# Patient Record
Sex: FEMALE | Race: WHITE | NOT HISPANIC OR LATINO | Employment: OTHER | URBAN - METROPOLITAN AREA
[De-identification: names, ages, dates, MRNs, and addresses within clinical notes are randomized per-mention and may not be internally consistent; named-entity substitution may affect disease eponyms.]

---

## 2017-02-15 ENCOUNTER — OFFICE VISIT (OUTPATIENT)
Dept: INTERNAL MEDICINE | Age: 72
End: 2017-02-15

## 2017-02-15 VITALS
SYSTOLIC BLOOD PRESSURE: 134 MMHG | TEMPERATURE: 97.6 F | WEIGHT: 136.2 LBS | BODY MASS INDEX: 25.06 KG/M2 | HEART RATE: 107 BPM | DIASTOLIC BLOOD PRESSURE: 84 MMHG | HEIGHT: 62 IN

## 2017-02-15 DIAGNOSIS — Z23 ENCOUNTER FOR IMMUNIZATION: ICD-10-CM

## 2017-02-15 DIAGNOSIS — I10 ESSENTIAL HYPERTENSION: ICD-10-CM

## 2017-02-15 DIAGNOSIS — M54.50 ACUTE BILATERAL LOW BACK PAIN WITHOUT SCIATICA: ICD-10-CM

## 2017-02-15 DIAGNOSIS — R09.89 PALPABLE ABDOMINAL AORTA: ICD-10-CM

## 2017-02-15 DIAGNOSIS — E28.39: ICD-10-CM

## 2017-02-15 DIAGNOSIS — Z00.00 MEDICARE ANNUAL WELLNESS VISIT, SUBSEQUENT: Primary | ICD-10-CM

## 2017-02-15 DIAGNOSIS — K21.9 GASTROESOPHAGEAL REFLUX DISEASE WITHOUT ESOPHAGITIS: ICD-10-CM

## 2017-02-15 DIAGNOSIS — E78.5 HYPERLIPIDEMIA, UNSPECIFIED HYPERLIPIDEMIA TYPE: ICD-10-CM

## 2017-02-15 LAB
ALBUMIN SERPL-MCNC: 5.1 G/DL (ref 3.5–5.2)
ALBUMIN/GLOB SERPL: 1.9 G/DL
ALP SERPL-CCNC: 98 U/L (ref 39–117)
ALT SERPL-CCNC: 22 U/L (ref 1–33)
AST SERPL-CCNC: 27 U/L (ref 1–32)
BASOPHILS # BLD AUTO: 0.04 10*3/MM3 (ref 0–0.2)
BASOPHILS NFR BLD AUTO: 0.4 % (ref 0–1.5)
BILIRUB SERPL-MCNC: 0.4 MG/DL (ref 0.1–1.2)
BUN SERPL-MCNC: 15 MG/DL (ref 8–23)
BUN/CREAT SERPL: 17.9 (ref 7–25)
CALCIUM SERPL-MCNC: 9.9 MG/DL (ref 8.6–10.5)
CHLORIDE SERPL-SCNC: 102 MMOL/L (ref 98–107)
CHOLEST SERPL-MCNC: 184 MG/DL (ref 0–200)
CO2 SERPL-SCNC: 24.5 MMOL/L (ref 22–29)
CREAT SERPL-MCNC: 0.84 MG/DL (ref 0.57–1)
EOSINOPHIL # BLD AUTO: 0.06 10*3/MM3 (ref 0–0.7)
EOSINOPHIL NFR BLD AUTO: 0.6 % (ref 0.3–6.2)
ERYTHROCYTE [DISTWIDTH] IN BLOOD BY AUTOMATED COUNT: 13 % (ref 11.7–13)
GLOBULIN SER CALC-MCNC: 2.7 GM/DL
GLUCOSE SERPL-MCNC: 109 MG/DL (ref 65–99)
HCT VFR BLD AUTO: 46 % (ref 35.6–45.5)
HDLC SERPL-MCNC: 78 MG/DL (ref 40–60)
HGB BLD-MCNC: 14.5 G/DL (ref 11.9–15.5)
IMM GRANULOCYTES # BLD: 0.03 10*3/MM3 (ref 0–0.03)
IMM GRANULOCYTES NFR BLD: 0.3 % (ref 0–0.5)
LDLC SERPL CALC-MCNC: 81 MG/DL (ref 0–100)
LYMPHOCYTES # BLD AUTO: 1.41 10*3/MM3 (ref 0.9–4.8)
LYMPHOCYTES NFR BLD AUTO: 13.7 % (ref 19.6–45.3)
MCH RBC QN AUTO: 31.7 PG (ref 26.9–32)
MCHC RBC AUTO-ENTMCNC: 31.5 G/DL (ref 32.4–36.3)
MCV RBC AUTO: 100.4 FL (ref 80.5–98.2)
MONOCYTES # BLD AUTO: 0.54 10*3/MM3 (ref 0.2–1.2)
MONOCYTES NFR BLD AUTO: 5.3 % (ref 5–12)
NEUTROPHILS # BLD AUTO: 8.18 10*3/MM3 (ref 1.9–8.1)
NEUTROPHILS NFR BLD AUTO: 79.7 % (ref 42.7–76)
PLATELET # BLD AUTO: 305 10*3/MM3 (ref 140–500)
POTASSIUM SERPL-SCNC: 4.1 MMOL/L (ref 3.5–5.2)
PROT SERPL-MCNC: 7.8 G/DL (ref 6–8.5)
RBC # BLD AUTO: 4.58 10*6/MM3 (ref 3.9–5.2)
SODIUM SERPL-SCNC: 144 MMOL/L (ref 136–145)
TRIGL SERPL-MCNC: 124 MG/DL (ref 0–150)
VLDLC SERPL CALC-MCNC: 24.8 MG/DL (ref 5–40)
WBC # BLD AUTO: 10.26 10*3/MM3 (ref 4.5–10.7)

## 2017-02-15 PROCEDURE — 99214 OFFICE O/P EST MOD 30 MIN: CPT | Performed by: INTERNAL MEDICINE

## 2017-02-15 PROCEDURE — G0009 ADMIN PNEUMOCOCCAL VACCINE: HCPCS | Performed by: INTERNAL MEDICINE

## 2017-02-15 PROCEDURE — G0439 PPPS, SUBSEQ VISIT: HCPCS | Performed by: INTERNAL MEDICINE

## 2017-02-15 PROCEDURE — 90670 PCV13 VACCINE IM: CPT | Performed by: INTERNAL MEDICINE

## 2017-02-15 RX ORDER — CYCLOBENZAPRINE HCL 5 MG
5 TABLET ORAL 3 TIMES DAILY
Qty: 21 TABLET | Refills: 0 | Status: SHIPPED | OUTPATIENT
Start: 2017-02-15 | End: 2017-06-05 | Stop reason: SDUPTHER

## 2017-02-15 NOTE — PROGRESS NOTES
Subjective   Neela Ramirez is a 71 y.o. female.     History of Present Illness     HTN: Patient is compliant with medication, dietary salt restriction, not regularly monitor blood pressure the outpatient setting.  Is not exercise on a regular basis.    Hyperlipidemia: She is compliant with meds, diet, and is fasting for lab work.    Esophageal reflux on medication, symptoms are controlled to her satisfaction.  Without side effects.    Acute bilateral low back pain.  She noticed this after a fall he denied any radiculopathy, no fever, chills, sweats, nausea, vomiting, diarrhea, anorexia, GI or  incontinence.    Ovarian failure, patient needs to have bone density test updated today    Immunization update, Prevnar be provided.    The following portions of the patient's history were reviewed and updated as appropriate: allergies, current medications, past family history, past medical history, past social history, past surgical history and problem list.    Review of Systems   Constitutional: Negative.    HENT: Negative.    Eyes: Negative.    Respiratory: Negative.    Cardiovascular: Negative.    Gastrointestinal: Negative.    Genitourinary: Negative.    Musculoskeletal: Negative.    Skin: Negative.    Neurological: Negative.    Hematological: Negative.    Psychiatric/Behavioral: Negative.        Objective   Physical Exam   Constitutional: She is oriented to person, place, and time. She appears well-developed and well-nourished. No distress.   HENT:   Head: Normocephalic and atraumatic.   Right Ear: Tympanic membrane, external ear and ear canal normal.   Left Ear: Tympanic membrane, external ear and ear canal normal.   Mouth/Throat: Uvula is midline, oropharynx is clear and moist and mucous membranes are normal.   Eyes: Conjunctivae and EOM are normal. Pupils are equal, round, and reactive to light.   Neck: Normal range of motion. Neck supple. No JVD present. Carotid bruit is not present. No thyromegaly present.    Cardiovascular: Normal rate, regular rhythm, normal heart sounds and intact distal pulses.  Exam reveals no gallop and no friction rub.    No murmur heard.  Pulmonary/Chest: Effort normal and breath sounds normal. She has no wheezes. She has no rales.   Abdominal: Soft. Bowel sounds are normal. There is no hepatosplenomegaly. There is no tenderness.   Genitourinary:   Genitourinary Comments: Defer to GYN   Musculoskeletal: Normal range of motion. She exhibits no edema or deformity.   Leg raising test negative bilaterally, area of tenderness is the insertion of the musculature on the superior pelvic brim bilaterally.   Lymphadenopathy:     She has no cervical adenopathy.   Neurological: She is alert and oriented to person, place, and time. She has normal strength and normal reflexes. No cranial nerve deficit or sensory deficit. Coordination normal.   Skin: Skin is warm and dry. No rash noted.   Psychiatric: She has a normal mood and affect. Her speech is normal and behavior is normal. Judgment and thought content normal. Cognition and memory are normal.   Nursing note and vitals reviewed.      Assessment/Plan   Neela was seen today for annual exam.    Diagnoses and all orders for this visit:    Medicare annual wellness visit, subsequent  -     CBC & Differential    Essential hypertension  -     Comprehensive Metabolic Panel    Hyperlipidemia, unspecified hyperlipidemia type  -     Lipid Panel    Gastroesophageal reflux disease without esophagitis    Acute bilateral low back pain without sciatica    Premature ovarian failure type 7  -     DEXA Bone Density Axial    Encounter for immunization  -     Pneumococcal Conjugate Vaccine 13-[employer] All    Palpable abdominal aorta  -     US AAA Screen Limited      #1 Medicare subsequent annual wellness visit, clinically stable, see comments above recommend repeat exam one year, check CBC today, patient is aware that this is a noncovered benefit and will sign an ABN for this  laboratory test.    Hypertension stable on current medication.  Plan: Same meds, blood pressure check 4 months check CMP as above, follow-up results by mail.    #3 hyperlipidemia on medication, status to be determined.  Plan: Continue meds, check lipid today results will be followed up by mail.    #4 esophageal reflux, continue meds.    #5 Back pain SPECT secondary to myofascial strain, is no alarm symptoms noted, I do not suspect degenerative disc disease of the lumbar spine.  Recommend local heat, exercises, she will be provided, Advil as needed.  We'll also prescribe Flexeril 5 mg 3 times a day for 7 days.    #6 ovarian failure, check DEXA.    #7 immunization update, Prevnar today.    #8 palpable abdominal aorta, check ultrasound.

## 2017-02-23 ENCOUNTER — HOSPITAL ENCOUNTER (OUTPATIENT)
Dept: BONE DENSITY | Facility: HOSPITAL | Age: 72
Discharge: HOME | End: 2017-02-23
Admitting: INTERNAL MEDICINE

## 2017-02-23 ENCOUNTER — HOSPITAL ENCOUNTER (OUTPATIENT)
Dept: ULTRASOUND IMAGING | Facility: HOSPITAL | Age: 72
Discharge: HOME | End: 2017-02-23

## 2017-02-23 PROCEDURE — 77080 DXA BONE DENSITY AXIAL: CPT

## 2017-02-23 PROCEDURE — 76775 US EXAM ABDO BACK WALL LIM: CPT

## 2017-03-20 DIAGNOSIS — I10 ESSENTIAL HYPERTENSION: ICD-10-CM

## 2017-03-20 RX ORDER — METOPROLOL SUCCINATE 25 MG/1
TABLET, EXTENDED RELEASE ORAL
Qty: 90 TABLET | Refills: 0 | Status: SHIPPED | OUTPATIENT
Start: 2017-03-20 | End: 2017-06-20 | Stop reason: SDUPTHER

## 2017-03-20 RX ORDER — AMLODIPINE BESYLATE 5 MG/1
TABLET ORAL
Qty: 90 TABLET | Refills: 0 | Status: SHIPPED | OUTPATIENT
Start: 2017-03-20 | End: 2017-06-20 | Stop reason: SDUPTHER

## 2017-03-21 ENCOUNTER — TELEPHONE (OUTPATIENT)
Dept: INTERNAL MEDICINE | Age: 72
End: 2017-03-21

## 2017-03-21 RX ORDER — ALENDRONATE SODIUM 35 MG/1
70 TABLET ORAL
Qty: 12 TABLET | Refills: 0 | Status: SHIPPED | OUTPATIENT
Start: 2017-03-21 | End: 2017-05-08 | Stop reason: SDUPTHER

## 2017-04-14 DIAGNOSIS — F41.9 ANXIETY: Primary | ICD-10-CM

## 2017-04-14 RX ORDER — ESCITALOPRAM OXALATE 20 MG/1
TABLET ORAL
Qty: 90 TABLET | Refills: 0 | Status: SHIPPED | OUTPATIENT
Start: 2017-04-14 | End: 2017-07-13 | Stop reason: SDUPTHER

## 2017-05-09 RX ORDER — ALENDRONATE SODIUM 35 MG/1
TABLET ORAL
Qty: 12 TABLET | Refills: 0 | Status: SHIPPED | OUTPATIENT
Start: 2017-05-09 | End: 2017-06-17 | Stop reason: SDUPTHER

## 2017-06-05 DIAGNOSIS — M54.50 ACUTE BILATERAL LOW BACK PAIN WITHOUT SCIATICA: ICD-10-CM

## 2017-06-06 RX ORDER — CYCLOBENZAPRINE HCL 5 MG
TABLET ORAL
Qty: 21 TABLET | Refills: 0 | Status: SHIPPED | OUTPATIENT
Start: 2017-06-06 | End: 2017-07-17 | Stop reason: SDUPTHER

## 2017-06-09 DIAGNOSIS — M54.50 ACUTE BILATERAL LOW BACK PAIN WITHOUT SCIATICA: ICD-10-CM

## 2017-06-09 RX ORDER — CYCLOBENZAPRINE HCL 5 MG
TABLET ORAL
Qty: 21 TABLET | Refills: 0 | OUTPATIENT
Start: 2017-06-09

## 2017-06-19 RX ORDER — ALENDRONATE SODIUM 35 MG/1
TABLET ORAL
Qty: 12 TABLET | Refills: 0 | Status: SHIPPED | OUTPATIENT
Start: 2017-06-19 | End: 2017-09-19 | Stop reason: SDUPTHER

## 2017-06-20 DIAGNOSIS — I10 ESSENTIAL HYPERTENSION: ICD-10-CM

## 2017-06-20 RX ORDER — AMLODIPINE BESYLATE 5 MG/1
TABLET ORAL
Qty: 90 TABLET | Refills: 0 | Status: SHIPPED | OUTPATIENT
Start: 2017-06-20 | End: 2017-09-16 | Stop reason: SDUPTHER

## 2017-06-20 RX ORDER — METOPROLOL SUCCINATE 25 MG/1
TABLET, EXTENDED RELEASE ORAL
Qty: 90 TABLET | Refills: 0 | Status: SHIPPED | OUTPATIENT
Start: 2017-06-20 | End: 2017-09-16 | Stop reason: SDUPTHER

## 2017-06-21 DIAGNOSIS — I10 ESSENTIAL HYPERTENSION: ICD-10-CM

## 2017-06-22 RX ORDER — AMLODIPINE BESYLATE 5 MG/1
TABLET ORAL
Qty: 90 TABLET | Refills: 0 | OUTPATIENT
Start: 2017-06-22

## 2017-06-22 RX ORDER — METOPROLOL SUCCINATE 25 MG/1
TABLET, EXTENDED RELEASE ORAL
Qty: 90 TABLET | Refills: 0 | OUTPATIENT
Start: 2017-06-22

## 2017-07-13 DIAGNOSIS — F41.9 ANXIETY: ICD-10-CM

## 2017-07-14 RX ORDER — ESCITALOPRAM OXALATE 20 MG/1
TABLET ORAL
Qty: 90 TABLET | Refills: 0 | Status: SHIPPED | OUTPATIENT
Start: 2017-07-14 | End: 2018-05-22

## 2017-07-17 ENCOUNTER — OFFICE VISIT (OUTPATIENT)
Dept: INTERNAL MEDICINE | Age: 72
End: 2017-07-17

## 2017-07-17 ENCOUNTER — HOSPITAL ENCOUNTER (OUTPATIENT)
Dept: GENERAL RADIOLOGY | Facility: HOSPITAL | Age: 72
Discharge: HOME | End: 2017-07-17
Admitting: INTERNAL MEDICINE

## 2017-07-17 ENCOUNTER — HOSPITAL ENCOUNTER (OUTPATIENT)
Dept: GENERAL RADIOLOGY | Facility: HOSPITAL | Age: 72
Discharge: HOME | End: 2017-07-17

## 2017-07-17 VITALS
BODY MASS INDEX: 24.07 KG/M2 | HEIGHT: 62 IN | SYSTOLIC BLOOD PRESSURE: 136 MMHG | OXYGEN SATURATION: 98 % | DIASTOLIC BLOOD PRESSURE: 72 MMHG | TEMPERATURE: 98.6 F | HEART RATE: 105 BPM | WEIGHT: 130.8 LBS

## 2017-07-17 DIAGNOSIS — M25.552 PAIN OF BOTH HIP JOINTS: ICD-10-CM

## 2017-07-17 DIAGNOSIS — I10 ESSENTIAL HYPERTENSION: Primary | ICD-10-CM

## 2017-07-17 DIAGNOSIS — M25.551 PAIN OF BOTH HIP JOINTS: ICD-10-CM

## 2017-07-17 DIAGNOSIS — M54.50 ACUTE BILATERAL LOW BACK PAIN WITHOUT SCIATICA: ICD-10-CM

## 2017-07-17 PROCEDURE — 99214 OFFICE O/P EST MOD 30 MIN: CPT | Performed by: INTERNAL MEDICINE

## 2017-07-17 PROCEDURE — 73521 X-RAY EXAM HIPS BI 2 VIEWS: CPT

## 2017-07-17 RX ORDER — CYCLOBENZAPRINE HCL 5 MG
5 TABLET ORAL NIGHTLY PRN
Qty: 21 TABLET | Refills: 0 | COMMUNITY
Start: 2017-07-17 | End: 2018-05-22

## 2017-07-17 RX ORDER — MELOXICAM 7.5 MG/1
7.5 TABLET ORAL DAILY
Qty: 30 TABLET | Refills: 1 | Status: SHIPPED | OUTPATIENT
Start: 2017-07-17 | End: 2018-01-04

## 2017-07-17 NOTE — PROGRESS NOTES
"Neela JOSE James / 72 y.o. / female  07/17/2017  VITALS    /72  Pulse 105  Temp 98.6 °F (37 °C)  Ht 61.5\" (156.2 cm)  Wt 130 lb 12.8 oz (59.3 kg)  SpO2 98%  BMI 24.31 kg/m2  BP Readings from Last 3 Encounters:   07/17/17 136/72   02/15/17 134/84   08/11/16 138/70     Wt Readings from Last 3 Encounters:   07/17/17 130 lb 12.8 oz (59.3 kg)   02/15/17 136 lb 3.2 oz (61.8 kg)   08/11/16 135 lb 6.4 oz (61.4 kg)      Body mass index is 24.31 kg/(m^2).    CC:  Main reason(s) for today's visit: Hypertension      HPI:     Patient presents today for four-month follow-up for hypertension.  When last seen by me, her blood pressure was well-controlled.    Hypertension: She is compliant with medication, dietary salt restriction, exercise has been limited because of the hot weather, she does not regularly monitor blood pressure the outpatient setting.  There are no medication side effects noted.    Hip pain, noted over the past 4 weeks.  Pain begins in the posterior region of the hip, with radiation around to the groin bilaterally.  Pain seems to improve with lying supine in the evening.  Pain is worsened with weightbearing, and getting in and out of her new car.  She does not have running towards.  Pain is up to 8/10 in severity, aching in quality, and may last up to all day long.    Patient Care Team:  Pj Ramirez MD as PCP - General  Pj Ramirez MD as PCP - Claims Attributed    ____________________________________________________________________    ASSESSMENT & PLAN:    Problem List Items Addressed This Visit        Unprioritized    Essential hypertension - Primary    Relevant Medications    metoprolol succinate XL (TOPROL-XL) 25 MG 24 hr tablet    amLODIPine (NORVASC) 5 MG tablet    Bilateral low back pain without sciatica    Relevant Medications    cyclobenzaprine (FLEXERIL) 5 MG tablet        No orders of the defined types were placed in this encounter.      Summary/Discussion:     ·   Number-one hypertension " stable on current medical regimen.  Plan: Same meds, blood pressure check 4 months.    #2 hip pain bilaterally suspect DJD.  We'll check x-ray today,last renal function normal with a creatinine clearance of 67 cc/m.  Recommend trial of Mobic, 7.5 mg daily, patient will follow-up with me by phone in 2 weeks with an update on her symptoms.we will treat as above, patient is aware that this combination of Lexapro and Mobic and increased risk of GI bleeding.  She will use the medication with food and contact me if she has any abdominal discomfort.  She was advised not to use any over-the-counter NSAIDs or aspirin products.      No Follow-up on file.    No future appointments.    ______________________________________________________    REVIEW OF SYSTEMS    Review of Systems   Respiratory: Negative for chest tightness and shortness of breath.    Cardiovascular: Negative for chest pain and palpitations.   Genitourinary: Negative for dysuria.   Neurological: Negative for dizziness, syncope, speech difficulty, weakness, light-headedness and headaches.         PHYSICAL EXAMINATION    Physical Exam   Constitutional: She is oriented to person, place, and time. She appears well-developed and well-nourished. No distress.   Cardiovascular: Normal rate, regular rhythm, normal heart sounds and intact distal pulses.  Exam reveals no gallop and no friction rub.    No murmur heard.  Pulmonary/Chest: Effort normal and breath sounds normal. She has no wheezes. She has no rales.   Musculoskeletal: Normal range of motion. She exhibits no edema.   Taryn test bilaterally positive right greater than left, straight leg raising test negative bilaterally.  There is no discomfort with internal or external rotation of hip joint.   Neurological: She is alert and oriented to person, place, and time.   Skin: Skin is warm and dry. No rash noted.   Psychiatric: She has a normal mood and affect. Her behavior is normal. Judgment and thought content  normal.   Nursing note and vitals reviewed.      REVIEWED DATA:    Labs:   Lab Results   Component Value Date     02/15/2017    K 4.1 02/15/2017    AST 27 02/15/2017    ALT 22 02/15/2017    BUN 15 02/15/2017    CREATININE 0.84 02/15/2017    CREATININE 1.01 (H) 08/11/2016    CREATININE 0.83 09/30/2015    EGFRIFNONA 67 02/15/2017    EGFRIFAFRI 81 02/15/2017          Lab Results   Component Value Date     (H) 02/15/2017     (H) 08/11/2016     (H) 09/30/2015       Lab Results   Component Value Date    LDL 81 02/15/2017     (H) 08/11/2016    LDL 76 09/30/2015    HDL 78 (H) 02/15/2017    TRIG 124 02/15/2017       No results found for: TSH, FREET4       Lab Results   Component Value Date    WBC 10.26 02/15/2017    HGB 14.5 02/15/2017     02/15/2017        Imaging:        Medical Tests:        Summary of old records / correspondence / consultant report:        Request outside records:        No Known Allergies    Current Outpatient Prescriptions   Medication Sig Dispense Refill   • alendronate (FOSAMAX) 35 MG tablet TAKE TWO TABLETS BY MOUTH ONCE A WEEK 12 tablet 0   • amLODIPine (NORVASC) 5 MG tablet TAKE ONE TABLET BY MOUTH ONCE DAILY 90 tablet 0   • cyclobenzaprine (FLEXERIL) 5 MG tablet Take 1 tablet by mouth At Night As Needed for Muscle Spasms. 21 tablet 0   • desoximetasone (TOPICORT) 0.25 % ointment 1 application 2 (two) times a day as needed.     • escitalopram (LEXAPRO) 20 MG tablet TAKE ONE TABLET BY MOUTH ONCE DAILY 90 tablet 0   • metoprolol succinate XL (TOPROL-XL) 25 MG 24 hr tablet TAKE ONE TABLET BY MOUTH ONCE DAILY 90 tablet 0   • omeprazole (PriLOSEC) 20 MG capsule TAKE ONE CAPSULE BY MOUTH ONCE DAILY IN THE MORNING BEFORE  BREAKFAST 30 capsule 0   • rosuvastatin (CRESTOR) 5 MG tablet Take 1 tablet by mouth daily.       No current facility-administered medications for this visit.        PFSH:     The following portions of the patient's history were reviewed and  updated as appropriate: Allergies / Current Medications / Past Medical History / Surgical History / Social History / Family History    Patient Active Problem List   Diagnosis   • Anxiety   • DD (diverticular disease)   • Gastroesophageal reflux disease   • Essential hypertension   • Hyperlipidemia   • Osteopenia   • Bilateral low back pain without sciatica   • Medicare annual wellness visit, subsequent       Past Medical History:   Diagnosis Date   • Anxiety    • Backache    • Constipation    • Dermatitis    • Diverticulosis     Colonoscopy November 2014   • Dyspepsia    • Dysuria    • Erythema of face     Transitory Erythema Of The Face   • Impacted cerumen    • Neck pain    • Tachycardia        Past Surgical History:   Procedure Laterality Date   • EXOSTECTOMY      Simple Bunion Exostectomy ([facility] Procedure)       Social History     Social History   • Marital status:      Spouse name: N/A   • Number of children: 1   • Years of education: N/A     Social History Main Topics   • Smoking status: Never Smoker   • Smokeless tobacco: Never Used   • Alcohol use Yes      Comment: social drinker   • Drug use: None   • Sexual activity: Not Asked     Other Topics Concern   • None     Social History Narrative       Family History   Problem Relation Age of Onset   • No Known Problems Father          **John Disclaimer:   Much of this encounter note is an electronic transcription/translation of spoken language to printed text. The electronic translation of spoken language may permit erroneous, or at times, nonsensical words or phrases to be inadvertently transcribed. Although I have reviewed the note for such errors, some may still exist.

## 2017-07-18 DIAGNOSIS — F41.9 ANXIETY: ICD-10-CM

## 2017-07-18 RX ORDER — ESCITALOPRAM OXALATE 20 MG/1
TABLET ORAL
Qty: 90 TABLET | Refills: 0 | Status: SHIPPED | OUTPATIENT
Start: 2017-07-18 | End: 2018-01-04 | Stop reason: SDUPTHER

## 2017-08-18 DIAGNOSIS — M54.50 ACUTE BILATERAL LOW BACK PAIN WITHOUT SCIATICA: ICD-10-CM

## 2017-08-18 RX ORDER — CYCLOBENZAPRINE HCL 5 MG
TABLET ORAL
Qty: 21 TABLET | Refills: 0 | OUTPATIENT
Start: 2017-08-18

## 2017-09-16 DIAGNOSIS — I10 ESSENTIAL HYPERTENSION: ICD-10-CM

## 2017-09-18 RX ORDER — AMLODIPINE BESYLATE 5 MG/1
TABLET ORAL
Qty: 90 TABLET | Refills: 0 | Status: SHIPPED | OUTPATIENT
Start: 2017-09-18 | End: 2017-12-16 | Stop reason: SDUPTHER

## 2017-09-18 RX ORDER — METOPROLOL SUCCINATE 25 MG/1
TABLET, EXTENDED RELEASE ORAL
Qty: 90 TABLET | Refills: 0 | Status: SHIPPED | OUTPATIENT
Start: 2017-09-18 | End: 2017-12-16 | Stop reason: SDUPTHER

## 2017-09-20 RX ORDER — ALENDRONATE SODIUM 35 MG/1
TABLET ORAL
Qty: 24 TABLET | Refills: 0 | Status: SHIPPED | OUTPATIENT
Start: 2017-09-20 | End: 2017-12-16 | Stop reason: SDUPTHER

## 2017-11-13 RX ORDER — ROSUVASTATIN CALCIUM 5 MG/1
TABLET, COATED ORAL
Qty: 90 TABLET | Refills: 3 | Status: SHIPPED | OUTPATIENT
Start: 2017-11-13 | End: 2018-11-29 | Stop reason: SDUPTHER

## 2017-12-16 DIAGNOSIS — I10 ESSENTIAL HYPERTENSION: ICD-10-CM

## 2017-12-18 RX ORDER — AMLODIPINE BESYLATE 5 MG/1
TABLET ORAL
Qty: 90 TABLET | Refills: 0 | Status: SHIPPED | OUTPATIENT
Start: 2017-12-18 | End: 2018-03-14 | Stop reason: SDUPTHER

## 2017-12-18 RX ORDER — METOPROLOL SUCCINATE 25 MG/1
TABLET, EXTENDED RELEASE ORAL
Qty: 90 TABLET | Refills: 0 | Status: SHIPPED | OUTPATIENT
Start: 2017-12-18 | End: 2018-03-14 | Stop reason: SDUPTHER

## 2017-12-18 RX ORDER — ALENDRONATE SODIUM 35 MG/1
TABLET ORAL
Qty: 24 TABLET | Refills: 0 | Status: SHIPPED | OUTPATIENT
Start: 2017-12-18 | End: 2017-12-22 | Stop reason: SDUPTHER

## 2017-12-22 DIAGNOSIS — M85.80 OSTEOPENIA, UNSPECIFIED LOCATION: Primary | ICD-10-CM

## 2017-12-22 RX ORDER — ALENDRONATE SODIUM 70 MG/1
70 TABLET ORAL
Qty: 13 TABLET | Refills: 0 | Status: SHIPPED | OUTPATIENT
Start: 2017-12-22 | End: 2018-03-19 | Stop reason: SDUPTHER

## 2017-12-22 NOTE — TELEPHONE ENCOUNTER
Pt should not be taking Alendronate 35mg two tablets 1 time a week. Per Dr. Ramirez, pt should only be taking 70mg 1 time a week.     New Rx has been sent to pharmacy for Alendronate 70mg tablet 1 Q7 days.    KD

## 2018-01-04 ENCOUNTER — OFFICE VISIT (OUTPATIENT)
Dept: INTERNAL MEDICINE | Age: 73
End: 2018-01-04

## 2018-01-04 VITALS — HEART RATE: 96 BPM | TEMPERATURE: 98.6 F | DIASTOLIC BLOOD PRESSURE: 80 MMHG | SYSTOLIC BLOOD PRESSURE: 130 MMHG

## 2018-01-04 DIAGNOSIS — M25.552 PAIN OF BOTH HIP JOINTS: ICD-10-CM

## 2018-01-04 DIAGNOSIS — M25.551 PAIN OF BOTH HIP JOINTS: ICD-10-CM

## 2018-01-04 DIAGNOSIS — E78.5 HYPERLIPIDEMIA, UNSPECIFIED HYPERLIPIDEMIA TYPE: ICD-10-CM

## 2018-01-04 DIAGNOSIS — Z00.00 ROUTINE HEALTH MAINTENANCE: ICD-10-CM

## 2018-01-04 DIAGNOSIS — I10 ESSENTIAL HYPERTENSION: Primary | ICD-10-CM

## 2018-01-04 PROCEDURE — 99213 OFFICE O/P EST LOW 20 MIN: CPT | Performed by: INTERNAL MEDICINE

## 2018-01-04 RX ORDER — OMEPRAZOLE 20 MG/1
20 CAPSULE, DELAYED RELEASE ORAL AS NEEDED
Qty: 30 CAPSULE | Refills: 0
Start: 2018-01-04 | End: 2018-02-05 | Stop reason: SDUPTHER

## 2018-01-04 RX ORDER — MELOXICAM 7.5 MG/1
7.5 TABLET ORAL AS NEEDED
Qty: 30 TABLET | Refills: 1
Start: 2018-01-04 | End: 2018-05-22

## 2018-01-04 NOTE — PROGRESS NOTES
Neela JOSE James / 72 y.o. / female  01/04/2018  VITALS    Visit Vitals   • /80   • Pulse 96   • Temp 98.6 °F (37 °C)       BP Readings from Last 3 Encounters:   01/04/18 130/80   07/17/17 136/72   02/15/17 134/84     Wt Readings from Last 3 Encounters:   07/17/17 59.3 kg (130 lb 12.8 oz)   02/15/17 61.8 kg (136 lb 3.2 oz)   08/11/16 61.4 kg (135 lb 6.4 oz)      There is no height or weight on file to calculate BMI.    CC:  Main reason(s) for today's visit: Hypertension      HPI:     Patient presents today for four-month follow-up of hypertension.  When last seen by me, blood pressure was well-controlled.  Today she is compliant with medication, dietary salt restriction, regular physical activity, is not regularly monitor blood pressure the outpatient setting.    Patient's hip pain as noted at previous visit has improved.  She is not using the meloxicam at this time.  She occasionally will have pain in the lumbar region after carrying heavy objects.  This is treated with local heat at home.    Laboratory review, February 2017 CMP, lipid, CBC, all acceptable, see below for specifics of lab results.    Patient Care Team:  Pj Ramirez MD as PCP - General  Pj Ramirez MD as PCP - Claims Attributed  ____________________________________________________________________    ASSESSMENT & PLAN:    Problem List Items Addressed This Visit        Unprioritized    Essential hypertension - Primary    Relevant Medications    amLODIPine (NORVASC) 5 MG tablet    metoprolol succinate XL (TOPROL-XL) 25 MG 24 hr tablet    Hyperlipidemia    Relevant Medications    rosuvastatin (CRESTOR) 5 MG tablet      Other Visit Diagnoses     Pain of both hip joints        Relevant Medications    meloxicam (MOBIC) 7.5 MG tablet    Routine health maintenance            No orders of the defined types were placed in this encounter.      Summary/Discussion:  · Number-one hypertension stable on current medication.  Plan: Same meds, blood  pressure check 4 months.  ·   · #2 hyperlipidemia stable on medication.  Plan: Continue same, repeat lab again in summer of this year.  ·   · Back pain, continue with local heat as needed.    #4 routine health maintenance, schedule annual wellness visit 4 months.    Return in about 4 months (around 5/4/2018) for Annual wellness visit, Blood pressure check.    No future appointments.    ______________________________________________________    REVIEW OF SYSTEMS    Review of Systems   Constitutional: Negative for appetite change, chills, diaphoresis and fever.   Respiratory: Negative for chest tightness and shortness of breath.    Cardiovascular: Negative for chest pain and palpitations.   Gastrointestinal: Negative for nausea and vomiting.        No GI incontinence noted   Genitourinary:        No  incontinence noted   Neurological: Negative for dizziness, syncope, speech difficulty, weakness, light-headedness and headaches.         PHYSICAL EXAMINATION    Physical Exam   Constitutional: She is oriented to person, place, and time. She appears well-developed and well-nourished. No distress.   Cardiovascular: Normal rate, regular rhythm, normal heart sounds and intact distal pulses.  Exam reveals no gallop and no friction rub.    No murmur heard.  Pulmonary/Chest: Effort normal and breath sounds normal. She has no wheezes. She has no rales.   Musculoskeletal: She exhibits no edema.   Neurological: She is alert and oriented to person, place, and time.   Skin: Skin is warm and dry. No rash noted.   Psychiatric: She has a normal mood and affect. Her behavior is normal. Judgment and thought content normal.   Nursing note and vitals reviewed.    REVIEWED DATA:    Labs:     Lab Results   Component Value Date     02/15/2017    K 4.1 02/15/2017    AST 27 02/15/2017    ALT 22 02/15/2017    BUN 15 02/15/2017    CREATININE 0.84 02/15/2017    CREATININE 1.01 (H) 08/11/2016    CREATININE 0.83 09/30/2015    EGFRIFNONA 67  02/15/2017    EGFRIFAFRI 81 02/15/2017       Lab Results   Component Value Date     (H) 02/15/2017     (H) 08/11/2016     (H) 09/30/2015       Lab Results   Component Value Date    LDL 81 02/15/2017     (H) 08/11/2016    LDL 76 09/30/2015    HDL 78 (H) 02/15/2017    TRIG 124 02/15/2017       No results found for: TSH, FREET4    Lab Results   Component Value Date    WBC 10.26 02/15/2017    HGB 14.5 02/15/2017     02/15/2017       Imaging:         Medical Tests:         Summary of old records / correspondence / consultant report:         Request outside records:         ALLERGIES  No Known Allergies     MEDICATIONS  Current Outpatient Prescriptions   Medication Sig Dispense Refill   • alendronate (FOSAMAX) 70 MG tablet Take 1 tablet by mouth Every 7 (Seven) Days. 13 tablet 0   • amLODIPine (NORVASC) 5 MG tablet TAKE ONE TABLET BY MOUTH ONCE DAILY 90 tablet 0   • cyclobenzaprine (FLEXERIL) 5 MG tablet Take 1 tablet by mouth At Night As Needed for Muscle Spasms. 21 tablet 0   • desoximetasone (TOPICORT) 0.25 % ointment 1 application 2 (two) times a day as needed.     • escitalopram (LEXAPRO) 20 MG tablet TAKE ONE TABLET BY MOUTH ONCE DAILY 90 tablet 0   • meloxicam (MOBIC) 7.5 MG tablet Take 1 tablet by mouth As Needed. 30 tablet 1   • metoprolol succinate XL (TOPROL-XL) 25 MG 24 hr tablet TAKE ONE TABLET BY MOUTH ONCE DAILY 90 tablet 0   • omeprazole (priLOSEC) 20 MG capsule Take 1 capsule by mouth As Needed (Dyspepsia). 30 capsule 0   • rosuvastatin (CRESTOR) 5 MG tablet TAKE ONE TABLET BY MOUTH ONCE DAILY 90 tablet 3     No current facility-administered medications for this visit.        PFSH:     The following portions of the patient's history were reviewed and updated as appropriate: Allergies / Current Medications / Past Medical History / Surgical History / Social History / Family History    PROBLEM LIST   Patient Active Problem List   Diagnosis   • Anxiety   • DD (diverticular  disease)   • Gastroesophageal reflux disease   • Essential hypertension   • Hyperlipidemia   • Osteopenia   • Bilateral low back pain without sciatica   • Medicare annual wellness visit, subsequent       PAST MEDICAL HISTORY  Past Medical History:   Diagnosis Date   • Anxiety    • Backache    • Constipation    • Dermatitis    • Diverticulosis     Colonoscopy November 2014   • Dyspepsia    • Dysuria    • Erythema of face     Transitory Erythema Of The Face   • Impacted cerumen    • Neck pain    • Tachycardia        SURGICAL HISTORY  Past Surgical History:   Procedure Laterality Date   • EXOSTECTOMY      Simple Bunion Exostectomy ([facility] Procedure)       SOCIAL HISTORY  Social History     Social History   • Marital status:      Spouse name: N/A   • Number of children: 1   • Years of education: N/A     Social History Main Topics   • Smoking status: Never Smoker   • Smokeless tobacco: Never Used   • Alcohol use Yes      Comment: social drinker   • Drug use: None   • Sexual activity: Not Asked     Other Topics Concern   • None     Social History Narrative       FAMILY HISTORY  Family History   Problem Relation Age of Onset   • No Known Problems Father          **John Disclaimer:   Much of this encounter note is an electronic transcription/translation of spoken language to printed text. The electronic translation of spoken language may permit erroneous, or at times, nonsensical words or phrases to be inadvertently transcribed. Although I have reviewed the note for such errors, some may still exist.

## 2018-01-09 DIAGNOSIS — F41.9 ANXIETY: ICD-10-CM

## 2018-01-09 RX ORDER — ESCITALOPRAM OXALATE 20 MG/1
TABLET ORAL
Qty: 90 TABLET | Refills: 1 | Status: SHIPPED | OUTPATIENT
Start: 2018-01-09 | End: 2018-07-03 | Stop reason: SDUPTHER

## 2018-02-05 RX ORDER — OMEPRAZOLE 20 MG/1
CAPSULE, DELAYED RELEASE ORAL
Qty: 30 CAPSULE | Refills: 0 | Status: SHIPPED | OUTPATIENT
Start: 2018-02-05 | End: 2019-08-15 | Stop reason: SDUPTHER

## 2018-03-08 DIAGNOSIS — M54.50 ACUTE BILATERAL LOW BACK PAIN WITHOUT SCIATICA: ICD-10-CM

## 2018-03-08 RX ORDER — CYCLOBENZAPRINE HCL 5 MG
TABLET ORAL
Qty: 21 TABLET | Refills: 0 | Status: SHIPPED | OUTPATIENT
Start: 2018-03-08 | End: 2018-05-22

## 2018-03-14 DIAGNOSIS — I10 ESSENTIAL HYPERTENSION: ICD-10-CM

## 2018-03-15 RX ORDER — METOPROLOL SUCCINATE 25 MG/1
TABLET, EXTENDED RELEASE ORAL
Qty: 90 TABLET | Refills: 0 | Status: SHIPPED | OUTPATIENT
Start: 2018-03-15 | End: 2018-06-16 | Stop reason: SDUPTHER

## 2018-03-15 RX ORDER — AMLODIPINE BESYLATE 5 MG/1
TABLET ORAL
Qty: 90 TABLET | Refills: 0 | Status: SHIPPED | OUTPATIENT
Start: 2018-03-15 | End: 2018-06-16 | Stop reason: SDUPTHER

## 2018-03-19 DIAGNOSIS — M85.80 OSTEOPENIA, UNSPECIFIED LOCATION: ICD-10-CM

## 2018-03-20 RX ORDER — ALENDRONATE SODIUM 70 MG/1
TABLET ORAL
Qty: 13 TABLET | Refills: 0 | Status: SHIPPED | OUTPATIENT
Start: 2018-03-20 | End: 2018-07-16 | Stop reason: SDUPTHER

## 2018-05-22 ENCOUNTER — OFFICE VISIT (OUTPATIENT)
Dept: INTERNAL MEDICINE | Age: 73
End: 2018-05-22

## 2018-05-22 VITALS
WEIGHT: 131.8 LBS | SYSTOLIC BLOOD PRESSURE: 140 MMHG | OXYGEN SATURATION: 98 % | HEART RATE: 116 BPM | HEIGHT: 61 IN | BODY MASS INDEX: 24.88 KG/M2 | DIASTOLIC BLOOD PRESSURE: 70 MMHG | TEMPERATURE: 98.9 F

## 2018-05-22 DIAGNOSIS — R19.8 ABDOMINAL FULLNESS: ICD-10-CM

## 2018-05-22 DIAGNOSIS — I10 ESSENTIAL HYPERTENSION: ICD-10-CM

## 2018-05-22 DIAGNOSIS — E78.5 HYPERLIPIDEMIA, UNSPECIFIED HYPERLIPIDEMIA TYPE: ICD-10-CM

## 2018-05-22 DIAGNOSIS — Z00.00 MEDICARE ANNUAL WELLNESS VISIT, SUBSEQUENT: Primary | ICD-10-CM

## 2018-05-22 DIAGNOSIS — K21.9 GASTROESOPHAGEAL REFLUX DISEASE WITHOUT ESOPHAGITIS: ICD-10-CM

## 2018-05-22 PROCEDURE — G0439 PPPS, SUBSEQ VISIT: HCPCS | Performed by: INTERNAL MEDICINE

## 2018-05-22 PROCEDURE — 99214 OFFICE O/P EST MOD 30 MIN: CPT | Performed by: INTERNAL MEDICINE

## 2018-05-22 RX ORDER — CYCLOBENZAPRINE HCL 5 MG
5 TABLET ORAL 3 TIMES DAILY PRN
Qty: 30 TABLET | Refills: 3 | Status: SHIPPED | OUTPATIENT
Start: 2018-05-22 | End: 2020-04-21 | Stop reason: SDUPTHER

## 2018-05-22 RX ORDER — CYCLOBENZAPRINE HCL 5 MG
5 TABLET ORAL 3 TIMES DAILY PRN
COMMUNITY
End: 2018-05-22 | Stop reason: SDUPTHER

## 2018-05-22 RX ORDER — ASPIRIN 81 MG/1
81 TABLET ORAL DAILY
COMMUNITY
End: 2020-02-04

## 2018-05-22 NOTE — PATIENT INSTRUCTIONS
Medicare Wellness  Personal Prevention Plan of Service     Date of Office Visit:  2018  Encounter Provider:  Pj Ramirez MD  Place of Service:  [facility]  Patient Name: Neela Ramirez YOB: 1945    As part of the Medicare Wellness portion of your visit today, we are providing you with this personalized preventive plan of services (PPPS). This plan is based upon recommendations of the [place] Preventive Services Task Force (USPSTF) and the Advisory Committee on Immunization Practices (ACIP).    This lists the preventive care services that should be considered, and provides dates of when you are due. Items listed as completed are up-to-date and do not require any further intervention.    Health Maintenance   Topic Date Due   • ZOSTER VACCINE (2 of 3) 10/27/2012   • INFLUENZA VACCINE  2018   • DXA SCAN  2019   • MEDICARE ANNUAL WELLNESS  2019   • LIPID PANEL  2019   • MAMMOGRAM  2020   • COLONOSCOPY  2024   • TDAP/TD VACCINES (2 - Td) 2025   • HEPATITIS C SCREENING  Addressed   • PNEUMOCOCCAL VACCINES (65+ LOW/MEDIUM RISK)  Completed       Orders Placed This Encounter   Procedures   • CT abdomen w contrast     Order Specific Question:   Will Oral Contrast be needed for this procedure?     Answer:   No   • Comprehensive Metabolic Panel   • Lipid Panel   • CBC & Differential     Order Specific Question:   Manual Differential     Answer:   No       No Follow-up on file.

## 2018-05-22 NOTE — PROGRESS NOTES
Subjective   Neela Ramirez is a 73 y.o. female.     History of Present Illness     Patient presents for subsequent annual Medicare wellness evaluation.    Health maintenance: Last ophthalmology visit was within the past month, dentist within the past 2 months.  Exercise patient walks.    Hypertension: She is compliant with medication, dietary salt restriction, exercise, and she does not monitor blood pressure regularly in the outpatient setting.  There are no medication side effects noted.    Hyperlipidemia: She is compliant with Crestor regularly, and is fasting for lab work today.  She does monitor fat intake as well.    Reflux, patient is compliant with omeprazole, and this does control symptoms of reflux to her satisfaction.  There are no medication side effects noted.        The following portions of the patient's history were reviewed and updated as appropriate: allergies, current medications, past family history, past medical history, past social history, past surgical history and problem list.    Review of Systems   Constitutional: Negative.    HENT: Negative.    Eyes: Negative.    Respiratory: Negative.    Cardiovascular: Negative.    Gastrointestinal: Negative.    Endocrine: Negative.    Genitourinary: Negative.    Musculoskeletal: Negative.    Skin: Negative.    Allergic/Immunologic: Negative for immunocompromised state.   Neurological: Negative.    Hematological: Negative.    Psychiatric/Behavioral: Negative.        Objective   Physical Exam   Constitutional: She is oriented to person, place, and time. She appears well-developed and well-nourished. No distress.   HENT:   Head: Normocephalic and atraumatic.   Right Ear: Tympanic membrane, external ear and ear canal normal.   Left Ear: Tympanic membrane, external ear and ear canal normal.   Mouth/Throat: Uvula is midline, oropharynx is clear and moist and mucous membranes are normal.   Eyes: Conjunctivae and EOM are normal. Pupils are equal, round, and  reactive to light.   Neck: Normal range of motion. Neck supple. No JVD present. Carotid bruit is not present. No thyromegaly present.   Cardiovascular: Normal rate, regular rhythm, normal heart sounds and intact distal pulses.  Exam reveals no gallop and no friction rub.    No murmur heard.  Pulmonary/Chest: Effort normal and breath sounds normal. She has no wheezes. She has no rales.   Abdominal: Soft. Bowel sounds are normal. There is no hepatosplenomegaly. There is no tenderness.       Area of fullness as noted.   Genitourinary:   Genitourinary Comments: Defer to gynecology   Musculoskeletal: Normal range of motion. She exhibits no edema or deformity.   Lymphadenopathy:     She has no cervical adenopathy.   Neurological: She is alert and oriented to person, place, and time. She has normal strength and normal reflexes. No cranial nerve deficit or sensory deficit. Coordination normal.   Skin: Skin is warm and dry. No rash noted.   Psychiatric: She has a normal mood and affect. Her speech is normal and behavior is normal. Judgment and thought content normal. Cognition and memory are normal.   Nursing note and vitals reviewed.      Assessment/Plan   Neela was seen today for annual exam.    Diagnoses and all orders for this visit:    Medicare annual wellness visit, subsequent  -     CBC & Differential    Essential hypertension  -     Comprehensive Metabolic Panel    Hyperlipidemia, unspecified hyperlipidemia type  -     Lipid Panel    Gastroesophageal reflux disease without esophagitis    Abdominal fullness  -     CT abdomen w contrast    Other orders  -     cyclobenzaprine (FLEXERIL) 5 MG tablet; Take 1 tablet by mouth 3 (Three) Times a Day As Needed for Muscle Spasms.      #1 subsequent annual Medicare wellness evaluation, clinically stable.  Recommend repeat exam one year.  Patient be asked to sign a ABN for CBC since is a noncovered benefit..    Hypertension stable stable on current medication.  Plan: Same meds,  blood pressure check 4 months, check CMP today, follow-up results by mail or online as appropriate.    #3 hyperlipidemia on medication status to be determined.  Plan: Continue meds, check lipids today, follow-up results as above.    #4 reflux stable on meds, continue same.    #5 abdominal fullness by physical exam, etiology to be determined.  Some of this may represent abdominal aorta, but the fullness extends beyond the rest of the aorta.  Patient had ultrasound of the aorta in 2017 which showed mild atheromatous plaque within the aorta but no evidence of aneurysm.

## 2018-05-22 NOTE — PROGRESS NOTES
QUICK REFERENCE INFORMATION:  The ABCs of the Annual Wellness Visit    Subsequent Medicare Wellness Visit    HEALTH RISK ASSESSMENT    1945    Recent Hospitalizations:  No hospitalization(s) within the last year..        Current Medical Providers:  Patient Care Team:  Pj Ramirez MD as PCP - General  Pj Ramirez MD as PCP - Claims Attributed        Smoking Status:  History   Smoking Status   • Never Smoker   Smokeless Tobacco   • Never Used       Alcohol Consumption:  History   Alcohol Use   • Yes     Comment: social drinker       Depression Screen:   PHQ-2/PHQ-9 Depression Screening 5/22/2018   Little interest or pleasure in doing things 0   Feeling down, depressed, or hopeless 0   Trouble falling or staying asleep, or sleeping too much -   Feeling tired or having little energy -   Poor appetite or overeating -   Feeling bad about yourself - or that you are a failure or have let yourself or your family down -   Trouble concentrating on things, such as reading the newspaper or watching television -   Moving or speaking so slowly that other people could have noticed. Or the opposite - being so fidgety or restless that you have been moving around a lot more than usual -   Thoughts that you would be better off dead, or of hurting yourself in some way -   Total Score 0   If you checked off any problems, how difficult have these problems made it for you to do your work, take care of things at home, or get along with other people? -       Health Habits and Functional and Cognitive Screening:  Functional & Cognitive Status 5/22/2018   Do you have difficulty preparing food and eating? No   Do you have difficulty bathing yourself, getting dressed or grooming yourself? No   Do you have difficulty using the toilet? No   Do you have difficulty moving around from place to place? No   Do you have trouble with steps or getting out of a bed or a chair? No   In the past year have you fallen or experienced a near fall? No    Do you need help using the phone?  No   Are you deaf or do you have serious difficulty hearing?  No   Do you need help with transportation? No   Do you need help shopping? No   Do you need help preparing meals?  No   Do you need help with housework?  No   Do you need help with laundry? No   Do you need help taking your medications? No   Do you need help managing money? No   Do you ever drive or ride in a car without wearing a seat belt? No   Do you have difficulty concentrating, remembering or making decisions? -           Does the patient have evidence of cognitive impairment? No    Aspirin use counseling: Start ASA 81 mg daily       Recent Lab Results:  CMP:  Lab Results   Component Value Date     (H) 02/15/2017    BUN 15 02/15/2017    CREATININE 0.84 02/15/2017    EGFRIFNONA 67 02/15/2017    EGFRIFAFRI 81 02/15/2017    BCR 17.9 02/15/2017     02/15/2017    K 4.1 02/15/2017    CO2 24.5 02/15/2017    CALCIUM 9.9 02/15/2017    PROTENTOTREF 7.8 02/15/2017    ALBUMIN 5.10 02/15/2017    LABGLOBREF 2.7 02/15/2017    LABIL2 1.9 02/15/2017    BILITOT 0.4 02/15/2017    ALKPHOS 98 02/15/2017    AST 27 02/15/2017    ALT 22 02/15/2017     Lipid Panel:  Lab Results   Component Value Date    TRIG 124 02/15/2017    HDL 78 (H) 02/15/2017    VLDL 24.8 02/15/2017     HbA1c:       Visual Acuity:  No exam data present    Age-appropriate Screening Schedule:  Refer to the list below for future screening recommendations based on patient's age, sex and/or medical conditions. Orders for these recommended tests are listed in the plan section. The patient has been provided with a written plan.    Health Maintenance   Topic Date Due   • ZOSTER VACCINE (2 of 3) 10/27/2012   • INFLUENZA VACCINE  08/01/2018   • DXA SCAN  02/23/2019   • LIPID PANEL  05/22/2019   • MAMMOGRAM  01/22/2020   • COLONOSCOPY  11/17/2024   • TDAP/TD VACCINES (2 - Td) 09/30/2025   • PNEUMOCOCCAL VACCINES (65+ LOW/MEDIUM RISK)  Completed        Subjective    History of Present Illness    Neela Ramirez is a 73 y.o. female who presents for an Subsequent Wellness Visit.    The following portions of the patient's history were reviewed and updated as appropriate: allergies, current medications, past family history, past medical history, past social history, past surgical history and problem list.    Outpatient Medications Prior to Visit   Medication Sig Dispense Refill   • alendronate (FOSAMAX) 70 MG tablet TAKE 1 TABLET BY MOUTH EVERY 7 DAYS 13 tablet 0   • amLODIPine (NORVASC) 5 MG tablet TAKE ONE TABLET BY MOUTH ONCE DAILY 90 tablet 0   • desoximetasone (TOPICORT) 0.25 % ointment 1 application 2 (two) times a day as needed.     • escitalopram (LEXAPRO) 20 MG tablet TAKE ONE TABLET BY MOUTH ONCE DAILY 90 tablet 1   • metoprolol succinate XL (TOPROL-XL) 25 MG 24 hr tablet TAKE ONE TABLET BY MOUTH ONCE DAILY 90 tablet 0   • omeprazole (priLOSEC) 20 MG capsule TAKE ONE CAPSULE BY MOUTH IN THE MORNING BEFORE BREAKFAST 30 capsule 0   • rosuvastatin (CRESTOR) 5 MG tablet TAKE ONE TABLET BY MOUTH ONCE DAILY 90 tablet 3   • cyclobenzaprine (FLEXERIL) 5 MG tablet Take 1 tablet by mouth At Night As Needed for Muscle Spasms. 21 tablet 0   • cyclobenzaprine (FLEXERIL) 5 MG tablet TAKE ONE TABLET BY MOUTH THREE TIMES DAILY 21 tablet 0   • escitalopram (LEXAPRO) 20 MG tablet TAKE ONE TABLET BY MOUTH ONCE DAILY 90 tablet 0   • meloxicam (MOBIC) 7.5 MG tablet Take 1 tablet by mouth As Needed. 30 tablet 1     No facility-administered medications prior to visit.        Patient Active Problem List   Diagnosis   • Anxiety   • DD (diverticular disease)   • Gastroesophageal reflux disease   • Essential hypertension   • Hyperlipidemia   • Osteopenia   • Bilateral low back pain without sciatica   • Medicare annual wellness visit, subsequent       Advance Care Planning:  has an advance directive - a copy has been provided and is in file    Identification of Risk Factors:  Risk factors include:  "None noted.    Review of Systems    Compared to one year ago, the patient feels her physical health is the same.  Compared to one year ago, the patient feels her mental health is the same.    Objective     Physical Exam    Vitals:    05/22/18 1434   BP: 140/90   Pulse: 116   Temp: 98.9 °F (37.2 °C)   SpO2: 98%   Weight: 59.8 kg (131 lb 12.8 oz)   Height: 156.2 cm (61.5\")   PainSc:   4   PainLoc: Neck       Patient's Body mass index is 24.5 kg/m². BMI is within normal parameters. No follow-up required.      Assessment/Plan   Patient Self-Management and Personalized Health Advice  The patient has been provided with information about: None noted and preventive services including:   · Diabetes screening, see lab orders, We did discuss the utility of her having the new shingles vaccine, and I also advised her to obtain hepatitis A immunization..    Visit Diagnoses:    ICD-10-CM ICD-9-CM   1. Medicare annual wellness visit, subsequent Z00.00 V70.0   2. Essential hypertension I10 401.9   3. Hyperlipidemia, unspecified hyperlipidemia type E78.5 272.4   4. Gastroesophageal reflux disease without esophagitis K21.9 530.81       No orders of the defined types were placed in this encounter.      Outpatient Encounter Prescriptions as of 5/22/2018   Medication Sig Dispense Refill   • cyclobenzaprine (FLEXERIL) 5 MG tablet Take 1 tablet by mouth 3 (Three) Times a Day As Needed for Muscle Spasms. 30 tablet 3   • [DISCONTINUED] cyclobenzaprine (FLEXERIL) 5 MG tablet Take 5 mg by mouth 3 (Three) Times a Day As Needed.     • alendronate (FOSAMAX) 70 MG tablet TAKE 1 TABLET BY MOUTH EVERY 7 DAYS 13 tablet 0   • amLODIPine (NORVASC) 5 MG tablet TAKE ONE TABLET BY MOUTH ONCE DAILY 90 tablet 0   • desoximetasone (TOPICORT) 0.25 % ointment 1 application 2 (two) times a day as needed.     • escitalopram (LEXAPRO) 20 MG tablet TAKE ONE TABLET BY MOUTH ONCE DAILY 90 tablet 1   • metoprolol succinate XL (TOPROL-XL) 25 MG 24 hr tablet TAKE ONE " TABLET BY MOUTH ONCE DAILY 90 tablet 0   • omeprazole (priLOSEC) 20 MG capsule TAKE ONE CAPSULE BY MOUTH IN THE MORNING BEFORE BREAKFAST 30 capsule 0   • rosuvastatin (CRESTOR) 5 MG tablet TAKE ONE TABLET BY MOUTH ONCE DAILY 90 tablet 3   • [DISCONTINUED] cyclobenzaprine (FLEXERIL) 5 MG tablet Take 1 tablet by mouth At Night As Needed for Muscle Spasms. 21 tablet 0   • [DISCONTINUED] cyclobenzaprine (FLEXERIL) 5 MG tablet TAKE ONE TABLET BY MOUTH THREE TIMES DAILY 21 tablet 0   • [DISCONTINUED] escitalopram (LEXAPRO) 20 MG tablet TAKE ONE TABLET BY MOUTH ONCE DAILY 90 tablet 0   • [DISCONTINUED] meloxicam (MOBIC) 7.5 MG tablet Take 1 tablet by mouth As Needed. 30 tablet 1     No facility-administered encounter medications on file as of 5/22/2018.        Reviewed use of high risk medication in the elderly: yes  Reviewed for potential of harmful drug interactions in the elderly: yes    Follow Up:  1 YEAR    An After Visit Summary and PPPS with all of these plans were given to the patient.

## 2018-05-25 LAB
ALBUMIN SERPL-MCNC: 4.5 G/DL (ref 3.5–5.2)
ALBUMIN/GLOB SERPL: 1.8 G/DL
ALP SERPL-CCNC: 87 U/L (ref 39–117)
ALT SERPL-CCNC: 16 U/L (ref 1–33)
AST SERPL-CCNC: 19 U/L (ref 1–32)
BASOPHILS # BLD AUTO: 0.04 10*3/MM3 (ref 0–0.2)
BASOPHILS NFR BLD AUTO: 0.5 % (ref 0–1.5)
BILIRUB SERPL-MCNC: 0.5 MG/DL (ref 0.1–1.2)
BUN SERPL-MCNC: 23 MG/DL (ref 8–23)
BUN/CREAT SERPL: 23.2 (ref 7–25)
CALCIUM SERPL-MCNC: 9.8 MG/DL (ref 8.6–10.5)
CHLORIDE SERPL-SCNC: 105 MMOL/L (ref 98–107)
CHOLEST SERPL-MCNC: 177 MG/DL (ref 0–200)
CO2 SERPL-SCNC: 25.6 MMOL/L (ref 22–29)
CREAT SERPL-MCNC: 0.99 MG/DL (ref 0.57–1)
EOSINOPHIL # BLD AUTO: 0.16 10*3/MM3 (ref 0–0.7)
EOSINOPHIL NFR BLD AUTO: 2.2 % (ref 0.3–6.2)
ERYTHROCYTE [DISTWIDTH] IN BLOOD BY AUTOMATED COUNT: 13 % (ref 11.7–13)
GFR SERPLBLD CREATININE-BSD FMLA CKD-EPI: 55 ML/MIN/1.73
GFR SERPLBLD CREATININE-BSD FMLA CKD-EPI: 67 ML/MIN/1.73
GLOBULIN SER CALC-MCNC: 2.5 GM/DL
GLUCOSE SERPL-MCNC: 102 MG/DL (ref 65–99)
HCT VFR BLD AUTO: 44.2 % (ref 35.6–45.5)
HDLC SERPL-MCNC: 69 MG/DL (ref 40–60)
HGB BLD-MCNC: 14.3 G/DL (ref 11.9–15.5)
IMM GRANULOCYTES # BLD: 0 10*3/MM3 (ref 0–0.03)
IMM GRANULOCYTES NFR BLD: 0 % (ref 0–0.5)
LDLC SERPL CALC-MCNC: 86 MG/DL (ref 0–100)
LYMPHOCYTES # BLD AUTO: 2.34 10*3/MM3 (ref 0.9–4.8)
LYMPHOCYTES NFR BLD AUTO: 32.1 % (ref 19.6–45.3)
MCH RBC QN AUTO: 32.7 PG (ref 26.9–32)
MCHC RBC AUTO-ENTMCNC: 32.4 G/DL (ref 32.4–36.3)
MCV RBC AUTO: 101.1 FL (ref 80.5–98.2)
MONOCYTES # BLD AUTO: 0.49 10*3/MM3 (ref 0.2–1.2)
MONOCYTES NFR BLD AUTO: 6.7 % (ref 5–12)
NEUTROPHILS # BLD AUTO: 4.25 10*3/MM3 (ref 1.9–8.1)
NEUTROPHILS NFR BLD AUTO: 58.5 % (ref 42.7–76)
PLATELET # BLD AUTO: 280 10*3/MM3 (ref 140–500)
POTASSIUM SERPL-SCNC: 5.2 MMOL/L (ref 3.5–5.2)
PROT SERPL-MCNC: 7 G/DL (ref 6–8.5)
RBC # BLD AUTO: 4.37 10*6/MM3 (ref 3.9–5.2)
SODIUM SERPL-SCNC: 142 MMOL/L (ref 136–145)
TRIGL SERPL-MCNC: 108 MG/DL (ref 0–150)
VLDLC SERPL CALC-MCNC: 21.6 MG/DL (ref 5–40)
WBC # BLD AUTO: 7.28 10*3/MM3 (ref 4.5–10.7)

## 2018-06-04 ENCOUNTER — HOSPITAL ENCOUNTER (OUTPATIENT)
Dept: CT IMAGING | Facility: HOSPITAL | Age: 73
Discharge: HOME | End: 2018-06-04
Admitting: INTERNAL MEDICINE

## 2018-06-04 LAB — CREAT BLDA-MCNC: 0.8 MG/DL (ref 0.6–1.3)

## 2018-06-04 PROCEDURE — 74160 CT ABDOMEN W/CONTRAST: CPT

## 2018-06-04 PROCEDURE — 25010000002 IOPAMIDOL 61 % SOLUTION: Performed by: INTERNAL MEDICINE

## 2018-06-04 PROCEDURE — 82565 ASSAY OF CREATININE: CPT

## 2018-06-04 RX ADMIN — IOPAMIDOL 85 ML: 612 INJECTION, SOLUTION INTRAVENOUS at 10:39

## 2018-06-16 DIAGNOSIS — I10 ESSENTIAL HYPERTENSION: ICD-10-CM

## 2018-06-18 RX ORDER — AMLODIPINE BESYLATE 5 MG/1
TABLET ORAL
Qty: 90 TABLET | Refills: 0 | Status: SHIPPED | OUTPATIENT
Start: 2018-06-18 | End: 2018-09-12 | Stop reason: SDUPTHER

## 2018-06-18 RX ORDER — METOPROLOL SUCCINATE 25 MG/1
TABLET, EXTENDED RELEASE ORAL
Qty: 90 TABLET | Refills: 0 | Status: SHIPPED | OUTPATIENT
Start: 2018-06-18 | End: 2018-09-12 | Stop reason: SDUPTHER

## 2018-07-03 DIAGNOSIS — F41.9 ANXIETY: ICD-10-CM

## 2018-07-03 RX ORDER — ESCITALOPRAM OXALATE 20 MG/1
TABLET ORAL
Qty: 90 TABLET | Refills: 1 | Status: SHIPPED | OUTPATIENT
Start: 2018-07-03 | End: 2018-12-31 | Stop reason: SDUPTHER

## 2018-07-16 DIAGNOSIS — M85.80 OSTEOPENIA, UNSPECIFIED LOCATION: ICD-10-CM

## 2018-07-16 RX ORDER — ALENDRONATE SODIUM 70 MG/1
TABLET ORAL
Qty: 13 TABLET | Refills: 0 | Status: SHIPPED | OUTPATIENT
Start: 2018-07-16 | End: 2018-11-25 | Stop reason: SDUPTHER

## 2018-09-12 DIAGNOSIS — I10 ESSENTIAL HYPERTENSION: ICD-10-CM

## 2018-09-13 RX ORDER — METOPROLOL SUCCINATE 25 MG/1
TABLET, EXTENDED RELEASE ORAL
Qty: 90 TABLET | Refills: 0 | Status: SHIPPED | OUTPATIENT
Start: 2018-09-13 | End: 2018-12-14 | Stop reason: SDUPTHER

## 2018-09-13 RX ORDER — AMLODIPINE BESYLATE 5 MG/1
TABLET ORAL
Qty: 90 TABLET | Refills: 0 | Status: SHIPPED | OUTPATIENT
Start: 2018-09-13 | End: 2018-12-14 | Stop reason: SDUPTHER

## 2018-10-25 ENCOUNTER — OFFICE VISIT (OUTPATIENT)
Dept: INTERNAL MEDICINE | Age: 73
End: 2018-10-25

## 2018-10-25 VITALS
SYSTOLIC BLOOD PRESSURE: 134 MMHG | BODY MASS INDEX: 25.41 KG/M2 | OXYGEN SATURATION: 98 % | TEMPERATURE: 98 F | WEIGHT: 134.6 LBS | DIASTOLIC BLOOD PRESSURE: 88 MMHG | HEIGHT: 61 IN | HEART RATE: 114 BPM

## 2018-10-25 DIAGNOSIS — Z23 ENCOUNTER FOR IMMUNIZATION: ICD-10-CM

## 2018-10-25 DIAGNOSIS — E78.5 HYPERLIPIDEMIA, UNSPECIFIED HYPERLIPIDEMIA TYPE: ICD-10-CM

## 2018-10-25 DIAGNOSIS — Z23 NEED FOR INFLUENZA VACCINATION: Primary | ICD-10-CM

## 2018-10-25 PROCEDURE — 90662 IIV NO PRSV INCREASED AG IM: CPT | Performed by: INTERNAL MEDICINE

## 2018-10-25 PROCEDURE — 99213 OFFICE O/P EST LOW 20 MIN: CPT | Performed by: INTERNAL MEDICINE

## 2018-10-25 PROCEDURE — G0008 ADMIN INFLUENZA VIRUS VAC: HCPCS | Performed by: INTERNAL MEDICINE

## 2018-10-25 NOTE — PROGRESS NOTES
"[facility]  JESSICA GILBERT MD      Neela Ramirez / 73 y.o. / female  10/25/2018      ASSESSMENT & PLAN:    Problem List Items Addressed This Visit        Unprioritized    Hyperlipidemia    Relevant Medications    rosuvastatin (CRESTOR) 5 MG tablet      Other Visit Diagnoses     Need for influenza vaccination    -  Primary    Relevant Orders    Flu Vaccine High Dose PF 65YR+ ([id number]) (Completed)    Encounter for immunization            Orders Placed This Encounter   Procedures   • Flu Vaccine High Dose PF 65YR+ ([id number])       Summary/Discussion:    Number-one hypertension stable on current medication.  Plan: Continue same, blood pressure check 4-6 months with new physician since I'll be leaving the area.    #2 hyperlipidemia stable on meds, continue same.    #3 immunization update, flu shot today, shingles vaccine in January after the holidays.    Return in about 4 months (around 2/25/2019) for Blood pressure check.    ____________________________________________________________________    VITALS    Visit Vitals  /88   Pulse 114   Temp 98 °F (36.7 °C)   Ht 156.2 cm (61.5\")   Wt 61.1 kg (134 lb 9.6 oz)   SpO2 98%   BMI 25.02 kg/m²       BP Readings from Last 3 Encounters:   10/25/18 134/88   05/22/18 140/70   01/04/18 130/80     Wt Readings from Last 3 Encounters:   10/25/18 61.1 kg (134 lb 9.6 oz)   05/22/18 59.8 kg (131 lb 12.8 oz)   07/17/17 59.3 kg (130 lb 12.8 oz)      Body mass index is 25.02 kg/m².    CC:  Main reason(s) for today's visit: Hypertension      HPI:     Patient presents this morning for follow-up of hypertension.  When last seen by us, blood pressure was well-controlled.  Today she is compliant with meds and dietary salt restriction, she does walk for exercise, does not monitor blood pressure the outpatient setting.    Immunization update, patient will receive flu shot today with also discussed immunizing with the new shingles vaccine after the holidays.    Laboratory review " May 2018 CMP, lipid, CBC all normal.    Patient Care Team:  Pj Ramirez MD as PCP - General  Pj Ramirez MD as PCP - Claims Attributed  ____________________________________________________________________    REVIEW OF SYSTEMS    Review of Systems   Respiratory: Negative for chest tightness and shortness of breath.    Cardiovascular: Negative for chest pain and palpitations.   Neurological: Negative for dizziness, syncope, speech difficulty, weakness, light-headedness and headaches.         PHYSICAL EXAMINATION    Physical Exam   Constitutional: She is oriented to person, place, and time. She appears well-developed and well-nourished. No distress.   Cardiovascular: Normal rate, regular rhythm, normal heart sounds and intact distal pulses.  Exam reveals no gallop and no friction rub.    No murmur heard.  Pulmonary/Chest: Effort normal and breath sounds normal. She has no wheezes. She has no rales.   Musculoskeletal: She exhibits no edema.   Neurological: She is alert and oriented to person, place, and time.   Skin: Skin is warm and dry. No rash noted.   Psychiatric: She has a normal mood and affect. Her behavior is normal. Judgment and thought content normal.   Nursing note and vitals reviewed.        REVIEWED DATA:    Labs:     Lab Results   Component Value Date     05/25/2018    K 5.2 05/25/2018    AST 19 05/25/2018    ALT 16 05/25/2018    BUN 23 05/25/2018    CREATININE 0.80 06/04/2018    CREATININE 0.99 05/25/2018    CREATININE 0.84 02/15/2017    EGFRIFNONA 55 (L) 05/25/2018    EGFRIFAFRI 67 05/25/2018       No results found for: HGBA1C, GLUCOSE, MICROALBUR    Lab Results   Component Value Date    LDL 86 05/25/2018    LDL 81 02/15/2017     (H) 08/11/2016    HDL 69 (H) 05/25/2018    TRIG 108 05/25/2018       No results found for: TSH, FREET4    Lab Results   Component Value Date    WBC 10.26 02/15/2017    HGB 14.3 05/25/2018    HGB 14.5 02/15/2017     05/25/2018       No results found  for: PSA      Imaging:         Medical Tests:         Summary of old records / correspondence / consultant report:         Request outside records:         ALLERGIES  No Known Allergies     MEDICATIONS  Current Outpatient Prescriptions   Medication Sig Dispense Refill   • alendronate (FOSAMAX) 70 MG tablet TAKE 1 TABLET BY MOUTH ONCE A WEEK 13 tablet 0   • amLODIPine (NORVASC) 5 MG tablet TAKE 1 TABLET BY MOUTH ONCE DAILY 90 tablet 0   • aspirin 81 MG EC tablet Take 81 mg by mouth Daily.     • cyclobenzaprine (FLEXERIL) 5 MG tablet Take 1 tablet by mouth 3 (Three) Times a Day As Needed for Muscle Spasms. 30 tablet 3   • desoximetasone (TOPICORT) 0.25 % ointment 1 application 2 (two) times a day as needed.     • escitalopram (LEXAPRO) 20 MG tablet TAKE ONE TABLET BY MOUTH ONCE DAILY 90 tablet 1   • metoprolol succinate XL (TOPROL-XL) 25 MG 24 hr tablet TAKE 1 TABLET BY MOUTH ONCE DAILY 90 tablet 0   • omeprazole (priLOSEC) 20 MG capsule TAKE ONE CAPSULE BY MOUTH IN THE MORNING BEFORE BREAKFAST 30 capsule 0   • rosuvastatin (CRESTOR) 5 MG tablet TAKE ONE TABLET BY MOUTH ONCE DAILY 90 tablet 3     No current facility-administered medications for this visit.        PFSH:     The following portions of the patient's history were reviewed and updated as appropriate: Allergies / Current Medications / Past Medical History / Surgical History / Social History / Family History    PROBLEM LIST   Patient Active Problem List   Diagnosis   • Anxiety   • DD (diverticular disease)   • Gastroesophageal reflux disease   • Essential hypertension   • Hyperlipidemia   • Osteopenia   • Bilateral low back pain without sciatica   • Medicare annual wellness visit, subsequent       PAST MEDICAL HISTORY  Past Medical History:   Diagnosis Date   • Anxiety    • Backache    • Constipation    • Dermatitis    • Diverticulosis     Colonoscopy November 2014   • Dyspepsia    • Dysuria    • Erythema of face     Transitory Erythema Of The Face   •  Impacted cerumen    • Neck pain    • Tachycardia        SURGICAL HISTORY  Past Surgical History:   Procedure Laterality Date   • EXOSTECTOMY      Simple Bunion Exostectomy ([facility] Procedure)       SOCIAL HISTORY  Social History     Social History   • Marital status:    • Number of children: 1     Occupational History   • Retired      Social History Main Topics   • Smoking status: Never Smoker   • Smokeless tobacco: Never Used   • Alcohol use Yes      Comment: social drinker   • Drug use: Unknown     Other Topics Concern   • Not on file       FAMILY HISTORY  Family History   Problem Relation Age of Onset   • No Known Problems Father        Health Maintenance Due   Topic   • ZOSTER VACCINE (2 of 3)       Overdue health maintenance interventions  reviewed with patient.    Dictated utilizing Dragon voice recognition software

## 2018-11-25 DIAGNOSIS — M85.80 OSTEOPENIA, UNSPECIFIED LOCATION: ICD-10-CM

## 2018-11-26 RX ORDER — ALENDRONATE SODIUM 70 MG/1
TABLET ORAL
Qty: 13 TABLET | Refills: 0 | Status: SHIPPED | OUTPATIENT
Start: 2018-11-26 | End: 2019-04-22 | Stop reason: SDUPTHER

## 2018-11-29 RX ORDER — ROSUVASTATIN CALCIUM 5 MG/1
TABLET, COATED ORAL
Qty: 90 TABLET | Refills: 1 | Status: SHIPPED | OUTPATIENT
Start: 2018-11-29 | End: 2019-06-10 | Stop reason: SDUPTHER

## 2018-12-14 DIAGNOSIS — I10 ESSENTIAL HYPERTENSION: ICD-10-CM

## 2018-12-14 RX ORDER — METOPROLOL SUCCINATE 25 MG/1
TABLET, EXTENDED RELEASE ORAL
Qty: 90 TABLET | Refills: 0 | Status: SHIPPED | OUTPATIENT
Start: 2018-12-14 | End: 2019-03-13 | Stop reason: SDUPTHER

## 2018-12-14 RX ORDER — AMLODIPINE BESYLATE 5 MG/1
TABLET ORAL
Qty: 90 TABLET | Refills: 0 | Status: SHIPPED | OUTPATIENT
Start: 2018-12-14 | End: 2019-03-14 | Stop reason: SDUPTHER

## 2018-12-31 DIAGNOSIS — F41.9 ANXIETY: ICD-10-CM

## 2019-01-02 RX ORDER — ESCITALOPRAM OXALATE 20 MG/1
TABLET ORAL
Qty: 90 TABLET | Refills: 1 | Status: SHIPPED | OUTPATIENT
Start: 2019-01-02 | End: 2019-07-09 | Stop reason: SDUPTHER

## 2019-03-13 DIAGNOSIS — I10 ESSENTIAL HYPERTENSION: ICD-10-CM

## 2019-03-13 RX ORDER — METOPROLOL SUCCINATE 25 MG/1
25 TABLET, EXTENDED RELEASE ORAL DAILY
Qty: 90 TABLET | Refills: 1 | Status: SHIPPED | OUTPATIENT
Start: 2019-03-13 | End: 2019-09-09 | Stop reason: SDUPTHER

## 2019-03-14 DIAGNOSIS — I10 ESSENTIAL HYPERTENSION: ICD-10-CM

## 2019-03-14 RX ORDER — AMLODIPINE BESYLATE 5 MG/1
5 TABLET ORAL DAILY
Qty: 90 TABLET | Refills: 1 | Status: SHIPPED | OUTPATIENT
Start: 2019-03-14 | End: 2019-09-09 | Stop reason: SDUPTHER

## 2019-04-22 DIAGNOSIS — M85.80 OSTEOPENIA, UNSPECIFIED LOCATION: ICD-10-CM

## 2019-04-22 RX ORDER — ALENDRONATE SODIUM 70 MG/1
70 TABLET ORAL WEEKLY
Qty: 13 TABLET | Refills: 0 | Status: SHIPPED | OUTPATIENT
Start: 2019-04-22 | End: 2019-10-21 | Stop reason: SDUPTHER

## 2019-06-10 DIAGNOSIS — E78.5 HYPERLIPIDEMIA, UNSPECIFIED HYPERLIPIDEMIA TYPE: Primary | ICD-10-CM

## 2019-06-10 RX ORDER — ROSUVASTATIN CALCIUM 5 MG/1
5 TABLET, COATED ORAL DAILY
Qty: 90 TABLET | Refills: 1 | Status: SHIPPED | OUTPATIENT
Start: 2019-06-10 | End: 2020-01-27

## 2019-07-09 ENCOUNTER — HOSPITAL ENCOUNTER (OUTPATIENT)
Dept: GENERAL RADIOLOGY | Facility: HOSPITAL | Age: 74
Discharge: HOME | End: 2019-07-09
Admitting: NURSE PRACTITIONER

## 2019-07-09 ENCOUNTER — OFFICE VISIT (OUTPATIENT)
Dept: INTERNAL MEDICINE | Age: 74
End: 2019-07-09

## 2019-07-09 VITALS
OXYGEN SATURATION: 96 % | HEART RATE: 113 BPM | TEMPERATURE: 96.8 F | BODY MASS INDEX: 25.3 KG/M2 | WEIGHT: 134 LBS | HEIGHT: 61 IN | DIASTOLIC BLOOD PRESSURE: 80 MMHG | SYSTOLIC BLOOD PRESSURE: 130 MMHG

## 2019-07-09 DIAGNOSIS — I10 ESSENTIAL HYPERTENSION: ICD-10-CM

## 2019-07-09 DIAGNOSIS — Z78.0 POSTMENOPAUSAL: ICD-10-CM

## 2019-07-09 DIAGNOSIS — M85.80 OSTEOPENIA, UNSPECIFIED LOCATION: ICD-10-CM

## 2019-07-09 DIAGNOSIS — F41.9 ANXIETY: ICD-10-CM

## 2019-07-09 DIAGNOSIS — M54.9 UPPER BACK PAIN ON RIGHT SIDE: Primary | ICD-10-CM

## 2019-07-09 PROCEDURE — 72040 X-RAY EXAM NECK SPINE 2-3 VW: CPT

## 2019-07-09 PROCEDURE — 99214 OFFICE O/P EST MOD 30 MIN: CPT | Performed by: NURSE PRACTITIONER

## 2019-07-09 RX ORDER — ESCITALOPRAM OXALATE 20 MG/1
20 TABLET ORAL DAILY
Qty: 90 TABLET | Refills: 1 | Status: SHIPPED | OUTPATIENT
Start: 2019-07-09 | End: 2020-01-02

## 2019-07-09 NOTE — PROGRESS NOTES
Neela Ramirez / 74 y.o. / female  Encounter Date: 07/09/2019    ASSESSMENT & PLAN:    Problem List Items Addressed This Visit        Cardiovascular and Mediastinum    Essential hypertension    Relevant Medications    metoprolol succinate XL (TOPROL-XL) 25 MG 24 hr tablet    amLODIPine (NORVASC) 5 MG tablet       Musculoskeletal and Integument    Osteopenia    Overview     Description: Per bone density testing 7/ 2013         Relevant Medications    alendronate (FOSAMAX) 70 MG tablet    Other Relevant Orders    DEXA Bone Density Axial       Other    Anxiety    Relevant Medications    escitalopram (LEXAPRO) 20 MG tablet      Other Visit Diagnoses     Upper back pain on right side    -  Primary    Relevant Orders    XR Spine Cervical 2 or 3 View    Ambulatory Referral to Physical Therapy Evaluate and treat    Postmenopausal        Relevant Orders    DEXA Bone Density Axial        Orders Placed This Encounter   Procedures   • XR Spine Cervical 2 or 3 View   • DEXA Bone Density Axial   • Ambulatory Referral to Physical Therapy Evaluate and treat     New Medications Ordered This Visit   Medications   • escitalopram (LEXAPRO) 20 MG tablet     Sig: Take 1 tablet by mouth Daily.     Dispense:  90 tablet     Refill:  1       Summary/Discussion:  1.  X-ray of cervical spine today, to assess interval changes from 2014, with new onset right upper back and shoulder pain x4 weeks.  She is also due for repeat DEXA scan, most recent was in 2017.  She is taking Fosamax once weekly.  Recommended physical therapy evaluation and treatment of right upper shoulder pain and constant ache.  Patient is agreeable to this plan.  Okay to use heat and OTC NSAIDs as needed at home for pain relief.  2.  Continue current medications as prescribed.  Refills provided.  3.  Due for annual wellness visit, with labs 1 week prior.  We will schedule in approximately 4 weeks.  Labs: lipid panel, CBC, CMP  4. Health Maintenance due: zoster vax, DEXA, Lipid  "panel, AWV, MOST form.   Spent 45 minutes in face-to-face encounter time and >50% of time spent in counseling regarding above medical problems/issues.       Return in about 4 weeks (around 8/6/2019) for AWV, subsequent with labs 1 week prior.  ________________________________________________________________    VITALS:    Visit Vitals  /80   Pulse 113   Temp 96.8 °F (36 °C) (Temporal)   Ht 156.2 cm (61.5\")   Wt 60.8 kg (134 lb)   SpO2 96%   BMI 24.91 kg/m²       BP Readings from Last 3 Encounters:   07/09/19 130/80   10/25/18 134/88   05/22/18 140/70     Wt Readings from Last 3 Encounters:   07/09/19 60.8 kg (134 lb)   10/25/18 61.1 kg (134 lb 9.6 oz)   05/22/18 59.8 kg (131 lb 12.8 oz)      Body mass index is 24.91 kg/m².    CC: Main reason(s) for today's visit: Hypertension; Neck Pain; Shoulder Pain; and Establish Care      HPI    Patient is a 74 y.o. female who is here to establish care with new PCP and for preventive medicine service visit.  She is a new patient to me.    Concerns today include:     Heartburn/reflux: Patient has previously been taking omeprazole, as needed for reflux symptoms.  Not taking daily.  She reports epigastric burning after eating, without nausea.  Reports satiety, frequent and repetitive belching.  Denies nausea or vomiting.  She does have occasional nighttime symptoms, when she is eaten too close to bedtime.  She admits triggers include spaghetti sauces, and carbonated beverages.  No prior history of EGD.    R upper back pain: Right upper back, cervical/shoulder pain onset 4 weeks ago, worsened in the last 1 to 2 weeks.  She reports sharp pains along her right cervical spine that radiate to her right shoulder.  No numbness or tingling, and no radiation to the right arm.  Right upper back is mildly tender to palpation, and she reports the pain as a dull ache with infrequent sharp shooting pains.  Range of motion is not impaired, no known injury.  Cervical x-ray from 2014 shows " degenerative changes to cervical spine.  She denies headaches, neck impairment, immobility, shortness of air.  She takes very infrequent Advil as needed at home.    Chronic essential hypertension:  Since prior visit: compliant with medication(s), does not check blood pressure at home and denies significant problems with medication(s).  Most recent in-office blood pressure readings:   BP Readings from Last 3 Encounters:   07/09/19 130/80   10/25/18 134/88   05/22/18 140/70     Chronic hyperlipidemia:  Current therapy include low fat/cholesterol diet, rosuvastatin, denies problems with medication.    Most recent labs:   Lab Results   Component Value Date    LDL 86 05/25/2018    LDL 81 02/15/2017     (H) 08/11/2016    HDL 69 (H) 05/25/2018    HDL 78 (H) 02/15/2017    TRIG 108 05/25/2018       Patient Care Team:  Teri Rodriguez APRN as PCP - General (Nurse Practitioner)  Hannah Cabello MD as PCP - Claims Attributed  ____________________________________________________________________    REVIEW OF SYSTEMS    Review of Systems   Constitutional: Negative.    Respiratory: Negative.    Cardiovascular: Negative.    Gastrointestinal: Negative.    Musculoskeletal: Positive for arthralgias and back pain (R upper back pain).   Skin: Negative.    Neurological: Negative.        PHYSICAL EXAMINATION    Physical Exam   Constitutional: She is oriented to person, place, and time. She appears well-developed. No distress.   Neck: Normal range of motion. Neck supple.   Cardiovascular: Normal rate, regular rhythm and normal heart sounds.   Pulmonary/Chest: Effort normal and breath sounds normal.   Musculoskeletal: Normal range of motion. She exhibits no edema or tenderness.   Lymphadenopathy:     She has no cervical adenopathy.   Neurological: She is alert and oriented to person, place, and time. No sensory deficit. Coordination normal.   Skin: Skin is warm and dry.   Psychiatric: She has a normal mood and affect.   Vitals  reviewed.  Back Exam     Tenderness   The patient is experiencing tenderness in the cervical.    Range of Motion   Extension: normal   Flexion: normal   Lateral bend right: normal   Lateral bend left: normal   Rotation right: normal   Rotation left: normal     Muscle Strength   The patient has normal back strength.      Right Shoulder Exam   Right shoulder exam is normal.      Left Shoulder Exam   Left shoulder exam is normal.          REVIEWED DATA:    Labs:   Lab Results   Component Value Date     05/25/2018    K 5.2 05/25/2018    AST 19 05/25/2018    ALT 16 05/25/2018    BUN 23 05/25/2018    CREATININE 0.80 06/04/2018    CREATININE 0.99 05/25/2018    CREATININE 0.84 02/15/2017    EGFRIFNONA 55 (L) 05/25/2018    EGFRIFAFRI 67 05/25/2018       No results found for: GLUCOSE, HGBA1C, MICROALBUR    Lab Results   Component Value Date    LDL 86 05/25/2018    LDL 81 02/15/2017     (H) 08/11/2016    HDL 69 (H) 05/25/2018    TRIG 108 05/25/2018       No results found for: TSH, FREET4       Lab Results   Component Value Date    WBC 7.28 05/25/2018    HGB 14.3 05/25/2018    HGB 14.5 02/15/2017     05/25/2018         Imaging:      Medical Tests:      Summary of old records / correspondence / consultant report:      Request outside records:   ______________________________________________________________________    ALLERGIES  No Known Allergies     MEDICATIONS  Current Outpatient Medications on File Prior to Visit   Medication Sig   • alendronate (FOSAMAX) 70 MG tablet Take 1 tablet by mouth 1 (One) Time Per Week.   • amLODIPine (NORVASC) 5 MG tablet Take 1 tablet by mouth Daily.   • aspirin 81 MG EC tablet Take 81 mg by mouth Daily.   • cyclobenzaprine (FLEXERIL) 5 MG tablet Take 1 tablet by mouth 3 (Three) Times a Day As Needed for Muscle Spasms.   • desoximetasone (TOPICORT) 0.25 % ointment 1 application 2 (two) times a day as needed.   • metoprolol succinate XL (TOPROL-XL) 25 MG 24 hr tablet Take 1  tablet by mouth Daily.   • omeprazole (priLOSEC) 20 MG capsule TAKE ONE CAPSULE BY MOUTH IN THE MORNING BEFORE BREAKFAST   • rosuvastatin (CRESTOR) 5 MG tablet Take 1 tablet by mouth Daily.   • [DISCONTINUED] escitalopram (LEXAPRO) 20 MG tablet TAKE 1 TABLET BY MOUTH ONCE DAILY     No current facility-administered medications on file prior to visit.        PFSH:     The following portions of the patient's history were reviewed and updated as appropriate: Allergies / Current Medications / Past Medical History / Surgical History / Social History / Family History    PROBLEM LIST   Patient Active Problem List   Diagnosis   • Anxiety   • DD (diverticular disease)   • Gastroesophageal reflux disease   • Essential hypertension   • Hyperlipidemia   • Osteopenia   • Bilateral low back pain without sciatica   • Medicare annual wellness visit, subsequent       PAST MEDICAL HISTORY  Past Medical History:   Diagnosis Date   • Anxiety    • Backache    • Constipation    • Dermatitis    • Diverticulosis     Colonoscopy November 2014   • Dyspepsia    • Dysuria    • Erythema of face     Transitory Erythema Of The Face   • Impacted cerumen    • Neck pain    • Tachycardia        SURGICAL HISTORY  Past Surgical History:   Procedure Laterality Date   • EXOSTECTOMY      Simple Bunion Exostectomy (Silver Procedure)       SOCIAL HISTORY  Social History     Socioeconomic History   • Marital status:      Spouse name: Not on file   • Number of children: 1   • Years of education: Not on file   • Highest education level: Not on file   Occupational History   • Occupation: Retired   Tobacco Use   • Smoking status: Never Smoker   • Smokeless tobacco: Never Used   Substance and Sexual Activity   • Alcohol use: Yes     Comment: social drinker       FAMILY HISTORY  Family History   Problem Relation Age of Onset   • No Known Problems Father

## 2019-07-10 DIAGNOSIS — I65.23 CALCIFICATION OF BOTH CAROTID ARTERIES: Primary | ICD-10-CM

## 2019-07-10 DIAGNOSIS — E78.5 HYPERLIPIDEMIA, UNSPECIFIED HYPERLIPIDEMIA TYPE: ICD-10-CM

## 2019-07-10 DIAGNOSIS — I70.90 ARTERIAL CALCIFICATION: ICD-10-CM

## 2019-07-16 ENCOUNTER — HOSPITAL ENCOUNTER (OUTPATIENT)
Dept: CARDIOLOGY | Facility: HOSPITAL | Age: 74
Discharge: HOME | End: 2019-07-16
Admitting: NURSE PRACTITIONER

## 2019-07-16 LAB
BH CV XLRA MEAS LEFT CCA RATIO VEL: 60.9 CM/SEC
BH CV XLRA MEAS LEFT DIST CCA EDV: 14.1 CM/SEC
BH CV XLRA MEAS LEFT DIST CCA PSV: 60.9 CM/SEC
BH CV XLRA MEAS LEFT DIST ICA EDV: -38.9 CM/SEC
BH CV XLRA MEAS LEFT DIST ICA PSV: -110 CM/SEC
BH CV XLRA MEAS LEFT ICA RATIO VEL: -110 CM/SEC
BH CV XLRA MEAS LEFT ICA/CCA RATIO: -1.8
BH CV XLRA MEAS LEFT MID ICA EDV: -31.5 CM/SEC
BH CV XLRA MEAS LEFT MID ICA PSV: -83 CM/SEC
BH CV XLRA MEAS LEFT PROX CCA EDV: 21.2 CM/SEC
BH CV XLRA MEAS LEFT PROX CCA PSV: 104 CM/SEC
BH CV XLRA MEAS LEFT PROX ECA EDV: -10 CM/SEC
BH CV XLRA MEAS LEFT PROX ECA PSV: -83.8 CM/SEC
BH CV XLRA MEAS LEFT PROX ICA EDV: 21.2 CM/SEC
BH CV XLRA MEAS LEFT PROX ICA PSV: 65.6 CM/SEC
BH CV XLRA MEAS LEFT PROX SCLA EDV: 8.3 CM/SEC
BH CV XLRA MEAS LEFT PROX SCLA PSV: 151 CM/SEC
BH CV XLRA MEAS LEFT VERTEBRAL A EDV: 5.4 CM/SEC
BH CV XLRA MEAS LEFT VERTEBRAL A PSV: 24 CM/SEC
BH CV XLRA MEAS RIGHT CCA RATIO VEL: 79.7 CM/SEC
BH CV XLRA MEAS RIGHT DIST CCA EDV: 27 CM/SEC
BH CV XLRA MEAS RIGHT DIST CCA PSV: 79.7 CM/SEC
BH CV XLRA MEAS RIGHT DIST ICA EDV: -48.9 CM/SEC
BH CV XLRA MEAS RIGHT DIST ICA PSV: -113 CM/SEC
BH CV XLRA MEAS RIGHT ICA RATIO VEL: -113 CM/SEC
BH CV XLRA MEAS RIGHT ICA/CCA RATIO: -1.4
BH CV XLRA MEAS RIGHT MID ICA EDV: 36.3 CM/SEC
BH CV XLRA MEAS RIGHT MID ICA PSV: 96.4 CM/SEC
BH CV XLRA MEAS RIGHT PROX CCA EDV: -20.5 CM/SEC
BH CV XLRA MEAS RIGHT PROX CCA PSV: -82.7 CM/SEC
BH CV XLRA MEAS RIGHT PROX ECA EDV: -12.3 CM/SEC
BH CV XLRA MEAS RIGHT PROX ECA PSV: -92 CM/SEC
BH CV XLRA MEAS RIGHT PROX ICA EDV: -28.7 CM/SEC
BH CV XLRA MEAS RIGHT PROX ICA PSV: -82.1 CM/SEC
BH CV XLRA MEAS RIGHT PROX SCLA EDV: 10.2 CM/SEC
BH CV XLRA MEAS RIGHT PROX SCLA PSV: 130 CM/SEC
BH CV XLRA MEAS RIGHT VERTEBRAL A EDV: 11.7 CM/SEC
BH CV XLRA MEAS RIGHT VERTEBRAL A PSV: 35.2 CM/SEC
LEFT ARM BP: NORMAL MMHG
RIGHT ARM BP: NORMAL MMHG

## 2019-07-16 PROCEDURE — 93880 EXTRACRANIAL BILAT STUDY: CPT

## 2019-07-17 ENCOUNTER — HOSPITAL ENCOUNTER (OUTPATIENT)
Dept: BONE DENSITY | Facility: HOSPITAL | Age: 74
Discharge: HOME | End: 2019-07-17
Admitting: NURSE PRACTITIONER

## 2019-07-17 PROCEDURE — 77080 DXA BONE DENSITY AXIAL: CPT

## 2019-08-02 DIAGNOSIS — I65.23 CALCIFICATION OF BOTH CAROTID ARTERIES: ICD-10-CM

## 2019-08-02 DIAGNOSIS — E78.5 HYPERLIPIDEMIA, UNSPECIFIED HYPERLIPIDEMIA TYPE: ICD-10-CM

## 2019-08-02 DIAGNOSIS — I10 ESSENTIAL HYPERTENSION: ICD-10-CM

## 2019-08-02 DIAGNOSIS — M85.80 OSTEOPENIA, UNSPECIFIED LOCATION: ICD-10-CM

## 2019-08-02 DIAGNOSIS — Z00.00 PREVENTATIVE HEALTH CARE: Primary | ICD-10-CM

## 2019-08-05 DIAGNOSIS — Z00.00 ROUTINE HEALTH MAINTENANCE: Primary | ICD-10-CM

## 2019-08-06 DIAGNOSIS — E03.8 SUBCLINICAL HYPOTHYROIDISM: Primary | ICD-10-CM

## 2019-08-06 LAB
ALBUMIN SERPL-MCNC: 4.4 G/DL (ref 3.5–5.2)
ALBUMIN/GLOB SERPL: 1.6 G/DL
ALP SERPL-CCNC: 101 U/L (ref 39–117)
ALT SERPL-CCNC: 19 U/L (ref 1–33)
AST SERPL-CCNC: 21 U/L (ref 1–32)
BASOPHILS # BLD AUTO: 0.06 10*3/MM3 (ref 0–0.2)
BASOPHILS NFR BLD AUTO: 0.9 % (ref 0–1.5)
BILIRUB SERPL-MCNC: 0.4 MG/DL (ref 0.2–1.2)
BUN SERPL-MCNC: 16 MG/DL (ref 8–23)
BUN/CREAT SERPL: 18 (ref 7–25)
CALCIUM SERPL-MCNC: 9.4 MG/DL (ref 8.6–10.5)
CHLORIDE SERPL-SCNC: 102 MMOL/L (ref 98–107)
CHOLEST SERPL-MCNC: 173 MG/DL (ref 0–200)
CO2 SERPL-SCNC: 25.8 MMOL/L (ref 22–29)
CREAT SERPL-MCNC: 0.89 MG/DL (ref 0.57–1)
EOSINOPHIL # BLD AUTO: 0.19 10*3/MM3 (ref 0–0.4)
EOSINOPHIL NFR BLD AUTO: 2.8 % (ref 0.3–6.2)
ERYTHROCYTE [DISTWIDTH] IN BLOOD BY AUTOMATED COUNT: 13 % (ref 12.3–15.4)
GLOBULIN SER CALC-MCNC: 2.8 GM/DL
GLUCOSE SERPL-MCNC: 111 MG/DL (ref 65–99)
HBA1C MFR BLD: 5.6 % (ref 4.8–5.6)
HCT VFR BLD AUTO: 46.5 % (ref 34–46.6)
HDLC SERPL-MCNC: 65 MG/DL (ref 40–60)
HGB BLD-MCNC: 14.1 G/DL (ref 12–15.9)
IMM GRANULOCYTES # BLD AUTO: 0.02 10*3/MM3 (ref 0–0.05)
IMM GRANULOCYTES NFR BLD AUTO: 0.3 % (ref 0–0.5)
LDLC SERPL CALC-MCNC: 87 MG/DL (ref 0–100)
LYMPHOCYTES # BLD AUTO: 2.01 10*3/MM3 (ref 0.7–3.1)
LYMPHOCYTES NFR BLD AUTO: 29.4 % (ref 19.6–45.3)
MCH RBC QN AUTO: 31.4 PG (ref 26.6–33)
MCHC RBC AUTO-ENTMCNC: 30.3 G/DL (ref 31.5–35.7)
MCV RBC AUTO: 103.6 FL (ref 79–97)
MONOCYTES # BLD AUTO: 0.62 10*3/MM3 (ref 0.1–0.9)
MONOCYTES NFR BLD AUTO: 9.1 % (ref 5–12)
NEUTROPHILS # BLD AUTO: 3.93 10*3/MM3 (ref 1.7–7)
NEUTROPHILS NFR BLD AUTO: 57.5 % (ref 42.7–76)
NRBC BLD AUTO-RTO: 0 /100 WBC (ref 0–0.2)
PLATELET # BLD AUTO: 307 10*3/MM3 (ref 140–450)
POTASSIUM SERPL-SCNC: 4.2 MMOL/L (ref 3.5–5.2)
PROT SERPL-MCNC: 7.2 G/DL (ref 6–8.5)
RBC # BLD AUTO: 4.49 10*6/MM3 (ref 3.77–5.28)
SODIUM SERPL-SCNC: 139 MMOL/L (ref 136–145)
T4 FREE SERPL-MCNC: 1 NG/DL (ref 0.93–1.7)
TRIGL SERPL-MCNC: 105 MG/DL (ref 0–150)
TSH SERPL DL<=0.005 MIU/L-ACNC: 4.98 MIU/ML (ref 0.27–4.2)
VLDLC SERPL CALC-MCNC: 21 MG/DL
WBC # BLD AUTO: 6.83 10*3/MM3 (ref 3.4–10.8)

## 2019-08-06 RX ORDER — LEVOTHYROXINE SODIUM 0.03 MG/1
TABLET ORAL
Qty: 30 TABLET | Refills: 3 | Status: SHIPPED | OUTPATIENT
Start: 2019-08-06 | End: 2019-12-08 | Stop reason: SDUPTHER

## 2019-08-11 NOTE — PROGRESS NOTES
"08/12/2019      MEDICARE ANNUAL WELLNESS VISIT     Neela Ramirez is a 74 y.o. female who presents for SUBSEQUENT AWV    Patient's general assessment of her health since a year ago:     - Compared to one year ago, she feels her physical health is about the same without significant change.    - Compared to one year ago, she feels her mental health is about the same without significant change.      HPI for other active medical problems:   Weakness of L foot- patient states she had L foot bunionectomy completed many years ago without complication, however the podiatrist also completed a surgical procedure that she cannot recall on the outer aspect of her L foot and ever since then she reports that she has difficulty with lateral stability on that side. She wears supportive based shoes and walks frequently, but reports that she has imbalance issues only when walking slowly or with weight on the L side. She is interested in further evaluation at this point and does not desire to re-visit previous podiatrist.     Subclinical hypothyroidism: Recent labs showed elevated TSH. She began levothyroxine 25 mcg daily 4 days ago. She denies side effects since starting the medication.    Recently started on levothyroxine, decreased fatigue, no change in weight, denies palpitation or chest pain, denies increased anxiety or depression, denies change in bowels, denies cold intolerance  Lab Results   Component Value Date    TSH 4.980 (H) 08/05/2019    FREET4 1.00 08/05/2019       * The required components of Health Risk Assessment (HRA) that were completed by the patient and/or my staff are contained within this note and in the scanned documents titled \"Health Risk Assessment\" within the media section of the patient's chart in [facility].       HISTORY    Recent Hospitalizations:    Recent hospitalization?: NO    If YES, location, date, and diagnoses:     · Location:   · Date:   · Principle Discharge Dx:   · Secondary Dx:       Patient Care " Team:    Patient Care Team:  Teri Rodriguez APRN as PCP - General (Nurse Practitioner)  Hannah Cabello MD as PCP - Claims Attributed      Allergies:  Patient has no known allergies.    Medications:  Current Outpatient Medications   Medication Sig Dispense Refill   • alendronate (FOSAMAX) 70 MG tablet Take 1 tablet by mouth 1 (One) Time Per Week. 13 tablet 0   • amLODIPine (NORVASC) 5 MG tablet Take 1 tablet by mouth Daily. 90 tablet 1   • aspirin 81 MG EC tablet Take 81 mg by mouth Daily.     • cyclobenzaprine (FLEXERIL) 5 MG tablet Take 1 tablet by mouth 3 (Three) Times a Day As Needed for Muscle Spasms. 30 tablet 3   • desoximetasone (TOPICORT) 0.25 % ointment 1 application 2 (two) times a day as needed.     • escitalopram (LEXAPRO) 20 MG tablet Take 1 tablet by mouth Daily. 90 tablet 1   • levothyroxine (SYNTHROID, LEVOTHROID) 25 MCG tablet Take 30 minutes before breakfast or any other medications in the morning. 30 tablet 3   • metoprolol succinate XL (TOPROL-XL) 25 MG 24 hr tablet Take 1 tablet by mouth Daily. 90 tablet 1   • omeprazole (priLOSEC) 20 MG capsule TAKE ONE CAPSULE BY MOUTH IN THE MORNING BEFORE BREAKFAST 30 capsule 0   • rosuvastatin (CRESTOR) 5 MG tablet Take 1 tablet by mouth Daily. 90 tablet 1     No current facility-administered medications for this visit.         PFSH:     The following portions of the patient's history were reviewed and updated as appropriate: Allergies / Current Medications / Past Medical History / Surgical History / Social History / Family History    Problem List:  Patient Active Problem List   Diagnosis   • Anxiety   • DD (diverticular disease)   • Gastroesophageal reflux disease   • Essential hypertension   • Hyperlipidemia   • Osteopenia   • Bilateral low back pain without sciatica   • Medicare annual wellness visit, subsequent       Past Medical History:  Past Medical History:   Diagnosis Date   • Anxiety    • Backache    • Constipation    • Dermatitis    •  "Diverticulosis     Colonoscopy November 2014   • Dyspepsia    • Dysuria    • Erythema of face     Transitory Erythema Of The Face   • Impacted cerumen    • Neck pain    • Tachycardia        Past Surgical History:  Past Surgical History:   Procedure Laterality Date   • EXOSTECTOMY      Simple Bunion Exostectomy (Silver Procedure)       Social History:  Social History     Socioeconomic History   • Marital status:      Spouse name: Not on file   • Number of children: 1   • Years of education: Not on file   • Highest education level: Not on file   Occupational History   • Occupation: Retired   Tobacco Use   • Smoking status: Never Smoker   • Smokeless tobacco: Never Used   Substance and Sexual Activity   • Alcohol use: Yes     Comment: social drinker       Family History:  Family History   Problem Relation Age of Onset   • No Known Problems Father          PATIENT ASSESSMENT    Vitals:  /74   Pulse 111   Temp 97 °F (36.1 °C) (Temporal)   Ht 156.2 cm (61.5\")   Wt 61.2 kg (135 lb)   SpO2 98%   BMI 25.10 kg/m²   BP Readings from Last 3 Encounters:   08/12/19 128/74   07/09/19 130/80   10/25/18 134/88     Wt Readings from Last 3 Encounters:   08/12/19 61.2 kg (135 lb)   07/09/19 60.8 kg (134 lb)   10/25/18 61.1 kg (134 lb 9.6 oz)      Body mass index is 25.1 kg/m².    There were no vitals filed for this visit.      Review of Systems:    Review of Systems   Constitutional: Negative for appetite change, fatigue (improved since starting thyroid replacement) and unexpected weight change.   HENT: Negative.    Eyes: Negative.    Respiratory: Negative for cough, shortness of breath and wheezing.    Cardiovascular: Negative for chest pain and palpitations.   Gastrointestinal: Negative for constipation, diarrhea and nausea.   Endocrine: Negative.    Genitourinary: Negative.    Musculoskeletal: Negative for arthralgias and myalgias.        L foot instability with balance   Skin: Negative.    Neurological: Negative. "    Psychiatric/Behavioral: Negative.      Physical Exam:    Physical Exam   Constitutional: She is oriented to person, place, and time. She appears well-developed.   HENT:   Head: Normocephalic.   Left Ear: External ear normal.   Mouth/Throat: Oropharynx is clear and moist.   R ear cerumen impaction   Eyes: Conjunctivae and EOM are normal. Pupils are equal, round, and reactive to light.   Neck: Normal range of motion. Neck supple.   Cardiovascular: Normal rate, regular rhythm, normal heart sounds and intact distal pulses.   Pulmonary/Chest: Effort normal and breath sounds normal.   Abdominal: Soft. Bowel sounds are normal. She exhibits no distension and no mass. There is no tenderness.   Musculoskeletal: Normal range of motion. She exhibits no edema.   Lymphadenopathy:     She has no cervical adenopathy.   Neurological: She is alert and oriented to person, place, and time. No sensory deficit. Coordination normal.   Skin: Skin is warm and dry.   Psychiatric: She has a normal mood and affect.   Vitals reviewed.    Reviewed Data:    Labs:   Lab Results   Component Value Date     08/05/2019    K 4.2 08/05/2019    CALCIUM 9.4 08/05/2019    AST 21 08/05/2019    ALT 19 08/05/2019    BUN 16 08/05/2019    CREATININE 0.89 08/05/2019    CREATININE 0.80 06/04/2018    CREATININE 0.99 05/25/2018    EGFRIFNONA 62 08/05/2019    EGFRIFAFRI 75 08/05/2019       Lab Results   Component Value Date     (H) 08/05/2019     (H) 05/25/2018     (H) 02/15/2017    HGBA1C 5.60 08/05/2019       Lab Results   Component Value Date    LDL 87 08/05/2019    LDL 86 05/25/2018    LDL 81 02/15/2017    HDL 65 (H) 08/05/2019    TRIG 105 08/05/2019       Lab Results   Component Value Date    TSH 4.980 (H) 08/05/2019    FREET4 1.00 08/05/2019          Lab Results   Component Value Date    WBC 6.83 08/05/2019    HGB 14.1 08/05/2019    HGB 14.3 05/25/2018    HGB 14.5 02/15/2017     08/05/2019                 No results found  for: PSA    Imaging:     Medical Tests:     Screening for Glaucoma:  Previous screening for glaucoma?: Yes. Most recent eye specialist evaluation 3 months ago. Completed annually. Does have some evidence of small cataracts.       Hearing Loss Screen:  Finger Rub Hearing Test (right ear): passed  Finger Rub Hearing Test (left ear): passed      Urinary Incontinence Screen:  Episodes of urinary incontinence? : No      Depression Screen:  PHQ-2/PHQ-9 Depression Screening 8/12/2019   Little interest or pleasure in doing things 0   Feeling down, depressed, or hopeless 0   Trouble falling or staying asleep, or sleeping too much -   Feeling tired or having little energy -   Poor appetite or overeating -   Feeling bad about yourself - or that you are a failure or have let yourself or your family down -   Trouble concentrating on things, such as reading the newspaper or watching television -   Moving or speaking so slowly that other people could have noticed. Or the opposite - being so fidgety or restless that you have been moving around a lot more than usual -   Thoughts that you would be better off dead, or of hurting yourself in some way -   Total Score 0   If you checked off any problems, how difficult have these problems made it for you to do your work, take care of things at home, or get along with other people? -        PHQ-2: 0 (Not depressed)    PHQ-9: 0 (Negative screening for depression)       FUNCTIONAL, FALL RISK, & COGNITIVE SCREENING (Components below):    DATA:    Functional & Cognitive Status 8/12/2019   Do you have difficulty preparing food and eating? No   Do you have difficulty bathing yourself, getting dressed or grooming yourself? No   Do you have difficulty using the toilet? No   Do you have difficulty moving around from place to place? No   Do you have trouble with steps or getting out of a bed or a chair? No   Current Diet Well Balanced Diet   Dental Exam Up to date   Eye Exam Up to date   Exercise  (times per week) 0 times per week   Current Exercise Activities Include None   Do you need help using the phone?  No   Are you deaf or do you have serious difficulty hearing?  No   Do you need help with transportation? No   Do you need help shopping? No   Do you need help preparing meals?  No   Do you need help with housework?  No   Do you need help with laundry? No   Do you need help taking your medications? No   Do you need help managing money? No   Do you ever drive or ride in a car without wearing a seat belt? No   Do you have difficulty concentrating, remembering or making decisions? No         A) Assessment of Functional Ability:  (Assessment of ability to perform ADL's (showering/bathing, using toilet, dressing, feeding self, moving self around) and IADL's (use telephone, shop, prepare food, housekeep, do laundry, transport independently, take medications independently, and handle finances)    Degree of functional impairment: NONE (based on assessment noted above)      B) Assessment of Fall Risk:       AMB Fall Risk Assessment 1/4/2018   Fallen in past 6 months 0--> No   Mental Status 0--> no mental status change   Mobility 0--> No mobility issues   Medications 0--> No meds   Total Fall Risk Score 2       Need for further evaluation of gait, strength, and balance? : No    Timed Up and Go (TUG):   (>= 12 seconds indicates high risk for falling)    Observable abnormalities included: Normal gait pattern       C. Assessment of Cognitive Function:    Mini-Cog Test:     1) Registration (3 objects): Yes   2) Number of objects recalled: 3   3) Clock Draw: Passed? : Yes       Further evaluation required? : Yes      COUNSELING    A. Identification of Health Risk Factors:    Risk factors include: cardiovascular risk factors, increased fall risk, inadequate nutrition and poor hearing      B. Age-Appropriate Screening Schedule:  (Refer to the list below for future screening recommendations based on patient's age, sex  and/or medical conditions. Orders for these recommended tests are listed in the plan section. The patient has been provided with a written plan)    Health Maintenance Topics  Health Maintenance   Topic Date Due   • ZOSTER VACCINE (2 of 3) 10/27/2012   • INFLUENZA VACCINE  08/01/2019   • LIPID PANEL  08/05/2020   • MAMMOGRAM  03/07/2021   • DXA SCAN  07/17/2021   • COLONOSCOPY  11/17/2024   • TDAP/TD VACCINES (2 - Td) 09/30/2025   • PNEUMOCOCCAL VACCINES (65+ LOW/MEDIUM RISK)  Completed       Health Maintenance Topics Due or Over-Due  Health Maintenance Due   Topic Date Due   • ZOSTER VACCINE (2 of 3) 10/27/2012   • MOST FORM  05/22/2019   • INFLUENZA VACCINE  08/01/2019         C. Advanced Care Planning:    has an advanced directive which is on file and has a medical power of       D. Patient Self-Management and Personalized Health Advice:    She has been provided with personalized counseling/information (including brochures/handouts) about:     -- optimizing diet/nutrition plans, improving exercise / conditioning, weight management, reducing risk for cardiovascular disease (heart, stroke, vascular) and fall prevention      She has been recommended for the following preventative services which has been performed today, will be ordered today or ordered/performed on upcoming follow-up visit:     -- alcohol use counseling completed, nutrition counseling provided, exercise counseling provided, recommended eye exam for glaucoma screening, counseling for cardiovascular disease risk reduction, fall risk assessment / plan of care completed, urinary incontinence assessment done, vaccination for Shingrix administered  (or recommended at pharmacy)      E. Miscellaneous Items:    -Aspirin use counseling: Does not need ASA but is currently taking (advised patient that ASA is not indicated and patient chooses to stop it)    -Discussed BMI with her. The BMI is in the acceptable range    -Reviewed use of high risk  medication in the elderly: YES    -Reviewed for potential of harmful drug interactions in the elderly: YES      IV. WRAP-UP    Assessment & Plan:  1. Medicare annual wellness visit, subsequent    2. Subclinical hypothyroidism  - TSH+Free T4; Future    3. Weakness of left foot  - Ambulatory Referral to Orthopedic Surgery    4. History of foot surgery  - Ambulatory Referral to Orthopedic Surgery    5. Nutritional counseling    1) MEDICARE ANNUAL WELLNESS VISIT    2) OTHER MEDICAL CONDITIONS ADDRESSED TODAY:              Problem List Items Addressed This Visit        Other    Medicare annual wellness visit, subsequent - Primary      Other Visit Diagnoses     Subclinical hypothyroidism        Relevant Orders    TSH+Free T4    Weakness of left foot        Relevant Orders    Ambulatory Referral to Orthopedic Surgery (Completed)    History of foot surgery        Relevant Orders    Ambulatory Referral to Orthopedic Surgery (Completed)    Nutritional counseling                        Orders Placed This Encounter   Procedures   • TSH+Free T4   • Ambulatory Referral to Orthopedic Surgery       Discussion / Summary:  1.  Referral was provided for orthopedic surgery regarding left foot weakness and instability causing imbalance issues.  I have advised her that she may benefit most from physical therapy in order to strengthen left foot, however she would like reevaluation by orthopedist considering history of foot surgery and unknown procedure on the lateral left foot.  2.  Recheck hypothyroidism with thyroid labs in approximately 8 weeks for regular maintenance monitoring and dose adjustment as needed.  Advised patient on side effects and thyroid symptoms to be aware of.  3.  Greater than 15 minutes spent counseling patient on nutrition and exercise for weight loss, she was provided with handout for profile weight loss clinic and health coaching.  I have also handed her information regarding Dr. Harvey and Dr. Pacheco dietary  guidelines on her AVS.  I encouraged her to review all of these guidance tools and find the one that is most appealing to her and will work best with her lifestyle.  Contact me with any questions she may have.  4.  Return in 1 week after applying Debrox eardrops to right ear for earwax removal.  Attempted removal today with irrigation, however earwax was to hard in order to remove it she will need to use Debrox eardrops for approximately 1 week prior to irrigation removal.  She verbalized understanding and agreement.  5.  Follow-up for regular scheduled visits and repeat Medicare wellness visit in approximately 12 months with labs as indicated.      Medications as of TODAY:              Current Outpatient Medications   Medication Sig Dispense Refill   • alendronate (FOSAMAX) 70 MG tablet Take 1 tablet by mouth 1 (One) Time Per Week. 13 tablet 0   • amLODIPine (NORVASC) 5 MG tablet Take 1 tablet by mouth Daily. 90 tablet 1   • aspirin 81 MG EC tablet Take 81 mg by mouth Daily.     • cyclobenzaprine (FLEXERIL) 5 MG tablet Take 1 tablet by mouth 3 (Three) Times a Day As Needed for Muscle Spasms. 30 tablet 3   • desoximetasone (TOPICORT) 0.25 % ointment 1 application 2 (two) times a day as needed.     • escitalopram (LEXAPRO) 20 MG tablet Take 1 tablet by mouth Daily. 90 tablet 1   • levothyroxine (SYNTHROID, LEVOTHROID) 25 MCG tablet Take 30 minutes before breakfast or any other medications in the morning. 30 tablet 3   • metoprolol succinate XL (TOPROL-XL) 25 MG 24 hr tablet Take 1 tablet by mouth Daily. 90 tablet 1   • omeprazole (priLOSEC) 20 MG capsule TAKE ONE CAPSULE BY MOUTH IN THE MORNING BEFORE BREAKFAST 30 capsule 0   • rosuvastatin (CRESTOR) 5 MG tablet Take 1 tablet by mouth Daily. 90 tablet 1     No current facility-administered medications for this visit.          FOLLOW-UP:            Return in about 4 months (around 12/12/2019) for Recheck HTN, Hyperlipidemia.              Future Appointments   Date  Time Provider Department Center   8/19/2019 11:00 AM Teri Rodriguez APRN [facility] None   10/3/2019 10:00 AM [facility] None   12/12/2019 11:00 AM Teri Rodriguez APRN [facility] None         ______________________________________________________________________      A printed After Visit Summary (AVS) including the Personalized Prevention  Plan Services (PPPS) was given to the patient at check-out today.     Educational Handouts    Strong & Stable / Balance / Physical Activity / Pain / Depression /  Forgetfulness / Healthy Eating / Alcohol / Smoking /  Medicine / Sexuality / Scams

## 2019-08-12 ENCOUNTER — OFFICE VISIT (OUTPATIENT)
Dept: INTERNAL MEDICINE | Age: 74
End: 2019-08-12

## 2019-08-12 VITALS
HEIGHT: 61 IN | OXYGEN SATURATION: 98 % | SYSTOLIC BLOOD PRESSURE: 128 MMHG | HEART RATE: 111 BPM | WEIGHT: 135 LBS | DIASTOLIC BLOOD PRESSURE: 74 MMHG | BODY MASS INDEX: 25.49 KG/M2 | TEMPERATURE: 97 F

## 2019-08-12 DIAGNOSIS — Z00.00 MEDICARE ANNUAL WELLNESS VISIT, SUBSEQUENT: Primary | ICD-10-CM

## 2019-08-12 DIAGNOSIS — Z98.890 HISTORY OF FOOT SURGERY: ICD-10-CM

## 2019-08-12 DIAGNOSIS — Z71.3 NUTRITIONAL COUNSELING: ICD-10-CM

## 2019-08-12 DIAGNOSIS — R29.898 WEAKNESS OF LEFT FOOT: ICD-10-CM

## 2019-08-12 DIAGNOSIS — E03.8 SUBCLINICAL HYPOTHYROIDISM: ICD-10-CM

## 2019-08-12 PROCEDURE — G0439 PPPS, SUBSEQ VISIT: HCPCS | Performed by: NURSE PRACTITIONER

## 2019-08-12 NOTE — PATIENT INSTRUCTIONS
Medicare Wellness  Personal Prevention Plan of Service     Date of Office Visit:  2019  Encounter Provider:  RENO Rodriguez  Place of Service:  [facility]  Patient Name: Neela Ramirez  :  1945    As part of the Medicare Wellness portion of your visit today, we are providing you with this personalized preventive plan of services (PPPS). This plan is based upon recommendations of the [place] Preventive Services Task Force (USPSTF) and the Advisory Committee on Immunization Practices (ACIP).    This lists the preventive care services that should be considered, and provides dates of when you are due. Items listed as completed are up-to-date and do not require any further intervention.      Age-Appropriate Screening Schedule:  (Refer to the list below for future screening recommendations based on patient's age, sex and/or medical conditions. Orders for these recommended tests are listed in the plan section. The patient has been provided with a written plan)    Health Maintenance Topics  Health Maintenance   Topic Date Due   • ZOSTER VACCINE (2 of 3) 10/27/2012   • MOST FORM  2019   • INFLUENZA VACCINE  2019   • LIPID PANEL  2020   • MEDICARE ANNUAL WELLNESS  2020   • MAMMOGRAM  2021   • DXA SCAN  2021   • COLONOSCOPY  2024   • TDAP/TD VACCINES (2 - Td) 2025   • PNEUMOCOCCAL VACCINES (65+ LOW/MEDIUM RISK)  Completed   • HEPATITIS C SCREENING  Addressed       Health Maintenance Topics Due or Over-Due  Health Maintenance Due   Topic Date Due   • ZOSTER VACCINE (2 of 3) 10/27/2012   • MOST FORM  2019   • INFLUENZA VACCINE  2019         ADDITIONAL RESOURCES:    For excellent information on senior health and wellness refer to the [place] of [employer] web-site at:    WWW.NIHSENIORHEALTH.GOV    Exercise 150 minutes/week of moderate-vigorous intensity  Daily Water goal: 64-80 ounces   Reduce/eliminate  soda/juice/sweet tea  Anti-inflammatory diet -search Dr. Weil online (Dr. Weil's Anti-Inflammatory Food Pyramid) or Dr. Harrell 'Food as Medicine' at [employer]   Food intake high in vegetables, lean protein, omega 3 fats (salmon, walnuts, almonds), and fruit.  Food intake low in processed food, refined sugar, and saturated fats from meat/dairy.  Flu shot every fall  Vitamin D3 daily  Calcium 4935-8374 mg daily-try to get from food intake such as dark leafy vegetables like spinach, kale, turnips, and jason greens.

## 2019-08-15 DIAGNOSIS — K21.9 GASTROESOPHAGEAL REFLUX DISEASE WITHOUT ESOPHAGITIS: Primary | ICD-10-CM

## 2019-08-15 RX ORDER — OMEPRAZOLE 20 MG/1
20 CAPSULE, DELAYED RELEASE ORAL DAILY
Qty: 30 CAPSULE | Refills: 0 | Status: SHIPPED | OUTPATIENT
Start: 2019-08-15 | End: 2020-01-23

## 2019-08-19 ENCOUNTER — OFFICE VISIT (OUTPATIENT)
Dept: INTERNAL MEDICINE | Age: 74
End: 2019-08-19

## 2019-08-19 VITALS
TEMPERATURE: 98.1 F | OXYGEN SATURATION: 96 % | BODY MASS INDEX: 25.11 KG/M2 | DIASTOLIC BLOOD PRESSURE: 80 MMHG | HEART RATE: 103 BPM | SYSTOLIC BLOOD PRESSURE: 130 MMHG | WEIGHT: 133 LBS | HEIGHT: 61 IN

## 2019-08-19 DIAGNOSIS — H61.21 IMPACTED CERUMEN OF RIGHT EAR: Primary | ICD-10-CM

## 2019-08-19 DIAGNOSIS — H60.501 ACUTE OTITIS EXTERNA OF RIGHT EAR, UNSPECIFIED TYPE: ICD-10-CM

## 2019-08-19 PROCEDURE — 69210 REMOVE IMPACTED EAR WAX UNI: CPT | Performed by: NURSE PRACTITIONER

## 2019-08-19 RX ORDER — AMOXICILLIN 875 MG/1
875 TABLET, COATED ORAL 2 TIMES DAILY
Qty: 14 TABLET | Refills: 0 | Status: SHIPPED | OUTPATIENT
Start: 2019-08-19 | End: 2019-08-26

## 2019-08-19 NOTE — PROGRESS NOTES
Ear Cerumen Removal  Date/Time: 8/19/2019 7:44 AM  Performed by: Teri Rodriguez APRN  Authorized by: Teri Rodriguez APRN   Consent: Verbal consent obtained.  Risks and benefits: risks, benefits and alternatives were discussed  Consent given by: patient  Patient understanding: patient states understanding of the procedure being performed  Ceruminolytics applied: Ceruminolytics applied prior to the procedure.  Location details: right ear  Patient tolerance: Patient tolerated the procedure well with no immediate complications  Comments: R ear: large portion of cerumen removed from external portion of ear canal, without pain but with small lacerations and bleeding left residual. Patient denies any pain. Hard, dark cerumen retained deep in ear canal blocking TM. Patient states she's had hearing impairment for a while now without knowing it, and it has not worsened with procedure. She has a history of scrapping her own ears with fingers and tools at home. She will be referred to ENT for further cerumen removal and TM visualization. She will be started on antibiotics for lacerations of R ear canal following procedure today. She is understanding and agreeable to plan.   Procedure type: instrumentation and irrigation   Sedation:  Patient sedated: no

## 2019-09-09 DIAGNOSIS — I10 ESSENTIAL HYPERTENSION: ICD-10-CM

## 2019-09-10 RX ORDER — METOPROLOL SUCCINATE 25 MG/1
TABLET, EXTENDED RELEASE ORAL
Qty: 90 TABLET | Refills: 1 | Status: SHIPPED | OUTPATIENT
Start: 2019-09-10 | End: 2020-03-10 | Stop reason: SDUPTHER

## 2019-09-10 RX ORDER — AMLODIPINE BESYLATE 5 MG/1
TABLET ORAL
Qty: 90 TABLET | Refills: 1 | Status: SHIPPED | OUTPATIENT
Start: 2019-09-10 | End: 2020-03-10 | Stop reason: SDUPTHER

## 2019-10-03 ENCOUNTER — RESULTS ENCOUNTER (OUTPATIENT)
Dept: INTERNAL MEDICINE | Age: 74
End: 2019-10-03

## 2019-10-03 DIAGNOSIS — E03.8 SUBCLINICAL HYPOTHYROIDISM: ICD-10-CM

## 2019-10-04 LAB
T4 FREE SERPL-MCNC: 1.21 NG/DL (ref 0.93–1.7)
TSH SERPL DL<=0.005 MIU/L-ACNC: 1.41 UIU/ML (ref 0.27–4.2)

## 2019-10-21 DIAGNOSIS — M85.80 OSTEOPENIA, UNSPECIFIED LOCATION: ICD-10-CM

## 2019-10-21 RX ORDER — ALENDRONATE SODIUM 70 MG/1
70 TABLET ORAL WEEKLY
Qty: 13 TABLET | Refills: 3 | Status: SHIPPED | OUTPATIENT
Start: 2019-10-21 | End: 2021-02-01 | Stop reason: SDUPTHER

## 2019-12-08 DIAGNOSIS — E03.8 SUBCLINICAL HYPOTHYROIDISM: ICD-10-CM

## 2019-12-09 RX ORDER — LEVOTHYROXINE SODIUM 0.03 MG/1
TABLET ORAL
Qty: 30 TABLET | Refills: 11 | Status: SHIPPED | OUTPATIENT
Start: 2019-12-09 | End: 2020-11-09 | Stop reason: SDUPTHER

## 2020-01-02 DIAGNOSIS — F41.9 ANXIETY: ICD-10-CM

## 2020-01-02 RX ORDER — ESCITALOPRAM OXALATE 20 MG/1
TABLET ORAL
Qty: 90 TABLET | Refills: 0 | Status: SHIPPED | OUTPATIENT
Start: 2020-01-02 | End: 2020-04-02 | Stop reason: SDUPTHER

## 2020-01-23 DIAGNOSIS — K21.9 GASTROESOPHAGEAL REFLUX DISEASE WITHOUT ESOPHAGITIS: ICD-10-CM

## 2020-01-23 RX ORDER — OMEPRAZOLE 20 MG/1
CAPSULE, DELAYED RELEASE ORAL
Qty: 30 CAPSULE | Refills: 11 | Status: SHIPPED | OUTPATIENT
Start: 2020-01-23 | End: 2021-12-13

## 2020-01-27 DIAGNOSIS — E78.5 HYPERLIPIDEMIA, UNSPECIFIED HYPERLIPIDEMIA TYPE: ICD-10-CM

## 2020-01-27 RX ORDER — ROSUVASTATIN CALCIUM 5 MG/1
TABLET, COATED ORAL
Qty: 90 TABLET | Refills: 0 | Status: SHIPPED | OUTPATIENT
Start: 2020-01-27 | End: 2020-05-28

## 2020-02-04 ENCOUNTER — OFFICE VISIT (OUTPATIENT)
Dept: INTERNAL MEDICINE | Age: 75
End: 2020-02-04

## 2020-02-04 VITALS
WEIGHT: 138 LBS | TEMPERATURE: 97.8 F | OXYGEN SATURATION: 98 % | DIASTOLIC BLOOD PRESSURE: 78 MMHG | SYSTOLIC BLOOD PRESSURE: 140 MMHG | HEIGHT: 61 IN | BODY MASS INDEX: 26.06 KG/M2 | HEART RATE: 112 BPM

## 2020-02-04 DIAGNOSIS — R31.9 HEMATURIA, UNSPECIFIED TYPE: Primary | ICD-10-CM

## 2020-02-04 LAB
BILIRUB BLD-MCNC: NEGATIVE MG/DL
CLARITY, POC: CLEAR
COLOR UR: ABNORMAL
GLUCOSE UR STRIP-MCNC: NEGATIVE MG/DL
KETONES UR QL: NEGATIVE
LEUKOCYTE EST, POC: ABNORMAL
NITRITE UR-MCNC: NEGATIVE MG/ML
PH UR: 6 [PH] (ref 5–8)
PROT UR STRIP-MCNC: NEGATIVE MG/DL
RBC # UR STRIP: ABNORMAL /UL
SP GR UR: 1 (ref 1–1.03)
UROBILINOGEN UR QL: NORMAL

## 2020-02-04 PROCEDURE — 99213 OFFICE O/P EST LOW 20 MIN: CPT | Performed by: NURSE PRACTITIONER

## 2020-02-04 PROCEDURE — 81003 URINALYSIS AUTO W/O SCOPE: CPT | Performed by: NURSE PRACTITIONER

## 2020-02-04 RX ORDER — CEPHALEXIN 500 MG/1
500 CAPSULE ORAL 2 TIMES DAILY
Qty: 14 CAPSULE | Refills: 0 | Status: SHIPPED | OUTPATIENT
Start: 2020-02-04 | End: 2020-02-11

## 2020-02-04 NOTE — PATIENT INSTRUCTIONS
Hematuria, Adult  Hematuria is blood in the urine. Blood may be visible in the urine, or it may be identified with a test. This condition can be caused by infections of the bladder, urethra, kidney, or prostate. Other possible causes include:  · Kidney stones.  · Cancer of the urinary tract.  · Too much calcium in the urine.  · Conditions that are passed from parent to child (inherited conditions).  · Exercise that requires a lot of energy.  Infections can usually be treated with medicine, and a kidney stone usually will pass through your urine. If neither of these is the cause of your hematuria, more tests may be needed to identify the cause of your symptoms.  It is very important to tell your health care provider about any blood in your urine, even if it is painless or the blood stops without treatment. Blood in the urine, when it happens and then stops and then happens again, can be a symptom of a very serious condition, including cancer. There is no pain in the initial stages of many urinary cancers.  Follow these instructions at home:  Medicines  · Take over-the-counter and prescription medicines only as told by your health care provider.  · If you were prescribed an antibiotic medicine, take it as told by your health care provider. Do not stop taking the antibiotic even if you start to feel better.  Eating and drinking  · Drink enough fluid to keep your urine clear or pale yellow. It is recommended that you drink 3-4 quarts (2.8-3.8 L) a day. If you have been diagnosed with an infection, it is recommended that you drink cranberry juice in addition to large amounts of water.  · Avoid caffeine, tea, and carbonated beverages. These tend to irritate the bladder.  · Avoid alcohol because it may irritate the prostate (men).  General instructions  · If you have been diagnosed with a kidney stone, follow your health care provider's instructions about straining your urine to catch the stone.  · Empty your bladder  often. Avoid holding urine for long periods of time.  · If you are female:  ? After a bowel movement, wipe from front to back and use each piece of toilet paper only once.  ? Empty your bladder before and after sex.  · Pay attention to any changes in your symptoms. Tell your health care provider about any changes or any new symptoms.  · It is your responsibility to get your test results. Ask your health care provider, or the department performing the test, when your results will be ready.  · Keep all follow-up visits as told by your health care provider. This is important.  Contact a health care provider if:  · You develop back pain.  · You have a fever.  · You have nausea or vomiting.  · Your symptoms do not improve after 3 days.  · Your symptoms get worse.  Get help right away if:  · You develop severe vomiting and are unable take medicine without vomiting.  · You develop severe pain in your back or abdomen even though you are taking medicine.  · You pass a large amount of blood in your urine.  · You pass blood clots in your urine.  · You feel very weak or like you might faint.  · You faint.  Summary  · Hematuria is blood in the urine. It has many possible causes.  · It is very important that you tell your health care provider about any blood in your urine, even if it is painless or the blood stops without treatment.  · Take over-the-counter and prescription medicines only as told by your health care provider.  · Drink enough fluid to keep your urine clear or pale yellow.  This information is not intended to replace advice given to you by your health care provider. Make sure you discuss any questions you have with your health care provider.  Document Released: 12/18/2006 Document Revised: 01/20/2018 Document Reviewed: 01/20/2018  [employer] Interactive Patient Education © 2019 [employer]

## 2020-02-04 NOTE — PROGRESS NOTES
[facility] [place]  Jacqueline Ramirez / 74 y.o. / female  02/04/2020      ASSESSMENT & PLAN:    Problem List Items Addressed This Visit     None      Visit Diagnoses     Hematuria, unspecified type    -  Primary    Relevant Medications    cephalexin (KEFLEX) 500 MG capsule    Other Relevant Orders    POCT urinalysis dipstick, automated (Completed)    Urinalysis With Culture If Indicated - Urine, Clean Catch        Orders Placed This Encounter   Procedures   • Urinalysis With Culture If Indicated - Urine, Clean Catch   • POCT urinalysis dipstick, automated     New Medications Ordered This Visit   Medications   • cephalexin (KEFLEX) 500 MG capsule     Sig: Take 1 capsule by mouth 2 (Two) Times a Day for 7 days.     Dispense:  14 capsule     Refill:  0       Summary/Discussion:    1. Hematuria, unspecified type  Evidence of possible UTI with hematuria and WBC, will treat as such. Will start patient on PO antibiotics for treatment of lower UTI. Instructed patient to increase PO Fluid intake. Send urinalysis with reflex to culture for verification.   Patient to follow-up in office in 2 weeks for chronic medical follow-up.  At that time, we will recheck urinalysis to ensure resolution of hematuria.  If hematuria persists, may need to consider referral to urology for further evaluation and work-up.     - POCT urinalysis dipstick, automated  - Urinalysis With Culture If Indicated - Urine, Clean Catch  - cephalexin (KEFLEX) 500 MG capsule; Take 1 capsule by mouth 2 (Two) Times a Day for 7 days.  Dispense: 14 capsule; Refill: 0        Return in about 2 weeks (around 2/18/2020) for Next scheduled follow up chronic medical and recheck UA .    ____________________________________________________________________    MEDICATIONS  Current Outpatient Medications   Medication Sig Dispense Refill   • alendronate (FOSAMAX) 70 MG tablet Take 1 tablet by mouth 1 (One) Time Per Week. 13 tablet 3   • amLODIPine  "(NORVASC) 5 MG tablet TAKE 1 TABLET BY MOUTH ONCE DAILY 90 tablet 1   • cyclobenzaprine (FLEXERIL) 5 MG tablet Take 1 tablet by mouth 3 (Three) Times a Day As Needed for Muscle Spasms. 30 tablet 3   • desoximetasone (TOPICORT) 0.25 % ointment 1 application 2 (two) times a day as needed.     • escitalopram (LEXAPRO) 20 MG tablet TAKE 1 TABLET BY MOUTH ONCE DAILY 90 tablet 0   • levothyroxine (SYNTHROID, LEVOTHROID) 25 MCG tablet TAKE 1 TABLET BY MOUTH ONCE DAILY IN THE MORNING 30  MINUTES  BEFORE  BREAKFAST  OR  ANY  OTHER  MEDICATIONS 30 tablet 11   • metoprolol succinate XL (TOPROL-XL) 25 MG 24 hr tablet TAKE 1 TABLET BY MOUTH ONCE DAILY 90 tablet 1   • omeprazole (priLOSEC) 20 MG capsule TAKE 1 CAPSULE BY MOUTH ONCE DAILY 30 capsule 11   • rosuvastatin (CRESTOR) 5 MG tablet TAKE 1 TABLET BY MOUTH ONCE DAILY 90 tablet 0   • cephalexin (KEFLEX) 500 MG capsule Take 1 capsule by mouth 2 (Two) Times a Day for 7 days. 14 capsule 0     No current facility-administered medications for this visit.           VITALS:    Visit Vitals  /78   Pulse 112   Temp 97.8 °F (36.6 °C)   Ht 156.2 cm (61.5\")   Wt 62.6 kg (138 lb)   SpO2 98%   BMI 25.66 kg/m²       BP Readings from Last 3 Encounters:   02/04/20 140/78   08/19/19 130/80   08/12/19 128/74     Wt Readings from Last 3 Encounters:   02/04/20 62.6 kg (138 lb)   08/19/19 60.3 kg (133 lb)   08/12/19 61.2 kg (135 lb)      Body mass index is 25.66 kg/m².    CC:  Main reason(s) for today's visit: Blood in Urine      HPI:     Hematuria: Patient complains of gross hematuria. Onset of hematuria was 1 week ago and was sudden in onset, has had two discrete episodes and none since. There is not a history of nephrolithiasis. There is not a history of urologic trauma. Having some frequency, reports some \"matter noted at urethra\" when wiping, although she is unable to further describe this finding or whether is blood clotting.   Patient denies history of previous infection, " urolithiasis,  trauma,  cancer,  surgery and sexually transmitted diseases. Prior workup has been none. She is not a smoker.     Patient Care Team:  Jacqueline Amor APRN as PCP - General (Internal Medicine)  Hannah Cabello MD as PCP - Claims Attributed    ____________________________________________________________________    REVIEW OF SYSTEMS    Review of Systems   Constitutional: Negative for activity change, appetite change, chills, fever and unexpected weight change.   HENT: Negative for tinnitus.    Eyes: Negative for visual disturbance.   Respiratory: Negative for cough, chest tightness and shortness of breath.    Cardiovascular: Negative for chest pain, palpitations and leg swelling.   Gastrointestinal: Negative for abdominal pain, nausea and vomiting.   Genitourinary: Positive for frequency and hematuria. Negative for decreased urine volume, difficulty urinating, dysuria, flank pain, pelvic pain, urgency, vaginal bleeding, vaginal discharge and vaginal pain.   Musculoskeletal: Negative for back pain.   Neurological: Negative for dizziness, light-headedness and headaches.         PHYSICAL EXAMINATION    Physical Exam   Constitutional: Vital signs are normal. She appears well-developed and well-nourished. She is cooperative. She does not appear ill. No distress.   Cardiovascular: Normal rate, regular rhythm and normal heart sounds.   No murmur heard.  Pulmonary/Chest: Effort normal and breath sounds normal.   Abdominal: There is no CVA tenderness.   Neurological: She is alert.   Skin: Skin is warm, dry and intact.   Psychiatric: She has a normal mood and affect. Her speech is normal and behavior is normal. Judgment and thought content normal. Cognition and memory are normal.   Nursing note and vitals reviewed.      REVIEWED DATA:    Labs:     Lab Results   Component Value Date     08/05/2019    K 4.2 08/05/2019    AST 21 08/05/2019    ALT 19 08/05/2019    BUN 16 08/05/2019    CREATININE  0.89 08/05/2019    CREATININE 0.80 06/04/2018    CREATININE 0.99 05/25/2018    EGFRIFNONA 62 08/05/2019    EGFRIFAFRI 75 08/05/2019       Lab Results   Component Value Date    HGBA1C 5.60 08/05/2019       Lab Results   Component Value Date    LDL 87 08/05/2019    LDL 86 05/25/2018    LDL 81 02/15/2017    HDL 65 (H) 08/05/2019    HDL 69 (H) 05/25/2018    HDL 78 (H) 02/15/2017    TRIG 105 08/05/2019    TRIG 108 05/25/2018    TRIG 124 02/15/2017       Lab Results   Component Value Date    TSH 1.410 10/04/2019    FREET4 1.21 10/04/2019       Lab Results   Component Value Date    WBC 6.83 08/05/2019    HGB 14.1 08/05/2019    HGB 14.3 05/25/2018    HGB 14.5 02/15/2017     08/05/2019       Lab Results   Component Value Date    PROTEIN Trace 09/30/2015    GLUCOSEU Negative 09/30/2015    BLOODU 1+ (A) 09/30/2015    NITRITEU Negative 09/30/2015    LEUKOCYTESUR Small (1+) (A) 02/04/2020       Imaging:         Medical Tests:         Summary of old records / correspondence / consultant report:         Request outside records:         ALLERGIES  No Known Allergies     PFSH:     The following portions of the patient's history were reviewed and updated as appropriate: Allergies / Current Medications / Past Medical History / Surgical History / Social History / Family History    PROBLEM LIST   Patient Active Problem List   Diagnosis   • Anxiety   • DD (diverticular disease)   • Gastroesophageal reflux disease   • Essential hypertension   • Hyperlipidemia   • Osteopenia   • Bilateral low back pain without sciatica   • Medicare annual wellness visit, subsequent       PAST MEDICAL HISTORY  Past Medical History:   Diagnosis Date   • Anxiety    • Backache    • Constipation    • Dermatitis    • Diverticulosis     Colonoscopy November 2014   • Dyspepsia    • Dysuria    • Erythema of face     Transitory Erythema Of The Face   • Impacted cerumen    • Neck pain    • Tachycardia        SURGICAL HISTORY  Past Surgical History:   Procedure  Laterality Date   • EXOSTECTOMY      Simple Bunion Exostectomy (Silver Procedure)       SOCIAL HISTORY  Social History     Socioeconomic History   • Marital status:      Spouse name: Not on file   • Number of children: 1   • Years of education: Not on file   • Highest education level: Not on file   Occupational History   • Occupation: Retired   Tobacco Use   • Smoking status: Never Smoker   • Smokeless tobacco: Never Used   Substance and Sexual Activity   • Alcohol use: Yes     Comment: social drinker       FAMILY HISTORY  Family History   Problem Relation Age of Onset   • No Known Problems Father          **Dragon Disclaimer:   Much of this encounter note is an electronic transcription/translation of spoken language to printed text. The electronic translation of spoken language may permit erroneous, or at times, nonsensical words or phrases to be inadvertently transcribed. Although I have reviewed the note for such errors, some may still exist.

## 2020-02-06 LAB
APPEARANCE UR: CLEAR
BACTERIA #/AREA URNS HPF: ABNORMAL /HPF
BACTERIA UR CULT: NORMAL
BACTERIA UR CULT: NORMAL
BILIRUB UR QL STRIP: NEGATIVE
COLOR UR: YELLOW
EPI CELLS #/AREA URNS HPF: ABNORMAL /HPF (ref 0–10)
GLUCOSE UR QL: NEGATIVE
HGB UR QL STRIP: ABNORMAL
KETONES UR QL STRIP: NEGATIVE
LEUKOCYTE ESTERASE UR QL STRIP: ABNORMAL
MICRO URNS: ABNORMAL
NITRITE UR QL STRIP: NEGATIVE
PH UR STRIP: 5.5 [PH] (ref 5–7.5)
PROT UR QL STRIP: NEGATIVE
RBC #/AREA URNS HPF: ABNORMAL /HPF (ref 0–2)
SP GR UR: 1.01 (ref 1–1.03)
URINALYSIS REFLEX: ABNORMAL
UROBILINOGEN UR STRIP-MCNC: 0.2 MG/DL (ref 0.2–1)
WBC #/AREA URNS HPF: ABNORMAL /HPF (ref 0–5)

## 2020-02-20 ENCOUNTER — OFFICE VISIT (OUTPATIENT)
Dept: INTERNAL MEDICINE | Age: 75
End: 2020-02-20

## 2020-02-20 VITALS
WEIGHT: 138 LBS | HEIGHT: 61 IN | BODY MASS INDEX: 26.06 KG/M2 | HEART RATE: 80 BPM | TEMPERATURE: 97.5 F | DIASTOLIC BLOOD PRESSURE: 84 MMHG | OXYGEN SATURATION: 99 % | SYSTOLIC BLOOD PRESSURE: 140 MMHG

## 2020-02-20 DIAGNOSIS — R31.0 GROSS HEMATURIA: ICD-10-CM

## 2020-02-20 DIAGNOSIS — I10 ESSENTIAL HYPERTENSION: Primary | ICD-10-CM

## 2020-02-20 DIAGNOSIS — F41.9 ANXIETY: ICD-10-CM

## 2020-02-20 DIAGNOSIS — E03.8 SUBCLINICAL HYPOTHYROIDISM: ICD-10-CM

## 2020-02-20 DIAGNOSIS — E78.5 HYPERLIPIDEMIA, UNSPECIFIED HYPERLIPIDEMIA TYPE: ICD-10-CM

## 2020-02-20 PROCEDURE — 99214 OFFICE O/P EST MOD 30 MIN: CPT | Performed by: NURSE PRACTITIONER

## 2020-02-20 NOTE — PROGRESS NOTES
[facility] [place] [facility]  Jacqueline Ramirez / 74 y.o. / female  02/20/2020      ASSESSMENT & PLAN:    Problem List Items Addressed This Visit        Cardiovascular and Mediastinum    Essential hypertension - Primary    Relevant Medications    metoprolol succinate XL (TOPROL-XL) 25 MG 24 hr tablet    amLODIPine (NORVASC) 5 MG tablet    Other Relevant Orders    Basic metabolic panel    Hyperlipidemia    Relevant Medications    rosuvastatin (CRESTOR) 5 MG tablet       Other    Anxiety    Relevant Medications    escitalopram (LEXAPRO) 20 MG tablet      Other Visit Diagnoses     Gross hematuria        Relevant Orders    Urinalysis With Culture If Indicated - Urine, Clean Catch    Subclinical hypothyroidism        Relevant Orders    TSH+Free T4        Orders Placed This Encounter   Procedures   • Urinalysis With Culture If Indicated - Urine, Clean Catch   • TSH+Free T4   • Basic metabolic panel     No orders of the defined types were placed in this encounter.      Summary/Discussion:    1. Essential hypertension  BP slightly elevated today, suspect whitecoat hypertension.  Will check thyroid and BMP today.  Recommended patient start monitoring blood pressure on occasion outside of the office setting.  Continue current medications for now, follow-up 4 months, sooner as needed.    Continue lifestyle modifications for high blood pressure, high cholesterol, and increased weight. Decrease/eliminate soda, caffeine, alcohol and overall caloric intake. Reduce carbohydrates and sweets in diet.  Continue to improve dietary habits with lean proteins, fresh vegetables, fruits, and nuts. Improve aerobic exercise: walking/biking/swimming daily as tolerated, recommend 30 minutes/day at least 5 days/week.     - Basic metabolic panel    2. Hyperlipidemia, unspecified hyperlipidemia type  Stable on current treatment, last FLP done 8/2019.     3. Gross hematuria  Patient reports symptoms have resolved.  Recheck urinalysis  "today.  If hematuria persists, may need to consider further work-up and/or referral to urology.    - Urinalysis With Culture If Indicated - Urine, Clean Catch    4. Anxiety  Stable on current escitalopram, continue same.     5. Subclinical hypothyroidism    - TSH+Free T4        Return in about 4 months (around 6/20/2020) for Next scheduled follow up.  ____________________________________________________________________    MEDICATIONS  Current Outpatient Medications   Medication Sig Dispense Refill   • alendronate (FOSAMAX) 70 MG tablet Take 1 tablet by mouth 1 (One) Time Per Week. 13 tablet 3   • amLODIPine (NORVASC) 5 MG tablet TAKE 1 TABLET BY MOUTH ONCE DAILY 90 tablet 1   • cyclobenzaprine (FLEXERIL) 5 MG tablet Take 1 tablet by mouth 3 (Three) Times a Day As Needed for Muscle Spasms. 30 tablet 3   • desoximetasone (TOPICORT) 0.25 % ointment 1 application 2 (two) times a day as needed.     • escitalopram (LEXAPRO) 20 MG tablet TAKE 1 TABLET BY MOUTH ONCE DAILY 90 tablet 0   • levothyroxine (SYNTHROID, LEVOTHROID) 25 MCG tablet TAKE 1 TABLET BY MOUTH ONCE DAILY IN THE MORNING 30  MINUTES  BEFORE  BREAKFAST  OR  ANY  OTHER  MEDICATIONS 30 tablet 11   • metoprolol succinate XL (TOPROL-XL) 25 MG 24 hr tablet TAKE 1 TABLET BY MOUTH ONCE DAILY 90 tablet 1   • omeprazole (priLOSEC) 20 MG capsule TAKE 1 CAPSULE BY MOUTH ONCE DAILY 30 capsule 11   • rosuvastatin (CRESTOR) 5 MG tablet TAKE 1 TABLET BY MOUTH ONCE DAILY 90 tablet 0     No current facility-administered medications for this visit.        VITALS    Visit Vitals  /84   Pulse 80   Temp 97.5 °F (36.4 °C)   Ht 156.2 cm (61.5\")   Wt 62.6 kg (138 lb)   SpO2 99%   BMI 25.66 kg/m²       BP Readings from Last 3 Encounters:   02/20/20 140/84   02/04/20 140/78   08/19/19 130/80     Wt Readings from Last 3 Encounters:   02/20/20 62.6 kg (138 lb)   02/04/20 62.6 kg (138 lb)   08/19/19 60.3 kg (133 lb)      Body mass index is 25.66 kg/m².    CC:  Main reason(s) for " today's visit: Follow-up for hypertension and hyperlipidemia, follow up hematuria     HPI:   Chronic essential hypertension:  Since prior visit: compliant with medication(s), does not check blood pressure at home and denies significant problems with medication(s). Currently taking amlodipine (Norvase) and metoprolol (Lopressor, Toprol). She is not exercising, but is adherent to low sodium diet.  She denies symptoms of chest pain/pressure, dyspnea, swelling, vision impairment. CVD risk factors include: advanced age (>55 for males, >65 for females), dyslipidemia, HTN, and sedentary lifestyle. Use of agents associated with HTN: no alcohol use, no tobacco use and no caffeine use . She reports white coat hypertension and anxiety. BP at last office visits have been mostly around 130/80.   Most recent in-office blood pressure readings:     BP Readings from Last 3 Encounters:   02/20/20 140/84   02/04/20 140/78   08/19/19 130/80       Chronic hyperlipidemia:  Current therapy include rosuvastatin.      Most recent labs:   Lab Results   Component Value Date    LDL 87 08/05/2019    LDL 86 05/25/2018    LDL 81 02/15/2017    HDL 65 (H) 08/05/2019    HDL 69 (H) 05/25/2018    HDL 78 (H) 02/15/2017    TRIG 105 08/05/2019        Hematuria: Seen by me in office recently for c/o gross hematuria. Treated for UTI with Keflex, urine culture non-remarkable. Patient reports no further episodes of hematuria, frequency, or other urinary symptoms. Denies any pelvic pain, vaginal bleeding. Has scant vaginal discharge, no itching, no odor.       Patient Care Team:  Jacqueline Amor APRN as PCP - General (Internal Medicine)  Hannah Cabello MD as PCP - Claims Attributed  ____________________________________________________________________      REVIEW OF SYSTEMS    Review of Systems   Constitutional: Negative for activity change, appetite change and unexpected weight change.   HENT: Negative for tinnitus.    Eyes: Negative for visual  disturbance.   Respiratory: Negative for cough, chest tightness and shortness of breath.    Cardiovascular: Negative for chest pain, palpitations and leg swelling.   Genitourinary: Positive for vaginal discharge. Negative for dysuria, flank pain, frequency, hematuria, menstrual problem, pelvic pain, vaginal bleeding and vaginal pain.   Neurological: Negative for dizziness, light-headedness and headaches.         PHYSICAL EXAMINATION    Physical Exam   Constitutional: She is oriented to person, place, and time. She appears well-developed and well-nourished. She is cooperative. She does not appear ill. No distress.   Cardiovascular: Normal rate, regular rhythm, S1 normal, S2 normal and normal heart sounds.   No murmur heard.  Pulmonary/Chest: Effort normal and breath sounds normal. She has no decreased breath sounds. She has no wheezes. She has no rhonchi. She has no rales.   Neurological: She is alert and oriented to person, place, and time.   Skin: Skin is warm, dry and intact.   Psychiatric: She has a normal mood and affect. Her speech is normal and behavior is normal. Judgment and thought content normal. Cognition and memory are normal.   Nursing note and vitals reviewed.      REVIEWED DATA:    Labs:   Lab Results   Component Value Date     08/05/2019    K 4.2 08/05/2019    AST 21 08/05/2019    ALT 19 08/05/2019    BUN 16 08/05/2019    CREATININE 0.89 08/05/2019    CREATININE 0.80 06/04/2018    CREATININE 0.99 05/25/2018    EGFRIFNONA 62 08/05/2019    EGFRIFAFRI 75 08/05/2019       Lab Results   Component Value Date    HGBA1C 5.60 08/05/2019       Lab Results   Component Value Date    LDL 87 08/05/2019    LDL 86 05/25/2018    LDL 81 02/15/2017    HDL 65 (H) 08/05/2019    HDL 69 (H) 05/25/2018    HDL 78 (H) 02/15/2017    TRIG 105 08/05/2019    TRIG 108 05/25/2018    TRIG 124 02/15/2017       Lab Results   Component Value Date    TSH 1.410 10/04/2019    FREET4 1.21 10/04/2019          Lab Results   Component  Value Date    WBC 6.83 08/05/2019    HGB 14.1 08/05/2019    HGB 14.3 05/25/2018    HGB 14.5 02/15/2017     08/05/2019       Lab Results   Component Value Date    PROTEIN Negative 02/04/2020    GLUCOSEU Negative 02/04/2020    BLOODU Trace (A) 02/04/2020    NITRITEU Negative 02/04/2020    LEUKOCYTESUR Small (1+) (A) 02/04/2020       Imaging:        Medical Tests:        Summary of old records / correspondence / consultant report:        Request outside records:        ALLERGIES  No Known Allergies     PFSH:     The following portions of the patient's history were reviewed and updated as appropriate: Allergies / Current Medications / Past Medical History / Surgical History / Social History / Family History    PROBLEM LIST   Patient Active Problem List   Diagnosis   • Anxiety   • DD (diverticular disease)   • Gastroesophageal reflux disease   • Essential hypertension   • Hyperlipidemia   • Osteopenia   • Bilateral low back pain without sciatica   • Medicare annual wellness visit, subsequent       PAST MEDICAL HISTORY  Past Medical History:   Diagnosis Date   • Anxiety    • Backache    • Constipation    • Dermatitis    • Diverticulosis     Colonoscopy November 2014   • Dyspepsia    • Dysuria    • Erythema of face     Transitory Erythema Of The Face   • Impacted cerumen    • Neck pain    • Tachycardia        SURGICAL HISTORY  Past Surgical History:   Procedure Laterality Date   • EXOSTECTOMY      Simple Bunion Exostectomy (Silver Procedure)       SOCIAL HISTORY  Social History     Socioeconomic History   • Marital status:      Spouse name: Not on file   • Number of children: 1   • Years of education: Not on file   • Highest education level: Not on file   Occupational History   • Occupation: Retired   Tobacco Use   • Smoking status: Never Smoker   • Smokeless tobacco: Never Used   Substance and Sexual Activity   • Alcohol use: Yes     Comment: social drinker       FAMILY HISTORY  Family History   Problem  Relation Age of Onset   • No Known Problems Father

## 2020-02-24 DIAGNOSIS — R31.9 HEMATURIA, UNSPECIFIED TYPE: Primary | ICD-10-CM

## 2020-02-24 LAB
APPEARANCE UR: CLEAR
BACTERIA #/AREA URNS HPF: NORMAL /HPF
BACTERIA UR CULT: NORMAL
BACTERIA UR CULT: NORMAL
BILIRUB UR QL STRIP: NEGATIVE
BUN SERPL-MCNC: 18 MG/DL (ref 8–23)
BUN/CREAT SERPL: 22.2 (ref 7–25)
CALCIUM SERPL-MCNC: 9.4 MG/DL (ref 8.6–10.5)
CHLORIDE SERPL-SCNC: 99 MMOL/L (ref 98–107)
CO2 SERPL-SCNC: 21.9 MMOL/L (ref 22–29)
COLOR UR: YELLOW
CREAT SERPL-MCNC: 0.81 MG/DL (ref 0.57–1)
EPI CELLS #/AREA URNS HPF: NORMAL /HPF (ref 0–10)
GLUCOSE SERPL-MCNC: 107 MG/DL (ref 65–99)
GLUCOSE UR QL: NEGATIVE
HGB UR QL STRIP: ABNORMAL
KETONES UR QL STRIP: NEGATIVE
LEUKOCYTE ESTERASE UR QL STRIP: ABNORMAL
MICRO URNS: ABNORMAL
MUCOUS THREADS URNS QL MICRO: PRESENT /HPF
NITRITE UR QL STRIP: NEGATIVE
PH UR STRIP: 6 [PH] (ref 5–7.5)
POTASSIUM SERPL-SCNC: 3.9 MMOL/L (ref 3.5–5.2)
PROT UR QL STRIP: NEGATIVE
RBC #/AREA URNS HPF: NORMAL /HPF (ref 0–2)
SODIUM SERPL-SCNC: 136 MMOL/L (ref 136–145)
SP GR UR: 1.01 (ref 1–1.03)
T4 FREE SERPL-MCNC: 1.11 NG/DL (ref 0.93–1.7)
TSH SERPL DL<=0.005 MIU/L-ACNC: 3.5 UIU/ML (ref 0.27–4.2)
URINALYSIS REFLEX: ABNORMAL
UROBILINOGEN UR STRIP-MCNC: 0.2 MG/DL (ref 0.2–1)
WBC #/AREA URNS HPF: NORMAL /HPF (ref 0–5)

## 2020-03-09 ENCOUNTER — RESULTS ENCOUNTER (OUTPATIENT)
Dept: INTERNAL MEDICINE | Age: 75
End: 2020-03-09

## 2020-03-09 DIAGNOSIS — R31.9 HEMATURIA, UNSPECIFIED TYPE: ICD-10-CM

## 2020-03-10 DIAGNOSIS — I10 ESSENTIAL HYPERTENSION: ICD-10-CM

## 2020-03-10 RX ORDER — AMLODIPINE BESYLATE 5 MG/1
5 TABLET ORAL DAILY
Qty: 90 TABLET | Refills: 3 | Status: SHIPPED | OUTPATIENT
Start: 2020-03-10 | End: 2021-03-05

## 2020-03-10 RX ORDER — METOPROLOL SUCCINATE 25 MG/1
25 TABLET, EXTENDED RELEASE ORAL DAILY
Qty: 90 TABLET | Refills: 3 | Status: SHIPPED | OUTPATIENT
Start: 2020-03-10 | End: 2021-03-05

## 2020-04-02 DIAGNOSIS — F41.9 ANXIETY: ICD-10-CM

## 2020-04-02 RX ORDER — ESCITALOPRAM OXALATE 20 MG/1
20 TABLET ORAL DAILY
Qty: 90 TABLET | Refills: 1 | Status: SHIPPED | OUTPATIENT
Start: 2020-04-02 | End: 2020-10-01

## 2020-04-21 RX ORDER — CYCLOBENZAPRINE HCL 5 MG
5 TABLET ORAL 3 TIMES DAILY PRN
Qty: 30 TABLET | Refills: 3 | Status: SHIPPED | OUTPATIENT
Start: 2020-04-21 | End: 2021-06-24

## 2020-05-27 DIAGNOSIS — E78.5 HYPERLIPIDEMIA, UNSPECIFIED HYPERLIPIDEMIA TYPE: ICD-10-CM

## 2020-05-28 RX ORDER — ROSUVASTATIN CALCIUM 5 MG/1
TABLET, COATED ORAL
Qty: 90 TABLET | Refills: 0 | Status: SHIPPED | OUTPATIENT
Start: 2020-05-28 | End: 2020-09-14

## 2020-07-02 ENCOUNTER — OFFICE VISIT (OUTPATIENT)
Dept: INTERNAL MEDICINE | Age: 75
End: 2020-07-02

## 2020-07-02 VITALS
SYSTOLIC BLOOD PRESSURE: 130 MMHG | HEART RATE: 101 BPM | DIASTOLIC BLOOD PRESSURE: 88 MMHG | WEIGHT: 135 LBS | TEMPERATURE: 98 F | HEIGHT: 61 IN | OXYGEN SATURATION: 96 % | BODY MASS INDEX: 25.49 KG/M2

## 2020-07-02 DIAGNOSIS — E78.5 HYPERLIPIDEMIA, UNSPECIFIED HYPERLIPIDEMIA TYPE: ICD-10-CM

## 2020-07-02 DIAGNOSIS — I10 ESSENTIAL HYPERTENSION: Primary | ICD-10-CM

## 2020-07-02 DIAGNOSIS — R31.9 HEMATURIA, UNSPECIFIED TYPE: ICD-10-CM

## 2020-07-02 LAB
ALBUMIN SERPL-MCNC: 4.6 G/DL (ref 3.5–5.2)
ALBUMIN/GLOB SERPL: 1.6 G/DL
ALP SERPL-CCNC: 101 U/L (ref 39–117)
ALT SERPL-CCNC: 18 U/L (ref 1–33)
AST SERPL-CCNC: 18 U/L (ref 1–32)
BILIRUB BLD-MCNC: NEGATIVE MG/DL
BILIRUB SERPL-MCNC: 0.4 MG/DL (ref 0.2–1.2)
BUN SERPL-MCNC: 17 MG/DL (ref 8–23)
BUN/CREAT SERPL: 19.5 (ref 7–25)
CALCIUM SERPL-MCNC: 9.5 MG/DL (ref 8.6–10.5)
CHLORIDE SERPL-SCNC: 103 MMOL/L (ref 98–107)
CHOLEST SERPL-MCNC: 158 MG/DL (ref 0–200)
CHOLEST/HDLC SERPL: 2.43 {RATIO}
CLARITY, POC: CLEAR
CO2 SERPL-SCNC: 24.4 MMOL/L (ref 22–29)
COLOR UR: YELLOW
CREAT SERPL-MCNC: 0.87 MG/DL (ref 0.57–1)
GLOBULIN SER CALC-MCNC: 2.8 GM/DL
GLUCOSE SERPL-MCNC: 114 MG/DL (ref 65–99)
GLUCOSE UR STRIP-MCNC: NEGATIVE MG/DL
HDLC SERPL-MCNC: 65 MG/DL (ref 40–60)
KETONES UR QL: NEGATIVE
LDLC SERPL CALC-MCNC: 81 MG/DL (ref 0–100)
LEUKOCYTE EST, POC: ABNORMAL
NITRITE UR-MCNC: NEGATIVE MG/ML
PH UR: 5.5 [PH] (ref 5–8)
POTASSIUM SERPL-SCNC: 4.3 MMOL/L (ref 3.5–5.2)
PROT SERPL-MCNC: 7.4 G/DL (ref 6–8.5)
PROT UR STRIP-MCNC: NEGATIVE MG/DL
RBC # UR STRIP: ABNORMAL /UL
SODIUM SERPL-SCNC: 138 MMOL/L (ref 136–145)
SP GR UR: 1.01 (ref 1–1.03)
TRIGL SERPL-MCNC: 62 MG/DL (ref 0–150)
UROBILINOGEN UR QL: NORMAL
VLDLC SERPL CALC-MCNC: 12.4 MG/DL

## 2020-07-02 PROCEDURE — 81003 URINALYSIS AUTO W/O SCOPE: CPT | Performed by: NURSE PRACTITIONER

## 2020-07-02 PROCEDURE — 99214 OFFICE O/P EST MOD 30 MIN: CPT | Performed by: NURSE PRACTITIONER

## 2020-07-02 NOTE — PROGRESS NOTES
[facility] [place] [facility]  Jacqueline Ramirez / 75 y.o. / female  07/02/2020      ASSESSMENT & PLAN:    Problem List Items Addressed This Visit        Cardiovascular and Mediastinum    Essential hypertension - Primary    Relevant Medications    amLODIPine (NORVASC) 5 MG tablet    metoprolol succinate XL (TOPROL-XL) 25 MG 24 hr tablet    Other Relevant Orders    Comprehensive Metabolic Panel    Hyperlipidemia    Relevant Medications    rosuvastatin (CRESTOR) 5 MG tablet    Other Relevant Orders    Lipid Panel With / Chol / HDL Ratio      Other Visit Diagnoses     Hematuria, unspecified type        Relevant Orders    Ambulatory Referral to Urology    POCT urinalysis dipstick, automated (Completed)        Orders Placed This Encounter   Procedures   • Comprehensive Metabolic Panel   • Lipid Panel With / Chol / HDL Ratio   • Ambulatory Referral to Urology   • POCT urinalysis dipstick, automated     No orders of the defined types were placed in this encounter.      Summary/Discussion:    1. Essential hypertension  Blood pressures at home have been stable.  Patient has some degree of whitecoat hypertension.  Blood pressure stable on current therapy, continue same.  Check labs today and adjust dosage of medication as necessary.  Follow-up 4 months.    Continue lifestyle modifications for high blood pressure, high cholesterol, and increased weight. Decrease/eliminate soda, caffeine, alcohol and overall caloric intake. Reduce carbohydrates and sweets in diet.  Continue to improve dietary habits with lean proteins, fresh vegetables, fruits, and nuts. Improve aerobic exercise: walking/biking/swimming daily as tolerated, recommend 30 minutes/day at least 5 days/week.     - Comprehensive Metabolic Panel    2. Hyperlipidemia, unspecified hyperlipidemia type  Check fasting lipid panel and adjust dosage of medication as necessary.    - Lipid Panel With / Chol / HDL Ratio    3. Hematuria, unspecified type  Persistent  "hematuria noted on urinalysis today. Recommended referral to urology for further evaluation.     - Ambulatory Referral to Urology      Return in about 4 months (around 11/2/2020) for Next scheduled follow up, 6 months AWV .  ____________________________________________________________________    MEDICATIONS  Current Outpatient Medications   Medication Sig Dispense Refill   • alendronate (FOSAMAX) 70 MG tablet Take 1 tablet by mouth 1 (One) Time Per Week. 13 tablet 3   • amLODIPine (NORVASC) 5 MG tablet Take 1 tablet by mouth Daily. 90 tablet 3   • cyclobenzaprine (FLEXERIL) 5 MG tablet Take 1 tablet by mouth 3 (Three) Times a Day As Needed for Muscle Spasms. 30 tablet 3   • desoximetasone (TOPICORT) 0.25 % ointment 1 application 2 (two) times a day as needed.     • escitalopram (LEXAPRO) 20 MG tablet Take 1 tablet by mouth Daily. 90 tablet 1   • levothyroxine (SYNTHROID, LEVOTHROID) 25 MCG tablet TAKE 1 TABLET BY MOUTH ONCE DAILY IN THE MORNING 30  MINUTES  BEFORE  BREAKFAST  OR  ANY  OTHER  MEDICATIONS 30 tablet 11   • metoprolol succinate XL (TOPROL-XL) 25 MG 24 hr tablet Take 1 tablet by mouth Daily. 90 tablet 3   • omeprazole (priLOSEC) 20 MG capsule TAKE 1 CAPSULE BY MOUTH ONCE DAILY 30 capsule 11   • rosuvastatin (CRESTOR) 5 MG tablet Take 1 tablet by mouth once daily 90 tablet 0     No current facility-administered medications for this visit.        VITALS    Visit Vitals  /88   Pulse 101   Temp 98 °F (36.7 °C) (Temporal)   Ht 156.2 cm (61.5\")   Wt 61.2 kg (135 lb)   SpO2 96%   BMI 25.10 kg/m²       BP Readings from Last 3 Encounters:   07/02/20 130/88   02/20/20 140/84   02/04/20 140/78     Wt Readings from Last 3 Encounters:   07/02/20 61.2 kg (135 lb)   02/20/20 62.6 kg (138 lb)   02/04/20 62.6 kg (138 lb)      Body mass index is 25.1 kg/m².    CC:  Main reason(s) for today's visit: Follow-up for hypertension and hyperlipidemia    HPI:   Chronic essential hypertension:  Since prior visit: compliant " with medication(s), checks blood pressure occasionally and denies significant problems with medication(s). Currently taking amlodipine (Norvase) and metoprolol (Lopressor, Toprol). She is not exercising, but is adherent to low sodium diet.  She denies symptoms of chest pain/pressure, dyspnea, swelling, vision impairment. CVD risk factors include: advanced age (>55 for males, >65 for females), dyslipidemia, HTN, obesity (BMI>=30 kg/m2), and sedentary lifestyle. Use of agents associated with HTN: no tobacco use . Most recent in-office blood pressure readings:   BP Readings from Last 3 Encounters:   07/02/20 130/88   02/20/20 140/84   02/04/20 140/78       Chronic hyperlipidemia:  Current therapy include rosuvastatin.    Most recent labs:   Lab Results   Component Value Date    LDL 87 08/05/2019    LDL 86 05/25/2018    LDL 81 02/15/2017    HDL 65 (H) 08/05/2019    HDL 69 (H) 05/25/2018    HDL 78 (H) 02/15/2017    TRIG 105 08/05/2019        Hypothyroidism  Neela Ramirez is a 75 y.o. female who presents for follow up of hypothyroidism. Current symptoms: none . Patient denies change in energy level, diarrhea, heat / cold intolerance, nervousness, palpitations and weight changes. Symptoms have been basically asymptomatic.    Hematuria: Patient was seen by me in February of this year for gross hematuria.  She was treated with antibiotics and urine was retested.  At that time she still had trace blood in the urine.  I had recommended she return in 2 weeks for follow-up urinalysis, which had not been done until today.  She has not noticed any significant gross hematuria, denies pain.     Patient Care Team:  Jacqueline Amor APRN as PCP - General (Internal Medicine)  Jacqueline Amor APRN as PCP - Claims Attributed  ____________________________________________________________________      REVIEW OF SYSTEMS    Review of Systems   Constitutional: Negative for activity change, appetite change and unexpected weight change.    HENT: Negative for tinnitus.    Eyes: Negative for visual disturbance.   Respiratory: Negative for cough, chest tightness and shortness of breath.    Cardiovascular: Negative for chest pain, palpitations and leg swelling.   Genitourinary: Negative for dysuria, flank pain and hematuria.   Neurological: Negative for dizziness, light-headedness and headaches.         PHYSICAL EXAMINATION    Physical Exam   Constitutional: She is oriented to person, place, and time. Vital signs are normal. She appears well-developed and well-nourished. She is cooperative. She does not appear ill. No distress.   Cardiovascular: Normal rate, regular rhythm, S1 normal, S2 normal and normal heart sounds.   No murmur heard.  Pulmonary/Chest: Effort normal and breath sounds normal. She has no decreased breath sounds. She has no wheezes. She has no rhonchi. She has no rales.   Neurological: She is alert and oriented to person, place, and time.   Skin: Skin is warm, dry and intact.   Psychiatric: She has a normal mood and affect. Her speech is normal and behavior is normal. Judgment and thought content normal. Cognition and memory are normal.   Nursing note and vitals reviewed.      REVIEWED DATA:    Labs:   Lab Results   Component Value Date     02/20/2020    K 3.9 02/20/2020    AST 21 08/05/2019    ALT 19 08/05/2019    BUN 18 02/20/2020    CREATININE 0.81 02/20/2020    CREATININE 0.89 08/05/2019    CREATININE 0.80 06/04/2018    EGFRIFNONA 69 02/20/2020    EGFRIFAFRI 84 02/20/2020       Lab Results   Component Value Date    HGBA1C 5.60 08/05/2019       Lab Results   Component Value Date    LDL 87 08/05/2019    LDL 86 05/25/2018    LDL 81 02/15/2017    HDL 65 (H) 08/05/2019    HDL 69 (H) 05/25/2018    HDL 78 (H) 02/15/2017    TRIG 105 08/05/2019    TRIG 108 05/25/2018    TRIG 124 02/15/2017       Lab Results   Component Value Date    TSH 3.500 02/20/2020    FREET4 1.11 02/20/2020          Lab Results   Component Value Date    WBC 6.83  08/05/2019    HGB 14.1 08/05/2019    HGB 14.3 05/25/2018    HGB 14.5 02/15/2017     08/05/2019       Lab Results   Component Value Date    PROTEIN Negative 02/20/2020    GLUCOSEU Negative 02/20/2020    BLOODU Trace (A) 02/20/2020    NITRITEU Negative 02/20/2020    LEUKOCYTESUR Small (1+) (A) 07/02/2020       Imaging:        Medical Tests:        Summary of old records / correspondence / consultant report:        Request outside records:        ALLERGIES  No Known Allergies     PFSH:     The following portions of the patient's history were reviewed and updated as appropriate: Allergies / Current Medications / Past Medical History / Surgical History / Social History / Family History    PROBLEM LIST   Patient Active Problem List   Diagnosis   • Anxiety   • DD (diverticular disease)   • Gastroesophageal reflux disease   • Essential hypertension   • Hyperlipidemia   • Osteopenia   • Bilateral low back pain without sciatica   • Medicare annual wellness visit, subsequent       PAST MEDICAL HISTORY  Past Medical History:   Diagnosis Date   • Anxiety    • Backache    • Constipation    • Dermatitis    • Diverticulosis     Colonoscopy November 2014   • Dyspepsia    • Dysuria    • Erythema of face     Transitory Erythema Of The Face   • Impacted cerumen    • Neck pain    • Tachycardia        SURGICAL HISTORY  Past Surgical History:   Procedure Laterality Date   • EXOSTECTOMY      Simple Bunion Exostectomy (Silver Procedure)       SOCIAL HISTORY  Social History     Socioeconomic History   • Marital status:      Spouse name: Not on file   • Number of children: 1   • Years of education: Not on file   • Highest education level: Not on file   Occupational History   • Occupation: Retired   Tobacco Use   • Smoking status: Never Smoker   • Smokeless tobacco: Never Used   Substance and Sexual Activity   • Alcohol use: Yes     Comment: social drinker       FAMILY HISTORY  Family History   Problem Relation Age of Onset   •  No Known Problems Father

## 2020-09-11 DIAGNOSIS — E78.5 HYPERLIPIDEMIA, UNSPECIFIED HYPERLIPIDEMIA TYPE: ICD-10-CM

## 2020-09-14 RX ORDER — ROSUVASTATIN CALCIUM 5 MG/1
TABLET, COATED ORAL
Qty: 90 TABLET | Refills: 0 | Status: SHIPPED | OUTPATIENT
Start: 2020-09-14 | End: 2021-01-05

## 2020-09-30 DIAGNOSIS — F41.9 ANXIETY: ICD-10-CM

## 2020-10-01 RX ORDER — ESCITALOPRAM OXALATE 20 MG/1
TABLET ORAL
Qty: 90 TABLET | Refills: 0 | Status: SHIPPED | OUTPATIENT
Start: 2020-10-01 | End: 2020-12-31

## 2020-11-09 DIAGNOSIS — E03.8 SUBCLINICAL HYPOTHYROIDISM: ICD-10-CM

## 2020-11-09 RX ORDER — LEVOTHYROXINE SODIUM 0.03 MG/1
TABLET ORAL
Qty: 30 TABLET | Refills: 11 | Status: SHIPPED | OUTPATIENT
Start: 2020-11-09 | End: 2021-11-03

## 2020-12-30 DIAGNOSIS — F41.9 ANXIETY: ICD-10-CM

## 2020-12-31 RX ORDER — ESCITALOPRAM OXALATE 20 MG/1
TABLET ORAL
Qty: 90 TABLET | Refills: 0 | Status: SHIPPED | OUTPATIENT
Start: 2020-12-31 | End: 2021-04-01

## 2021-01-04 DIAGNOSIS — E78.5 HYPERLIPIDEMIA, UNSPECIFIED HYPERLIPIDEMIA TYPE: ICD-10-CM

## 2021-01-05 RX ORDER — ROSUVASTATIN CALCIUM 5 MG/1
TABLET, COATED ORAL
Qty: 90 TABLET | Refills: 0 | Status: SHIPPED | OUTPATIENT
Start: 2021-01-05 | End: 2021-04-19

## 2021-02-01 DIAGNOSIS — M85.80 OSTEOPENIA, UNSPECIFIED LOCATION: ICD-10-CM

## 2021-02-01 RX ORDER — ALENDRONATE SODIUM 70 MG/1
70 TABLET ORAL WEEKLY
Qty: 13 TABLET | Refills: 3 | Status: SHIPPED | OUTPATIENT
Start: 2021-02-01 | End: 2022-02-18 | Stop reason: SDUPTHER

## 2021-03-05 DIAGNOSIS — I10 ESSENTIAL HYPERTENSION: ICD-10-CM

## 2021-03-05 RX ORDER — AMLODIPINE BESYLATE 5 MG/1
TABLET ORAL
Qty: 30 TABLET | Refills: 0 | Status: SHIPPED | OUTPATIENT
Start: 2021-03-05 | End: 2021-04-01

## 2021-03-05 RX ORDER — METOPROLOL SUCCINATE 25 MG/1
TABLET, EXTENDED RELEASE ORAL
Qty: 30 TABLET | Refills: 0 | Status: SHIPPED | OUTPATIENT
Start: 2021-03-05 | End: 2021-04-01

## 2021-03-31 DIAGNOSIS — I10 ESSENTIAL HYPERTENSION: ICD-10-CM

## 2021-03-31 DIAGNOSIS — F41.9 ANXIETY: ICD-10-CM

## 2021-04-01 RX ORDER — METOPROLOL SUCCINATE 25 MG/1
TABLET, EXTENDED RELEASE ORAL
Qty: 90 TABLET | Refills: 0 | Status: SHIPPED | OUTPATIENT
Start: 2021-04-01 | End: 2021-07-02

## 2021-04-01 RX ORDER — AMLODIPINE BESYLATE 5 MG/1
TABLET ORAL
Qty: 90 TABLET | Refills: 0 | Status: SHIPPED | OUTPATIENT
Start: 2021-04-01 | End: 2021-07-02

## 2021-04-01 RX ORDER — ESCITALOPRAM OXALATE 20 MG/1
TABLET ORAL
Qty: 90 TABLET | Refills: 0 | Status: SHIPPED | OUTPATIENT
Start: 2021-04-01 | End: 2021-07-02

## 2021-04-01 NOTE — TELEPHONE ENCOUNTER
PATIENT CALLING ABOUT THESE MEDICATIONS. PATIENT DOES HAVE AN APPOINTMENT 4/12. PATIENT JUST WANTS TO BE SURE SHE HAS THEM BY THE WEEKEND BECAUSE SHE IS GOING TO RUN OUT. PLEASE ADVISE IF THIS CAN'T BE FILLED .

## 2021-04-12 ENCOUNTER — OFFICE VISIT (OUTPATIENT)
Dept: INTERNAL MEDICINE | Age: 76
End: 2021-04-12

## 2021-04-12 VITALS
WEIGHT: 131 LBS | BODY MASS INDEX: 24.73 KG/M2 | SYSTOLIC BLOOD PRESSURE: 134 MMHG | HEIGHT: 61 IN | TEMPERATURE: 97.3 F | OXYGEN SATURATION: 96 % | HEART RATE: 127 BPM | DIASTOLIC BLOOD PRESSURE: 80 MMHG

## 2021-04-12 DIAGNOSIS — E03.8 SUBCLINICAL HYPOTHYROIDISM: ICD-10-CM

## 2021-04-12 DIAGNOSIS — Z78.0 POSTMENOPAUSAL: ICD-10-CM

## 2021-04-12 DIAGNOSIS — I10 ESSENTIAL HYPERTENSION: Primary | ICD-10-CM

## 2021-04-12 DIAGNOSIS — M85.80 OSTEOPENIA, UNSPECIFIED LOCATION: ICD-10-CM

## 2021-04-12 DIAGNOSIS — E78.5 HYPERLIPIDEMIA, UNSPECIFIED HYPERLIPIDEMIA TYPE: ICD-10-CM

## 2021-04-12 DIAGNOSIS — Z12.31 ENCOUNTER FOR SCREENING MAMMOGRAM FOR MALIGNANT NEOPLASM OF BREAST: ICD-10-CM

## 2021-04-12 DIAGNOSIS — K21.9 GASTROESOPHAGEAL REFLUX DISEASE WITHOUT ESOPHAGITIS: ICD-10-CM

## 2021-04-12 DIAGNOSIS — R73.01 IFG (IMPAIRED FASTING GLUCOSE): ICD-10-CM

## 2021-04-12 PROBLEM — Z00.00 MEDICARE ANNUAL WELLNESS VISIT, SUBSEQUENT: Status: RESOLVED | Noted: 2017-02-15 | Resolved: 2021-04-12

## 2021-04-12 PROCEDURE — 99214 OFFICE O/P EST MOD 30 MIN: CPT | Performed by: NURSE PRACTITIONER

## 2021-04-12 NOTE — PROGRESS NOTES
"Chief Complaint  Hypertension, Hyperlipidemia, and Heartburn    Subjective          Neela Ramirez presents to [facility]  Hypertension  This is a chronic problem. The problem is controlled. Pertinent negatives include no anxiety, blurred vision, chest pain, headaches, peripheral edema, PND, shortness of breath or sweats. Current antihypertension treatment includes calcium channel blockers and beta blockers. There are no compliance problems.    Hyperlipidemia  This is a chronic problem. Pertinent negatives include no chest pain or shortness of breath. Current antihyperlipidemic treatment includes statins. Risk factors for coronary artery disease include dyslipidemia, post-menopausal and hypertension.   Heartburn  She complains of abdominal pain (epigastric discomfort intermittently), dysphagia (occasionally) and early satiety. She reports no chest pain, no coughing or no globus sensation (occasionally). This is a chronic problem. The symptoms are aggravated by certain foods. There are no known risk factors. She has tried a PPI for the symptoms. Past procedures do not include an EGD.       Objective   Vital Signs:   /80   Pulse (!) 127   Temp 97.3 °F (36.3 °C) (Temporal)   Ht 156.2 cm (61.5\")   Wt 59.4 kg (131 lb)   SpO2 96%   BMI 24.35 kg/m²     Physical Exam  Vitals and nursing note reviewed.   Constitutional:       General: She is not in acute distress.     Appearance: She is well-developed. She is not ill-appearing.   Cardiovascular:      Rate and Rhythm: Normal rate and regular rhythm.      Heart sounds: Normal heart sounds, S1 normal and S2 normal. No murmur heard.     Pulmonary:      Effort: Pulmonary effort is normal.      Breath sounds: Normal breath sounds. No decreased breath sounds, wheezing, rhonchi or rales.   Skin:     General: Skin is warm and dry.   Neurological:      Mental Status: She is alert and oriented to person, place, and time.   Psychiatric:         " Attention and Perception: Attention normal.         Mood and Affect: Mood is anxious.         Speech: Speech normal.         Behavior: Behavior normal. Behavior is cooperative.         Thought Content: Thought content normal.         Judgment: Judgment normal.        Result Review :   The following data was reviewed by: RENO Louis on 04/12/2021:  Common labs    Common Labsle 7/2/20 7/2/20    1202 1202   Glucose 114 (A)    BUN 17    Creatinine 0.87    eGFR Non  Am 63    eGFR African Am 77    Sodium 138    Potassium 4.3    Chloride 103    Calcium 9.5    Total Protein 7.4    Albumin 4.60    Total Bilirubin 0.4    Alkaline Phosphatase 101    AST (SGOT) 18    ALT (SGPT) 18    Total Cholesterol  158   Triglycerides  62   HDL Cholesterol  65 (A)   LDL Cholesterol   81   (A) Abnormal value       Comments are available for some flowsheets but are not being displayed.                  Assessment and Plan    Diagnoses and all orders for this visit:    1. Essential hypertension (Primary)  Blood pressure stable on current therapy, continue same.  Check labs today and adjust dosage of medication as necessary.  Follow-up 6 months.    -     CBC & Differential  -     Comprehensive Metabolic Panel    2. Hyperlipidemia, unspecified hyperlipidemia type  Check fasting lipid panel today.  Adjustment of medication as necessary.  Continue to follow and improve on low-fat, low carbohydrate diet.     -     Lipid Panel With / Chol / HDL Ratio    3. Subclinical hypothyroidism  -     TSH+Free T4    4. Osteopenia, unspecified location  DEXA scan due 7/2021.     -     Vitamin D 25 Hydroxy  -     DEXA Bone Density Axial; Future    5. IFG (impaired fasting glucose)  -     Comprehensive Metabolic Panel  -     Hemoglobin A1c    6. Postmenopausal  -     Mammo Screening Bilateral With CAD; Future  -     DEXA Bone Density Axial; Future    7. Encounter for screening mammogram for malignant neoplasm of breast   -     Mammo Screening  Bilateral With CAD; Future    8. Gastroesophageal reflux disease without esophagitis  Advised trial OTC Omeprazole 20mg daily for one month.   If no improvement in symptoms, follow up; may need to consider discontinuation of alendronate and/or referral for EGD.       Follow Up   Return in about 6 months (around 10/12/2021) for Next scheduled follow up.  Patient was given instructions and counseling regarding her condition or for health maintenance advice. Please see specific information pulled into the AVS if appropriate.

## 2021-04-12 NOTE — PATIENT INSTRUCTIONS
TRIAL OF OMEPRAZOLE DAILY FOR 4 WEEKS.   IF SYMPTOMS DO NOT IMPROVE OR RESOLVE, PLEASE FOLLOW UP.       Heartburn  Heartburn is a type of pain or discomfort that can happen in the throat or chest. It is often described as a burning pain. It may also cause a bad, acid-like taste in the mouth. Heartburn may feel worse when you lie down or bend over, and it is often worse at night. Heartburn may be caused by stomach contents that move back up into the esophagus (reflux).  Follow these instructions at home:  Eating and drinking    · Avoid certain foods and drinks as told by your health care provider. This may include:  ? Coffee and tea (with or without caffeine).  ? Drinks that contain alcohol.  ? Energy drinks and sports drinks.  ? Carbonated drinks or sodas.  ? Chocolate and cocoa.  ? Peppermint and mint flavorings.  ? Garlic and onions.  ? Horseradish.  ? Spicy and acidic foods, including peppers, chili powder, massey powder, vinegar, hot sauces, and barbecue sauce.  ? Citrus fruit juices and citrus fruits, such as oranges, jimmy, and limes.  ? Tomato-based foods, such as red sauce, chili, salsa, and pizza with red sauce.  ? Fried and fatty foods, such as donuts, french fries, potato chips, and high-fat dressings.  ? High-fat meats, such as hot dogs and fatty cuts of red and white meats, such as rib eye steak, sausage, ham, and reyes.  ? High-fat dairy items, such as whole milk, butter, and cream cheese.  · Eat small, frequent meals instead of large meals.  · Avoid drinking large amounts of liquid with your meals.  · Avoid eating meals during the 2-3 hours before bedtime.  · Avoid lying down right after you eat.  · Do not exercise right after you eat.  Lifestyle         · If you are overweight, reduce your weight to an amount that is healthy for you. Ask your health care provider for guidance about a safe weight loss goal.  · Do not use any products that contain nicotine or tobacco, such as cigarettes, e-cigarettes,  and chewing tobacco. These can make your symptoms worse. If you need help quitting, ask your health care provider.  · Wear loose-fitting clothing. Do not wear anything tight around your waist that causes pressure on your abdomen.  · Raise (elevate) the head of your bed about 6 inches (15 cm) when you sleep.  · Try to reduce your stress, such as with yoga or meditation. If you need help reducing stress, ask your health care provider.  General instructions  · Pay attention to any changes in your symptoms.  · Take over-the-counter and prescription medicines only as told by your health care provider.  ? Do not take aspirin, ibuprofen, or other NSAIDs unless your health care provider told you to do so.  ? Stop medicines only as told by your health care provider. If you stop taking some medicines too quickly, your symptoms may get worse.  · Keep all follow-up visits as told by your health care provider. This is important.  Contact a health care provider if:  · You have new symptoms.  · You have unexplained weight loss.  · You have difficulty swallowing, or it hurts to swallow.  · You have wheezing or a persistent cough.  · Your symptoms do not improve with treatment.  · You have frequent heartburn for more than 2 weeks.  Get help right away if:  · You have pain in your arms, neck, jaw, teeth, or back.  · You feel sweaty, dizzy, or light-headed.  · You have chest pain or shortness of breath.  · You vomit and your vomit looks like blood or coffee grounds.  · Your stool is bloody or black.  These symptoms may represent a serious problem that is an emergency. Do not wait to see if the symptoms will go away. Get medical help right away. Call your local emergency services (911 in the [place] Do not drive yourself to the hospital.  Summary  · Heartburn is a type of pain or discomfort that can happen in the throat or chest. It is often described as a burning pain. It may also cause a bad, acid-like taste in the mouth.  · Avoid  certain foods and drinks as told by your health care provider.  · Take over-the-counter and prescription medicines only as told by your health care provider. Do not take aspirin, ibuprofen, or other NSAIDs unless your health care provider told you to do so.  · Contact a health care provider if your symptoms do not improve or they get worse.  This information is not intended to replace advice given to you by your health care provider. Make sure you discuss any questions you have with your health care provider.  Document Revised: 05/20/2019 Document Reviewed: 05/20/2019  Elsevier Patient Education © 2021 [employer]

## 2021-04-13 ENCOUNTER — TELEPHONE (OUTPATIENT)
Dept: INTERNAL MEDICINE | Age: 76
End: 2021-04-13

## 2021-04-13 LAB
25(OH)D3+25(OH)D2 SERPL-MCNC: 25.9 NG/ML (ref 30–100)
ALBUMIN SERPL-MCNC: 4.5 G/DL (ref 3.5–5.2)
ALBUMIN/GLOB SERPL: 1.7 G/DL
ALP SERPL-CCNC: 96 U/L (ref 39–117)
ALT SERPL-CCNC: 20 U/L (ref 1–33)
AST SERPL-CCNC: 24 U/L (ref 1–32)
BASOPHILS # BLD AUTO: 0.06 10*3/MM3 (ref 0–0.2)
BASOPHILS NFR BLD AUTO: 0.8 % (ref 0–1.5)
BILIRUB SERPL-MCNC: 0.4 MG/DL (ref 0–1.2)
BUN SERPL-MCNC: 17 MG/DL (ref 8–23)
BUN/CREAT SERPL: 23.6 (ref 7–25)
CALCIUM SERPL-MCNC: 9.8 MG/DL (ref 8.6–10.5)
CHLORIDE SERPL-SCNC: 104 MMOL/L (ref 98–107)
CHOLEST SERPL-MCNC: 179 MG/DL (ref 0–200)
CHOLEST/HDLC SERPL: 2.36 {RATIO}
CO2 SERPL-SCNC: 24.5 MMOL/L (ref 22–29)
CREAT SERPL-MCNC: 0.72 MG/DL (ref 0.57–1)
EOSINOPHIL # BLD AUTO: 0.05 10*3/MM3 (ref 0–0.4)
EOSINOPHIL NFR BLD AUTO: 0.7 % (ref 0.3–6.2)
ERYTHROCYTE [DISTWIDTH] IN BLOOD BY AUTOMATED COUNT: 11.8 % (ref 12.3–15.4)
GLOBULIN SER CALC-MCNC: 2.6 GM/DL
GLUCOSE SERPL-MCNC: 103 MG/DL (ref 65–99)
HBA1C MFR BLD: 5.6 % (ref 4.8–5.6)
HCT VFR BLD AUTO: 42.8 % (ref 34–46.6)
HDLC SERPL-MCNC: 76 MG/DL (ref 40–60)
HGB BLD-MCNC: 14.5 G/DL (ref 12–15.9)
IMM GRANULOCYTES # BLD AUTO: 0.02 10*3/MM3 (ref 0–0.05)
IMM GRANULOCYTES NFR BLD AUTO: 0.3 % (ref 0–0.5)
LDLC SERPL CALC-MCNC: 83 MG/DL (ref 0–100)
LYMPHOCYTES # BLD AUTO: 1.43 10*3/MM3 (ref 0.7–3.1)
LYMPHOCYTES NFR BLD AUTO: 18.7 % (ref 19.6–45.3)
MCH RBC QN AUTO: 32.4 PG (ref 26.6–33)
MCHC RBC AUTO-ENTMCNC: 33.9 G/DL (ref 31.5–35.7)
MCV RBC AUTO: 95.5 FL (ref 79–97)
MONOCYTES # BLD AUTO: 0.48 10*3/MM3 (ref 0.1–0.9)
MONOCYTES NFR BLD AUTO: 6.3 % (ref 5–12)
NEUTROPHILS # BLD AUTO: 5.61 10*3/MM3 (ref 1.7–7)
NEUTROPHILS NFR BLD AUTO: 73.2 % (ref 42.7–76)
NRBC BLD AUTO-RTO: 0 /100 WBC (ref 0–0.2)
PLATELET # BLD AUTO: 296 10*3/MM3 (ref 140–450)
POTASSIUM SERPL-SCNC: 4 MMOL/L (ref 3.5–5.2)
PROT SERPL-MCNC: 7.1 G/DL (ref 6–8.5)
RBC # BLD AUTO: 4.48 10*6/MM3 (ref 3.77–5.28)
SODIUM SERPL-SCNC: 137 MMOL/L (ref 136–145)
T4 FREE SERPL-MCNC: 1.19 NG/DL (ref 0.93–1.7)
TRIGL SERPL-MCNC: 112 MG/DL (ref 0–150)
TSH SERPL DL<=0.005 MIU/L-ACNC: 1.63 UIU/ML (ref 0.27–4.2)
VLDLC SERPL CALC-MCNC: 20 MG/DL (ref 5–40)
WBC # BLD AUTO: 7.65 10*3/MM3 (ref 3.4–10.8)

## 2021-04-13 NOTE — TELEPHONE ENCOUNTER
"----- Message from RENO Louis sent at 4/13/2021  7:02 AM EDT -----  Please call patient with results and send result letter to home address.    Neela:     Here are the results of your labs from your visit.    Your CBC looks normal, no sign of anemia, platelet disorder, or obvious infection.     Your CMP labs are acceptable. This is a measure of kidney function, electrolytes, and liver function. Fasting blood sugar is slightly elevated at 103. HgbA1c (which is a general assessment of your blood sugar over the past 3 months) is stable at 5.6%, which is normal.   For your reference, a fasting glucose of greater than 126 (or a Hemoglobin A1c greater than 6.5%) is indicative of diabetes.     Your cholesterol/lipid levels look good at this point. LDL cholesterol (\"bad cholesterol\") and HDL cholesterol (\"good cholesterol\") are both at normal levels.     Your tests for thyroid function are normal. Continue current dose of replacement.     Your vitamin D level is slightly low, which can cause decreased calcium and phosphorus absorption, increased bone loss, and increased risk for fractures.    Recommend start over-the-counter vitamin D3 2000 units daily.     All other labs are within acceptable range. Please continue current medications as prescribed and follow up as scheduled.     Jacqueline BOJORQUEZ               "

## 2021-04-15 ENCOUNTER — TRANSCRIBE ORDERS (OUTPATIENT)
Dept: ADMINISTRATIVE | Facility: HOSPITAL | Age: 76
End: 2021-04-15

## 2021-04-15 DIAGNOSIS — Z12.31 ENCOUNTER FOR SCREENING MAMMOGRAM FOR BREAST CANCER: Primary | ICD-10-CM

## 2021-04-19 DIAGNOSIS — E78.5 HYPERLIPIDEMIA, UNSPECIFIED HYPERLIPIDEMIA TYPE: ICD-10-CM

## 2021-04-19 RX ORDER — ROSUVASTATIN CALCIUM 5 MG/1
TABLET, COATED ORAL
Qty: 90 TABLET | Refills: 2 | Status: SHIPPED | OUTPATIENT
Start: 2021-04-19 | End: 2022-04-26 | Stop reason: SDUPTHER

## 2021-06-24 RX ORDER — CYCLOBENZAPRINE HCL 5 MG
TABLET ORAL
Qty: 30 TABLET | Refills: 0 | Status: SHIPPED | OUTPATIENT
Start: 2021-06-24 | End: 2022-01-18

## 2021-07-02 DIAGNOSIS — F41.9 ANXIETY: ICD-10-CM

## 2021-07-02 DIAGNOSIS — I10 ESSENTIAL HYPERTENSION: ICD-10-CM

## 2021-07-02 RX ORDER — AMLODIPINE BESYLATE 5 MG/1
TABLET ORAL
Qty: 90 TABLET | Refills: 2 | Status: SHIPPED | OUTPATIENT
Start: 2021-07-02 | End: 2022-04-04 | Stop reason: SDUPTHER

## 2021-07-02 RX ORDER — ESCITALOPRAM OXALATE 20 MG/1
TABLET ORAL
Qty: 90 TABLET | Refills: 1 | Status: SHIPPED | OUTPATIENT
Start: 2021-07-02 | End: 2021-12-29

## 2021-07-02 RX ORDER — METOPROLOL SUCCINATE 25 MG/1
TABLET, EXTENDED RELEASE ORAL
Qty: 90 TABLET | Refills: 2 | Status: SHIPPED | OUTPATIENT
Start: 2021-07-02 | End: 2022-04-04 | Stop reason: SDUPTHER

## 2021-08-10 ENCOUNTER — TELEPHONE (OUTPATIENT)
Dept: INTERNAL MEDICINE | Age: 76
End: 2021-08-10

## 2021-08-10 ENCOUNTER — HOSPITAL ENCOUNTER (OUTPATIENT)
Dept: MAMMOGRAPHY | Facility: HOSPITAL | Age: 76
Discharge: HOME | End: 2021-08-10

## 2021-08-10 ENCOUNTER — HOSPITAL ENCOUNTER (OUTPATIENT)
Dept: BONE DENSITY | Facility: HOSPITAL | Age: 76
Discharge: HOME | End: 2021-08-10

## 2021-08-10 DIAGNOSIS — Z12.31 ENCOUNTER FOR SCREENING MAMMOGRAM FOR MALIGNANT NEOPLASM OF BREAST: ICD-10-CM

## 2021-08-10 DIAGNOSIS — M85.80 OSTEOPENIA, UNSPECIFIED LOCATION: ICD-10-CM

## 2021-08-10 DIAGNOSIS — Z78.0 POSTMENOPAUSAL: ICD-10-CM

## 2021-08-10 DIAGNOSIS — N63.0 BREAST LUMP IN FEMALE: Primary | ICD-10-CM

## 2021-08-10 DIAGNOSIS — N64.59 OTHER SIGNS AND SYMPTOMS IN BREAST: ICD-10-CM

## 2021-08-10 DIAGNOSIS — N63.42 UNSPECIFIED LUMP IN LEFT BREAST, SUBAREOLAR: ICD-10-CM

## 2021-08-10 PROCEDURE — 77080 DXA BONE DENSITY AXIAL: CPT

## 2021-08-17 ENCOUNTER — HOSPITAL ENCOUNTER (OUTPATIENT)
Dept: MAMMOGRAPHY | Facility: HOSPITAL | Age: 76
Discharge: HOME | End: 2021-08-17

## 2021-08-17 ENCOUNTER — HOSPITAL ENCOUNTER (OUTPATIENT)
Dept: ULTRASOUND IMAGING | Facility: HOSPITAL | Age: 76
Discharge: HOME | End: 2021-08-17

## 2021-08-17 DIAGNOSIS — N63.0 BREAST LUMP IN FEMALE: ICD-10-CM

## 2021-08-17 DIAGNOSIS — N63.42 UNSPECIFIED LUMP IN LEFT BREAST, SUBAREOLAR: ICD-10-CM

## 2021-08-17 DIAGNOSIS — Z12.31 ENCOUNTER FOR SCREENING MAMMOGRAM FOR MALIGNANT NEOPLASM OF BREAST: ICD-10-CM

## 2021-08-17 DIAGNOSIS — N64.59 OTHER SIGNS AND SYMPTOMS IN BREAST: ICD-10-CM

## 2021-08-17 PROCEDURE — 77066 DX MAMMO INCL CAD BI: CPT

## 2021-08-17 PROCEDURE — 76642 ULTRASOUND BREAST LIMITED: CPT

## 2021-08-19 ENCOUNTER — TELEPHONE (OUTPATIENT)
Dept: INTERNAL MEDICINE | Age: 76
End: 2021-08-19

## 2021-08-19 DIAGNOSIS — R92.8 ABNORMAL MAMMOGRAM OF LEFT BREAST: ICD-10-CM

## 2021-08-19 DIAGNOSIS — N63.20 MASS OF LEFT BREAST: Primary | ICD-10-CM

## 2021-08-19 NOTE — TELEPHONE ENCOUNTER
----- Message from Ceci Fleming MA sent at 8/19/2021  1:23 PM EDT -----  Pt informed of lab results letter sent. Pt will like to get the Biopsy

## 2021-09-08 ENCOUNTER — HOSPITAL ENCOUNTER (OUTPATIENT)
Dept: ULTRASOUND IMAGING | Facility: HOSPITAL | Age: 76
Discharge: HOME | End: 2021-09-08

## 2021-09-08 ENCOUNTER — HOSPITAL ENCOUNTER (OUTPATIENT)
Dept: MAMMOGRAPHY | Facility: HOSPITAL | Age: 76
Discharge: HOME | End: 2021-09-08

## 2021-09-08 VITALS
HEART RATE: 109 BPM | OXYGEN SATURATION: 97 % | RESPIRATION RATE: 16 BRPM | WEIGHT: 127 LBS | TEMPERATURE: 97.8 F | BODY MASS INDEX: 23.98 KG/M2 | HEIGHT: 61 IN | DIASTOLIC BLOOD PRESSURE: 87 MMHG | SYSTOLIC BLOOD PRESSURE: 143 MMHG

## 2021-09-08 DIAGNOSIS — R92.8 ABNORMAL MAMMOGRAM OF LEFT BREAST: ICD-10-CM

## 2021-09-08 DIAGNOSIS — N63.20 MASS OF LEFT BREAST: ICD-10-CM

## 2021-09-08 DIAGNOSIS — Z98.890 STATUS POST BIOPSY: ICD-10-CM

## 2021-09-08 PROCEDURE — 88341 IMHCHEM/IMCYTCHM EA ADD ANTB: CPT | Performed by: NURSE PRACTITIONER

## 2021-09-08 PROCEDURE — 88342 IMHCHEM/IMCYTCHM 1ST ANTB: CPT | Performed by: NURSE PRACTITIONER

## 2021-09-08 PROCEDURE — 88360 TUMOR IMMUNOHISTOCHEM/MANUAL: CPT | Performed by: NURSE PRACTITIONER

## 2021-09-08 PROCEDURE — 88305 TISSUE EXAM BY PATHOLOGIST: CPT | Performed by: NURSE PRACTITIONER

## 2021-09-08 PROCEDURE — 77065 DX MAMMO INCL CAD UNI: CPT

## 2021-09-08 PROCEDURE — A4648 IMPLANTABLE TISSUE MARKER: HCPCS

## 2021-09-08 RX ORDER — LIDOCAINE HYDROCHLORIDE AND EPINEPHRINE 10; 10 MG/ML; UG/ML
10 INJECTION, SOLUTION INFILTRATION; PERINEURAL ONCE
Status: DISCONTINUED | OUTPATIENT
Start: 2021-09-08 | End: 2021-09-09 | Stop reason: HOSPADM

## 2021-09-08 RX ORDER — DIAZEPAM 5 MG/1
5 TABLET ORAL ONCE AS NEEDED
Status: DISCONTINUED | OUTPATIENT
Start: 2021-09-08 | End: 2021-09-09 | Stop reason: HOSPADM

## 2021-09-08 RX ORDER — LIDOCAINE HYDROCHLORIDE 10 MG/ML
10 INJECTION, SOLUTION INFILTRATION; PERINEURAL ONCE
Status: DISCONTINUED | OUTPATIENT
Start: 2021-09-08 | End: 2021-09-09 | Stop reason: HOSPADM

## 2021-09-08 NOTE — NURSING NOTE
Biopsy site to left mid outer breast clear with Exofin dry and intact. No new firmness or swelling noted at or around biopsy site (patient presented for biopsy for palpable area in left breast). Denies pain. Ice pack with protective covering applied to biopsy site. Discharge instructions discussed with understanding voiced by patient. Copies provided to patient. No distress noted. To home via private vehicle accompanied by her .

## 2021-09-08 NOTE — H&P
Name: Neela Ramirez ADMIT: 2021   : 1945  PCP: Jacqueline Amor APRN    MRN: [id number] LOS: 0 days   AGE/SEX: 76 y.o. female  ROOM: Room/bed info not found       Chief complaint left breast mass    Present Illness or Internal History:  Patient is a 76 y.o. female presents with a left breast mass.     Past Surgical History:  Past Surgical History:   Procedure Laterality Date   • BREAST CYST ASPIRATION     • BREAST CYST EXCISION     • EXOSTECTOMY      Simple Bunion Exostectomy (Silver Procedure)       Past Medical History:  Past Medical History:   Diagnosis Date   • Anxiety    • Backache    • Constipation    • Dermatitis    • Diverticulosis     Colonoscopy 2014   • Dyspepsia    • Dysuria    • Erythema of face     Transitory Erythema Of The Face   • Impacted cerumen    • Neck pain    • Tachycardia        Home Medications:  (Not in a hospital admission)      Allergies:  Patient has no known allergies.    Family History:  Family History   Problem Relation Age of Onset   • No Known Problems Father        Social History:  Social History     Tobacco Use   • Smoking status: Never Smoker   • Smokeless tobacco: Never Used   Substance Use Topics   • Alcohol use: Yes     Comment: social drinker   • Drug use: Not on file        Objective     Physical Exam:    No exam performed today,    Vital Signs  Temp:  [97.8 °F (36.6 °C)] 97.8 °F (36.6 °C)  Heart Rate:  [104] 104  Resp:  [18] 18  BP: (155)/(96) 155/96    Anticipated Surgical Procedure:  Ultrasound guided vacuum assisted left breast biopsy with clip placement    The risks, benefits and alternatives of this procedure have been discussed with the patient or responsible party: Yes        Trevor Tirado Jr., MD  21  14:30 EDT

## 2021-09-09 LAB
CYTO UR: NORMAL
LAB AP CASE REPORT: NORMAL
LAB AP CLINICAL INFORMATION: NORMAL
LAB AP DIAGNOSIS COMMENT: NORMAL
LAB AP SPECIAL STAINS: NORMAL
LAB AP SYNOPTIC CHECKLIST: NORMAL
PATH REPORT.FINAL DX SPEC: NORMAL
PATH REPORT.GROSS SPEC: NORMAL

## 2021-09-13 ENCOUNTER — TELEPHONE (OUTPATIENT)
Dept: INTERNAL MEDICINE | Age: 76
End: 2021-09-13

## 2021-09-13 DIAGNOSIS — C50.912 MALIGNANT NEOPLASM OF LEFT FEMALE BREAST, UNSPECIFIED ESTROGEN RECEPTOR STATUS, UNSPECIFIED SITE OF BREAST (HCC): Primary | ICD-10-CM

## 2021-09-13 NOTE — TELEPHONE ENCOUNTER
Notified patient of positive breast biopsy via telephone.   Referral placed to Breast surgery for further evaluation and treatment discussion.     Ceci, please send copy of results to patient's home address.

## 2021-09-20 NOTE — PROGRESS NOTES
General Surgery History and Physical Exam     Summary:    76 y.o. 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      A multidisciplinary plan has been formulated for the patient:    (1) Breast Surgical Oncology:  -MRI to evaluate anatomy as well as for surgical planning.   -Nurse navigator consult.     (2) Medical Oncology:  -Discussed with Dr. Avendano. Will plan for neoadjuvant chemotherapy with plans for lumpectomy/SLN after. Will order ECHO and port placement.     (3) Radiation Oncology:  -Will refer postoperatively for evaluation for radiation therapy after lumpectomy..    Referring Provider: RENO Louis    Chief Complaint: breast mass    History of Present Illness: Ms. Neela Ramirez is a 76 y.o. year old lady, seen at the request of Jacqueline Amor APRN for a new diagnosis of left breast cancer.      This was initially detected as a palpable mass. She has had annual mammograms each year or every other year. She denies any prior history of abnormal mammograms or breast biopsies. Her work-up is detailed in the oncologic history below.     She denies any pain, skin changes, or nipple discharge.  She has noticed this lump for a few months but new her regular mammogram is coming up soon.  She denies any family history of breast or ovarian cancer.     Workup of Current Diagnosis:    8/17/2021 left breast diagnostic Mammogram with Ultrasound:   In the posterior one-third upper inner quadrant of the left breast at the site of palpable concern  near the 11-o'clock position there is a mass with adjacent pleomorphic microcalcifications. The mass and calcifications measure on the order 1.5 cm in greatest dimension. 11-o'clock position on the order of 5 cm from the nipple. At this location, there is an irregular  hypoechoic mass that measures 2.4 x 2.0 x 1.6 cm. Internal vascularity is noted. Echogenic foci in the posterior aspect of the mass that represents the visualized calcifications are noted.  IMPRESSION:  1. There is an irregular 2.4 cm mass in the left breast at the site of palpable concern which corresponds to the 11-o'clock position on the order of 5 cm from the nipple. Correlation with an ultrasound guided left breast biopsy is recommended.  2. There are no findings suspicious for malignancy in the right breast.  BI-RADS Category 5: Highly suggestive for malignancy. Appropriate action should be taken.    Left breast ultrasound-guided biopsy:   The lesion at the 11:00 position on the order of 5 cm from the nipple was visualized. Multiple tissue specimens were obtained. A barbell shaped metallic clip was placed to katey the site. Postbiopsy unilateral left digital mammography consisting of CC and 90 degree lateral images were obtained and demonstrate placement of a barbell shaped metallic clip at the 11:00 position in the posterior one third of the left breast at the site of a pre-existing mass seen on the examination of 8/17/2021. The metallic clip is on the order of 7 mm anterior to residual microcalcifications.   IMPRESSION:   Technically successful ultrasound guided Mammotome vacuum assisted left breast biopsy with placement of a barbell shaped metallic clip. The pathology result has returned as malignant. Surgical consultation is recommended.  BI-RADS Category 6: Known malignancy    9/8/2021 pathology:   Final Diagnosis   1. Left Breast at 11 O'clock 5 cm from Nipple, Core Biopsy:               A. Invasive breast carcinoma with ductal and lobular features (see comment):                             1. Overall Daphne grade III (tubular score = 3, nuclear score = 3, mitotic score = 2).                            2. Invasive carcinoma measures at least 7 mm in greatest dimension.                            3.  Focally suspicious for lymphovascular space invasion.               B. Rare focus of high grade solid and comedo DCIS.     Gynecologic History:   . P:0 AB:0  Age at first childbirth: Not applicable  Lactation/How long: Not applicable  Age at menarche: 13  Age at menopause: 50  Total years of oral contraceptive use: None  Total years of hormone replacement therapy: 7 years    Past Medical History:   • Hypertension  • Hyperlipidemia  • Hypothyroidism  • GERD    Past Surgical History:    • Right breast lumpectomy for benign disease  • Left cyst aspiration  • Foot surgery    Family History:    Family History   Problem Relation Age of Onset   • No Known Problems Father      Social History:  • Denies tobacco use  • Occasional alcohol use    Allergies:   No Known Allergies    Medications:     Current Outpatient Medications:   •  alendronate (FOSAMAX) 70 MG tablet, Take 1 tablet by mouth 1 (One) Time Per Week., Disp: 13 tablet, Rfl: 3  •  amLODIPine (NORVASC) 5 MG tablet, Take 1 tablet by mouth once daily, Disp: 90 tablet, Rfl: 2  •  cyclobenzaprine (FLEXERIL) 5 MG tablet, Take 1 tablet by mouth three times daily as needed for muscle spasm, Disp: 30 tablet, Rfl: 0  •  desoximetasone (TOPICORT) 0.25 % ointment, 1 application 2 (two) times a day as needed., Disp: , Rfl:   •  escitalopram (LEXAPRO) 20 MG tablet, Take 1 tablet by mouth once daily, Disp: 90 tablet, Rfl: 1  •  levothyroxine (SYNTHROID, LEVOTHROID) 25 MCG tablet, TAKE 1 TABLET BY MOUTH ONCE DAILY IN THE MORNING 30  MINUTES  BEFORE  BREAKFAST  OR  ANY  OTHER  MEDICATIONS, Disp: 30 tablet, Rfl: 11  •  metoprolol succinate XL (TOPROL-XL) 25 MG 24 hr tablet, Take 1 tablet by mouth once daily, Disp: 90 tablet, Rfl: 2  •  omeprazole (priLOSEC) 20 MG capsule, TAKE 1 CAPSULE BY MOUTH ONCE DAILY, Disp: 30 capsule, Rfl: 11  •  rosuvastatin (CRESTOR) 5 MG tablet, Take 1 tablet by mouth once daily, Disp: 90 tablet, Rfl: 2    Laboratory Values:    Labs from 2021  reviewed    Review of Systems:   Influenza-like illness: no fever, no  cough, no  sore throat, no  body aches, no loss of sense of taste or smell, no known exposure to person with Covid-19.  Constitutional: Negative for fevers or chills  HENT: Negative for hearing loss or runny nose  Eyes: Negative for vision changes or scleral icterus  Respiratory: Negative for cough or shortness of breath  Cardiovascular: Negative for chest pain or heart palpitations  Gastrointestinal: Negative for abdominal pain, nausea, vomiting, constipation, melena, or hematochezia  Genitourinary: Negative for hematuria or dysuria  Musculoskeletal: Negative for joint swelling or gait instability  Neurologic: Negative for tremors or seizures  Psychiatric: Negative for suicidal ideations or depression  All other systems reviewed and negative    Physical Exam:   • ECO - Asymptomatic  • Constitutional: Well-developed well-nourished, no acute distress  • Eyes: Conjunctiva normal, sclera nonicteric  • ENMT: Hearing grossly normal, oral mucosa moist  • Neck: Supple, no palpable mass, trachea midline  • Respiratory: Clear to auscultation, normal inspiratory effort  • Cardiovascular: Regular rate, no peripheral edema, no jugular venous distention  • Breast: symmetric  o Right: No visible abnormalities on inspection while seated, with arms raised or hands on hips. No masses, skin changes, or nipple abnormalities.  o Left: No visible abnormalities on inspection while seated, with arms raised or hands on hips.  Palpable mass in the left breast in the upper outer area.  No skin or nipple changes.  o Biopsy site appreciated in left breast, otherwise no skin changes.   o No clinical chest wall involvement.  • Gastrointestinal: Soft, nontender  • Lymphatics (palpable nodes): No cervical, supraclavicular or axillary lymphadenopathy  • Skin:  Warm, dry, no rash on visualized skin surfaces  • Musculoskeletal: Symmetric strength, normal gait  • Psychiatric:  Alert and oriented ×3, normal affect     Discussion:  I had an extensive discussion with the patient and her family about the nature of her breast cancer diagnosis. We reviewed the components of breast tissue including ducts and lobules. We reviewed her pathology report in detail. We reviewed breast cancer histology, including stage, grade, ER/MS receptors, HER2 receptors and how this applies to her diagnosis. We reviewed the basics of systemic and local/regional management of breast cancer.     We reviewed potential surgical treatments to include partial mastectomy, mastectomy, sentinel lymph node biopsy and axillary node dissection and discussed the rationale associated with each approach. Regarding radiation therapy, we discussed that radiation is indicated in all cases of breast conservation and in only limited circumstances following mastectomy. We discussed that the primary goal of adjuvant radiation is to decrease the likelihood of local recurrence.     In her case, she is a good candidate for lumpectomy and she would like to proceed with breast conservation. We discussed that lumpectomy would require preoperative wire-localization. We also discussed the risk of positive margins and that she must have negative margins for lumpectomy to be an appropriate oncologic procedure. I will make every effort to obtain negative margins at her initial operation, but there is a 10-15% chance that she will require a second operation for re-excision, or possibly a total mastectomy. We will not know the margin status until after her final pathology has returned.     We discussed axillary staging. I described the procedure for sentinel lymph node biopsy in detail, including the preoperative injection of technetium sulfur colloid and intraoperative injection of lymphazurin blue dye. I explained that this is a mapping test and not a cancer test, that all of the lymph nodes containing these dyes will be removed for complete  testing by pathology, and that the results could impact the decision for adjuvant treatment or additional surgery.    I described additional risks and potential complications associated with surgery, including, but not limited to, bleeding, infection, complications related to blue dye, lymphedema, deformity/poor cosmetic result, chronic pain, neurovascular injury, numbness, seroma, hematoma, deep venous thrombosis, skin flap necrosis, disease recurrence and the possibility of requiring additional surgery. We also discussed other treatment options including the option of not undergoing any surgical treatment and the risks associated with this including disease progression. She expressed an understanding of these factors and wished to proceed.    We discussed that in her case, systemic treatment would involve endocrine therapy and possibly chemotherapy. She will be referred to medical oncology to discuss this further.     SOSA CARBALLO M.D.  General and Endoscopic Surgery  [facility]    [address], [address]  [place], [place], [address]  P: [phone]  F: [phone]

## 2021-09-21 ENCOUNTER — OFFICE VISIT (OUTPATIENT)
Dept: SURGERY | Facility: CLINIC | Age: 76
End: 2021-09-21

## 2021-09-21 VITALS — HEIGHT: 61 IN | BODY MASS INDEX: 24.28 KG/M2 | WEIGHT: 128.6 LBS

## 2021-09-21 DIAGNOSIS — I06.8 OTHER RHEUMATIC AORTIC VALVE DISEASES: ICD-10-CM

## 2021-09-21 DIAGNOSIS — Z17.0 MALIGNANT NEOPLASM OF BREAST IN FEMALE, ESTROGEN RECEPTOR POSITIVE, UNSPECIFIED LATERALITY, UNSPECIFIED SITE OF BREAST (HCC): Primary | ICD-10-CM

## 2021-09-21 DIAGNOSIS — C50.612 MALIGNANT NEOPLASM OF AXILLARY TAIL OF LEFT FEMALE BREAST, UNSPECIFIED ESTROGEN RECEPTOR STATUS (HCC): ICD-10-CM

## 2021-09-21 DIAGNOSIS — C50.919 MALIGNANT NEOPLASM OF BREAST IN FEMALE, ESTROGEN RECEPTOR POSITIVE, UNSPECIFIED LATERALITY, UNSPECIFIED SITE OF BREAST (HCC): Primary | ICD-10-CM

## 2021-09-21 PROCEDURE — 99204 OFFICE O/P NEW MOD 45 MIN: CPT | Performed by: STUDENT IN AN ORGANIZED HEALTH CARE EDUCATION/TRAINING PROGRAM

## 2021-09-22 ENCOUNTER — TELEPHONE (OUTPATIENT)
Dept: SURGERY | Facility: CLINIC | Age: 76
End: 2021-09-22

## 2021-09-23 ENCOUNTER — PREP FOR SURGERY (OUTPATIENT)
Dept: OTHER | Facility: HOSPITAL | Age: 76
End: 2021-09-23

## 2021-09-23 DIAGNOSIS — C50.919 MALIGNANT NEOPLASM OF FEMALE BREAST, UNSPECIFIED ESTROGEN RECEPTOR STATUS, UNSPECIFIED LATERALITY, UNSPECIFIED SITE OF BREAST (HCC): Primary | ICD-10-CM

## 2021-09-23 RX ORDER — CEFAZOLIN SODIUM 2 G/100ML
2 INJECTION, SOLUTION INTRAVENOUS ONCE
Status: CANCELLED | OUTPATIENT
Start: 2021-10-21 | End: 2021-09-23

## 2021-09-24 ENCOUNTER — TELEPHONE (OUTPATIENT)
Dept: SURGERY | Facility: CLINIC | Age: 76
End: 2021-09-24

## 2021-09-24 DIAGNOSIS — C50.919 MALIGNANT NEOPLASM OF FEMALE BREAST, UNSPECIFIED ESTROGEN RECEPTOR STATUS, UNSPECIFIED LATERALITY, UNSPECIFIED SITE OF BREAST (HCC): Primary | ICD-10-CM

## 2021-09-24 NOTE — TELEPHONE ENCOUNTER
----- Message from Mary Rosa sent at 9/24/2021 11:03 AM EDT -----  Regarding: RE: Scheduling  Scheduled 10/8/21 for ECHO and 10/11/21 to see dr Avendano. Thanks   ----- Message -----  From: Becky Liu MD  Sent: 9/23/2021   4:04 PM EDT  To: Mary Rosa, #  Subject: Scheduling                                       Jeanine: Just placed an order for ECHO and referral to Dr. Avendano.     She needs a port but I'd like for it to be scheduled after she sees Romana.     Thanks, Becky

## 2021-09-27 PROBLEM — C50.919 MALIGNANT NEOPLASM OF FEMALE BREAST (HCC): Status: ACTIVE | Noted: 2021-09-27

## 2021-09-30 ENCOUNTER — NURSE NAVIGATOR (OUTPATIENT)
Dept: ONCOLOGY | Facility: CLINIC | Age: 76
End: 2021-09-30

## 2021-09-30 NOTE — PROGRESS NOTES
I received a referral from Dr. Liu.    I spoke with the patient and I was able to introduce myself and my role in her care as Breast Nurse Navigator.  I notified the patient that I am here to help her to with any questions or needs.   The patient voiced understanding.    I notified the patient of the resources available with [facility].  The patient denied any questions or needs at this time.  I let her know that I have a folder with information on the resources that are available and she stated that she would like to receive one.      The patient was given my direct contact information and I requested that she call with any questions or needs.  She voiced understanding.

## 2021-10-05 ENCOUNTER — CONSULT (OUTPATIENT)
Dept: ONCOLOGY | Facility: CLINIC | Age: 76
End: 2021-10-05

## 2021-10-05 ENCOUNTER — LAB (OUTPATIENT)
Dept: LAB | Facility: HOSPITAL | Age: 76
End: 2021-10-05

## 2021-10-05 VITALS
OXYGEN SATURATION: 96 % | RESPIRATION RATE: 16 BRPM | HEART RATE: 95 BPM | TEMPERATURE: 98.4 F | DIASTOLIC BLOOD PRESSURE: 79 MMHG | BODY MASS INDEX: 29.76 KG/M2 | SYSTOLIC BLOOD PRESSURE: 166 MMHG | HEIGHT: 55 IN | WEIGHT: 128.6 LBS

## 2021-10-05 DIAGNOSIS — C50.212 MALIGNANT NEOPLASM OF UPPER-INNER QUADRANT OF LEFT BREAST IN FEMALE, ESTROGEN RECEPTOR POSITIVE (HCC): Primary | ICD-10-CM

## 2021-10-05 DIAGNOSIS — Z17.0 MALIGNANT NEOPLASM OF UPPER-INNER QUADRANT OF LEFT BREAST IN FEMALE, ESTROGEN RECEPTOR POSITIVE (HCC): ICD-10-CM

## 2021-10-05 DIAGNOSIS — Z17.0 MALIGNANT NEOPLASM OF UPPER-INNER QUADRANT OF LEFT BREAST IN FEMALE, ESTROGEN RECEPTOR POSITIVE (HCC): Primary | ICD-10-CM

## 2021-10-05 DIAGNOSIS — C50.212 MALIGNANT NEOPLASM OF UPPER-INNER QUADRANT OF LEFT BREAST IN FEMALE, ESTROGEN RECEPTOR POSITIVE (HCC): ICD-10-CM

## 2021-10-05 LAB
ALBUMIN SERPL-MCNC: 4.3 G/DL (ref 3.5–5.2)
ALBUMIN/GLOB SERPL: 1.6 G/DL (ref 1.1–2.4)
ALP SERPL-CCNC: 97 U/L (ref 38–116)
ALT SERPL W P-5'-P-CCNC: 13 U/L (ref 0–33)
ANION GAP SERPL CALCULATED.3IONS-SCNC: 10 MMOL/L (ref 5–15)
AST SERPL-CCNC: 19 U/L (ref 0–32)
BASOPHILS # BLD AUTO: 0.07 10*3/MM3 (ref 0–0.2)
BASOPHILS NFR BLD AUTO: 0.8 % (ref 0–1.5)
BILIRUB SERPL-MCNC: 0.4 MG/DL (ref 0.2–1.2)
BUN SERPL-MCNC: 17 MG/DL (ref 6–20)
BUN/CREAT SERPL: 20.5 (ref 7.3–30)
CALCIUM SPEC-SCNC: 9.2 MG/DL (ref 8.5–10.2)
CHLORIDE SERPL-SCNC: 104 MMOL/L (ref 98–107)
CO2 SERPL-SCNC: 24 MMOL/L (ref 22–29)
CREAT SERPL-MCNC: 0.83 MG/DL (ref 0.6–1.1)
DEPRECATED RDW RBC AUTO: 45.3 FL (ref 37–54)
EOSINOPHIL # BLD AUTO: 0.17 10*3/MM3 (ref 0–0.4)
EOSINOPHIL NFR BLD AUTO: 1.9 % (ref 0.3–6.2)
ERYTHROCYTE [DISTWIDTH] IN BLOOD BY AUTOMATED COUNT: 12.4 % (ref 12.3–15.4)
GFR SERPL CREATININE-BSD FRML MDRD: 67 ML/MIN/1.73
GLOBULIN UR ELPH-MCNC: 2.7 GM/DL (ref 1.8–3.5)
GLUCOSE SERPL-MCNC: 107 MG/DL (ref 74–124)
HCT VFR BLD AUTO: 44 % (ref 34–46.6)
HGB BLD-MCNC: 14.1 G/DL (ref 12–15.9)
IMM GRANULOCYTES # BLD AUTO: 0.05 10*3/MM3 (ref 0–0.05)
IMM GRANULOCYTES NFR BLD AUTO: 0.5 % (ref 0–0.5)
LYMPHOCYTES # BLD AUTO: 1.97 10*3/MM3 (ref 0.7–3.1)
LYMPHOCYTES NFR BLD AUTO: 21.5 % (ref 19.6–45.3)
MCH RBC QN AUTO: 31.7 PG (ref 26.6–33)
MCHC RBC AUTO-ENTMCNC: 32 G/DL (ref 31.5–35.7)
MCV RBC AUTO: 98.9 FL (ref 79–97)
MONOCYTES # BLD AUTO: 0.78 10*3/MM3 (ref 0.1–0.9)
MONOCYTES NFR BLD AUTO: 8.5 % (ref 5–12)
NEUTROPHILS NFR BLD AUTO: 6.13 10*3/MM3 (ref 1.7–7)
NEUTROPHILS NFR BLD AUTO: 66.8 % (ref 42.7–76)
NRBC BLD AUTO-RTO: 0 /100 WBC (ref 0–0.2)
PLATELET # BLD AUTO: 296 10*3/MM3 (ref 140–450)
PMV BLD AUTO: 9.5 FL (ref 6–12)
POTASSIUM SERPL-SCNC: 4 MMOL/L (ref 3.5–4.7)
PROT SERPL-MCNC: 7 G/DL (ref 6.3–8)
RBC # BLD AUTO: 4.45 10*6/MM3 (ref 3.77–5.28)
SODIUM SERPL-SCNC: 138 MMOL/L (ref 134–145)
WBC # BLD AUTO: 9.17 10*3/MM3 (ref 3.4–10.8)

## 2021-10-05 PROCEDURE — 99205 OFFICE O/P NEW HI 60 MIN: CPT | Performed by: INTERNAL MEDICINE

## 2021-10-05 PROCEDURE — 85025 COMPLETE CBC W/AUTO DIFF WBC: CPT

## 2021-10-05 PROCEDURE — 80053 COMPREHEN METABOLIC PANEL: CPT

## 2021-10-05 PROCEDURE — G2212 PROLONG OUTPT/OFFICE VIS: HCPCS | Performed by: INTERNAL MEDICINE

## 2021-10-05 PROCEDURE — 36415 COLL VENOUS BLD VENIPUNCTURE: CPT

## 2021-10-05 NOTE — PROGRESS NOTES
Subjective   Neela Ramirez is a 76 y.o. female.  Referred by Dr. 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  A bilateral diagnostic mammogram was performed for further evaluation of the same.    8/17/2021-bilateral diagnostic mammogram-scattered fibroglandular densities throughout both breasts.  In the posterior one third upper inner quadrant of the left breast at the site of palpable concern, 11 o'clock position there is a mass with adjacent pleomorphic calcifications.  The mass and the calcifications measure on order of 1.5 cm in greatest dimension.  No evidence of axillary lymphadenopathy appreciated.  Stable microcalcifications seen in other areas of the left breast on right breast.  Ultrasound-at 11 o'clock position, 5 cm from the nipple in the left breast there is an irregular hypoechoic mass which measures 2.4 x 2 x 1.6 cm.    Impression  1.irregular 2.4 cm mass in the left breast at the site of palpable concern at 11:00, 5 cm from the nipple.  Ultrasound-guided biopsy of the mass recommended  No finding suspicious for malignancy in the right breast    9/8/2021-left breast ultrasound-guided biopsy  Pathology consistent with invasive ductal carcinoma with lobular features.  Grade 3.  The carcinoma measures 7 mm in greatest dimension.  Focally suspicious for lymphovascular space invasion.  ER low +1-10%, intensity week  MO negative  HER-2 3+ on immunohistochemistry  Ki-67 70%    MRI of the breast-ordered but not performed yet, scheduled 10/8/2021    Echocardiogram ordered but not performed yet, scheduled 10/8/2021    She denies any new bone pains, dyspnea, cough, abdominal pain nausea vomiting or recent changes in weight.    She is accompanied by her  to the visit today.  She had 2  previous breast aspirations in her 20s.  No other breast biopsies  She does not know much about her family hence limited family history.        The following portions of the patient's history were reviewed and updated as appropriate: allergies, current medications, past family history, past medical history, past social history, past surgical history and problem list.    Past Medical History:   Diagnosis Date   • Anxiety    • Arthritis    • Backache    • Bleeding    • Constipation    • Dermatitis    • Diverticulosis     Colonoscopy 2014   • Dyspepsia    • Dysuria    • Erythema of face     Transitory Erythema Of The Face   • Hypertension    • Impacted cerumen    • Malignant neoplasm of female breast (CMS/HCC) 2021   • Neck pain    • Tachycardia         Past Surgical History:   Procedure Laterality Date   • BREAST BIOPSY  2021    Dr. Tirado    • BREAST CYST ASPIRATION     • BREAST CYST EXCISION     • EXOSTECTOMY      Simple Bunion Exostectomy ([facility] Procedure)        Family History   Problem Relation Age of Onset   • Arthritis Father         Social History     Socioeconomic History   • Marital status:      Spouse name: Not on file   • Number of children: 1   • Years of education: Not on file   • Highest education level: Not on file   Tobacco Use   • Smoking status: Never Smoker   • Smokeless tobacco: Never Used   Vaping Use   • Vaping Use: Never used   Substance and Sexual Activity   • Alcohol use: Yes     Comment: social drinker   • Drug use: Never   • Sexual activity: Defer        OB History             Para        Term   0            AB        Living           SAB        TAB        Ectopic        Molar        Multiple        Live Births                 Age at menarche-13  Age at menopause-50  Nulliparous  Breast-feeding-N/A   0 para 0  0  Oral contraceptive pill use-none  Hormone replacement therapy-7 years    No Known Allergies         Review of Systems    Constitutional: Negative.    HENT: Negative.    Eyes: Negative.    Respiratory: Negative.    Cardiovascular: Negative.    Gastrointestinal: Negative.    Endocrine: Negative.    Genitourinary: Negative.    Musculoskeletal: Positive for back pain.   Skin: Positive for rash.   Neurological: Negative.    Hematological: Negative.    Psychiatric/Behavioral: Negative.          Objective   There were no vitals taken for this visit.   Physical Exam  Vitals reviewed.   Constitutional:       Appearance: Normal appearance.   HENT:      Head: Normocephalic and atraumatic.      Right Ear: External ear normal.      Left Ear: External ear normal.      Nose: Nose normal.      Mouth/Throat:      Mouth: Mucous membranes are moist.      Pharynx: Oropharynx is clear.   Eyes:      Conjunctiva/sclera: Conjunctivae normal.      Pupils: Pupils are equal, round, and reactive to light.   Cardiovascular:      Rate and Rhythm: Normal rate and regular rhythm.      Pulses: Normal pulses.      Heart sounds: Normal heart sounds.   Pulmonary:      Effort: Pulmonary effort is normal.      Breath sounds: Normal breath sounds.   Abdominal:      General: Abdomen is flat. Bowel sounds are normal.      Palpations: Abdomen is soft.   Musculoskeletal:         General: Normal range of motion.      Cervical back: Normal range of motion.   Skin:     General: Skin is warm.   Neurological:      General: No focal deficit present.      Mental Status: She is alert and oriented to person, place, and time. Mental status is at baseline.   Psychiatric:         Mood and Affect: Mood normal.         Behavior: Behavior normal.         Thought Content: Thought content normal.         Judgment: Judgment normal.       Breast Exam: Right breast appears normal on inspection.  No palpable abnormalities of the right breast.  Left breast on inspection there is a mass in the upper inner quadrant.  On palpation this mass measures 3 x 3 cm.  Some bruising related to recent biopsy.   No palpable left axillary lymphadenopathy.  No right axillary lymphadenopathy.  No cervical or supraclavicular lymphadenopathy.    Lab on 10/05/2021   Component Date Value Ref Range Status   • WBC 10/05/2021 9.17  3.40 - 10.80 10*3/mm3 Final   • RBC 10/05/2021 4.45  3.77 - 5.28 10*6/mm3 Final   • Hemoglobin 10/05/2021 14.1  12.0 - 15.9 g/dL Final   • Hematocrit 10/05/2021 44.0  34.0 - 46.6 % Final   • MCV 10/05/2021 98.9* 79.0 - 97.0 fL Final   • MCH 10/05/2021 31.7  26.6 - 33.0 pg Final   • MCHC 10/05/2021 32.0  31.5 - 35.7 g/dL Final   • RDW 10/05/2021 12.4  12.3 - 15.4 % Final   • RDW-SD 10/05/2021 45.3  37.0 - 54.0 fl Final   • MPV 10/05/2021 9.5  6.0 - 12.0 fL Final   • Platelets 10/05/2021 296  140 - 450 10*3/mm3 Final   • Neutrophil % 10/05/2021 66.8  42.7 - 76.0 % Final   • Lymphocyte % 10/05/2021 21.5  19.6 - 45.3 % Final   • Monocyte % 10/05/2021 8.5  5.0 - 12.0 % Final   • Eosinophil % 10/05/2021 1.9  0.3 - 6.2 % Final   • Basophil % 10/05/2021 0.8  0.0 - 1.5 % Final   • Immature Grans % 10/05/2021 0.5  0.0 - 0.5 % Final   • Neutrophils, Absolute 10/05/2021 6.13  1.70 - 7.00 10*3/mm3 Final   • Lymphocytes, Absolute 10/05/2021 1.97  0.70 - 3.10 10*3/mm3 Final   • Monocytes, Absolute 10/05/2021 0.78  0.10 - 0.90 10*3/mm3 Final   • Eosinophils, Absolute 10/05/2021 0.17  0.00 - 0.40 10*3/mm3 Final   • Basophils, Absolute 10/05/2021 0.07  0.00 - 0.20 10*3/mm3 Final   • Immature Grans, Absolute 10/05/2021 0.05  0.00 - 0.05 10*3/mm3 Final   • nRBC 10/05/2021 0.0  0.0 - 0.2 /100 WBC Final   Hospital Outpatient Visit on 09/08/2021   Component Date Value Ref Range Status   • Case Report 09/08/2021    Final                    Value:Surgical Pathology Report                         Case: [id number]                                  Authorizing Provider:  Jacqueline Amor APRN     Collected:           09/08/2021 02:56 PM          Ordering Location:     [facility]  Received:            09/08/2021 03:28  PM                                 Mammography                                                                  Pathologist:           Patricio Priest MD                                                         Specimen:    Breast, Left, us guided left breast biopsy 11:00 5 cm FN mass - removed from patient                and in formalin  @ 1456 - not for calcifications                                          • Clinical Information 09/08/2021    Final                    Value:This result contains rich text formatting which cannot be displayed here.   • Final Diagnosis 09/08/2021    Final                    Value:This result contains rich text formatting which cannot be displayed here.   • Synoptic Checklist 09/08/2021    Final                    Value:Breast Biomarker Reporting Template  (BREAST: BIOMARKER REPORTING TEMPLATE - All Specimens)                                                        Protocol posted: 2/26/2020                                                           Test(s) Performed:                                     Estrogen Receptor (ER) Status:    Low Positive (1-10% of cells with nuclear positivity)                                    Percentage of Cells with Nuclear Positivity:    6                                    Average Intensity of Staining:    Weak                                    Status of Internal Controls:    Internal control cells present and stain as expected                                  Test Type:    Food and Drug Administration (FDA) cleared (test / vendor): [place]                                  Primary Antibody:    SP1                                  Scoring System:    Trina                                    Proportion Score:    2                                    Intensity Score:    1                                    Total Trina Score:    3                                Test(s) Performed:                                     Progesterone Receptor (PgR) Status:     Negative (less than 1%)                                    :    Internal control cells absent                                  Test Type:    Food and Drug Administration (FDA) cleared (test / vendor): [employer]                                  Primary Antibody:    1E2                                  Scoring System:    No separate scoring system used                                Test(s) Performed:                                     HER2 by Immunohistochemistry:    Positive (Score 3+)                                    Percentage of Cells with Uniform Intense Complete Membrane Stainin %                                 Test Type:    Food and Drug Administration (FDA) cleared (test / vendor): [employer]                                  Primary Antibody:    4B5                                Test(s) Performed:    Ki-67                                  Percentage of Cells with Nuclear Positivity:    70 %                                 Primary Antibody:    30-9                                Cold Ischemia and Fixation Times:    Meet requirements specified in latest version of the ASCO / CAP Guidelines                                Cold Ischemia Time (minutes):    1 min                               Fixation Time (hours):    6 hours                               Testing Performed on Block Number(s):    1A                                                         METHODS                               Fixative:    Formalin                                Image Analysis:    Not performed      • Comment 2021    Final                    Value:This result contains rich text formatting which cannot be displayed here.   • Gross Description 2021    Final                    Value:This result contains rich text formatting which cannot be displayed here.   • Special Stains 2021    Final                    Value:This result contains rich text formatting which cannot be displayed here.   • Microscopic Description  09/08/2021    Final                    Value:This result contains rich text formatting which cannot be displayed here.        Mammo Diagnostic Left With CAD    Result Date: 9/10/2021   IMPRESSION: Technically successful ultrasound guided Mammotome vacuum assisted left breast biopsy with placement of a barbell shaped metallic clip. The pathology result has returned as malignant. Surgical consultation is recommended.  BI-RADS Category 6: Known malignancy.  This report was finalized on 9/10/2021 3:26 PM by Dr. Trevor Tirado M.D.      US Guided Breast Biopsy With & Without Device initial    Result Date: 9/10/2021   IMPRESSION: Technically successful ultrasound guided Mammotome vacuum assisted left breast biopsy with placement of a barbell shaped metallic clip. The pathology result has returned as malignant. Surgical consultation is recommended.  BI-RADS Category 6: Known malignancy.  This report was finalized on 9/10/2021 3:26 PM by Dr. Trevor Tirado M.D.       10/5/2021 CBC and CMP reviewed and within normal limits      Assessment/Plan       *Invasive ductal carcinoma of the left breast  · Clinical T2 N0 M0  · On ultrasound the tumor measures 2.4 cm.  Lymph nodes on the left side are normal.  · MRI of the breast pending  · Pathology consistent with invasive carcinoma with ductal and lobular features, grade 3, ER +1 to 10% weak, IA negative, HER-2 3+ immunohistochemistry, Ki-67 70%.  · She was evaluated by Dr. Liu and referred for consideration of neoadjuvant chemotherapy.  Discussed at length the details of imaging and pathology report.Discussed the origin of breast cancer from the ducts and the lobules and the histological type of breast cancer based on site of origin. Discussed the tumor size, lymph node status and stage of the cancer. Explained the presence of DCIS. Discussed the receptor status including ER, IA and her-2 loreto and their significance in determining the biology and treatment. Also discussed the  importance of grade and ki-67.   The steps of curative intent breast cancer treatment have been explained, including surgery,  chemotherapy, possible radiation and possible endocrine therapy.   Since the tumor is HER-2 positive and over 2 cm I think it would be reasonable to proceed with neoadjuvant chemotherapy.  Explained to her about the benefits of neoadjuvant chemotherapy including do no assessment of response and making decisions about adjuvant HER-2 directed therapy.  Also discussed the benefits of neoadjuvant chemotherapy in terms of breast conservation.  Explained to her about the different regimens that could be used for treatment of HER-2 positive breast cancers particularly TCHP, AC-T HP, Taxol and Herceptin in the adjuvant setting.   Since the tumor is over 2 cm but lymph node negative I think she would be a great candidate for EA 1181 clinical trial which comprises of administering Taxol Herceptin and Perjeta new adjuvantly followed by surgery and additional adjuvant treatment depending upon the response.  Patient and her  were interested in participating in the clinical trial.   Discussed with our research coordinator Nela and an informed consent has been provided to the patient today.  MRI and echocardiogram pending.  We will await the results of both    *Hypertension-currently on amlodipine and metoprolol.  Continue the same blood pressure 166/79    *Anxiety  · Poorly controlled  · Somewhat worse since diagnosis of the breast cancer  · She is currently on Lexapro  · Referral will be made to supportive oncology clinic    *Hypothyroidism  · Start Synthroid    *Follow-up-1 week to discuss the MRI results as well as the echocardiogram and possibly starting chemotherapy.    101 minutes spent on the encounter including face-to-face time, reviewing the medical records,

## 2021-10-08 ENCOUNTER — HOSPITAL ENCOUNTER (OUTPATIENT)
Dept: CARDIOLOGY | Facility: HOSPITAL | Age: 76
Discharge: HOME | End: 2021-10-08

## 2021-10-08 ENCOUNTER — HOSPITAL ENCOUNTER (OUTPATIENT)
Dept: MRI IMAGING | Facility: HOSPITAL | Age: 76
Discharge: HOME | End: 2021-10-08

## 2021-10-08 VITALS
SYSTOLIC BLOOD PRESSURE: 120 MMHG | BODY MASS INDEX: 23.41 KG/M2 | WEIGHT: 124 LBS | HEIGHT: 61 IN | HEART RATE: 91 BPM | DIASTOLIC BLOOD PRESSURE: 70 MMHG

## 2021-10-08 DIAGNOSIS — C50.612 MALIGNANT NEOPLASM OF AXILLARY TAIL OF LEFT FEMALE BREAST, UNSPECIFIED ESTROGEN RECEPTOR STATUS (HCC): ICD-10-CM

## 2021-10-08 DIAGNOSIS — C50.919 MALIGNANT NEOPLASM OF BREAST IN FEMALE, ESTROGEN RECEPTOR POSITIVE, UNSPECIFIED LATERALITY, UNSPECIFIED SITE OF BREAST (HCC): ICD-10-CM

## 2021-10-08 DIAGNOSIS — I06.8 OTHER RHEUMATIC AORTIC VALVE DISEASES: ICD-10-CM

## 2021-10-08 DIAGNOSIS — Z17.0 MALIGNANT NEOPLASM OF BREAST IN FEMALE, ESTROGEN RECEPTOR POSITIVE, UNSPECIFIED LATERALITY, UNSPECIFIED SITE OF BREAST (HCC): ICD-10-CM

## 2021-10-08 LAB
AORTIC ARCH: 2.3 CM
AORTIC DIMENSIONLESS INDEX: 0.5 (DI)
ASCENDING AORTA: 2.5 CM
BH CV ECHO MEAS - ACS: 1.7 CM
BH CV ECHO MEAS - AI DEC SLOPE: 215.2 CM/SEC^2
BH CV ECHO MEAS - AI MAX PG: 55.1 MMHG
BH CV ECHO MEAS - AI MAX VEL: 371 CM/SEC
BH CV ECHO MEAS - AI P1/2T: 505 MSEC
BH CV ECHO MEAS - AO ARCH DIAM (PROXIMAL TRANS.): 2.3 CM
BH CV ECHO MEAS - AO MAX PG (FULL): 6 MMHG
BH CV ECHO MEAS - AO MAX PG: 8.6 MMHG
BH CV ECHO MEAS - AO MEAN PG (FULL): 3.3 MMHG
BH CV ECHO MEAS - AO MEAN PG: 4.7 MMHG
BH CV ECHO MEAS - AO ROOT AREA (BSA CORRECTED): 1.7
BH CV ECHO MEAS - AO ROOT AREA: 6 CM^2
BH CV ECHO MEAS - AO ROOT DIAM: 2.8 CM
BH CV ECHO MEAS - AO V2 MAX: 146.6 CM/SEC
BH CV ECHO MEAS - AO V2 MEAN: 102.3 CM/SEC
BH CV ECHO MEAS - AO V2 VTI: 27.2 CM
BH CV ECHO MEAS - ASC AORTA: 2.5 CM
BH CV ECHO MEAS - AVA(I,A): 1.6 CM^2
BH CV ECHO MEAS - AVA(I,D): 1.6 CM^2
BH CV ECHO MEAS - AVA(V,A): 1.6 CM^2
BH CV ECHO MEAS - AVA(V,D): 1.6 CM^2
BH CV ECHO MEAS - BSA(HAYCOCK): 1.6 M^2
BH CV ECHO MEAS - BSA: 1.6 M^2
BH CV ECHO MEAS - BZI_BMI: 23.4 KILOGRAMS/M^2
BH CV ECHO MEAS - BZI_METRIC_HEIGHT: 157.5 CM
BH CV ECHO MEAS - BZI_METRIC_WEIGHT: 58.1 KG
BH CV ECHO MEAS - EDV(CUBED): 32.6 ML
BH CV ECHO MEAS - EDV(MOD-SP2): 71 ML
BH CV ECHO MEAS - EDV(MOD-SP4): 88 ML
BH CV ECHO MEAS - EDV(TEICH): 40.8 ML
BH CV ECHO MEAS - EF(CUBED): 59.1 %
BH CV ECHO MEAS - EF(MOD-BP): 53 %
BH CV ECHO MEAS - EF(MOD-SP2): 54.9 %
BH CV ECHO MEAS - EF(MOD-SP4): 56.8 %
BH CV ECHO MEAS - EF(TEICH): 52 %
BH CV ECHO MEAS - ESV(CUBED): 13.4 ML
BH CV ECHO MEAS - ESV(MOD-SP2): 32 ML
BH CV ECHO MEAS - ESV(MOD-SP4): 38 ML
BH CV ECHO MEAS - ESV(TEICH): 19.6 ML
BH CV ECHO MEAS - FS: 25.8 %
BH CV ECHO MEAS - IVS/LVPW: 0.89
BH CV ECHO MEAS - IVSD: 0.79 CM
BH CV ECHO MEAS - LAT PEAK E' VEL: 8.2 CM/SEC
BH CV ECHO MEAS - LV DIASTOLIC VOL/BSA (35-75): 55.7 ML/M^2
BH CV ECHO MEAS - LV MASS(C)D: 70.3 GRAMS
BH CV ECHO MEAS - LV MASS(C)DI: 44.4 GRAMS/M^2
BH CV ECHO MEAS - LV MAX PG: 2.6 MMHG
BH CV ECHO MEAS - LV MEAN PG: 1.4 MMHG
BH CV ECHO MEAS - LV SYSTOLIC VOL/BSA (12-30): 24 ML/M^2
BH CV ECHO MEAS - LV V1 MAX: 80.6 CM/SEC
BH CV ECHO MEAS - LV V1 MEAN: 55.2 CM/SEC
BH CV ECHO MEAS - LV V1 VTI: 14.7 CM
BH CV ECHO MEAS - LVIDD: 3.2 CM
BH CV ECHO MEAS - LVIDS: 2.4 CM
BH CV ECHO MEAS - LVLD AP2: 6.6 CM
BH CV ECHO MEAS - LVLD AP4: 6.6 CM
BH CV ECHO MEAS - LVLS AP2: 5.3 CM
BH CV ECHO MEAS - LVLS AP4: 6 CM
BH CV ECHO MEAS - LVOT AREA (M): 2.8 CM^2
BH CV ECHO MEAS - LVOT AREA: 3 CM^2
BH CV ECHO MEAS - LVOT DIAM: 1.9 CM
BH CV ECHO MEAS - LVPWD: 0.89 CM
BH CV ECHO MEAS - MED PEAK E' VEL: 8.3 CM/SEC
BH CV ECHO MEAS - MV A DUR: 0.14 SEC
BH CV ECHO MEAS - MV A MAX VEL: 109 CM/SEC
BH CV ECHO MEAS - MV DEC SLOPE: 305.5 CM/SEC^2
BH CV ECHO MEAS - MV DEC TIME: 0.23 SEC
BH CV ECHO MEAS - MV E MAX VEL: 61.8 CM/SEC
BH CV ECHO MEAS - MV E/A: 0.57
BH CV ECHO MEAS - MV MAX PG: 3 MMHG
BH CV ECHO MEAS - MV MEAN PG: 1.3 MMHG
BH CV ECHO MEAS - MV P1/2T MAX VEL: 73.9 CM/SEC
BH CV ECHO MEAS - MV P1/2T: 70.9 MSEC
BH CV ECHO MEAS - MV V2 MAX: 86 CM/SEC
BH CV ECHO MEAS - MV V2 MEAN: 52.5 CM/SEC
BH CV ECHO MEAS - MV V2 VTI: 22.7 CM
BH CV ECHO MEAS - MVA P1/2T LCG: 3 CM^2
BH CV ECHO MEAS - MVA(P1/2T): 3.1 CM^2
BH CV ECHO MEAS - MVA(VTI): 1.9 CM^2
BH CV ECHO MEAS - PA ACC TIME: 0.09 SEC
BH CV ECHO MEAS - PA MAX PG (FULL): 1.5 MMHG
BH CV ECHO MEAS - PA MAX PG: 3 MMHG
BH CV ECHO MEAS - PA PR(ACCEL): 38.6 MMHG
BH CV ECHO MEAS - PA V2 MAX: 86.9 CM/SEC
BH CV ECHO MEAS - PVA(V,A): 1.7 CM^2
BH CV ECHO MEAS - PVA(V,D): 1.7 CM^2
BH CV ECHO MEAS - QP/QS: 0.62
BH CV ECHO MEAS - RAP SYSTOLE: 3 MMHG
BH CV ECHO MEAS - RV MAX PG: 1.6 MMHG
BH CV ECHO MEAS - RV MEAN PG: 0.74 MMHG
BH CV ECHO MEAS - RV V1 MAX: 62.6 CM/SEC
BH CV ECHO MEAS - RV V1 MEAN: 38.8 CM/SEC
BH CV ECHO MEAS - RV V1 VTI: 11.4 CM
BH CV ECHO MEAS - RVOT AREA: 2.4 CM^2
BH CV ECHO MEAS - RVOT DIAM: 1.8 CM
BH CV ECHO MEAS - RVSP: 24 MMHG
BH CV ECHO MEAS - SI(AO): 102.5 ML/M^2
BH CV ECHO MEAS - SI(CUBED): 12.2 ML/M^2
BH CV ECHO MEAS - SI(LVOT): 27.8 ML/M^2
BH CV ECHO MEAS - SI(MOD-SP2): 24.7 ML/M^2
BH CV ECHO MEAS - SI(MOD-SP4): 31.6 ML/M^2
BH CV ECHO MEAS - SI(TEICH): 13.4 ML/M^2
BH CV ECHO MEAS - SUP REN AO DIAM: 2 CM
BH CV ECHO MEAS - SV(AO): 162.1 ML
BH CV ECHO MEAS - SV(CUBED): 19.3 ML
BH CV ECHO MEAS - SV(LVOT): 43.9 ML
BH CV ECHO MEAS - SV(MOD-SP2): 39 ML
BH CV ECHO MEAS - SV(MOD-SP4): 50 ML
BH CV ECHO MEAS - SV(RVOT): 27.4 ML
BH CV ECHO MEAS - SV(TEICH): 21.3 ML
BH CV ECHO MEAS - TAPSE (>1.6): 2 CM
BH CV ECHO MEAS - TR MAX VEL: 229.2 CM/SEC
BH CV ECHO MEASUREMENTS AVERAGE E/E' RATIO: 7.49
BH CV XLRA - RV BASE: 3.3 CM
BH CV XLRA - RV LENGTH: 6 CM
BH CV XLRA - RV MID: 2.1 CM
BH CV XLRA - TDI S': 16.5 CM/SEC
LEFT ATRIUM VOLUME INDEX: 16 ML/M2
SINUS: 2.9 CM
STJ: 1.7 CM

## 2021-10-08 PROCEDURE — C8908 MRI W/O FOL W/CONT, BREAST,: HCPCS

## 2021-10-08 PROCEDURE — C8937 CAD BREAST MRI: HCPCS

## 2021-10-08 PROCEDURE — 93306 TTE W/DOPPLER COMPLETE: CPT

## 2021-10-08 PROCEDURE — 93356 MYOCRD STRAIN IMG SPCKL TRCK: CPT | Performed by: INTERNAL MEDICINE

## 2021-10-08 PROCEDURE — 93356 MYOCRD STRAIN IMG SPCKL TRCK: CPT

## 2021-10-08 PROCEDURE — 93306 TTE W/DOPPLER COMPLETE: CPT | Performed by: INTERNAL MEDICINE

## 2021-10-08 PROCEDURE — A9577 INJ MULTIHANCE: HCPCS | Performed by: STUDENT IN AN ORGANIZED HEALTH CARE EDUCATION/TRAINING PROGRAM

## 2021-10-08 PROCEDURE — 0 GADOBENATE DIMEGLUMINE 529 MG/ML SOLUTION: Performed by: STUDENT IN AN ORGANIZED HEALTH CARE EDUCATION/TRAINING PROGRAM

## 2021-10-08 PROCEDURE — 25010000002 PERFLUTREN (DEFINITY) 8.476 MG IN SODIUM CHLORIDE (PF) 0.9 % 10 ML INJECTION: Performed by: STUDENT IN AN ORGANIZED HEALTH CARE EDUCATION/TRAINING PROGRAM

## 2021-10-08 RX ADMIN — PERFLUTREN 2 ML: 6.52 INJECTION, SUSPENSION INTRAVENOUS at 14:07

## 2021-10-08 RX ADMIN — GADOBENATE DIMEGLUMINE 11 ML: 529 INJECTION, SOLUTION INTRAVENOUS at 08:14

## 2021-10-11 ENCOUNTER — APPOINTMENT (OUTPATIENT)
Dept: LAB | Facility: HOSPITAL | Age: 76
End: 2021-10-11

## 2021-10-11 ENCOUNTER — TELEPHONE (OUTPATIENT)
Dept: SURGERY | Facility: CLINIC | Age: 76
End: 2021-10-11

## 2021-10-12 ENCOUNTER — OFFICE VISIT (OUTPATIENT)
Dept: ONCOLOGY | Facility: CLINIC | Age: 76
End: 2021-10-12

## 2021-10-12 ENCOUNTER — RESEARCH ENCOUNTER (OUTPATIENT)
Dept: ONCOLOGY | Facility: CLINIC | Age: 76
End: 2021-10-12

## 2021-10-12 ENCOUNTER — APPOINTMENT (OUTPATIENT)
Dept: LAB | Facility: HOSPITAL | Age: 76
End: 2021-10-12

## 2021-10-12 ENCOUNTER — TRANSCRIBE ORDERS (OUTPATIENT)
Dept: SURGERY | Facility: CLINIC | Age: 76
End: 2021-10-12

## 2021-10-12 VITALS
BODY MASS INDEX: 24.13 KG/M2 | OXYGEN SATURATION: 97 % | TEMPERATURE: 96.6 F | DIASTOLIC BLOOD PRESSURE: 89 MMHG | HEIGHT: 61 IN | HEART RATE: 92 BPM | SYSTOLIC BLOOD PRESSURE: 156 MMHG | RESPIRATION RATE: 16 BRPM | WEIGHT: 127.8 LBS

## 2021-10-12 DIAGNOSIS — Z17.0 MALIGNANT NEOPLASM OF UPPER-INNER QUADRANT OF LEFT BREAST IN FEMALE, ESTROGEN RECEPTOR POSITIVE (HCC): Primary | ICD-10-CM

## 2021-10-12 DIAGNOSIS — C50.212 MALIGNANT NEOPLASM OF UPPER-INNER QUADRANT OF LEFT BREAST IN FEMALE, ESTROGEN RECEPTOR POSITIVE (HCC): Primary | ICD-10-CM

## 2021-10-12 DIAGNOSIS — Z01.818 OTHER SPECIFIED PRE-OPERATIVE EXAMINATION: Primary | ICD-10-CM

## 2021-10-12 PROCEDURE — 99215 OFFICE O/P EST HI 40 MIN: CPT | Performed by: INTERNAL MEDICINE

## 2021-10-12 NOTE — PROGRESS NOTES
Subjective   Neela Ramirez is a 76 y.o. female.  Referred by Dr. 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  A bilateral diagnostic mammogram was performed for further evaluation of the same.    8/17/2021-bilateral diagnostic mammogram-scattered fibroglandular densities throughout both breasts.  In the posterior one third upper inner quadrant of the left breast at the site of palpable concern, 11 o'clock position there is a mass with adjacent pleomorphic calcifications.  The mass and the calcifications measure on order of 1.5 cm in greatest dimension.  No evidence of axillary lymphadenopathy appreciated.  Stable microcalcifications seen in other areas of the left breast on right breast.  Ultrasound-at 11 o'clock position, 5 cm from the nipple in the left breast there is an irregular hypoechoic mass which measures 2.4 x 2 x 1.6 cm.    Impression  1.irregular 2.4 cm mass in the left breast at the site of palpable concern at 11:00, 5 cm from the nipple.  Ultrasound-guided biopsy of the mass recommended  No finding suspicious for malignancy in the right breast    9/8/2021-left breast ultrasound-guided biopsy  Pathology consistent with invasive ductal carcinoma with lobular features.  Grade 3.  The carcinoma measures 7 mm in greatest dimension.  Focally suspicious for lymphovascular space invasion.  ER low +1-10%, intensity week  UT negative  HER-2 3+ on immunohistochemistry  Ki-67 70%    MRI of the breast 10/8/2021-Biopsy-proven malignancy in the left breast at 11:00 measuring 2.8 cm with a centrally located metallic clip.  No other suspicious findings are seen within the left breast or no left axillary lymphadenopathy.  No suspicious findings of the right breast    Echocardiogram 10/8/2021 was normal  ejection fraction of 56 to 60% and strain of -20    She denies any new bone pains, dyspnea, cough, abdominal pain nausea vomiting or recent changes in weight.    She had 2 previous breast aspirations in her 20s.  No other breast biopsies  She does not know much about her family hence limited family history.        Interval history  Ms. Ramirez presents to the clinic today for follow-up.  She is accompanied by her .  At her last visit we discussed the EA 1181 clinical trial with neoadjuvant Taxol Perjeta and Herceptin.  She was willing to participate on the clinical trial at her last visit however now she has some concerns about the child and she feels like since her treatment might be withheld as a part of the trial.  She had an MRI of the breast and an echocardiogram.  She is here to discuss treatment options and results of the MRI and echocardiogram.  No other complaints at this time        The following portions of the patient's history were reviewed and updated as appropriate: allergies, current medications, past family history, past medical history, past social history, past surgical history and problem list.    Past Medical History:   Diagnosis Date   • Anxiety    • Arthritis    • Backache    • Bleeding    • Constipation    • Dermatitis    • Diverticulosis     Colonoscopy November 2014   • Dyspepsia    • Dysuria    • Erythema of face     Transitory Erythema Of The Face   • Hypertension    • Impacted cerumen    • Malignant neoplasm of female breast (HCC) 9/27/2021   • Neck pain    • Tachycardia         Past Surgical History:   Procedure Laterality Date   • BREAST BIOPSY  09/2021    Dr. Tirado    • BREAST CYST ASPIRATION     • BREAST CYST EXCISION     • EXOSTECTOMY      Simple Bunion Exostectomy ([facility] Procedure)        Family History   Problem Relation Age of Onset   • Arthritis Father         Social History     Socioeconomic History   • Marital status:    • Number of children: 1   Tobacco Use   •  "Smoking status: Never Smoker   • Smokeless tobacco: Never Used   Vaping Use   • Vaping Use: Never used   Substance and Sexual Activity   • Alcohol use: Yes     Comment: social drinker   • Drug use: Never   • Sexual activity: Defer        OB History             Para        Term   0            AB        Living           SAB        IAB        Ectopic        Molar        Multiple        Live Births                 Age at menarche-13  Age at menopause-50  Nulliparous  Breast-feeding-N/A   0 para 0  0  Oral contraceptive pill use-none  Hormone replacement therapy-7 years    No Known Allergies         Review of Systems   Constitutional: Negative.    HENT: Negative.    Eyes: Negative.    Respiratory: Negative.    Cardiovascular: Negative.    Gastrointestinal: Negative.    Endocrine: Negative.    Genitourinary: Negative.    Musculoskeletal: Positive for back pain.   Skin: Positive for rash.   Neurological: Negative.    Hematological: Negative.    Psychiatric/Behavioral: Negative.      Review of systems as mentioned in the HPI    Objective   Blood pressure 156/89, pulse 92, temperature 96.6 °F (35.9 °C), temperature source Temporal, resp. rate 16, height 154.9 cm (60.98\"), weight 58 kg (127 lb 12.8 oz), SpO2 97 %.   Physical Exam  Vitals reviewed.   Constitutional:       Appearance: Normal appearance.   HENT:      Head: Normocephalic and atraumatic.      Right Ear: External ear normal.      Left Ear: External ear normal.      Nose: Nose normal.      Mouth/Throat:      Mouth: Mucous membranes are moist.      Pharynx: Oropharynx is clear.   Eyes:      Conjunctiva/sclera: Conjunctivae normal.      Pupils: Pupils are equal, round, and reactive to light.   Cardiovascular:      Rate and Rhythm: Normal rate and regular rhythm.      Pulses: Normal pulses.      Heart sounds: Normal heart sounds.   Pulmonary:      Effort: Pulmonary effort is normal.      Breath sounds: Normal breath sounds.   Abdominal:     "  General: Abdomen is flat. Bowel sounds are normal.      Palpations: Abdomen is soft.   Musculoskeletal:         General: Normal range of motion.      Cervical back: Normal range of motion.   Skin:     General: Skin is warm.   Neurological:      General: No focal deficit present.      Mental Status: She is alert and oriented to person, place, and time. Mental status is at baseline.   Psychiatric:         Mood and Affect: Mood normal.         Behavior: Behavior normal.         Thought Content: Thought content normal.         Judgment: Judgment normal.       Breast Exam: Right breast appears normal on inspection.  No palpable abnormalities of the right breast.  Left breast on inspection there is a mass in the upper inner quadrant.  On palpation this mass measures 3 x 3 cm.  Some bruising related to recent biopsy.  No palpable left axillary lymphadenopathy.  No right axillary lymphadenopathy.  No cervical or supraclavicular lymphadenopathy.    I have reexamined the patient and the results are consistent with the previously documented exam. Sheri Avendano MD     Hospital Outpatient Visit on 10/08/2021   Component Date Value Ref Range Status   • BSA 10/08/2021 1.6  m^2 Final   • IVSd 10/08/2021 0.79  cm Final   • LVIDd 10/08/2021 3.2  cm Final   • LVIDs 10/08/2021 2.4  cm Final   • LVPWd 10/08/2021 0.89  cm Final   • IVS/LVPW 10/08/2021 0.89   Final   • FS 10/08/2021 25.8  % Final   • EDV(Teich) 10/08/2021 40.8  ml Final   • ESV(Teich) 10/08/2021 19.6  ml Final   • EF(Teich) 10/08/2021 52.0  % Final   • EDV(cubed) 10/08/2021 32.6  ml Final   • ESV(cubed) 10/08/2021 13.4  ml Final   • EF(cubed) 10/08/2021 59.1  % Final   • LV mass(C)d 10/08/2021 70.3  grams Final   • LV mass(C)dI 10/08/2021 44.4  grams/m^2 Final   • SV(Teich) 10/08/2021 21.3  ml Final   • SI(Teich) 10/08/2021 13.4  ml/m^2 Final   • SV(cubed) 10/08/2021 19.3  ml Final   • SI(cubed) 10/08/2021 12.2  ml/m^2 Final   • Ao root diam 10/08/2021 2.8  cm Final    • Ao root area 10/08/2021 6.0  cm^2 Final   • ACS 10/08/2021 1.7  cm Final   • asc Aorta Diam 10/08/2021 2.5  cm Final   • Ao Arch Diam (Proximal trans.) 10/08/2021 2.3  cm Final   • LVOT diam 10/08/2021 1.9  cm Final   • LVOT area 10/08/2021 3.0  cm^2 Final   • LVOT area(traced) 10/08/2021 2.8  cm^2 Final   • RVOT diam 10/08/2021 1.8  cm Final   • RVOT area 10/08/2021 2.4  cm^2 Final   • LVLd ap4 10/08/2021 6.6  cm Final   • EDV(MOD-sp4) 10/08/2021 88.0  ml Final   • LVLs ap4 10/08/2021 6.0  cm Final   • ESV(MOD-sp4) 10/08/2021 38.0  ml Final   • EF(MOD-sp4) 10/08/2021 56.8  % Final   • LVLd ap2 10/08/2021 6.6  cm Final   • EDV(MOD-sp2) 10/08/2021 71.0  ml Final   • LVLs ap2 10/08/2021 5.3  cm Final   • ESV(MOD-sp2) 10/08/2021 32.0  ml Final   • EF(MOD-sp2) 10/08/2021 54.9  % Final   • SV(MOD-sp4) 10/08/2021 50.0  ml Final   • SI(MOD-sp4) 10/08/2021 31.6  ml/m^2 Final   • SV(MOD-sp2) 10/08/2021 39.0  ml Final   • SI(MOD-sp2) 10/08/2021 24.7  ml/m^2 Final   • Ao root area (BSA corrected) 10/08/2021 1.7   Final   • LV Lara Vol (BSA corrected) 10/08/2021 55.7  ml/m^2 Final   • LV Sys Vol (BSA corrected) 10/08/2021 24.0  ml/m^2 Final   • EF(MOD-bp) 10/08/2021 53.0  % Final   • Ao pk taran 10/08/2021 146.6  cm/sec Final   • Ao max PG 10/08/2021 8.6  mmHg Final   • Ao max PG (full) 10/08/2021 6.0  mmHg Final   • Ao V2 mean 10/08/2021 102.3  cm/sec Final   • Ao mean PG 10/08/2021 4.7  mmHg Final   • Ao mean PG (full) 10/08/2021 3.3  mmHg Final   • Ao V2 VTI 10/08/2021 27.2  cm Final   • JACKIE(I,A) 10/08/2021 1.6  cm^2 Final   • JACKIE(I,D) 10/08/2021 1.6  cm^2 Final   • JACKIE(V,A) 10/08/2021 1.6  cm^2 Final   • JACKIE(V,D) 10/08/2021 1.6  cm^2 Final   • AI max taran 10/08/2021 371.0  cm/sec Final   • AI max PG 10/08/2021 55.1  mmHg Final   • AI dec slope 10/08/2021 215.2  cm/sec^2 Final   • AI P1/2t 10/08/2021 505.0  msec Final   • LV V1 max PG 10/08/2021 2.6  mmHg Final   • LV V1 mean PG 10/08/2021 1.4  mmHg Final   • LV V1 max  10/08/2021 80.6  cm/sec Final   • LV V1 mean 10/08/2021 55.2  cm/sec Final   • LV V1 VTI 10/08/2021 14.7  cm Final   • SV(Ao) 10/08/2021 162.1  ml Final   • SI(Ao) 10/08/2021 102.5  ml/m^2 Final   • SV(LVOT) 10/08/2021 43.9  ml Final   • SV(RVOT) 10/08/2021 27.4  ml Final   • SI(LVOT) 10/08/2021 27.8  ml/m^2 Final   • PA V2 max 10/08/2021 86.9  cm/sec Final   • PA max PG 10/08/2021 3.0  mmHg Final   • PA max PG (full) 10/08/2021 1.5  mmHg Final   • BH CV ECHO JAMES - PVA(V,A) 10/08/2021 1.7  cm^2 Final   • BH CV ECHO JAMES - PVA(V,D) 10/08/2021 1.7  cm^2 Final   • PA acc time 10/08/2021 0.09  sec Final   • RV V1 max PG 10/08/2021 1.6  mmHg Final   • RV V1 mean PG 10/08/2021 0.74  mmHg Final   • RV V1 max 10/08/2021 62.6  cm/sec Final   • RV V1 mean 10/08/2021 38.8  cm/sec Final   • RV V1 VTI 10/08/2021 11.4  cm Final   • TR max julian 10/08/2021 229.2  cm/sec Final   • RVSP(TR) 10/08/2021 24.0  mmHg Final   • RAP systole 10/08/2021 3.0  mmHg Final   • PA pr(Accel) 10/08/2021 38.6  mmHg Final   • Qp/Qs 10/08/2021 0.62   Final   • RV Base 10/08/2021 3.3  cm Final   • RV Length 10/08/2021 6.0  cm Final   • RV Mid 10/08/2021 2.1  cm Final   • Lat Peak E' Julian 10/08/2021 8.2  cm/sec Final   • Med Peak E' Julian 10/08/2021 8.3  cm/sec Final   • RV S' 10/08/2021 16.5  cm/sec Final   • BH CV ECHO JAMES - BZI_BMI 10/08/2021 23.4  kilograms/m^2 Final   • BH CV ECHO JAMES - BSA(H[place]COCK) 10/08/2021 1.6  m^2 Final   • BH CV ECHO JAMES - BZI_METRIC_WEIGHT 10/08/2021 58.1  kg Final   • BH CV ECHO JAMES - BZI_METRIC_HEIGHT 10/08/2021 157.5  cm Final   • MV A dur 10/08/2021 0.14  sec Final   • MV E max julian 10/08/2021 61.8  cm/sec Final   • MV A max julian 10/08/2021 109.0  cm/sec Final   • MV E/A 10/08/2021 0.57   Final   • MV V2 max 10/08/2021 86.0  cm/sec Final   • MV max PG 10/08/2021 3.0  mmHg Final   • MV V2 mean 10/08/2021 52.5  cm/sec Final   • MV mean PG 10/08/2021 1.3  mmHg Final   • MV V2 VTI 10/08/2021 22.7  cm Final   • MVA(VTI)  10/08/2021 1.9  cm^2 Final   • MV P1/2t max taran 10/08/2021 73.9  cm/sec Final   • MV P1/2t 10/08/2021 70.9  msec Final   • MVA(P1/2t) 10/08/2021 3.1  cm^2 Final   • MV dec slope 10/08/2021 305.5  cm/sec^2 Final   • MV dec time 10/08/2021 0.23  sec Final   • MVA P1/2T LCG 10/08/2021 3.0  cm^2 Final   • Avg E/e' ratio 10/08/2021 7.49   Final   • Sinus 10/08/2021 2.9  cm Final   • STJ 10/08/2021 1.7  cm Final   • Ascending aorta 10/08/2021 2.5  cm Final   • Aortic arch 10/08/2021 2.3  cm Final   • Dimensionless Index 10/08/2021 0.50  (DI) Final   • LA Volume Index 10/08/2021 16.0  mL/m2 Final   • Abdo Ao Diam 10/08/2021 2.0  cm Final   • TAPSE (>1.6) 10/08/2021 2.00  cm Final   Lab on 10/05/2021   Component Date Value Ref Range Status   • Glucose 10/05/2021 107  74 - 124 mg/dL Final   • BUN 10/05/2021 17  6 - 20 mg/dL Final   • Creatinine 10/05/2021 0.83  0.60 - 1.10 mg/dL Final   • Sodium 10/05/2021 138  134 - 145 mmol/L Final   • Potassium 10/05/2021 4.0  3.5 - 4.7 mmol/L Final   • Chloride 10/05/2021 104  98 - 107 mmol/L Final   • CO2 10/05/2021 24.0  22.0 - 29.0 mmol/L Final   • Calcium 10/05/2021 9.2  8.5 - 10.2 mg/dL Final   • Total Protein 10/05/2021 7.0  6.3 - 8.0 g/dL Final   • Albumin 10/05/2021 4.30  3.50 - 5.20 g/dL Final   • ALT (SGPT) 10/05/2021 13  0 - 33 U/L Final   • AST (SGOT) 10/05/2021 19  0 - 32 U/L Final   • Alkaline Phosphatase 10/05/2021 97  38 - 116 U/L Final   • Total Bilirubin 10/05/2021 0.4  0.2 - 1.2 mg/dL Final   • eGFR Non  Amer 10/05/2021 67  >60 mL/min/1.73 Final   • Globulin 10/05/2021 2.7  1.8 - 3.5 gm/dL Final   • A/G Ratio 10/05/2021 1.6  1.1 - 2.4 g/dL Final   • BUN/Creatinine Ratio 10/05/2021 20.5  7.3 - 30.0 Final   • Anion Gap 10/05/2021 10.0  5.0 - 15.0 mmol/L Final   • WBC 10/05/2021 9.17  3.40 - 10.80 10*3/mm3 Final   • RBC 10/05/2021 4.45  3.77 - 5.28 10*6/mm3 Final   • Hemoglobin 10/05/2021 14.1  12.0 - 15.9 g/dL Final   • Hematocrit 10/05/2021 44.0  34.0 - 46.6 %  Final   • MCV 10/05/2021 98.9* 79.0 - 97.0 fL Final   • MCH 10/05/2021 31.7  26.6 - 33.0 pg Final   • MCHC 10/05/2021 32.0  31.5 - 35.7 g/dL Final   • RDW 10/05/2021 12.4  12.3 - 15.4 % Final   • RDW-SD 10/05/2021 45.3  37.0 - 54.0 fl Final   • MPV 10/05/2021 9.5  6.0 - 12.0 fL Final   • Platelets 10/05/2021 296  140 - 450 10*3/mm3 Final   • Neutrophil % 10/05/2021 66.8  42.7 - 76.0 % Final   • Lymphocyte % 10/05/2021 21.5  19.6 - 45.3 % Final   • Monocyte % 10/05/2021 8.5  5.0 - 12.0 % Final   • Eosinophil % 10/05/2021 1.9  0.3 - 6.2 % Final   • Basophil % 10/05/2021 0.8  0.0 - 1.5 % Final   • Immature Grans % 10/05/2021 0.5  0.0 - 0.5 % Final   • Neutrophils, Absolute 10/05/2021 6.13  1.70 - 7.00 10*3/mm3 Final   • Lymphocytes, Absolute 10/05/2021 1.97  0.70 - 3.10 10*3/mm3 Final   • Monocytes, Absolute 10/05/2021 0.78  0.10 - 0.90 10*3/mm3 Final   • Eosinophils, Absolute 10/05/2021 0.17  0.00 - 0.40 10*3/mm3 Final   • Basophils, Absolute 10/05/2021 0.07  0.00 - 0.20 10*3/mm3 Final   • Immature Grans, Absolute 10/05/2021 0.05  0.00 - 0.05 10*3/mm3 Final   • nRBC 10/05/2021 0.0  0.0 - 0.2 /100 WBC Final        MRI Breast Bilateral With & Without Contrast    Result Date: 10/11/2021  1. Biopsy-proven malignancy in the left breast at the 11-o'clock position measuring on the order of 2.8 cm in greatest dimension with a centrally located barbell shaped metallic clip. No other suspicious findings are seen within left breast and there is no evidence for left axillary adenopathy. 2. There are no findings suspicious for malignancy in the right breast.  BI-RADS category 6: Known biopsy-proven malignancy.  This report was finalized on 10/11/2021 10:55 AM by Dr. Trevor Tirado M.D.       MRI of the breast-images independently reviewed and interpreted by me in detail summarized in HPI        Assessment/Plan       *Invasive ductal carcinoma of the left breast  · Clinical T2 N0 M0  · On ultrasound the tumor measures 2.4 cm.   Lymph nodes on the left side are normal.  · MRI 10/8/2021 with tumor measuring 2.8 cm.  Lymph nodes appear normal  · Pathology consistent with invasive carcinoma with ductal and lobular features, grade 3, ER +1 to 10% weak, DC negative, HER-2 3+ immunohistochemistry, Ki-67 70%.  · She was evaluated by Dr. Liu and referred for consideration of neoadjuvant chemotherapy.   Since the tumor is HER-2 positive and over 2 cm I think it would be reasonable to proceed with neoadjuvant chemotherapy.  Explained to her about the benefits of neoadjuvant chemotherapy including de surjit assessment of response and making decisions about adjuvant HER-2 directed therapy.  Also discussed the benefits of neoadjuvant chemotherapy in terms of breast conservation.  Explained to her about the different regimens that could be used for treatment of HER-2 positive breast cancers particularly TCHP, AC-T HP, Taxol and Herceptin in the adjuvant setting.   Since the tumor is over 2 cm but lymph node negative I think she would be a great candidate for EA 1181 clinical trial which comprises of administering Taxol Herceptin and Perjeta tamika  adjuvantly followed by surgery and additional adjuvant treatment depending upon the response.  She initially wanted to proceed with a trial at her last visit but subsequently had some reservations about the clinical trial.  Today we had a lengthy discussion about all the current existing chemotherapy regimens which can be used for treatment of HER-2 positive breast cancer and again a lengthy discussion about the risks and benefits of the clinical trial.  After much discussion patient is agreeable to proceeding with the clinical trial.  She will be scheduled for port placement on 10/18/2021  She will be started on chemotherapy after port placement on 10/19/2021.    *Hypertension-currently on amlodipine and metoprolol.  Continue the same blood pressure 156/89.  I will discuss with her about referral to cardio  oncology given her age, hypertension and use of Herceptin    *Anxiety  · Poorly controlled  · Referred to cardio oncology    *Hypothyroidism  · Continue Synthroid    *Follow-up-2 weeks for week 2 of Taxol Herceptin and Perjeta    43 minutes spent on the encounter including reviewing the MRI and echocardiogram, discussion about the clinical trial again at length including the risk benefits and also again discussing the various chemotherapy regimens available as well as documentation on the same day.

## 2021-10-13 ENCOUNTER — DOCUMENTATION (OUTPATIENT)
Dept: ONCOLOGY | Facility: CLINIC | Age: 76
End: 2021-10-13

## 2021-10-13 ENCOUNTER — APPOINTMENT (OUTPATIENT)
Dept: LAB | Facility: HOSPITAL | Age: 76
End: 2021-10-13

## 2021-10-13 ENCOUNTER — OFFICE VISIT (OUTPATIENT)
Dept: ONCOLOGY | Facility: CLINIC | Age: 76
End: 2021-10-13

## 2021-10-13 VITALS — WEIGHT: 127.8 LBS | BODY MASS INDEX: 24.16 KG/M2

## 2021-10-13 DIAGNOSIS — Z17.0 MALIGNANT NEOPLASM OF UPPER-INNER QUADRANT OF LEFT BREAST IN FEMALE, ESTROGEN RECEPTOR POSITIVE (HCC): Primary | ICD-10-CM

## 2021-10-13 DIAGNOSIS — C50.212 MALIGNANT NEOPLASM OF UPPER-INNER QUADRANT OF LEFT BREAST IN FEMALE, ESTROGEN RECEPTOR POSITIVE (HCC): Primary | ICD-10-CM

## 2021-10-13 PROCEDURE — 99215 OFFICE O/P EST HI 40 MIN: CPT | Performed by: NURSE PRACTITIONER

## 2021-10-13 RX ORDER — ONDANSETRON HYDROCHLORIDE 8 MG/1
8 TABLET, FILM COATED ORAL 3 TIMES DAILY PRN
Qty: 30 TABLET | Refills: 5 | Status: SHIPPED | OUTPATIENT
Start: 2021-10-13 | End: 2022-02-01

## 2021-10-13 NOTE — PROGRESS NOTES
____________________PATIENT EDUCATION____________________    PATIENT EDUCATION:  Today I met with the patient to discuss the chemotherapy regimen recommended for treatment of his/her Malignant neoplasm of upper-inner quadrant of left breast in female, estrogen receptor positive (HCC)  - ondansetron (ZOFRAN) 8 MG tablet  .  The patient was given explanation of treatment premed side effects including office policy that prohibits patients to drive if sedating medications are administered, MD explanation given regarding benefits, side effects, toxicities and goals of treatment.  The patient received a Chemotherapy/Biotherapy Plan Summary including diagnosis and explanation of specific treatment plan.    SIDE EFFECTS:  Common side effects were discussed with the patient and/or significant other.  Discussion included where applicable hair loss/discoloration, anemia/fatigue, infection/chills/fever, appetite, bleeding risk/precautions, constipation, diarrhea, mouth sores, taste alteration, loss of appetite, nausea/vomiting, peripheral neuropathy, skin/nail changes, rash, muscle aches/weakness, photosensitivity, weight gain/loss, hearing loss, dizziness, menopausal symptoms, menstrual irregularity, sterility, high blood pressure, heart damage, liver damage, lung damage, kidney damage, DVT/PE risk, fluid retention, pleural/pericardial effusion, somnolence, electrolyte/LFT imbalance, vein exercises and/or the possible need for vascular access/port placement.  The patient was advised that although uncommon, leakage of an infused medication from the vein or venous access device may lead to skin breakdown and/or other tissue damage.  The patient was advised that he/she may have pain, bleeding, and/or bruising from the insertion of a needle in their vein or venous access device (port).  The patient was further advised that, in spite of proper technique, infection with redness and irritation may rarely occur at the site where the  needle was inserted.  The patient was advised that if complications occur, additional medical treatment is available.  Finally, where applicable we have reviewed rare but potential immune mediated side effects including shortness of breath, cough, chest pain (pneumonitis), abdominal pain, diarrhea (colitis), thyroiditis (hypothyroid or hyperthyroid), hepatitis and liver dysfunction, nephritis and renal dysfunction.    Discussion also included side effects specific to drugs in the treatment plan, specifically Taxol, Herceptin, Perjeta    Reproductive risks were discussed, including appropriate use of birth control and protection during sexual relations.    Survivorship referral placed if appropriate yes.    I spent 40 minutes caring for Neela on this date of service. This time includes time spent by me in the following activities: preparing for the visit, counseling and educating the patient/family/caregiver, ordering medications, tests, or procedures, documenting information in the medical record and care coordination.

## 2021-10-13 NOTE — PROGRESS NOTES
[facility] Hematology/Oncology Treatment Plan Summary    Name: Neela Ramirez  [facility]# [id number]  MD: Dr. Avendano    Diagnosis:     ICD-10-CM ICD-9-CM   1. Malignant neoplasm of upper-inner quadrant of left breast in female, estrogen receptor positive (HCC)  C50.212 174.2    Z17.0 V86.0       Goal of chemotherapy: curative intent    Treatment Medication(s):   1. Taxol  2. Herceptin  3. Perjeta    Frequency: Taxol weekly x12 weeks, Herceptin/Perjeta every 3 weeks    Number of cycles: As above    Starting on: 10/19/2021    Items for home use: Thermometer    Rx written for: [x] Nausea    [] Pre-Chemo   ondansetron 8 mg by mouth every 8 hours as needed for nausea    Notes:     Next Steps: PORT     Completing Provider: RENO Souza           Date/time: 10/13/2021      Please note: You will be seen by a provider frequently with your treatment plan. This plan may change depending on many factors, if so, this will be discussed with you by your physician.  Last update 12/2020.

## 2021-10-15 ENCOUNTER — LAB (OUTPATIENT)
Dept: LAB | Facility: HOSPITAL | Age: 76
End: 2021-10-15

## 2021-10-15 LAB — SARS-COV-2 ORF1AB RESP QL NAA+PROBE: NOT DETECTED

## 2021-10-15 PROCEDURE — U0005 INFEC AGEN DETEC AMPLI PROBE: HCPCS | Performed by: STUDENT IN AN ORGANIZED HEALTH CARE EDUCATION/TRAINING PROGRAM

## 2021-10-15 PROCEDURE — U0004 COV-19 TEST NON-CDC HGH THRU: HCPCS | Performed by: STUDENT IN AN ORGANIZED HEALTH CARE EDUCATION/TRAINING PROGRAM

## 2021-10-15 PROCEDURE — C9803 HOPD COVID-19 SPEC COLLECT: HCPCS | Performed by: STUDENT IN AN ORGANIZED HEALTH CARE EDUCATION/TRAINING PROGRAM

## 2021-10-18 ENCOUNTER — ANESTHESIA EVENT (OUTPATIENT)
Dept: PERIOP | Facility: HOSPITAL | Age: 76
End: 2021-10-18

## 2021-10-18 ENCOUNTER — ANESTHESIA (OUTPATIENT)
Dept: PERIOP | Facility: HOSPITAL | Age: 76
End: 2021-10-18

## 2021-10-18 ENCOUNTER — APPOINTMENT (OUTPATIENT)
Dept: GENERAL RADIOLOGY | Facility: HOSPITAL | Age: 76
End: 2021-10-18

## 2021-10-18 ENCOUNTER — HOSPITAL ENCOUNTER (OUTPATIENT)
Facility: HOSPITAL | Age: 76
Setting detail: HOSPITAL OUTPATIENT SURGERY
Discharge: HOME | End: 2021-10-18
Attending: STUDENT IN AN ORGANIZED HEALTH CARE EDUCATION/TRAINING PROGRAM | Admitting: STUDENT IN AN ORGANIZED HEALTH CARE EDUCATION/TRAINING PROGRAM

## 2021-10-18 VITALS
TEMPERATURE: 97.9 F | RESPIRATION RATE: 16 BRPM | SYSTOLIC BLOOD PRESSURE: 92 MMHG | HEART RATE: 56 BPM | OXYGEN SATURATION: 96 % | DIASTOLIC BLOOD PRESSURE: 54 MMHG

## 2021-10-18 DIAGNOSIS — C50.919 MALIGNANT NEOPLASM OF FEMALE BREAST, UNSPECIFIED ESTROGEN RECEPTOR STATUS, UNSPECIFIED LATERALITY, UNSPECIFIED SITE OF BREAST (HCC): ICD-10-CM

## 2021-10-18 PROCEDURE — 77001 FLUOROGUIDE FOR VEIN DEVICE: CPT | Performed by: STUDENT IN AN ORGANIZED HEALTH CARE EDUCATION/TRAINING PROGRAM

## 2021-10-18 PROCEDURE — C1788 PORT, INDWELLING, IMP: HCPCS | Performed by: STUDENT IN AN ORGANIZED HEALTH CARE EDUCATION/TRAINING PROGRAM

## 2021-10-18 PROCEDURE — 76000 FLUOROSCOPY <1 HR PHYS/QHP: CPT

## 2021-10-18 PROCEDURE — 25010000002 HEPARIN (PORCINE) PER 1000 UNITS: Performed by: STUDENT IN AN ORGANIZED HEALTH CARE EDUCATION/TRAINING PROGRAM

## 2021-10-18 PROCEDURE — 25010000002 PROPOFOL 10 MG/ML EMULSION: Performed by: NURSE ANESTHETIST, CERTIFIED REGISTERED

## 2021-10-18 PROCEDURE — 25010000002 FENTANYL CITRATE (PF) 50 MCG/ML SOLUTION: Performed by: NURSE ANESTHETIST, CERTIFIED REGISTERED

## 2021-10-18 PROCEDURE — 76937 US GUIDE VASCULAR ACCESS: CPT | Performed by: STUDENT IN AN ORGANIZED HEALTH CARE EDUCATION/TRAINING PROGRAM

## 2021-10-18 PROCEDURE — 25010000002 PHENYLEPHRINE 10 MG/ML SOLUTION: Performed by: NURSE ANESTHETIST, CERTIFIED REGISTERED

## 2021-10-18 PROCEDURE — 25010000003 CEFAZOLIN IN DEXTROSE 2-4 GM/100ML-% SOLUTION: Performed by: STUDENT IN AN ORGANIZED HEALTH CARE EDUCATION/TRAINING PROGRAM

## 2021-10-18 PROCEDURE — 71045 X-RAY EXAM CHEST 1 VIEW: CPT

## 2021-10-18 PROCEDURE — 36561 INSERT TUNNELED CV CATH: CPT | Performed by: STUDENT IN AN ORGANIZED HEALTH CARE EDUCATION/TRAINING PROGRAM

## 2021-10-18 DEVICE — POWERPORT M.R.I. IMPLANTABLE PORT WITH ATTACHABLE 8F CHRONOFLEX OPEN-ENDED SINGLE-LUMEN VENOUS CATHETER INTERMEDIATE KIT (WITH SUTURE PLUGS)
Type: IMPLANTABLE DEVICE | Site: CHEST | Status: FUNCTIONAL
Brand: POWERPORT M.R.I., CHRONOFLEX

## 2021-10-18 RX ORDER — PROMETHAZINE HYDROCHLORIDE 25 MG/1
25 SUPPOSITORY RECTAL ONCE AS NEEDED
Status: DISCONTINUED | OUTPATIENT
Start: 2021-10-18 | End: 2021-10-18 | Stop reason: HOSPADM

## 2021-10-18 RX ORDER — SODIUM CHLORIDE 0.9 % (FLUSH) 0.9 %
3 SYRINGE (ML) INJECTION EVERY 12 HOURS SCHEDULED
Status: DISCONTINUED | OUTPATIENT
Start: 2021-10-18 | End: 2021-10-18 | Stop reason: HOSPADM

## 2021-10-18 RX ORDER — LIDOCAINE HYDROCHLORIDE 10 MG/ML
0.5 INJECTION, SOLUTION EPIDURAL; INFILTRATION; INTRACAUDAL; PERINEURAL ONCE AS NEEDED
Status: COMPLETED | OUTPATIENT
Start: 2021-10-18 | End: 2021-10-18

## 2021-10-18 RX ORDER — LABETALOL HYDROCHLORIDE 5 MG/ML
5 INJECTION, SOLUTION INTRAVENOUS
Status: DISCONTINUED | OUTPATIENT
Start: 2021-10-18 | End: 2021-10-18 | Stop reason: HOSPADM

## 2021-10-18 RX ORDER — PHENYLEPHRINE HYDROCHLORIDE 10 MG/ML
INJECTION INTRAVENOUS AS NEEDED
Status: DISCONTINUED | OUTPATIENT
Start: 2021-10-18 | End: 2021-10-18 | Stop reason: SURG

## 2021-10-18 RX ORDER — FAMOTIDINE 10 MG/ML
20 INJECTION, SOLUTION INTRAVENOUS ONCE
Status: COMPLETED | OUTPATIENT
Start: 2021-10-18 | End: 2021-10-18

## 2021-10-18 RX ORDER — SODIUM CHLORIDE, SODIUM LACTATE, POTASSIUM CHLORIDE, CALCIUM CHLORIDE 600; 310; 30; 20 MG/100ML; MG/100ML; MG/100ML; MG/100ML
9 INJECTION, SOLUTION INTRAVENOUS CONTINUOUS
Status: DISCONTINUED | OUTPATIENT
Start: 2021-10-18 | End: 2021-10-18 | Stop reason: HOSPADM

## 2021-10-18 RX ORDER — SODIUM CHLORIDE 0.9 % (FLUSH) 0.9 %
3-10 SYRINGE (ML) INJECTION AS NEEDED
Status: DISCONTINUED | OUTPATIENT
Start: 2021-10-18 | End: 2021-10-18 | Stop reason: HOSPADM

## 2021-10-18 RX ORDER — DIPHENHYDRAMINE HYDROCHLORIDE 50 MG/ML
12.5 INJECTION INTRAMUSCULAR; INTRAVENOUS
Status: DISCONTINUED | OUTPATIENT
Start: 2021-10-18 | End: 2021-10-18 | Stop reason: HOSPADM

## 2021-10-18 RX ORDER — FLUMAZENIL 0.1 MG/ML
0.2 INJECTION INTRAVENOUS AS NEEDED
Status: DISCONTINUED | OUTPATIENT
Start: 2021-10-18 | End: 2021-10-18 | Stop reason: HOSPADM

## 2021-10-18 RX ORDER — PROMETHAZINE HYDROCHLORIDE 25 MG/1
25 TABLET ORAL ONCE AS NEEDED
Status: DISCONTINUED | OUTPATIENT
Start: 2021-10-18 | End: 2021-10-18 | Stop reason: HOSPADM

## 2021-10-18 RX ORDER — PROPOFOL 10 MG/ML
VIAL (ML) INTRAVENOUS AS NEEDED
Status: DISCONTINUED | OUTPATIENT
Start: 2021-10-18 | End: 2021-10-18 | Stop reason: SURG

## 2021-10-18 RX ORDER — HYDROMORPHONE HYDROCHLORIDE 1 MG/ML
0.25 INJECTION, SOLUTION INTRAMUSCULAR; INTRAVENOUS; SUBCUTANEOUS
Status: DISCONTINUED | OUTPATIENT
Start: 2021-10-18 | End: 2021-10-18 | Stop reason: HOSPADM

## 2021-10-18 RX ORDER — ONDANSETRON 2 MG/ML
4 INJECTION INTRAMUSCULAR; INTRAVENOUS ONCE AS NEEDED
Status: DISCONTINUED | OUTPATIENT
Start: 2021-10-18 | End: 2021-10-18 | Stop reason: HOSPADM

## 2021-10-18 RX ORDER — BUPIVACAINE HYDROCHLORIDE AND EPINEPHRINE 5; 5 MG/ML; UG/ML
INJECTION, SOLUTION EPIDURAL; INTRACAUDAL; PERINEURAL AS NEEDED
Status: DISCONTINUED | OUTPATIENT
Start: 2021-10-18 | End: 2021-10-18 | Stop reason: HOSPADM

## 2021-10-18 RX ORDER — NALOXONE HCL 0.4 MG/ML
0.2 VIAL (ML) INJECTION AS NEEDED
Status: DISCONTINUED | OUTPATIENT
Start: 2021-10-18 | End: 2021-10-18 | Stop reason: HOSPADM

## 2021-10-18 RX ORDER — FENTANYL CITRATE 50 UG/ML
50 INJECTION, SOLUTION INTRAMUSCULAR; INTRAVENOUS
Status: DISCONTINUED | OUTPATIENT
Start: 2021-10-18 | End: 2021-10-18 | Stop reason: HOSPADM

## 2021-10-18 RX ORDER — PROPOFOL 10 MG/ML
VIAL (ML) INTRAVENOUS CONTINUOUS PRN
Status: DISCONTINUED | OUTPATIENT
Start: 2021-10-18 | End: 2021-10-18 | Stop reason: SURG

## 2021-10-18 RX ORDER — DIPHENHYDRAMINE HCL 25 MG
25 CAPSULE ORAL
Status: DISCONTINUED | OUTPATIENT
Start: 2021-10-18 | End: 2021-10-18 | Stop reason: HOSPADM

## 2021-10-18 RX ORDER — HYDRALAZINE HYDROCHLORIDE 20 MG/ML
5 INJECTION INTRAMUSCULAR; INTRAVENOUS
Status: DISCONTINUED | OUTPATIENT
Start: 2021-10-18 | End: 2021-10-18 | Stop reason: HOSPADM

## 2021-10-18 RX ORDER — HYDROCODONE BITARTRATE AND ACETAMINOPHEN 5; 325 MG/1; MG/1
1 TABLET ORAL ONCE AS NEEDED
Status: DISCONTINUED | OUTPATIENT
Start: 2021-10-18 | End: 2021-10-18 | Stop reason: HOSPADM

## 2021-10-18 RX ORDER — CEFAZOLIN SODIUM 2 G/100ML
2 INJECTION, SOLUTION INTRAVENOUS ONCE
Status: COMPLETED | OUTPATIENT
Start: 2021-10-18 | End: 2021-10-18

## 2021-10-18 RX ORDER — EPHEDRINE SULFATE 50 MG/ML
5 INJECTION, SOLUTION INTRAVENOUS ONCE AS NEEDED
Status: DISCONTINUED | OUTPATIENT
Start: 2021-10-18 | End: 2021-10-18 | Stop reason: HOSPADM

## 2021-10-18 RX ORDER — FENTANYL CITRATE 50 UG/ML
25 INJECTION, SOLUTION INTRAMUSCULAR; INTRAVENOUS
Status: DISCONTINUED | OUTPATIENT
Start: 2021-10-18 | End: 2021-10-18 | Stop reason: HOSPADM

## 2021-10-18 RX ORDER — OXYCODONE AND ACETAMINOPHEN 10; 325 MG/1; MG/1
1 TABLET ORAL EVERY 4 HOURS PRN
Status: DISCONTINUED | OUTPATIENT
Start: 2021-10-18 | End: 2021-10-18 | Stop reason: HOSPADM

## 2021-10-18 RX ORDER — FENTANYL CITRATE 50 UG/ML
INJECTION, SOLUTION INTRAMUSCULAR; INTRAVENOUS AS NEEDED
Status: DISCONTINUED | OUTPATIENT
Start: 2021-10-18 | End: 2021-10-18 | Stop reason: SURG

## 2021-10-18 RX ORDER — MAGNESIUM HYDROXIDE 1200 MG/15ML
LIQUID ORAL AS NEEDED
Status: DISCONTINUED | OUTPATIENT
Start: 2021-10-18 | End: 2021-10-18 | Stop reason: HOSPADM

## 2021-10-18 RX ADMIN — Medication 140 MCG/KG/MIN: at 13:46

## 2021-10-18 RX ADMIN — FENTANYL CITRATE 25 MCG: 50 INJECTION INTRAMUSCULAR; INTRAVENOUS at 13:46

## 2021-10-18 RX ADMIN — PHENYLEPHRINE HYDROCHLORIDE 100 MCG: 10 INJECTION, SOLUTION INTRAVENOUS at 13:59

## 2021-10-18 RX ADMIN — CEFAZOLIN SODIUM 2 G: 2 INJECTION, SOLUTION INTRAVENOUS at 13:53

## 2021-10-18 RX ADMIN — SODIUM CHLORIDE, POTASSIUM CHLORIDE, SODIUM LACTATE AND CALCIUM CHLORIDE 9 ML/HR: 600; 310; 30; 20 INJECTION, SOLUTION INTRAVENOUS at 13:30

## 2021-10-18 RX ADMIN — PROPOFOL 40 MG: 10 INJECTION, EMULSION INTRAVENOUS at 13:46

## 2021-10-18 RX ADMIN — FENTANYL CITRATE 25 MCG: 50 INJECTION INTRAMUSCULAR; INTRAVENOUS at 13:50

## 2021-10-18 RX ADMIN — FAMOTIDINE 20 MG: 10 INJECTION INTRAVENOUS at 13:38

## 2021-10-18 RX ADMIN — LIDOCAINE HYDROCHLORIDE 0.5 ML: 10 INJECTION, SOLUTION EPIDURAL; INFILTRATION; INTRACAUDAL; PERINEURAL at 13:30

## 2021-10-18 NOTE — OP NOTE
PREOPERATIVE DIAGNOSIS:  Left her2+ invasive carcinoma     POSTOPERATIVE DIAGNOSIS AND FINDINGS:  same    PROCEDURE:  Right internal jugular Powerport placement with fluoroscopic guidance    DATE OF PROCEDURE:   10/18/2021    SURGEON:  Becky Carballo MD    ASSISTANT:  none    ANESTHESIA:  MAC    EBL:  Minimal    SPECIMEN:  none    DESCRIPTION:  All risks, benefits, and alternatives were explained and informed consent was obtained. The patient was taken to the operating room, transferred onto the operating room table in the supine position, underwent monitored anesthesia, and was prepped and draped in the usual sterile manner.  Half percent marcaine with epinephrine local anesthesic was administered.  The right internal jugular vein was accessed with an 18-gauge needle under ultrasound guiddance, and a guidewire was inserted under fluoroscopic guidance into the superior vena cava.  A subcutaneous pocket was then created with electrocautery on the right chest wall. The port was placed in the pocket and secured in two points with 2-0 prolene. The tubing was then tunneled from the subcutaneous pocket to the location of the wire. The vein dilator and sheath were introduced, and the dilator and guidewire were removed.  The port tubing was inserted to the cavoatrial junction, and position of tip was confirmed with fluoroscopic guidance.  The tubing was connected to the port and the cap was secured. Venous blood was easily aspirated from the port, and the port was flushed with heparinized saline. There was good hemostasis noted. The skin was then closed with 3-0 Vicryl deep dermal and 4-0 Vicryl subcuticular sutures. Dermabond was placed over the wound. The patient tolerated the procedure well, and was transferred to the recovery area in stable condition. Recovery room CXR was ordered to confirm placement.    BECKY CARBALLO M.D.  General and Endoscopic Surgery  [facility]    [facility]  [address]  [place], [place], [address]  P: [phone]  F: [phone]

## 2021-10-18 NOTE — INTERVAL H&P NOTE
L breast 2.8cm her2+ tumor. Plan for neoadjuvant therapy with Dr. Avendano followed by surgical intervention. All risks (including bleeding, infection, damage to surrounding structures including pneumothorax), benefits and alternatives were explained to the patient who agreed and wished to proceed.     H&P reviewed. The patient was examined and there are no changes to the H&P.

## 2021-10-18 NOTE — ANESTHESIA POSTPROCEDURE EVALUATION
Patient: Neela Ramirez    Procedure Summary     Date: 10/18/21 Room / Location:  [facility]  /  [facility]    Anesthesia Start: 1343 Anesthesia Stop: 1419    Procedure: INSERTION VENOUS ACCESS DEVICE (N/A ) Diagnosis:       Malignant neoplasm of female breast, unspecified estrogen receptor status, unspecified laterality, unspecified site of breast (HCC)      (Malignant neoplasm of female breast, unspecified estrogen receptor status, unspecified laterality, unspecified site of breast (HCC) [C50.919])    Surgeons: Becky Liu MD Provider: Mathew Kidd MD    Anesthesia Type: MAC ASA Status: 3          Anesthesia Type: MAC    Vitals  Vitals Value Taken Time   BP 94/54 10/18/21 1417   Temp 36.6 °C (97.9 °F) 10/18/21 1417   Pulse 60 10/18/21 1417   Resp 16 10/18/21 1417   SpO2 96 % 10/18/21 1417           Post Anesthesia Care and Evaluation    Pain management: adequate  Airway patency: patent  Anesthetic complications: No anesthetic complications    Cardiovascular status: acceptable  Respiratory status: acceptable  Hydration status: acceptable    Comments: BP 94/54 (BP Location: Left arm, Patient Position: Lying)   Pulse 60   Temp 36.6 °C (97.9 °F) (Oral)   Resp 16   SpO2 96%

## 2021-10-18 NOTE — ANESTHESIA PREPROCEDURE EVALUATION
Anesthesia Evaluation     NPO Solid Status: > 8 hours  NPO Liquid Status: > 2 hours           Airway   Mallampati: II  TM distance: >3 FB  Neck ROM: full  Dental      Pulmonary - normal exam   Cardiovascular - normal exam    Patient on routine beta blocker and Beta blocker given within 24 hours of surgery    (+) hypertension well controlled 2 medications or greater, hyperlipidemia,       Neuro/Psych  (+) psychiatric history Anxiety,     GI/Hepatic/Renal/Endo    (+)  GERD well controlled,  thyroid problem hypothyroidism    Musculoskeletal     (+) neck pain,   Abdominal    Substance History      OB/GYN          Other   arthritis,    history of cancer active                    Anesthesia Plan    ASA 3     MAC     intravenous induction     Anesthetic plan, all risks, benefits, and alternatives have been provided, discussed and informed consent has been obtained with: patient.

## 2021-10-19 ENCOUNTER — INFUSION (OUTPATIENT)
Dept: ONCOLOGY | Facility: HOSPITAL | Age: 76
End: 2021-10-19

## 2021-10-19 ENCOUNTER — APPOINTMENT (OUTPATIENT)
Dept: ONCOLOGY | Facility: HOSPITAL | Age: 76
End: 2021-10-19

## 2021-10-19 ENCOUNTER — APPOINTMENT (OUTPATIENT)
Dept: PREADMISSION TESTING | Facility: HOSPITAL | Age: 76
End: 2021-10-19

## 2021-10-19 ENCOUNTER — OFFICE VISIT (OUTPATIENT)
Dept: ONCOLOGY | Facility: CLINIC | Age: 76
End: 2021-10-19

## 2021-10-19 ENCOUNTER — RESEARCH ENCOUNTER (OUTPATIENT)
Dept: ONCOLOGY | Facility: CLINIC | Age: 76
End: 2021-10-19

## 2021-10-19 VITALS
WEIGHT: 126 LBS | HEIGHT: 61 IN | SYSTOLIC BLOOD PRESSURE: 151 MMHG | DIASTOLIC BLOOD PRESSURE: 88 MMHG | RESPIRATION RATE: 16 BRPM | TEMPERATURE: 97.4 F | HEART RATE: 63 BPM | OXYGEN SATURATION: 97 % | BODY MASS INDEX: 23.79 KG/M2

## 2021-10-19 VITALS — DIASTOLIC BLOOD PRESSURE: 71 MMHG | HEART RATE: 80 BPM | SYSTOLIC BLOOD PRESSURE: 123 MMHG

## 2021-10-19 DIAGNOSIS — Z17.0 MALIGNANT NEOPLASM OF UPPER-INNER QUADRANT OF LEFT BREAST IN FEMALE, ESTROGEN RECEPTOR POSITIVE (HCC): ICD-10-CM

## 2021-10-19 DIAGNOSIS — C50.212 MALIGNANT NEOPLASM OF UPPER-INNER QUADRANT OF LEFT BREAST IN FEMALE, ESTROGEN RECEPTOR POSITIVE (HCC): ICD-10-CM

## 2021-10-19 DIAGNOSIS — Z17.0 MALIGNANT NEOPLASM OF UPPER-INNER QUADRANT OF LEFT BREAST IN FEMALE, ESTROGEN RECEPTOR POSITIVE (HCC): Primary | ICD-10-CM

## 2021-10-19 DIAGNOSIS — C50.212 MALIGNANT NEOPLASM OF UPPER-INNER QUADRANT OF LEFT BREAST IN FEMALE, ESTROGEN RECEPTOR POSITIVE (HCC): Primary | ICD-10-CM

## 2021-10-19 LAB
ALBUMIN SERPL-MCNC: 4.1 G/DL (ref 3.5–5.2)
ALBUMIN/GLOB SERPL: 1.5 G/DL (ref 1.1–2.4)
ALP SERPL-CCNC: 101 U/L (ref 38–116)
ALT SERPL W P-5'-P-CCNC: 13 U/L (ref 0–33)
ANION GAP SERPL CALCULATED.3IONS-SCNC: 10.2 MMOL/L (ref 5–15)
AST SERPL-CCNC: 16 U/L (ref 0–32)
BASOPHILS # BLD AUTO: 0.05 10*3/MM3 (ref 0–0.2)
BASOPHILS NFR BLD AUTO: 0.6 % (ref 0–1.5)
BILIRUB SERPL-MCNC: 0.5 MG/DL (ref 0.2–1.2)
BUN SERPL-MCNC: 12 MG/DL (ref 6–20)
BUN/CREAT SERPL: 14.8 (ref 7.3–30)
CALCIUM SPEC-SCNC: 9.6 MG/DL (ref 8.5–10.2)
CHLORIDE SERPL-SCNC: 103 MMOL/L (ref 98–107)
CO2 SERPL-SCNC: 24.8 MMOL/L (ref 22–29)
CREAT SERPL-MCNC: 0.81 MG/DL (ref 0.6–1.1)
DEPRECATED RDW RBC AUTO: 44.2 FL (ref 37–54)
EOSINOPHIL # BLD AUTO: 0.37 10*3/MM3 (ref 0–0.4)
EOSINOPHIL NFR BLD AUTO: 4.2 % (ref 0.3–6.2)
ERYTHROCYTE [DISTWIDTH] IN BLOOD BY AUTOMATED COUNT: 12.4 % (ref 12.3–15.4)
GFR SERPL CREATININE-BSD FRML MDRD: 69 ML/MIN/1.73
GLOBULIN UR ELPH-MCNC: 2.8 GM/DL (ref 1.8–3.5)
GLUCOSE SERPL-MCNC: 106 MG/DL (ref 74–124)
HCT VFR BLD AUTO: 41.4 % (ref 34–46.6)
HGB BLD-MCNC: 13.6 G/DL (ref 12–15.9)
IMM GRANULOCYTES # BLD AUTO: 0.02 10*3/MM3 (ref 0–0.05)
IMM GRANULOCYTES NFR BLD AUTO: 0.2 % (ref 0–0.5)
LYMPHOCYTES # BLD AUTO: 1.65 10*3/MM3 (ref 0.7–3.1)
LYMPHOCYTES NFR BLD AUTO: 18.6 % (ref 19.6–45.3)
MCH RBC QN AUTO: 32.1 PG (ref 26.6–33)
MCHC RBC AUTO-ENTMCNC: 32.9 G/DL (ref 31.5–35.7)
MCV RBC AUTO: 97.6 FL (ref 79–97)
MONOCYTES # BLD AUTO: 0.7 10*3/MM3 (ref 0.1–0.9)
MONOCYTES NFR BLD AUTO: 7.9 % (ref 5–12)
NEUTROPHILS NFR BLD AUTO: 6.09 10*3/MM3 (ref 1.7–7)
NEUTROPHILS NFR BLD AUTO: 68.5 % (ref 42.7–76)
NRBC BLD AUTO-RTO: 0 /100 WBC (ref 0–0.2)
PLATELET # BLD AUTO: 243 10*3/MM3 (ref 140–450)
PMV BLD AUTO: 9.5 FL (ref 6–12)
POTASSIUM SERPL-SCNC: 3.8 MMOL/L (ref 3.5–4.7)
PROT SERPL-MCNC: 6.9 G/DL (ref 6.3–8)
RBC # BLD AUTO: 4.24 10*6/MM3 (ref 3.77–5.28)
SODIUM SERPL-SCNC: 138 MMOL/L (ref 134–145)
WBC # BLD AUTO: 8.88 10*3/MM3 (ref 3.4–10.8)

## 2021-10-19 PROCEDURE — 96413 CHEMO IV INFUSION 1 HR: CPT

## 2021-10-19 PROCEDURE — 80053 COMPREHEN METABOLIC PANEL: CPT

## 2021-10-19 PROCEDURE — 96375 TX/PRO/DX INJ NEW DRUG ADDON: CPT

## 2021-10-19 PROCEDURE — 25010000002 DEXAMETHASONE SODIUM PHOSPHATE 100 MG/10ML SOLUTION: Performed by: INTERNAL MEDICINE

## 2021-10-19 PROCEDURE — 85025 COMPLETE CBC W/AUTO DIFF WBC: CPT

## 2021-10-19 PROCEDURE — 25010000002 PERTUZUMAB 420 MG/14ML SOLUTION 420 MG VIAL: Performed by: INTERNAL MEDICINE

## 2021-10-19 PROCEDURE — 99214 OFFICE O/P EST MOD 30 MIN: CPT | Performed by: INTERNAL MEDICINE

## 2021-10-19 PROCEDURE — 25010000002 TRASTUZUMAB PER 10 MG: Performed by: INTERNAL MEDICINE

## 2021-10-19 PROCEDURE — 96417 CHEMO IV INFUS EACH ADDL SEQ: CPT

## 2021-10-19 PROCEDURE — 96415 CHEMO IV INFUSION ADDL HR: CPT

## 2021-10-19 PROCEDURE — 25010000002 PACLITAXEL PER 1 MG: Performed by: INTERNAL MEDICINE

## 2021-10-19 PROCEDURE — 25010000002 DIPHENHYDRAMINE PER 50 MG: Performed by: INTERNAL MEDICINE

## 2021-10-19 RX ORDER — FAMOTIDINE 10 MG/ML
20 INJECTION, SOLUTION INTRAVENOUS AS NEEDED
Status: CANCELLED | OUTPATIENT
Start: 2021-10-26

## 2021-10-19 RX ORDER — DIPHENHYDRAMINE HYDROCHLORIDE 50 MG/ML
50 INJECTION INTRAMUSCULAR; INTRAVENOUS AS NEEDED
Status: CANCELLED | OUTPATIENT
Start: 2021-11-02

## 2021-10-19 RX ORDER — SODIUM CHLORIDE 9 MG/ML
250 INJECTION, SOLUTION INTRAVENOUS ONCE
Status: CANCELLED | OUTPATIENT
Start: 2021-10-26

## 2021-10-19 RX ORDER — FAMOTIDINE 10 MG/ML
20 INJECTION, SOLUTION INTRAVENOUS AS NEEDED
Status: CANCELLED | OUTPATIENT
Start: 2021-11-02

## 2021-10-19 RX ORDER — FAMOTIDINE 10 MG/ML
20 INJECTION, SOLUTION INTRAVENOUS ONCE
Status: CANCELLED | OUTPATIENT
Start: 2021-10-19

## 2021-10-19 RX ORDER — FAMOTIDINE 10 MG/ML
20 INJECTION, SOLUTION INTRAVENOUS AS NEEDED
Status: CANCELLED | OUTPATIENT
Start: 2021-10-19

## 2021-10-19 RX ORDER — SODIUM CHLORIDE 9 MG/ML
250 INJECTION, SOLUTION INTRAVENOUS ONCE
Status: COMPLETED | OUTPATIENT
Start: 2021-10-19 | End: 2021-10-19

## 2021-10-19 RX ORDER — DIPHENHYDRAMINE HYDROCHLORIDE 50 MG/ML
50 INJECTION INTRAMUSCULAR; INTRAVENOUS AS NEEDED
Status: CANCELLED | OUTPATIENT
Start: 2021-10-19

## 2021-10-19 RX ORDER — FAMOTIDINE 10 MG/ML
20 INJECTION, SOLUTION INTRAVENOUS ONCE
Status: CANCELLED | OUTPATIENT
Start: 2021-10-26

## 2021-10-19 RX ORDER — SODIUM CHLORIDE 9 MG/ML
250 INJECTION, SOLUTION INTRAVENOUS ONCE
Status: CANCELLED | OUTPATIENT
Start: 2021-11-02

## 2021-10-19 RX ORDER — FAMOTIDINE 10 MG/ML
20 INJECTION, SOLUTION INTRAVENOUS ONCE
Status: CANCELLED | OUTPATIENT
Start: 2021-11-02

## 2021-10-19 RX ORDER — FAMOTIDINE 10 MG/ML
20 INJECTION, SOLUTION INTRAVENOUS ONCE
Status: DISCONTINUED | OUTPATIENT
Start: 2021-10-19 | End: 2021-10-19 | Stop reason: HOSPADM

## 2021-10-19 RX ORDER — SODIUM CHLORIDE 9 MG/ML
250 INJECTION, SOLUTION INTRAVENOUS ONCE
Status: CANCELLED | OUTPATIENT
Start: 2021-10-19

## 2021-10-19 RX ORDER — DIPHENHYDRAMINE HYDROCHLORIDE 50 MG/ML
50 INJECTION INTRAMUSCULAR; INTRAVENOUS AS NEEDED
Status: CANCELLED | OUTPATIENT
Start: 2021-10-26

## 2021-10-19 RX ADMIN — SODIUM CHLORIDE 250 ML: 9 INJECTION, SOLUTION INTRAVENOUS at 09:41

## 2021-10-19 RX ADMIN — DEXAMETHASONE SODIUM PHOSPHATE 12 MG: 10 INJECTION, SOLUTION INTRAMUSCULAR; INTRAVENOUS at 14:05

## 2021-10-19 RX ADMIN — DIPHENHYDRAMINE HYDROCHLORIDE 50 MG: 50 INJECTION, SOLUTION INTRAMUSCULAR; INTRAVENOUS at 09:41

## 2021-10-19 RX ADMIN — TRASTUZUMAB 460 MG: 150 INJECTION, POWDER, LYOPHILIZED, FOR SOLUTION INTRAVENOUS at 12:27

## 2021-10-19 RX ADMIN — PACLITAXEL 125 MG: 6 INJECTION, SOLUTION INTRAVENOUS at 14:38

## 2021-10-19 RX ADMIN — PERTUZUMAB 840 MG: 30 INJECTION, SOLUTION, CONCENTRATE INTRAVENOUS at 10:25

## 2021-10-19 NOTE — RESEARCH
Informed Consent for Protocol: [id number] (CompassHER2 pCR): Preoperative THP and postoperative HP in patients who achieve a pathologic complete response Part 1 Component of: The CompassHER2 Trials (COMprehensive use of Pathologic response ASSessment to optimize therapy in HER2-positive breast cancer)     Consent version 7-    Patient: Neela Ramirez   : Neal Reynolds RN   Other: and spouse and Alicja Jesus RN      The following was discussed with the patient prior to signing the informed consent.    • The study purposes   • Procedures   • Duration of study participation  • New findings   • Risks   • Discomforts  • Any known side effects when applicable    • The alternative treatments   • Benefits   • Patient and insurance costs   • Payments if applicable     • Patient's accountability and responsibilities    • The voluntary nature of research participants     • Right to withdraw consent    • The withdrawal process    • Confidentiality   • Authorization to use and disclose information for research purposes - Including who can view information and the types of information shared.    The patient was informed of what to do in case of an illness or injury resulting from the study and who to contact for more trial information, or information on rights and welfare as a research volunteer. The patient was given the  contact Information and clinical trial contact information.     The patient was given opportunity for questions and opportunity to discuss this with family and friends. The  and or sub investigators were also available for any questions. The patient verbalizes understanding and is willing to sign the informed consent.     After all questions were satisfied the patient signed the informed consent and a copy of the signed main informed consent form & any sub-study informed consent forms were given to the patient.    No study-specific procedures were  completed prior to patient signing study informed consent form(s)    The , Dr. Aevndano, is aware of the subject's participation status.    ECOG 0.

## 2021-10-19 NOTE — PROGRESS NOTES
Subjective   Neela Ramirez is a 76 y.o. female.  Referred by Dr. 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  A bilateral diagnostic mammogram was performed for further evaluation of the same.    8/17/2021-bilateral diagnostic mammogram-scattered fibroglandular densities throughout both breasts.  In the posterior one third upper inner quadrant of the left breast at the site of palpable concern, 11 o'clock position there is a mass with adjacent pleomorphic calcifications.  The mass and the calcifications measure on order of 1.5 cm in greatest dimension.  No evidence of axillary lymphadenopathy appreciated.  Stable microcalcifications seen in other areas of the left breast on right breast.  Ultrasound-at 11 o'clock position, 5 cm from the nipple in the left breast there is an irregular hypoechoic mass which measures 2.4 x 2 x 1.6 cm.    Impression  1.irregular 2.4 cm mass in the left breast at the site of palpable concern at 11:00, 5 cm from the nipple.  Ultrasound-guided biopsy of the mass recommended  No finding suspicious for malignancy in the right breast    9/8/2021-left breast ultrasound-guided biopsy  Pathology consistent with invasive ductal carcinoma with lobular features.  Grade 3.  The carcinoma measures 7 mm in greatest dimension.  Focally suspicious for lymphovascular space invasion.  ER low +1-10%, intensity week  WV negative  HER-2 3+ on immunohistochemistry  Ki-67 70%    MRI of the breast 10/8/2021-Biopsy-proven malignancy in the left breast at 11:00 measuring 2.8 cm with a centrally located metallic clip.  No other suspicious findings are seen within the left breast or no left axillary lymphadenopathy.  No suspicious findings of the right breast    Echocardiogram 10/8/2021 was normal  ejection fraction of 56 to 60% and strain of -20    She denies any new bone pains, dyspnea, cough, abdominal pain nausea vomiting or recent changes in weight.    She had 2 previous breast aspirations in her 20s.  No other breast biopsies  She does not know much about her family hence limited family history.        Interval history  Ms. Ramirez presents to the clinic today for follow-up. She is scheduled to start Taxol Perjeta and Herceptin as a part of the  clinical trial.  She had a port placed on 10/18/2021 by Dr. Liu.  She is complaining of some pain on inspiration in the left chest wall with started this morning.  She has history of gastritis and has been on omeprazole but not been very compliant with it.  No other complaints at this time.        The following portions of the patient's history were reviewed and updated as appropriate: allergies, current medications, past family history, past medical history, past social history, past surgical history and problem list.    Past Medical History:   Diagnosis Date   • Anxiety    • Arthritis    • Backache    • Bleeding    • Constipation    • Dermatitis    • Diverticulosis     Colonoscopy November 2014   • Dyspepsia    • Dysuria    • Erythema of face     Transitory Erythema Of The Face   • Hypertension    • Impacted cerumen    • Malignant neoplasm of female breast (HCC) 9/27/2021   • Neck pain    • Tachycardia         Past Surgical History:   Procedure Laterality Date   • BREAST BIOPSY  09/2021    Dr. Tirado    • BREAST CYST ASPIRATION     • BREAST CYST EXCISION     • EXOSTECTOMY      Simple Bunion Exostectomy ([facility] Procedure)        Family History   Problem Relation Age of Onset   • Arthritis Father         Social History     Socioeconomic History   • Marital status:    • Number of children: 1   Tobacco Use   • Smoking status: Never Smoker   • Smokeless tobacco: Never Used   Vaping Use   • Vaping Use: Never used   Substance and Sexual Activity   •  "Alcohol use: Yes     Comment: social drinker   • Drug use: Never   • Sexual activity: Defer        OB History             Para        Term   0            AB        Living           SAB        IAB        Ectopic        Molar        Multiple        Live Births                 Age at menarche-13  Age at menopause-50  Nulliparous  Breast-feeding-N/A   0 para 0  0  Oral contraceptive pill use-none  Hormone replacement therapy-7 years    No Known Allergies     Review of systems as mentioned in the HPI    Objective   Blood pressure 151/88, pulse 63, temperature 97.4 °F (36.3 °C), temperature source Temporal, resp. rate 16, height 154.9 cm (61\"), weight 57.2 kg (126 lb), SpO2 97 %.   Physical Exam  Vitals reviewed.   Constitutional:       Appearance: Normal appearance.   HENT:      Head: Normocephalic and atraumatic.      Right Ear: External ear normal.      Left Ear: External ear normal.      Nose: Nose normal.      Mouth/Throat:      Mouth: Mucous membranes are moist.      Pharynx: Oropharynx is clear.   Eyes:      Conjunctiva/sclera: Conjunctivae normal.      Pupils: Pupils are equal, round, and reactive to light.   Cardiovascular:      Rate and Rhythm: Normal rate and regular rhythm.      Pulses: Normal pulses.      Heart sounds: Normal heart sounds.   Pulmonary:      Effort: Pulmonary effort is normal.      Breath sounds: Normal breath sounds.   Abdominal:      General: Abdomen is flat. Bowel sounds are normal.      Palpations: Abdomen is soft.   Musculoskeletal:         General: Normal range of motion.      Cervical back: Normal range of motion.   Skin:     General: Skin is warm.   Neurological:      General: No focal deficit present.      Mental Status: She is alert and oriented to person, place, and time. Mental status is at baseline.   Psychiatric:         Mood and Affect: Mood normal.         Behavior: Behavior normal.         Thought Content: Thought content normal.         Judgment: " Judgment normal.       Breast Exam: Right breast appears normal on inspection.  No palpable abnormalities of the right breast.  Left breast on inspection there is a mass in the upper inner quadrant.  On palpation this mass measures 3.2 x 3.2 cm.  Some bruising related to recent biopsy.  No palpable left axillary lymphadenopathy.  No right axillary lymphadenopathy.  No cervical or supraclavicular lymphadenopathy.    I have reexamined the patient and the results are consistent with the previously documented exam. Sheri Avendano MD     Infusion on 10/19/2021   Component Date Value Ref Range Status   • WBC 10/19/2021 8.88  3.40 - 10.80 10*3/mm3 Final   • RBC 10/19/2021 4.24  3.77 - 5.28 10*6/mm3 Final   • Hemoglobin 10/19/2021 13.6  12.0 - 15.9 g/dL Final   • Hematocrit 10/19/2021 41.4  34.0 - 46.6 % Final   • MCV 10/19/2021 97.6* 79.0 - 97.0 fL Final   • MCH 10/19/2021 32.1  26.6 - 33.0 pg Final   • MCHC 10/19/2021 32.9  31.5 - 35.7 g/dL Final   • RDW 10/19/2021 12.4  12.3 - 15.4 % Final   • RDW-SD 10/19/2021 44.2  37.0 - 54.0 fl Final   • MPV 10/19/2021 9.5  6.0 - 12.0 fL Final   • Platelets 10/19/2021 243  140 - 450 10*3/mm3 Final   • Neutrophil % 10/19/2021 68.5  42.7 - 76.0 % Final   • Lymphocyte % 10/19/2021 18.6* 19.6 - 45.3 % Final   • Monocyte % 10/19/2021 7.9  5.0 - 12.0 % Final   • Eosinophil % 10/19/2021 4.2  0.3 - 6.2 % Final   • Basophil % 10/19/2021 0.6  0.0 - 1.5 % Final   • Immature Grans % 10/19/2021 0.2  0.0 - 0.5 % Final   • Neutrophils, Absolute 10/19/2021 6.09  1.70 - 7.00 10*3/mm3 Final   • Lymphocytes, Absolute 10/19/2021 1.65  0.70 - 3.10 10*3/mm3 Final   • Monocytes, Absolute 10/19/2021 0.70  0.10 - 0.90 10*3/mm3 Final   • Eosinophils, Absolute 10/19/2021 0.37  0.00 - 0.40 10*3/mm3 Final   • Basophils, Absolute 10/19/2021 0.05  0.00 - 0.20 10*3/mm3 Final   • Immature Grans, Absolute 10/19/2021 0.02  0.00 - 0.05 10*3/mm3 Final   • nRBC 10/19/2021 0.0  0.0 - 0.2 /100 WBC Final   Transcribe  Orders on 10/12/2021   Component Date Value Ref Range Status   • COVID19 10/15/2021 Not Detected  Not Detected - Ref. Range Final   Hospital Outpatient Visit on 10/08/2021   Component Date Value Ref Range Status   • BSA 10/08/2021 1.6  m^2 Final   • IVSd 10/08/2021 0.79  cm Final   • LVIDd 10/08/2021 3.2  cm Final   • LVIDs 10/08/2021 2.4  cm Final   • LVPWd 10/08/2021 0.89  cm Final   • IVS/LVPW 10/08/2021 0.89   Final   • FS 10/08/2021 25.8  % Final   • EDV(Teich) 10/08/2021 40.8  ml Final   • ESV(Teich) 10/08/2021 19.6  ml Final   • EF(Teich) 10/08/2021 52.0  % Final   • EDV(cubed) 10/08/2021 32.6  ml Final   • ESV(cubed) 10/08/2021 13.4  ml Final   • EF(cubed) 10/08/2021 59.1  % Final   • LV mass(C)d 10/08/2021 70.3  grams Final   • LV mass(C)dI 10/08/2021 44.4  grams/m^2 Final   • SV(Teich) 10/08/2021 21.3  ml Final   • SI(Teich) 10/08/2021 13.4  ml/m^2 Final   • SV(cubed) 10/08/2021 19.3  ml Final   • SI(cubed) 10/08/2021 12.2  ml/m^2 Final   • Ao root diam 10/08/2021 2.8  cm Final   • Ao root area 10/08/2021 6.0  cm^2 Final   • ACS 10/08/2021 1.7  cm Final   • asc Aorta Diam 10/08/2021 2.5  cm Final   • Ao Arch Diam (Proximal trans.) 10/08/2021 2.3  cm Final   • LVOT diam 10/08/2021 1.9  cm Final   • LVOT area 10/08/2021 3.0  cm^2 Final   • LVOT area(traced) 10/08/2021 2.8  cm^2 Final   • RVOT diam 10/08/2021 1.8  cm Final   • RVOT area 10/08/2021 2.4  cm^2 Final   • LVLd ap4 10/08/2021 6.6  cm Final   • EDV(MOD-sp4) 10/08/2021 88.0  ml Final   • LVLs ap4 10/08/2021 6.0  cm Final   • ESV(MOD-sp4) 10/08/2021 38.0  ml Final   • EF(MOD-sp4) 10/08/2021 56.8  % Final   • LVLd ap2 10/08/2021 6.6  cm Final   • EDV(MOD-sp2) 10/08/2021 71.0  ml Final   • LVLs ap2 10/08/2021 5.3  cm Final   • ESV(MOD-sp2) 10/08/2021 32.0  ml Final   • EF(MOD-sp2) 10/08/2021 54.9  % Final   • SV(MOD-sp4) 10/08/2021 50.0  ml Final   • SI(MOD-sp4) 10/08/2021 31.6  ml/m^2 Final   • SV(MOD-sp2) 10/08/2021 39.0  ml Final   • SI(MOD-sp2)  10/08/2021 24.7  ml/m^2 Final   • Ao root area (BSA corrected) 10/08/2021 1.7   Final   • LV Lara Vol (BSA corrected) 10/08/2021 55.7  ml/m^2 Final   • LV Sys Vol (BSA corrected) 10/08/2021 24.0  ml/m^2 Final   • EF(MOD-bp) 10/08/2021 53.0  % Final   • Ao pk taran 10/08/2021 146.6  cm/sec Final   • Ao max PG 10/08/2021 8.6  mmHg Final   • Ao max PG (full) 10/08/2021 6.0  mmHg Final   • Ao V2 mean 10/08/2021 102.3  cm/sec Final   • Ao mean PG 10/08/2021 4.7  mmHg Final   • Ao mean PG (full) 10/08/2021 3.3  mmHg Final   • Ao V2 VTI 10/08/2021 27.2  cm Final   • JACKIE(I,A) 10/08/2021 1.6  cm^2 Final   • JACKIE(I,D) 10/08/2021 1.6  cm^2 Final   • JACKIE(V,A) 10/08/2021 1.6  cm^2 Final   • JACKIE(V,D) 10/08/2021 1.6  cm^2 Final   • AI max taran 10/08/2021 371.0  cm/sec Final   • AI max PG 10/08/2021 55.1  mmHg Final   • AI dec slope 10/08/2021 215.2  cm/sec^2 Final   • AI P1/2t 10/08/2021 505.0  msec Final   • LV V1 max PG 10/08/2021 2.6  mmHg Final   • LV V1 mean PG 10/08/2021 1.4  mmHg Final   • LV V1 max 10/08/2021 80.6  cm/sec Final   • LV V1 mean 10/08/2021 55.2  cm/sec Final   • LV V1 VTI 10/08/2021 14.7  cm Final   • SV(Ao) 10/08/2021 162.1  ml Final   • SI(Ao) 10/08/2021 102.5  ml/m^2 Final   • SV(LVOT) 10/08/2021 43.9  ml Final   • SV(RVOT) 10/08/2021 27.4  ml Final   • SI(LVOT) 10/08/2021 27.8  ml/m^2 Final   • PA V2 max 10/08/2021 86.9  cm/sec Final   • PA max PG 10/08/2021 3.0  mmHg Final   • PA max PG (full) 10/08/2021 1.5  mmHg Final   • BH CV ECHO JAMES - PVA(V,A) 10/08/2021 1.7  cm^2 Final   • BH CV ECHO JAMES - PVA(V,D) 10/08/2021 1.7  cm^2 Final   • PA acc time 10/08/2021 0.09  sec Final   • RV V1 max PG 10/08/2021 1.6  mmHg Final   • RV V1 mean PG 10/08/2021 0.74  mmHg Final   • RV V1 max 10/08/2021 62.6  cm/sec Final   • RV V1 mean 10/08/2021 38.8  cm/sec Final   • RV V1 VTI 10/08/2021 11.4  cm Final   • TR max taran 10/08/2021 229.2  cm/sec Final   • RVSP(TR) 10/08/2021 24.0  mmHg Final   • RAP systole 10/08/2021 3.0   mmHg Final   • PA pr(Accel) 10/08/2021 38.6  mmHg Final   • Qp/Qs 10/08/2021 0.62   Final   • RV Base 10/08/2021 3.3  cm Final   • RV Length 10/08/2021 6.0  cm Final   • RV Mid 10/08/2021 2.1  cm Final   • Lat Peak E' Julian 10/08/2021 8.2  cm/sec Final   • Med Peak E' Julian 10/08/2021 8.3  cm/sec Final   • RV S' 10/08/2021 16.5  cm/sec Final   • BH CV ECHO JAMES - BZI_BMI 10/08/2021 23.4  kilograms/m^2 Final   •  CV ECHO JAMES - BSA(HAYCOCK) 10/08/2021 1.6  m^2 Final   •  CV ECHO JAMES - BZI_METRIC_WEIGHT 10/08/2021 58.1  kg Final   •  CV ECHO JAMES - BZI_METRIC_HEIGHT 10/08/2021 157.5  cm Final   • MV A dur 10/08/2021 0.14  sec Final   • MV E max julian 10/08/2021 61.8  cm/sec Final   • MV A max julian 10/08/2021 109.0  cm/sec Final   • MV E/A 10/08/2021 0.57   Final   • MV V2 max 10/08/2021 86.0  cm/sec Final   • MV max PG 10/08/2021 3.0  mmHg Final   • MV V2 mean 10/08/2021 52.5  cm/sec Final   • MV mean PG 10/08/2021 1.3  mmHg Final   • MV V2 VTI 10/08/2021 22.7  cm Final   • MVA(VTI) 10/08/2021 1.9  cm^2 Final   • MV P1/2t max julian 10/08/2021 73.9  cm/sec Final   • MV P1/2t 10/08/2021 70.9  msec Final   • MVA(P1/2t) 10/08/2021 3.1  cm^2 Final   • MV dec slope 10/08/2021 305.5  cm/sec^2 Final   • MV dec time 10/08/2021 0.23  sec Final   • MVA P1/2T LCG 10/08/2021 3.0  cm^2 Final   • Avg E/e' ratio 10/08/2021 7.49   Final   • Sinus 10/08/2021 2.9  cm Final   • STJ 10/08/2021 1.7  cm Final   • Ascending aorta 10/08/2021 2.5  cm Final   • Aortic arch 10/08/2021 2.3  cm Final   • Dimensionless Index 10/08/2021 0.50  (DI) Final   • LA Volume Index 10/08/2021 16.0  mL/m2 Final   • Abdo Ao Diam 10/08/2021 2.0  cm Final   • TAPSE (>1.6) 10/08/2021 2.00  cm Final   Lab on 10/05/2021   Component Date Value Ref Range Status   • Glucose 10/05/2021 107  74 - 124 mg/dL Final   • BUN 10/05/2021 17  6 - 20 mg/dL Final   • Creatinine 10/05/2021 0.83  0.60 - 1.10 mg/dL Final   • Sodium 10/05/2021 138  134 - 145 mmol/L Final   • Potassium  10/05/2021 4.0  3.5 - 4.7 mmol/L Final   • Chloride 10/05/2021 104  98 - 107 mmol/L Final   • CO2 10/05/2021 24.0  22.0 - 29.0 mmol/L Final   • Calcium 10/05/2021 9.2  8.5 - 10.2 mg/dL Final   • Total Protein 10/05/2021 7.0  6.3 - 8.0 g/dL Final   • Albumin 10/05/2021 4.30  3.50 - 5.20 g/dL Final   • ALT (SGPT) 10/05/2021 13  0 - 33 U/L Final   • AST (SGOT) 10/05/2021 19  0 - 32 U/L Final   • Alkaline Phosphatase 10/05/2021 97  38 - 116 U/L Final   • Total Bilirubin 10/05/2021 0.4  0.2 - 1.2 mg/dL Final   • eGFR Non  Amer 10/05/2021 67  >60 mL/min/1.73 Final   • Globulin 10/05/2021 2.7  1.8 - 3.5 gm/dL Final   • A/G Ratio 10/05/2021 1.6  1.1 - 2.4 g/dL Final   • BUN/Creatinine Ratio 10/05/2021 20.5  7.3 - 30.0 Final   • Anion Gap 10/05/2021 10.0  5.0 - 15.0 mmol/L Final   • WBC 10/05/2021 9.17  3.40 - 10.80 10*3/mm3 Final   • RBC 10/05/2021 4.45  3.77 - 5.28 10*6/mm3 Final   • Hemoglobin 10/05/2021 14.1  12.0 - 15.9 g/dL Final   • Hematocrit 10/05/2021 44.0  34.0 - 46.6 % Final   • MCV 10/05/2021 98.9* 79.0 - 97.0 fL Final   • MCH 10/05/2021 31.7  26.6 - 33.0 pg Final   • MCHC 10/05/2021 32.0  31.5 - 35.7 g/dL Final   • RDW 10/05/2021 12.4  12.3 - 15.4 % Final   • RDW-SD 10/05/2021 45.3  37.0 - 54.0 fl Final   • MPV 10/05/2021 9.5  6.0 - 12.0 fL Final   • Platelets 10/05/2021 296  140 - 450 10*3/mm3 Final   • Neutrophil % 10/05/2021 66.8  42.7 - 76.0 % Final   • Lymphocyte % 10/05/2021 21.5  19.6 - 45.3 % Final   • Monocyte % 10/05/2021 8.5  5.0 - 12.0 % Final   • Eosinophil % 10/05/2021 1.9  0.3 - 6.2 % Final   • Basophil % 10/05/2021 0.8  0.0 - 1.5 % Final   • Immature Grans % 10/05/2021 0.5  0.0 - 0.5 % Final   • Neutrophils, Absolute 10/05/2021 6.13  1.70 - 7.00 10*3/mm3 Final   • Lymphocytes, Absolute 10/05/2021 1.97  0.70 - 3.10 10*3/mm3 Final   • Monocytes, Absolute 10/05/2021 0.78  0.10 - 0.90 10*3/mm3 Final   • Eosinophils, Absolute 10/05/2021 0.17  0.00 - 0.40 10*3/mm3 Final   • Basophils, Absolute  10/05/2021 0.07  0.00 - 0.20 10*3/mm3 Final   • Immature Grans, Absolute 10/05/2021 0.05  0.00 - 0.05 10*3/mm3 Final   • nRBC 10/05/2021 0.0  0.0 - 0.2 /100 WBC Final        XR Chest 1 View    Result Date: 10/18/2021  Chest port placement. No pneumothorax.  This report was finalized on 10/18/2021 2:51 PM by Dr. Ryland Caba M.D.      MRI Breast Bilateral With & Without Contrast    Result Date: 10/11/2021  1. Biopsy-proven malignancy in the left breast at the 11-o'clock position measuring on the order of 2.8 cm in greatest dimension with a centrally located barbell shaped metallic clip. No other suspicious findings are seen within left breast and there is no evidence for left axillary adenopathy. 2. There are no findings suspicious for malignancy in the right breast.  BI-RADS category 6: Known biopsy-proven malignancy.  This report was finalized on 10/11/2021 10:55 AM by Dr. Trevor Tirado M.D.       MRI of the breast-images independently reviewed and interpreted by me in detail summarized in HPI    10/19/2021 CBC reviewed and within normal limits  CMP reviewed and within normal limits    Assessment/Plan       *Invasive ductal carcinoma of the left breast  · Clinical T2 N0 M0, stage IIa  · On ultrasound the tumor measures 2.4 cm.  Lymph nodes on the left side are normal.  · MRI 10/8/2021 with tumor measuring 2.8 cm.  Lymph nodes appear normal  · Pathology consistent with invasive carcinoma with ductal and lobular features, grade 3, ER +1 to 10% weak, SD negative, HER-2 3+ immunohistochemistry, Ki-67 70%.  · She was evaluated by Dr. Liu and referred for consideration of neoadjuvant chemotherapy.   Since the tumor is HER-2 positive and over 2 cm I think it would be reasonable to proceed with neoadjuvant chemotherapy.  Explained to her about the benefits of neoadjuvant chemotherapy including de surjit assessment of response and making decisions about adjuvant HER-2 directed therapy.  Also discussed the benefits  of neoadjuvant chemotherapy in terms of breast conservation.  Explained to her about the different regimens that could be used for treatment of HER-2 positive breast cancers particularly TCHP, AC-T HP, Taxol and Herceptin in the adjuvant setting.   Since the tumor is over 2 cm but lymph node negative I think she would be a great candidate for EA 1181 clinical trial which comprises of administering Taxol Herceptin and Perjeta tamika  adjuvantly followed by surgery and additional adjuvant treatment depending upon the response.  Port placed in 18 2021  Week 1 Taxol Herceptin Perjeta 10/19/2021  CBC and CMP reviewed and stable  Echocardiogram shows normal ejection fraction    *Hypertension-currently on amlodipine and metoprolol. Blood pressure 151/88. Refer to cardio oncology given her comorbidities and the use of Herceptin.    *Anxiety  · Referred to supportive oncology  · Better controlled    *Hypothyroidism  · Continue Synthroid    *Cardiac health-10/8/2021 echocardiogram shows an ejection fraction of 56 to 60%, septal wall motion is normal, LV strain normal at -20.9%, left ventricular diastolic function is consistent with grade 1 impaired relaxation.    Patient is on treatment requiring close monitoring for toxicities.

## 2021-10-25 NOTE — RESEARCH
[facility] Oncology Research  CONSULTING IN BLOOD DISORDERS & CANCER  Durga Bey, Patricio, Maxx, Juan Miguel, Rishi,  Óscar, Yo, Darlyn, Romana Barron & Daniel  [address] [facility], [address]  [place], [place] [address]  Phone: [phone]  Fax: [phone]      Patient Name:  Neela Ramirez  YOB: 1945  Patient Age:  76 y.o.  Patient's Sex:  female    Date of Service:  10/19/2021    Protocol:  [id number] (CompassHER2 pCR): Preoperative THP and postoperative HP  in patients who achieve a pathologic complete response                   RESEARCH NOTE                  Research nurse met with patient today with above mentioned protocol for C1D1.   Reviewed Medical History, Adverse Events and Con-Medications.   Labs drawn and chemotherapy administered without any problems.  Will meet with patient for next planned infusion C1D2 on 10/26/21.  Will continue to monitor.

## 2021-10-26 ENCOUNTER — OFFICE VISIT (OUTPATIENT)
Dept: ONCOLOGY | Facility: CLINIC | Age: 76
End: 2021-10-26

## 2021-10-26 ENCOUNTER — INFUSION (OUTPATIENT)
Dept: ONCOLOGY | Facility: HOSPITAL | Age: 76
End: 2021-10-26

## 2021-10-26 ENCOUNTER — RESEARCH ENCOUNTER (OUTPATIENT)
Dept: ONCOLOGY | Facility: CLINIC | Age: 76
End: 2021-10-26

## 2021-10-26 VITALS
HEIGHT: 61 IN | TEMPERATURE: 96.9 F | OXYGEN SATURATION: 96 % | HEART RATE: 114 BPM | RESPIRATION RATE: 16 BRPM | SYSTOLIC BLOOD PRESSURE: 164 MMHG | BODY MASS INDEX: 23.77 KG/M2 | DIASTOLIC BLOOD PRESSURE: 90 MMHG | WEIGHT: 125.9 LBS

## 2021-10-26 VITALS — HEART RATE: 80 BPM | DIASTOLIC BLOOD PRESSURE: 80 MMHG | SYSTOLIC BLOOD PRESSURE: 156 MMHG

## 2021-10-26 DIAGNOSIS — C50.212 MALIGNANT NEOPLASM OF UPPER-INNER QUADRANT OF LEFT BREAST IN FEMALE, ESTROGEN RECEPTOR POSITIVE (HCC): ICD-10-CM

## 2021-10-26 DIAGNOSIS — C50.212 MALIGNANT NEOPLASM OF UPPER-INNER QUADRANT OF LEFT BREAST IN FEMALE, ESTROGEN RECEPTOR POSITIVE (HCC): Primary | ICD-10-CM

## 2021-10-26 DIAGNOSIS — Z17.0 MALIGNANT NEOPLASM OF UPPER-INNER QUADRANT OF LEFT BREAST IN FEMALE, ESTROGEN RECEPTOR POSITIVE (HCC): ICD-10-CM

## 2021-10-26 DIAGNOSIS — Z17.0 MALIGNANT NEOPLASM OF UPPER-INNER QUADRANT OF LEFT BREAST IN FEMALE, ESTROGEN RECEPTOR POSITIVE (HCC): Primary | ICD-10-CM

## 2021-10-26 LAB
BASOPHILS # BLD AUTO: 0.06 10*3/MM3 (ref 0–0.2)
BASOPHILS NFR BLD AUTO: 0.7 % (ref 0–1.5)
DEPRECATED RDW RBC AUTO: 43.9 FL (ref 37–54)
EOSINOPHIL # BLD AUTO: 0.26 10*3/MM3 (ref 0–0.4)
EOSINOPHIL NFR BLD AUTO: 3 % (ref 0.3–6.2)
ERYTHROCYTE [DISTWIDTH] IN BLOOD BY AUTOMATED COUNT: 12 % (ref 12.3–15.4)
HCT VFR BLD AUTO: 40.7 % (ref 34–46.6)
HGB BLD-MCNC: 13 G/DL (ref 12–15.9)
IMM GRANULOCYTES # BLD AUTO: 0.09 10*3/MM3 (ref 0–0.05)
IMM GRANULOCYTES NFR BLD AUTO: 1 % (ref 0–0.5)
LYMPHOCYTES # BLD AUTO: 1.87 10*3/MM3 (ref 0.7–3.1)
LYMPHOCYTES NFR BLD AUTO: 21.7 % (ref 19.6–45.3)
MCH RBC QN AUTO: 31.8 PG (ref 26.6–33)
MCHC RBC AUTO-ENTMCNC: 31.9 G/DL (ref 31.5–35.7)
MCV RBC AUTO: 99.5 FL (ref 79–97)
MONOCYTES # BLD AUTO: 0.6 10*3/MM3 (ref 0.1–0.9)
MONOCYTES NFR BLD AUTO: 7 % (ref 5–12)
NEUTROPHILS NFR BLD AUTO: 5.72 10*3/MM3 (ref 1.7–7)
NEUTROPHILS NFR BLD AUTO: 66.6 % (ref 42.7–76)
NRBC BLD AUTO-RTO: 0 /100 WBC (ref 0–0.2)
PLATELET # BLD AUTO: 298 10*3/MM3 (ref 140–450)
PMV BLD AUTO: 9.5 FL (ref 6–12)
RBC # BLD AUTO: 4.09 10*6/MM3 (ref 3.77–5.28)
WBC # BLD AUTO: 8.6 10*3/MM3 (ref 3.4–10.8)

## 2021-10-26 PROCEDURE — 25010000002 PACLITAXEL PER 1 MG: Performed by: INTERNAL MEDICINE

## 2021-10-26 PROCEDURE — 25010000002 DEXAMETHASONE SODIUM PHOSPHATE 100 MG/10ML SOLUTION: Performed by: INTERNAL MEDICINE

## 2021-10-26 PROCEDURE — 85025 COMPLETE CBC W/AUTO DIFF WBC: CPT

## 2021-10-26 PROCEDURE — 99214 OFFICE O/P EST MOD 30 MIN: CPT | Performed by: INTERNAL MEDICINE

## 2021-10-26 PROCEDURE — 96375 TX/PRO/DX INJ NEW DRUG ADDON: CPT

## 2021-10-26 PROCEDURE — 96413 CHEMO IV INFUSION 1 HR: CPT

## 2021-10-26 PROCEDURE — 25010000002 DIPHENHYDRAMINE PER 50 MG: Performed by: INTERNAL MEDICINE

## 2021-10-26 RX ORDER — CEPHALEXIN 500 MG/1
500 CAPSULE ORAL 2 TIMES DAILY
Qty: 14 CAPSULE | Refills: 0 | Status: SHIPPED | OUTPATIENT
Start: 2021-10-26 | End: 2021-12-22

## 2021-10-26 RX ORDER — FAMOTIDINE 10 MG/ML
20 INJECTION, SOLUTION INTRAVENOUS ONCE
Status: COMPLETED | OUTPATIENT
Start: 2021-10-26 | End: 2021-10-26

## 2021-10-26 RX ORDER — SODIUM CHLORIDE 9 MG/ML
250 INJECTION, SOLUTION INTRAVENOUS ONCE
Status: COMPLETED | OUTPATIENT
Start: 2021-10-26 | End: 2021-10-26

## 2021-10-26 RX ADMIN — FAMOTIDINE 20 MG: 10 INJECTION INTRAVENOUS at 10:02

## 2021-10-26 RX ADMIN — DIPHENHYDRAMINE HYDROCHLORIDE 25 MG: 50 INJECTION, SOLUTION INTRAMUSCULAR; INTRAVENOUS at 10:21

## 2021-10-26 RX ADMIN — DEXAMETHASONE SODIUM PHOSPHATE 12 MG: 10 INJECTION, SOLUTION INTRAMUSCULAR; INTRAVENOUS at 10:02

## 2021-10-26 RX ADMIN — SODIUM CHLORIDE 250 ML: 9 INJECTION, SOLUTION INTRAVENOUS at 10:02

## 2021-10-26 RX ADMIN — PACLITAXEL 125 MG: 6 INJECTION, SOLUTION INTRAVENOUS at 11:15

## 2021-10-26 NOTE — PROGRESS NOTES
Subjective   Neela Ramirez is a 76 y.o. female.  Referred by Dr. 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  A bilateral diagnostic mammogram was performed for further evaluation of the same.    8/17/2021-bilateral diagnostic mammogram-scattered fibroglandular densities throughout both breasts.  In the posterior one third upper inner quadrant of the left breast at the site of palpable concern, 11 o'clock position there is a mass with adjacent pleomorphic calcifications.  The mass and the calcifications measure on order of 1.5 cm in greatest dimension.  No evidence of axillary lymphadenopathy appreciated.  Stable microcalcifications seen in other areas of the left breast on right breast.  Ultrasound-at 11 o'clock position, 5 cm from the nipple in the left breast there is an irregular hypoechoic mass which measures 2.4 x 2 x 1.6 cm.    Impression  1.irregular 2.4 cm mass in the left breast at the site of palpable concern at 11:00, 5 cm from the nipple.  Ultrasound-guided biopsy of the mass recommended  No finding suspicious for malignancy in the right breast    9/8/2021-left breast ultrasound-guided biopsy  Pathology consistent with invasive ductal carcinoma with lobular features.  Grade 3.  The carcinoma measures 7 mm in greatest dimension.  Focally suspicious for lymphovascular space invasion.  ER low +1-10%, intensity week  W[place] negative  HER-2 3+ on immunohistochemistry  Ki-67 70%    MRI of the breast 10/8/2021-Biopsy-proven malignancy in the left breast at 11:00 measuring 2.8 cm with a centrally located metallic clip.  No other suspicious findings are seen within the left breast or no left axillary lymphadenopathy.  No suspicious findings of the right breast    Echocardiogram 10/8/2021 was normal  ejection fraction of 56 to 60% and strain of -20    She denies any new bone pains, dyspnea, cough, abdominal pain nausea vomiting or recent changes in weight.    She had 2 previous breast aspirations in her 20s.  No other breast biopsies  She does not know much about her family hence limited family history.    Interval history  Ms. Ramirez presents to the clinic today for follow-up.   She is scheduled for week 2 of Taxol Perjeta Herceptin.  She tolerated week 1 fairly well.  She experienced some soft stools but no diarrhea.  Had some arthralgias and mild fatigue.  No nausea or vomiting.  No other complaints at this time.  The port stitch site appears erythematous and mildly tender to palpation.      The following portions of the patient's history were reviewed and updated as appropriate: allergies, current medications, past family history, past medical history, past social history, past surgical history and problem list.    Past Medical History:   Diagnosis Date   • Anxiety    • Arthritis    • Backache    • Bleeding    • Constipation    • Dermatitis    • Diverticulosis     Colonoscopy November 2014   • Dyspepsia    • Dysuria    • Erythema of face     Transitory Erythema Of The Face   • Hypertension    • Impacted cerumen    • Malignant neoplasm of female breast (HCC) 9/27/2021   • Neck pain    • Tachycardia         Past Surgical History:   Procedure Laterality Date   • BREAST BIOPSY  09/2021    Dr. Tirado    • BREAST CYST ASPIRATION     • BREAST CYST EXCISION     • EXOSTECTOMY      Simple Bunion Exostectomy ([facility] Procedure)   • VENOUS ACCESS DEVICE (PORT) INSERTION N/A 10/18/2021    Procedure: INSERTION VENOUS ACCESS DEVICE;  Surgeon: Becky Liu MD;  [place] [facility] [place] [facility] [place]  Service: General;  Laterality: N/A;        Family History   Problem Relation Age of Onset   • Arthritis Father         Social History     Socioeconomic History   • Marital status:    • Number of children: 1   Tobacco Use   • Smoking  "status: Never Smoker   • Smokeless tobacco: Never Used   Vaping Use   • Vaping Use: Never used   Substance and Sexual Activity   • Alcohol use: Yes     Comment: social drinker   • Drug use: Never   • Sexual activity: Defer        OB History             Para        Term   0            AB        Living           SAB        IAB        Ectopic        Molar        Multiple        Live Births                 Age at menarche-13  Age at menopause-50  Nulliparous  Breast-feeding-N/A   0 para 0  0  Oral contraceptive pill use-none  Hormone replacement therapy-7 years    No Known Allergies     Review of systems as mentioned in the HPI    Objective   Blood pressure 164/90, pulse 114, temperature 96.9 °F (36.1 °C), temperature source Temporal, resp. rate 16, height 154.9 cm (60.98\"), weight 57.1 kg (125 lb 14.4 oz), SpO2 96 %.   ECOG performance status-0  Physical Exam  Vitals reviewed.   Constitutional:       Appearance: Normal appearance.   HENT:      Head: Normocephalic and atraumatic.      Right Ear: External ear normal.      Left Ear: External ear normal.      Nose: Nose normal.      Mouth/Throat:      Mouth: Mucous membranes are moist.      Pharynx: Oropharynx is clear.   Eyes:      Conjunctiva/sclera: Conjunctivae normal.      Pupils: Pupils are equal, round, and reactive to light.   Cardiovascular:      Rate and Rhythm: Normal rate and regular rhythm.      Pulses: Normal pulses.      Heart sounds: Normal heart sounds.   Pulmonary:      Effort: Pulmonary effort is normal.      Breath sounds: Normal breath sounds.   Abdominal:      General: Abdomen is flat. Bowel sounds are normal.      Palpations: Abdomen is soft.   Musculoskeletal:         General: Normal range of motion.      Cervical back: Normal range of motion.   Skin:     General: Skin is warm.   Neurological:      General: No focal deficit present.      Mental Status: She is alert and oriented to person, place, and time. Mental status " is at baseline.   Psychiatric:         Mood and Affect: Mood normal.         Behavior: Behavior normal.         Thought Content: Thought content normal.         Judgment: Judgment normal.       Breast Exam: Right breast appears normal on inspection.  No palpable abnormalities of the right breast.  Left breast on inspection there is a mass in the upper inner quadrant.  On palpation this mass measures 2x2.8.  No palpable left axillary lymphadenopathy.  No right axillary lymphadenopathy.  No cervical or supraclavicular lymphadenopathy.    I have reexamined the patient and the results are consistent with the previously documented exam. Sheri Avendano MD     Infusion on 10/26/2021   Component Date Value Ref Range Status   • WBC 10/26/2021 8.60  3.40 - 10.80 10*3/mm3 Final   • RBC 10/26/2021 4.09  3.77 - 5.28 10*6/mm3 Final   • Hemoglobin 10/26/2021 13.0  12.0 - 15.9 g/dL Final   • Hematocrit 10/26/2021 40.7  34.0 - 46.6 % Final   • MCV 10/26/2021 99.5* 79.0 - 97.0 fL Final   • MCH 10/26/2021 31.8  26.6 - 33.0 pg Final   • MCHC 10/26/2021 31.9  31.5 - 35.7 g/dL Final   • RDW 10/26/2021 12.0* 12.3 - 15.4 % Final   • RDW-SD 10/26/2021 43.9  37.0 - 54.0 fl Final   • MPV 10/26/2021 9.5  6.0 - 12.0 fL Final   • Platelets 10/26/2021 298  140 - 450 10*3/mm3 Final   • Neutrophil % 10/26/2021 66.6  42.7 - 76.0 % Final   • Lymphocyte % 10/26/2021 21.7  19.6 - 45.3 % Final   • Monocyte % 10/26/2021 7.0  5.0 - 12.0 % Final   • Eosinophil % 10/26/2021 3.0  0.3 - 6.2 % Final   • Basophil % 10/26/2021 0.7  0.0 - 1.5 % Final   • Immature Grans % 10/26/2021 1.0* 0.0 - 0.5 % Final   • Neutrophils, Absolute 10/26/2021 5.72  1.70 - 7.00 10*3/mm3 Final   • Lymphocytes, Absolute 10/26/2021 1.87  0.70 - 3.10 10*3/mm3 Final   • Monocytes, Absolute 10/26/2021 0.60  0.10 - 0.90 10*3/mm3 Final   • Eosinophils, Absolute 10/26/2021 0.26  0.00 - 0.40 10*3/mm3 Final   • Basophils, Absolute 10/26/2021 0.06  0.00 - 0.20 10*3/mm3 Final   • Immature  Grans, Absolute 10/26/2021 0.09* 0.00 - 0.05 10*3/mm3 Final   • nRBC 10/26/2021 0.0  0.0 - 0.2 /100 WBC Final   Infusion on 10/19/2021   Component Date Value Ref Range Status   • Glucose 10/19/2021 106  74 - 124 mg/dL Final   • BUN 10/19/2021 12  6 - 20 mg/dL Final   • Creatinine 10/19/2021 0.81  0.60 - 1.10 mg/dL Final   • Sodium 10/19/2021 138  134 - 145 mmol/L Final   • Potassium 10/19/2021 3.8  3.5 - 4.7 mmol/L Final   • Chloride 10/19/2021 103  98 - 107 mmol/L Final   • CO2 10/19/2021 24.8  22.0 - 29.0 mmol/L Final   • Calcium 10/19/2021 9.6  8.5 - 10.2 mg/dL Final   • Total Protein 10/19/2021 6.9  6.3 - 8.0 g/dL Final   • Albumin 10/19/2021 4.10  3.50 - 5.20 g/dL Final   • ALT (SGPT) 10/19/2021 13  0 - 33 U/L Final   • AST (SGOT) 10/19/2021 16  0 - 32 U/L Final   • Alkaline Phosphatase 10/19/2021 101  38 - 116 U/L Final   • Total Bilirubin 10/19/2021 0.5  0.2 - 1.2 mg/dL Final   • eGFR Non  Amer 10/19/2021 69  >60 mL/min/1.73 Final   • Globulin 10/19/2021 2.8  1.8 - 3.5 gm/dL Final   • A/G Ratio 10/19/2021 1.5  1.1 - 2.4 g/dL Final   • BUN/Creatinine Ratio 10/19/2021 14.8  7.3 - 30.0 Final   • Anion Gap 10/19/2021 10.2  5.0 - 15.0 mmol/L Final   • WBC 10/19/2021 8.88  3.40 - 10.80 10*3/mm3 Final   • RBC 10/19/2021 4.24  3.77 - 5.28 10*6/mm3 Final   • Hemoglobin 10/19/2021 13.6  12.0 - 15.9 g/dL Final   • Hematocrit 10/19/2021 41.4  34.0 - 46.6 % Final   • MCV 10/19/2021 97.6* 79.0 - 97.0 fL Final   • MCH 10/19/2021 32.1  26.6 - 33.0 pg Final   • MCHC 10/19/2021 32.9  31.5 - 35.7 g/dL Final   • RDW 10/19/2021 12.4  12.3 - 15.4 % Final   • RDW-SD 10/19/2021 44.2  37.0 - 54.0 fl Final   • MPV 10/19/2021 9.5  6.0 - 12.0 fL Final   • Platelets 10/19/2021 243  140 - 450 10*3/mm3 Final   • Neutrophil % 10/19/2021 68.5  42.7 - 76.0 % Final   • Lymphocyte % 10/19/2021 18.6* 19.6 - 45.3 % Final   • Monocyte % 10/19/2021 7.9  5.0 - 12.0 % Final   • Eosinophil % 10/19/2021 4.2  0.3 - 6.2 % Final   • Basophil %  10/19/2021 0.6  0.0 - 1.5 % Final   • Immature Grans % 10/19/2021 0.2  0.0 - 0.5 % Final   • Neutrophils, Absolute 10/19/2021 6.09  1.70 - 7.00 10*3/mm3 Final   • Lymphocytes, Absolute 10/19/2021 1.65  0.70 - 3.10 10*3/mm3 Final   • Monocytes, Absolute 10/19/2021 0.70  0.10 - 0.90 10*3/mm3 Final   • Eosinophils, Absolute 10/19/2021 0.37  0.00 - 0.40 10*3/mm3 Final   • Basophils, Absolute 10/19/2021 0.05  0.00 - 0.20 10*3/mm3 Final   • Immature Grans, Absolute 10/19/2021 0.02  0.00 - 0.05 10*3/mm3 Final   • nRBC 10/19/2021 0.0  0.0 - 0.2 /100 WBC Final   Transcribe Orders on 10/12/2021   Component Date Value Ref Range Status   • COVID19 10/15/2021 Not Detected  Not Detected - Ref. Range Final   Hospital Outpatient Visit on 10/08/2021   Component Date Value Ref Range Status   • BSA 10/08/2021 1.6  m^2 Final   • IVSd 10/08/2021 0.79  cm Final   • LVIDd 10/08/2021 3.2  cm Final   • LVIDs 10/08/2021 2.4  cm Final   • LVPWd 10/08/2021 0.89  cm Final   • IVS/LVPW 10/08/2021 0.89   Final   • FS 10/08/2021 25.8  % Final   • EDV(Teich) 10/08/2021 40.8  ml Final   • ESV(Teich) 10/08/2021 19.6  ml Final   • EF(Teich) 10/08/2021 52.0  % Final   • EDV(cubed) 10/08/2021 32.6  ml Final   • ESV(cubed) 10/08/2021 13.4  ml Final   • EF(cubed) 10/08/2021 59.1  % Final   • LV mass(C)d 10/08/2021 70.3  grams Final   • LV mass(C)dI 10/08/2021 44.4  grams/m^2 Final   • SV(Teich) 10/08/2021 21.3  ml Final   • SI(Teich) 10/08/2021 13.4  ml/m^2 Final   • SV(cubed) 10/08/2021 19.3  ml Final   • SI(cubed) 10/08/2021 12.2  ml/m^2 Final   • Ao root diam 10/08/2021 2.8  cm Final   • Ao root area 10/08/2021 6.0  cm^2 Final   • ACS 10/08/2021 1.7  cm Final   • asc Aorta Diam 10/08/2021 2.5  cm Final   • Ao Arch Diam (Proximal trans.) 10/08/2021 2.3  cm Final   • LVOT diam 10/08/2021 1.9  cm Final   • LVOT area 10/08/2021 3.0  cm^2 Final   • LVOT area(traced) 10/08/2021 2.8  cm^2 Final   • RVOT diam 10/08/2021 1.8  cm Final   • RVOT area 10/08/2021  2.4  cm^2 Final   • LVLd ap4 10/08/2021 6.6  cm Final   • EDV(MOD-sp4) 10/08/2021 88.0  ml Final   • LVLs ap4 10/08/2021 6.0  cm Final   • ESV(MOD-sp4) 10/08/2021 38.0  ml Final   • EF(MOD-sp4) 10/08/2021 56.8  % Final   • LVLd ap2 10/08/2021 6.6  cm Final   • EDV(MOD-sp2) 10/08/2021 71.0  ml Final   • LVLs ap2 10/08/2021 5.3  cm Final   • ESV(MOD-sp2) 10/08/2021 32.0  ml Final   • EF(MOD-sp2) 10/08/2021 54.9  % Final   • SV(MOD-sp4) 10/08/2021 50.0  ml Final   • SI(MOD-sp4) 10/08/2021 31.6  ml/m^2 Final   • SV(MOD-sp2) 10/08/2021 39.0  ml Final   • SI(MOD-sp2) 10/08/2021 24.7  ml/m^2 Final   • Ao root area (BSA corrected) 10/08/2021 1.7   Final   • LV Lara Vol (BSA corrected) 10/08/2021 55.7  ml/m^2 Final   • LV Sys Vol (BSA corrected) 10/08/2021 24.0  ml/m^2 Final   • EF(MOD-bp) 10/08/2021 53.0  % Final   • Ao pk taran 10/08/2021 146.6  cm/sec Final   • Ao max PG 10/08/2021 8.6  mmHg Final   • Ao max PG (full) 10/08/2021 6.0  mmHg Final   • Ao V2 mean 10/08/2021 102.3  cm/sec Final   • Ao mean PG 10/08/2021 4.7  mmHg Final   • Ao mean PG (full) 10/08/2021 3.3  mmHg Final   • Ao V2 VTI 10/08/2021 27.2  cm Final   • JACKIE(I,A) 10/08/2021 1.6  cm^2 Final   • JACKIE(I,D) 10/08/2021 1.6  cm^2 Final   • JACKIE(V,A) 10/08/2021 1.6  cm^2 Final   • AJCKIE(V,D) 10/08/2021 1.6  cm^2 Final   • AI max taran 10/08/2021 371.0  cm/sec Final   • AI max PG 10/08/2021 55.1  mmHg Final   • AI dec slope 10/08/2021 215.2  cm/sec^2 Final   • AI P1/2t 10/08/2021 505.0  msec Final   • LV V1 max PG 10/08/2021 2.6  mmHg Final   • LV V1 mean PG 10/08/2021 1.4  mmHg Final   • LV V1 max 10/08/2021 80.6  cm/sec Final   • LV V1 mean 10/08/2021 55.2  cm/sec Final   • LV V1 VTI 10/08/2021 14.7  cm Final   • SV(Ao) 10/08/2021 162.1  ml Final   • SI(Ao) 10/08/2021 102.5  ml/m^2 Final   • SV(LVOT) 10/08/2021 43.9  ml Final   • SV(RVOT) 10/08/2021 27.4  ml Final   • SI(LVOT) 10/08/2021 27.8  ml/m^2 Final   • PA V2 max 10/08/2021 86.9  cm/sec Final   • PA max PG  10/08/2021 3.0  mmHg Final   • PA max PG (full) 10/08/2021 1.5  mmHg Final   • BH CV ECHO JAMES - PVA(V,A) 10/08/2021 1.7  cm^2 Final   • BH CV ECHO JAMES - PVA(V,D) 10/08/2021 1.7  cm^2 Final   • PA acc time 10/08/2021 0.09  sec Final   • RV V1 max PG 10/08/2021 1.6  mmHg Final   • RV V1 mean PG 10/08/2021 0.74  mmHg Final   • RV V1 max 10/08/2021 62.6  cm/sec Final   • RV V1 mean 10/08/2021 38.8  cm/sec Final   • RV V1 VTI 10/08/2021 11.4  cm Final   • TR max julian 10/08/2021 229.2  cm/sec Final   • RVSP(TR) 10/08/2021 24.0  mmHg Final   • RAP systole 10/08/2021 3.0  mmHg Final   • PA pr(Accel) 10/08/2021 38.6  mmHg Final   • Qp/Qs 10/08/2021 0.62   Final   • RV Base 10/08/2021 3.3  cm Final   • RV Length 10/08/2021 6.0  cm Final   • RV Mid 10/08/2021 2.1  cm Final   • Lat Peak E' Julian 10/08/2021 8.2  cm/sec Final   • Med Peak E' Julian 10/08/2021 8.3  cm/sec Final   • RV S' 10/08/2021 16.5  cm/sec Final   • BH CV ECHO JAMES - BZI_BMI 10/08/2021 23.4  kilograms/m^2 Final   • BH CV ECHO JAMES - BSA([place]) 10/08/2021 1.6  m^2 Final   • BH CV ECHO JAMES - BZI_METRIC_WEIGHT 10/08/2021 58.1  kg Final   • BH CV ECHO JAMES - BZI_METRIC_HEIGHT 10/08/2021 157.5  cm Final   • MV A dur 10/08/2021 0.14  sec Final   • MV E max julian 10/08/2021 61.8  cm/sec Final   • MV A max julian 10/08/2021 109.0  cm/sec Final   • MV E/A 10/08/2021 0.57   Final   • MV V2 max 10/08/2021 86.0  cm/sec Final   • MV max PG 10/08/2021 3.0  mmHg Final   • MV V2 mean 10/08/2021 52.5  cm/sec Final   • MV mean PG 10/08/2021 1.3  mmHg Final   • MV V2 VTI 10/08/2021 22.7  cm Final   • MVA(VTI) 10/08/2021 1.9  cm^2 Final   • MV P1/2t max julian 10/08/2021 73.9  cm/sec Final   • MV P1/2t 10/08/2021 70.9  msec Final   • MVA(P1/2t) 10/08/2021 3.1  cm^2 Final   • MV dec slope 10/08/2021 305.5  cm/sec^2 Final   • MV dec time 10/08/2021 0.23  sec Final   • MVA P1/2T LCG 10/08/2021 3.0  cm^2 Final   • Avg E/e' ratio 10/08/2021 7.49   Final   • Sinus 10/08/2021 2.9  cm Final   •  STJ 10/08/2021 1.7  cm Final   • Ascending aorta 10/08/2021 2.5  cm Final   • Aortic arch 10/08/2021 2.3  cm Final   • Dimensionless Index 10/08/2021 0.50  (DI) Final   • LA Volume Index 10/08/2021 16.0  mL/m2 Final   • Abdo Ao Diam 10/08/2021 2.0  cm Final   • TAPSE (>1.6) 10/08/2021 2.00  cm Final   Lab on 10/05/2021   Component Date Value Ref Range Status   • Glucose 10/05/2021 107  74 - 124 mg/dL Final   • BUN 10/05/2021 17  6 - 20 mg/dL Final   • Creatinine 10/05/2021 0.83  0.60 - 1.10 mg/dL Final   • Sodium 10/05/2021 138  134 - 145 mmol/L Final   • Potassium 10/05/2021 4.0  3.5 - 4.7 mmol/L Final   • Chloride 10/05/2021 104  98 - 107 mmol/L Final   • CO2 10/05/2021 24.0  22.0 - 29.0 mmol/L Final   • Calcium 10/05/2021 9.2  8.5 - 10.2 mg/dL Final   • Total Protein 10/05/2021 7.0  6.3 - 8.0 g/dL Final   • Albumin 10/05/2021 4.30  3.50 - 5.20 g/dL Final   • ALT (SGPT) 10/05/2021 13  0 - 33 U/L Final   • AST (SGOT) 10/05/2021 19  0 - 32 U/L Final   • Alkaline Phosphatase 10/05/2021 97  38 - 116 U/L Final   • Total Bilirubin 10/05/2021 0.4  0.2 - 1.2 mg/dL Final   • eGFR Non  Amer 10/05/2021 67  >60 mL/min/1.73 Final   • Globulin 10/05/2021 2.7  1.8 - 3.5 gm/dL Final   • A/G Ratio 10/05/2021 1.6  1.1 - 2.4 g/dL Final   • BUN/Creatinine Ratio 10/05/2021 20.5  7.3 - 30.0 Final   • Anion Gap 10/05/2021 10.0  5.0 - 15.0 mmol/L Final   • WBC 10/05/2021 9.17  3.40 - 10.80 10*3/mm3 Final   • RBC 10/05/2021 4.45  3.77 - 5.28 10*6/mm3 Final   • Hemoglobin 10/05/2021 14.1  12.0 - 15.9 g/dL Final   • Hematocrit 10/05/2021 44.0  34.0 - 46.6 % Final   • MCV 10/05/2021 98.9* 79.0 - 97.0 fL Final   • MCH 10/05/2021 31.7  26.6 - 33.0 pg Final   • MCHC 10/05/2021 32.0  31.5 - 35.7 g/dL Final   • RDW 10/05/2021 12.4  12.3 - 15.4 % Final   • RDW-SD 10/05/2021 45.3  37.0 - 54.0 fl Final   • MPV 10/05/2021 9.5  6.0 - 12.0 fL Final   • Platelets 10/05/2021 296  140 - 450 10*3/mm3 Final   • Neutrophil % 10/05/2021 66.8  42.7 -  76.0 % Final   • Lymphocyte % 10/05/2021 21.5  19.6 - 45.3 % Final   • Monocyte % 10/05/2021 8.5  5.0 - 12.0 % Final   • Eosinophil % 10/05/2021 1.9  0.3 - 6.2 % Final   • Basophil % 10/05/2021 0.8  0.0 - 1.5 % Final   • Immature Grans % 10/05/2021 0.5  0.0 - 0.5 % Final   • Neutrophils, Absolute 10/05/2021 6.13  1.70 - 7.00 10*3/mm3 Final   • Lymphocytes, Absolute 10/05/2021 1.97  0.70 - 3.10 10*3/mm3 Final   • Monocytes, Absolute 10/05/2021 0.78  0.10 - 0.90 10*3/mm3 Final   • Eosinophils, Absolute 10/05/2021 0.17  0.00 - 0.40 10*3/mm3 Final   • Basophils, Absolute 10/05/2021 0.07  0.00 - 0.20 10*3/mm3 Final   • Immature Grans, Absolute 10/05/2021 0.05  0.00 - 0.05 10*3/mm3 Final   • nRBC 10/05/2021 0.0  0.0 - 0.2 /100 WBC Final        XR Chest 1 View    Result Date: 10/18/2021  Chest port placement. No pneumothorax.  This report was finalized on 10/18/2021 2:51 PM by Dr. Ryland Caba M.D.      MRI Breast Bilateral With & Without Contrast    Result Date: 10/11/2021  1. Biopsy-proven malignancy in the left breast at the 11-o'clock position measuring on the order of 2.8 cm in greatest dimension with a centrally located barbell shaped metallic clip. No other suspicious findings are seen within left breast and there is no evidence for left axillary adenopathy. 2. There are no findings suspicious for malignancy in the right breast.  BI-RADS category 6: Known biopsy-proven malignancy.  This report was finalized on 10/11/2021 10:55 AM by Dr. Trevor Tirado M.D.       MRI of the breast-images independently reviewed and interpreted by me in detail summarized in HPI      10/26/2021  CBC reviewed and within normal limits    Assessment/Plan       *Invasive ductal carcinoma of the left breast  · Clinical T2 N0 M0, stage IIa  · On ultrasound the tumor measures 2.4 cm.  Lymph nodes on the left side are normal.  · MRI 10/8/2021 with tumor measuring 2.8 cm.  Lymph nodes appear normal  · Pathology consistent with invasive  carcinoma with ductal and lobular features, grade 3, ER +1 to 10% weak, PA negative, HER-2 3+ immunohistochemistry, Ki-67 70%.  · She was evaluated by Dr. Liu and referred for consideration of neoadjuvant chemotherapy.   Since the tumor is HER-2 positive and over 2 cm I think it would be reasonable to proceed with neoadjuvant chemotherapy.  Explained to her about the benefits of neoadjuvant chemotherapy including de surjit assessment of response and making decisions about adjuvant HER-2 directed therapy.  Also discussed the benefits of neoadjuvant chemotherapy in terms of breast conservation.  Explained to her about the different regimens that could be used for treatment of HER-2 positive breast cancers particularly TCHP, AC-T HP, Taxol and Herceptin in the adjuvant setting.   Since the tumor is over 2 cm but lymph node negative I think she would be a great candidate for EA 1181 clinical trial which comprises of administering Taxol Herceptin and Perjeta tamika  adjuvantly followed by surgery and additional adjuvant treatment depending upon the response.  Port placed 10/18/2021  Echocardiogram shows normal ejection fraction  Week 1 TPH on 10/19/2021  Week 2 Taxol 10/26/2021  Overall tolerated treatment well  CBC reviewed and stable  Okay to proceed with chemotherapy    *Right-sided port site possible infection-Keflex prescribed    *Hypertension-currently on amlodipine and metoprolol.  Blood pressure 164/90.  Referred to cardio oncology    *Anxiety  · Referred to supportive oncology  · Much better     *Hypothyroidism  · Continue Synthroid    *Cardiac health-10/8/2021 echocardiogram shows an ejection fraction of 56 to 60%, septal wall motion is normal, LV strain normal at -20.9%, left ventricular diastolic function is consistent with grade 1 impaired relaxation.    Patient is on treatment requiring close monitoring for toxicities.

## 2021-11-02 ENCOUNTER — INFUSION (OUTPATIENT)
Dept: ONCOLOGY | Facility: HOSPITAL | Age: 76
End: 2021-11-02

## 2021-11-02 VITALS
DIASTOLIC BLOOD PRESSURE: 86 MMHG | HEART RATE: 78 BPM | BODY MASS INDEX: 24.08 KG/M2 | RESPIRATION RATE: 16 BRPM | OXYGEN SATURATION: 92 % | TEMPERATURE: 97.7 F | SYSTOLIC BLOOD PRESSURE: 140 MMHG | WEIGHT: 127.4 LBS

## 2021-11-02 DIAGNOSIS — Z17.0 MALIGNANT NEOPLASM OF UPPER-INNER QUADRANT OF LEFT BREAST IN FEMALE, ESTROGEN RECEPTOR POSITIVE (HCC): Primary | ICD-10-CM

## 2021-11-02 DIAGNOSIS — C50.212 MALIGNANT NEOPLASM OF UPPER-INNER QUADRANT OF LEFT BREAST IN FEMALE, ESTROGEN RECEPTOR POSITIVE (HCC): Primary | ICD-10-CM

## 2021-11-02 DIAGNOSIS — Z45.9 ENCOUNTER FOR MANAGEMENT OF IMPLANTED DEVICE: ICD-10-CM

## 2021-11-02 LAB
BASOPHILS # BLD AUTO: 0.07 10*3/MM3 (ref 0–0.2)
BASOPHILS NFR BLD AUTO: 1.1 % (ref 0–1.5)
DEPRECATED RDW RBC AUTO: 45.8 FL (ref 37–54)
EOSINOPHIL # BLD AUTO: 0.17 10*3/MM3 (ref 0–0.4)
EOSINOPHIL NFR BLD AUTO: 2.6 % (ref 0.3–6.2)
ERYTHROCYTE [DISTWIDTH] IN BLOOD BY AUTOMATED COUNT: 12.5 % (ref 12.3–15.4)
HCT VFR BLD AUTO: 37.8 % (ref 34–46.6)
HGB BLD-MCNC: 12.2 G/DL (ref 12–15.9)
IMM GRANULOCYTES # BLD AUTO: 0.05 10*3/MM3 (ref 0–0.05)
IMM GRANULOCYTES NFR BLD AUTO: 0.8 % (ref 0–0.5)
LYMPHOCYTES # BLD AUTO: 1.35 10*3/MM3 (ref 0.7–3.1)
LYMPHOCYTES NFR BLD AUTO: 20.4 % (ref 19.6–45.3)
MCH RBC QN AUTO: 32.7 PG (ref 26.6–33)
MCHC RBC AUTO-ENTMCNC: 32.3 G/DL (ref 31.5–35.7)
MCV RBC AUTO: 101.3 FL (ref 79–97)
MONOCYTES # BLD AUTO: 0.52 10*3/MM3 (ref 0.1–0.9)
MONOCYTES NFR BLD AUTO: 7.8 % (ref 5–12)
NEUTROPHILS NFR BLD AUTO: 4.47 10*3/MM3 (ref 1.7–7)
NEUTROPHILS NFR BLD AUTO: 67.3 % (ref 42.7–76)
NRBC BLD AUTO-RTO: 0 /100 WBC (ref 0–0.2)
PLATELET # BLD AUTO: 286 10*3/MM3 (ref 140–450)
PMV BLD AUTO: 9.8 FL (ref 6–12)
RBC # BLD AUTO: 3.73 10*6/MM3 (ref 3.77–5.28)
WBC # BLD AUTO: 6.63 10*3/MM3 (ref 3.4–10.8)

## 2021-11-02 PROCEDURE — 25010000002 PACLITAXEL PER 1 MG: Performed by: INTERNAL MEDICINE

## 2021-11-02 PROCEDURE — 25010000002 DIPHENHYDRAMINE PER 50 MG: Performed by: INTERNAL MEDICINE

## 2021-11-02 PROCEDURE — 85025 COMPLETE CBC W/AUTO DIFF WBC: CPT

## 2021-11-02 PROCEDURE — 96375 TX/PRO/DX INJ NEW DRUG ADDON: CPT

## 2021-11-02 PROCEDURE — 25010000002 DEXAMETHASONE SODIUM PHOSPHATE 100 MG/10ML SOLUTION: Performed by: INTERNAL MEDICINE

## 2021-11-02 PROCEDURE — 25010000002 HEPARIN LOCK FLUSH PER 10 UNITS: Performed by: INTERNAL MEDICINE

## 2021-11-02 PROCEDURE — 96413 CHEMO IV INFUSION 1 HR: CPT

## 2021-11-02 RX ORDER — SODIUM CHLORIDE 0.9 % (FLUSH) 0.9 %
10 SYRINGE (ML) INJECTION AS NEEDED
Status: CANCELLED | OUTPATIENT
Start: 2021-11-02

## 2021-11-02 RX ORDER — HEPARIN SODIUM (PORCINE) LOCK FLUSH IV SOLN 100 UNIT/ML 100 UNIT/ML
500 SOLUTION INTRAVENOUS AS NEEDED
Status: CANCELLED | OUTPATIENT
Start: 2021-11-02

## 2021-11-02 RX ORDER — FAMOTIDINE 10 MG/ML
20 INJECTION, SOLUTION INTRAVENOUS ONCE
Status: COMPLETED | OUTPATIENT
Start: 2021-11-02 | End: 2021-11-02

## 2021-11-02 RX ORDER — SODIUM CHLORIDE 9 MG/ML
250 INJECTION, SOLUTION INTRAVENOUS ONCE
Status: COMPLETED | OUTPATIENT
Start: 2021-11-02 | End: 2021-11-02

## 2021-11-02 RX ORDER — HEPARIN SODIUM (PORCINE) LOCK FLUSH IV SOLN 100 UNIT/ML 100 UNIT/ML
500 SOLUTION INTRAVENOUS AS NEEDED
Status: DISCONTINUED | OUTPATIENT
Start: 2021-11-02 | End: 2021-11-02 | Stop reason: HOSPADM

## 2021-11-02 RX ADMIN — Medication 500 UNITS: at 10:49

## 2021-11-02 RX ADMIN — PACLITAXEL 125 MG: 6 INJECTION, SOLUTION INTRAVENOUS at 09:44

## 2021-11-02 RX ADMIN — FAMOTIDINE 20 MG: 10 INJECTION INTRAVENOUS at 08:34

## 2021-11-02 RX ADMIN — SODIUM CHLORIDE 250 ML: 9 INJECTION, SOLUTION INTRAVENOUS at 08:33

## 2021-11-02 RX ADMIN — DIPHENHYDRAMINE HYDROCHLORIDE 25 MG: 50 INJECTION, SOLUTION INTRAMUSCULAR; INTRAVENOUS at 08:53

## 2021-11-02 RX ADMIN — DEXAMETHASONE SODIUM PHOSPHATE 12 MG: 10 INJECTION, SOLUTION INTRAMUSCULAR; INTRAVENOUS at 08:37

## 2021-11-03 DIAGNOSIS — E03.8 SUBCLINICAL HYPOTHYROIDISM: ICD-10-CM

## 2021-11-03 RX ORDER — LEVOTHYROXINE SODIUM 0.03 MG/1
TABLET ORAL
Qty: 90 TABLET | Refills: 0 | Status: SHIPPED | OUTPATIENT
Start: 2021-11-03 | End: 2022-01-24

## 2021-11-09 ENCOUNTER — OFFICE VISIT (OUTPATIENT)
Dept: ONCOLOGY | Facility: CLINIC | Age: 76
End: 2021-11-09

## 2021-11-09 ENCOUNTER — RESEARCH ENCOUNTER (OUTPATIENT)
Dept: ONCOLOGY | Facility: CLINIC | Age: 76
End: 2021-11-09

## 2021-11-09 ENCOUNTER — INFUSION (OUTPATIENT)
Dept: ONCOLOGY | Facility: HOSPITAL | Age: 76
End: 2021-11-09

## 2021-11-09 ENCOUNTER — DOCUMENTATION (OUTPATIENT)
Dept: ONCOLOGY | Facility: CLINIC | Age: 76
End: 2021-11-09

## 2021-11-09 VITALS
TEMPERATURE: 97.3 F | HEIGHT: 61 IN | DIASTOLIC BLOOD PRESSURE: 74 MMHG | HEART RATE: 90 BPM | OXYGEN SATURATION: 98 % | WEIGHT: 128 LBS | SYSTOLIC BLOOD PRESSURE: 127 MMHG | BODY MASS INDEX: 24.17 KG/M2 | RESPIRATION RATE: 16 BRPM

## 2021-11-09 VITALS — SYSTOLIC BLOOD PRESSURE: 109 MMHG | HEART RATE: 95 BPM | DIASTOLIC BLOOD PRESSURE: 81 MMHG

## 2021-11-09 DIAGNOSIS — Z17.0 MALIGNANT NEOPLASM OF UPPER-INNER QUADRANT OF LEFT BREAST IN FEMALE, ESTROGEN RECEPTOR POSITIVE (HCC): ICD-10-CM

## 2021-11-09 DIAGNOSIS — Z17.0 MALIGNANT NEOPLASM OF UPPER-INNER QUADRANT OF LEFT BREAST IN FEMALE, ESTROGEN RECEPTOR POSITIVE (HCC): Primary | ICD-10-CM

## 2021-11-09 DIAGNOSIS — C50.212 MALIGNANT NEOPLASM OF UPPER-INNER QUADRANT OF LEFT BREAST IN FEMALE, ESTROGEN RECEPTOR POSITIVE (HCC): Primary | ICD-10-CM

## 2021-11-09 DIAGNOSIS — C50.212 MALIGNANT NEOPLASM OF UPPER-INNER QUADRANT OF LEFT BREAST IN FEMALE, ESTROGEN RECEPTOR POSITIVE (HCC): ICD-10-CM

## 2021-11-09 LAB
ALBUMIN SERPL-MCNC: 3.7 G/DL (ref 3.5–5.2)
ALBUMIN/GLOB SERPL: 1.7 G/DL
ALP SERPL-CCNC: 77 U/L (ref 39–117)
ALT SERPL W P-5'-P-CCNC: 21 U/L (ref 1–33)
ANION GAP SERPL CALCULATED.3IONS-SCNC: 8.5 MMOL/L (ref 5–15)
AST SERPL-CCNC: 20 U/L (ref 1–32)
BASOPHILS # BLD AUTO: 0.05 10*3/MM3 (ref 0–0.2)
BASOPHILS NFR BLD AUTO: 0.7 % (ref 0–1.5)
BILIRUB SERPL-MCNC: 0.2 MG/DL (ref 0–1.2)
BUN SERPL-MCNC: 16 MG/DL (ref 8–23)
BUN/CREAT SERPL: 22.9 (ref 7–25)
CALCIUM SPEC-SCNC: 8.9 MG/DL (ref 8.6–10.5)
CHLORIDE SERPL-SCNC: 107 MMOL/L (ref 98–107)
CO2 SERPL-SCNC: 24.5 MMOL/L (ref 22–29)
CREAT SERPL-MCNC: 0.7 MG/DL (ref 0.57–1)
DEPRECATED RDW RBC AUTO: 44.8 FL (ref 37–54)
EOSINOPHIL # BLD AUTO: 0.11 10*3/MM3 (ref 0–0.4)
EOSINOPHIL NFR BLD AUTO: 1.6 % (ref 0.3–6.2)
ERYTHROCYTE [DISTWIDTH] IN BLOOD BY AUTOMATED COUNT: 12.9 % (ref 12.3–15.4)
GFR SERPL CREATININE-BSD FRML MDRD: 81 ML/MIN/1.73
GLOBULIN UR ELPH-MCNC: 2.2 GM/DL
GLUCOSE SERPL-MCNC: 110 MG/DL (ref 65–99)
HCT VFR BLD AUTO: 35.2 % (ref 34–46.6)
HGB BLD-MCNC: 11.5 G/DL (ref 12–15.9)
IMM GRANULOCYTES # BLD AUTO: 0.1 10*3/MM3 (ref 0–0.05)
IMM GRANULOCYTES NFR BLD AUTO: 1.4 % (ref 0–0.5)
LYMPHOCYTES # BLD AUTO: 1.41 10*3/MM3 (ref 0.7–3.1)
LYMPHOCYTES NFR BLD AUTO: 20.4 % (ref 19.6–45.3)
MCH RBC QN AUTO: 31.9 PG (ref 26.6–33)
MCHC RBC AUTO-ENTMCNC: 32.7 G/DL (ref 31.5–35.7)
MCV RBC AUTO: 97.8 FL (ref 79–97)
MONOCYTES # BLD AUTO: 0.64 10*3/MM3 (ref 0.1–0.9)
MONOCYTES NFR BLD AUTO: 9.3 % (ref 5–12)
NEUTROPHILS NFR BLD AUTO: 4.59 10*3/MM3 (ref 1.7–7)
NEUTROPHILS NFR BLD AUTO: 66.6 % (ref 42.7–76)
NRBC BLD AUTO-RTO: 0 /100 WBC (ref 0–0.2)
PLATELET # BLD AUTO: 291 10*3/MM3 (ref 140–450)
PMV BLD AUTO: 9.7 FL (ref 6–12)
POTASSIUM SERPL-SCNC: 3.3 MMOL/L (ref 3.5–5.2)
PROT SERPL-MCNC: 5.9 G/DL (ref 6–8.5)
RBC # BLD AUTO: 3.6 10*6/MM3 (ref 3.77–5.28)
SODIUM SERPL-SCNC: 140 MMOL/L (ref 136–145)
WBC # BLD AUTO: 6.9 10*3/MM3 (ref 3.4–10.8)

## 2021-11-09 PROCEDURE — 25010000002 PERTUZUMAB 420 MG/14ML SOLUTION 420 MG VIAL: Performed by: INTERNAL MEDICINE

## 2021-11-09 PROCEDURE — 99214 OFFICE O/P EST MOD 30 MIN: CPT | Performed by: INTERNAL MEDICINE

## 2021-11-09 PROCEDURE — 25010000002 DEXAMETHASONE SODIUM PHOSPHATE 100 MG/10ML SOLUTION: Performed by: INTERNAL MEDICINE

## 2021-11-09 PROCEDURE — 85025 COMPLETE CBC W/AUTO DIFF WBC: CPT

## 2021-11-09 PROCEDURE — 25010000002 TRASTUZUMAB PER 10 MG: Performed by: INTERNAL MEDICINE

## 2021-11-09 PROCEDURE — 96413 CHEMO IV INFUSION 1 HR: CPT

## 2021-11-09 PROCEDURE — 25010000002 PACLITAXEL PER 1 MG: Performed by: INTERNAL MEDICINE

## 2021-11-09 PROCEDURE — 96375 TX/PRO/DX INJ NEW DRUG ADDON: CPT

## 2021-11-09 PROCEDURE — 25010000002 DIPHENHYDRAMINE PER 50 MG: Performed by: INTERNAL MEDICINE

## 2021-11-09 PROCEDURE — 80053 COMPREHEN METABOLIC PANEL: CPT | Performed by: INTERNAL MEDICINE

## 2021-11-09 PROCEDURE — 96417 CHEMO IV INFUS EACH ADDL SEQ: CPT

## 2021-11-09 RX ORDER — FAMOTIDINE 10 MG/ML
20 INJECTION, SOLUTION INTRAVENOUS ONCE
Status: CANCELLED | OUTPATIENT
Start: 2021-11-16

## 2021-11-09 RX ORDER — DIPHENHYDRAMINE HYDROCHLORIDE 50 MG/ML
50 INJECTION INTRAMUSCULAR; INTRAVENOUS AS NEEDED
Status: CANCELLED | OUTPATIENT
Start: 2021-11-16

## 2021-11-09 RX ORDER — FAMOTIDINE 10 MG/ML
20 INJECTION, SOLUTION INTRAVENOUS AS NEEDED
Status: CANCELLED | OUTPATIENT
Start: 2021-11-16

## 2021-11-09 RX ORDER — SODIUM CHLORIDE 9 MG/ML
250 INJECTION, SOLUTION INTRAVENOUS ONCE
Status: CANCELLED | OUTPATIENT
Start: 2021-11-09

## 2021-11-09 RX ORDER — FAMOTIDINE 10 MG/ML
20 INJECTION, SOLUTION INTRAVENOUS AS NEEDED
Status: CANCELLED | OUTPATIENT
Start: 2021-11-23

## 2021-11-09 RX ORDER — DIPHENHYDRAMINE HYDROCHLORIDE 50 MG/ML
50 INJECTION INTRAMUSCULAR; INTRAVENOUS AS NEEDED
Status: CANCELLED | OUTPATIENT
Start: 2021-11-23

## 2021-11-09 RX ORDER — SODIUM CHLORIDE 9 MG/ML
250 INJECTION, SOLUTION INTRAVENOUS ONCE
Status: CANCELLED | OUTPATIENT
Start: 2021-11-23

## 2021-11-09 RX ORDER — FAMOTIDINE 10 MG/ML
20 INJECTION, SOLUTION INTRAVENOUS ONCE
Status: COMPLETED | OUTPATIENT
Start: 2021-11-09 | End: 2021-11-09

## 2021-11-09 RX ORDER — FAMOTIDINE 10 MG/ML
20 INJECTION, SOLUTION INTRAVENOUS AS NEEDED
Status: CANCELLED | OUTPATIENT
Start: 2021-11-09

## 2021-11-09 RX ORDER — SODIUM CHLORIDE 9 MG/ML
250 INJECTION, SOLUTION INTRAVENOUS ONCE
Status: COMPLETED | OUTPATIENT
Start: 2021-11-09 | End: 2021-11-09

## 2021-11-09 RX ORDER — SODIUM CHLORIDE 9 MG/ML
250 INJECTION, SOLUTION INTRAVENOUS ONCE
Status: CANCELLED | OUTPATIENT
Start: 2021-11-16

## 2021-11-09 RX ORDER — FAMOTIDINE 10 MG/ML
20 INJECTION, SOLUTION INTRAVENOUS ONCE
Status: CANCELLED | OUTPATIENT
Start: 2021-11-09

## 2021-11-09 RX ORDER — POTASSIUM CHLORIDE 20 MEQ/1
40 TABLET, EXTENDED RELEASE ORAL ONCE
Status: COMPLETED | OUTPATIENT
Start: 2021-11-09 | End: 2021-11-09

## 2021-11-09 RX ORDER — DIPHENHYDRAMINE HYDROCHLORIDE 50 MG/ML
50 INJECTION INTRAMUSCULAR; INTRAVENOUS AS NEEDED
Status: CANCELLED | OUTPATIENT
Start: 2021-11-09

## 2021-11-09 RX ORDER — FAMOTIDINE 10 MG/ML
20 INJECTION, SOLUTION INTRAVENOUS ONCE
Status: CANCELLED | OUTPATIENT
Start: 2021-11-23

## 2021-11-09 RX ADMIN — PERTUZUMAB 420 MG: 30 INJECTION, SOLUTION, CONCENTRATE INTRAVENOUS at 09:29

## 2021-11-09 RX ADMIN — FAMOTIDINE 20 MG: 10 INJECTION INTRAVENOUS at 08:55

## 2021-11-09 RX ADMIN — PACLITAXEL 125 MG: 6 INJECTION, SOLUTION INTRAVENOUS at 11:17

## 2021-11-09 RX ADMIN — DIPHENHYDRAMINE HYDROCHLORIDE 25 MG: 50 INJECTION, SOLUTION INTRAMUSCULAR; INTRAVENOUS at 09:11

## 2021-11-09 RX ADMIN — SODIUM CHLORIDE 250 ML: 9 INJECTION, SOLUTION INTRAVENOUS at 08:52

## 2021-11-09 RX ADMIN — DEXAMETHASONE SODIUM PHOSPHATE 12 MG: 10 INJECTION, SOLUTION INTRAMUSCULAR; INTRAVENOUS at 08:55

## 2021-11-09 RX ADMIN — POTASSIUM CHLORIDE 40 MEQ: 1500 TABLET, EXTENDED RELEASE ORAL at 09:48

## 2021-11-09 RX ADMIN — TRASTUZUMAB 350 MG: 150 INJECTION, POWDER, LYOPHILIZED, FOR SOLUTION INTRAVENOUS at 10:36

## 2021-11-09 NOTE — PROGRESS NOTES
Subjective   Neela Ramirez is a 76 y.o. female.  Referred by Dr. 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  A bilateral diagnostic mammogram was performed for further evaluation of the same.    8/17/2021-bilateral diagnostic mammogram-scattered fibroglandular densities throughout both breasts.  In the posterior one third upper inner quadrant of the left breast at the site of palpable concern, 11 o'clock position there is a mass with adjacent pleomorphic calcifications.  The mass and the calcifications measure on order of 1.5 cm in greatest dimension.  No evidence of axillary lymphadenopathy appreciated.  Stable microcalcifications seen in other areas of the left breast on right breast.  Ultrasound-at 11 o'clock position, 5 cm from the nipple in the left breast there is an irregular hypoechoic mass which measures 2.4 x 2 x 1.6 cm.    Impression  1.irregular 2.4 cm mass in the left breast at the site of palpable concern at 11:00, 5 cm from the nipple.  Ultrasound-guided biopsy of the mass recommended  No finding suspicious for malignancy in the right breast    9/8/2021-left breast ultrasound-guided biopsy  Pathology consistent with invasive ductal carcinoma with lobular features.  Grade 3.  The carcinoma measures 7 mm in greatest dimension.  Focally suspicious for lymphovascular space invasion.  ER low +1-10%, intensity week  MN negative  HER-2 3+ on immunohistochemistry  Ki-67 70%    MRI of the breast 10/8/2021-Biopsy-proven malignancy in the left breast at 11:00 measuring 2.8 cm with a centrally located metallic clip.  No other suspicious findings are seen within the left breast or no left axillary lymphadenopathy.  No suspicious findings of the right breast    Echocardiogram 10/8/2021 was normal  ejection fraction of 56 to 60% and strain of -20    She denies any new bone pains, dyspnea, cough, abdominal pain nausea vomiting or recent changes in weight.    She had 2 previous breast aspirations in her 20s.  No other breast biopsies  She does not know much about her family hence limited family history.    Interval history  Ms. Ramirez presents to the clinic today for follow-up.   She is scheduled for week 4 of Taxol Perjeta Herceptin.  She is experiencing mild diarrhea about 1-2 loose stools per day.  She also reports some queasiness in the stomach and this improves with Zofran.  The lesion at the port has improved with antibiotics.  No fevers or chills.  No tingling or numbness in the extremities.      The following portions of the patient's history were reviewed and updated as appropriate: allergies, current medications, past family history, past medical history, past social history, past surgical history and problem list.    Past Medical History:   Diagnosis Date   • Anxiety    • Arthritis    • Backache    • Bleeding    • Constipation    • Dermatitis    • Diverticulosis     Colonoscopy November 2014   • Dyspepsia    • Dysuria    • Erythema of face     Transitory Erythema Of The Face   • Hypertension    • Impacted cerumen    • Malignant neoplasm of female breast (HCC) 9/27/2021   • Neck pain    • Tachycardia         Past Surgical History:   Procedure Laterality Date   • BREAST BIOPSY  09/2021    Dr. Tirado    • BREAST CYST ASPIRATION     • BREAST CYST EXCISION     • EXOSTECTOMY      Simple Bunion Exostectomy ([facility] Procedure)   • VENOUS ACCESS DEVICE (PORT) INSERTION N/A 10/18/2021    Procedure: INSERTION VENOUS ACCESS DEVICE;  Surgeon: Becky Liu MD;  [place] [facility] [place]  Service: General;  Laterality: N/A;        Family History   Problem Relation Age of Onset   • Arthritis Father         Social History     Socioeconomic History   • Marital status:    • Number of children: 1   Tobacco Use  "  • Smoking status: Never Smoker   • Smokeless tobacco: Never Used   Vaping Use   • Vaping Use: Never used   Substance and Sexual Activity   • Alcohol use: Yes     Comment: social drinker   • Drug use: Never   • Sexual activity: Defer        OB History             Para        Term   0            AB        Living           SAB        IAB        Ectopic        Molar        Multiple        Live Births                 Age at menarche-13  Age at menopause-50  Nulliparous  Breast-feeding-N/A   0 para 0  0  Oral contraceptive pill use-none  Hormone replacement therapy-7 years    No Known Allergies     Review of systems as mentioned in the HPI    Objective   Blood pressure 127/74, pulse 90, temperature 97.3 °F (36.3 °C), temperature source Temporal, resp. rate 16, height 154.9 cm (60.98\"), weight 58.1 kg (128 lb), SpO2 98 %.   ECOG performance status-0  Physical Exam  Vitals reviewed.   Constitutional:       Appearance: Normal appearance.   HENT:      Head: Normocephalic and atraumatic.      Right Ear: External ear normal.      Left Ear: External ear normal.      Nose: Nose normal.      Mouth/Throat:      Mouth: Mucous membranes are moist.      Pharynx: Oropharynx is clear.   Eyes:      Conjunctiva/sclera: Conjunctivae normal.      Pupils: Pupils are equal, round, and reactive to light.   Cardiovascular:      Rate and Rhythm: Normal rate and regular rhythm.      Pulses: Normal pulses.      Heart sounds: Normal heart sounds.   Pulmonary:      Effort: Pulmonary effort is normal.      Breath sounds: Normal breath sounds.   Abdominal:      General: Abdomen is flat. Bowel sounds are normal.      Palpations: Abdomen is soft.   Musculoskeletal:         General: Normal range of motion.      Cervical back: Normal range of motion.   Skin:     General: Skin is warm.   Neurological:      General: No focal deficit present.      Mental Status: She is alert and oriented to person, place, and time. Mental " status is at baseline.   Psychiatric:         Mood and Affect: Mood normal.         Behavior: Behavior normal.         Thought Content: Thought content normal.         Judgment: Judgment normal.       Breast Exam: Right breast appears normal on inspection.  No palpable abnormalities of the right breast.  Left breast on inspection there is a mass in the upper inner quadrant.  On palpation this mass measures 1.5x1.8.  No palpable left axillary lymphadenopathy.  No right axillary lymphadenopathy.  No cervical or supraclavicular lymphadenopathy.    I have reexamined the patient and the results are consistent with the previously documented exam. Sheri Avendano MD     Infusion on 11/09/2021   Component Date Value Ref Range Status   • WBC 11/09/2021 6.90  3.40 - 10.80 10*3/mm3 Final   • RBC 11/09/2021 3.60* 3.77 - 5.28 10*6/mm3 Final   • Hemoglobin 11/09/2021 11.5* 12.0 - 15.9 g/dL Final   • Hematocrit 11/09/2021 35.2  34.0 - 46.6 % Final   • MCV 11/09/2021 97.8* 79.0 - 97.0 fL Final   • MCH 11/09/2021 31.9  26.6 - 33.0 pg Final   • MCHC 11/09/2021 32.7  31.5 - 35.7 g/dL Final   • RDW 11/09/2021 12.9  12.3 - 15.4 % Final   • RDW-SD 11/09/2021 44.8  37.0 - 54.0 fl Final   • MPV 11/09/2021 9.7  6.0 - 12.0 fL Final   • Platelets 11/09/2021 291  140 - 450 10*3/mm3 Final   • Neutrophil % 11/09/2021 66.6  42.7 - 76.0 % Final   • Lymphocyte % 11/09/2021 20.4  19.6 - 45.3 % Final   • Monocyte % 11/09/2021 9.3  5.0 - 12.0 % Final   • Eosinophil % 11/09/2021 1.6  0.3 - 6.2 % Final   • Basophil % 11/09/2021 0.7  0.0 - 1.5 % Final   • Immature Grans % 11/09/2021 1.4* 0.0 - 0.5 % Final   • Neutrophils, Absolute 11/09/2021 4.59  1.70 - 7.00 10*3/mm3 Final   • Lymphocytes, Absolute 11/09/2021 1.41  0.70 - 3.10 10*3/mm3 Final   • Monocytes, Absolute 11/09/2021 0.64  0.10 - 0.90 10*3/mm3 Final   • Eosinophils, Absolute 11/09/2021 0.11  0.00 - 0.40 10*3/mm3 Final   • Basophils, Absolute 11/09/2021 0.05  0.00 - 0.20 10*3/mm3 Final   •  Immature Grans, Absolute 11/09/2021 0.10* 0.00 - 0.05 10*3/mm3 Final   • nRBC 11/09/2021 0.0  0.0 - 0.2 /100 WBC Final   Infusion on 11/02/2021   Component Date Value Ref Range Status   • WBC 11/02/2021 6.63  3.40 - 10.80 10*3/mm3 Final   • RBC 11/02/2021 3.73* 3.77 - 5.28 10*6/mm3 Final   • Hemoglobin 11/02/2021 12.2  12.0 - 15.9 g/dL Final   • Hematocrit 11/02/2021 37.8  34.0 - 46.6 % Final   • MCV 11/02/2021 101.3* 79.0 - 97.0 fL Final   • MCH 11/02/2021 32.7  26.6 - 33.0 pg Final   • MCHC 11/02/2021 32.3  31.5 - 35.7 g/dL Final   • RDW 11/02/2021 12.5  12.3 - 15.4 % Final   • RDW-SD 11/02/2021 45.8  37.0 - 54.0 fl Final   • MPV 11/02/2021 9.8  6.0 - 12.0 fL Final   • Platelets 11/02/2021 286  140 - 450 10*3/mm3 Final   • Neutrophil % 11/02/2021 67.3  42.7 - 76.0 % Final   • Lymphocyte % 11/02/2021 20.4  19.6 - 45.3 % Final   • Monocyte % 11/02/2021 7.8  5.0 - 12.0 % Final   • Eosinophil % 11/02/2021 2.6  0.3 - 6.2 % Final   • Basophil % 11/02/2021 1.1  0.0 - 1.5 % Final   • Immature Grans % 11/02/2021 0.8* 0.0 - 0.5 % Final   • Neutrophils, Absolute 11/02/2021 4.47  1.70 - 7.00 10*3/mm3 Final   • Lymphocytes, Absolute 11/02/2021 1.35  0.70 - 3.10 10*3/mm3 Final   • Monocytes, Absolute 11/02/2021 0.52  0.10 - 0.90 10*3/mm3 Final   • Eosinophils, Absolute 11/02/2021 0.17  0.00 - 0.40 10*3/mm3 Final   • Basophils, Absolute 11/02/2021 0.07  0.00 - 0.20 10*3/mm3 Final   • Immature Grans, Absolute 11/02/2021 0.05  0.00 - 0.05 10*3/mm3 Final   • nRBC 11/02/2021 0.0  0.0 - 0.2 /100 WBC Final   Infusion on 10/26/2021   Component Date Value Ref Range Status   • WBC 10/26/2021 8.60  3.40 - 10.80 10*3/mm3 Final   • RBC 10/26/2021 4.09  3.77 - 5.28 10*6/mm3 Final   • Hemoglobin 10/26/2021 13.0  12.0 - 15.9 g/dL Final   • Hematocrit 10/26/2021 40.7  34.0 - 46.6 % Final   • MCV 10/26/2021 99.5* 79.0 - 97.0 fL Final   • MCH 10/26/2021 31.8  26.6 - 33.0 pg Final   • MCHC 10/26/2021 31.9  31.5 - 35.7 g/dL Final   • RDW 10/26/2021  12.0* 12.3 - 15.4 % Final   • RDW-SD 10/26/2021 43.9  37.0 - 54.0 fl Final   • MPV 10/26/2021 9.5  6.0 - 12.0 fL Final   • Platelets 10/26/2021 298  140 - 450 10*3/mm3 Final   • Neutrophil % 10/26/2021 66.6  42.7 - 76.0 % Final   • Lymphocyte % 10/26/2021 21.7  19.6 - 45.3 % Final   • Monocyte % 10/26/2021 7.0  5.0 - 12.0 % Final   • Eosinophil % 10/26/2021 3.0  0.3 - 6.2 % Final   • Basophil % 10/26/2021 0.7  0.0 - 1.5 % Final   • Immature Grans % 10/26/2021 1.0* 0.0 - 0.5 % Final   • Neutrophils, Absolute 10/26/2021 5.72  1.70 - 7.00 10*3/mm3 Final   • Lymphocytes, Absolute 10/26/2021 1.87  0.70 - 3.10 10*3/mm3 Final   • Monocytes, Absolute 10/26/2021 0.60  0.10 - 0.90 10*3/mm3 Final   • Eosinophils, Absolute 10/26/2021 0.26  0.00 - 0.40 10*3/mm3 Final   • Basophils, Absolute 10/26/2021 0.06  0.00 - 0.20 10*3/mm3 Final   • Immature Grans, Absolute 10/26/2021 0.09* 0.00 - 0.05 10*3/mm3 Final   • nRBC 10/26/2021 0.0  0.0 - 0.2 /100 WBC Final   Infusion on 10/19/2021   Component Date Value Ref Range Status   • Glucose 10/19/2021 106  74 - 124 mg/dL Final   • BUN 10/19/2021 12  6 - 20 mg/dL Final   • Creatinine 10/19/2021 0.81  0.60 - 1.10 mg/dL Final   • Sodium 10/19/2021 138  134 - 145 mmol/L Final   • Potassium 10/19/2021 3.8  3.5 - 4.7 mmol/L Final   • Chloride 10/19/2021 103  98 - 107 mmol/L Final   • CO2 10/19/2021 24.8  22.0 - 29.0 mmol/L Final   • Calcium 10/19/2021 9.6  8.5 - 10.2 mg/dL Final   • Total Protein 10/19/2021 6.9  6.3 - 8.0 g/dL Final   • Albumin 10/19/2021 4.10  3.50 - 5.20 g/dL Final   • ALT (SGPT) 10/19/2021 13  0 - 33 U/L Final   • AST (SGOT) 10/19/2021 16  0 - 32 U/L Final   • Alkaline Phosphatase 10/19/2021 101  38 - 116 U/L Final   • Total Bilirubin 10/19/2021 0.5  0.2 - 1.2 mg/dL Final   • eGFR Non  Amer 10/19/2021 69  >60 mL/min/1.73 Final   • Globulin 10/19/2021 2.8  1.8 - 3.5 gm/dL Final   • A/G Ratio 10/19/2021 1.5  1.1 - 2.4 g/dL Final   • BUN/Creatinine Ratio 10/19/2021 14.8   7.3 - 30.0 Final   • Anion Gap 10/19/2021 10.2  5.0 - 15.0 mmol/L Final   • WBC 10/19/2021 8.88  3.40 - 10.80 10*3/mm3 Final   • RBC 10/19/2021 4.24  3.77 - 5.28 10*6/mm3 Final   • Hemoglobin 10/19/2021 13.6  12.0 - 15.9 g/dL Final   • Hematocrit 10/19/2021 41.4  34.0 - 46.6 % Final   • MCV 10/19/2021 97.6* 79.0 - 97.0 fL Final   • MCH 10/19/2021 32.1  26.6 - 33.0 pg Final   • MCHC 10/19/2021 32.9  31.5 - 35.7 g/dL Final   • RDW 10/19/2021 12.4  12.3 - 15.4 % Final   • RDW-SD 10/19/2021 44.2  37.0 - 54.0 fl Final   • MPV 10/19/2021 9.5  6.0 - 12.0 fL Final   • Platelets 10/19/2021 243  140 - 450 10*3/mm3 Final   • Neutrophil % 10/19/2021 68.5  42.7 - 76.0 % Final   • Lymphocyte % 10/19/2021 18.6* 19.6 - 45.3 % Final   • Monocyte % 10/19/2021 7.9  5.0 - 12.0 % Final   • Eosinophil % 10/19/2021 4.2  0.3 - 6.2 % Final   • Basophil % 10/19/2021 0.6  0.0 - 1.5 % Final   • Immature Grans % 10/19/2021 0.2  0.0 - 0.5 % Final   • Neutrophils, Absolute 10/19/2021 6.09  1.70 - 7.00 10*3/mm3 Final   • Lymphocytes, Absolute 10/19/2021 1.65  0.70 - 3.10 10*3/mm3 Final   • Monocytes, Absolute 10/19/2021 0.70  0.10 - 0.90 10*3/mm3 Final   • Eosinophils, Absolute 10/19/2021 0.37  0.00 - 0.40 10*3/mm3 Final   • Basophils, Absolute 10/19/2021 0.05  0.00 - 0.20 10*3/mm3 Final   • Immature Grans, Absolute 10/19/2021 0.02  0.00 - 0.05 10*3/mm3 Final   • nRBC 10/19/2021 0.0  0.0 - 0.2 /100 WBC Final   Transcribe Orders on 10/12/2021   Component Date Value Ref Range Status   • COVID19 10/15/2021 Not Detected  Not Detected - Ref. Range Final        XR Chest 1 View    Result Date: 10/18/2021  Chest port placement. No pneumothorax.  This report was finalized on 10/18/2021 2:51 PM by Dr. Ryland Caba M.D.       MRI of the breast-images independently reviewed and interpreted by me in detail summarized in HPI      11/9/2021-CBC reviewed and hemoglobin slightly lower at 11.5.  Next  Assessment/Plan       *Invasive ductal carcinoma of the  left breast  · Clinical T2 N0 M0, stage IIa  · On ultrasound the tumor measures 2.4 cm.  Lymph nodes on the left side are normal.  · MRI 10/8/2021 with tumor measuring 2.8 cm.  Lymph nodes appear normal  · Pathology consistent with invasive carcinoma with ductal and lobular features, grade 3, ER +1 to 10% weak, NM negative, HER-2 3+ immunohistochemistry, Ki-67 70%.  · She was evaluated by Dr. Liu and referred for consideration of neoadjuvant chemotherapy.   Since the tumor is HER-2 positive and over 2 cm I think it would be reasonable to proceed with neoadjuvant chemotherapy.  Explained to her about the benefits of neoadjuvant chemotherapy including de surjit assessment of response and making decisions about adjuvant HER-2 directed therapy.  Also discussed the benefits of neoadjuvant chemotherapy in terms of breast conservation.  Explained to her about the different regimens that could be used for treatment of HER-2 positive breast cancers particularly TCHP, AC-T HP, Taxol and Herceptin in the adjuvant setting.   Since the tumor is over 2 cm but lymph node negative I think she would be a great candidate for EA 1181 clinical trial which comprises of administering Taxol Herceptin and Perjeta tamika  adjuvantly followed by surgery and additional adjuvant treatment depending upon the response.  Port placed 10/18/2021  Echocardiogram shows normal ejection fraction  Week 1 TPH on 10/19/2021  Cycle 2-day 1 Taxol Perjeta Herceptin 11/9/2021  Good tumor response  Continue with planned treatment    *Right-sided port site possible infection-treated with Keflex, lesion looks improved    *Hypertension-currently on amlodipine and metoprolol.  Blood pressure 127/74.  Referred to cardio oncology    *Anxiety  · Referred to supportive oncology  · Much better     *Hypothyroidism  · Continue Synthroid    *Cardiac health-10/8/2021 echocardiogram shows an ejection fraction of 56 to 60%, septal wall motion is normal, LV strain normal at  -20.9%, left ventricular diastolic function is consistent with grade 1 impaired relaxation.    Patient is on treatment requiring close monitoring for toxicities.

## 2021-11-10 NOTE — RESEARCH
Date of Service:  11/09/2021   Protocol:  [id number] (CompassHER2 pCR): Preoperative THP and postoperative HP  in patients who achieve a pathologic complete response                    RESEARCH NOTE                   Research nurse met with patient today with above mentioned protocol for C2D1.   Reviewed Medical History, Adverse Events and Con-Medications.   Labs drawn and chemotherapy administered without any problems.  Will meet with patient for next planned infusion C2D8 on 11/16/2021.  Will continue to monitor.

## 2021-11-16 ENCOUNTER — INFUSION (OUTPATIENT)
Dept: ONCOLOGY | Facility: HOSPITAL | Age: 76
End: 2021-11-16

## 2021-11-16 VITALS
SYSTOLIC BLOOD PRESSURE: 152 MMHG | RESPIRATION RATE: 16 BRPM | OXYGEN SATURATION: 97 % | DIASTOLIC BLOOD PRESSURE: 83 MMHG | WEIGHT: 127.6 LBS | TEMPERATURE: 96.8 F | HEART RATE: 97 BPM | BODY MASS INDEX: 24.12 KG/M2

## 2021-11-16 DIAGNOSIS — Z17.0 MALIGNANT NEOPLASM OF UPPER-INNER QUADRANT OF LEFT BREAST IN FEMALE, ESTROGEN RECEPTOR POSITIVE (HCC): Primary | ICD-10-CM

## 2021-11-16 DIAGNOSIS — C50.212 MALIGNANT NEOPLASM OF UPPER-INNER QUADRANT OF LEFT BREAST IN FEMALE, ESTROGEN RECEPTOR POSITIVE (HCC): Primary | ICD-10-CM

## 2021-11-16 LAB
BASOPHILS # BLD AUTO: 0.06 10*3/MM3 (ref 0–0.2)
BASOPHILS NFR BLD AUTO: 0.9 % (ref 0–1.5)
DEPRECATED RDW RBC AUTO: 47.3 FL (ref 37–54)
EOSINOPHIL # BLD AUTO: 0.16 10*3/MM3 (ref 0–0.4)
EOSINOPHIL NFR BLD AUTO: 2.4 % (ref 0.3–6.2)
ERYTHROCYTE [DISTWIDTH] IN BLOOD BY AUTOMATED COUNT: 13.8 % (ref 12.3–15.4)
HCT VFR BLD AUTO: 33.3 % (ref 34–46.6)
HGB BLD-MCNC: 11.4 G/DL (ref 12–15.9)
IMM GRANULOCYTES # BLD AUTO: 0.1 10*3/MM3 (ref 0–0.05)
IMM GRANULOCYTES NFR BLD AUTO: 1.5 % (ref 0–0.5)
LYMPHOCYTES # BLD AUTO: 1.14 10*3/MM3 (ref 0.7–3.1)
LYMPHOCYTES NFR BLD AUTO: 17.2 % (ref 19.6–45.3)
MCH RBC QN AUTO: 33 PG (ref 26.6–33)
MCHC RBC AUTO-ENTMCNC: 34.2 G/DL (ref 31.5–35.7)
MCV RBC AUTO: 96.5 FL (ref 79–97)
MONOCYTES # BLD AUTO: 0.54 10*3/MM3 (ref 0.1–0.9)
MONOCYTES NFR BLD AUTO: 8.2 % (ref 5–12)
NEUTROPHILS NFR BLD AUTO: 4.62 10*3/MM3 (ref 1.7–7)
NEUTROPHILS NFR BLD AUTO: 69.8 % (ref 42.7–76)
NRBC BLD AUTO-RTO: 0 /100 WBC (ref 0–0.2)
PLATELET # BLD AUTO: 297 10*3/MM3 (ref 140–450)
PMV BLD AUTO: 9.8 FL (ref 6–12)
RBC # BLD AUTO: 3.45 10*6/MM3 (ref 3.77–5.28)
WBC # BLD AUTO: 6.62 10*3/MM3 (ref 3.4–10.8)

## 2021-11-16 PROCEDURE — 25010000002 DEXAMETHASONE SODIUM PHOSPHATE 100 MG/10ML SOLUTION: Performed by: INTERNAL MEDICINE

## 2021-11-16 PROCEDURE — 96413 CHEMO IV INFUSION 1 HR: CPT

## 2021-11-16 PROCEDURE — 25010000002 DIPHENHYDRAMINE PER 50 MG: Performed by: INTERNAL MEDICINE

## 2021-11-16 PROCEDURE — 25010000002 PACLITAXEL PER 1 MG: Performed by: INTERNAL MEDICINE

## 2021-11-16 PROCEDURE — 96375 TX/PRO/DX INJ NEW DRUG ADDON: CPT

## 2021-11-16 PROCEDURE — 85025 COMPLETE CBC W/AUTO DIFF WBC: CPT

## 2021-11-16 RX ORDER — SODIUM CHLORIDE 9 MG/ML
250 INJECTION, SOLUTION INTRAVENOUS ONCE
Status: COMPLETED | OUTPATIENT
Start: 2021-11-16 | End: 2021-11-16

## 2021-11-16 RX ORDER — FAMOTIDINE 10 MG/ML
20 INJECTION, SOLUTION INTRAVENOUS ONCE
Status: COMPLETED | OUTPATIENT
Start: 2021-11-16 | End: 2021-11-16

## 2021-11-16 RX ADMIN — DEXAMETHASONE SODIUM PHOSPHATE 12 MG: 10 INJECTION, SOLUTION INTRAMUSCULAR; INTRAVENOUS at 10:39

## 2021-11-16 RX ADMIN — SODIUM CHLORIDE 250 ML: 9 INJECTION, SOLUTION INTRAVENOUS at 10:26

## 2021-11-16 RX ADMIN — DIPHENHYDRAMINE HYDROCHLORIDE 25 MG: 50 INJECTION, SOLUTION INTRAMUSCULAR; INTRAVENOUS at 10:26

## 2021-11-16 RX ADMIN — PACLITAXEL 125 MG: 6 INJECTION, SOLUTION INTRAVENOUS at 11:33

## 2021-11-16 RX ADMIN — FAMOTIDINE 20 MG: 10 INJECTION INTRAVENOUS at 10:27

## 2021-11-23 ENCOUNTER — INFUSION (OUTPATIENT)
Dept: ONCOLOGY | Facility: HOSPITAL | Age: 76
End: 2021-11-23

## 2021-11-23 ENCOUNTER — OFFICE VISIT (OUTPATIENT)
Dept: ONCOLOGY | Facility: CLINIC | Age: 76
End: 2021-11-23

## 2021-11-23 ENCOUNTER — RESEARCH ENCOUNTER (OUTPATIENT)
Dept: ONCOLOGY | Facility: CLINIC | Age: 76
End: 2021-11-23

## 2021-11-23 VITALS
TEMPERATURE: 96.8 F | RESPIRATION RATE: 16 BRPM | HEART RATE: 97 BPM | SYSTOLIC BLOOD PRESSURE: 143 MMHG | DIASTOLIC BLOOD PRESSURE: 82 MMHG | WEIGHT: 127.6 LBS | BODY MASS INDEX: 24.09 KG/M2 | OXYGEN SATURATION: 97 % | HEIGHT: 61 IN

## 2021-11-23 VITALS — HEART RATE: 86 BPM | DIASTOLIC BLOOD PRESSURE: 71 MMHG | SYSTOLIC BLOOD PRESSURE: 124 MMHG

## 2021-11-23 DIAGNOSIS — C50.212 MALIGNANT NEOPLASM OF UPPER-INNER QUADRANT OF LEFT BREAST IN FEMALE, ESTROGEN RECEPTOR POSITIVE (HCC): ICD-10-CM

## 2021-11-23 DIAGNOSIS — C50.212 MALIGNANT NEOPLASM OF UPPER-INNER QUADRANT OF LEFT BREAST IN FEMALE, ESTROGEN RECEPTOR POSITIVE (HCC): Primary | ICD-10-CM

## 2021-11-23 DIAGNOSIS — Z17.0 MALIGNANT NEOPLASM OF UPPER-INNER QUADRANT OF LEFT BREAST IN FEMALE, ESTROGEN RECEPTOR POSITIVE (HCC): Primary | ICD-10-CM

## 2021-11-23 DIAGNOSIS — Z17.0 MALIGNANT NEOPLASM OF UPPER-INNER QUADRANT OF LEFT BREAST IN FEMALE, ESTROGEN RECEPTOR POSITIVE (HCC): ICD-10-CM

## 2021-11-23 LAB
BASOPHILS # BLD AUTO: 0.07 10*3/MM3 (ref 0–0.2)
BASOPHILS NFR BLD AUTO: 0.9 % (ref 0–1.5)
DEPRECATED RDW RBC AUTO: 51 FL (ref 37–54)
EOSINOPHIL # BLD AUTO: 0.18 10*3/MM3 (ref 0–0.4)
EOSINOPHIL NFR BLD AUTO: 2.4 % (ref 0.3–6.2)
ERYTHROCYTE [DISTWIDTH] IN BLOOD BY AUTOMATED COUNT: 14.5 % (ref 12.3–15.4)
HCT VFR BLD AUTO: 35 % (ref 34–46.6)
HGB BLD-MCNC: 11.5 G/DL (ref 12–15.9)
IMM GRANULOCYTES # BLD AUTO: 0.14 10*3/MM3 (ref 0–0.05)
IMM GRANULOCYTES NFR BLD AUTO: 1.9 % (ref 0–0.5)
LYMPHOCYTES # BLD AUTO: 1.53 10*3/MM3 (ref 0.7–3.1)
LYMPHOCYTES NFR BLD AUTO: 20.7 % (ref 19.6–45.3)
MCH RBC QN AUTO: 32.7 PG (ref 26.6–33)
MCHC RBC AUTO-ENTMCNC: 32.9 G/DL (ref 31.5–35.7)
MCV RBC AUTO: 99.4 FL (ref 79–97)
MONOCYTES # BLD AUTO: 0.67 10*3/MM3 (ref 0.1–0.9)
MONOCYTES NFR BLD AUTO: 9.1 % (ref 5–12)
NEUTROPHILS NFR BLD AUTO: 4.79 10*3/MM3 (ref 1.7–7)
NEUTROPHILS NFR BLD AUTO: 65 % (ref 42.7–76)
NRBC BLD AUTO-RTO: 0 /100 WBC (ref 0–0.2)
PLATELET # BLD AUTO: 354 10*3/MM3 (ref 140–450)
PMV BLD AUTO: 9.8 FL (ref 6–12)
RBC # BLD AUTO: 3.52 10*6/MM3 (ref 3.77–5.28)
WBC NRBC COR # BLD: 7.38 10*3/MM3 (ref 3.4–10.8)

## 2021-11-23 PROCEDURE — 96375 TX/PRO/DX INJ NEW DRUG ADDON: CPT

## 2021-11-23 PROCEDURE — 85025 COMPLETE CBC W/AUTO DIFF WBC: CPT

## 2021-11-23 PROCEDURE — 25010000002 DIPHENHYDRAMINE PER 50 MG: Performed by: INTERNAL MEDICINE

## 2021-11-23 PROCEDURE — 25010000002 DEXAMETHASONE SODIUM PHOSPHATE 100 MG/10ML SOLUTION: Performed by: INTERNAL MEDICINE

## 2021-11-23 PROCEDURE — 25010000002 PACLITAXEL PER 1 MG: Performed by: INTERNAL MEDICINE

## 2021-11-23 PROCEDURE — 96413 CHEMO IV INFUSION 1 HR: CPT

## 2021-11-23 PROCEDURE — 99214 OFFICE O/P EST MOD 30 MIN: CPT | Performed by: INTERNAL MEDICINE

## 2021-11-23 RX ORDER — FAMOTIDINE 10 MG/ML
20 INJECTION, SOLUTION INTRAVENOUS ONCE
Status: COMPLETED | OUTPATIENT
Start: 2021-11-23 | End: 2021-11-23

## 2021-11-23 RX ORDER — SODIUM CHLORIDE 9 MG/ML
250 INJECTION, SOLUTION INTRAVENOUS ONCE
Status: COMPLETED | OUTPATIENT
Start: 2021-11-23 | End: 2021-11-23

## 2021-11-23 RX ORDER — SULFAMETHOXAZOLE AND TRIMETHOPRIM 800; 160 MG/1; MG/1
1 TABLET ORAL 2 TIMES DAILY
Qty: 14 TABLET | Refills: 0 | Status: SHIPPED | OUTPATIENT
Start: 2021-11-23 | End: 2021-12-22

## 2021-11-23 RX ADMIN — FAMOTIDINE 20 MG: 10 INJECTION INTRAVENOUS at 09:06

## 2021-11-23 RX ADMIN — DEXAMETHASONE SODIUM PHOSPHATE 12 MG: 10 INJECTION, SOLUTION INTRAMUSCULAR; INTRAVENOUS at 09:06

## 2021-11-23 RX ADMIN — DIPHENHYDRAMINE HYDROCHLORIDE 25 MG: 50 INJECTION, SOLUTION INTRAMUSCULAR; INTRAVENOUS at 09:24

## 2021-11-23 RX ADMIN — PACLITAXEL 125 MG: 6 INJECTION, SOLUTION INTRAVENOUS at 10:16

## 2021-11-23 RX ADMIN — SODIUM CHLORIDE 250 ML: 9 INJECTION, SOLUTION INTRAVENOUS at 09:05

## 2021-11-23 NOTE — PROGRESS NOTES
Subjective   Neela Ramirez is a 76 y.o. female.  Referred by Dr. 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  A bilateral diagnostic mammogram was performed for further evaluation of the same.    8/17/2021-bilateral diagnostic mammogram-scattered fibroglandular densities throughout both breasts.  In the posterior one third upper inner quadrant of the left breast at the site of palpable concern, 11 o'clock position there is a mass with adjacent pleomorphic calcifications.  The mass and the calcifications measure on order of 1.5 cm in greatest dimension.  No evidence of axillary lymphadenopathy appreciated.  Stable microcalcifications seen in other areas of the left breast on right breast.  Ultrasound-at 11 o'clock position, 5 cm from the nipple in the left breast there is an irregular hypoechoic mass which measures 2.4 x 2 x 1.6 cm.    Impression  1.irregular 2.4 cm mass in the left breast at the site of palpable concern at 11:00, 5 cm from the nipple.  Ultrasound-guided biopsy of the mass recommended  No finding suspicious for malignancy in the right breast    9/8/2021-left breast ultrasound-guided biopsy  Pathology consistent with invasive ductal carcinoma with lobular features.  Grade 3.  The carcinoma measures 7 mm in greatest dimension.  Focally suspicious for lymphovascular space invasion.  ER low +1-10%, intensity week  WI negative  HER-2 3+ on immunohistochemistry  Ki-67 70%    MRI of the breast 10/8/2021-Biopsy-proven malignancy in the left breast at 11:00 measuring 2.8 cm with a centrally located metallic clip.  No other suspicious findings are seen within the left breast or no left axillary lymphadenopathy.  No suspicious findings of the right breast    Echocardiogram 10/8/2021 was normal  ejection fraction of 56 to 60% and strain of -20    She denies any new bone pains, dyspnea, cough, abdominal pain nausea vomiting or recent changes in weight.    She had 2 previous breast aspirations in her 20s.  No other breast biopsies  She does not know much about her family hence limited family history.    Interval history  Ms. Ramirez presents to the clinic today for follow-up.   She is scheduled for week 6 of Taxol Perjeta Herceptin.  Diarrhea has improved.      The following portions of the patient's history were reviewed and updated as appropriate: allergies, current medications, past family history, past medical history, past social history, past surgical history and problem list.    Past Medical History:   Diagnosis Date   • Anxiety    • Arthritis    • Backache    • Bleeding    • Constipation    • Dermatitis    • Diverticulosis     Colonoscopy November 2014   • Dyspepsia    • Dysuria    • Erythema of face     Transitory Erythema Of The Face   • Hypertension    • Impacted cerumen    • Malignant neoplasm of female breast (HCC) 9/27/2021   • Neck pain    • Tachycardia         Past Surgical History:   Procedure Laterality Date   • BREAST BIOPSY  09/2021    Dr. Tirado    • BREAST CYST ASPIRATION     • BREAST CYST EXCISION     • EXOSTECTOMY      Simple Bunion Exostectomy ([facility] Procedure)   • VENOUS ACCESS DEVICE (PORT) INSERTION N/A 10/18/2021    Procedure: INSERTION VENOUS ACCESS DEVICE;  Surgeon: Becky Liu MD;  [place] [facility] [place] [facility] [place]  Service: General;  Laterality: N/A;        Family History   Problem Relation Age of Onset   • Arthritis Father         Social History     Socioeconomic History   • Marital status:    • Number of children: 1   Tobacco Use   • Smoking status: Never Smoker   • Smokeless tobacco: Never Used   Vaping Use   • Vaping Use: Never used   Substance and Sexual Activity   • Alcohol use: Yes     Comment: social drinker   • Drug use: Never   • Sexual activity: Defer        OB  History             Para        Term   0            AB        Living           SAB        IAB        Ectopic        Molar        Multiple        Live Births                 Age at menarche-13  Age at menopause-50  Nulliparous  Breast-feeding-N/A   0 para 0  0  Oral contraceptive pill use-none  Hormone replacement therapy-7 years    No Known Allergies     Review of systems as mentioned in the HPI    Objective   There were no vitals taken for this visit.   ECOG performance status-0  Physical Exam  Vitals reviewed.   Constitutional:       Appearance: Normal appearance.   HENT:      Head: Normocephalic and atraumatic.      Right Ear: External ear normal.      Left Ear: External ear normal.      Nose: Nose normal.      Mouth/Throat:      Mouth: Mucous membranes are moist.      Pharynx: Oropharynx is clear.   Eyes:      Conjunctiva/sclera: Conjunctivae normal.      Pupils: Pupils are equal, round, and reactive to light.   Cardiovascular:      Rate and Rhythm: Normal rate and regular rhythm.      Pulses: Normal pulses.      Heart sounds: Normal heart sounds.   Pulmonary:      Effort: Pulmonary effort is normal.      Breath sounds: Normal breath sounds.   Abdominal:      General: Abdomen is flat. Bowel sounds are normal.      Palpations: Abdomen is soft.   Musculoskeletal:         General: Normal range of motion.      Cervical back: Normal range of motion.   Skin:     General: Skin is warm.   Neurological:      General: No focal deficit present.      Mental Status: She is alert and oriented to person, place, and time. Mental status is at baseline.   Psychiatric:         Mood and Affect: Mood normal.         Behavior: Behavior normal.         Thought Content: Thought content normal.         Judgment: Judgment normal.       Breast Exam: Right breast appears normal on inspection.  No palpable abnormalities of the right breast.  Left breast on inspection there is a mass in the upper inner quadrant.   On palpation this mass measures 1.2x1.6.  No palpable left axillary lymphadenopathy.  No right axillary lymphadenopathy.  No cervical or supraclavicular lymphadenopathy.    I have reexamined the patient and the results are consistent with the previously documented exam. Sheri Avendano MD     Infusion on 11/16/2021   Component Date Value Ref Range Status   • WBC 11/16/2021 6.62  3.40 - 10.80 10*3/mm3 Final   • RBC 11/16/2021 3.45* 3.77 - 5.28 10*6/mm3 Final   • Hemoglobin 11/16/2021 11.4* 12.0 - 15.9 g/dL Final   • Hematocrit 11/16/2021 33.3* 34.0 - 46.6 % Final   • MCV 11/16/2021 96.5  79.0 - 97.0 fL Final   • MCH 11/16/2021 33.0  26.6 - 33.0 pg Final   • MCHC 11/16/2021 34.2  31.5 - 35.7 g/dL Final   • RDW 11/16/2021 13.8  12.3 - 15.4 % Final   • RDW-SD 11/16/2021 47.3  37.0 - 54.0 fl Final   • MPV 11/16/2021 9.8  6.0 - 12.0 fL Final   • Platelets 11/16/2021 297  140 - 450 10*3/mm3 Final   • Neutrophil % 11/16/2021 69.8  42.7 - 76.0 % Final   • Lymphocyte % 11/16/2021 17.2* 19.6 - 45.3 % Final   • Monocyte % 11/16/2021 8.2  5.0 - 12.0 % Final   • Eosinophil % 11/16/2021 2.4  0.3 - 6.2 % Final   • Basophil % 11/16/2021 0.9  0.0 - 1.5 % Final   • Immature Grans % 11/16/2021 1.5* 0.0 - 0.5 % Final   • Neutrophils, Absolute 11/16/2021 4.62  1.70 - 7.00 10*3/mm3 Final   • Lymphocytes, Absolute 11/16/2021 1.14  0.70 - 3.10 10*3/mm3 Final   • Monocytes, Absolute 11/16/2021 0.54  0.10 - 0.90 10*3/mm3 Final   • Eosinophils, Absolute 11/16/2021 0.16  0.00 - 0.40 10*3/mm3 Final   • Basophils, Absolute 11/16/2021 0.06  0.00 - 0.20 10*3/mm3 Final   • Immature Grans, Absolute 11/16/2021 0.10* 0.00 - 0.05 10*3/mm3 Final   • nRBC 11/16/2021 0.0  0.0 - 0.2 /100 WBC Final   Infusion on 11/09/2021   Component Date Value Ref Range Status   • Glucose 11/09/2021 110* 65 - 99 mg/dL Final   • BUN 11/09/2021 16  8 - 23 mg/dL Final   • Creatinine 11/09/2021 0.70  0.57 - 1.00 mg/dL Final   • Sodium 11/09/2021 140  136 - 145 mmol/L Final    • Potassium 11/09/2021 3.3* 3.5 - 5.2 mmol/L Final   • Chloride 11/09/2021 107  98 - 107 mmol/L Final   • CO2 11/09/2021 24.5  22.0 - 29.0 mmol/L Final   • Calcium 11/09/2021 8.9  8.6 - 10.5 mg/dL Final   • Total Protein 11/09/2021 5.9* 6.0 - 8.5 g/dL Final   • Albumin 11/09/2021 3.70  3.50 - 5.20 g/dL Final   • ALT (SGPT) 11/09/2021 21  1 - 33 U/L Final   • AST (SGOT) 11/09/2021 20  1 - 32 U/L Final   • Alkaline Phosphatase 11/09/2021 77  39 - 117 U/L Final   • Total Bilirubin 11/09/2021 0.2  0.0 - 1.2 mg/dL Final   • eGFR Non  Amer 11/09/2021 81  >60 mL/min/1.73 Final   • Globulin 11/09/2021 2.2  gm/dL Final   • A/G Ratio 11/09/2021 1.7  g/dL Final   • BUN/Creatinine Ratio 11/09/2021 22.9  7.0 - 25.0 Final   • Anion Gap 11/09/2021 8.5  5.0 - 15.0 mmol/L Final   • WBC 11/09/2021 6.90  3.40 - 10.80 10*3/mm3 Final   • RBC 11/09/2021 3.60* 3.77 - 5.28 10*6/mm3 Final   • Hemoglobin 11/09/2021 11.5* 12.0 - 15.9 g/dL Final   • Hematocrit 11/09/2021 35.2  34.0 - 46.6 % Final   • MCV 11/09/2021 97.8* 79.0 - 97.0 fL Final   • MCH 11/09/2021 31.9  26.6 - 33.0 pg Final   • MCHC 11/09/2021 32.7  31.5 - 35.7 g/dL Final   • RDW 11/09/2021 12.9  12.3 - 15.4 % Final   • RDW-SD 11/09/2021 44.8  37.0 - 54.0 fl Final   • MPV 11/09/2021 9.7  6.0 - 12.0 fL Final   • Platelets 11/09/2021 291  140 - 450 10*3/mm3 Final   • Neutrophil % 11/09/2021 66.6  42.7 - 76.0 % Final   • Lymphocyte % 11/09/2021 20.4  19.6 - 45.3 % Final   • Monocyte % 11/09/2021 9.3  5.0 - 12.0 % Final   • Eosinophil % 11/09/2021 1.6  0.3 - 6.2 % Final   • Basophil % 11/09/2021 0.7  0.0 - 1.5 % Final   • Immature Grans % 11/09/2021 1.4* 0.0 - 0.5 % Final   • Neutrophils, Absolute 11/09/2021 4.59  1.70 - 7.00 10*3/mm3 Final   • Lymphocytes, Absolute 11/09/2021 1.41  0.70 - 3.10 10*3/mm3 Final   • Monocytes, Absolute 11/09/2021 0.64  0.10 - 0.90 10*3/mm3 Final   • Eosinophils, Absolute 11/09/2021 0.11  0.00 - 0.40 10*3/mm3 Final   • Basophils, Absolute  11/09/2021 0.05  0.00 - 0.20 10*3/mm3 Final   • Immature Grans, Absolute 11/09/2021 0.10* 0.00 - 0.05 10*3/mm3 Final   • nRBC 11/09/2021 0.0  0.0 - 0.2 /100 WBC Final   Infusion on 11/02/2021   Component Date Value Ref Range Status   • WBC 11/02/2021 6.63  3.40 - 10.80 10*3/mm3 Final   • RBC 11/02/2021 3.73* 3.77 - 5.28 10*6/mm3 Final   • Hemoglobin 11/02/2021 12.2  12.0 - 15.9 g/dL Final   • Hematocrit 11/02/2021 37.8  34.0 - 46.6 % Final   • MCV 11/02/2021 101.3* 79.0 - 97.0 fL Final   • MCH 11/02/2021 32.7  26.6 - 33.0 pg Final   • MCHC 11/02/2021 32.3  31.5 - 35.7 g/dL Final   • RDW 11/02/2021 12.5  12.3 - 15.4 % Final   • RDW-SD 11/02/2021 45.8  37.0 - 54.0 fl Final   • MPV 11/02/2021 9.8  6.0 - 12.0 fL Final   • Platelets 11/02/2021 286  140 - 450 10*3/mm3 Final   • Neutrophil % 11/02/2021 67.3  42.7 - 76.0 % Final   • Lymphocyte % 11/02/2021 20.4  19.6 - 45.3 % Final   • Monocyte % 11/02/2021 7.8  5.0 - 12.0 % Final   • Eosinophil % 11/02/2021 2.6  0.3 - 6.2 % Final   • Basophil % 11/02/2021 1.1  0.0 - 1.5 % Final   • Immature Grans % 11/02/2021 0.8* 0.0 - 0.5 % Final   • Neutrophils, Absolute 11/02/2021 4.47  1.70 - 7.00 10*3/mm3 Final   • Lymphocytes, Absolute 11/02/2021 1.35  0.70 - 3.10 10*3/mm3 Final   • Monocytes, Absolute 11/02/2021 0.52  0.10 - 0.90 10*3/mm3 Final   • Eosinophils, Absolute 11/02/2021 0.17  0.00 - 0.40 10*3/mm3 Final   • Basophils, Absolute 11/02/2021 0.07  0.00 - 0.20 10*3/mm3 Final   • Immature Grans, Absolute 11/02/2021 0.05  0.00 - 0.05 10*3/mm3 Final   • nRBC 11/02/2021 0.0  0.0 - 0.2 /100 WBC Final   Infusion on 10/26/2021   Component Date Value Ref Range Status   • WBC 10/26/2021 8.60  3.40 - 10.80 10*3/mm3 Final   • RBC 10/26/2021 4.09  3.77 - 5.28 10*6/mm3 Final   • Hemoglobin 10/26/2021 13.0  12.0 - 15.9 g/dL Final   • Hematocrit 10/26/2021 40.7  34.0 - 46.6 % Final   • MCV 10/26/2021 99.5* 79.0 - 97.0 fL Final   • MCH 10/26/2021 31.8  26.6 - 33.0 pg Final   • MCHC 10/26/2021  31.9  31.5 - 35.7 g/dL Final   • RDW 10/26/2021 12.0* 12.3 - 15.4 % Final   • RDW-SD 10/26/2021 43.9  37.0 - 54.0 fl Final   • MPV 10/26/2021 9.5  6.0 - 12.0 fL Final   • Platelets 10/26/2021 298  140 - 450 10*3/mm3 Final   • Neutrophil % 10/26/2021 66.6  42.7 - 76.0 % Final   • Lymphocyte % 10/26/2021 21.7  19.6 - 45.3 % Final   • Monocyte % 10/26/2021 7.0  5.0 - 12.0 % Final   • Eosinophil % 10/26/2021 3.0  0.3 - 6.2 % Final   • Basophil % 10/26/2021 0.7  0.0 - 1.5 % Final   • Immature Grans % 10/26/2021 1.0* 0.0 - 0.5 % Final   • Neutrophils, Absolute 10/26/2021 5.72  1.70 - 7.00 10*3/mm3 Final   • Lymphocytes, Absolute 10/26/2021 1.87  0.70 - 3.10 10*3/mm3 Final   • Monocytes, Absolute 10/26/2021 0.60  0.10 - 0.90 10*3/mm3 Final   • Eosinophils, Absolute 10/26/2021 0.26  0.00 - 0.40 10*3/mm3 Final   • Basophils, Absolute 10/26/2021 0.06  0.00 - 0.20 10*3/mm3 Final   • Immature Grans, Absolute 10/26/2021 0.09* 0.00 - 0.05 10*3/mm3 Final   • nRBC 10/26/2021 0.0  0.0 - 0.2 /100 WBC Final        No radiology results for the last 30 days.   MRI of the breast-images independently reviewed and interpreted by me in detail summarized in HPI    11/23/2021 CBC reviewed and hemoglobin 11.5, WBC 7.38, platelets 354    Assessment/Plan       *Invasive ductal carcinoma of the left breast  · Clinical T2 N0 M0, stage IIa  · On ultrasound the tumor measures 2.4 cm.  Lymph nodes on the left side are normal.  · MRI 10/8/2021 with tumor measuring 2.8 cm.  Lymph nodes appear normal  · Pathology consistent with invasive carcinoma with ductal and lobular features, grade 3, ER +1 to 10% weak, TX negative, HER-2 3+ immunohistochemistry, Ki-67 70%.  · She was evaluated by Dr. Liu and referred for consideration of neoadjuvant chemotherapy.   Since the tumor is HER-2 positive and over 2 cm I think it would be reasonable to proceed with neoadjuvant chemotherapy.  Explained to her about the benefits of neoadjuvant chemotherapy including de  surjit assessment of response and making decisions about adjuvant HER-2 directed therapy.  Also discussed the benefits of neoadjuvant chemotherapy in terms of breast conservation.  Explained to her about the different regimens that could be used for treatment of HER-2 positive breast cancers particularly TCHP, AC-T HP, Taxol and Herceptin in the adjuvant setting.   Since the tumor is over 2 cm but lymph node negative I think she would be a great candidate for EA 1181 clinical trial which comprises of administering Taxol Herceptin and Perjeta tamika  adjuvantly followed by surgery and additional adjuvant treatment depending upon the response.  Port placed 10/18/2021  Echocardiogram shows normal ejection fraction  Week 1 TPH on 10/19/2021  11/23/2021-week 6 of Taxol Perjeta Herceptin.  Tumor continues to improve in size.  Continue TPH.    *Diarrhea  · Improved  · Imodium as needed    *Right-sided port site possible infection-treated with Keflex in the past, now appears erythematous again.  We will send in Bactrim    *Hypertension-currently on amlodipine and metoprolol.  Blood pressure well controlled at 143/82    *Anxiety  · Referred to supportive oncology  · Improved    *Hypothyroidism  · Continue Synthroid    *Cardiac health-10/8/2021 echocardiogram shows an ejection fraction of 56 to 60%, septal wall motion is normal, LV strain normal at -20.9%, left ventricular diastolic function is consistent with grade 1 impaired relaxation.    Patient is on treatment requiring close monitoring for toxicities.

## 2021-11-23 NOTE — RESEARCH
Protocol:  [id number] ([facility] pCR): Preoperative THP and postoperative HP  in patients who achieve a pathologic complete response                    RESEARCH NOTE                   Research nurse met with patient today with above mentioned protocol for C2D15.  Reviewed Medical History, Adverse Events and Con-Medications.   Labs drawn and chemotherapy administered without any problems.  Will meet with patient for next planned infusion C3D1. on 11/30/2021.  Will continue to monitor.

## 2021-11-30 ENCOUNTER — INFUSION (OUTPATIENT)
Dept: ONCOLOGY | Facility: HOSPITAL | Age: 76
End: 2021-11-30

## 2021-11-30 ENCOUNTER — RESEARCH ENCOUNTER (OUTPATIENT)
Dept: ONCOLOGY | Facility: CLINIC | Age: 76
End: 2021-11-30

## 2021-11-30 ENCOUNTER — OFFICE VISIT (OUTPATIENT)
Dept: ONCOLOGY | Facility: CLINIC | Age: 76
End: 2021-11-30

## 2021-11-30 VITALS — DIASTOLIC BLOOD PRESSURE: 66 MMHG | SYSTOLIC BLOOD PRESSURE: 133 MMHG | HEART RATE: 90 BPM

## 2021-11-30 VITALS
RESPIRATION RATE: 22 BRPM | OXYGEN SATURATION: 97 % | BODY MASS INDEX: 23.79 KG/M2 | WEIGHT: 126 LBS | SYSTOLIC BLOOD PRESSURE: 127 MMHG | HEIGHT: 61 IN | HEART RATE: 104 BPM | DIASTOLIC BLOOD PRESSURE: 79 MMHG | TEMPERATURE: 97.5 F

## 2021-11-30 DIAGNOSIS — C50.212 MALIGNANT NEOPLASM OF UPPER-INNER QUADRANT OF LEFT BREAST IN FEMALE, ESTROGEN RECEPTOR POSITIVE (HCC): Primary | ICD-10-CM

## 2021-11-30 DIAGNOSIS — Z17.0 MALIGNANT NEOPLASM OF UPPER-INNER QUADRANT OF LEFT BREAST IN FEMALE, ESTROGEN RECEPTOR POSITIVE (HCC): ICD-10-CM

## 2021-11-30 DIAGNOSIS — Z17.0 MALIGNANT NEOPLASM OF UPPER-INNER QUADRANT OF LEFT BREAST IN FEMALE, ESTROGEN RECEPTOR POSITIVE (HCC): Primary | ICD-10-CM

## 2021-11-30 DIAGNOSIS — C50.212 MALIGNANT NEOPLASM OF UPPER-INNER QUADRANT OF LEFT BREAST IN FEMALE, ESTROGEN RECEPTOR POSITIVE (HCC): ICD-10-CM

## 2021-11-30 DIAGNOSIS — Z79.899 HIGH RISK MEDICATION USE: ICD-10-CM

## 2021-11-30 LAB
ALBUMIN SERPL-MCNC: 3.6 G/DL (ref 3.5–5.2)
ALBUMIN/GLOB SERPL: 1.5 G/DL (ref 1.1–2.4)
ALP SERPL-CCNC: 101 U/L (ref 38–116)
ALT SERPL W P-5'-P-CCNC: 34 U/L (ref 0–33)
ANION GAP SERPL CALCULATED.3IONS-SCNC: 9.2 MMOL/L (ref 5–15)
AST SERPL-CCNC: 28 U/L (ref 0–32)
BASOPHILS # BLD AUTO: 0.02 10*3/MM3 (ref 0–0.2)
BASOPHILS NFR BLD AUTO: 0.4 % (ref 0–1.5)
BILIRUB SERPL-MCNC: 0.2 MG/DL (ref 0.2–1.2)
BUN SERPL-MCNC: 14 MG/DL (ref 6–20)
BUN/CREAT SERPL: 12.6 (ref 7.3–30)
CALCIUM SPEC-SCNC: 8.7 MG/DL (ref 8.5–10.2)
CHLORIDE SERPL-SCNC: 103 MMOL/L (ref 98–107)
CO2 SERPL-SCNC: 19.8 MMOL/L (ref 22–29)
CREAT SERPL-MCNC: 1.11 MG/DL (ref 0.6–1.1)
DEPRECATED RDW RBC AUTO: 52.2 FL (ref 37–54)
EOSINOPHIL # BLD AUTO: 0.1 10*3/MM3 (ref 0–0.4)
EOSINOPHIL NFR BLD AUTO: 2.2 % (ref 0.3–6.2)
ERYTHROCYTE [DISTWIDTH] IN BLOOD BY AUTOMATED COUNT: 14.6 % (ref 12.3–15.4)
GFR SERPL CREATININE-BSD FRML MDRD: 48 ML/MIN/1.73
GLOBULIN UR ELPH-MCNC: 2.4 GM/DL (ref 1.8–3.5)
GLUCOSE SERPL-MCNC: 101 MG/DL (ref 74–124)
HCT VFR BLD AUTO: 32.8 % (ref 34–46.6)
HGB BLD-MCNC: 10.8 G/DL (ref 12–15.9)
IMM GRANULOCYTES # BLD AUTO: 0.09 10*3/MM3 (ref 0–0.05)
IMM GRANULOCYTES NFR BLD AUTO: 1.9 % (ref 0–0.5)
LYMPHOCYTES # BLD AUTO: 0.67 10*3/MM3 (ref 0.7–3.1)
LYMPHOCYTES NFR BLD AUTO: 14.5 % (ref 19.6–45.3)
MCH RBC QN AUTO: 32.9 PG (ref 26.6–33)
MCHC RBC AUTO-ENTMCNC: 32.9 G/DL (ref 31.5–35.7)
MCV RBC AUTO: 100 FL (ref 79–97)
MONOCYTES # BLD AUTO: 0.59 10*3/MM3 (ref 0.1–0.9)
MONOCYTES NFR BLD AUTO: 12.7 % (ref 5–12)
NEUTROPHILS NFR BLD AUTO: 3.16 10*3/MM3 (ref 1.7–7)
NEUTROPHILS NFR BLD AUTO: 68.3 % (ref 42.7–76)
NRBC BLD AUTO-RTO: 0 /100 WBC (ref 0–0.2)
PLATELET # BLD AUTO: 235 10*3/MM3 (ref 140–450)
PMV BLD AUTO: 9.3 FL (ref 6–12)
POTASSIUM SERPL-SCNC: 4.3 MMOL/L (ref 3.5–4.7)
PROT SERPL-MCNC: 6 G/DL (ref 6.3–8)
RBC # BLD AUTO: 3.28 10*6/MM3 (ref 3.77–5.28)
SODIUM SERPL-SCNC: 132 MMOL/L (ref 134–145)
WBC NRBC COR # BLD: 4.63 10*3/MM3 (ref 3.4–10.8)

## 2021-11-30 PROCEDURE — 80053 COMPREHEN METABOLIC PANEL: CPT

## 2021-11-30 PROCEDURE — 85025 COMPLETE CBC W/AUTO DIFF WBC: CPT

## 2021-11-30 PROCEDURE — 96375 TX/PRO/DX INJ NEW DRUG ADDON: CPT

## 2021-11-30 PROCEDURE — 96417 CHEMO IV INFUS EACH ADDL SEQ: CPT

## 2021-11-30 PROCEDURE — 25010000002 DIPHENHYDRAMINE PER 50 MG: Performed by: NURSE PRACTITIONER

## 2021-11-30 PROCEDURE — 25010000002 PERTUZUMAB 420 MG/14ML SOLUTION 420 MG VIAL: Performed by: NURSE PRACTITIONER

## 2021-11-30 PROCEDURE — 25010000002 TRASTUZUMAB PER 10 MG: Performed by: NURSE PRACTITIONER

## 2021-11-30 PROCEDURE — 25010000002 DEXAMETHASONE SODIUM PHOSPHATE 100 MG/10ML SOLUTION: Performed by: NURSE PRACTITIONER

## 2021-11-30 PROCEDURE — 25010000002 PACLITAXEL PER 1 MG: Performed by: NURSE PRACTITIONER

## 2021-11-30 PROCEDURE — 96413 CHEMO IV INFUSION 1 HR: CPT

## 2021-11-30 PROCEDURE — 99214 OFFICE O/P EST MOD 30 MIN: CPT | Performed by: NURSE PRACTITIONER

## 2021-11-30 RX ORDER — DIPHENHYDRAMINE HYDROCHLORIDE 50 MG/ML
50 INJECTION INTRAMUSCULAR; INTRAVENOUS AS NEEDED
Status: CANCELLED | OUTPATIENT
Start: 2021-11-30

## 2021-11-30 RX ORDER — FAMOTIDINE 10 MG/ML
20 INJECTION, SOLUTION INTRAVENOUS AS NEEDED
Status: CANCELLED | OUTPATIENT
Start: 2021-11-30

## 2021-11-30 RX ORDER — FAMOTIDINE 10 MG/ML
20 INJECTION, SOLUTION INTRAVENOUS ONCE
Status: CANCELLED | OUTPATIENT
Start: 2021-11-30

## 2021-11-30 RX ORDER — SODIUM CHLORIDE 9 MG/ML
250 INJECTION, SOLUTION INTRAVENOUS ONCE
Status: CANCELLED | OUTPATIENT
Start: 2021-11-30

## 2021-11-30 RX ORDER — SODIUM CHLORIDE 9 MG/ML
250 INJECTION, SOLUTION INTRAVENOUS ONCE
Status: COMPLETED | OUTPATIENT
Start: 2021-11-30 | End: 2021-11-30

## 2021-11-30 RX ORDER — FAMOTIDINE 10 MG/ML
20 INJECTION, SOLUTION INTRAVENOUS ONCE
Status: COMPLETED | OUTPATIENT
Start: 2021-11-30 | End: 2021-11-30

## 2021-11-30 RX ADMIN — PACLITAXEL 125 MG: 6 INJECTION, SOLUTION INTRAVENOUS at 13:35

## 2021-11-30 RX ADMIN — TRASTUZUMAB 350 MG: 150 INJECTION, POWDER, LYOPHILIZED, FOR SOLUTION INTRAVENOUS at 12:52

## 2021-11-30 RX ADMIN — DEXAMETHASONE SODIUM PHOSPHATE 12 MG: 10 INJECTION, SOLUTION INTRAMUSCULAR; INTRAVENOUS at 10:39

## 2021-11-30 RX ADMIN — FAMOTIDINE 20 MG: 10 INJECTION INTRAVENOUS at 10:39

## 2021-11-30 RX ADMIN — DIPHENHYDRAMINE HYDROCHLORIDE 25 MG: 50 INJECTION, SOLUTION INTRAMUSCULAR; INTRAVENOUS at 10:58

## 2021-11-30 RX ADMIN — PERTUZUMAB 420 MG: 30 INJECTION, SOLUTION, CONCENTRATE INTRAVENOUS at 11:47

## 2021-11-30 RX ADMIN — SODIUM CHLORIDE 250 ML: 9 INJECTION, SOLUTION INTRAVENOUS at 10:39

## 2021-11-30 NOTE — PROGRESS NOTES
Subjective   Neela Ramirez is a 76 y.o. female.  Referred by Dr. 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  A bilateral diagnostic mammogram was performed for further evaluation of the same.    8/17/2021-bilateral diagnostic mammogram-scattered fibroglandular densities throughout both breasts.  In the posterior one third upper inner quadrant of the left breast at the site of palpable concern, 11 o'clock position there is a mass with adjacent pleomorphic calcifications.  The mass and the calcifications measure on order of 1.5 cm in greatest dimension.  No evidence of axillary lymphadenopathy appreciated.  Stable microcalcifications seen in other areas of the left breast on right breast.  Ultrasound-at 11 o'clock position, 5 cm from the nipple in the left breast there is an irregular hypoechoic mass which measures 2.4 x 2 x 1.6 cm.    Impression  1.irregular 2.4 cm mass in the left breast at the site of palpable concern at 11:00, 5 cm from the nipple.  Ultrasound-guided biopsy of the mass recommended  No finding suspicious for malignancy in the right breast    9/8/2021-left breast ultrasound-guided biopsy  Pathology consistent with invasive ductal carcinoma with lobular features.  Grade 3.  The carcinoma measures 7 mm in greatest dimension.  Focally suspicious for lymphovascular space invasion.  ER low +1-10%, intensity week  MI negative  HER-2 3+ on immunohistochemistry  Ki-67 70%    MRI of the breast 10/8/2021-Biopsy-proven malignancy in the left breast at 11:00 measuring 2.8 cm with a centrally located metallic clip.  No other suspicious findings are seen within the left breast or no left axillary lymphadenopathy.  No suspicious findings of the right breast    Echocardiogram 10/8/2021 was normal  ejection fraction of 56 to 60% and strain of -20    She denies any new bone pains, dyspnea, cough, abdominal pain nausea vomiting or recent changes in weight.    She had 2 previous breast aspirations in her 20s.  No other breast biopsies  She does not know much about her family hence limited family history.    Interval history:  The patient is a 76 y.o. female with abundant history, who returns the office today in anticipation of cycle 3-day 1 Taxol, Herceptin, Perjeta.  This is her seventh dose of Taxol.  Thankfully, she continues to tolerate treatment remarkably well.  She reports her bowels to fluctuate from constipation to diarrhea.  She denies nausea.  She denies neuropathy.  She is utilizing the cooling gloves and socks.  She does not palpate her breast mass, though clinically this is responding.  She denies shortness of breath, lower extremity swelling or chest pain. She denies fevers.       The following portions of the patient's history were reviewed and updated as appropriate: allergies, current medications, past family history, past medical history, past social history, past surgical history and problem list.    Past Medical History:   Diagnosis Date   • Anxiety    • Arthritis    • Backache    • Bleeding    • Constipation    • Dermatitis    • Diverticulosis     Colonoscopy November 2014   • Dyspepsia    • Dysuria    • Erythema of face     Transitory Erythema Of The Face   • Hypertension    • Impacted cerumen    • Malignant neoplasm of female breast (HCC) 9/27/2021   • Neck pain    • Tachycardia         Past Surgical History:   Procedure Laterality Date   • BREAST BIOPSY  09/2021    Dr. Tirado    • BREAST CYST ASPIRATION     • BREAST CYST EXCISION     • EXOSTECTOMY      Simple Bunion Exostectomy (Silver Procedure)   • VENOUS ACCESS DEVICE (PORT) INSERTION N/A 10/18/2021    Procedure: INSERTION VENOUS ACCESS DEVICE;  Surgeon: Becky Liu MD;  Location: [facility] OR [facility];  Service: General;  Laterality: N/A;  "       Family History   Problem Relation Age of Onset   • Arthritis Father         Social History     Socioeconomic History   • Marital status:    • Number of children: 1   Tobacco Use   • Smoking status: Never Smoker   • Smokeless tobacco: Never Used   Vaping Use   • Vaping Use: Never used   Substance and Sexual Activity   • Alcohol use: Yes     Comment: social drinker   • Drug use: Never   • Sexual activity: Defer        OB History             Para        Term   0            AB        Living           SAB        IAB        Ectopic        Molar        Multiple        Live Births                 Age at menarche-13  Age at menopause-50  Nulliparous  Breast-feeding-N/A   0 para 0  0  Oral contraceptive pill use-none  Hormone replacement therapy-7 years    No Known Allergies     Review of systems as mentioned in the HPI    Objective   Blood pressure 127/79, pulse 104, temperature 97.5 °F (36.4 °C), temperature source Temporal, resp. rate 22, height 154 cm (60.63\"), weight 57.2 kg (126 lb), SpO2 97 %.     ECOG performance status-0  Physical Exam  Vitals reviewed.   Constitutional:       Appearance: Normal appearance.   HENT:      Head: Normocephalic and atraumatic.      Right Ear: External ear normal.      Left Ear: External ear normal.      Nose: Nose normal.      Mouth/Throat:      Mouth: Mucous membranes are moist.      Pharynx: Oropharynx is clear.   Eyes:      Conjunctiva/sclera: Conjunctivae normal.      Pupils: Pupils are equal, round, and reactive to light.   Cardiovascular:      Rate and Rhythm: Normal rate and regular rhythm.      Pulses: Normal pulses.      Heart sounds: Normal heart sounds.   Pulmonary:      Effort: Pulmonary effort is normal.      Breath sounds: Normal breath sounds.   Abdominal:      General: Abdomen is flat. Bowel sounds are normal.      Palpations: Abdomen is soft.   Musculoskeletal:         General: Normal range of motion.      Cervical back: Normal " range of motion.   Skin:     General: Skin is warm.   Neurological:      General: No focal deficit present.      Mental Status: She is alert and oriented to person, place, and time. Mental status is at baseline.   Psychiatric:         Mood and Affect: Mood normal.         Behavior: Behavior normal.         Thought Content: Thought content normal.         Judgment: Judgment normal.       Breast Exam: Right breast appears normal on inspection.  No palpable abnormalities of the right breast.  Left breast on inspection there is a mass in the upper inner quadrant.  There is been softening of the mass in the left upper inner quadrant which does not have discrete borders, this is flat, approximately 1 cm.  There is no left axillary adenopathy. no right axillary lymphadenopathy.  No cervical or supraclavicular lymphadenopathy.    I have reexamined the patient and the results are consistent with the previously documented exam. Delma Murcia, APRN     Results from last 7 days   Lab Units 11/30/21  0946   WBC 10*3/mm3 4.63   NEUTROS ABS 10*3/mm3 3.16   HEMOGLOBIN g/dL 10.8*   HEMATOCRIT % 32.8*   PLATELETS 10*3/mm3 235     Results from last 7 days   Lab Units 11/30/21  0946   SODIUM mmol/L 132*   POTASSIUM mmol/L 4.3   CHLORIDE mmol/L 103   CO2 mmol/L 19.8*   BUN mg/dL 14   CREATININE mg/dL 1.11*   CALCIUM mg/dL 8.7   ALBUMIN g/dL 3.60   BILIRUBIN mg/dL 0.2   ALK PHOS U/L 101   ALT (SGPT) U/L 34*   AST (SGOT) U/L 28   GLUCOSE mg/dL 101           No radiology results for the last 30 days.       Assessment/Plan       *Invasive ductal carcinoma of the left breast  · Clinical T2 N0 M0, stage IIa  · On ultrasound the tumor measures 2.4 cm.  Lymph nodes on the left side are normal.  · MRI 10/8/2021 with tumor measuring 2.8 cm.  Lymph nodes appear normal  · Pathology consistent with invasive carcinoma with ductal and lobular features, grade 3, ER +1 to 10% weak, WV negative, HER-2 3+ immunohistochemistry, Ki-67 70%.  · She  was evaluated by Dr. Liu and referred for consideration of neoadjuvant chemotherapy.   Since the tumor is HER-2 positive and over 2 cm I think it would be reasonable to proceed with neoadjuvant chemotherapy.  Explained to her about the benefits of neoadjuvant chemotherapy including de surjit assessment of response and making decisions about adjuvant HER-2 directed therapy.  Also discussed the benefits of neoadjuvant chemotherapy in terms of breast conservation.  Explained to her about the different regimens that could be used for treatment of HER-2 positive breast cancers particularly TCHP, AC-T HP, Taxol and Herceptin in the adjuvant setting.   Since the tumor is over 2 cm but lymph node negative I think she would be a great candidate for  1181 clinical trial which comprises of administering Taxol Herceptin and Perjeta tamika  adjuvantly followed by surgery and additional adjuvant treatment depending upon the response.  Port placed 10/18/2021  Echocardiogram shows normal ejection fraction  Week 1 TPH on 10/19/2021  11/23/2021-week 6 of Taxol Perjeta Herceptin.  Tumor continues to improve in size.  Proceed today with cycle 3-day 1 Taxol, Herceptin, Perjeta, dose #7 Taxol.  Clinically, the patient continues to respond to neoadjuvant therapy    *Diarrhea  · Improved  · Imodium as needed    *Right-sided port site possible infection-treated with Keflex in the past, now appears erythematous again.  We will send in Bactrim    *Hypertension-currently on amlodipine and metoprolol.  Blood pressure well controlled at 143/82    *Anxiety  · Referred to supportive oncology  · Improved    *Hypothyroidism  · Continue Synthroid    *Cardiac health-10/8/2021 echocardiogram shows an ejection fraction of 56 to 60%, septal wall motion is normal, LV strain normal at -20.9%, left ventricular diastolic function is consistent with grade 1 impaired relaxation.    PLAN:  1. Continue Taxol, Herceptin, Perjeta.  Today is cycle 3-day 1.  Today  is her seventh dose of Taxol  2. Continue Imodium as needed  3. Clinically, the patient does continue to respond well to neoadjuvant therapy.  4. Continue cooling gloves and socks, the patient is without neuropathy  5. Return in 1 week in 2 weeks for weekly Taxol  6. NP follow-up in 3 weeks with cycle 4-day 1 Taxol, Herceptin, Perjeta  7. Echocardiogram 10/8/2021, due again early January 2022    The patient continues on high risk medication requiring close monitoring for toxicity    Delma Murcia, APRN  11/30/2021

## 2021-12-07 ENCOUNTER — RESEARCH ENCOUNTER (OUTPATIENT)
Dept: ONCOLOGY | Facility: CLINIC | Age: 76
End: 2021-12-07

## 2021-12-07 ENCOUNTER — INFUSION (OUTPATIENT)
Dept: ONCOLOGY | Facility: HOSPITAL | Age: 76
End: 2021-12-07

## 2021-12-07 VITALS
BODY MASS INDEX: 24.06 KG/M2 | DIASTOLIC BLOOD PRESSURE: 74 MMHG | WEIGHT: 125.8 LBS | OXYGEN SATURATION: 94 % | RESPIRATION RATE: 16 BRPM | SYSTOLIC BLOOD PRESSURE: 144 MMHG | TEMPERATURE: 96.8 F | HEART RATE: 79 BPM

## 2021-12-07 DIAGNOSIS — Z45.9 ENCOUNTER FOR MANAGEMENT OF IMPLANTED DEVICE: ICD-10-CM

## 2021-12-07 DIAGNOSIS — Z17.0 MALIGNANT NEOPLASM OF UPPER-INNER QUADRANT OF LEFT BREAST IN FEMALE, ESTROGEN RECEPTOR POSITIVE (HCC): Primary | ICD-10-CM

## 2021-12-07 DIAGNOSIS — C50.212 MALIGNANT NEOPLASM OF UPPER-INNER QUADRANT OF LEFT BREAST IN FEMALE, ESTROGEN RECEPTOR POSITIVE (HCC): Primary | ICD-10-CM

## 2021-12-07 LAB
ALBUMIN SERPL-MCNC: 3.8 G/DL (ref 3.5–5.2)
ALBUMIN/GLOB SERPL: 1.6 G/DL (ref 1.1–2.4)
ALP SERPL-CCNC: 93 U/L (ref 38–116)
ALT SERPL W P-5'-P-CCNC: 27 U/L (ref 0–33)
ANION GAP SERPL CALCULATED.3IONS-SCNC: 9.2 MMOL/L (ref 5–15)
AST SERPL-CCNC: 20 U/L (ref 0–32)
BASOPHILS # BLD AUTO: 0.05 10*3/MM3 (ref 0–0.2)
BASOPHILS NFR BLD AUTO: 0.8 % (ref 0–1.5)
BILIRUB SERPL-MCNC: 0.3 MG/DL (ref 0.2–1.2)
BUN SERPL-MCNC: 15 MG/DL (ref 6–20)
BUN/CREAT SERPL: 19 (ref 7.3–30)
CALCIUM SPEC-SCNC: 9 MG/DL (ref 8.5–10.2)
CHLORIDE SERPL-SCNC: 106 MMOL/L (ref 98–107)
CO2 SERPL-SCNC: 24.8 MMOL/L (ref 22–29)
CREAT SERPL-MCNC: 0.79 MG/DL (ref 0.6–1.1)
DEPRECATED RDW RBC AUTO: 53.2 FL (ref 37–54)
EOSINOPHIL # BLD AUTO: 0.08 10*3/MM3 (ref 0–0.4)
EOSINOPHIL NFR BLD AUTO: 1.2 % (ref 0.3–6.2)
ERYTHROCYTE [DISTWIDTH] IN BLOOD BY AUTOMATED COUNT: 14.6 % (ref 12.3–15.4)
GFR SERPL CREATININE-BSD FRML MDRD: 71 ML/MIN/1.73
GLOBULIN UR ELPH-MCNC: 2.4 GM/DL (ref 1.8–3.5)
GLUCOSE SERPL-MCNC: 109 MG/DL (ref 74–124)
HCT VFR BLD AUTO: 32.2 % (ref 34–46.6)
HGB BLD-MCNC: 10.5 G/DL (ref 12–15.9)
IMM GRANULOCYTES # BLD AUTO: 0.12 10*3/MM3 (ref 0–0.05)
IMM GRANULOCYTES NFR BLD AUTO: 1.8 % (ref 0–0.5)
LYMPHOCYTES # BLD AUTO: 1.18 10*3/MM3 (ref 0.7–3.1)
LYMPHOCYTES NFR BLD AUTO: 17.9 % (ref 19.6–45.3)
MCH RBC QN AUTO: 33.3 PG (ref 26.6–33)
MCHC RBC AUTO-ENTMCNC: 32.6 G/DL (ref 31.5–35.7)
MCV RBC AUTO: 102.2 FL (ref 79–97)
MONOCYTES # BLD AUTO: 0.31 10*3/MM3 (ref 0.1–0.9)
MONOCYTES NFR BLD AUTO: 4.7 % (ref 5–12)
NEUTROPHILS NFR BLD AUTO: 4.85 10*3/MM3 (ref 1.7–7)
NEUTROPHILS NFR BLD AUTO: 73.6 % (ref 42.7–76)
NRBC BLD AUTO-RTO: 0 /100 WBC (ref 0–0.2)
PLATELET # BLD AUTO: 299 10*3/MM3 (ref 140–450)
PMV BLD AUTO: 9.7 FL (ref 6–12)
POTASSIUM SERPL-SCNC: 3.9 MMOL/L (ref 3.5–4.7)
PROT SERPL-MCNC: 6.2 G/DL (ref 6.3–8)
RBC # BLD AUTO: 3.15 10*6/MM3 (ref 3.77–5.28)
SODIUM SERPL-SCNC: 140 MMOL/L (ref 134–145)
WBC NRBC COR # BLD: 6.59 10*3/MM3 (ref 3.4–10.8)

## 2021-12-07 PROCEDURE — 96413 CHEMO IV INFUSION 1 HR: CPT

## 2021-12-07 PROCEDURE — 25010000002 DEXAMETHASONE SODIUM PHOSPHATE 100 MG/10ML SOLUTION: Performed by: NURSE PRACTITIONER

## 2021-12-07 PROCEDURE — 25010000002 DIPHENHYDRAMINE PER 50 MG: Performed by: NURSE PRACTITIONER

## 2021-12-07 PROCEDURE — 85025 COMPLETE CBC W/AUTO DIFF WBC: CPT

## 2021-12-07 PROCEDURE — 25010000002 HEPARIN LOCK FLUSH PER 10 UNITS: Performed by: INTERNAL MEDICINE

## 2021-12-07 PROCEDURE — 25010000002 PACLITAXEL PER 1 MG: Performed by: NURSE PRACTITIONER

## 2021-12-07 PROCEDURE — 80053 COMPREHEN METABOLIC PANEL: CPT | Performed by: INTERNAL MEDICINE

## 2021-12-07 PROCEDURE — 96375 TX/PRO/DX INJ NEW DRUG ADDON: CPT

## 2021-12-07 RX ORDER — SODIUM CHLORIDE 0.9 % (FLUSH) 0.9 %
10 SYRINGE (ML) INJECTION AS NEEDED
Status: CANCELLED | OUTPATIENT
Start: 2021-12-07

## 2021-12-07 RX ORDER — SODIUM CHLORIDE 9 MG/ML
250 INJECTION, SOLUTION INTRAVENOUS ONCE
Status: COMPLETED | OUTPATIENT
Start: 2021-12-07 | End: 2021-12-07

## 2021-12-07 RX ORDER — FAMOTIDINE 10 MG/ML
20 INJECTION, SOLUTION INTRAVENOUS ONCE
Status: COMPLETED | OUTPATIENT
Start: 2021-12-07 | End: 2021-12-07

## 2021-12-07 RX ORDER — FAMOTIDINE 10 MG/ML
20 INJECTION, SOLUTION INTRAVENOUS AS NEEDED
Status: CANCELLED | OUTPATIENT
Start: 2021-12-07

## 2021-12-07 RX ORDER — HEPARIN SODIUM (PORCINE) LOCK FLUSH IV SOLN 100 UNIT/ML 100 UNIT/ML
500 SOLUTION INTRAVENOUS AS NEEDED
Status: CANCELLED | OUTPATIENT
Start: 2021-12-07

## 2021-12-07 RX ORDER — DIPHENHYDRAMINE HYDROCHLORIDE 50 MG/ML
50 INJECTION INTRAMUSCULAR; INTRAVENOUS AS NEEDED
Status: CANCELLED | OUTPATIENT
Start: 2021-12-07

## 2021-12-07 RX ORDER — HEPARIN SODIUM (PORCINE) LOCK FLUSH IV SOLN 100 UNIT/ML 100 UNIT/ML
500 SOLUTION INTRAVENOUS AS NEEDED
Status: DISCONTINUED | OUTPATIENT
Start: 2021-12-07 | End: 2021-12-07 | Stop reason: HOSPADM

## 2021-12-07 RX ADMIN — PACLITAXEL 125 MG: 6 INJECTION, SOLUTION INTRAVENOUS at 10:23

## 2021-12-07 RX ADMIN — FAMOTIDINE 20 MG: 10 INJECTION INTRAVENOUS at 09:14

## 2021-12-07 RX ADMIN — DEXAMETHASONE SODIUM PHOSPHATE 12 MG: 10 INJECTION, SOLUTION INTRAMUSCULAR; INTRAVENOUS at 09:17

## 2021-12-07 RX ADMIN — SODIUM CHLORIDE 250 ML: 9 INJECTION, SOLUTION INTRAVENOUS at 09:14

## 2021-12-07 RX ADMIN — DIPHENHYDRAMINE HYDROCHLORIDE 25 MG: 50 INJECTION, SOLUTION INTRAMUSCULAR; INTRAVENOUS at 09:34

## 2021-12-07 RX ADMIN — Medication 500 UNITS: at 11:27

## 2021-12-07 NOTE — RESEARCH
Protocol:  [id number] ([facility] pCR): Preoperative THP and postoperative HP  in patients who achieve a pathologic complete response                    RESEARCH NOTE                   Research nurse met with patient today with above mentioned protocol for C3D8.  Reviewed Medical History, Adverse Events and Con-Medications.   Chemotherapy administered without any problems.  Will meet with patient for next planned infusion C3D15. on 12/14/21.  Will continue to monitor.

## 2021-12-13 DIAGNOSIS — K21.9 GASTROESOPHAGEAL REFLUX DISEASE WITHOUT ESOPHAGITIS: ICD-10-CM

## 2021-12-13 RX ORDER — OMEPRAZOLE 20 MG/1
CAPSULE, DELAYED RELEASE ORAL
Qty: 90 CAPSULE | Refills: 1 | Status: SHIPPED | OUTPATIENT
Start: 2021-12-13 | End: 2022-03-07

## 2021-12-14 ENCOUNTER — RESEARCH ENCOUNTER (OUTPATIENT)
Dept: ONCOLOGY | Facility: CLINIC | Age: 76
End: 2021-12-14

## 2021-12-14 ENCOUNTER — INFUSION (OUTPATIENT)
Dept: ONCOLOGY | Facility: HOSPITAL | Age: 76
End: 2021-12-14

## 2021-12-14 VITALS
TEMPERATURE: 97.1 F | SYSTOLIC BLOOD PRESSURE: 126 MMHG | WEIGHT: 125.8 LBS | BODY MASS INDEX: 24.06 KG/M2 | HEART RATE: 98 BPM | RESPIRATION RATE: 16 BRPM | OXYGEN SATURATION: 96 % | DIASTOLIC BLOOD PRESSURE: 57 MMHG

## 2021-12-14 DIAGNOSIS — C50.212 MALIGNANT NEOPLASM OF UPPER-INNER QUADRANT OF LEFT BREAST IN FEMALE, ESTROGEN RECEPTOR POSITIVE (HCC): Primary | ICD-10-CM

## 2021-12-14 DIAGNOSIS — Z17.0 MALIGNANT NEOPLASM OF UPPER-INNER QUADRANT OF LEFT BREAST IN FEMALE, ESTROGEN RECEPTOR POSITIVE (HCC): Primary | ICD-10-CM

## 2021-12-14 LAB
ALBUMIN SERPL-MCNC: 3.8 G/DL (ref 3.5–5.2)
ALBUMIN/GLOB SERPL: 1.7 G/DL (ref 1.1–2.4)
ALP SERPL-CCNC: 98 U/L (ref 38–116)
ALT SERPL W P-5'-P-CCNC: 31 U/L (ref 0–33)
ANION GAP SERPL CALCULATED.3IONS-SCNC: 9.8 MMOL/L (ref 5–15)
AST SERPL-CCNC: 23 U/L (ref 0–32)
BASOPHILS # BLD AUTO: 0.04 10*3/MM3 (ref 0–0.2)
BASOPHILS NFR BLD AUTO: 0.9 % (ref 0–1.5)
BILIRUB SERPL-MCNC: 0.2 MG/DL (ref 0.2–1.2)
BUN SERPL-MCNC: 16 MG/DL (ref 6–20)
BUN/CREAT SERPL: 20.8 (ref 7.3–30)
CALCIUM SPEC-SCNC: 9 MG/DL (ref 8.5–10.2)
CHLORIDE SERPL-SCNC: 107 MMOL/L (ref 98–107)
CO2 SERPL-SCNC: 22.2 MMOL/L (ref 22–29)
CREAT SERPL-MCNC: 0.77 MG/DL (ref 0.6–1.1)
DEPRECATED RDW RBC AUTO: 58.2 FL (ref 37–54)
EOSINOPHIL # BLD AUTO: 0.07 10*3/MM3 (ref 0–0.4)
EOSINOPHIL NFR BLD AUTO: 1.5 % (ref 0.3–6.2)
ERYTHROCYTE [DISTWIDTH] IN BLOOD BY AUTOMATED COUNT: 15.8 % (ref 12.3–15.4)
GFR SERPL CREATININE-BSD FRML MDRD: 73 ML/MIN/1.73
GLOBULIN UR ELPH-MCNC: 2.3 GM/DL (ref 1.8–3.5)
GLUCOSE SERPL-MCNC: 100 MG/DL (ref 74–124)
HCT VFR BLD AUTO: 32.7 % (ref 34–46.6)
HGB BLD-MCNC: 10.4 G/DL (ref 12–15.9)
IMM GRANULOCYTES # BLD AUTO: 0.09 10*3/MM3 (ref 0–0.05)
IMM GRANULOCYTES NFR BLD AUTO: 2 % (ref 0–0.5)
LYMPHOCYTES # BLD AUTO: 1.01 10*3/MM3 (ref 0.7–3.1)
LYMPHOCYTES NFR BLD AUTO: 22 % (ref 19.6–45.3)
MCH RBC QN AUTO: 33.1 PG (ref 26.6–33)
MCHC RBC AUTO-ENTMCNC: 31.8 G/DL (ref 31.5–35.7)
MCV RBC AUTO: 104.1 FL (ref 79–97)
MONOCYTES # BLD AUTO: 0.53 10*3/MM3 (ref 0.1–0.9)
MONOCYTES NFR BLD AUTO: 11.5 % (ref 5–12)
NEUTROPHILS NFR BLD AUTO: 2.86 10*3/MM3 (ref 1.7–7)
NEUTROPHILS NFR BLD AUTO: 62.1 % (ref 42.7–76)
NRBC BLD AUTO-RTO: 0 /100 WBC (ref 0–0.2)
PLATELET # BLD AUTO: 317 10*3/MM3 (ref 140–450)
PMV BLD AUTO: 9.2 FL (ref 6–12)
POTASSIUM SERPL-SCNC: 3.6 MMOL/L (ref 3.5–4.7)
PROT SERPL-MCNC: 6.1 G/DL (ref 6.3–8)
RBC # BLD AUTO: 3.14 10*6/MM3 (ref 3.77–5.28)
SODIUM SERPL-SCNC: 139 MMOL/L (ref 134–145)
WBC NRBC COR # BLD: 4.6 10*3/MM3 (ref 3.4–10.8)

## 2021-12-14 PROCEDURE — 25010000002 PACLITAXEL PER 1 MG: Performed by: NURSE PRACTITIONER

## 2021-12-14 PROCEDURE — 85025 COMPLETE CBC W/AUTO DIFF WBC: CPT

## 2021-12-14 PROCEDURE — 25010000002 DEXAMETHASONE SODIUM PHOSPHATE 100 MG/10ML SOLUTION: Performed by: NURSE PRACTITIONER

## 2021-12-14 PROCEDURE — 96413 CHEMO IV INFUSION 1 HR: CPT

## 2021-12-14 PROCEDURE — 25010000002 DIPHENHYDRAMINE PER 50 MG: Performed by: NURSE PRACTITIONER

## 2021-12-14 PROCEDURE — 96375 TX/PRO/DX INJ NEW DRUG ADDON: CPT

## 2021-12-14 PROCEDURE — 80053 COMPREHEN METABOLIC PANEL: CPT

## 2021-12-14 RX ORDER — FAMOTIDINE 10 MG/ML
20 INJECTION, SOLUTION INTRAVENOUS AS NEEDED
Status: CANCELLED | OUTPATIENT
Start: 2021-12-14

## 2021-12-14 RX ORDER — SODIUM CHLORIDE 9 MG/ML
250 INJECTION, SOLUTION INTRAVENOUS ONCE
Status: COMPLETED | OUTPATIENT
Start: 2021-12-14 | End: 2021-12-14

## 2021-12-14 RX ORDER — DIPHENHYDRAMINE HYDROCHLORIDE 50 MG/ML
50 INJECTION INTRAMUSCULAR; INTRAVENOUS AS NEEDED
Status: CANCELLED | OUTPATIENT
Start: 2021-12-14

## 2021-12-14 RX ORDER — FAMOTIDINE 10 MG/ML
20 INJECTION, SOLUTION INTRAVENOUS ONCE
Status: COMPLETED | OUTPATIENT
Start: 2021-12-14 | End: 2021-12-14

## 2021-12-14 RX ADMIN — DEXAMETHASONE SODIUM PHOSPHATE 12 MG: 10 INJECTION, SOLUTION INTRAMUSCULAR; INTRAVENOUS at 09:35

## 2021-12-14 RX ADMIN — SODIUM CHLORIDE 250 ML: 9 INJECTION, SOLUTION INTRAVENOUS at 09:35

## 2021-12-14 RX ADMIN — PACLITAXEL 125 MG: 6 INJECTION, SOLUTION INTRAVENOUS at 10:46

## 2021-12-14 RX ADMIN — DIPHENHYDRAMINE HYDROCHLORIDE 25 MG: 50 INJECTION, SOLUTION INTRAMUSCULAR; INTRAVENOUS at 09:52

## 2021-12-14 RX ADMIN — FAMOTIDINE 20 MG: 10 INJECTION INTRAVENOUS at 09:35

## 2021-12-15 ENCOUNTER — TELEPHONE (OUTPATIENT)
Dept: SURGERY | Facility: CLINIC | Age: 76
End: 2021-12-15

## 2021-12-15 NOTE — TELEPHONE ENCOUNTER
OK FOR HUB TO SHARE    Left message for pt to call back and make appointment to see Dr. Liu next week. Ok to work in if needed per LFA.

## 2021-12-16 NOTE — PROGRESS NOTES
Subjective   Neela Ramirez is a 76 y.o. female.  Referred by Dr. 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  A bilateral diagnostic mammogram was performed for further evaluation of the same.    8/17/2021-bilateral diagnostic mammogram-scattered fibroglandular densities throughout both breasts.  In the posterior one third upper inner quadrant of the left breast at the site of palpable concern, 11 o'clock position there is a mass with adjacent pleomorphic calcifications.  The mass and the calcifications measure on order of 1.5 cm in greatest dimension.  No evidence of axillary lymphadenopathy appreciated.  Stable microcalcifications seen in other areas of the left breast on right breast.  Ultrasound-at 11 o'clock position, 5 cm from the nipple in the left breast there is an irregular hypoechoic mass which measures 2.4 x 2 x 1.6 cm.    Impression  1.irregular 2.4 cm mass in the left breast at the site of palpable concern at 11:00, 5 cm from the nipple.  Ultrasound-guided biopsy of the mass recommended  No finding suspicious for malignancy in the right breast    9/8/2021-left breast ultrasound-guided biopsy  Pathology consistent with invasive ductal carcinoma with lobular features.  Grade 3.  The carcinoma measures 7 mm in greatest dimension.  Focally suspicious for lymphovascular space invasion.  ER low +1-10%, intensity week  HI negative  HER-2 3+ on immunohistochemistry  Ki-67 70%    MRI of the breast 10/8/2021-Biopsy-proven malignancy in the left breast at 11:00 measuring 2.8 cm with a centrally located metallic clip.  No other suspicious findings are seen within the left breast or no left axillary lymphadenopathy.  No suspicious findings of the right breast    Echocardiogram 10/8/2021 was normal  ejection fraction of 56 to 60% and strain of -20    She denies any new bone pains, dyspnea, cough, abdominal pain nausea vomiting or recent changes in weight.    She had 2 previous breast aspirations in her 20s.  No other breast biopsies  She does not know much about her family hence limited family history.    Interval history:  The patient is a 76 y.o. female with abundant history, who returns the office today in anticipation of cycle 4-day 1 Taxol, Herceptin, Perjeta.  She is feeling reasonably well today, aside from fatigue.  She does feel nauseated at times, and does not feel she is getting much relief from her Zofran.  She does feel like she is still eating and drinking adequately.  1 to 2 days following chemotherapy, she experiences a few episodes of diarrhea, controlled with Imodium.  She also notes a skin rash to the back, that she feels is improving.  She has not used any medication for this.  She denies fever or chills.  She denies vomiting.  She denies arthralgias.  She denies signs or symptoms of peripheral neuropathy.    The following portions of the patient's history were reviewed and updated as appropriate: allergies, current medications, past family history, past medical history, past social history, past surgical history and problem list.    Past Medical History:   Diagnosis Date   • Anxiety    • Arthritis    • Backache    • Bleeding    • Constipation    • Dermatitis    • Diverticulosis     Colonoscopy November 2014   • Dyspepsia    • Dysuria    • Erythema of face     Transitory Erythema Of The Face   • Hypertension    • Impacted cerumen    • Malignant neoplasm of female breast (HCC) 9/27/2021   • Neck pain    • Tachycardia         Past Surgical History:   Procedure Laterality Date   • BREAST BIOPSY  09/2021    Dr. Tirado    • BREAST CYST ASPIRATION     • BREAST CYST EXCISION     • EXOSTECTOMY      Simple Bunion Exostectomy (Silver Procedure)   • VENOUS ACCESS DEVICE (PORT) INSERTION N/A 10/18/2021  "   Procedure: INSERTION VENOUS ACCESS DEVICE;  Surgeon: Becyk Liu MD;  Location: [facility] OR [facility] [place];  Service: General;  Laterality: N/A;        Family History   Problem Relation Age of Onset   • Arthritis Father         Social History     Socioeconomic History   • Marital status:    • Number of children: 1   Tobacco Use   • Smoking status: Never Smoker   • Smokeless tobacco: Never Used   Vaping Use   • Vaping Use: Never used   Substance and Sexual Activity   • Alcohol use: Yes     Comment: social drinker   • Drug use: Never   • Sexual activity: Defer        OB History             Para        Term   0            AB        Living           SAB        IAB        Ectopic        Molar        Multiple        Live Births                 Age at menarche-13  Age at menopause-50  Nulliparous  Breast-feeding-N/A   0 para 0  0  Oral contraceptive pill use-none  Hormone replacement therapy-7 years    No Known Allergies     Review of systems as mentioned in the HPI    Objective   Blood pressure 136/82, pulse 98, temperature 97.1 °F (36.2 °C), temperature source Temporal, resp. rate 16, height 154 cm (60.63\"), weight 56.6 kg (124 lb 12.8 oz), SpO2 96 %.     ECOG performance status-0    Physical Exam  Vitals reviewed.   Constitutional:       Appearance: Normal appearance.   HENT:      Head: Normocephalic and atraumatic.      Nose: Nose normal.      Mouth/Throat:      Mouth: Mucous membranes are moist.      Pharynx: Oropharynx is clear.   Eyes:      Conjunctiva/sclera: Conjunctivae normal.      Pupils: Pupils are equal, round, and reactive to light.   Cardiovascular:      Rate and Rhythm: Normal rate and regular rhythm.      Pulses: Normal pulses.      Heart sounds: Normal heart sounds.   Pulmonary:      Effort: Pulmonary effort is normal.      Breath sounds: Normal breath sounds.   Abdominal:      General: Abdomen is flat. Bowel sounds are normal.      Palpations: Abdomen is soft. "   Musculoskeletal:         General: Normal range of motion.      Cervical back: Normal range of motion.   Skin:     General: Skin is warm and dry.      Findings: Rash (erythematous macular rash to the back) present.   Neurological:      General: No focal deficit present.      Mental Status: She is alert and oriented to person, place, and time. Mental status is at baseline.   Psychiatric:         Mood and Affect: Mood normal.         Behavior: Behavior normal.         Thought Content: Thought content normal.         Judgment: Judgment normal.       Breast Exam: Right breast appears normal on inspection.  No palpable abnormalities of the right breast.  Left breast on inspection there is a mass in the upper inner quadrant.  There has been softening of the mass in the left upper inner quadrant which does not have discrete borders, this is flat, approximately 1 cm.  There is no left axillary adenopathy. no right axillary lymphadenopathy.  No cervical or supraclavicular lymphadenopathy.     I have reexamined the patient and the results are consistent with the previously documented exam. RENO Mays     Results from last 7 days   Lab Units 12/21/21  0817   WBC 10*3/mm3 6.04   NEUTROS ABS 10*3/mm3 3.79   HEMOGLOBIN g/dL 10.8*   HEMATOCRIT % 33.5*   PLATELETS 10*3/mm3 303     Results from last 7 days   Lab Units 12/21/21  0817   SODIUM mmol/L 138   POTASSIUM mmol/L 4.2   CHLORIDE mmol/L 106   CO2 mmol/L 22.7   BUN mg/dL 13   CREATININE mg/dL 0.82   CALCIUM mg/dL 8.9   ALBUMIN g/dL 3.80   BILIRUBIN mg/dL 0.3   ALK PHOS U/L 97   ALT (SGPT) U/L 20   AST (SGOT) U/L 19   GLUCOSE mg/dL 100           No radiology results for the last 30 days.       Assessment/Plan       *Invasive ductal carcinoma of the left breast  · Clinical T2 N0 M0, stage IIa  · On ultrasound the tumor measures 2.4 cm.  Lymph nodes on the left side are normal.  · MRI 10/8/2021 with tumor measuring 2.8 cm.  Lymph nodes appear normal  · Pathology  consistent with invasive carcinoma with ductal and lobular features, grade 3, ER +1 to 10% weak, LA negative, HER-2 3+ immunohistochemistry, Ki-67 70%.  · She was evaluated by Dr. Liu and referred for consideration of neoadjuvant chemotherapy.   Since the tumor is HER-2 positive and over 2 cm I think it would be reasonable to proceed with neoadjuvant chemotherapy.  Explained to her about the benefits of neoadjuvant chemotherapy including de surjit assessment of response and making decisions about adjuvant HER-2 directed therapy.  Also discussed the benefits of neoadjuvant chemotherapy in terms of breast conservation.  Explained to her about the different regimens that could be used for treatment of HER-2 positive breast cancers particularly TCHP, AC-T HP, Taxol and Herceptin in the adjuvant setting.   Since the tumor is over 2 cm but lymph node negative I think she would be a great candidate for [facility] 1181 clinical trial which comprises of administering Taxol Herceptin and Perjeta tamika  adjuvantly followed by surgery and additional adjuvant treatment depending upon the response.  Port placed 10/18/2021  Echocardiogram shows normal ejection fraction  Week 1 TPH on 10/19/2021  11/23/2021-week 6 of Taxol Perjeta Herceptin.  Tumor continues to improve in size.  12/21/2021 Proceed today with cycle 4-day 1 Taxol, Herceptin, Perjeta.  Clinically, the patient continues to respond to neoadjuvant therapy.  She has been experiencing nausea, not controlled with Zofran.  We will send her prescription for Compazine.    *Diarrhea  · Improved  · Imodium as needed    *Right-sided port site possible infection-treated with Keflex in the past, now appears erythematous again.  We will send in Bactrim    *Hypertension-currently on amlodipine and metoprolol.  Blood pressure well controlled at 136/82.    *Anxiety  · Referred to supportive oncology  · Improved    *Hypothyroidism  · Continue Synthroid    *Cardiac health-10/8/2021 echocardiogram  shows an ejection fraction of 56 to 60%, septal wall motion is normal, LV strain normal at -20.9%, left ventricular diastolic function is consistent with grade 1 impaired relaxation.    *Skin rash  · 12/21/2021, during breast exam today, patient is noted to have a skin rash to the chest and back.  She reports having this for approximately 3 to 4 weeks.  She has not used any medication for this, but notes that it has continued to improve.  Discussed with patient that this could be related to her chemotherapy.  She is going to apply hydrocortisone cream, and we will continue to monitor this area.    PLAN:   1. Continue Taxol, Herceptin, Perjeta.  Today is cycle 4-day.  2. Continue Imodium as needed  3. Clinically, the patient does continue to respond well to neoadjuvant therapy.  4. Continue cooling gloves and socks, the patient is without neuropathy  5. Return in 1 week for MD follow up and C4D8 THP, taxol only.  6. Echocardiogram 10/8/2021, due again early January 2022  7. Start Compazine 10 mg every 6 hours as needed for nausea.  Prescription sent to pharmacy today.  8. Start hydrocortisone cream to skin rash as needed.  Patient already has this available at home.    The patient continues on high risk medication requiring close monitoring for toxicity    RENO Mays  12/21/2021

## 2021-12-21 ENCOUNTER — OFFICE VISIT (OUTPATIENT)
Dept: ONCOLOGY | Facility: CLINIC | Age: 76
End: 2021-12-21

## 2021-12-21 ENCOUNTER — INFUSION (OUTPATIENT)
Dept: ONCOLOGY | Facility: HOSPITAL | Age: 76
End: 2021-12-21

## 2021-12-21 ENCOUNTER — RESEARCH ENCOUNTER (OUTPATIENT)
Dept: ONCOLOGY | Facility: CLINIC | Age: 76
End: 2021-12-21

## 2021-12-21 VITALS — DIASTOLIC BLOOD PRESSURE: 81 MMHG | SYSTOLIC BLOOD PRESSURE: 146 MMHG

## 2021-12-21 VITALS
TEMPERATURE: 97.1 F | RESPIRATION RATE: 16 BRPM | SYSTOLIC BLOOD PRESSURE: 136 MMHG | HEIGHT: 61 IN | HEART RATE: 98 BPM | OXYGEN SATURATION: 96 % | BODY MASS INDEX: 23.56 KG/M2 | DIASTOLIC BLOOD PRESSURE: 82 MMHG | WEIGHT: 124.8 LBS

## 2021-12-21 DIAGNOSIS — Z17.0 MALIGNANT NEOPLASM OF UPPER-INNER QUADRANT OF LEFT BREAST IN FEMALE, ESTROGEN RECEPTOR POSITIVE (HCC): ICD-10-CM

## 2021-12-21 DIAGNOSIS — C50.212 MALIGNANT NEOPLASM OF UPPER-INNER QUADRANT OF LEFT BREAST IN FEMALE, ESTROGEN RECEPTOR POSITIVE (HCC): ICD-10-CM

## 2021-12-21 DIAGNOSIS — Z17.0 MALIGNANT NEOPLASM OF UPPER-INNER QUADRANT OF LEFT BREAST IN FEMALE, ESTROGEN RECEPTOR POSITIVE (HCC): Primary | ICD-10-CM

## 2021-12-21 DIAGNOSIS — C50.212 MALIGNANT NEOPLASM OF UPPER-INNER QUADRANT OF LEFT BREAST IN FEMALE, ESTROGEN RECEPTOR POSITIVE (HCC): Primary | ICD-10-CM

## 2021-12-21 DIAGNOSIS — Z79.899 HIGH RISK MEDICATION USE: ICD-10-CM

## 2021-12-21 DIAGNOSIS — T45.1X5A SKIN CHANGES RELATED TO CHEMOTHERAPY: ICD-10-CM

## 2021-12-21 DIAGNOSIS — T45.1X5A CHEMOTHERAPY-INDUCED NAUSEA: ICD-10-CM

## 2021-12-21 DIAGNOSIS — R23.9 SKIN CHANGES RELATED TO CHEMOTHERAPY: ICD-10-CM

## 2021-12-21 DIAGNOSIS — R11.0 CHEMOTHERAPY-INDUCED NAUSEA: ICD-10-CM

## 2021-12-21 LAB
ALBUMIN SERPL-MCNC: 3.8 G/DL (ref 3.5–5.2)
ALBUMIN/GLOB SERPL: 1.6 G/DL (ref 1.1–2.4)
ALP SERPL-CCNC: 97 U/L (ref 38–116)
ALT SERPL W P-5'-P-CCNC: 20 U/L (ref 0–33)
ANION GAP SERPL CALCULATED.3IONS-SCNC: 9.3 MMOL/L (ref 5–15)
AST SERPL-CCNC: 19 U/L (ref 0–32)
BASOPHILS # BLD AUTO: 0.06 10*3/MM3 (ref 0–0.2)
BASOPHILS NFR BLD AUTO: 1 % (ref 0–1.5)
BILIRUB SERPL-MCNC: 0.3 MG/DL (ref 0.2–1.2)
BUN SERPL-MCNC: 13 MG/DL (ref 6–20)
BUN/CREAT SERPL: 15.9 (ref 7.3–30)
CALCIUM SPEC-SCNC: 8.9 MG/DL (ref 8.5–10.2)
CHLORIDE SERPL-SCNC: 106 MMOL/L (ref 98–107)
CO2 SERPL-SCNC: 22.7 MMOL/L (ref 22–29)
CREAT SERPL-MCNC: 0.82 MG/DL (ref 0.6–1.1)
DEPRECATED RDW RBC AUTO: 56.9 FL (ref 37–54)
EOSINOPHIL # BLD AUTO: 0.07 10*3/MM3 (ref 0–0.4)
EOSINOPHIL NFR BLD AUTO: 1.2 % (ref 0.3–6.2)
ERYTHROCYTE [DISTWIDTH] IN BLOOD BY AUTOMATED COUNT: 15.8 % (ref 12.3–15.4)
GFR SERPL CREATININE-BSD FRML MDRD: 68 ML/MIN/1.73
GLOBULIN UR ELPH-MCNC: 2.4 GM/DL (ref 1.8–3.5)
GLUCOSE SERPL-MCNC: 100 MG/DL (ref 74–124)
HCT VFR BLD AUTO: 33.5 % (ref 34–46.6)
HGB BLD-MCNC: 10.8 G/DL (ref 12–15.9)
IMM GRANULOCYTES # BLD AUTO: 0.1 10*3/MM3 (ref 0–0.05)
IMM GRANULOCYTES NFR BLD AUTO: 1.7 % (ref 0–0.5)
LYMPHOCYTES # BLD AUTO: 1.48 10*3/MM3 (ref 0.7–3.1)
LYMPHOCYTES NFR BLD AUTO: 24.5 % (ref 19.6–45.3)
MCH RBC QN AUTO: 32.7 PG (ref 26.6–33)
MCHC RBC AUTO-ENTMCNC: 32.2 G/DL (ref 31.5–35.7)
MCV RBC AUTO: 101.5 FL (ref 79–97)
MONOCYTES # BLD AUTO: 0.54 10*3/MM3 (ref 0.1–0.9)
MONOCYTES NFR BLD AUTO: 8.9 % (ref 5–12)
NEUTROPHILS NFR BLD AUTO: 3.79 10*3/MM3 (ref 1.7–7)
NEUTROPHILS NFR BLD AUTO: 62.7 % (ref 42.7–76)
NRBC BLD AUTO-RTO: 0 /100 WBC (ref 0–0.2)
PLATELET # BLD AUTO: 303 10*3/MM3 (ref 140–450)
PMV BLD AUTO: 9.4 FL (ref 6–12)
POTASSIUM SERPL-SCNC: 4.2 MMOL/L (ref 3.5–4.7)
PROT SERPL-MCNC: 6.2 G/DL (ref 6.3–8)
RBC # BLD AUTO: 3.3 10*6/MM3 (ref 3.77–5.28)
SODIUM SERPL-SCNC: 138 MMOL/L (ref 134–145)
WBC NRBC COR # BLD: 6.04 10*3/MM3 (ref 3.4–10.8)

## 2021-12-21 PROCEDURE — 80053 COMPREHEN METABOLIC PANEL: CPT

## 2021-12-21 PROCEDURE — 96417 CHEMO IV INFUS EACH ADDL SEQ: CPT

## 2021-12-21 PROCEDURE — 96413 CHEMO IV INFUSION 1 HR: CPT

## 2021-12-21 PROCEDURE — 25010000002 DEXAMETHASONE SODIUM PHOSPHATE 100 MG/10ML SOLUTION: Performed by: NURSE PRACTITIONER

## 2021-12-21 PROCEDURE — 96375 TX/PRO/DX INJ NEW DRUG ADDON: CPT

## 2021-12-21 PROCEDURE — 85025 COMPLETE CBC W/AUTO DIFF WBC: CPT

## 2021-12-21 PROCEDURE — 99214 OFFICE O/P EST MOD 30 MIN: CPT | Performed by: NURSE PRACTITIONER

## 2021-12-21 PROCEDURE — 25010000002 TRASTUZUMAB PER 10 MG: Performed by: NURSE PRACTITIONER

## 2021-12-21 PROCEDURE — 25010000002 DIPHENHYDRAMINE PER 50 MG: Performed by: NURSE PRACTITIONER

## 2021-12-21 PROCEDURE — 25010000002 PACLITAXEL PER 1 MG: Performed by: NURSE PRACTITIONER

## 2021-12-21 PROCEDURE — 25010000002 PERTUZUMAB 420 MG/14ML SOLUTION 420 MG VIAL: Performed by: NURSE PRACTITIONER

## 2021-12-21 RX ORDER — FAMOTIDINE 10 MG/ML
20 INJECTION, SOLUTION INTRAVENOUS ONCE
Status: CANCELLED | OUTPATIENT
Start: 2021-12-21

## 2021-12-21 RX ORDER — PROCHLORPERAZINE MALEATE 10 MG
10 TABLET ORAL EVERY 6 HOURS PRN
Qty: 30 TABLET | Refills: 3 | Status: SHIPPED | OUTPATIENT
Start: 2021-12-21

## 2021-12-21 RX ORDER — DIPHENHYDRAMINE HYDROCHLORIDE 50 MG/ML
50 INJECTION INTRAMUSCULAR; INTRAVENOUS AS NEEDED
Status: CANCELLED | OUTPATIENT
Start: 2021-12-21

## 2021-12-21 RX ORDER — SODIUM CHLORIDE 9 MG/ML
250 INJECTION, SOLUTION INTRAVENOUS ONCE
Status: COMPLETED | OUTPATIENT
Start: 2021-12-21 | End: 2021-12-21

## 2021-12-21 RX ORDER — SODIUM CHLORIDE 9 MG/ML
250 INJECTION, SOLUTION INTRAVENOUS ONCE
Status: CANCELLED | OUTPATIENT
Start: 2021-12-21

## 2021-12-21 RX ORDER — FAMOTIDINE 10 MG/ML
20 INJECTION, SOLUTION INTRAVENOUS ONCE
Status: COMPLETED | OUTPATIENT
Start: 2021-12-21 | End: 2021-12-21

## 2021-12-21 RX ORDER — FAMOTIDINE 10 MG/ML
20 INJECTION, SOLUTION INTRAVENOUS AS NEEDED
Status: CANCELLED | OUTPATIENT
Start: 2021-12-21

## 2021-12-21 RX ADMIN — TRASTUZUMAB 350 MG: 150 INJECTION, POWDER, LYOPHILIZED, FOR SOLUTION INTRAVENOUS at 11:25

## 2021-12-21 RX ADMIN — DEXAMETHASONE SODIUM PHOSPHATE 12 MG: 10 INJECTION, SOLUTION INTRAMUSCULAR; INTRAVENOUS at 09:31

## 2021-12-21 RX ADMIN — PACLITAXEL 125 MG: 6 INJECTION, SOLUTION INTRAVENOUS at 12:06

## 2021-12-21 RX ADMIN — SODIUM CHLORIDE 250 ML: 9 INJECTION, SOLUTION INTRAVENOUS at 09:31

## 2021-12-21 RX ADMIN — DIPHENHYDRAMINE HYDROCHLORIDE 25 MG: 50 INJECTION, SOLUTION INTRAMUSCULAR; INTRAVENOUS at 09:50

## 2021-12-21 RX ADMIN — PERTUZUMAB 420 MG: 30 INJECTION, SOLUTION, CONCENTRATE INTRAVENOUS at 10:15

## 2021-12-21 RX ADMIN — FAMOTIDINE 20 MG: 10 INJECTION INTRAVENOUS at 09:31

## 2021-12-21 NOTE — RESEARCH
RESEARCH NOTE                   Research nurse met with patient today for the above mentioned protocol for C4D1.  Reviewed adverse events and conmeds changes noted.  Chemotherapy was administered without any problems. Will meet with patient for next planned infusion  C4D8 on 12/28/21.  Will continue to monitor.  ECOG was 0

## 2021-12-22 ENCOUNTER — OFFICE VISIT (OUTPATIENT)
Dept: SURGERY | Facility: CLINIC | Age: 76
End: 2021-12-22

## 2021-12-22 VITALS — BODY MASS INDEX: 24.14 KG/M2 | WEIGHT: 126.2 LBS

## 2021-12-22 DIAGNOSIS — C50.912 MALIGNANT NEOPLASM OF LEFT BREAST IN FEMALE, ESTROGEN RECEPTOR NEGATIVE, UNSPECIFIED SITE OF BREAST: Primary | ICD-10-CM

## 2021-12-22 DIAGNOSIS — Z17.1 MALIGNANT NEOPLASM OF LEFT BREAST IN FEMALE, ESTROGEN RECEPTOR NEGATIVE, UNSPECIFIED SITE OF BREAST: Primary | ICD-10-CM

## 2021-12-22 PROCEDURE — 99213 OFFICE O/P EST LOW 20 MIN: CPT | Performed by: STUDENT IN AN ORGANIZED HEALTH CARE EDUCATION/TRAINING PROGRAM

## 2021-12-28 ENCOUNTER — INFUSION (OUTPATIENT)
Dept: ONCOLOGY | Facility: HOSPITAL | Age: 76
End: 2021-12-28

## 2021-12-28 ENCOUNTER — OFFICE VISIT (OUTPATIENT)
Dept: ONCOLOGY | Facility: CLINIC | Age: 76
End: 2021-12-28

## 2021-12-28 VITALS
TEMPERATURE: 98.4 F | WEIGHT: 123.7 LBS | HEIGHT: 61 IN | SYSTOLIC BLOOD PRESSURE: 136 MMHG | DIASTOLIC BLOOD PRESSURE: 82 MMHG | BODY MASS INDEX: 23.35 KG/M2 | HEART RATE: 96 BPM | OXYGEN SATURATION: 97 % | RESPIRATION RATE: 16 BRPM

## 2021-12-28 VITALS — SYSTOLIC BLOOD PRESSURE: 140 MMHG | HEART RATE: 90 BPM | DIASTOLIC BLOOD PRESSURE: 78 MMHG

## 2021-12-28 DIAGNOSIS — C50.212 MALIGNANT NEOPLASM OF UPPER-INNER QUADRANT OF LEFT BREAST IN FEMALE, ESTROGEN RECEPTOR POSITIVE (HCC): Primary | ICD-10-CM

## 2021-12-28 DIAGNOSIS — Z17.0 MALIGNANT NEOPLASM OF UPPER-INNER QUADRANT OF LEFT BREAST IN FEMALE, ESTROGEN RECEPTOR POSITIVE (HCC): Primary | ICD-10-CM

## 2021-12-28 DIAGNOSIS — C50.212 MALIGNANT NEOPLASM OF UPPER-INNER QUADRANT OF LEFT BREAST IN FEMALE, ESTROGEN RECEPTOR POSITIVE (HCC): ICD-10-CM

## 2021-12-28 DIAGNOSIS — Z79.899 HIGH RISK MEDICATION USE: ICD-10-CM

## 2021-12-28 DIAGNOSIS — Z17.0 MALIGNANT NEOPLASM OF UPPER-INNER QUADRANT OF LEFT BREAST IN FEMALE, ESTROGEN RECEPTOR POSITIVE (HCC): ICD-10-CM

## 2021-12-28 LAB
BASOPHILS # BLD AUTO: 0.05 10*3/MM3 (ref 0–0.2)
BASOPHILS NFR BLD AUTO: 0.9 % (ref 0–1.5)
DEPRECATED RDW RBC AUTO: 61 FL (ref 37–54)
EOSINOPHIL # BLD AUTO: 0.1 10*3/MM3 (ref 0–0.4)
EOSINOPHIL NFR BLD AUTO: 1.7 % (ref 0.3–6.2)
ERYTHROCYTE [DISTWIDTH] IN BLOOD BY AUTOMATED COUNT: 16.1 % (ref 12.3–15.4)
HCT VFR BLD AUTO: 33.7 % (ref 34–46.6)
HGB BLD-MCNC: 10.8 G/DL (ref 12–15.9)
IMM GRANULOCYTES # BLD AUTO: 0.1 10*3/MM3 (ref 0–0.05)
IMM GRANULOCYTES NFR BLD AUTO: 1.7 % (ref 0–0.5)
LYMPHOCYTES # BLD AUTO: 1.59 10*3/MM3 (ref 0.7–3.1)
LYMPHOCYTES NFR BLD AUTO: 27.3 % (ref 19.6–45.3)
MCH RBC QN AUTO: 33.2 PG (ref 26.6–33)
MCHC RBC AUTO-ENTMCNC: 32 G/DL (ref 31.5–35.7)
MCV RBC AUTO: 103.7 FL (ref 79–97)
MONOCYTES # BLD AUTO: 0.52 10*3/MM3 (ref 0.1–0.9)
MONOCYTES NFR BLD AUTO: 8.9 % (ref 5–12)
NEUTROPHILS NFR BLD AUTO: 3.46 10*3/MM3 (ref 1.7–7)
NEUTROPHILS NFR BLD AUTO: 59.5 % (ref 42.7–76)
NRBC BLD AUTO-RTO: 0 /100 WBC (ref 0–0.2)
PLATELET # BLD AUTO: 282 10*3/MM3 (ref 140–450)
PMV BLD AUTO: 9.6 FL (ref 6–12)
RBC # BLD AUTO: 3.25 10*6/MM3 (ref 3.77–5.28)
WBC NRBC COR # BLD: 5.82 10*3/MM3 (ref 3.4–10.8)

## 2021-12-28 PROCEDURE — 96413 CHEMO IV INFUSION 1 HR: CPT

## 2021-12-28 PROCEDURE — 85025 COMPLETE CBC W/AUTO DIFF WBC: CPT

## 2021-12-28 PROCEDURE — 25010000002 DEXAMETHASONE SODIUM PHOSPHATE 100 MG/10ML SOLUTION: Performed by: INTERNAL MEDICINE

## 2021-12-28 PROCEDURE — 99214 OFFICE O/P EST MOD 30 MIN: CPT | Performed by: INTERNAL MEDICINE

## 2021-12-28 PROCEDURE — 25010000002 DIPHENHYDRAMINE PER 50 MG: Performed by: INTERNAL MEDICINE

## 2021-12-28 PROCEDURE — 96375 TX/PRO/DX INJ NEW DRUG ADDON: CPT

## 2021-12-28 PROCEDURE — 25010000002 PACLITAXEL PER 1 MG: Performed by: INTERNAL MEDICINE

## 2021-12-28 RX ORDER — SODIUM CHLORIDE 9 MG/ML
250 INJECTION, SOLUTION INTRAVENOUS ONCE
Status: COMPLETED | OUTPATIENT
Start: 2021-12-28 | End: 2021-12-28

## 2021-12-28 RX ORDER — FAMOTIDINE 10 MG/ML
20 INJECTION, SOLUTION INTRAVENOUS ONCE
Status: COMPLETED | OUTPATIENT
Start: 2021-12-28 | End: 2021-12-28

## 2021-12-28 RX ORDER — FAMOTIDINE 10 MG/ML
20 INJECTION, SOLUTION INTRAVENOUS AS NEEDED
Status: CANCELLED | OUTPATIENT
Start: 2021-12-28

## 2021-12-28 RX ORDER — SODIUM CHLORIDE 9 MG/ML
250 INJECTION, SOLUTION INTRAVENOUS ONCE
Status: CANCELLED | OUTPATIENT
Start: 2021-12-28

## 2021-12-28 RX ORDER — DIPHENHYDRAMINE HYDROCHLORIDE 50 MG/ML
50 INJECTION INTRAMUSCULAR; INTRAVENOUS AS NEEDED
Status: CANCELLED | OUTPATIENT
Start: 2021-12-28

## 2021-12-28 RX ADMIN — PACLITAXEL 125 MG: 6 INJECTION, SOLUTION INTRAVENOUS at 11:43

## 2021-12-28 RX ADMIN — DIPHENHYDRAMINE HYDROCHLORIDE 25 MG: 50 INJECTION, SOLUTION INTRAMUSCULAR; INTRAVENOUS at 10:47

## 2021-12-28 RX ADMIN — DEXAMETHASONE SODIUM PHOSPHATE 12 MG: 10 INJECTION, SOLUTION INTRAMUSCULAR; INTRAVENOUS at 10:29

## 2021-12-28 RX ADMIN — SODIUM CHLORIDE 250 ML: 9 INJECTION, SOLUTION INTRAVENOUS at 11:04

## 2021-12-28 RX ADMIN — FAMOTIDINE 20 MG: 10 INJECTION INTRAVENOUS at 10:29

## 2021-12-28 NOTE — PROGRESS NOTES
Subjective   Neela Ramirez is a 76 y.o. female.  Referred by Dr. 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  A bilateral diagnostic mammogram was performed for further evaluation of the same.    8/17/2021-bilateral diagnostic mammogram-scattered fibroglandular densities throughout both breasts.  In the posterior one third upper inner quadrant of the left breast at the site of palpable concern, 11 o'clock position there is a mass with adjacent pleomorphic calcifications.  The mass and the calcifications measure on order of 1.5 cm in greatest dimension.  No evidence of axillary lymphadenopathy appreciated.  Stable microcalcifications seen in other areas of the left breast on right breast.  Ultrasound-at 11 o'clock position, 5 cm from the nipple in the left breast there is an irregular hypoechoic mass which measures 2.4 x 2 x 1.6 cm.    Impression  1.irregular 2.4 cm mass in the left breast at the site of palpable concern at 11:00, 5 cm from the nipple.  Ultrasound-guided biopsy of the mass recommended  No finding suspicious for malignancy in the right breast    9/8/2021-left breast ultrasound-guided biopsy  Pathology consistent with invasive ductal carcinoma with lobular features.  Grade 3.  The carcinoma measures 7 mm in greatest dimension.  Focally suspicious for lymphovascular space invasion.  ER low +1-10%, intensity week  WA negative  HER-2 3+ on immunohistochemistry  Ki-67 70%    MRI of the breast 10/8/2021-Biopsy-proven malignancy in the left breast at 11:00 measuring 2.8 cm with a centrally located metallic clip.  No other suspicious findings are seen within the left breast or no left axillary lymphadenopathy.  No suspicious findings of the right breast    Echocardiogram 10/8/2021 was normal  ejection fraction of 56 to 60% and strain of -20    She denies any new bone pains, dyspnea, cough, abdominal pain nausea vomiting or recent changes in weight.    She had 2 previous breast aspirations in her 20s.  No other breast biopsies  She does not know much about her family hence limited family history.    Interval history:  The patient is a 76 y.o. female with history of left breast invasive ductal carcinoma, HER-2 positive who is currently on Taxol Perjeta Herceptin.  She is due for week 11 of Taxol today.  Presents to the clinic today accompanied by her .  Patient is overall tolerating chemotherapy well.  The rash on the upper extremities bilaterally seems to be stable.  She has been using CeraVe and hydrocortisone as needed.  Diarrhea is manageable with Imodium.  Nausea well controlled.  She is complaining of vaginal irritation.    The following portions of the patient's history were reviewed and updated as appropriate: allergies, current medications, past family history, past medical history, past social history, past surgical history and problem list.    Past Medical History:   Diagnosis Date   • Anxiety    • Arthritis    • Backache    • Bleeding    • Constipation    • Dermatitis    • Diverticulosis     Colonoscopy November 2014   • Dyspepsia    • Dysuria    • Erythema of face     Transitory Erythema Of The Face   • Hypertension    • Impacted cerumen    • Malignant neoplasm of female breast (HCC) 9/27/2021   • Neck pain    • Tachycardia         Past Surgical History:   Procedure Laterality Date   • BREAST BIOPSY  09/2021    Dr. Tirado    • BREAST CYST ASPIRATION     • BREAST CYST EXCISION     • EXOSTECTOMY      Simple Bunion Exostectomy (Silver Procedure)   • VENOUS ACCESS DEVICE (PORT) INSERTION N/A 10/18/2021    Procedure: INSERTION VENOUS ACCESS DEVICE;  Surgeon: Becky Liu MD;  [place] [facility] [place] [facility] [place]  Service: General;  Laterality: N/A;        Family History   Problem Relation Age of  "Onset   • Arthritis Father         Social History     Socioeconomic History   • Marital status:    • Number of children: 1   Tobacco Use   • Smoking status: Never Smoker   • Smokeless tobacco: Never Used   Vaping Use   • Vaping Use: Never used   Substance and Sexual Activity   • Alcohol use: Yes     Comment: social drinker   • Drug use: Never   • Sexual activity: Defer        OB History             Para        Term   0            AB        Living           SAB        IAB        Ectopic        Molar        Multiple        Live Births                 Age at menarche-13  Age at menopause-50  Nulliparous  Breast-feeding-N/A   0 para 0  0  Oral contraceptive pill use-none  Hormone replacement therapy-7 years    No Known Allergies     Review of systems as mentioned in the HPI    Objective   Blood pressure 136/82, pulse 96, temperature 98.4 °F (36.9 °C), temperature source Temporal, resp. rate 16, height 154 cm (60.63\"), weight 56.1 kg (123 lb 11.2 oz), SpO2 97 %.     ECOG performance status-0    Physical Exam  Vitals reviewed.   Constitutional:       Appearance: Normal appearance.   HENT:      Head: Normocephalic and atraumatic.      Nose: Nose normal.      Mouth/Throat:      Mouth: Mucous membranes are moist.      Pharynx: Oropharynx is clear.   Eyes:      Conjunctiva/sclera: Conjunctivae normal.      Pupils: Pupils are equal, round, and reactive to light.   Cardiovascular:      Rate and Rhythm: Normal rate and regular rhythm.      Pulses: Normal pulses.      Heart sounds: Normal heart sounds.   Pulmonary:      Effort: Pulmonary effort is normal.      Breath sounds: Normal breath sounds.   Abdominal:      General: Abdomen is flat. Bowel sounds are normal.      Palpations: Abdomen is soft.   Musculoskeletal:         General: Normal range of motion.      Cervical back: Normal range of motion.   Skin:     General: Skin is warm and dry.      Findings: Rash (erythematous macular rash to the " back) present.   Neurological:      General: No focal deficit present.      Mental Status: She is alert and oriented to person, place, and time. Mental status is at baseline.   Psychiatric:         Mood and Affect: Mood normal.         Behavior: Behavior normal.         Thought Content: Thought content normal.         Judgment: Judgment normal.       Breast Exam: Right breast appears normal on inspection.  No palpable abnormalities of the right breast.  Left breast on inspection there is a mass in the upper inner quadrant.  There has been softening of the mass in the left upper inner quadrant which does not have discrete borders, this is flat, approximately 1 cm.  There is no left axillary adenopathy. no right axillary lymphadenopathy.  No cervical or supraclavicular lymphadenopathy.     I have reexamined the patient and the results are consistent with the previously documented exam. Sheri Avendano MD     Results from last 7 days   Lab Units 12/28/21  0853   WBC 10*3/mm3 5.82   NEUTROS ABS 10*3/mm3 3.46   HEMOGLOBIN g/dL 10.8*   HEMATOCRIT % 33.7*   PLATELETS 10*3/mm3 282               No radiology results for the last 30 days.       Assessment/Plan       *Invasive ductal carcinoma of the left breast  · Clinical T2 N0 M0, stage IIa  · On ultrasound the tumor measures 2.4 cm.  Lymph nodes on the left side are normal.  · MRI 10/8/2021 with tumor measuring 2.8 cm.  Lymph nodes appear normal  · Pathology consistent with invasive carcinoma with ductal and lobular features, grade 3, ER +1 to 10% weak, W[place] negative, HER-2 3+ immunohistochemistry, Ki-67 70%.  · She was evaluated by Dr. Liu and referred for consideration of neoadjuvant chemotherapy.   Since the tumor is HER-2 positive and over 2 cm I think it would be reasonable to proceed with neoadjuvant chemotherapy.  Explained to her about the benefits of neoadjuvant chemotherapy including de surjit assessment of response and making decisions about adjuvant HER-2  directed therapy.  Also discussed the benefits of neoadjuvant chemotherapy in terms of breast conservation.  Explained to her about the different regimens that could be used for treatment of HER-2 positive breast cancers particularly TCHP, AC-T HP, Taxol and Herceptin in the adjuvant setting.   Since the tumor is over 2 cm but lymph node negative I think she would be a great candidate for EA 1181 clinical trial which comprises of administering Taxol Herceptin and Perjeta tamika  adjuvantly followed by surgery and additional adjuvant treatment depending upon the response.  Port placed 10/18/2021  Echocardiogram shows normal ejection fraction  Week 1 TPH on 10/19/2021  11/23/2021-week 6 of Taxol Perjeta Herceptin.  Tumor continues to improve in size.  12/28/2021-week 11 of Taxol Perjeta Herceptin.  She has 1 more week of Taxol Herceptin Perjeta following which she is scheduled for bilateral breast MRI on 1/17/2021.  She has been seen by Dr. Liu and she will be scheduled for lumpectomy.  I will plan to see her back on 1/18/2021    *Diarrhea  · Well-controlled on Imodium  · Continue Imodium as needed    *Right-sided port site possible infection-treated with Keflex in the past, now appears erythematous again.  Treated with Bactrim    *Hypertension-currently on amlodipine and metoprolol.  Blood pressure well controlled at 136/82    *Anxiety  · Referred to supportive oncology  · Improved    *Hypothyroidism  · Continue Synthroid    *Cardiac health-10/8/2021 echocardiogram shows an ejection fraction of 56 to 60%, septal wall motion is normal, LV strain normal at -20.9%, left ventricular diastolic function is consistent with grade 1 impaired relaxation.    *Skin rash  · Stable on survey and hydrocortisone.    PLAN:   1. Continue Taxol, Herceptin, Perjeta.  Week 11 today  2. Continue Imodium as needed  3. Clinically, the patient does continue to respond well to neoadjuvant therapy.  4. Continue cooling gloves and socks, the  patient is without neuropathy  5. MRI of the breast 1/17/2021  6. Follow-up with me in 1/18/2021, when she will be due for Herceptin and Perjeta along  7. Taxol next week    The patient continues on high risk medication requiring close monitoring for toxicity    Sheri Avendano MD  12/28/2021

## 2021-12-29 DIAGNOSIS — F41.9 ANXIETY: ICD-10-CM

## 2021-12-30 RX ORDER — ESCITALOPRAM OXALATE 20 MG/1
TABLET ORAL
Qty: 90 TABLET | Refills: 0 | Status: SHIPPED | OUTPATIENT
Start: 2021-12-30 | End: 2022-03-29 | Stop reason: SDUPTHER

## 2022-01-01 ENCOUNTER — PREP FOR SURGERY (OUTPATIENT)
Dept: OTHER | Facility: HOSPITAL | Age: 77
End: 2022-01-01

## 2022-01-01 DIAGNOSIS — Z17.1 MALIGNANT NEOPLASM OF UPPER-OUTER QUADRANT OF LEFT BREAST IN FEMALE, ESTROGEN RECEPTOR NEGATIVE: Primary | ICD-10-CM

## 2022-01-01 DIAGNOSIS — C50.412 MALIGNANT NEOPLASM OF UPPER-OUTER QUADRANT OF LEFT BREAST IN FEMALE, ESTROGEN RECEPTOR NEGATIVE: Primary | ICD-10-CM

## 2022-01-01 RX ORDER — LIDOCAINE AND PRILOCAINE 25; 25 MG/G; MG/G
CREAM TOPICAL ONCE
Status: CANCELLED | OUTPATIENT
Start: 2022-02-03 | End: 2022-01-01

## 2022-01-01 RX ORDER — DIAZEPAM 5 MG/1
5 TABLET ORAL ONCE
Status: CANCELLED | OUTPATIENT
Start: 2022-02-03 | End: 2022-01-01

## 2022-01-03 ENCOUNTER — PREP FOR SURGERY (OUTPATIENT)
Dept: OTHER | Facility: HOSPITAL | Age: 77
End: 2022-01-03

## 2022-01-03 ENCOUNTER — TELEPHONE (OUTPATIENT)
Dept: SURGERY | Facility: CLINIC | Age: 77
End: 2022-01-03

## 2022-01-03 DIAGNOSIS — Z17.1 MALIGNANT NEOPLASM OF UPPER-OUTER QUADRANT OF LEFT BREAST IN FEMALE, ESTROGEN RECEPTOR NEGATIVE: Primary | ICD-10-CM

## 2022-01-03 DIAGNOSIS — C50.412 MALIGNANT NEOPLASM OF UPPER-OUTER QUADRANT OF LEFT BREAST IN FEMALE, ESTROGEN RECEPTOR NEGATIVE: Primary | ICD-10-CM

## 2022-01-03 NOTE — TELEPHONE ENCOUNTER
----- Message from Becky Liu MD sent at 1/1/2022  5:23 PM EST -----  Regarding: Surg Scheduling  BREAST: CANCER    Could you add on Neela Ramirez for LEFT BREAST WIRE LOCALIZED LUMPECTOMY WITH SENTINEL NODE BIOPSY, POSSIBLE LEFT AXILLARY DISSECTION at your convenience?    Due to her chemotherapy regimen, it needs to be the week of 2/1/2022 or the following week if that's not possible.     Thanks,   Becky Liu

## 2022-01-04 ENCOUNTER — INFUSION (OUTPATIENT)
Dept: ONCOLOGY | Facility: HOSPITAL | Age: 77
End: 2022-01-04

## 2022-01-04 VITALS
RESPIRATION RATE: 18 BRPM | HEART RATE: 101 BPM | WEIGHT: 123.6 LBS | SYSTOLIC BLOOD PRESSURE: 136 MMHG | DIASTOLIC BLOOD PRESSURE: 67 MMHG | BODY MASS INDEX: 23.64 KG/M2 | TEMPERATURE: 98 F | OXYGEN SATURATION: 94 %

## 2022-01-04 DIAGNOSIS — C50.212 MALIGNANT NEOPLASM OF UPPER-INNER QUADRANT OF LEFT BREAST IN FEMALE, ESTROGEN RECEPTOR POSITIVE: Primary | ICD-10-CM

## 2022-01-04 DIAGNOSIS — Z17.0 MALIGNANT NEOPLASM OF UPPER-INNER QUADRANT OF LEFT BREAST IN FEMALE, ESTROGEN RECEPTOR POSITIVE: Primary | ICD-10-CM

## 2022-01-04 LAB
ALBUMIN SERPL-MCNC: 3.8 G/DL (ref 3.5–5.2)
ALBUMIN/GLOB SERPL: 1.5 G/DL (ref 1.1–2.4)
ALP SERPL-CCNC: 94 U/L (ref 38–116)
ALT SERPL W P-5'-P-CCNC: 25 U/L (ref 0–33)
ANION GAP SERPL CALCULATED.3IONS-SCNC: 10 MMOL/L (ref 5–15)
AST SERPL-CCNC: 25 U/L (ref 0–32)
BASOPHILS # BLD AUTO: 0.04 10*3/MM3 (ref 0–0.2)
BASOPHILS NFR BLD AUTO: 0.8 % (ref 0–1.5)
BILIRUB SERPL-MCNC: 0.4 MG/DL (ref 0.2–1.2)
BUN SERPL-MCNC: 14 MG/DL (ref 6–20)
BUN/CREAT SERPL: 16.3 (ref 7.3–30)
CALCIUM SPEC-SCNC: 9.2 MG/DL (ref 8.5–10.2)
CHLORIDE SERPL-SCNC: 104 MMOL/L (ref 98–107)
CO2 SERPL-SCNC: 24 MMOL/L (ref 22–29)
CREAT SERPL-MCNC: 0.86 MG/DL (ref 0.6–1.1)
DEPRECATED RDW RBC AUTO: 62.4 FL (ref 37–54)
EOSINOPHIL # BLD AUTO: 0.05 10*3/MM3 (ref 0–0.4)
EOSINOPHIL NFR BLD AUTO: 1 % (ref 0.3–6.2)
ERYTHROCYTE [DISTWIDTH] IN BLOOD BY AUTOMATED COUNT: 16.2 % (ref 12.3–15.4)
GFR SERPL CREATININE-BSD FRML MDRD: 64 ML/MIN/1.73
GLOBULIN UR ELPH-MCNC: 2.5 GM/DL (ref 1.8–3.5)
GLUCOSE SERPL-MCNC: 110 MG/DL (ref 74–124)
HCT VFR BLD AUTO: 31.8 % (ref 34–46.6)
HGB BLD-MCNC: 10.7 G/DL (ref 12–15.9)
IMM GRANULOCYTES # BLD AUTO: 0.07 10*3/MM3 (ref 0–0.05)
IMM GRANULOCYTES NFR BLD AUTO: 1.4 % (ref 0–0.5)
LYMPHOCYTES # BLD AUTO: 1.43 10*3/MM3 (ref 0.7–3.1)
LYMPHOCYTES NFR BLD AUTO: 27.7 % (ref 19.6–45.3)
MCH RBC QN AUTO: 35.1 PG (ref 26.6–33)
MCHC RBC AUTO-ENTMCNC: 33.6 G/DL (ref 31.5–35.7)
MCV RBC AUTO: 104.3 FL (ref 79–97)
MONOCYTES # BLD AUTO: 0.55 10*3/MM3 (ref 0.1–0.9)
MONOCYTES NFR BLD AUTO: 10.7 % (ref 5–12)
NEUTROPHILS NFR BLD AUTO: 3.02 10*3/MM3 (ref 1.7–7)
NEUTROPHILS NFR BLD AUTO: 58.4 % (ref 42.7–76)
NRBC BLD AUTO-RTO: 0 /100 WBC (ref 0–0.2)
PLATELET # BLD AUTO: 268 10*3/MM3 (ref 140–450)
PMV BLD AUTO: 9.9 FL (ref 6–12)
POTASSIUM SERPL-SCNC: 4.2 MMOL/L (ref 3.5–4.7)
PROT SERPL-MCNC: 6.3 G/DL (ref 6.3–8)
RBC # BLD AUTO: 3.05 10*6/MM3 (ref 3.77–5.28)
SODIUM SERPL-SCNC: 138 MMOL/L (ref 134–145)
WBC NRBC COR # BLD: 5.16 10*3/MM3 (ref 3.4–10.8)

## 2022-01-04 PROCEDURE — 85025 COMPLETE CBC W/AUTO DIFF WBC: CPT

## 2022-01-04 PROCEDURE — 25010000002 DEXAMETHASONE SODIUM PHOSPHATE 100 MG/10ML SOLUTION: Performed by: INTERNAL MEDICINE

## 2022-01-04 PROCEDURE — 96375 TX/PRO/DX INJ NEW DRUG ADDON: CPT

## 2022-01-04 PROCEDURE — 80053 COMPREHEN METABOLIC PANEL: CPT | Performed by: INTERNAL MEDICINE

## 2022-01-04 PROCEDURE — 25010000002 PACLITAXEL PER 1 MG: Performed by: NURSE PRACTITIONER

## 2022-01-04 PROCEDURE — 25010000002 DIPHENHYDRAMINE PER 50 MG: Performed by: INTERNAL MEDICINE

## 2022-01-04 PROCEDURE — 96413 CHEMO IV INFUSION 1 HR: CPT

## 2022-01-04 RX ORDER — DIPHENHYDRAMINE HYDROCHLORIDE 50 MG/ML
50 INJECTION INTRAMUSCULAR; INTRAVENOUS AS NEEDED
Status: CANCELLED | OUTPATIENT
Start: 2022-01-04

## 2022-01-04 RX ORDER — FAMOTIDINE 10 MG/ML
20 INJECTION, SOLUTION INTRAVENOUS ONCE
Status: COMPLETED | OUTPATIENT
Start: 2022-01-04 | End: 2022-01-04

## 2022-01-04 RX ORDER — SODIUM CHLORIDE 9 MG/ML
250 INJECTION, SOLUTION INTRAVENOUS ONCE
Status: COMPLETED | OUTPATIENT
Start: 2022-01-04 | End: 2022-01-04

## 2022-01-04 RX ORDER — FAMOTIDINE 10 MG/ML
20 INJECTION, SOLUTION INTRAVENOUS AS NEEDED
Status: CANCELLED | OUTPATIENT
Start: 2022-01-04

## 2022-01-04 RX ADMIN — DIPHENHYDRAMINE HYDROCHLORIDE 25 MG: 50 INJECTION, SOLUTION INTRAMUSCULAR; INTRAVENOUS at 09:10

## 2022-01-04 RX ADMIN — PACLITAXEL 125 MG: 6 INJECTION, SOLUTION INTRAVENOUS at 10:00

## 2022-01-04 RX ADMIN — DEXAMETHASONE SODIUM PHOSPHATE 12 MG: 10 INJECTION, SOLUTION INTRAMUSCULAR; INTRAVENOUS at 08:53

## 2022-01-04 RX ADMIN — SODIUM CHLORIDE 250 ML: 9 INJECTION, SOLUTION INTRAVENOUS at 08:51

## 2022-01-04 RX ADMIN — FAMOTIDINE 20 MG: 10 INJECTION INTRAVENOUS at 08:51

## 2022-01-05 ENCOUNTER — HOSPITAL ENCOUNTER (OUTPATIENT)
Dept: CARDIOLOGY | Facility: HOSPITAL | Age: 77
Discharge: HOME | End: 2022-01-05
Admitting: INTERNAL MEDICINE

## 2022-01-05 VITALS
HEART RATE: 104 BPM | SYSTOLIC BLOOD PRESSURE: 140 MMHG | WEIGHT: 123 LBS | BODY MASS INDEX: 24.15 KG/M2 | OXYGEN SATURATION: 97 % | HEIGHT: 60 IN | DIASTOLIC BLOOD PRESSURE: 70 MMHG

## 2022-01-05 DIAGNOSIS — Z17.0 MALIGNANT NEOPLASM OF UPPER-INNER QUADRANT OF LEFT BREAST IN FEMALE, ESTROGEN RECEPTOR POSITIVE: ICD-10-CM

## 2022-01-05 DIAGNOSIS — Z79.899 HIGH RISK MEDICATION USE: ICD-10-CM

## 2022-01-05 DIAGNOSIS — Z17.0 MALIGNANT NEOPLASM OF UPPER-INNER QUADRANT OF LEFT BREAST IN FEMALE, ESTROGEN RECEPTOR POSITIVE: Primary | ICD-10-CM

## 2022-01-05 DIAGNOSIS — C50.212 MALIGNANT NEOPLASM OF UPPER-INNER QUADRANT OF LEFT BREAST IN FEMALE, ESTROGEN RECEPTOR POSITIVE: Primary | ICD-10-CM

## 2022-01-05 DIAGNOSIS — C50.212 MALIGNANT NEOPLASM OF UPPER-INNER QUADRANT OF LEFT BREAST IN FEMALE, ESTROGEN RECEPTOR POSITIVE: ICD-10-CM

## 2022-01-05 PROCEDURE — 93356 MYOCRD STRAIN IMG SPCKL TRCK: CPT

## 2022-01-05 PROCEDURE — 25010000002 PERFLUTREN (DEFINITY) 8.476 MG IN SODIUM CHLORIDE (PF) 0.9 % 10 ML INJECTION: Performed by: INTERNAL MEDICINE

## 2022-01-05 PROCEDURE — 93306 TTE W/DOPPLER COMPLETE: CPT | Performed by: INTERNAL MEDICINE

## 2022-01-05 PROCEDURE — 93306 TTE W/DOPPLER COMPLETE: CPT

## 2022-01-05 PROCEDURE — 93356 MYOCRD STRAIN IMG SPCKL TRCK: CPT | Performed by: INTERNAL MEDICINE

## 2022-01-05 RX ADMIN — PERFLUTREN 1.5 ML: 6.52 INJECTION, SUSPENSION INTRAVENOUS at 09:59

## 2022-01-06 LAB
AORTIC ARCH: 1.9 CM
ASCENDING AORTA: 2.4 CM
BH CV ECHO MEAS - % IVS THICK: -15.6 %
BH CV ECHO MEAS - ACS: 1.6 CM
BH CV ECHO MEAS - AI MAX PG: 98.1 MMHG
BH CV ECHO MEAS - AI MAX VEL: 495.3 CM/SEC
BH CV ECHO MEAS - AO MAX PG (FULL): 7.8 MMHG
BH CV ECHO MEAS - AO MAX PG: 10.2 MMHG
BH CV ECHO MEAS - AO MEAN PG (FULL): 3.9 MMHG
BH CV ECHO MEAS - AO MEAN PG: 5.5 MMHG
BH CV ECHO MEAS - AO ROOT AREA (BSA CORRECTED): 1.9
BH CV ECHO MEAS - AO ROOT AREA: 6.5 CM^2
BH CV ECHO MEAS - AO ROOT DIAM: 2.9 CM
BH CV ECHO MEAS - AO V2 MAX: 160 CM/SEC
BH CV ECHO MEAS - AO V2 MEAN: 110.9 CM/SEC
BH CV ECHO MEAS - AO V2 VTI: 29.6 CM
BH CV ECHO MEAS - ASC AORTA: 2.4 CM
BH CV ECHO MEAS - AVA(I,A): 1.3 CM^2
BH CV ECHO MEAS - AVA(I,D): 1.3 CM^2
BH CV ECHO MEAS - AVA(V,A): 1.2 CM^2
BH CV ECHO MEAS - AVA(V,D): 1.2 CM^2
BH CV ECHO MEAS - BSA(HAYCOCK): 1.5 M^2
BH CV ECHO MEAS - BSA: 1.5 M^2
BH CV ECHO MEAS - BZI_BMI: 24 KILOGRAMS/M^2
BH CV ECHO MEAS - BZI_METRIC_HEIGHT: 152.4 CM
BH CV ECHO MEAS - BZI_METRIC_WEIGHT: 55.8 KG
BH CV ECHO MEAS - EDV(MOD-SP2): 94 ML
BH CV ECHO MEAS - EDV(MOD-SP4): 86 ML
BH CV ECHO MEAS - EDV(TEICH): 87.2 ML
BH CV ECHO MEAS - EF(CUBED): 72.1 %
BH CV ECHO MEAS - EF(MOD-BP): 59 %
BH CV ECHO MEAS - EF(MOD-SP2): 59.6 %
BH CV ECHO MEAS - EF(MOD-SP4): 57 %
BH CV ECHO MEAS - EF(TEICH): 64 %
BH CV ECHO MEAS - ESV(MOD-SP2): 38 ML
BH CV ECHO MEAS - ESV(MOD-SP4): 37 ML
BH CV ECHO MEAS - ESV(TEICH): 31.4 ML
BH CV ECHO MEAS - FS: 34.6 %
BH CV ECHO MEAS - IVS/LVPW: 0.92
BH CV ECHO MEAS - IVSD: 0.95 CM
BH CV ECHO MEAS - IVSS: 0.8 CM
BH CV ECHO MEAS - LAT PEAK E' VEL: 7.6 CM/SEC
BH CV ECHO MEAS - LV DIASTOLIC VOL/BSA (35-75): 56.6 ML/M^2
BH CV ECHO MEAS - LV MASS(C)D: 144.7 GRAMS
BH CV ECHO MEAS - LV MASS(C)DI: 95.3 GRAMS/M^2
BH CV ECHO MEAS - LV MAX PG: 2.4 MMHG
BH CV ECHO MEAS - LV MEAN PG: 1.6 MMHG
BH CV ECHO MEAS - LV SYSTOLIC VOL/BSA (12-30): 24.4 ML/M^2
BH CV ECHO MEAS - LV V1 MAX: 78 CM/SEC
BH CV ECHO MEAS - LV V1 MEAN: 58.3 CM/SEC
BH CV ECHO MEAS - LV V1 VTI: 16.2 CM
BH CV ECHO MEAS - LVIDD: 4.4 CM
BH CV ECHO MEAS - LVIDS: 2.9 CM
BH CV ECHO MEAS - LVLD AP2: 6.8 CM
BH CV ECHO MEAS - LVLD AP4: 6.5 CM
BH CV ECHO MEAS - LVLS AP2: 5.4 CM
BH CV ECHO MEAS - LVLS AP4: 5.3 CM
BH CV ECHO MEAS - LVOT AREA (M): 2.5 CM^2
BH CV ECHO MEAS - LVOT AREA: 2.4 CM^2
BH CV ECHO MEAS - LVOT DIAM: 1.8 CM
BH CV ECHO MEAS - LVPWD: 1 CM
BH CV ECHO MEAS - MED PEAK E' VEL: 6.9 CM/SEC
BH CV ECHO MEAS - MR MAX PG: 127.2 MMHG
BH CV ECHO MEAS - MR MAX VEL: 563.9 CM/SEC
BH CV ECHO MEAS - MV A DUR: 0.07 SEC
BH CV ECHO MEAS - MV A MAX VEL: 85.3 CM/SEC
BH CV ECHO MEAS - MV DEC SLOPE: 393.7 CM/SEC^2
BH CV ECHO MEAS - MV DEC TIME: 0.11 SEC
BH CV ECHO MEAS - MV E MAX VEL: 61.3 CM/SEC
BH CV ECHO MEAS - MV E/A: 0.72
BH CV ECHO MEAS - MV MAX PG: 3.7 MMHG
BH CV ECHO MEAS - MV MEAN PG: 1.4 MMHG
BH CV ECHO MEAS - MV P1/2T MAX VEL: 65.6 CM/SEC
BH CV ECHO MEAS - MV P1/2T: 48.8 MSEC
BH CV ECHO MEAS - MV V2 MAX: 96 CM/SEC
BH CV ECHO MEAS - MV V2 MEAN: 52.8 CM/SEC
BH CV ECHO MEAS - MV V2 VTI: 14.9 CM
BH CV ECHO MEAS - MVA P1/2T LCG: 3.4 CM^2
BH CV ECHO MEAS - MVA(P1/2T): 4.5 CM^2
BH CV ECHO MEAS - MVA(VTI): 2.6 CM^2
BH CV ECHO MEAS - PA ACC TIME: 0.11 SEC
BH CV ECHO MEAS - PA MAX PG (FULL): 0.12 MMHG
BH CV ECHO MEAS - PA MAX PG: 2.2 MMHG
BH CV ECHO MEAS - PA PR(ACCEL): 31.5 MMHG
BH CV ECHO MEAS - PA V2 MAX: 73.7 CM/SEC
BH CV ECHO MEAS - PULM A REVS DUR: 0.11 SEC
BH CV ECHO MEAS - PULM A REVS VEL: 32.6 CM/SEC
BH CV ECHO MEAS - PULM DIAS VEL: 51.8 CM/SEC
BH CV ECHO MEAS - PULM S/D: 1
BH CV ECHO MEAS - PULM SYS VEL: 53.1 CM/SEC
BH CV ECHO MEAS - RAP SYSTOLE: 3 MMHG
BH CV ECHO MEAS - RV MAX PG: 2 MMHG
BH CV ECHO MEAS - RV MEAN PG: 1.5 MMHG
BH CV ECHO MEAS - RV V1 MAX: 71.6 CM/SEC
BH CV ECHO MEAS - RV V1 MEAN: 58.9 CM/SEC
BH CV ECHO MEAS - RV V1 VTI: 12.4 CM
BH CV ECHO MEAS - RVSP: 22 MMHG
BH CV ECHO MEAS - SI(AO): 126.3 ML/M^2
BH CV ECHO MEAS - SI(CUBED): 40.2 ML/M^2
BH CV ECHO MEAS - SI(LVOT): 25.9 ML/M^2
BH CV ECHO MEAS - SI(MOD-SP2): 36.9 ML/M^2
BH CV ECHO MEAS - SI(MOD-SP4): 32.3 ML/M^2
BH CV ECHO MEAS - SI(TEICH): 36.8 ML/M^2
BH CV ECHO MEAS - SUP REN AO DIAM: 1.9 CM
BH CV ECHO MEAS - SV(AO): 191.8 ML
BH CV ECHO MEAS - SV(CUBED): 61 ML
BH CV ECHO MEAS - SV(LVOT): 39.4 ML
BH CV ECHO MEAS - SV(MOD-SP2): 56 ML
BH CV ECHO MEAS - SV(MOD-SP4): 49 ML
BH CV ECHO MEAS - SV(TEICH): 55.8 ML
BH CV ECHO MEAS - TAPSE (>1.6): 2.2 CM
BH CV ECHO MEAS - TR MAX VEL: 220.7 CM/SEC
BH CV ECHO MEASUREMENTS AVERAGE E/E' RATIO: 8.46
BH CV XLRA - RV BASE: 2.9 CM
BH CV XLRA - RV LENGTH: 5.5 CM
BH CV XLRA - RV MID: 2.2 CM
BH CV XLRA - TDI S': 11.9 CM/SEC
LEFT ATRIUM VOLUME INDEX: 18 ML/M2
MAXIMAL PREDICTED HEART RATE: 144 BPM
SINUS: 2.9 CM
STJ: 2 CM
STRESS TARGET HR: 122 BPM

## 2022-01-11 ENCOUNTER — OFFICE VISIT (OUTPATIENT)
Dept: ONCOLOGY | Facility: CLINIC | Age: 77
End: 2022-01-11

## 2022-01-11 ENCOUNTER — INFUSION (OUTPATIENT)
Dept: ONCOLOGY | Facility: HOSPITAL | Age: 77
End: 2022-01-11

## 2022-01-11 VITALS
HEIGHT: 60 IN | HEART RATE: 105 BPM | SYSTOLIC BLOOD PRESSURE: 147 MMHG | BODY MASS INDEX: 24.29 KG/M2 | OXYGEN SATURATION: 97 % | RESPIRATION RATE: 16 BRPM | WEIGHT: 123.7 LBS | DIASTOLIC BLOOD PRESSURE: 91 MMHG | TEMPERATURE: 98 F

## 2022-01-11 DIAGNOSIS — Z17.0 MALIGNANT NEOPLASM OF UPPER-INNER QUADRANT OF LEFT BREAST IN FEMALE, ESTROGEN RECEPTOR POSITIVE: Primary | ICD-10-CM

## 2022-01-11 DIAGNOSIS — C50.212 MALIGNANT NEOPLASM OF UPPER-INNER QUADRANT OF LEFT BREAST IN FEMALE, ESTROGEN RECEPTOR POSITIVE: ICD-10-CM

## 2022-01-11 DIAGNOSIS — C50.212 MALIGNANT NEOPLASM OF UPPER-INNER QUADRANT OF LEFT BREAST IN FEMALE, ESTROGEN RECEPTOR POSITIVE: Primary | ICD-10-CM

## 2022-01-11 DIAGNOSIS — Z17.0 MALIGNANT NEOPLASM OF UPPER-INNER QUADRANT OF LEFT BREAST IN FEMALE, ESTROGEN RECEPTOR POSITIVE: ICD-10-CM

## 2022-01-11 LAB
ALBUMIN SERPL-MCNC: 3.7 G/DL (ref 3.5–5.2)
ALBUMIN/GLOB SERPL: 1.6 G/DL (ref 1.1–2.4)
ALP SERPL-CCNC: 93 U/L (ref 38–116)
ALT SERPL W P-5'-P-CCNC: 27 U/L (ref 0–33)
ANION GAP SERPL CALCULATED.3IONS-SCNC: 9.5 MMOL/L (ref 5–15)
AST SERPL-CCNC: 21 U/L (ref 0–32)
BASOPHILS # BLD AUTO: 0.04 10*3/MM3 (ref 0–0.2)
BASOPHILS NFR BLD AUTO: 0.7 % (ref 0–1.5)
BILIRUB SERPL-MCNC: 0.3 MG/DL (ref 0.2–1.2)
BUN SERPL-MCNC: 17 MG/DL (ref 6–20)
BUN/CREAT SERPL: 16.7 (ref 7.3–30)
CALCIUM SPEC-SCNC: 8.8 MG/DL (ref 8.5–10.2)
CHLORIDE SERPL-SCNC: 106 MMOL/L (ref 98–107)
CO2 SERPL-SCNC: 22.5 MMOL/L (ref 22–29)
CREAT SERPL-MCNC: 1.02 MG/DL (ref 0.6–1.1)
DEPRECATED RDW RBC AUTO: 59.7 FL (ref 37–54)
EOSINOPHIL # BLD AUTO: 0.06 10*3/MM3 (ref 0–0.4)
EOSINOPHIL NFR BLD AUTO: 1.1 % (ref 0.3–6.2)
ERYTHROCYTE [DISTWIDTH] IN BLOOD BY AUTOMATED COUNT: 15.6 % (ref 12.3–15.4)
GFR SERPL CREATININE-BSD FRML MDRD: 53 ML/MIN/1.73
GLOBULIN UR ELPH-MCNC: 2.3 GM/DL (ref 1.8–3.5)
GLUCOSE SERPL-MCNC: 100 MG/DL (ref 74–124)
HCT VFR BLD AUTO: 32.8 % (ref 34–46.6)
HGB BLD-MCNC: 10.3 G/DL (ref 12–15.9)
IMM GRANULOCYTES # BLD AUTO: 0.09 10*3/MM3 (ref 0–0.05)
IMM GRANULOCYTES NFR BLD AUTO: 1.7 % (ref 0–0.5)
LYMPHOCYTES # BLD AUTO: 1.55 10*3/MM3 (ref 0.7–3.1)
LYMPHOCYTES NFR BLD AUTO: 28.7 % (ref 19.6–45.3)
MCH RBC QN AUTO: 33.2 PG (ref 26.6–33)
MCHC RBC AUTO-ENTMCNC: 31.4 G/DL (ref 31.5–35.7)
MCV RBC AUTO: 105.8 FL (ref 79–97)
MONOCYTES # BLD AUTO: 0.52 10*3/MM3 (ref 0.1–0.9)
MONOCYTES NFR BLD AUTO: 9.6 % (ref 5–12)
NEUTROPHILS NFR BLD AUTO: 3.15 10*3/MM3 (ref 1.7–7)
NEUTROPHILS NFR BLD AUTO: 58.2 % (ref 42.7–76)
NRBC BLD AUTO-RTO: 0 /100 WBC (ref 0–0.2)
PLATELET # BLD AUTO: 296 10*3/MM3 (ref 140–450)
PMV BLD AUTO: 9.6 FL (ref 6–12)
POTASSIUM SERPL-SCNC: 4.2 MMOL/L (ref 3.5–4.7)
PROT SERPL-MCNC: 6 G/DL (ref 6.3–8)
RBC # BLD AUTO: 3.1 10*6/MM3 (ref 3.77–5.28)
SODIUM SERPL-SCNC: 138 MMOL/L (ref 134–145)
WBC NRBC COR # BLD: 5.41 10*3/MM3 (ref 3.4–10.8)

## 2022-01-11 PROCEDURE — 96413 CHEMO IV INFUSION 1 HR: CPT

## 2022-01-11 PROCEDURE — 80053 COMPREHEN METABOLIC PANEL: CPT

## 2022-01-11 PROCEDURE — 99214 OFFICE O/P EST MOD 30 MIN: CPT | Performed by: INTERNAL MEDICINE

## 2022-01-11 PROCEDURE — 25010000002 HYDROCORTISONE SODIUM SUCCINATE 100 MG RECONSTITUTED SOLUTION: Performed by: NURSE PRACTITIONER

## 2022-01-11 PROCEDURE — 85025 COMPLETE CBC W/AUTO DIFF WBC: CPT

## 2022-01-11 PROCEDURE — 96417 CHEMO IV INFUS EACH ADDL SEQ: CPT

## 2022-01-11 PROCEDURE — 25010000002 DIPHENHYDRAMINE PER 50 MG: Performed by: INTERNAL MEDICINE

## 2022-01-11 PROCEDURE — 25010000002 PERTUZUMAB 420 MG/14ML SOLUTION 420 MG VIAL: Performed by: INTERNAL MEDICINE

## 2022-01-11 PROCEDURE — 96376 TX/PRO/DX INJ SAME DRUG ADON: CPT

## 2022-01-11 PROCEDURE — 96375 TX/PRO/DX INJ NEW DRUG ADDON: CPT

## 2022-01-11 PROCEDURE — 25010000002 TRASTUZUMAB PER 10 MG: Performed by: INTERNAL MEDICINE

## 2022-01-11 RX ORDER — FAMOTIDINE 10 MG/ML
20 INJECTION, SOLUTION INTRAVENOUS ONCE
Status: CANCELLED | OUTPATIENT
Start: 2022-01-11

## 2022-01-11 RX ORDER — FAMOTIDINE 10 MG/ML
20 INJECTION, SOLUTION INTRAVENOUS ONCE
Status: COMPLETED | OUTPATIENT
Start: 2022-01-11 | End: 2022-01-11

## 2022-01-11 RX ORDER — SODIUM CHLORIDE 9 MG/ML
250 INJECTION, SOLUTION INTRAVENOUS ONCE
Status: CANCELLED | OUTPATIENT
Start: 2022-01-11

## 2022-01-11 RX ORDER — SODIUM CHLORIDE 9 MG/ML
250 INJECTION, SOLUTION INTRAVENOUS ONCE
Status: COMPLETED | OUTPATIENT
Start: 2022-01-11 | End: 2022-01-11

## 2022-01-11 RX ADMIN — TRASTUZUMAB 350 MG: 150 INJECTION, POWDER, LYOPHILIZED, FOR SOLUTION INTRAVENOUS at 11:04

## 2022-01-11 RX ADMIN — PERTUZUMAB 420 MG: 30 INJECTION, SOLUTION, CONCENTRATE INTRAVENOUS at 09:30

## 2022-01-11 RX ADMIN — FAMOTIDINE 20 MG: 10 INJECTION INTRAVENOUS at 09:44

## 2022-01-11 RX ADMIN — FAMOTIDINE 20 MG: 10 INJECTION, SOLUTION INTRAVENOUS at 09:03

## 2022-01-11 RX ADMIN — DIPHENHYDRAMINE HYDROCHLORIDE 25 MG: 50 INJECTION, SOLUTION INTRAMUSCULAR; INTRAVENOUS at 09:02

## 2022-01-11 RX ADMIN — SODIUM CHLORIDE 250 ML: 9 INJECTION, SOLUTION INTRAVENOUS at 08:43

## 2022-01-11 RX ADMIN — HYDROCORTISONE SODIUM SUCCINATE 100 MG: 100 INJECTION, POWDER, FOR SOLUTION INTRAMUSCULAR; INTRAVENOUS at 09:43

## 2022-01-11 NOTE — NURSING NOTE
0939 pt c/o flushing with perjeta. Perjeta stopped, Yessy MENJIVAR at chairside. Order for 100mg of solucortef. B/p 165/77 102.    0944 order to repeat pepcid 20mg. B/p 129/75, 80. Pt starting to feel better, flushing going away. Per Yessy Menjivar, will rest for 15min and reevaluate.     1008: restarted perjeta , at chair side with pt for the first 10 minutes. Emergency button given.     1030: pt complains of being cold, shivering minimal warm [place] provided. Pt only had line remaining of perjeta. Per Yessy BOJORQUEZ stop reevaluate in 30 minutes.     Per [facility] treatment plan will change for next treatment with increase pepcid and solu-cortef

## 2022-01-11 NOTE — NURSING NOTE
Per Erika BOJORQUEZ oncology treatment plan summary 10/13/2021 pt will receive taxol weekly x12 and Herceptin/Perjeta every 3 weeks. Discussed with Hugh in pharmacy pts plan is correct. No numbness/tingling in pts finger tips and toes.

## 2022-01-11 NOTE — PROGRESS NOTES
Subjective   Neela Ramirez is a 76 y.o. female.  Referred by Dr. 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  A bilateral diagnostic mammogram was performed for further evaluation of the same.    8/17/2021-bilateral diagnostic mammogram-scattered fibroglandular densities throughout both breasts.  In the posterior one third upper inner quadrant of the left breast at the site of palpable concern, 11 o'clock position there is a mass with adjacent pleomorphic calcifications.  The mass and the calcifications measure on order of 1.5 cm in greatest dimension.  No evidence of axillary lymphadenopathy appreciated.  Stable microcalcifications seen in other areas of the left breast on right breast.  Ultrasound-at 11 o'clock position, 5 cm from the nipple in the left breast there is an irregular hypoechoic mass which measures 2.4 x 2 x 1.6 cm.    Impression  1.irregular 2.4 cm mass in the left breast at the site of palpable concern at 11:00, 5 cm from the nipple.  Ultrasound-guided biopsy of the mass recommended  No finding suspicious for malignancy in the right breast    9/8/2021-left breast ultrasound-guided biopsy  Pathology consistent with invasive ductal carcinoma with lobular features.  Grade 3.  The carcinoma measures 7 mm in greatest dimension.  Focally suspicious for lymphovascular space invasion.  ER low +1-10%, intensity week  ND negative  HER-2 3+ on immunohistochemistry  Ki-67 70%    MRI of the breast 10/8/2021-Biopsy-proven malignancy in the left breast at 11:00 measuring 2.8 cm with a centrally located metallic clip.  No other suspicious findings are seen within the left breast or no left axillary lymphadenopathy.  No suspicious findings of the right breast    Echocardiogram 10/8/2021 was normal  ejection fraction of 56 to 60% and strain of -20    She denies any new bone pains, dyspnea, cough, abdominal pain nausea vomiting or recent changes in weight.    She had 2 previous breast aspirations in her 20s.  No other breast biopsies  She does not know much about her family hence limited family history.    Interval history:  The patient is a 76 y.o. female with history of left breast invasive ductal carcinoma, HER-2 positive who is currently on Taxol Perjeta Herceptin.  She has completed 12 weeks of Taxol Herceptin Perjeta.  Scheduled for Herceptin Perjeta only.  MRI scheduled 1/17/2022.  Mass in the left breast is relatively unchanged.  She had some diarrhea which lasted about 2 days but improved now.  Reports a decreased appetite and has been drinking about 2 boosts per day.  She had an echocardiogram 1/5/2022 which was stable.  Erythema around the port site has improved with Bactrim.  The rash on the upper extremities is still present and pruritic.    The following portions of the patient's history were reviewed and updated as appropriate: allergies, current medications, past family history, past medical history, past social history, past surgical history and problem list.    Past Medical History:   Diagnosis Date   • Anxiety    • Arthritis    • Backache    • Bleeding    • Constipation    • Dermatitis    • Diverticulosis     Colonoscopy November 2014   • Dyspepsia    • Dysuria    • Erythema of face     Transitory Erythema Of The Face   • Hypertension    • Impacted cerumen    • Malignant neoplasm of female breast (HCC) 9/27/2021   • Neck pain    • Tachycardia         Past Surgical History:   Procedure Laterality Date   • BREAST BIOPSY  09/2021    Dr. Tirado    • BREAST CYST ASPIRATION     • BREAST CYST EXCISION     • EXOSTECTOMY      Simple Bunion Exostectomy (Silver Procedure)   • VENOUS ACCESS DEVICE (PORT) INSERTION N/A 10/18/2021    Procedure: INSERTION VENOUS ACCESS DEVICE;  Surgeon: Becky Liu MD;   "Location: [facility] [place];  Service: General;  Laterality: N/A;        Family History   Problem Relation Age of Onset   • Arthritis Father         Social History     Socioeconomic History   • Marital status:    • Number of children: 1   Tobacco Use   • Smoking status: Never Smoker   • Smokeless tobacco: Never Used   Vaping Use   • Vaping Use: Never used   Substance and Sexual Activity   • Alcohol use: Yes     Comment: social drinker   • Drug use: Never   • Sexual activity: Defer        OB History             Para        Term   0            AB        Living           SAB        IAB        Ectopic        Molar        Multiple        Live Births                 Age at menarche-13  Age at menopause-50  Nulliparous  Breast-feeding-N/A   0 para 0  0  Oral contraceptive pill use-none  Hormone replacement therapy-7 years    No Known Allergies     Review of systems as mentioned in the HPI    Objective   Blood pressure 147/91, pulse 105, temperature 98 °F (36.7 °C), temperature source Temporal, resp. rate 16, height 152.4 cm (60\"), weight 56.1 kg (123 lb 11.2 oz), SpO2 97 %.     ECOG performance status-0    Physical Exam  Vitals reviewed.   Constitutional:       Appearance: Normal appearance.   HENT:      Head: Normocephalic and atraumatic.      Nose: Nose normal.      Mouth/Throat:      Mouth: Mucous membranes are moist.      Pharynx: Oropharynx is clear.   Eyes:      Conjunctiva/sclera: Conjunctivae normal.      Pupils: Pupils are equal, round, and reactive to light.   Cardiovascular:      Rate and Rhythm: Normal rate and regular rhythm.      Pulses: Normal pulses.      Heart sounds: Normal heart sounds.   Pulmonary:      Effort: Pulmonary effort is normal.      Breath sounds: Normal breath sounds.   Abdominal:      General: Abdomen is flat. Bowel sounds are normal.      Palpations: Abdomen is soft.   Musculoskeletal:         General: Normal range of motion.      Cervical back: Normal range " of motion.   Skin:     General: Skin is warm and dry.      Findings: Rash (erythematous macular rash to the back) present.   Neurological:      General: No focal deficit present.      Mental Status: She is alert and oriented to person, place, and time. Mental status is at baseline.   Psychiatric:         Mood and Affect: Mood normal.         Behavior: Behavior normal.         Thought Content: Thought content normal.         Judgment: Judgment normal.       Breast Exam: Right breast appears normal on inspection.  No palpable abnormalities of the right breast.  Left breast on inspection there is a mass in the upper inner quadrant.  There has been softening of the mass in the left upper inner quadrant which does not have discrete borders, this is flat, approximately 1 cm.  There is no left axillary adenopathy. no right axillary lymphadenopathy.  No cervical or supraclavicular lymphadenopathy.     I have reexamined the patient and the results are consistent with the previously documented exam. Sheri Avendano MD     Results from last 7 days   Lab Units 01/11/22  0733 01/04/22  0818   WBC 10*3/mm3 5.41 5.16   NEUTROS ABS 10*3/mm3 3.15 3.02   HEMOGLOBIN g/dL 10.3* 10.7*   HEMATOCRIT % 32.8* 31.8*   PLATELETS 10*3/mm3 296 268     Results from last 7 days   Lab Units 01/04/22  0818   SODIUM mmol/L 138   POTASSIUM mmol/L 4.2   CHLORIDE mmol/L 104   CO2 mmol/L 24.0   BUN mg/dL 14   CREATININE mg/dL 0.86   CALCIUM mg/dL 9.2   ALBUMIN g/dL 3.80   BILIRUBIN mg/dL 0.4   ALK PHOS U/L 94   ALT (SGPT) U/L 25   AST (SGOT) U/L 25   GLUCOSE mg/dL 110           No radiology results for the last 30 days.     CBC from 1/11/2022 reviewed and WBC 5.41, hemoglobin 10.3, platelets 290    Assessment/Plan       *Invasive ductal carcinoma of the left breast  · Clinical T2 N0 M0, stage IIa  · On ultrasound the tumor measures 2.4 cm.  Lymph nodes on the left side are normal.  · MRI 10/8/2021 with tumor measuring 2.8 cm.  Lymph nodes appear  normal  · Pathology consistent with invasive carcinoma with ductal and lobular features, grade 3, ER +1 to 10% weak, [place] negative, HER-2 3+ immunohistochemistry, Ki-67 70%.  · She was evaluated by Dr. Liu and referred for consideration of neoadjuvant chemotherapy.   Since the tumor is HER-2 positive and over 2 cm I think it would be reasonable to proceed with neoadjuvant chemotherapy.  Explained to her about the benefits of neoadjuvant chemotherapy including de surjit assessment of response and making decisions about adjuvant HER-2 directed therapy.  Also discussed the benefits of neoadjuvant chemotherapy in terms of breast conservation.  Explained to her about the different regimens that could be used for treatment of HER-2 positive breast cancers particularly TCHP, AC-T HP, Taxol and Herceptin in the adjuvant setting.   Since the tumor is over 2 cm but lymph node negative I think she would be a great candidate for EA 1181 clinical trial which comprises of administering Taxol Herceptin and Perjeta tamika  adjuvantly followed by surgery and additional adjuvant treatment depending upon the response.  Port placed 10/18/2021  Echocardiogram shows normal ejection fraction  Week 1 TPH on 10/19/2021  Completed 12 weeks of Taxol Perjeta and Herceptin on 1/4/2022  Scheduled for an MRI 1/17/2022 1/11/2022 due for Herceptin and Perjeta only  Continues to have some diarrhea, rash and decreased appetite secondary to chemotherapy  Labs reviewed and stable  Echocardiogram reviewed and stable  Proceed with chemotherapy    *Diarrhea  · Well-controlled on Imodium  · 2 days of diarrhea this past week  · Continue Imodium as needed    *Right-sided port site possible infection-treated with Keflex in the past, and most recently with Bactrim.  The erythema has improved but there is a small open wound around the port site.    *Hypertension-currently on amlodipine and metoprolol.  Blood pressure well controlled at  136/82    *Anxiety  · Referred to supportive oncology  · Improved    *Hypothyroidism  · Continue Synthroid    *Cardiac health-  · Echocardiogram 1/5/2022 reviewed and stable    *Skin rash  · Stable on survey and hydrocortisone.    PLAN:   1. Herceptin and Perjeta only today   2. continue Imodium as needed  3. Follow-up on MRI 1/17/2022  4. Follow-up with me in 3 weeks    The patient continues on high risk medication requiring close monitoring for toxicity    Sheri Avendano MD  01/11/2022

## 2022-01-11 NOTE — PROGRESS NOTES
See nursing notes from today.  Patient reacting while receiving Perjeta, having significant flushing and some burning in the sternal region of her chest.  Mild queasy stomach though no true nausea or vomiting.  Patient receiving 100 mg Solu-Cortef and additional 20 mg IV Pepcid.  Patient was just drops from completing Perjeta when she had was believed to just be cold chills rather than actual chilling.  Patient observed for 30 minutes between Perjeta and Herceptin with no additional chilling.  Proceeding with Herceptin without difficulty.    Per discussion with Dr. Avendano today we will add Solu-Cortef 100 mg an additional 20 mg Pepcid for a total of 40 mg for future treatment.  Care plan has been adjusted.

## 2022-01-14 ENCOUNTER — TELEPHONE (OUTPATIENT)
Dept: SURGERY | Facility: CLINIC | Age: 77
End: 2022-01-14

## 2022-01-14 ENCOUNTER — HOSPITAL ENCOUNTER (OUTPATIENT)
Dept: MRI IMAGING | Facility: HOSPITAL | Age: 77
Discharge: HOME | End: 2022-01-14
Admitting: STUDENT IN AN ORGANIZED HEALTH CARE EDUCATION/TRAINING PROGRAM

## 2022-01-14 DIAGNOSIS — C50.912 MALIGNANT NEOPLASM OF LEFT BREAST IN FEMALE, ESTROGEN RECEPTOR NEGATIVE, UNSPECIFIED SITE OF BREAST: ICD-10-CM

## 2022-01-14 DIAGNOSIS — Z17.1 MALIGNANT NEOPLASM OF LEFT BREAST IN FEMALE, ESTROGEN RECEPTOR NEGATIVE, UNSPECIFIED SITE OF BREAST: ICD-10-CM

## 2022-01-14 PROCEDURE — A9577 INJ MULTIHANCE: HCPCS | Performed by: STUDENT IN AN ORGANIZED HEALTH CARE EDUCATION/TRAINING PROGRAM

## 2022-01-14 PROCEDURE — 0 GADOBENATE DIMEGLUMINE 529 MG/ML SOLUTION: Performed by: STUDENT IN AN ORGANIZED HEALTH CARE EDUCATION/TRAINING PROGRAM

## 2022-01-14 PROCEDURE — C8908 MRI W/O FOL W/CONT, BREAST,: HCPCS

## 2022-01-14 PROCEDURE — C8937 CAD BREAST MRI: HCPCS

## 2022-01-14 RX ADMIN — GADOBENATE DIMEGLUMINE 11 ML: 529 INJECTION, SOLUTION INTRAVENOUS at 13:54

## 2022-01-14 NOTE — TELEPHONE ENCOUNTER
Called regarding MRI results. Voicemail left. Overall improved; known cancer is down to 1.2cm.     IMPRESSION AND RECOMMENDATION:     1.  A 1.2 cm mass at 11:00 in the posterior left breast corresponds to  biopsy-proven malignancy and has decreased in size from 10/20/2021,  consistent with positive treatment response. Continued oncologic and  surgical management are recommended.  2.  No MRI evidence of malignancy in the right breast.     BI-RADS Category 6: Known biopsy-proven malignancy    Becky Liu MD

## 2022-01-17 ENCOUNTER — APPOINTMENT (OUTPATIENT)
Dept: MRI IMAGING | Facility: HOSPITAL | Age: 77
End: 2022-01-17

## 2022-01-18 RX ORDER — CYCLOBENZAPRINE HCL 5 MG
TABLET ORAL
Qty: 30 TABLET | Refills: 0 | Status: SHIPPED | OUTPATIENT
Start: 2022-01-18

## 2022-01-23 DIAGNOSIS — E03.8 SUBCLINICAL HYPOTHYROIDISM: ICD-10-CM

## 2022-01-24 RX ORDER — LEVOTHYROXINE SODIUM 0.03 MG/1
TABLET ORAL
Qty: 90 TABLET | Refills: 0 | Status: SHIPPED | OUTPATIENT
Start: 2022-01-24 | End: 2022-01-25 | Stop reason: SDUPTHER

## 2022-01-25 DIAGNOSIS — E03.8 SUBCLINICAL HYPOTHYROIDISM: ICD-10-CM

## 2022-01-25 RX ORDER — LEVOTHYROXINE SODIUM 0.03 MG/1
TABLET ORAL
Qty: 30 TABLET | Refills: 0 | Status: SHIPPED | OUTPATIENT
Start: 2022-01-25 | End: 2022-02-14 | Stop reason: SDUPTHER

## 2022-01-26 ENCOUNTER — TELEPHONE (OUTPATIENT)
Dept: NUTRITION | Facility: HOSPITAL | Age: 77
End: 2022-01-26

## 2022-01-26 NOTE — PROGRESS NOTES
Outpatient Oncology Nutrition      Patient Name:  Neela Ramirez  YOB: 1945  MRN: [id number]    Assessment Date:  1/26/2022    Comments:  Left another message for patient regarding nutrition referral.       Electronically signed by:  Susan Huerta RD, LD  01/26/22 16:22 EST

## 2022-02-01 ENCOUNTER — PRE-ADMISSION TESTING (OUTPATIENT)
Dept: PREADMISSION TESTING | Facility: HOSPITAL | Age: 77
End: 2022-02-01

## 2022-02-01 ENCOUNTER — INFUSION (OUTPATIENT)
Dept: ONCOLOGY | Facility: HOSPITAL | Age: 77
End: 2022-02-01

## 2022-02-01 ENCOUNTER — OFFICE VISIT (OUTPATIENT)
Dept: ONCOLOGY | Facility: CLINIC | Age: 77
End: 2022-02-01

## 2022-02-01 VITALS
RESPIRATION RATE: 16 BRPM | DIASTOLIC BLOOD PRESSURE: 77 MMHG | HEART RATE: 95 BPM | HEIGHT: 60 IN | OXYGEN SATURATION: 98 % | TEMPERATURE: 97.8 F | BODY MASS INDEX: 24.35 KG/M2 | SYSTOLIC BLOOD PRESSURE: 131 MMHG | WEIGHT: 124 LBS

## 2022-02-01 VITALS
SYSTOLIC BLOOD PRESSURE: 145 MMHG | WEIGHT: 124.7 LBS | HEART RATE: 84 BPM | RESPIRATION RATE: 16 BRPM | OXYGEN SATURATION: 97 % | HEIGHT: 60 IN | DIASTOLIC BLOOD PRESSURE: 82 MMHG | TEMPERATURE: 98.2 F | BODY MASS INDEX: 24.48 KG/M2

## 2022-02-01 DIAGNOSIS — Z17.0 MALIGNANT NEOPLASM OF UPPER-INNER QUADRANT OF LEFT BREAST IN FEMALE, ESTROGEN RECEPTOR POSITIVE: Primary | ICD-10-CM

## 2022-02-01 DIAGNOSIS — Z17.1 MALIGNANT NEOPLASM OF UPPER-OUTER QUADRANT OF LEFT BREAST IN FEMALE, ESTROGEN RECEPTOR NEGATIVE: ICD-10-CM

## 2022-02-01 DIAGNOSIS — C50.412 MALIGNANT NEOPLASM OF UPPER-OUTER QUADRANT OF LEFT BREAST IN FEMALE, ESTROGEN RECEPTOR NEGATIVE: ICD-10-CM

## 2022-02-01 DIAGNOSIS — C50.212 MALIGNANT NEOPLASM OF UPPER-INNER QUADRANT OF LEFT BREAST IN FEMALE, ESTROGEN RECEPTOR POSITIVE: ICD-10-CM

## 2022-02-01 DIAGNOSIS — R11.0 CHEMOTHERAPY-INDUCED NAUSEA: Primary | ICD-10-CM

## 2022-02-01 DIAGNOSIS — Z45.9 ENCOUNTER FOR MANAGEMENT OF IMPLANTED DEVICE: ICD-10-CM

## 2022-02-01 DIAGNOSIS — T45.1X5A CHEMOTHERAPY-INDUCED NAUSEA: Primary | ICD-10-CM

## 2022-02-01 DIAGNOSIS — Z17.0 MALIGNANT NEOPLASM OF UPPER-INNER QUADRANT OF LEFT BREAST IN FEMALE, ESTROGEN RECEPTOR POSITIVE: ICD-10-CM

## 2022-02-01 DIAGNOSIS — C50.212 MALIGNANT NEOPLASM OF UPPER-INNER QUADRANT OF LEFT BREAST IN FEMALE, ESTROGEN RECEPTOR POSITIVE: Primary | ICD-10-CM

## 2022-02-01 LAB
ALBUMIN SERPL-MCNC: 3.9 G/DL (ref 3.5–5.2)
ALBUMIN/GLOB SERPL: 1.6 G/DL (ref 1.1–2.4)
ALP SERPL-CCNC: 100 U/L (ref 38–116)
ALT SERPL W P-5'-P-CCNC: 32 U/L (ref 0–33)
ANION GAP SERPL CALCULATED.3IONS-SCNC: 10.7 MMOL/L (ref 5–15)
AST SERPL-CCNC: 27 U/L (ref 0–32)
BASOPHILS # BLD AUTO: 0.06 10*3/MM3 (ref 0–0.2)
BASOPHILS NFR BLD AUTO: 0.8 % (ref 0–1.5)
BILIRUB SERPL-MCNC: 0.3 MG/DL (ref 0.2–1.2)
BUN SERPL-MCNC: 19 MG/DL (ref 6–20)
BUN/CREAT SERPL: 25 (ref 7.3–30)
CALCIUM SPEC-SCNC: 9 MG/DL (ref 8.5–10.2)
CHLORIDE SERPL-SCNC: 105 MMOL/L (ref 98–107)
CO2 SERPL-SCNC: 23.3 MMOL/L (ref 22–29)
CREAT SERPL-MCNC: 0.76 MG/DL (ref 0.6–1.1)
DEPRECATED RDW RBC AUTO: 53.7 FL (ref 37–54)
EOSINOPHIL # BLD AUTO: 0.13 10*3/MM3 (ref 0–0.4)
EOSINOPHIL NFR BLD AUTO: 1.7 % (ref 0.3–6.2)
ERYTHROCYTE [DISTWIDTH] IN BLOOD BY AUTOMATED COUNT: 13.8 % (ref 12.3–15.4)
GFR SERPL CREATININE-BSD FRML MDRD: 74 ML/MIN/1.73
GLOBULIN UR ELPH-MCNC: 2.5 GM/DL (ref 1.8–3.5)
GLUCOSE SERPL-MCNC: 101 MG/DL (ref 74–124)
HCT VFR BLD AUTO: 38.2 % (ref 34–46.6)
HGB BLD-MCNC: 12.2 G/DL (ref 12–15.9)
IMM GRANULOCYTES # BLD AUTO: 0.03 10*3/MM3 (ref 0–0.05)
IMM GRANULOCYTES NFR BLD AUTO: 0.4 % (ref 0–0.5)
LYMPHOCYTES # BLD AUTO: 1.69 10*3/MM3 (ref 0.7–3.1)
LYMPHOCYTES NFR BLD AUTO: 21.9 % (ref 19.6–45.3)
MCH RBC QN AUTO: 33.5 PG (ref 26.6–33)
MCHC RBC AUTO-ENTMCNC: 31.9 G/DL (ref 31.5–35.7)
MCV RBC AUTO: 104.9 FL (ref 79–97)
MONOCYTES # BLD AUTO: 0.66 10*3/MM3 (ref 0.1–0.9)
MONOCYTES NFR BLD AUTO: 8.5 % (ref 5–12)
NEUTROPHILS NFR BLD AUTO: 5.16 10*3/MM3 (ref 1.7–7)
NEUTROPHILS NFR BLD AUTO: 66.7 % (ref 42.7–76)
NRBC BLD AUTO-RTO: 0 /100 WBC (ref 0–0.2)
PLATELET # BLD AUTO: 289 10*3/MM3 (ref 140–450)
PMV BLD AUTO: 9.5 FL (ref 6–12)
POTASSIUM SERPL-SCNC: 4.2 MMOL/L (ref 3.5–4.7)
PROT SERPL-MCNC: 6.4 G/DL (ref 6.3–8)
QT INTERVAL: 363 MS
RBC # BLD AUTO: 3.64 10*6/MM3 (ref 3.77–5.28)
SARS-COV-2 ORF1AB RESP QL NAA+PROBE: NOT DETECTED
SODIUM SERPL-SCNC: 139 MMOL/L (ref 134–145)
WBC NRBC COR # BLD: 7.73 10*3/MM3 (ref 3.4–10.8)

## 2022-02-01 PROCEDURE — 93010 ELECTROCARDIOGRAM REPORT: CPT | Performed by: INTERNAL MEDICINE

## 2022-02-01 PROCEDURE — 80053 COMPREHEN METABOLIC PANEL: CPT

## 2022-02-01 PROCEDURE — 25010000002 TRASTUZUMAB PER 10 MG: Performed by: INTERNAL MEDICINE

## 2022-02-01 PROCEDURE — U0005 INFEC AGEN DETEC AMPLI PROBE: HCPCS

## 2022-02-01 PROCEDURE — 96376 TX/PRO/DX INJ SAME DRUG ADON: CPT

## 2022-02-01 PROCEDURE — 25010000002 PERTUZUMAB 420 MG/14ML SOLUTION 420 MG VIAL: Performed by: INTERNAL MEDICINE

## 2022-02-01 PROCEDURE — 93005 ELECTROCARDIOGRAM TRACING: CPT

## 2022-02-01 PROCEDURE — C9803 HOPD COVID-19 SPEC COLLECT: HCPCS

## 2022-02-01 PROCEDURE — U0004 COV-19 TEST NON-CDC HGH THRU: HCPCS

## 2022-02-01 PROCEDURE — 85025 COMPLETE CBC W/AUTO DIFF WBC: CPT

## 2022-02-01 PROCEDURE — 25010000002 HYDROCORTISONE SODIUM SUCCINATE 100 MG RECONSTITUTED SOLUTION: Performed by: NURSE PRACTITIONER

## 2022-02-01 PROCEDURE — 96375 TX/PRO/DX INJ NEW DRUG ADDON: CPT

## 2022-02-01 PROCEDURE — 99214 OFFICE O/P EST MOD 30 MIN: CPT | Performed by: INTERNAL MEDICINE

## 2022-02-01 PROCEDURE — 25010000002 HYDROCORTISONE SODIUM SUCCINATE 100 MG RECONSTITUTED SOLUTION: Performed by: INTERNAL MEDICINE

## 2022-02-01 PROCEDURE — 25010000002 DIPHENHYDRAMINE PER 50 MG: Performed by: INTERNAL MEDICINE

## 2022-02-01 PROCEDURE — 96417 CHEMO IV INFUS EACH ADDL SEQ: CPT

## 2022-02-01 PROCEDURE — 96413 CHEMO IV INFUSION 1 HR: CPT

## 2022-02-01 RX ORDER — FAMOTIDINE 10 MG/ML
40 INJECTION, SOLUTION INTRAVENOUS ONCE
Status: CANCELLED | OUTPATIENT
Start: 2022-02-01

## 2022-02-01 RX ORDER — FAMOTIDINE 10 MG/ML
40 INJECTION, SOLUTION INTRAVENOUS ONCE
Status: COMPLETED | OUTPATIENT
Start: 2022-02-01 | End: 2022-02-01

## 2022-02-01 RX ORDER — ACETAMINOPHEN 500 MG
500-1000 TABLET ORAL EVERY 6 HOURS PRN
COMMUNITY
End: 2022-09-28

## 2022-02-01 RX ORDER — SODIUM CHLORIDE 9 MG/ML
250 INJECTION, SOLUTION INTRAVENOUS ONCE
Status: CANCELLED | OUTPATIENT
Start: 2022-02-01

## 2022-02-01 RX ORDER — SODIUM CHLORIDE 9 MG/ML
250 INJECTION, SOLUTION INTRAVENOUS ONCE
Status: COMPLETED | OUTPATIENT
Start: 2022-02-01 | End: 2022-02-01

## 2022-02-01 RX ADMIN — DIPHENHYDRAMINE HYDROCHLORIDE 25 MG: 50 INJECTION, SOLUTION INTRAMUSCULAR; INTRAVENOUS at 12:05

## 2022-02-01 RX ADMIN — HYDROCORTISONE SODIUM SUCCINATE 100 MG: 100 INJECTION, POWDER, FOR SOLUTION INTRAMUSCULAR; INTRAVENOUS at 12:03

## 2022-02-01 RX ADMIN — HYDROCORTISONE SODIUM SUCCINATE 100 MG: 100 INJECTION, POWDER, FOR SOLUTION INTRAMUSCULAR; INTRAVENOUS at 12:41

## 2022-02-01 RX ADMIN — TRASTUZUMAB 350 MG: 150 INJECTION, POWDER, LYOPHILIZED, FOR SOLUTION INTRAVENOUS at 14:57

## 2022-02-01 RX ADMIN — FAMOTIDINE 40 MG: 10 INJECTION INTRAVENOUS at 12:03

## 2022-02-01 RX ADMIN — DIPHENHYDRAMINE HYDROCHLORIDE 25 MG: 50 INJECTION, SOLUTION INTRAMUSCULAR; INTRAVENOUS at 13:11

## 2022-02-01 RX ADMIN — SODIUM CHLORIDE 250 ML: 9 INJECTION, SOLUTION INTRAVENOUS at 12:00

## 2022-02-01 RX ADMIN — PERTUZUMAB 420 MG: 30 INJECTION, SOLUTION, CONCENTRATE INTRAVENOUS at 12:29

## 2022-02-01 NOTE — NURSING NOTE
1238: Pt flushed and feeling hot.Stomach tight.  Perjeta stopped. Janet BOJORQUEZ at chairside to [facility].    1240: 149/81, HR 99, O2 99%.    1241: Solu-cortef 100mg IV given.    1245: 123/74, HR 80    1250: Redness almost fully resolved.  Her stomach tightness is easing also. NS only infusing. Will monitor.    1305: Per Dr. Avendano, give pt another benadryl 25mg IVPB, wait 30 minutes then restart the perjeta.

## 2022-02-01 NOTE — DISCHARGE INSTRUCTIONS
Take the following medications the morning of surgery:  AMLODIPINE, METOPROLOL, LEXAPRO, LEVOTHYROXINE, OMEPRAZOLE      If you are on prescription narcotic pain medication to control your pain you may also take that medication the morning of surgery.    General Instructions:  • Do not eat solid food after midnight the night before surgery.  • You may drink clear liquids day of surgery but must stop at least one hour before your hospital arrival time.  • It is beneficial for you to have a clear drink that contains carbohydrates the day of surgery.  We suggest a 12 to 20 ounce bottle of Gatorade or Powerade for non-diabetic patients or a 12 to 20 ounce bottle of G2 or Powerade Zero for diabetic patients.     Clear liquids are liquids you can see through.  Nothing red in color.     Plain water                               Sports drinks  Sodas                                   Gelatin (Jell-O)  Fruit juices without pulp such as white grape juice and apple juice  Popsicles that contain no fruit or yogurt  Tea or coffee (no cream or milk added)  Gatorade / Powerade  G2 / Powerade Zero     • Bring any papers given to you in the doctor’s office.  • Wear clean comfortable clothes.  • Do not wear contact lenses, false eyelashes or make-up.  Bring a case for your glasses.   • Remove all piercings.  Leave jewelry and any other valuables at home.  • The Pre-Admission Testing nurse will instruct you to bring medications if unable to obtain an accurate list in Pre-Admission Testing.            Preventing a Surgical Site Infection:  • For 2 to 3 days before surgery, avoid shaving with a razor because the razor can irritate skin and make it easier to develop an infection.    • Any areas of open skin can increase the risk of a post-operative wound infection by allowing bacteria to enter and travel throughout the body.  Notify your surgeon if you have any skin wounds / rashes even if it is not near the expected surgical site.  The  area will need assessed to determine if surgery should be delayed until it is healed.  • The night prior to surgery shower using a fresh bar of anti-bacterial soap (such as Dial) and clean washcloth.  Sleep in a clean bed with clean clothing.  Do not allow pets to sleep with you.  • Shower on the morning of surgery using a fresh bar of anti-bacterial soap (such as Dial) and clean washcloth.  Dry with a clean towel and dress in clean clothing.  • Ask your surgeon if you will be receiving antibiotics prior to surgery.  • Make sure you, your family, and all healthcare providers clean their hands with soap and water or an alcohol based hand  before caring for you or your wound.    Day of surgery:  Your arrival time is approximately two hours before your scheduled surgery time.  Upon arrival, a Pre-op nurse and Anesthesiologist will review your health history, obtain vital signs, and answer questions you may have.  The only belongings needed at this time will be a list of your home medications and if applicable your C-PAP/BI-PAP machine.  A Pre-op nurse will start an IV and you may receive medication in preparation for surgery, including something to help you relax.     Please be aware that surgery does come with discomfort.  We want to make every effort to control your discomfort so please discuss any uncontrolled symptoms with your nurse.   Your doctor will most likely have prescribed pain medications.      If you are going home after surgery you will receive individualized written care instructions before being discharged.  A responsible adult must drive you to and from the hospital on the day of your surgery and stay with you for 24 hours.  Discharge prescriptions can be filled by the hospital pharmacy during regular pharmacy hours.  If you are having surgery late in the day/evening your prescription may be e-prescribed to your pharmacy.  Please verify your pharmacy hours or chose a 24 hour pharmacy to avoid  not having access to your prescription because your pharmacy has closed for the day.        If you have any questions please call Pre-Admission Testing at [phone].  Deductibles and co-payments are collected on the day of service. Please be prepared to pay the required co-pay, deductible or deposit on the day of service as defined by your plan.          CHLORHEXIDINE CLOTH INSTRUCTIONS  The morning of surgery follow these instructions using the Chlorhexidine cloths you've been given.  These steps reduce bacteria on the body.  Do not use the cloths near your eyes, ears mouth, genitalia or on open wounds.  Throw the cloths away after use but do not try to flush them down a toilet.      • Open and remove one cloth at a time from the package.    • Leave the cloth unfolded and begin the bathing.  • Massage the skin with the cloths using gentle pressure to remove bacteria.  Do not scrub harshly.   • Follow the steps below with one 2% CHG cloth per area (6 total cloths).  • One cloth for neck, shoulders and chest.  • One cloth for both arms, hands, fingers and underarms (do underarms last).  • One cloth for the abdomen followed by groin.  • One cloth for right leg and foot including between the toes.  • One cloth for left leg and foot including between the toes.  • The last cloth is to be used for the back of the neck, back and buttocks.    Allow the CHG to air dry 3 minutes on the skin which will give it time to work and decrease the chance of irritation.  The skin may feel sticky until it is dry.  Do not rinse with water or any other liquid or you will lose the beneficial effects of the CHG.  If mild skin irritation occurs, do rinse the skin to remove the CHG.  Report this to the nurse at time of admission.  Do not apply lotions, creams, ointments, deodorants or perfumes after using the clothes. Dress in clean clothes before coming to the hospital.    Patient Education for Self-Quarantine Process    • Following your  COVID testing, we strongly recommend that you wear a mask when you are with other people and practice social distancing.   • Limit your activities to only required outings.  • Wash your hands with soap and water frequently for at least 20 seconds.   • Avoid touching your eyes, nose and mouth with unwashed hands.  • Do not share anything - utensils, drinking glasses, food from the same bowl.   • Sanitize household surfaces daily. Include all high touch areas (door handles, light switches, phones, countertops, etc.)    Call your surgeon immediately if you experience any of the following symptoms:  • Sore Throat  • Shortness of Breath or difficulty breathing  • Cough  • Chills  • Body soreness or muscle pain  • Headache  • Fever  • New loss of taste or smell  • Do not arrive for your surgery ill.  Your procedure will need to be rescheduled to another time.  You will need to call your physician before the day of surgery to avoid any unnecessary exposure to hospital staff as well as other patients.

## 2022-02-01 NOTE — PROGRESS NOTES
Subjective   Neela Ramirez is a 76 y.o. female.  Referred by Dr. 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  A bilateral diagnostic mammogram was performed for further evaluation of the same.    8/17/2021-bilateral diagnostic mammogram-scattered fibroglandular densities throughout both breasts.  In the posterior one third upper inner quadrant of the left breast at the site of palpable concern, 11 o'clock position there is a mass with adjacent pleomorphic calcifications.  The mass and the calcifications measure on order of 1.5 cm in greatest dimension.  No evidence of axillary lymphadenopathy appreciated.  Stable microcalcifications seen in other areas of the left breast on right breast.  Ultrasound-at 11 o'clock position, 5 cm from the nipple in the left breast there is an irregular hypoechoic mass which measures 2.4 x 2 x 1.6 cm.    Impression  1.irregular 2.4 cm mass in the left breast at the site of palpable concern at 11:00, 5 cm from the nipple.  Ultrasound-guided biopsy of the mass recommended  No finding suspicious for malignancy in the right breast    9/8/2021-left breast ultrasound-guided biopsy  Pathology consistent with invasive ductal carcinoma with lobular features.  Grade 3.  The carcinoma measures 7 mm in greatest dimension.  Focally suspicious for lymphovascular space invasion.  ER low +1-10%, intensity week  MS negative  HER-2 3+ on immunohistochemistry  Ki-67 70%    MRI of the breast 10/8/2021-Biopsy-proven malignancy in the left breast at 11:00 measuring 2.8 cm with a centrally located metallic clip.  No other suspicious findings are seen within the left breast or no left axillary lymphadenopathy.  No suspicious findings of the right breast    Echocardiogram 10/8/2021 was normal  ejection fraction of 56 to 60% and strain of -20    She denies any new bone pains, dyspnea, cough, abdominal pain nausea vomiting or recent changes in weight.    She had 2 previous breast aspirations in her 20s.  No other breast biopsies  She does not know much about her family hence limited family history.    Interval history:  The patient is a 76 y.o. female with history of left breast invasive ductal carcinoma, HER-2 positive who completed 12 weeks of Taxol Herceptin and Perjeta.  She had a repeat MRI of the breast for assessment of tumor response.  1/14/2022-bilateral breast MRI-images independently reviewed and interpreted by me.  Right breast with no suspicious abnormalities  Left breast at 11:00, 6.7 cm from the nipple the tumor measures 1.2 x 1.1 x 0.8 cm. Previously the tumor measured 2.8 x 2.1 x 2.2 cm.  She is due for Herceptin and Perjeta today.  She had an allergic reaction to Perjeta at last visit requiring additional Solu-Cortef and Benadryl.  She had no other complaints at this time.      The following portions of the patient's history were reviewed and updated as appropriate: allergies, current medications, past family history, past medical history, past social history, past surgical history and problem list.    Past Medical History:   Diagnosis Date   • Anxiety    • Arthritis    • Cataract     EARLY. JIMMIE EYES   • Chronic back pain     LOW BACK ACHES AT TIME FROM ARTHRTIS   • Constipation     AT TIMES. NO RECENT ISSUES   • Dermatitis     LEGS. STATES CLEARING UP NOW.   • Disease of thyroid gland     HYPO   • Diverticulosis     Colonoscopy November 2014   • Erythema of face     Transitory Erythema Of The Face   • GERD (gastroesophageal reflux disease)     ON OCC   • History of chemotherapy     FOR LEFT BREAST CANCER   • Hyperlipidemia    • Hypertension    • Malignant neoplasm of female breast (HCC) 9/27/2021   • Osteopenia    • Tachycardia         Past Surgical History:   Procedure Laterality Date  "  • BREAST BIOPSY  2021    Dr. Tirado    • BREAST CYST ASPIRATION Right    • BREAST CYST EXCISION Right    • COLONOSCOPY     • EXOSTECTOMY Bilateral     Simple Bunion Exostectomy ([facility] Procedure)   • VENOUS ACCESS DEVICE (PORT) INSERTION N/A 10/18/2021    Procedure: INSERTION VENOUS ACCESS DEVICE;  Surgeon: Becky Liu MD;  Location: [facility];  Service: General;  Laterality: N/A;        Family History   Problem Relation Age of Onset   • Arthritis Father    • Malig Hyperthermia Neg Hx         Social History     Socioeconomic History   • Marital status:    • Number of children: 1   Tobacco Use   • Smoking status: Former Smoker     Types: Cigarettes   • Smokeless tobacco: Never Used   • Tobacco comment: SOCIALLY IN EARLY 'S   Vaping Use   • Vaping Use: Never used   Substance and Sexual Activity   • Alcohol use: Yes     Comment: social drinker   • Drug use: Never   • Sexual activity: Defer        OB History             Para        Term   0            AB        Living           SAB        IAB        Ectopic        Molar        Multiple        Live Births                 Age at menarche-13  Age at menopause-50  Nulliparous  Breast-feeding-N/A   0 para 0  0  Oral contraceptive pill use-none  Hormone replacement therapy-7 years    No Known Allergies     Review of systems as mentioned in the HPI    Objective   Blood pressure 145/82, pulse 84, temperature 98.2 °F (36.8 °C), temperature source Temporal, resp. rate 16, height 152.4 cm (60\"), weight 56.6 kg (124 lb 11.2 oz), SpO2 97 %.     ECOG performance status-0    Physical Exam  Vitals reviewed.   Constitutional:       Appearance: Normal appearance.   HENT:      Head: Normocephalic and atraumatic.      Nose: Nose normal.      Mouth/Throat:      Mouth: Mucous membranes are moist.      Pharynx: Oropharynx is clear.   Eyes:      Conjunctiva/sclera: Conjunctivae normal.      Pupils: Pupils are equal, round, and reactive to " light.   Cardiovascular:      Rate and Rhythm: Normal rate and regular rhythm.      Pulses: Normal pulses.      Heart sounds: Normal heart sounds.   Pulmonary:      Effort: Pulmonary effort is normal.      Breath sounds: Normal breath sounds.   Abdominal:      General: Abdomen is flat. Bowel sounds are normal.      Palpations: Abdomen is soft.   Musculoskeletal:         General: Normal range of motion.      Cervical back: Normal range of motion.   Skin:     General: Skin is warm and dry.      Findings: Rash (erythematous macular rash to the back) present.   Neurological:      General: No focal deficit present.      Mental Status: She is alert and oriented to person, place, and time. Mental status is at baseline.   Psychiatric:         Mood and Affect: Mood normal.         Behavior: Behavior normal.         Thought Content: Thought content normal.         Judgment: Judgment normal.       Breast Exam: Right breast appears normal on inspection.  No palpable abnormalities of the right breast.  Left breast on inspection there is a mass in the upper inner quadrant.  There has been softening of the mass in the left upper inner quadrant which does not have discrete borders, this is flat, approximately 1 cm.  There is no left axillary adenopathy. no right axillary lymphadenopathy.  No cervical or supraclavicular lymphadenopathy.     I have reexamined the patient and the results are consistent with the previously documented exam. Sheri Avendano MD     Results from last 7 days   Lab Units 02/01/22  0947   WBC 10*3/mm3 7.73   NEUTROS ABS 10*3/mm3 5.16   HEMOGLOBIN g/dL 12.2   HEMATOCRIT % 38.2   PLATELETS 10*3/mm3 289     Results from last 7 days   Lab Units 02/01/22  0947   SODIUM mmol/L 139   POTASSIUM mmol/L 4.2   CHLORIDE mmol/L 105   CO2 mmol/L 23.3   BUN mg/dL 19   CREATININE mg/dL 0.76   CALCIUM mg/dL 9.0   ALBUMIN g/dL 3.90   BILIRUBIN mg/dL 0.3   ALK PHOS U/L 100   ALT (SGPT) U/L 32   AST (SGOT) U/L 27   GLUCOSE  mg/dL 101           No radiology results for the last 30 days.     CBC from 1/11/2022 reviewed and WBC 5.41, hemoglobin 10.3, platelets 290    Assessment/Plan       *Invasive ductal carcinoma of the left breast  · Clinical T2 N0 M0, stage IIa  · On ultrasound the tumor measures 2.4 cm.  Lymph nodes on the left side are normal.  · MRI 10/8/2021 with tumor measuring 2.8 cm.  Lymph nodes appear normal  · Pathology consistent with invasive carcinoma with ductal and lobular features, grade 3, ER +1 to 10% weak, NH negative, HER-2 3+ immunohistochemistry, Ki-67 70%.  · She was evaluated by Dr. Liu and referred for consideration of neoadjuvant chemotherapy.   Since the tumor is HER-2 positive and over 2 cm I think it would be reasonable to proceed with neoadjuvant chemotherapy.  Explained to her about the benefits of neoadjuvant chemotherapy including de surjit assessment of response and making decisions about adjuvant HER-2 directed therapy.  Also discussed the benefits of neoadjuvant chemotherapy in terms of breast conservation.  Explained to her about the different regimens that could be used for treatment of HER-2 positive breast cancers particularly TCHP, AC-T HP, Taxol and Herceptin in the adjuvant setting.   Since the tumor is over 2 cm but lymph node negative I think she would be a great candidate for EA 1181 clinical trial which comprises of administering Taxol Herceptin and Perjeta tamika  adjuvantly followed by surgery and additional adjuvant treatment depending upon the response.  Port placed 10/18/2021  Echocardiogram shows normal ejection fraction  Week 1 TPH on 10/19/2021  Completed 12 weeks of Taxol Perjeta and Herceptin on 1/4/2022 1/11/2022 due for Herceptin and Perjeta only  2/1/2022-due for dose 2 of Herceptin and Perjeta  MRI 1/14/2022 reviewed and there has been a good tumor response with decrease in size of the mass to 1.2 cm.  Therefore I think is reasonable to proceed with surgery.  I will plan to  see her postoperatively and decide on adjuvant therapy.  We have the option of proceeding with dose dense AC following surgery versus just continuing with Kadcyla for residual disease.  Given the fact that she is 76 and the tumor is around 1 cm it may not be unreasonable to proceed with just Kadcyla alone.    *Diarrhea  Completely resolved    *Right-sided port site possible infection-treated with Keflex in the past, and most recently with Bactrim. Port site appears normal    *Hypertension-currently on amlodipine and metoprolol.  Blood pressure well controlled at 145/80    *Anxiety  · Referred to supportive oncology  · Improved    *Hypothyroidism  · Continue Synthroid    *Cardiac health-  · Echocardiogram 1/5/2022 reviewed and stable      PLAN:   1. Herceptin and Perjeta only today   2. continue Imodium as needed  3. Follow-up in 3 weeks, scheduled for surgery on 2/4/2022    The patient continues on high risk medication requiring close monitoring for toxicity    Sheri Avendano MD  02/01/2022

## 2022-02-02 ENCOUNTER — TELEPHONE (OUTPATIENT)
Dept: SURGERY | Facility: CLINIC | Age: 77
End: 2022-02-02

## 2022-02-02 DIAGNOSIS — C50.919 MALIGNANT NEOPLASM OF FEMALE BREAST, UNSPECIFIED ESTROGEN RECEPTOR STATUS, UNSPECIFIED LATERALITY, UNSPECIFIED SITE OF BREAST: Primary | ICD-10-CM

## 2022-02-02 NOTE — TELEPHONE ENCOUNTER
PAT called stating patient had abnormal EKG. No prior comparison. She will require cardiac clearance and need to be rescheduled. Can you place referral for Jeanine to get patient set up and once she is cleared we can get her rescheduled.

## 2022-02-03 ENCOUNTER — HOSPITAL ENCOUNTER (OUTPATIENT)
Dept: MAMMOGRAPHY | Facility: HOSPITAL | Age: 77
Discharge: HOME | End: 2022-02-03

## 2022-02-03 ENCOUNTER — HOSPITAL ENCOUNTER (OUTPATIENT)
Dept: CARDIOLOGY | Facility: HOSPITAL | Age: 77
Discharge: HOME | End: 2022-02-03

## 2022-02-03 ENCOUNTER — APPOINTMENT (OUTPATIENT)
Dept: NUCLEAR MEDICINE | Facility: HOSPITAL | Age: 77
End: 2022-02-03

## 2022-02-03 ENCOUNTER — OFFICE VISIT (OUTPATIENT)
Dept: CARDIOLOGY | Facility: CLINIC | Age: 77
End: 2022-02-03

## 2022-02-03 VITALS
OXYGEN SATURATION: 98 % | HEART RATE: 107 BPM | WEIGHT: 127.4 LBS | DIASTOLIC BLOOD PRESSURE: 78 MMHG | SYSTOLIC BLOOD PRESSURE: 122 MMHG | HEIGHT: 60 IN | BODY MASS INDEX: 25.01 KG/M2

## 2022-02-03 VITALS — HEIGHT: 59 IN | BODY MASS INDEX: 25.69 KG/M2 | WEIGHT: 127.43 LBS

## 2022-02-03 DIAGNOSIS — I10 ESSENTIAL HYPERTENSION: Primary | ICD-10-CM

## 2022-02-03 DIAGNOSIS — E78.5 HYPERLIPIDEMIA, UNSPECIFIED HYPERLIPIDEMIA TYPE: ICD-10-CM

## 2022-02-03 DIAGNOSIS — I65.23 BILATERAL CAROTID ARTERY STENOSIS: ICD-10-CM

## 2022-02-03 DIAGNOSIS — Z01.810 PREOPERATIVE CARDIOVASCULAR EXAMINATION: ICD-10-CM

## 2022-02-03 DIAGNOSIS — I10 ESSENTIAL HYPERTENSION: ICD-10-CM

## 2022-02-03 DIAGNOSIS — R06.09 DYSPNEA ON EXERTION: ICD-10-CM

## 2022-02-03 DIAGNOSIS — Z09 CHEMOTHERAPY FOLLOW-UP EXAMINATION: ICD-10-CM

## 2022-02-03 DIAGNOSIS — R94.31 ABNORMAL ECG: ICD-10-CM

## 2022-02-03 DIAGNOSIS — Z17.0 MALIGNANT NEOPLASM OF UPPER-INNER QUADRANT OF LEFT BREAST IN FEMALE, ESTROGEN RECEPTOR POSITIVE: ICD-10-CM

## 2022-02-03 DIAGNOSIS — C50.212 MALIGNANT NEOPLASM OF UPPER-INNER QUADRANT OF LEFT BREAST IN FEMALE, ESTROGEN RECEPTOR POSITIVE: ICD-10-CM

## 2022-02-03 LAB
BH CV NUCLEAR PRIOR STUDY: 2
BH CV REST NUCLEAR ISOTOPE DOSE: 60 MCI
BH CV STRESS BP STAGE 1: NORMAL
BH CV STRESS COMMENTS STAGE 1: NORMAL
BH CV STRESS DOSE REGADENOSON STAGE 1: 0.4
BH CV STRESS DURATION MIN STAGE 1: 0
BH CV STRESS DURATION SEC STAGE 1: 10
BH CV STRESS HR STAGE 1: 124
BH CV STRESS NUCLEAR ISOTOPE DOSE: 60 MCI
BH CV STRESS PROTOCOL 1: NORMAL
BH CV STRESS RECOVERY BP: NORMAL MMHG
BH CV STRESS RECOVERY HR: 111 BPM
BH CV STRESS STAGE 1: 1
LV EF NUC BP: 66 %
MAXIMAL PREDICTED HEART RATE: 144 BPM
PERCENT MAX PREDICTED HR: 86.11 %
STRESS BASELINE BP: NORMAL MMHG
STRESS BASELINE HR: 103 BPM
STRESS PERCENT HR: 101 %
STRESS POST EXERCISE DUR SEC: 10 SEC
STRESS POST PEAK BP: NORMAL MMHG
STRESS POST PEAK HR: 124 BPM
STRESS TARGET HR: 122 BPM

## 2022-02-03 PROCEDURE — 25010000002 REGADENOSON 0.4 MG/5ML SOLUTION: Performed by: INTERNAL MEDICINE

## 2022-02-03 PROCEDURE — 93000 ELECTROCARDIOGRAM COMPLETE: CPT | Performed by: INTERNAL MEDICINE

## 2022-02-03 PROCEDURE — 78492 MYOCRD IMG PET MLT RST&STRS: CPT

## 2022-02-03 PROCEDURE — 93017 CV STRESS TEST TRACING ONLY: CPT

## 2022-02-03 PROCEDURE — 93016 CV STRESS TEST SUPVJ ONLY: CPT | Performed by: INTERNAL MEDICINE

## 2022-02-03 PROCEDURE — 0 RUBIDIUM CHLORIDE: Performed by: INTERNAL MEDICINE

## 2022-02-03 PROCEDURE — 93018 CV STRESS TEST I&R ONLY: CPT | Performed by: INTERNAL MEDICINE

## 2022-02-03 PROCEDURE — A9555 RB82 RUBIDIUM: HCPCS | Performed by: INTERNAL MEDICINE

## 2022-02-03 PROCEDURE — 78492 MYOCRD IMG PET MLT RST&STRS: CPT | Performed by: INTERNAL MEDICINE

## 2022-02-03 PROCEDURE — 99204 OFFICE O/P NEW MOD 45 MIN: CPT | Performed by: INTERNAL MEDICINE

## 2022-02-03 RX ADMIN — REGADENOSON 0.4 MG: 0.08 INJECTION, SOLUTION INTRAVENOUS at 10:00

## 2022-02-07 ENCOUNTER — PREP FOR SURGERY (OUTPATIENT)
Dept: OTHER | Facility: HOSPITAL | Age: 77
End: 2022-02-07

## 2022-02-07 ENCOUNTER — TRANSCRIBE ORDERS (OUTPATIENT)
Dept: ADMINISTRATIVE | Facility: HOSPITAL | Age: 77
End: 2022-02-07

## 2022-02-07 DIAGNOSIS — C50.412 MALIGNANT NEOPLASM OF UPPER-OUTER QUADRANT OF LEFT BREAST IN FEMALE, ESTROGEN RECEPTOR NEGATIVE: Primary | ICD-10-CM

## 2022-02-07 DIAGNOSIS — Z01.818 OTHER SPECIFIED PRE-OPERATIVE EXAMINATION: Primary | ICD-10-CM

## 2022-02-07 DIAGNOSIS — Z17.1 MALIGNANT NEOPLASM OF UPPER-OUTER QUADRANT OF LEFT BREAST IN FEMALE, ESTROGEN RECEPTOR NEGATIVE: Primary | ICD-10-CM

## 2022-02-08 ENCOUNTER — LAB (OUTPATIENT)
Dept: LAB | Facility: HOSPITAL | Age: 77
End: 2022-02-08

## 2022-02-08 DIAGNOSIS — Z01.818 OTHER SPECIFIED PRE-OPERATIVE EXAMINATION: ICD-10-CM

## 2022-02-08 LAB — SARS-COV-2 ORF1AB RESP QL NAA+PROBE: NOT DETECTED

## 2022-02-08 PROCEDURE — U0005 INFEC AGEN DETEC AMPLI PROBE: HCPCS

## 2022-02-08 PROCEDURE — C9803 HOPD COVID-19 SPEC COLLECT: HCPCS

## 2022-02-08 PROCEDURE — U0004 COV-19 TEST NON-CDC HGH THRU: HCPCS

## 2022-02-10 ENCOUNTER — HOSPITAL ENCOUNTER (OUTPATIENT)
Dept: MAMMOGRAPHY | Facility: HOSPITAL | Age: 77
Discharge: HOME | End: 2022-02-10

## 2022-02-10 ENCOUNTER — ANESTHESIA EVENT (OUTPATIENT)
Dept: PERIOP | Facility: HOSPITAL | Age: 77
End: 2022-02-10

## 2022-02-10 ENCOUNTER — HOSPITAL ENCOUNTER (OUTPATIENT)
Dept: NUCLEAR MEDICINE | Facility: HOSPITAL | Age: 77
Discharge: HOME | End: 2022-02-10

## 2022-02-10 ENCOUNTER — APPOINTMENT (OUTPATIENT)
Dept: GENERAL RADIOLOGY | Facility: HOSPITAL | Age: 77
End: 2022-02-10

## 2022-02-10 ENCOUNTER — ANESTHESIA (OUTPATIENT)
Dept: PERIOP | Facility: HOSPITAL | Age: 77
End: 2022-02-10

## 2022-02-10 ENCOUNTER — HOSPITAL ENCOUNTER (OUTPATIENT)
Facility: HOSPITAL | Age: 77
Setting detail: HOSPITAL OUTPATIENT SURGERY
Discharge: HOME | End: 2022-02-10
Attending: STUDENT IN AN ORGANIZED HEALTH CARE EDUCATION/TRAINING PROGRAM | Admitting: STUDENT IN AN ORGANIZED HEALTH CARE EDUCATION/TRAINING PROGRAM

## 2022-02-10 VITALS
HEART RATE: 98 BPM | OXYGEN SATURATION: 94 % | TEMPERATURE: 99.3 F | DIASTOLIC BLOOD PRESSURE: 106 MMHG | RESPIRATION RATE: 16 BRPM | SYSTOLIC BLOOD PRESSURE: 120 MMHG

## 2022-02-10 DIAGNOSIS — Z17.1 MALIGNANT NEOPLASM OF UPPER-OUTER QUADRANT OF LEFT BREAST IN FEMALE, ESTROGEN RECEPTOR NEGATIVE: Primary | ICD-10-CM

## 2022-02-10 DIAGNOSIS — Z17.1 MALIGNANT NEOPLASM OF UPPER-OUTER QUADRANT OF LEFT BREAST IN FEMALE, ESTROGEN RECEPTOR NEGATIVE: ICD-10-CM

## 2022-02-10 DIAGNOSIS — C50.412 MALIGNANT NEOPLASM OF UPPER-OUTER QUADRANT OF LEFT BREAST IN FEMALE, ESTROGEN RECEPTOR NEGATIVE: Primary | ICD-10-CM

## 2022-02-10 DIAGNOSIS — C50.412 MALIGNANT NEOPLASM OF UPPER-OUTER QUADRANT OF LEFT BREAST IN FEMALE, ESTROGEN RECEPTOR NEGATIVE: ICD-10-CM

## 2022-02-10 PROCEDURE — 76098 X-RAY EXAM SURGICAL SPECIMEN: CPT

## 2022-02-10 PROCEDURE — 0 LIDOCAINE 1 % SOLUTION: Performed by: STUDENT IN AN ORGANIZED HEALTH CARE EDUCATION/TRAINING PROGRAM

## 2022-02-10 PROCEDURE — 25010000002 HEPARIN LOCK FLUSH PER 10 UNITS: Performed by: STUDENT IN AN ORGANIZED HEALTH CARE EDUCATION/TRAINING PROGRAM

## 2022-02-10 PROCEDURE — 88332 PATH CONSLTJ SURG EA ADD BLK: CPT | Performed by: STUDENT IN AN ORGANIZED HEALTH CARE EDUCATION/TRAINING PROGRAM

## 2022-02-10 PROCEDURE — 25010000002 PROPOFOL 10 MG/ML EMULSION: Performed by: NURSE ANESTHETIST, CERTIFIED REGISTERED

## 2022-02-10 PROCEDURE — A9520 TC99 TILMANOCEPT DIAG 0.5MCI: HCPCS | Performed by: STUDENT IN AN ORGANIZED HEALTH CARE EDUCATION/TRAINING PROGRAM

## 2022-02-10 PROCEDURE — 25010000002 ONDANSETRON PER 1 MG: Performed by: NURSE ANESTHETIST, CERTIFIED REGISTERED

## 2022-02-10 PROCEDURE — 88360 TUMOR IMMUNOHISTOCHEM/MANUAL: CPT | Performed by: STUDENT IN AN ORGANIZED HEALTH CARE EDUCATION/TRAINING PROGRAM

## 2022-02-10 PROCEDURE — 25010000002 FENTANYL CITRATE (PF) 50 MCG/ML SOLUTION: Performed by: ANESTHESIOLOGY

## 2022-02-10 PROCEDURE — 25010000002 CEFOXITIN PER 1 G: Performed by: STUDENT IN AN ORGANIZED HEALTH CARE EDUCATION/TRAINING PROGRAM

## 2022-02-10 PROCEDURE — 38525 BIOPSY/REMOVAL LYMPH NODES: CPT | Performed by: STUDENT IN AN ORGANIZED HEALTH CARE EDUCATION/TRAINING PROGRAM

## 2022-02-10 PROCEDURE — C1889 IMPLANT/INSERT DEVICE, NOC: HCPCS | Performed by: STUDENT IN AN ORGANIZED HEALTH CARE EDUCATION/TRAINING PROGRAM

## 2022-02-10 PROCEDURE — 88341 IMHCHEM/IMCYTCHM EA ADD ANTB: CPT | Performed by: STUDENT IN AN ORGANIZED HEALTH CARE EDUCATION/TRAINING PROGRAM

## 2022-02-10 PROCEDURE — 88307 TISSUE EXAM BY PATHOLOGIST: CPT | Performed by: STUDENT IN AN ORGANIZED HEALTH CARE EDUCATION/TRAINING PROGRAM

## 2022-02-10 PROCEDURE — 88331 PATH CONSLTJ SURG 1 BLK 1SPC: CPT | Performed by: STUDENT IN AN ORGANIZED HEALTH CARE EDUCATION/TRAINING PROGRAM

## 2022-02-10 PROCEDURE — 38792 RA TRACER ID OF SENTINL NODE: CPT

## 2022-02-10 PROCEDURE — 88342 IMHCHEM/IMCYTCHM 1ST ANTB: CPT | Performed by: STUDENT IN AN ORGANIZED HEALTH CARE EDUCATION/TRAINING PROGRAM

## 2022-02-10 PROCEDURE — 38900 IO MAP OF SENT LYMPH NODE: CPT | Performed by: STUDENT IN AN ORGANIZED HEALTH CARE EDUCATION/TRAINING PROGRAM

## 2022-02-10 PROCEDURE — 25010000002 DEXAMETHASONE PER 1 MG: Performed by: NURSE ANESTHETIST, CERTIFIED REGISTERED

## 2022-02-10 PROCEDURE — C1819 TISSUE LOCALIZATION-EXCISION: HCPCS

## 2022-02-10 PROCEDURE — 0 TECHETIUM TC99M TILMANOCEPT: Performed by: STUDENT IN AN ORGANIZED HEALTH CARE EDUCATION/TRAINING PROGRAM

## 2022-02-10 PROCEDURE — 25010000002 FENTANYL CITRATE (PF) 50 MCG/ML SOLUTION: Performed by: NURSE ANESTHETIST, CERTIFIED REGISTERED

## 2022-02-10 PROCEDURE — 19301 PARTIAL MASTECTOMY: CPT | Performed by: STUDENT IN AN ORGANIZED HEALTH CARE EDUCATION/TRAINING PROGRAM

## 2022-02-10 PROCEDURE — 88377 M/PHMTRC ALYS ISHQUANT/SEMIQ: CPT

## 2022-02-10 DEVICE — LIGACLIP MCA MULTIPLE CLIP APPLIERS, 20 MEDIUM CLIPS
Type: IMPLANTABLE DEVICE | Site: BREAST | Status: FUNCTIONAL
Brand: LIGACLIP

## 2022-02-10 RX ORDER — ONDANSETRON 2 MG/ML
4 INJECTION INTRAMUSCULAR; INTRAVENOUS ONCE AS NEEDED
Status: DISCONTINUED | OUTPATIENT
Start: 2022-02-10 | End: 2022-02-10 | Stop reason: HOSPADM

## 2022-02-10 RX ORDER — MIDAZOLAM HYDROCHLORIDE 1 MG/ML
0.5 INJECTION INTRAMUSCULAR; INTRAVENOUS
Status: DISCONTINUED | OUTPATIENT
Start: 2022-02-10 | End: 2022-02-10 | Stop reason: HOSPADM

## 2022-02-10 RX ORDER — LIDOCAINE AND PRILOCAINE 25; 25 MG/G; MG/G
CREAM TOPICAL ONCE
Status: COMPLETED | OUTPATIENT
Start: 2022-02-10 | End: 2022-02-10

## 2022-02-10 RX ORDER — DIAZEPAM 5 MG/1
5 TABLET ORAL ONCE
Status: COMPLETED | OUTPATIENT
Start: 2022-02-10 | End: 2022-02-10

## 2022-02-10 RX ORDER — OXYCODONE AND ACETAMINOPHEN 7.5; 325 MG/1; MG/1
1 TABLET ORAL ONCE AS NEEDED
Status: DISCONTINUED | OUTPATIENT
Start: 2022-02-10 | End: 2022-02-10 | Stop reason: HOSPADM

## 2022-02-10 RX ORDER — HYDROMORPHONE HYDROCHLORIDE 1 MG/ML
0.25 INJECTION, SOLUTION INTRAMUSCULAR; INTRAVENOUS; SUBCUTANEOUS
Status: DISCONTINUED | OUTPATIENT
Start: 2022-02-10 | End: 2022-02-10 | Stop reason: HOSPADM

## 2022-02-10 RX ORDER — FENTANYL CITRATE 50 UG/ML
INJECTION, SOLUTION INTRAMUSCULAR; INTRAVENOUS AS NEEDED
Status: DISCONTINUED | OUTPATIENT
Start: 2022-02-10 | End: 2022-02-10 | Stop reason: SURG

## 2022-02-10 RX ORDER — DROPERIDOL 2.5 MG/ML
0.62 INJECTION, SOLUTION INTRAMUSCULAR; INTRAVENOUS ONCE AS NEEDED
Status: DISCONTINUED | OUTPATIENT
Start: 2022-02-10 | End: 2022-02-10 | Stop reason: HOSPADM

## 2022-02-10 RX ORDER — FENTANYL CITRATE 50 UG/ML
50 INJECTION, SOLUTION INTRAMUSCULAR; INTRAVENOUS
Status: DISCONTINUED | OUTPATIENT
Start: 2022-02-10 | End: 2022-02-10 | Stop reason: HOSPADM

## 2022-02-10 RX ORDER — ENALAPRILAT 2.5 MG/2ML
1.25 INJECTION INTRAVENOUS ONCE AS NEEDED
Status: DISCONTINUED | OUTPATIENT
Start: 2022-02-10 | End: 2022-02-10 | Stop reason: HOSPADM

## 2022-02-10 RX ORDER — BUPIVACAINE HYDROCHLORIDE AND EPINEPHRINE 5; 5 MG/ML; UG/ML
INJECTION, SOLUTION EPIDURAL; INTRACAUDAL; PERINEURAL AS NEEDED
Status: DISCONTINUED | OUTPATIENT
Start: 2022-02-10 | End: 2022-02-10 | Stop reason: HOSPADM

## 2022-02-10 RX ORDER — LIDOCAINE HYDROCHLORIDE 10 MG/ML
3 INJECTION, SOLUTION INFILTRATION; PERINEURAL ONCE
Status: COMPLETED | OUTPATIENT
Start: 2022-02-10 | End: 2022-02-10

## 2022-02-10 RX ORDER — NALOXONE HCL 0.4 MG/ML
0.4 VIAL (ML) INJECTION AS NEEDED
Status: DISCONTINUED | OUTPATIENT
Start: 2022-02-10 | End: 2022-02-10 | Stop reason: HOSPADM

## 2022-02-10 RX ORDER — LIDOCAINE HYDROCHLORIDE 10 MG/ML
0.5 INJECTION, SOLUTION EPIDURAL; INFILTRATION; INTRACAUDAL; PERINEURAL ONCE AS NEEDED
Status: DISCONTINUED | OUTPATIENT
Start: 2022-02-10 | End: 2022-02-10 | Stop reason: HOSPADM

## 2022-02-10 RX ORDER — HEPARIN SODIUM (PORCINE) LOCK FLUSH IV SOLN 100 UNIT/ML 100 UNIT/ML
5 SOLUTION INTRAVENOUS AS NEEDED
Status: DISCONTINUED | OUTPATIENT
Start: 2022-02-10 | End: 2022-02-10 | Stop reason: HOSPADM

## 2022-02-10 RX ORDER — DEXAMETHASONE SODIUM PHOSPHATE 10 MG/ML
INJECTION INTRAMUSCULAR; INTRAVENOUS AS NEEDED
Status: DISCONTINUED | OUTPATIENT
Start: 2022-02-10 | End: 2022-02-10 | Stop reason: SURG

## 2022-02-10 RX ORDER — SODIUM CHLORIDE 0.9 % (FLUSH) 0.9 %
3-10 SYRINGE (ML) INJECTION AS NEEDED
Status: DISCONTINUED | OUTPATIENT
Start: 2022-02-10 | End: 2022-02-10 | Stop reason: HOSPADM

## 2022-02-10 RX ORDER — LIDOCAINE HYDROCHLORIDE 20 MG/ML
INJECTION, SOLUTION INFILTRATION; PERINEURAL AS NEEDED
Status: DISCONTINUED | OUTPATIENT
Start: 2022-02-10 | End: 2022-02-10 | Stop reason: SURG

## 2022-02-10 RX ORDER — FAMOTIDINE 10 MG/ML
20 INJECTION, SOLUTION INTRAVENOUS ONCE
Status: COMPLETED | OUTPATIENT
Start: 2022-02-10 | End: 2022-02-10

## 2022-02-10 RX ORDER — SODIUM CHLORIDE 0.9 % (FLUSH) 0.9 %
10 SYRINGE (ML) INJECTION AS NEEDED
Status: DISCONTINUED | OUTPATIENT
Start: 2022-02-10 | End: 2022-02-10 | Stop reason: HOSPADM

## 2022-02-10 RX ORDER — MAGNESIUM HYDROXIDE 1200 MG/15ML
LIQUID ORAL AS NEEDED
Status: DISCONTINUED | OUTPATIENT
Start: 2022-02-10 | End: 2022-02-10 | Stop reason: HOSPADM

## 2022-02-10 RX ORDER — SODIUM CHLORIDE, SODIUM LACTATE, POTASSIUM CHLORIDE, CALCIUM CHLORIDE 600; 310; 30; 20 MG/100ML; MG/100ML; MG/100ML; MG/100ML
9 INJECTION, SOLUTION INTRAVENOUS CONTINUOUS
Status: DISCONTINUED | OUTPATIENT
Start: 2022-02-10 | End: 2022-02-10 | Stop reason: HOSPADM

## 2022-02-10 RX ORDER — EPHEDRINE SULFATE 50 MG/ML
INJECTION, SOLUTION INTRAVENOUS AS NEEDED
Status: DISCONTINUED | OUTPATIENT
Start: 2022-02-10 | End: 2022-02-10 | Stop reason: SURG

## 2022-02-10 RX ORDER — HYDROCODONE BITARTRATE AND ACETAMINOPHEN 5; 325 MG/1; MG/1
1 TABLET ORAL ONCE AS NEEDED
Status: COMPLETED | OUTPATIENT
Start: 2022-02-10 | End: 2022-02-10

## 2022-02-10 RX ORDER — HYDROCODONE BITARTRATE AND ACETAMINOPHEN 5; 325 MG/1; MG/1
1 TABLET ORAL EVERY 6 HOURS PRN
Qty: 8 TABLET | Refills: 0 | Status: SHIPPED | OUTPATIENT
Start: 2022-02-10 | End: 2022-02-23

## 2022-02-10 RX ORDER — DIPHENHYDRAMINE HYDROCHLORIDE 50 MG/ML
12.5 INJECTION INTRAMUSCULAR; INTRAVENOUS
Status: DISCONTINUED | OUTPATIENT
Start: 2022-02-10 | End: 2022-02-10 | Stop reason: HOSPADM

## 2022-02-10 RX ORDER — PROPOFOL 10 MG/ML
VIAL (ML) INTRAVENOUS AS NEEDED
Status: DISCONTINUED | OUTPATIENT
Start: 2022-02-10 | End: 2022-02-10 | Stop reason: SURG

## 2022-02-10 RX ORDER — SODIUM CHLORIDE 0.9 % (FLUSH) 0.9 %
3 SYRINGE (ML) INJECTION EVERY 12 HOURS SCHEDULED
Status: DISCONTINUED | OUTPATIENT
Start: 2022-02-10 | End: 2022-02-10 | Stop reason: HOSPADM

## 2022-02-10 RX ORDER — ONDANSETRON 2 MG/ML
INJECTION INTRAMUSCULAR; INTRAVENOUS AS NEEDED
Status: DISCONTINUED | OUTPATIENT
Start: 2022-02-10 | End: 2022-02-10 | Stop reason: SURG

## 2022-02-10 RX ADMIN — HEPARIN 500 UNITS: 100 SYRINGE at 16:30

## 2022-02-10 RX ADMIN — TILMANOCEPT 1 DOSE: KIT at 12:12

## 2022-02-10 RX ADMIN — Medication 3 ML: at 11:53

## 2022-02-10 RX ADMIN — DIAZEPAM 5 MG: 5 TABLET ORAL at 10:03

## 2022-02-10 RX ADMIN — HYDROCODONE BITARTRATE AND ACETAMINOPHEN 1 TABLET: 5; 325 TABLET ORAL at 15:05

## 2022-02-10 RX ADMIN — SODIUM CHLORIDE, POTASSIUM CHLORIDE, SODIUM LACTATE AND CALCIUM CHLORIDE: 600; 310; 30; 20 INJECTION, SOLUTION INTRAVENOUS at 14:12

## 2022-02-10 RX ADMIN — DEXAMETHASONE SODIUM PHOSPHATE 8 MG: 10 INJECTION INTRAMUSCULAR; INTRAVENOUS at 13:00

## 2022-02-10 RX ADMIN — LIDOCAINE AND PRILOCAINE: 25; 25 CREAM TOPICAL at 09:58

## 2022-02-10 RX ADMIN — FENTANYL CITRATE 25 MCG: 50 INJECTION INTRAMUSCULAR; INTRAVENOUS at 13:00

## 2022-02-10 RX ADMIN — PROPOFOL 140 MG: 10 INJECTION, EMULSION INTRAVENOUS at 12:48

## 2022-02-10 RX ADMIN — FAMOTIDINE 20 MG: 10 INJECTION, SOLUTION INTRAVENOUS at 12:30

## 2022-02-10 RX ADMIN — ONDANSETRON 4 MG: 2 INJECTION INTRAMUSCULAR; INTRAVENOUS at 14:10

## 2022-02-10 RX ADMIN — EPHEDRINE SULFATE 10 MG: 50 INJECTION INTRAVENOUS at 13:12

## 2022-02-10 RX ADMIN — FENTANYL CITRATE 50 MCG: 50 INJECTION, SOLUTION INTRAMUSCULAR; INTRAVENOUS at 15:03

## 2022-02-10 RX ADMIN — SODIUM CHLORIDE, POTASSIUM CHLORIDE, SODIUM LACTATE AND CALCIUM CHLORIDE 9 ML/HR: 600; 310; 30; 20 INJECTION, SOLUTION INTRAVENOUS at 12:28

## 2022-02-10 RX ADMIN — FENTANYL CITRATE 25 MCG: 50 INJECTION INTRAMUSCULAR; INTRAVENOUS at 14:14

## 2022-02-10 RX ADMIN — EPHEDRINE SULFATE 10 MG: 50 INJECTION INTRAVENOUS at 14:07

## 2022-02-10 RX ADMIN — LIDOCAINE HYDROCHLORIDE 80 MG: 20 INJECTION, SOLUTION INFILTRATION; PERINEURAL at 12:48

## 2022-02-10 RX ADMIN — FENTANYL CITRATE 25 MCG: 50 INJECTION INTRAMUSCULAR; INTRAVENOUS at 13:10

## 2022-02-10 RX ADMIN — Medication 2 G: at 12:55

## 2022-02-10 NOTE — ANESTHESIA PREPROCEDURE EVALUATION
Anesthesia Evaluation     Patient summary reviewed and Nursing notes reviewed   NPO Solid Status: > 8 hours  NPO Liquid Status: > 2 hours           Airway   Mallampati: II  TM distance: >3 FB  Neck ROM: limited  Possible difficult intubation  Dental - normal exam     Pulmonary - normal exam    breath sounds clear to auscultation  (+) a smoker Former,   Cardiovascular - normal exam    ECG reviewed  Patient on routine beta blocker  Rhythm: regular  Rate: normal    (+) hypertension 2 medications or greater, dysrhythmias Tachycardia, hyperlipidemia,   (-) angina, orthopnea, PND, BENITO    ROS comment: NSR. T wave inversion noted with slight ST depression     Neuro/Psych  (+) psychiatric history Anxiety,    GI/Hepatic/Renal/Endo    (+)  GERD,  thyroid problem hypothyroidism    Musculoskeletal     (+) back pain,   Abdominal    Substance History - negative use     OB/GYN negative ob/gyn ROS         Other   arthritis,    history of cancer                  Anesthesia Plan    ASA 2     general     intravenous induction     Anesthetic plan, all risks, benefits, and alternatives have been provided, discussed and informed consent has been obtained with: patient.        CODE STATUS:

## 2022-02-10 NOTE — OP NOTE
OPERATIVE REPORT     DATE:  2/10/2022     SURGEON: Becky Liu MD      ASSISTANT:  Yue Bailey PA-C, who was present for necessary suctioning, retracting, suturing throughout the procedure      OPERATION PERFORMED:  Left breast wire localized lumpectomy with sentinel lymph node biopsy     PREOPERATIVE DIAGNOSIS:  Invasive carcinoma of the left breast      POSTOPERATIVE DIAGNOSIS: Same     ANESTHESIA:  General     SPECIMEN:   Left breast wire localized lumpectomy (short stitch superior, long lateral, double anterior)  Additional superior margin (stitch marks true margin)  Additional medial margin (stitch marks true margin)  Additional lateral margin (stitch marks true margin)  Additional inferior margin (stitch marks true margin)  Additional anterior margin (stitch marks true margin)    Left axillary sentinel lymph node #1 (hot, blue, 220)  Left axillary sentinel lymph node #2 (hot, blue, 120)  Left axillary sentinel lymph node #3 (palpable)    DRAINS: None     BLOOD LOSS: Minimal     INDICATION FOR OPERATION:Neela Ramirez is a 76 y.o. lady who recently completed neoadjuvant chemotherapy for HER-2 positive left breast cancer.  She presents today for lumpectomy, sentinel lymph node biopsy, and possible axillary dissection. All risks (including bleeding, infection, damage to surrounding structures, need for further surgery, pathologic upgrade), benefits and alternatives were explained to the patient who agreed and wished to proceed. Informed consent was signed.      OPERATIVE COURSE: The patient was taken to the operating room, transferred onto the operating room table, and underwent anesthesia without incident. 5 cc of blue dye was injected into the dermal layer of the nipple areolar complex. The patient was prepped and draped in sterile fashion. A time out was performed and preoperative antibiotics were given.  Half percent Marcaine with epinephrine was injected into the skin and subcutaneous tissues.     We  first performed the sentinel lymph node biopsy. A curvilinear incision was made just inferior to the hairbearing area of the left axilla. Bovie electrocautery was used to dissect down through the skin and subcutaneous tissues and through the clavipectoral fascia into the axilla.  3 [place] lymph nodes were identified. These were removed with Bovie electrocautery and clips across all major lymphovascular structures. Once this was done, there were no hot, blue, or palpable lymph nodes remaining. The area was irrigated and appeared hemostatic.   These were sent for frozen specimen due to her receiving neoadjuvant chemotherapy.  A total of 10 lymph nodes were evaluated.  They all returned is negative.      We then performed a lumpectomy.  A curvilinear incision was made along the breast from the 10 to 2 o'clock position in the upper central portion of the left breast.  Bovie electrocautery was used to create a flap along the length of the wire 1 cm in thickness.    The medial and lateral planes were then dissected to the chest wall along the length of the wire.  The inferior border was then also transected just past the tip of the wire. Lastly the wire was brought through the incision with 2 hemostats.    The superior border was transected last. The tissue was amputated at its base at the level of the pectoralis muscle.  It was marked as listed above.  Specimen radiograph confirmed clip, wire, and abnormal breast tissue.  It was sent for fresh permanent specimen. Additional margins were taken along all borders other than the posterior as we were already at the chest wall. These were done with Allis clamps and approximately 1 cm in thickness. The area was irrigated and appeared hemostatic.  There were no signs of bleeding.      The rest of the local anesthetic was administered. It was closed with interrupted 3-0 Vicryl sutures and a running 4-0 Monocryl suture.  Skin glue was placed over the incision.  The patient  tolerated the procedure well. All needle and lap counts were correct at the end of the case. The patient was then awoken from anesthesia and taken to recovery for further monitoring.         Becky Liu MD   General and Endoscopic Surgery  [facility]     [address] [place] [address], [address]  [place], [place], [address]  P: [phone]  F: [phone]

## 2022-02-10 NOTE — DISCHARGE INSTRUCTIONS
Dr. Samy Frost  [address]  [place], [place] [address] [phone]    Discharge Instructions for Breast Biopsy    1. Go home, rest and take it easy today; however, you should get up and move about several times today to reduce the risk of developing a clot in your legs.      2. You may experience some dizziness or memory loss from the anesthesia.  This may last for the next 24 hours.  Someone should plan on staying with you for the first 24 hours for your safety.    3. Do not make any important legal decisions or sign any legal papers for the next 24 hours.      4. Eat and drink lightly today.  Start off with liquids, jello, soup, crackers or other bland foods at first. You may advance your diet tomorrow as tolerated as long as you do not experience any nausea or vomiting.     5. Please text Dr. Frost in 3 days at [phone] to obtain your pathology results.  Please include your name and date of birth in the text. He will get back to you as quickly as he can.  If you do not text, you may call Dr. Frost at the same number.      6. If skin glue (Dermabond) was used, your incisions are protected and covered.  The invisible glue will dissolve on its own as your incision heals. If you have dressings, you may remove your outer dressings in 3 days.  The white tapes called steri-strips should stay in place.  They will fall off on their own in 1-2 weeks.  Do not worry if they come off sooner.       7. If dressings were used, you may notice some bleeding/drainage on your outer dressings. A little bloody drainage is normal. If the bleeding/drainage is such that the bandage cannot absorb it, remove the dressing, apply clean gauze and apply firm pressure for a full 15 minutes.  If the bleeding continues, please call me.    8. You may shower tomorrow allowing water to run over your incisions; however, do not scrub the incisions.  No tub baths until your incisions are completely healed.         9. You have  received a prescription for a narcotic pain medicine, as you will have some pain following surgery.   You will not be totally pain free, but your pain medicine should make the pain tolerable.  Please take your pain medicine as prescribed and always take your pills with food to prevent nausea. If you are having severe pain that cannot be controlled by the pain medicine, please contact me.      10. You have also received a prescription for an anti-nausea medicine.  Please take this as prescribed for any nausea or vomiting.  Nausea could be a result of the anesthesia or a result of the narcotic pain medicine.  If you experience severe nausea and vomiting that cannot be controlled by the nausea medicine, please call me.      11. No driving for 24 hours and for as long as you are taking your prescription pain medicine.      12. You will need to call the office at [phone] to schedule a follow-up appointment in 6-10 days.     13. Remember to contact me for any of the following:    • Fever > 101 degrees  • Severe pain that cannot be controlled by taking your pain pills  • Severe nausea or vomiting that cannot be controlled by taking your nausea pills  • Significant bleeding of your incisions  • Drainage that has a bad smell or is yellow or green in appearance  • Any other questions or concerns

## 2022-02-10 NOTE — ANESTHESIA PROCEDURE NOTES
Airway  Urgency: elective    Date/Time: 2/10/2022 12:52 PM  Airway not difficult    General Information and Staff    Patient location during procedure: OR  Anesthesiologist: Ryland Honeycutt MD  CRNA: Erika Hatch CRNA    Indications and Patient Condition  Indications for airway management: airway protection    Preoxygenated: yes (Pt pre-O2 with 100% O2)  Mask difficulty assessment: 0 - not attempted    Final Airway Details  Final airway type: supraglottic airway      Successful airway: classic  Size 4    Number of attempts at approach: 1  Assessment: lips, teeth, and gum same as pre-op and atraumatic intubation    Additional Comments  ATOLMA x1. No change in dentition. + ETCO2. Airway seal pressure <20cm H2O.

## 2022-02-10 NOTE — ANESTHESIA POSTPROCEDURE EVALUATION
Patient: Neela Ramirez    Procedure Summary     Date: 02/10/22 Room / Location:  [facility]  [facility]    Anesthesia Start: 1239 Anesthesia Stop: 1425    Procedure: LEFT BREAST WIRE LOCALIZED LUMPECTOMY WITH SENTINEL NODE BIOPSY, POSSIBLE LEFT AXILLARY DISSECTION (Left Breast) Diagnosis:       Malignant neoplasm of upper-outer quadrant of left breast in female, estrogen receptor negative (HCC)      (Malignant neoplasm of upper-outer quadrant of left breast in female, estrogen receptor negative (HCC) [C50.412, Z17.1])    Surgeons: Becky Liu MD Provider: Ryland Honeycutt MD    Anesthesia Type: general ASA Status: 2          Anesthesia Type: general    Vitals  Vitals Value Taken Time   /67 02/10/22 1516   Temp     Pulse 91 02/10/22 1518   Resp 16 02/10/22 1515   SpO2 92 % 02/10/22 1518   Vitals shown include unvalidated device data.        Post Anesthesia Care and Evaluation    Patient location during evaluation: bedside  Patient participation: complete - patient participated  Level of consciousness: awake and alert  Pain management: adequate  Airway patency: patent  Anesthetic complications: No anesthetic complications  PONV Status: controlled  Cardiovascular status: acceptable and hemodynamically stable  Respiratory status: acceptable and spontaneous ventilation  Hydration status: acceptable    Comments: /67   Pulse 89   Temp 36.9 °C (98.5 °F) (Oral)   Resp 16   SpO2 95%

## 2022-02-10 NOTE — H&P
General Surgery History and Physical Exam      Summary:    76 y.o. 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       A multidisciplinary plan has been formulated for the patient:    (1) Breast Surgical Oncology:  -MRI to evaluate anatomy as well as for surgical planning. Repeat MRI week of 1/17.   -Tentative plan for left breast wire localized lumpectomy with sentinel lymph node biopsy, possible axillary dissection. Plan for surgery week of 2/1.       (2) Medical Oncology:  -Follows with Dr. Avendano. Currently on EA 1181 clinical trial and receiving taxol herceptin and Perjecta. Last dose 1/4/2021.      (3) Radiation Oncology:  -Will refer postoperatively for evaluation for radiation therapy after lumpectomy.     Referring Provider: No ref. provider found     Chief Complaint: breast mass     History of Present Illness: Ms. Neela Ramirez is a 76 y.o. year old lady, seen at the request of No ref. provider found for a new diagnosis of left breast cancer.       This was initially detected as a palpable mass. She has had annual mammograms each year or every other year. She denies any prior history of abnormal mammograms or breast biopsies. Her work-up is detailed in the oncologic history below.      She denies any pain, skin changes, or nipple discharge.  She has noticed this lump for a few months but new her regular mammogram is coming up soon.  She denies any family history of breast or ovarian cancer.      12/22/2021 she presents today for follow-up.  She is doing well.  She is tolerating the chemotherapy well.  She is had some issues with diarrhea that are improving.  She has also had some issues with sinus infections.     Workup of Current Diagnosis:    8/17/2021 left breast diagnostic Mammogram with Ultrasound:   In  the posterior one-third upper inner quadrant of the left breast at the site of palpable concern  near the 11-o'clock position there is a mass with adjacent pleomorphic microcalcifications. The mass and calcifications measure on the order 1.5 cm in greatest dimension. 11-o'clock position on the order of 5 cm from the nipple. At this location, there is an irregular hypoechoic mass that measures 2.4 x 2.0 x 1.6 cm. Internal vascularity is noted. Echogenic foci in the posterior aspect of the mass that represents the visualized calcifications are noted.  IMPRESSION:  1. There is an irregular 2.4 cm mass in the left breast at the site of palpable concern which corresponds to the 11-o'clock position on the order of 5 cm from the nipple. Correlation with an ultrasound guided left breast biopsy is recommended.  2. There are no findings suspicious for malignancy in the right breast.  BI-RADS Category 5: Highly suggestive for malignancy. Appropriate action should be taken.     Left breast ultrasound-guided biopsy:   The lesion at the 11:00 position on the order of 5 cm from the nipple was visualized. Multiple tissue specimens were obtained. A barbell shaped metallic clip was placed to katey the site. Postbiopsy unilateral left digital mammography consisting of CC and 90 degree lateral images were obtained and demonstrate placement of a barbell shaped metallic clip at the 11:00 position in the posterior one third of the left breast at the site of a pre-existing mass seen on the examination of 8/17/2021. The metallic clip is on the order of 7 mm anterior to residual microcalcifications.   IMPRESSION:   Technically successful ultrasound guided Mammotome vacuum assisted left breast biopsy with placement of a barbell shaped metallic clip. The pathology result has returned as malignant. Surgical consultation is recommended.  BI-RADS Category 6: Known malignancy     9/8/2021 pathology:   Final Diagnosis   1. Left Breast at 11 O'clock  5 cm from Nipple, Core Biopsy:               A. Invasive breast carcinoma with ductal and lobular features (see comment):                             1. Overall [place] grade III (tubular score = 3, nuclear score = 3, mitotic score = 2).                            2. Invasive carcinoma measures at least 7 mm in greatest dimension.                            3. Focally suspicious for lymphovascular space invasion.               B. Rare focus of high grade solid and comedo DCIS.      10/8/2021 Bilateral breast MRI   IMPRESSION:  1. Biopsy-proven malignancy in the left breast at the 11-o'clock position measuring on the order of 2.8 cm in greatest dimension with a centrally located barbell shaped metallic clip. No other suspicious findings are seen within left breast and there is no evidence for left axillary adenopathy.  2. There are no findings suspicious for malignancy in the right breast.  BI-RADS category 6: Known biopsy-proven malignancy.     10/18/2021 port placement     Gynecologic History:   . P:0 AB:0  Age at first childbirth: Not applicable  Lactation/How long: Not applicable  Age at menarche: 13  Age at menopause: 50  Total years of oral contraceptive use: None  Total years of hormone replacement therapy: 7 years     Past Medical History:   · Hypertension  · Hyperlipidemia  · Hypothyroidism  · GERD     Past Surgical History:    · Right breast lumpectomy for benign disease  · Left cyst aspiration  · Foot surgery     Family History:    Family History   Problem Relation Age of Onset   • Arthritis Father        Social History:  · Denies tobacco use  · Occasional alcohol use     Allergies:   No Known Allergies     Medications:      Current Outpatient Medications:   •  alendronate (FOSAMAX) 70 MG tablet, Take 1 tablet by mouth 1 (One) Time Per Week., Disp: 13 tablet, Rfl: 3  •  amLODIPine (NORVASC) 5 MG tablet, Take 1 tablet by mouth once daily, Disp: 90 tablet, Rfl: 2  •  cephalexin (Keflex) 500 MG capsule,  Take 1 capsule by mouth 2 (Two) Times a Day., Disp: 14 capsule, Rfl: 0  •  cyclobenzaprine (FLEXERIL) 5 MG tablet, Take 1 tablet by mouth three times daily as needed for muscle spasm, Disp: 30 tablet, Rfl: 0  •  desoximetasone (TOPICORT) 0.25 % ointment, 1 application 2 (two) times a day as needed., Disp: , Rfl:   •  escitalopram (LEXAPRO) 20 MG tablet, Take 1 tablet by mouth once daily, Disp: 90 tablet, Rfl: 1  •  levothyroxine (SYNTHROID, LEVOTHROID) 25 MCG tablet, TAKE 1 TABLET BY MOUTH IN THE MORNING 30  MINUTES  BEFORE  BREAKFAST  OR  ANY  OTHER  MEDICINE, Disp: 90 tablet, Rfl: 0  •  metoprolol succinate XL (TOPROL-XL) 25 MG 24 hr tablet, Take 1 tablet by mouth once daily, Disp: 90 tablet, Rfl: 2  •  mupirocin (BACTROBAN) 2 % ointment, APPLY A SMALL AMOUNT OF OINTMENT TOPICALLY TO AFFECTED AREA EVERY NIGHT, Disp: , Rfl:   •  omeprazole (priLOSEC) 20 MG capsule, Take 1 capsule by mouth once daily, Disp: 90 capsule, Rfl: 1  •  ondansetron (ZOFRAN) 8 MG tablet, Take 1 tablet by mouth 3 (Three) Times a Day As Needed for Nausea or Vomiting., Disp: 30 tablet, Rfl: 5  •  prochlorperazine (COMPAZINE) 10 MG tablet, Take 1 tablet by mouth Every 6 (Six) Hours As Needed for Nausea or Vomiting., Disp: 30 tablet, Rfl: 3  •  rosuvastatin (CRESTOR) 5 MG tablet, Take 1 tablet by mouth once daily, Disp: 90 tablet, Rfl: 2  •  sulfamethoxazole-trimethoprim (BACTRIM DS,SEPTRA DS) 800-160 MG per tablet, Take 1 tablet by mouth 2 (Two) Times a Day., Disp: 14 tablet, Rfl: 0  No current facility-administered medications for this visit.     Laboratory Values:    Labs from 12/22/2021 reviewed     Review of Systems:   Influenza-like illness: no fever, no  cough, no  sore throat, no  body aches, no loss of sense of taste or smell, no known exposure to person with Covid-19.  Constitutional: Negative for fevers or chills  HENT: Negative for hearing loss or runny nose  Eyes: Negative for vision changes or scleral icterus  Respiratory: Negative  for cough or shortness of breath  Cardiovascular: Negative for chest pain or heart palpitations  Gastrointestinal: Negative for abdominal pain, nausea, vomiting, constipation, melena, or hematochezia  Genitourinary: Negative for hematuria or dysuria  Musculoskeletal: Negative for joint swelling or gait instability  Neurologic: Negative for tremors or seizures  Psychiatric: Negative for suicidal ideations or depression  All other systems reviewed and negative     Physical Exam:   · ECO - Asymptomatic  · Constitutional: Well-developed well-nourished, no acute distress  · Eyes: Conjunctiva normal, sclera nonicteric  · ENMT: Hearing grossly normal, oral mucosa moist  · Neck: Supple, no palpable mass, trachea midline  · Respiratory: Clear to auscultation, normal inspiratory effort  · Cardiovascular: Regular rate, no peripheral edema, no jugular venous distention  · Breast: symmetric  ? Right: No visible abnormalities on inspection while seated, with arms raised or hands on hips. No masses, skin changes, or nipple abnormalities.  ? Left: No visible abnormalities on inspection while seated, with arms raised or hands on hips.  Mass no longer palpable in upper central breast  No skin or nipple changes.  ? No clinical chest wall involvement.  · Gastrointestinal: Soft, nontender  · Lymphatics (palpable nodes): No cervical, supraclavicular or axillary lymphadenopathy  · Skin:  Warm, dry, no rash on visualized skin surfaces  · Musculoskeletal: Symmetric strength, normal gait  · Psychiatric: Alert and oriented ×3, normal affect      Discussion:  I had an extensive discussion with the patient and her family about the nature of her breast cancer diagnosis. We reviewed the components of breast tissue including ducts and lobules. We reviewed her pathology report in detail. We reviewed breast cancer histology, including stage, grade, ER/MT receptors, HER2 receptors and how this applies to her diagnosis. We reviewed the basics of  systemic and local/regional management of breast cancer.      We reviewed potential surgical treatments to include partial mastectomy, mastectomy, sentinel lymph node biopsy and axillary node dissection and discussed the rationale associated with each approach. Regarding radiation therapy, we discussed that radiation is indicated in all cases of breast conservation and in only limited circumstances following mastectomy. We discussed that the primary goal of adjuvant radiation is to decrease the likelihood of local recurrence.      In her case, she is a good candidate for lumpectomy and she would like to proceed with breast conservation. We discussed that lumpectomy would require preoperative wire-localization. We also discussed the risk of positive margins and that she must have negative margins for lumpectomy to be an appropriate oncologic procedure. I will make every effort to obtain negative margins at her initial operation, but there is a 10-15% chance that she will require a second operation for re-excision, or possibly a total mastectomy. We will not know the margin status until after her final pathology has returned.      We discussed axillary staging. I described the procedure for sentinel lymph node biopsy in detail, including the preoperative injection of technetium sulfur colloid and intraoperative injection of lymphazurin blue dye. I explained that this is a mapping test and not a cancer test, that all of the lymph nodes containing these dyes will be removed for complete testing by pathology, and that the results could impact the decision for adjuvant treatment or additional surgery.     I described additional risks and potential complications associated with surgery, including, but not limited to, bleeding, infection, complications related to blue dye, lymphedema, deformity/poor cosmetic result, chronic pain, neurovascular injury, numbness, seroma, hematoma, deep venous thrombosis, skin flap necrosis,  disease recurrence and the possibility of requiring additional surgery. We also discussed other treatment options including the option of not undergoing any surgical treatment and the risks associated with this including disease progression. She expressed an understanding of these factors and wished to proceed.     We discussed that in her case, systemic treatment would involve endocrine therapy and possibly chemotherapy. She will be referred to medical oncology to discuss this further.      SOSA CARBALLO M.D.  General and Endoscopic Surgery  [facility]     [address], [address]  [place], [place], [address]  P: [phone]  F: [phone]

## 2022-02-14 ENCOUNTER — TELEPHONE (OUTPATIENT)
Dept: INTERNAL MEDICINE | Age: 77
End: 2022-02-14

## 2022-02-14 DIAGNOSIS — E03.8 SUBCLINICAL HYPOTHYROIDISM: ICD-10-CM

## 2022-02-14 RX ORDER — LEVOTHYROXINE SODIUM 0.03 MG/1
TABLET ORAL
Qty: 30 TABLET | Refills: 5 | Status: SHIPPED | OUTPATIENT
Start: 2022-02-14 | End: 2022-11-28

## 2022-02-14 NOTE — TELEPHONE ENCOUNTER
Caller: Neela Ramirez    Relationship to patient: Self    Best call back number: [phone]    Chief complaint: NEEDING NEW PATIENT, HAD TO CANCEL 2/22/22 APPOINTMENT DUE TO INFUSIONS RELATED TO BREAST CANCER    Type of visit: NEW PATIENT    Requested date: ANY DAY OR TIME OF DAY AFTER THE 2/22/22    If rescheduling, when is the original appointment: 2/22/22    Additional notes:PATIENT STATES SHE WILL RUN OUT OF HER THYROID MEDICATION AFTER 20 DAYS. BOOKED SOONEST WITH JULEE ALEMAN 7/7/22

## 2022-02-18 DIAGNOSIS — M85.80 OSTEOPENIA, UNSPECIFIED LOCATION: ICD-10-CM

## 2022-02-18 RX ORDER — ALENDRONATE SODIUM 70 MG/1
70 TABLET ORAL WEEKLY
Qty: 12 TABLET | Refills: 0 | Status: SHIPPED | OUTPATIENT
Start: 2022-02-18 | End: 2022-08-08

## 2022-02-22 ENCOUNTER — INFUSION (OUTPATIENT)
Dept: ONCOLOGY | Facility: HOSPITAL | Age: 77
End: 2022-02-22

## 2022-02-22 ENCOUNTER — TELEPHONE (OUTPATIENT)
Dept: SURGERY | Facility: CLINIC | Age: 77
End: 2022-02-22

## 2022-02-22 ENCOUNTER — OFFICE VISIT (OUTPATIENT)
Dept: ONCOLOGY | Facility: CLINIC | Age: 77
End: 2022-02-22

## 2022-02-22 VITALS
RESPIRATION RATE: 16 BRPM | SYSTOLIC BLOOD PRESSURE: 151 MMHG | HEIGHT: 59 IN | HEART RATE: 89 BPM | OXYGEN SATURATION: 99 % | TEMPERATURE: 97.3 F | DIASTOLIC BLOOD PRESSURE: 84 MMHG | BODY MASS INDEX: 25 KG/M2 | WEIGHT: 124 LBS

## 2022-02-22 DIAGNOSIS — Z17.0 MALIGNANT NEOPLASM OF UPPER-INNER QUADRANT OF LEFT BREAST IN FEMALE, ESTROGEN RECEPTOR POSITIVE: ICD-10-CM

## 2022-02-22 DIAGNOSIS — C50.212 MALIGNANT NEOPLASM OF UPPER-INNER QUADRANT OF LEFT BREAST IN FEMALE, ESTROGEN RECEPTOR POSITIVE: Primary | ICD-10-CM

## 2022-02-22 DIAGNOSIS — Z45.9 ENCOUNTER FOR MANAGEMENT OF IMPLANTED DEVICE: Primary | ICD-10-CM

## 2022-02-22 DIAGNOSIS — Z17.0 MALIGNANT NEOPLASM OF UPPER-INNER QUADRANT OF LEFT BREAST IN FEMALE, ESTROGEN RECEPTOR POSITIVE: Primary | ICD-10-CM

## 2022-02-22 DIAGNOSIS — C50.212 MALIGNANT NEOPLASM OF UPPER-INNER QUADRANT OF LEFT BREAST IN FEMALE, ESTROGEN RECEPTOR POSITIVE: ICD-10-CM

## 2022-02-22 LAB
ALBUMIN SERPL-MCNC: 3.8 G/DL (ref 3.5–5.2)
ALBUMIN/GLOB SERPL: 1.3 G/DL (ref 1.1–2.4)
ALP SERPL-CCNC: 116 U/L (ref 38–116)
ALT SERPL W P-5'-P-CCNC: 21 U/L (ref 0–33)
ANION GAP SERPL CALCULATED.3IONS-SCNC: 10.6 MMOL/L (ref 5–15)
AST SERPL-CCNC: 24 U/L (ref 0–32)
BASOPHILS # BLD AUTO: 0.06 10*3/MM3 (ref 0–0.2)
BASOPHILS NFR BLD AUTO: 0.8 % (ref 0–1.5)
BILIRUB SERPL-MCNC: 0.3 MG/DL (ref 0.2–1.2)
BUN SERPL-MCNC: 14 MG/DL (ref 6–20)
BUN/CREAT SERPL: 17.3 (ref 7.3–30)
CALCIUM SPEC-SCNC: 8.8 MG/DL (ref 8.5–10.2)
CHLORIDE SERPL-SCNC: 107 MMOL/L (ref 98–107)
CO2 SERPL-SCNC: 23.4 MMOL/L (ref 22–29)
CREAT SERPL-MCNC: 0.81 MG/DL (ref 0.6–1.1)
DEPRECATED RDW RBC AUTO: 46.6 FL (ref 37–54)
EOSINOPHIL # BLD AUTO: 0.27 10*3/MM3 (ref 0–0.4)
EOSINOPHIL NFR BLD AUTO: 3.7 % (ref 0.3–6.2)
ERYTHROCYTE [DISTWIDTH] IN BLOOD BY AUTOMATED COUNT: 12.2 % (ref 12.3–15.4)
GFR SERPL CREATININE-BSD FRML MDRD: 69 ML/MIN/1.73
GLOBULIN UR ELPH-MCNC: 2.9 GM/DL (ref 1.8–3.5)
GLUCOSE SERPL-MCNC: 111 MG/DL (ref 74–124)
HCT VFR BLD AUTO: 37.8 % (ref 34–46.6)
HGB BLD-MCNC: 12.3 G/DL (ref 12–15.9)
IMM GRANULOCYTES # BLD AUTO: 0.03 10*3/MM3 (ref 0–0.05)
IMM GRANULOCYTES NFR BLD AUTO: 0.4 % (ref 0–0.5)
LAB AP CASE REPORT: NORMAL
LAB AP CLINICAL INFORMATION: NORMAL
LAB AP DIAGNOSIS COMMENT: NORMAL
LAB AP SPECIAL STAINS: NORMAL
LAB AP SYNOPTIC CHECKLIST: NORMAL
LYMPHOCYTES # BLD AUTO: 1.5 10*3/MM3 (ref 0.7–3.1)
LYMPHOCYTES NFR BLD AUTO: 20.8 % (ref 19.6–45.3)
Lab: NORMAL
MCH RBC QN AUTO: 33.7 PG (ref 26.6–33)
MCHC RBC AUTO-ENTMCNC: 32.5 G/DL (ref 31.5–35.7)
MCV RBC AUTO: 103.6 FL (ref 79–97)
MONOCYTES # BLD AUTO: 0.63 10*3/MM3 (ref 0.1–0.9)
MONOCYTES NFR BLD AUTO: 8.7 % (ref 5–12)
NEUTROPHILS NFR BLD AUTO: 4.72 10*3/MM3 (ref 1.7–7)
NEUTROPHILS NFR BLD AUTO: 65.6 % (ref 42.7–76)
NRBC BLD AUTO-RTO: 0 /100 WBC (ref 0–0.2)
PATH REPORT.ADDENDUM SPEC: NORMAL
PATH REPORT.FINAL DX SPEC: NORMAL
PATH REPORT.GROSS SPEC: NORMAL
PLATELET # BLD AUTO: 286 10*3/MM3 (ref 140–450)
PMV BLD AUTO: 9.7 FL (ref 6–12)
POTASSIUM SERPL-SCNC: 3.8 MMOL/L (ref 3.5–4.7)
PROT SERPL-MCNC: 6.7 G/DL (ref 6.3–8)
RBC # BLD AUTO: 3.65 10*6/MM3 (ref 3.77–5.28)
SODIUM SERPL-SCNC: 141 MMOL/L (ref 134–145)
WBC NRBC COR # BLD: 7.21 10*3/MM3 (ref 3.4–10.8)

## 2022-02-22 PROCEDURE — 36591 DRAW BLOOD OFF VENOUS DEVICE: CPT

## 2022-02-22 PROCEDURE — 80053 COMPREHEN METABOLIC PANEL: CPT

## 2022-02-22 PROCEDURE — G2212 PROLONG OUTPT/OFFICE VIS: HCPCS | Performed by: INTERNAL MEDICINE

## 2022-02-22 PROCEDURE — 25010000002 HEPARIN LOCK FLUSH PER 10 UNITS: Performed by: INTERNAL MEDICINE

## 2022-02-22 PROCEDURE — 99215 OFFICE O/P EST HI 40 MIN: CPT | Performed by: INTERNAL MEDICINE

## 2022-02-22 PROCEDURE — 85025 COMPLETE CBC W/AUTO DIFF WBC: CPT

## 2022-02-22 RX ORDER — HEPARIN SODIUM (PORCINE) LOCK FLUSH IV SOLN 100 UNIT/ML 100 UNIT/ML
500 SOLUTION INTRAVENOUS AS NEEDED
Status: CANCELLED | OUTPATIENT
Start: 2022-02-22

## 2022-02-22 RX ORDER — SODIUM CHLORIDE 0.9 % (FLUSH) 0.9 %
10 SYRINGE (ML) INJECTION AS NEEDED
Status: CANCELLED | OUTPATIENT
Start: 2022-02-22

## 2022-02-22 RX ORDER — HEPARIN SODIUM (PORCINE) LOCK FLUSH IV SOLN 100 UNIT/ML 100 UNIT/ML
500 SOLUTION INTRAVENOUS AS NEEDED
Status: DISCONTINUED | OUTPATIENT
Start: 2022-02-22 | End: 2022-02-22 | Stop reason: HOSPADM

## 2022-02-22 RX ORDER — SODIUM CHLORIDE 0.9 % (FLUSH) 0.9 %
10 SYRINGE (ML) INJECTION AS NEEDED
Status: DISCONTINUED | OUTPATIENT
Start: 2022-02-22 | End: 2022-02-22 | Stop reason: HOSPADM

## 2022-02-22 RX ADMIN — Medication 10 ML: at 10:11

## 2022-02-22 RX ADMIN — Medication 500 UNITS: at 10:11

## 2022-02-22 NOTE — PROGRESS NOTES
Subjective   Neela Ramirez is a 76 y.o. female.  Referred by Dr. 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  A bilateral diagnostic mammogram was performed for further evaluation of the same.    8/17/2021-bilateral diagnostic mammogram-scattered fibroglandular densities throughout both breasts.  In the posterior one third upper inner quadrant of the left breast at the site of palpable concern, 11 o'clock position there is a mass with adjacent pleomorphic calcifications.  The mass and the calcifications measure on order of 1.5 cm in greatest dimension.  No evidence of axillary lymphadenopathy appreciated.  Stable microcalcifications seen in other areas of the left breast on right breast.  Ultrasound-at 11 o'clock position, 5 cm from the nipple in the left breast there is an irregular hypoechoic mass which measures 2.4 x 2 x 1.6 cm.    Impression  1.irregular 2.4 cm mass in the left breast at the site of palpable concern at 11:00, 5 cm from the nipple.  Ultrasound-guided biopsy of the mass recommended  No finding suspicious for malignancy in the right breast    9/8/2021-left breast ultrasound-guided biopsy  Pathology consistent with invasive ductal carcinoma with lobular features.  Grade 3.  The carcinoma measures 7 mm in greatest dimension.  Focally suspicious for lymphovascular space invasion.  ER low +1-10%, intensity week  AR negative  HER-2 3+ on immunohistochemistry  Ki-67 70%    MRI of the breast 10/8/2021-Biopsy-proven malignancy in the left breast at 11:00 measuring 2.8 cm with a centrally located metallic clip.  No other suspicious findings are seen within the left breast or no left axillary lymphadenopathy.  No suspicious findings of the right breast    Echocardiogram 10/8/2021 was normal  ejection fraction of 56 to 60% and strain of -20    She denies any new bone pains, dyspnea, cough, abdominal pain nausea vomiting or recent changes in weight.    She had 2 previous breast aspirations in her 20s.  No other breast biopsies  She does not know much about her family hence limited family history.    She received neoadjuvant chemotherapy on EA 1181 trial with Taxol Perjeta and Herceptin.  She completed 12 weeks of Taxol Herceptin and Perjeta.    Had an abnormal EKG preop and hence a stress test was performed on 2/3/2022 which showed a normal left ventricular ejection fraction and LVEF at stress was 73%.  Considered a low risk study with normal myocardial perfusion imaging.    She underwent a left breast lumpectomy and a sentinel lymph node biopsy on 2/10/2022.    Pathology was consistent with residual tumor which was pleomorphic invasive lobular carcinoma, poorly differentiated, grade 3  Tumor measured 18 mm  Margins negative for invasive carcinoma  Associated lobular carcinoma in situ noted  1 out of 10 lymph nodes was positive for metastatic focus measuring 3.5 mm.    Receptors were repeated on the pleomorphic invasive lobular carcinoma  ER +91 to 100% strong  HI +61 to 70% moderate  HER-2 negative  Ki-67 55%    Receptors performed on the metastatic lymph node  ER +81-90% strong  HI negative  HER-2 2+ on immunohistochemistry, FISH pending.    ypT1 cN1 aM0    Interval history  Ms. Ramirez presents to the clinic today for follow-up accompanied by her  to discuss the pathology and further recommendations on treatment.  She is recovering well from surgery.  She has some discomfort in the left axilla at the site of sentinel lymph node biopsy but other than that she is recovering well from surgery.  She does not have any other new complaints at this time.    The following portions of the patient's history were reviewed and updated as appropriate: allergies, current medications, past family history, past  medical history, past social history, past surgical history and problem list.    Past Medical History:   Diagnosis Date   • Anxiety    • Arthritis    • Cataract     EARLY. JIMMIE EYES   • Chronic back pain     LOW BACK ACHES AT TIME FROM ARTHRTIS   • Constipation     AT TIMES. NO RECENT ISSUES   • Dermatitis     LEGS. STATES CLEARING UP NOW.   • Disease of thyroid gland     HYPO   • Diverticulosis     Colonoscopy November 2014   • Erythema of face     Transitory Erythema Of The Face   • GERD (gastroesophageal reflux disease)     ON OCC   • History of chemotherapy     FOR LEFT BREAST CANCER   • Hyperlipidemia    • Hypertension    • Malignant neoplasm of female breast (HCC) 9/27/2021   • Osteopenia    • Tachycardia         Past Surgical History:   Procedure Laterality Date   • BREAST BIOPSY  09/2021    Dr. Tirado    • BREAST CYST ASPIRATION Right    • BREAST CYST EXCISION Right    • BREAST LUMPECTOMY WITH SENTINEL NODE BIOPSY Left 2/10/2022    Procedure: LEFT BREAST WIRE LOCALIZED LUMPECTOMY WITH SENTINEL NODE BIOPSY, POSSIBLE LEFT AXILLARY DISSECTION;  Surgeon: Becky Liu MD;  Location: [facility] OR [facility];  Service: General;  Laterality: Left;   • COLONOSCOPY     • EXOSTECTOMY Bilateral     Simple Bunion Exostectomy (Silver Procedure)   • VENOUS ACCESS DEVICE (PORT) INSERTION N/A 10/18/2021    Procedure: INSERTION VENOUS ACCESS DEVICE;  Surgeon: Becky Liu MD;  Location: [facility] OR [facility] [place];  Service: General;  Laterality: N/A;        Family History   Problem Relation Age of Onset   • Arthritis Father    • Heart disease Father    • Hypertension Father    • Hypertension Mother    • Malig Hyperthermia Neg Hx         Social History     Socioeconomic History   • Marital status:    • Number of children: 1   Tobacco Use   • Smoking status: Former Smoker     Types: Cigarettes   • Smokeless tobacco: Never Used   • Tobacco comment: SOCIALLY IN EARLY 20'S   Vaping Use   • Vaping Use: Never used   Substance and Sexual  "Activity   • Alcohol use: Yes     Comment: social drinker   • Drug use: Never   • Sexual activity: Defer        OB History             Para        Term   0            AB        Living           SAB        IAB        Ectopic        Molar        Multiple        Live Births                 Age at menarche-13  Age at menopause-50  Nulliparous  Breast-feeding-N/A   0 para 0  0  Oral contraceptive pill use-none  Hormone replacement therapy-7 years    No Known Allergies     Review of systems as mentioned in the HPI    Objective   Blood pressure 151/84, pulse 89, temperature 97.3 °F (36.3 °C), temperature source Temporal, resp. rate 16, height 150 cm (59.06\"), weight 56.2 kg (124 lb), SpO2 99 %.     ECOG performance status-0    Physical Exam  Vitals reviewed.   Constitutional:       Appearance: Normal appearance.   HENT:      Head: Normocephalic and atraumatic.      Nose: Nose normal.      Mouth/Throat:      Mouth: Mucous membranes are moist.      Pharynx: Oropharynx is clear.   Eyes:      Conjunctiva/sclera: Conjunctivae normal.      Pupils: Pupils are equal, round, and reactive to light.   Cardiovascular:      Rate and Rhythm: Normal rate and regular rhythm.      Pulses: Normal pulses.      Heart sounds: Normal heart sounds.   Pulmonary:      Effort: Pulmonary effort is normal.      Breath sounds: Normal breath sounds.   Abdominal:      General: Abdomen is flat. Bowel sounds are normal.      Palpations: Abdomen is soft.   Musculoskeletal:         General: Normal range of motion.      Cervical back: Normal range of motion.   Skin:     General: Skin is warm and dry.      Findings: No rash.   Neurological:      General: No focal deficit present.      Mental Status: She is alert and oriented to person, place, and time. Mental status is at baseline.   Psychiatric:         Mood and Affect: Mood normal.         Behavior: Behavior normal.         Thought Content: Thought content normal.         " Judgment: Judgment normal.       Breast Exam: Right breast appears normal on inspection.  No palpable abnormalities of the right breast.    Left breast on inspection there is a scar which is healing well.  There is also left axillary scar which is healing well.  The left breast overall appears more erythematous compared to the right breast.  However there is no warmth or no purulent drainage from the incisions.    I have reexamined the patient and the results are consistent with the previously documented exam. Sheri Avendano MD     Results from last 7 days   Lab Units 02/22/22  0813   WBC 10*3/mm3 7.21   NEUTROS ABS 10*3/mm3 4.72   HEMOGLOBIN g/dL 12.3   HEMATOCRIT % 37.8   PLATELETS 10*3/mm3 286     Results from last 7 days   Lab Units 02/22/22  0813   SODIUM mmol/L 141   POTASSIUM mmol/L 3.8   CHLORIDE mmol/L 107   CO2 mmol/L 23.4   BUN mg/dL 14   CREATININE mg/dL 0.81   CALCIUM mg/dL 8.8   ALBUMIN g/dL 3.80   BILIRUBIN mg/dL 0.3   ALK PHOS U/L 116   ALT (SGPT) U/L 21   AST (SGOT) U/L 24   GLUCOSE mg/dL 111           Mammo Breast Placement Device Initial Without Biopsy    Result Date: 2/10/2022  Technically successful mammographic guided left breast needle localization.  This report was finalized on 2/10/2022 3:44 PM by Dr. Trevor Tirado M.D.      XR Breast Specimen    Result Date: 2/10/2022  Technically successful mammographic guided left breast needle localization.  This report was finalized on 2/10/2022 3:44 PM by Dr. Trevor Tirado M.D.       CBC 2/22/2022 reviewed and within normal limits  CMP within normal limits    Assessment/Plan       *Invasive ductal carcinoma of the left breast  · Clinical T2 N0 M0, stage IIa  · On ultrasound the tumor measures 2.4 cm.  Lymph nodes on the left side are normal.  · MRI 10/8/2021 with tumor measuring 2.8 cm.  Lymph nodes appear normal  · Pathology consistent with invasive carcinoma with ductal and lobular features, grade 3, ER +1 to 10% weak, NV negative, HER-2 3+  immunohistochemistry, Ki-67 70%.  · She was evaluated by Dr. Liu and referred for consideration of neoadjuvant chemotherapy.   Since the tumor is HER-2 positive and over 2 cm I think it would be reasonable to proceed with neoadjuvant chemotherapy.  Since the tumor is over 2 cm but lymph node negative I think she would be a great candidate for EA 1181 clinical trial which comprises of administering Taxol Herceptin and Perjeta tamika  adjuvantly followed by surgery and additional adjuvant treatment depending upon the response.  Port placed 10/18/2021  Echocardiogram shows normal ejection fraction  Week 1 TPH on 10/19/2021  Completed 12 weeks of Taxol Perjeta and Herceptin on 1/4/2022 on EA 1181 clinical trial  1/11/2022 due for Herceptin and Perjeta only  2/1/2022-due for dose 2 of Herceptin and Perjeta  MRI 1/14/2022 reviewed and there has been a good tumor response with decrease in size of the mass to 1.2 cm.  2/10/2022-left breast lumpectomy and sentinel lymph node biopsy  Pathology shows ypT1 cN1 aM0, stage IIb, pleomorphic invasive lobular carcinoma, grade 3, ER +% strong, FL +61-70% moderate, HER-2 negative on the residual tumor  The left axillary lymph node was ER positive strong, FL negative and HER-2 2+, FISH pending  On the initial biopsy prior to surgery the pathology showed invasive ductal with lobular features and the ER/FL was negative and HER-2 was 3+.  The residual disease obviously appears to be different from the initial pathology.  What is remaining is essentially pleomorphic lobular carcinoma which is high-grade with a high Ki-67 and ER/FL positivity.  Now that the lymph node is also positive I do believe that she will benefit from additional chemotherapy particularly either AC or EC.  I had a lengthy discussion with patient and her  about the possible need for additional chemotherapy.  I also discussed this with Dr. Curry in our practice who agrees that she might need additional  chemotherapy  Discussed pathology with Dr. Clinton Subramanian at length today.  Adverse effects of AC including but not limited to myelosuppression, increased risk of infection, nausea, vomiting, mucositis, diarrhea, cardiotoxicity, risk of leukemia have been discussed with the patient and her  at length.  I plan to present her case at the multidisciplinary conference and if the consensus is to proceed with additional chemotherapy then we will plan for AC every 3 weekly for total of 4 cycles.  Following this she will proceed with Kadcyla given the presence of residual disease.  She will now need adjuvant radiation to the left axilla versus axillary dissection.  Given that only 1 out of 10 lymph nodes was positive I feel that it is reasonable to avoid axillary dissection proceed with adjuvant radiation however we will discuss this in the multidisciplinary conference and finalize recommendations based on that.  Now that she has been positive disease we will perform CT of the chest and pelvis and bone scan to rule out any evidence of metastatic disease.  I will plan to see the patient back next week to discuss the final recommendations.    *Right-sided port appears normal    *Hypertension-currently on amlodipine and metoprolol.  Blood pressure well controlled at 151/84    *Anxiety  · She is extremely anxious today given the presence of residual disease in the change in pathology.  · Reassured her that the treatment intent is still curative.    *Hypothyroidism  · Continue Synthroid    *Cardiac health-  · Echocardiogram 1/5/2022 reviewed and stable  · Stress test 2/3/2022 normal    *Left breast erythema  · No tenderness or increased warmth  · Next likely to be infection  · She has a follow-up with Dr. Liu on 2/20/2022  · Explained to her that if she develops a fever or any purulent drainage or tenderness in the left breast she is to call the clinic and she may need antibiotics.      PLAN:   1. Hold Herceptin and  Kailash today  2. Present case in multidisciplinary conference  3. Most likely she will need additional chemotherapy in terms of AC  4. Staging CT chest and pelvis and bone scan  5. Follow-up in 1 week to discuss treatment recommendations    The patient continues on high risk medication requiring close monitoring for toxicity.  75 minutes has been spent on the encounter today including reviewing the pathology and discussing at length the specifics of pathology, treatment recommendations, care coordination, discussing with multiple specialist, face-to-face time and documentation on the same day    Sheri Avendano MD  02/22/2022

## 2022-02-23 ENCOUNTER — OFFICE VISIT (OUTPATIENT)
Dept: INTERNAL MEDICINE | Age: 77
End: 2022-02-23

## 2022-02-23 VITALS
TEMPERATURE: 97.8 F | WEIGHT: 124 LBS | OXYGEN SATURATION: 98 % | SYSTOLIC BLOOD PRESSURE: 136 MMHG | DIASTOLIC BLOOD PRESSURE: 72 MMHG | BODY MASS INDEX: 25 KG/M2 | HEIGHT: 59 IN | HEART RATE: 113 BPM

## 2022-02-23 DIAGNOSIS — E78.5 HYPERLIPIDEMIA, UNSPECIFIED HYPERLIPIDEMIA TYPE: ICD-10-CM

## 2022-02-23 DIAGNOSIS — E03.9 HYPOTHYROIDISM, UNSPECIFIED TYPE: ICD-10-CM

## 2022-02-23 DIAGNOSIS — I10 ESSENTIAL HYPERTENSION: ICD-10-CM

## 2022-02-23 DIAGNOSIS — R73.9 HYPERGLYCEMIA: ICD-10-CM

## 2022-02-23 DIAGNOSIS — F41.1 GENERALIZED ANXIETY DISORDER: ICD-10-CM

## 2022-02-23 DIAGNOSIS — M85.80 OSTEOPENIA, UNSPECIFIED LOCATION: Primary | ICD-10-CM

## 2022-02-23 PROCEDURE — 99214 OFFICE O/P EST MOD 30 MIN: CPT | Performed by: NURSE PRACTITIONER

## 2022-02-23 NOTE — PROGRESS NOTES
"    I N T E R N A L  M E D I C I N E  JULEE ALEMANRENO      ENCOUNTER DATE:  02/23/2022    Neela Ramirez / 76 y.o. / female      CHIEF COMPLAINT / REASON FOR OFFICE VISIT     Hypertension, Hyperlipidemia, Anxiety, Hypothyroidism, and Osteoporosis (osteopenia )      ASSESSMENT & PLAN     1. Osteopenia, unspecified location  -Start vitamin D 2000 units daily  -Continue Fosamax  - Vitamin D 25 Hydroxy    2. Essential hypertension  -Continue amlodipine metoprolol  - Hemoglobin A1c  - Lipid Panel With / Chol / HDL Ratio  - Urinalysis With Culture If Indicated - Urine, Clean Catch    3. Hyperlipidemia, unspecified hyperlipidemia type  -Continue statin  - Lipid Panel With / Chol / HDL Ratio    4. Generalized anxiety disorder  -Continue Lexapro    5. Hyperglycemia  - Hemoglobin A1c    6. Hypothyroidism, unspecified type  -Continue levothyroxine  - TSH+Free T4    Orders Placed This Encounter   Procedures   • Fluzone High-Dose 65+yrs ([id number])   • Hemoglobin A1c   • Lipid Panel With / Chol / HDL Ratio   • TSH+Free T4   • Vitamin D 25 Hydroxy   • Urinalysis With Culture If Indicated - Urine, Clean Catch     No orders of the defined types were placed in this encounter.      SUMMARY/DISCUSSION  • Follow-up as scheduled in July to establish care along with chronic medical management      Next Appointment with me: 7/7/2022    Return for Next scheduled follow up.      VITAL SIGNS     Visit Vitals  /72   Pulse 113   Temp 97.8 °F (36.6 °C) (Temporal)   Ht 150 cm (59.06\")   Wt 56.2 kg (124 lb)   SpO2 98%   BMI 24.99 kg/m²     Wt Readings from Last 3 Encounters:   02/23/22 56.2 kg (124 lb)   02/22/22 56.2 kg (124 lb)   02/03/22 57.8 kg (127 lb 6.8 oz)     Body mass index is 24.99 kg/m².      MEDICATIONS AT THE TIME OF OFFICE VISIT     Current Outpatient Medications on File Prior to Visit   Medication Sig   • acetaminophen (TYLENOL) 500 MG tablet Take 500-1,000 mg by mouth Every 6 (Six) Hours As Needed for Mild Pain .   • " alendronate (FOSAMAX) 70 MG tablet Take 1 tablet by mouth 1 (One) Time Per Week.   • amLODIPine (NORVASC) 5 MG tablet Take 1 tablet by mouth once daily (Patient taking differently: Take 5 mg by mouth Daily.)   • cyclobenzaprine (FLEXERIL) 5 MG tablet Take 1 tablet by mouth three times daily as needed for muscle spasm (Patient taking differently: Take 5 mg by mouth 3 (Three) Times a Day As Needed for Muscle Spasms.)   • desoximetasone (TOPICORT) 0.25 % ointment Apply 1 application topically to the appropriate area as directed 2 (Two) Times a Day As Needed (TO IRRITATED AREA).   • escitalopram (LEXAPRO) 20 MG tablet Take 1 tablet by mouth once daily (Patient taking differently: Take 20 mg by mouth Daily.)   • levothyroxine (SYNTHROID, LEVOTHROID) 25 MCG tablet TAKE 1 TABLET BY MOUTH ONCE DAILY IN THE MORNING 30 MINUTES BEFORE BREAKFAST OR  ANY  OTHER  MEDICINE   • metoprolol succinate XL (TOPROL-XL) 25 MG 24 hr tablet Take 1 tablet by mouth once daily (Patient taking differently: Take 25 mg by mouth Daily.)   • mupirocin (BACTROBAN) 2 % ointment APPLY A SMALL AMOUNT OF OINTMENT TOPICALLY TO AFFECTED AREA EVERY NIGHT   • omeprazole (priLOSEC) 20 MG capsule Take 1 capsule by mouth once daily (Patient taking differently: Take 20 mg by mouth Daily.)   • prochlorperazine (COMPAZINE) 10 MG tablet Take 1 tablet by mouth Every 6 (Six) Hours As Needed for Nausea or Vomiting.   • rosuvastatin (CRESTOR) 5 MG tablet Take 1 tablet by mouth once daily (Patient taking differently: Take 10 mg by mouth Daily.)   • [DISCONTINUED] HYDROcodone-acetaminophen (Norco) 5-325 MG per tablet Take 1 tablet by mouth Every 6 (Six) Hours As Needed for Mild Pain .   • [DISCONTINUED] CHLORHEXIDINE GLUCONATE CLOTH EX Apply  topically. AS DIRECTED     No current facility-administered medications on file prior to visit.         HISTORY OF PRESENT ILLNESS     Hypertension stable on amlodipine 5 mg metoprolol 25. Denies any chest pain, shortness of air,  chest palpitations.     Hyperlipidemia well controlled with rosuvastatin 5 mg daily. No myalgias with medication. Liver enzymes in normal range.    Hypothyroidism controlled with thyroxine 25 MCG daily. Last TSH and free T4 within normal    Last DEXA scan in July 2021 showing osteopenia. Currently not on any vitamin D supplementation. On Fosamax 70 mg weekly.    Previously. Fasting glucose with last hemoglobin A1c 5.6.    GERD well controlled with 20 mg of omeprazole.    Generalized anxiety well-controlled Lexapro 20 mg daily no panic attack or thoughts of suicide or self-harm.    Breast malignancy followed by breast surgery at this time.       REVIEW OF SYSTEMS     Constitutional neg except per HPI   Resp neg  CV neg    PHYSICAL EXAMINATION     Physical Exam  Constitutional  No distress  Cardiovascular Rate  normal . Rhythm: regular . Heart sounds:  normal  Pulmonary/Chest  Effort normal. Breath sounds:  normal  Psychiatric  Alert. Judgment and thought content normal. Mood normal     REVIEWED DATA     Labs:   Lab Results   Component Value Date    GLUCOSE 111 02/22/2022    BUN 14 02/22/2022    CREATININE 0.81 02/22/2022    EGFRIFNONA 69 02/22/2022    EGFRIFAFRI 95 04/12/2021    BCR 17.3 02/22/2022    K 3.8 02/22/2022    CO2 23.4 02/22/2022    CALCIUM 8.8 02/22/2022    PROTENTOTREF 7.1 04/12/2021    ALBUMIN 3.80 02/22/2022    LABIL2 1.7 04/12/2021    AST 24 02/22/2022    ALT 21 02/22/2022     Lab Results   Component Value Date    CHLPL 179 04/12/2021    TRIG 112 04/12/2021    HDL 76 (H) 04/12/2021    LDL 83 04/12/2021     Lab Results   Component Value Date    HGBA1C 5.60 04/12/2021     Lab Results   Component Value Date    WBC 7.21 02/22/2022    HGB 12.3 02/22/2022    HCT 37.8 02/22/2022    .6 (H) 02/22/2022     02/22/2022     Lab Results   Component Value Date    TSH 1.630 04/12/2021     Imaging:           Medical Tests:             Summary of old records / correspondence / consultant report:            Request outside records:           *Examiner was wearing medical surgical mask, face shield and exam gloves during the entire duration of the visit. Patient was masked the entire time.   Minimum social distance of 6 ft maintained entire visit except if physical contact was necessary as documented.     Dictated utilizing Dragon dictation

## 2022-02-24 ENCOUNTER — MDT ASSESSMENT (OUTPATIENT)
Dept: OTHER | Facility: HOSPITAL | Age: 77
End: 2022-02-24

## 2022-02-24 LAB
25(OH)D3+25(OH)D2 SERPL-MCNC: 39.4 NG/ML (ref 30–100)
APPEARANCE UR: CLEAR
BACTERIA #/AREA URNS HPF: ABNORMAL /[HPF]
BILIRUB UR QL STRIP: NEGATIVE
CASTS URNS QL MICRO: ABNORMAL /LPF
CHOLEST SERPL-MCNC: 201 MG/DL (ref 100–199)
CHOLEST/HDLC SERPL: 2.9 RATIO (ref 0–4.4)
COLOR UR: YELLOW
CRYSTALS URNS MICRO: ABNORMAL
EPI CELLS #/AREA URNS HPF: ABNORMAL /HPF (ref 0–10)
GLUCOSE UR QL STRIP: NEGATIVE
HBA1C MFR BLD: 5.3 % (ref 4.8–5.6)
HDLC SERPL-MCNC: 69 MG/DL
HGB UR QL STRIP: NEGATIVE
KETONES UR QL STRIP: NEGATIVE
LDLC SERPL CALC-MCNC: 121 MG/DL (ref 0–99)
LEUKOCYTE ESTERASE UR QL STRIP: NEGATIVE
MICRO URNS: NORMAL
MICRO URNS: NORMAL
MUCOUS THREADS URNS QL MICRO: PRESENT
NITRITE UR QL STRIP: NEGATIVE
PH UR STRIP: 5 [PH] (ref 5–7.5)
PROT UR QL STRIP: NEGATIVE
RBC #/AREA URNS HPF: ABNORMAL /HPF (ref 0–2)
SP GR UR STRIP: 1.02 (ref 1–1.03)
T4 FREE SERPL-MCNC: 1.14 NG/DL (ref 0.82–1.77)
TRIGL SERPL-MCNC: 63 MG/DL (ref 0–149)
TSH SERPL DL<=0.005 MIU/L-ACNC: 1.1 UIU/ML (ref 0.45–4.5)
UNIDENT CRYS URNS QL MICRO: PRESENT
URINALYSIS REFLEX: NORMAL
UROBILINOGEN UR STRIP-MCNC: 0.2 MG/DL (ref 0.2–1)
VLDLC SERPL CALC-MCNC: 11 MG/DL (ref 5–40)
WBC #/AREA URNS HPF: ABNORMAL /HPF (ref 0–5)

## 2022-02-25 ENCOUNTER — TELEPHONE (OUTPATIENT)
Dept: INTERNAL MEDICINE | Age: 77
End: 2022-02-25

## 2022-02-25 NOTE — TELEPHONE ENCOUNTER
HUB MAY READ TO PT    ----- Message from RENO Nolasco sent at 2/24/2022  5:44 PM EST -----  Blood sugar looks good.  You do have some mild increase in your cholesterol.  If cholesterol still mildly elevated in July we will consider increasing the dosage of your rosuvastatin.  Thyroid is in normal range.  Vitamin D is in normal range.  No blood, protein or infection in urine.    Take care and so nice seeing you.  Please let me know if you have any questions or concerns.

## 2022-02-26 ENCOUNTER — APPOINTMENT (OUTPATIENT)
Dept: MRI IMAGING | Facility: HOSPITAL | Age: 77
End: 2022-02-26

## 2022-02-28 ENCOUNTER — OFFICE VISIT (OUTPATIENT)
Dept: ONCOLOGY | Facility: CLINIC | Age: 77
End: 2022-02-28

## 2022-02-28 ENCOUNTER — APPOINTMENT (OUTPATIENT)
Dept: ONCOLOGY | Facility: HOSPITAL | Age: 77
End: 2022-02-28

## 2022-02-28 ENCOUNTER — HOSPITAL ENCOUNTER (OUTPATIENT)
Dept: CT IMAGING | Facility: HOSPITAL | Age: 77
Discharge: HOME | End: 2022-02-28

## 2022-02-28 ENCOUNTER — OFFICE VISIT (OUTPATIENT)
Dept: SURGERY | Facility: CLINIC | Age: 77
End: 2022-02-28

## 2022-02-28 ENCOUNTER — LAB (OUTPATIENT)
Dept: LAB | Facility: HOSPITAL | Age: 77
End: 2022-02-28

## 2022-02-28 VITALS
HEIGHT: 61 IN | WEIGHT: 124.5 LBS | HEART RATE: 88 BPM | BODY MASS INDEX: 23.5 KG/M2 | OXYGEN SATURATION: 97 % | DIASTOLIC BLOOD PRESSURE: 78 MMHG | RESPIRATION RATE: 16 BRPM | TEMPERATURE: 97.8 F | SYSTOLIC BLOOD PRESSURE: 133 MMHG

## 2022-02-28 VITALS — BODY MASS INDEX: 23.41 KG/M2 | HEIGHT: 61 IN | WEIGHT: 124 LBS

## 2022-02-28 DIAGNOSIS — C50.912 MALIGNANT NEOPLASM OF LEFT FEMALE BREAST, UNSPECIFIED ESTROGEN RECEPTOR STATUS, UNSPECIFIED SITE OF BREAST: Primary | ICD-10-CM

## 2022-02-28 DIAGNOSIS — C50.212 MALIGNANT NEOPLASM OF UPPER-INNER QUADRANT OF LEFT BREAST IN FEMALE, ESTROGEN RECEPTOR POSITIVE: ICD-10-CM

## 2022-02-28 DIAGNOSIS — Z17.0 MALIGNANT NEOPLASM OF UPPER-INNER QUADRANT OF LEFT BREAST IN FEMALE, ESTROGEN RECEPTOR POSITIVE: Primary | ICD-10-CM

## 2022-02-28 DIAGNOSIS — Z01.818 OTHER SPECIFIED PRE-OPERATIVE EXAMINATION: Primary | ICD-10-CM

## 2022-02-28 DIAGNOSIS — Z17.0 MALIGNANT NEOPLASM OF UPPER-INNER QUADRANT OF LEFT BREAST IN FEMALE, ESTROGEN RECEPTOR POSITIVE: ICD-10-CM

## 2022-02-28 DIAGNOSIS — Z12.31 ENCOUNTER FOR SCREENING MAMMOGRAM FOR MALIGNANT NEOPLASM OF BREAST: ICD-10-CM

## 2022-02-28 DIAGNOSIS — Z45.9 ENCOUNTER FOR MANAGEMENT OF IMPLANTED DEVICE: ICD-10-CM

## 2022-02-28 DIAGNOSIS — C50.212 MALIGNANT NEOPLASM OF UPPER-INNER QUADRANT OF LEFT BREAST IN FEMALE, ESTROGEN RECEPTOR POSITIVE: Primary | ICD-10-CM

## 2022-02-28 LAB
BASOPHILS # BLD AUTO: 0.04 10*3/MM3 (ref 0–0.2)
BASOPHILS NFR BLD AUTO: 0.5 % (ref 0–1.5)
DEPRECATED RDW RBC AUTO: 45.2 FL (ref 37–54)
EOSINOPHIL # BLD AUTO: 0.08 10*3/MM3 (ref 0–0.4)
EOSINOPHIL NFR BLD AUTO: 1 % (ref 0.3–6.2)
ERYTHROCYTE [DISTWIDTH] IN BLOOD BY AUTOMATED COUNT: 12.1 % (ref 12.3–15.4)
HCT VFR BLD AUTO: 38.6 % (ref 34–46.6)
HGB BLD-MCNC: 12.7 G/DL (ref 12–15.9)
IMM GRANULOCYTES # BLD AUTO: 0.06 10*3/MM3 (ref 0–0.05)
IMM GRANULOCYTES NFR BLD AUTO: 0.7 % (ref 0–0.5)
LYMPHOCYTES # BLD AUTO: 1.6 10*3/MM3 (ref 0.7–3.1)
LYMPHOCYTES NFR BLD AUTO: 19.4 % (ref 19.6–45.3)
MCH RBC QN AUTO: 33.4 PG (ref 26.6–33)
MCHC RBC AUTO-ENTMCNC: 32.9 G/DL (ref 31.5–35.7)
MCV RBC AUTO: 101.6 FL (ref 79–97)
MONOCYTES # BLD AUTO: 0.5 10*3/MM3 (ref 0.1–0.9)
MONOCYTES NFR BLD AUTO: 6.1 % (ref 5–12)
NEUTROPHILS NFR BLD AUTO: 5.98 10*3/MM3 (ref 1.7–7)
NEUTROPHILS NFR BLD AUTO: 72.3 % (ref 42.7–76)
NRBC BLD AUTO-RTO: 0 /100 WBC (ref 0–0.2)
PLATELET # BLD AUTO: 270 10*3/MM3 (ref 140–450)
PMV BLD AUTO: 10 FL (ref 6–12)
RBC # BLD AUTO: 3.8 10*6/MM3 (ref 3.77–5.28)
WBC NRBC COR # BLD: 8.26 10*3/MM3 (ref 3.4–10.8)

## 2022-02-28 PROCEDURE — 99214 OFFICE O/P EST MOD 30 MIN: CPT | Performed by: INTERNAL MEDICINE

## 2022-02-28 PROCEDURE — 71260 CT THORAX DX C+: CPT

## 2022-02-28 PROCEDURE — 0 IOPAMIDOL PER 1 ML: Performed by: INTERNAL MEDICINE

## 2022-02-28 PROCEDURE — 85025 COMPLETE CBC W/AUTO DIFF WBC: CPT

## 2022-02-28 PROCEDURE — 74177 CT ABD & PELVIS W/CONTRAST: CPT

## 2022-02-28 PROCEDURE — 36415 COLL VENOUS BLD VENIPUNCTURE: CPT

## 2022-02-28 PROCEDURE — 99024 POSTOP FOLLOW-UP VISIT: CPT | Performed by: STUDENT IN AN ORGANIZED HEALTH CARE EDUCATION/TRAINING PROGRAM

## 2022-02-28 PROCEDURE — 0 DIATRIZOATE MEGLUMINE & SODIUM PER 1 ML: Performed by: INTERNAL MEDICINE

## 2022-02-28 RX ADMIN — IOPAMIDOL 100 ML: 755 INJECTION, SOLUTION INTRAVENOUS at 16:46

## 2022-02-28 RX ADMIN — DIATRIZOATE MEGLUMINE AND DIATRIZOATE SODIUM 30 ML: 600; 100 SOLUTION ORAL; RECTAL at 16:47

## 2022-02-28 NOTE — PROGRESS NOTES
Subjective   Neela Ramirez is a 76 y.o. female.  Referred by Dr. 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  A bilateral diagnostic mammogram was performed for further evaluation of the same.    8/17/2021-bilateral diagnostic mammogram-scattered fibroglandular densities throughout both breasts.  In the posterior one third upper inner quadrant of the left breast at the site of palpable concern, 11 o'clock position there is a mass with adjacent pleomorphic calcifications.  The mass and the calcifications measure on order of 1.5 cm in greatest dimension.  No evidence of axillary lymphadenopathy appreciated.  Stable microcalcifications seen in other areas of the left breast on right breast.  Ultrasound-at 11 o'clock position, 5 cm from the nipple in the left breast there is an irregular hypoechoic mass which measures 2.4 x 2 x 1.6 cm.    Impression  1.irregular 2.4 cm mass in the left breast at the site of palpable concern at 11:00, 5 cm from the nipple.  Ultrasound-guided biopsy of the mass recommended  No finding suspicious for malignancy in the right breast    9/8/2021-left breast ultrasound-guided biopsy  Pathology consistent with invasive ductal carcinoma with lobular features.  Grade 3.  The carcinoma measures 7 mm in greatest dimension.  Focally suspicious for lymphovascular space invasion.  ER low +1-10%, intensity week  IN negative  HER-2 3+ on immunohistochemistry  Ki-67 70%    MRI of the breast 10/8/2021-Biopsy-proven malignancy in the left breast at 11:00 measuring 2.8 cm with a centrally located metallic clip.  No other suspicious findings are seen within the left breast or no left axillary lymphadenopathy.  No suspicious findings of the right breast    Echocardiogram 10/8/2021 was normal  ejection fraction of 56 to 60% and strain of -20    She denies any new bone pains, dyspnea, cough, abdominal pain nausea vomiting or recent changes in weight.    She had 2 previous breast aspirations in her 20s.  No other breast biopsies  She does not know much about her family hence limited family history.    She received neoadjuvant chemotherapy on EA 1181 trial with Taxol Perjeta and Herceptin.  She completed 12 weeks of Taxol Herceptin and Perjeta.    Had an abnormal EKG preop and hence a stress test was performed on 2/3/2022 which showed a normal left ventricular ejection fraction and LVEF at stress was 73%.  Considered a low risk study with normal myocardial perfusion imaging.    She underwent a left breast lumpectomy and a sentinel lymph node biopsy on 2/10/2022.    Pathology was consistent with residual tumor which was pleomorphic invasive lobular carcinoma, poorly differentiated, grade 3  Tumor measured 18 mm  Margins negative for invasive carcinoma  Associated lobular carcinoma in situ noted  1 out of 10 lymph nodes was positive for metastatic focus measuring 3.5 mm.    Receptors were repeated on the pleomorphic invasive lobular carcinoma  ER +91 to 100% strong  KS +61 to 70% moderate  HER-2 negative  Ki-67 55%    Receptors performed on the metastatic lymph node  ER +81-90% strong  KS negative  HER-2 2+ on immunohistochemistry, FISH pending.    ypT1 cN1 aM0    Interval history  Ms. Ramirez presents to the clinic today for follow-up.  She had a visit with Dr. Liu.  She is recovering well from surgery.  No new complaints.  She is here to discuss regarding further chemotherapy with AC and recommendations regarding axillary management.    The following portions of the patient's history were reviewed and updated as appropriate: allergies, current medications, past family history, past medical history, past social history, past surgical history and problem list.    Past Medical History:   Diagnosis Date   •  Anxiety    • Arthritis    • Cataract     EARLY. JIMMIE EYES   • Chronic back pain     LOW BACK ACHES AT TIME FROM ARTHRTIS   • Constipation     AT TIMES. NO RECENT ISSUES   • Dermatitis     LEGS. STATES CLEARING UP NOW.   • Disease of thyroid gland     HYPO   • Diverticulosis     Colonoscopy November 2014   • Erythema of face     Transitory Erythema Of The Face   • GERD (gastroesophageal reflux disease)     ON OCC   • History of chemotherapy     FOR LEFT BREAST CANCER   • Hyperlipidemia    • Hypertension    • Malignant neoplasm of female breast (HCC) 9/27/2021   • Osteopenia    • Tachycardia         Past Surgical History:   Procedure Laterality Date   • BREAST BIOPSY  09/2021    Dr. Tirado    • BREAST CYST ASPIRATION Right    • BREAST CYST EXCISION Right    • BREAST LUMPECTOMY WITH SENTINEL NODE BIOPSY Left 2/10/2022    Procedure: LEFT BREAST WIRE LOCALIZED LUMPECTOMY WITH SENTINEL NODE BIOPSY, POSSIBLE LEFT AXILLARY DISSECTION;  Surgeon: Becky Liu MD;  Location: [facility] OR [facility] [place];  Service: General;  Laterality: Left;   • COLONOSCOPY     • EXOSTECTOMY Bilateral     Simple Bunion Exostectomy (Silver Procedure)   • VENOUS ACCESS DEVICE (PORT) INSERTION N/A 10/18/2021    Procedure: INSERTION VENOUS ACCESS DEVICE;  Surgeon: Becky Liu MD;  Location: [facility] OR [facility] [place];  Service: General;  Laterality: N/A;        Family History   Problem Relation Age of Onset   • Arthritis Father    • Heart disease Father    • Hypertension Father    • Hypertension Mother    • Malig Hyperthermia Neg Hx         Social History     Socioeconomic History   • Marital status:    • Number of children: 1   Tobacco Use   • Smoking status: Former Smoker     Types: Cigarettes   • Smokeless tobacco: Never Used   • Tobacco comment: SOCIALLY IN EARLY 20'S   Vaping Use   • Vaping Use: Never used   Substance and Sexual Activity   • Alcohol use: Yes     Comment: social drinker   • Drug use: Never   • Sexual activity: Defer        OB History      "        Para        Term   0            AB        Living           SAB        IAB        Ectopic        Molar        Multiple        Live Births                 Age at menarche-13  Age at menopause-50  Nulliparous  Breast-feeding-N/A   0 para 0  0  Oral contraceptive pill use-none  Hormone replacement therapy-7 years    No Known Allergies     Review of systems as mentioned in the HPI    Objective   Blood pressure 133/78, pulse 88, temperature 97.8 °F (36.6 °C), temperature source Temporal, resp. rate 16, height 154.9 cm (60.98\"), weight 56.5 kg (124 lb 8 oz), SpO2 97 %.     ECOG performance status-0    Physical Exam  Vitals reviewed.   Constitutional:       Appearance: Normal appearance.   HENT:      Head: Normocephalic and atraumatic.      Nose: Nose normal.      Mouth/Throat:      Mouth: Mucous membranes are moist.      Pharynx: Oropharynx is clear.   Eyes:      Conjunctiva/sclera: Conjunctivae normal.      Pupils: Pupils are equal, round, and reactive to light.   Cardiovascular:      Rate and Rhythm: Normal rate and regular rhythm.      Pulses: Normal pulses.      Heart sounds: Normal heart sounds.   Pulmonary:      Effort: Pulmonary effort is normal.      Breath sounds: Normal breath sounds.   Abdominal:      General: Abdomen is flat. Bowel sounds are normal.      Palpations: Abdomen is soft.   Musculoskeletal:         General: Normal range of motion.      Cervical back: Normal range of motion.   Skin:     General: Skin is warm and dry.      Findings: No rash.   Neurological:      General: No focal deficit present.      Mental Status: She is alert and oriented to person, place, and time. Mental status is at baseline.   Psychiatric:         Mood and Affect: Mood normal.         Behavior: Behavior normal.         Thought Content: Thought content normal.         Judgment: Judgment normal.       Breast Exam: Right breast appears normal on inspection.  No palpable abnormalities of the " right breast.    Left breast on inspection there is a scar which is healing well.  There is also left axillary scar which is healing well.  The left breast overall appears more erythematous compared to the right breast.  However there is no warmth or no purulent drainage from the incisions.    I have reexamined the patient and the results are consistent with the previously documented exam. Sheri Avendano MD     Results from last 7 days   Lab Units 02/28/22  1114 02/22/22  0813   WBC 10*3/mm3 8.26 7.21   NEUTROS ABS 10*3/mm3 5.98 4.72   HEMOGLOBIN g/dL 12.7 12.3   HEMATOCRIT % 38.6 37.8   PLATELETS 10*3/mm3 270 286     Results from last 7 days   Lab Units 02/23/22  0945 02/22/22  0813   SODIUM mmol/L  --  141   POTASSIUM mmol/L  --  3.8   CHLORIDE mmol/L  --  107   CO2 mmol/L  --  23.4   BUN mg/dL  --  14   CREATININE mg/dL  --  0.81   CALCIUM mg/dL  --  8.8   ALBUMIN g/dL  --  3.80   PROTEIN UA  Negative  --    BILIRUBIN mg/dL  --  0.3   ALK PHOS U/L  --  116   ALT (SGPT) U/L  --  21   AST (SGOT) U/L  --  24   GLUCOSE mg/dL  --  111           Mammo Breast Placement Device Initial Without Biopsy    Result Date: 2/10/2022  Technically successful mammographic guided left breast needle localization.  This report was finalized on 2/10/2022 3:44 PM by Dr. Trevor Tirado M.D.      XR Breast Specimen    Result Date: 2/10/2022  Technically successful mammographic guided left breast needle localization.  This report was finalized on 2/10/2022 3:44 PM by Dr. Trevor Tirado M.D.       CBC reviewed and within normal limits.    Assessment/Plan       *Invasive ductal carcinoma of the left breast  · Clinical T2 N0 M0, stage IIa  · On ultrasound the tumor measures 2.4 cm.  Lymph nodes on the left side are normal.  · MRI 10/8/2021 with tumor measuring 2.8 cm.  Lymph nodes appear normal  · Pathology consistent with invasive carcinoma with ductal and lobular features, grade 3, ER +1 to 10% weak, NE negative, HER-2 3+  immunohistochemistry, Ki-67 70%.  · She was evaluated by Dr. Liu and referred for consideration of neoadjuvant chemotherapy.   Since the tumor is HER-2 positive and over 2 cm I think it would be reasonable to proceed with neoadjuvant chemotherapy.  Since the tumor is over 2 cm but lymph node negative I think she would be a great candidate for EA 1181 clinical trial which comprises of administering Taxol Herceptin and Perjeta tamika  adjuvantly followed by surgery and additional adjuvant treatment depending upon the response.  Port placed 10/18/2021  Echocardiogram shows normal ejection fraction  Week 1 TPH on 10/19/2021  Completed 12 weeks of Taxol Perjeta and Herceptin on 1/4/2022 on EA 1181 clinical trial  1/11/2022 due for Herceptin and Perjeta only  2/1/2022-due for dose 2 of Herceptin and Perjeta  MRI 1/14/2022 reviewed and there has been a good tumor response with decrease in size of the mass to 1.2 cm.  2/10/2022-left breast lumpectomy and sentinel lymph node biopsy  Pathology shows ypT1 cN1 aM0, stage IIb, pleomorphic invasive lobular carcinoma, grade 3, ER +% strong, KY +61-70% moderate, HER-2 negative on the residual tumor  The left axillary lymph node was ER positive strong, KY negative and HER-2 2+, FISH pending  On the initial biopsy prior to surgery the pathology showed invasive ductal with lobular features and the ER/KY was negative and HER-2 was 3+.  The residual disease obviously appears to be different from the initial pathology.  What is remaining is essentially pleomorphic lobular carcinoma which is high-grade with a high Ki-67 and ER/KY positivity.  Now that the lymph node is also positive I do believe that she will benefit from additional chemotherapy particularly either AC or EC.  Case was presented at multidisciplinary conference.  Decided to proceed with adjuvant Adriamycin and cyclophosphamide.  Given her age we will proceed with every 3 weekly AC over every 2 weeks.  Patient and   requesting staging scans prior to starting AC.  Obtain CT chest abdomen pelvis and bone scan.  Reviewed adverse effects of AC at length including but not limited to myelosuppression, increased risk of infections, nausea, vomiting, mucositis, cardiotoxicity, risk of leukemia.  She has a follow-up with cardiology next week.  We will communicate with Dr. Galo regarding plan to start AC.    *Right-sided port appears normal    *Hypertension-currently amlodipine and metoprolol.  Blood pressure 133/78    *Anxiety  · Improved since last visit    *Hypothyroidism  · Continue Synthroid    *Cardiac health-  · Echocardiogram 1/5/2022 reviewed and stable  · Stress test 2/3/2022 normal  · Follow-up with cardiology    *Left breast erythema  · No tenderness or increased warmth  · Next likely to be infection  · She has a follow-up with Dr. Liu on 2/20/2022  · Explained to her that if she develops a fever or any purulent drainage or tenderness in the left breast she is to call the clinic and she may need antibiotics.      PLAN:   1. CT chest abdomen pelvis prior to follow-up  2. Case discussed on multidisciplinary conference  3. Proceed with AC

## 2022-03-01 ENCOUNTER — HOSPITAL ENCOUNTER (OUTPATIENT)
Dept: NUCLEAR MEDICINE | Facility: HOSPITAL | Age: 77
Discharge: HOME | End: 2022-03-01

## 2022-03-01 ENCOUNTER — HOSPITAL ENCOUNTER (OUTPATIENT)
Dept: CARDIOLOGY | Facility: HOSPITAL | Age: 77
Discharge: HOME | End: 2022-03-01

## 2022-03-01 VITALS
SYSTOLIC BLOOD PRESSURE: 128 MMHG | DIASTOLIC BLOOD PRESSURE: 76 MMHG | BODY MASS INDEX: 23.41 KG/M2 | HEIGHT: 61 IN | WEIGHT: 124 LBS | HEART RATE: 80 BPM

## 2022-03-01 DIAGNOSIS — Z17.0 MALIGNANT NEOPLASM OF UPPER-INNER QUADRANT OF LEFT BREAST IN FEMALE, ESTROGEN RECEPTOR POSITIVE: ICD-10-CM

## 2022-03-01 DIAGNOSIS — I65.23 BILATERAL CAROTID ARTERY STENOSIS: ICD-10-CM

## 2022-03-01 DIAGNOSIS — Z09 CHEMOTHERAPY FOLLOW-UP EXAMINATION: ICD-10-CM

## 2022-03-01 DIAGNOSIS — C50.212 MALIGNANT NEOPLASM OF UPPER-INNER QUADRANT OF LEFT BREAST IN FEMALE, ESTROGEN RECEPTOR POSITIVE: ICD-10-CM

## 2022-03-01 LAB
BH CV ECHO LEFT VENTRICLE GLOBAL LONGITUDINAL STRAIN: -20.5 %
BH CV ECHO MEAS - ACS: 1.83 CM
BH CV ECHO MEAS - AO MAX PG: 13 MMHG
BH CV ECHO MEAS - AO MEAN PG: 7.6 MMHG
BH CV ECHO MEAS - AO ROOT DIAM: 2.7 CM
BH CV ECHO MEAS - AO V2 MAX: 180.3 CM/SEC
BH CV ECHO MEAS - AO V2 VTI: 41 CM
BH CV ECHO MEAS - AVA(I,D): 1.46 CM2
BH CV ECHO MEAS - EDV(CUBED): 93 ML
BH CV ECHO MEAS - EDV(MOD-SP2): 94 ML
BH CV ECHO MEAS - EDV(MOD-SP4): 103 ML
BH CV ECHO MEAS - EF(MOD-BP): 54.8 %
BH CV ECHO MEAS - EF(MOD-SP2): 53.2 %
BH CV ECHO MEAS - EF(MOD-SP4): 55.3 %
BH CV ECHO MEAS - ESV(MOD-SP2): 44 ML
BH CV ECHO MEAS - ESV(MOD-SP4): 46 ML
BH CV ECHO MEAS - FS: 27.1 %
BH CV ECHO MEAS - IVS/LVPW: 0.93 CM
BH CV ECHO MEAS - IVSD: 0.83 CM
BH CV ECHO MEAS - LAT PEAK E' VEL: 7.8 CM/SEC
BH CV ECHO MEAS - LV DIASTOLIC VOL/BSA (35-75): 66.8 CM2
BH CV ECHO MEAS - LV MASS(C)D: 126.4 GRAMS
BH CV ECHO MEAS - LV MAX PG: 2.8 MMHG
BH CV ECHO MEAS - LV MEAN PG: 1.6 MMHG
BH CV ECHO MEAS - LV SYSTOLIC VOL/BSA (12-30): 29.8 CM2
BH CV ECHO MEAS - LV V1 MAX: 84.2 CM/SEC
BH CV ECHO MEAS - LV V1 VTI: 18.8 CM
BH CV ECHO MEAS - LVIDD: 4.5 CM
BH CV ECHO MEAS - LVIDS: 3.3 CM
BH CV ECHO MEAS - LVOT AREA: 3.2 CM2
BH CV ECHO MEAS - LVOT DIAM: 2 CM
BH CV ECHO MEAS - LVPWD: 0.89 CM
BH CV ECHO MEAS - MED PEAK E' VEL: 7.8 CM/SEC
BH CV ECHO MEAS - MR MAX PG: 128.5 MMHG
BH CV ECHO MEAS - MR MAX VEL: 566.8 CM/SEC
BH CV ECHO MEAS - MV A DUR: 0.13 SEC
BH CV ECHO MEAS - MV A MAX VEL: 81.5 CM/SEC
BH CV ECHO MEAS - MV DEC SLOPE: 290.2 CM/SEC2
BH CV ECHO MEAS - MV DEC TIME: 0.17 MSEC
BH CV ECHO MEAS - MV E MAX VEL: 59.1 CM/SEC
BH CV ECHO MEAS - MV E/A: 0.73
BH CV ECHO MEAS - MV MAX PG: 2.7 MMHG
BH CV ECHO MEAS - MV MEAN PG: 1.26 MMHG
BH CV ECHO MEAS - MV V2 VTI: 21.6 CM
BH CV ECHO MEAS - MVA(VTI): 2.8 CM2
BH CV ECHO MEAS - PA ACC TIME: 0.12 SEC
BH CV ECHO MEAS - PA PR(ACCEL): 23.5 MMHG
BH CV ECHO MEAS - PA V2 MAX: 96.5 CM/SEC
BH CV ECHO MEAS - PULM A REVS DUR: 0.1 SEC
BH CV ECHO MEAS - PULM A REVS VEL: 26.2 CM/SEC
BH CV ECHO MEAS - PULM DIAS VEL: 45.6 CM/SEC
BH CV ECHO MEAS - PULM SYS VEL: 53.5 CM/SEC
BH CV ECHO MEAS - RAP SYSTOLE: 3 MMHG
BH CV ECHO MEAS - RV MAX PG: 1.96 MMHG
BH CV ECHO MEAS - RV V1 MAX: 70 CM/SEC
BH CV ECHO MEAS - RV V1 VTI: 13.4 CM
BH CV ECHO MEAS - RVSP: 39 MMHG
BH CV ECHO MEAS - SI(MOD-SP2): 32.4 ML/M2
BH CV ECHO MEAS - SI(MOD-SP4): 37 ML/M2
BH CV ECHO MEAS - SV(LVOT): 60 ML
BH CV ECHO MEAS - SV(MOD-SP2): 50 ML
BH CV ECHO MEAS - SV(MOD-SP4): 57 ML
BH CV ECHO MEAS - TAPSE (>1.6): 2.18 CM
BH CV ECHO MEAS - TR MAX PG: 36.5 MMHG
BH CV ECHO MEAS - TR MAX VEL: 301.9 CM/SEC
BH CV ECHO MEASUREMENTS AVERAGE E/E' RATIO: 7.58
BH CV XLRA - RV BASE: 3.1 CM
BH CV XLRA - RV LENGTH: 5.9 CM
BH CV XLRA - RV MID: 2.29 CM
BH CV XLRA - TDI S': 8.5 CM/SEC
LEFT ATRIUM VOLUME INDEX: 26.4 ML/M2
MAXIMAL PREDICTED HEART RATE: 144 BPM
STRESS TARGET HR: 122 BPM

## 2022-03-01 PROCEDURE — 93880 EXTRACRANIAL BILAT STUDY: CPT | Performed by: INTERNAL MEDICINE

## 2022-03-01 PROCEDURE — 93880 EXTRACRANIAL BILAT STUDY: CPT

## 2022-03-01 PROCEDURE — 25010000002 PERFLUTREN (DEFINITY) 8.476 MG IN SODIUM CHLORIDE (PF) 0.9 % 10 ML INJECTION: Performed by: INTERNAL MEDICINE

## 2022-03-01 PROCEDURE — A9503 TC99M MEDRONATE: HCPCS | Performed by: INTERNAL MEDICINE

## 2022-03-01 PROCEDURE — 93306 TTE W/DOPPLER COMPLETE: CPT | Performed by: INTERNAL MEDICINE

## 2022-03-01 PROCEDURE — 78306 BONE IMAGING WHOLE BODY: CPT

## 2022-03-01 PROCEDURE — 93356 MYOCRD STRAIN IMG SPCKL TRCK: CPT

## 2022-03-01 PROCEDURE — 0 TECHNETIUM MEDRONATE KIT: Performed by: INTERNAL MEDICINE

## 2022-03-01 PROCEDURE — 93306 TTE W/DOPPLER COMPLETE: CPT

## 2022-03-01 PROCEDURE — 93356 MYOCRD STRAIN IMG SPCKL TRCK: CPT | Performed by: INTERNAL MEDICINE

## 2022-03-01 RX ORDER — TC 99M MEDRONATE 20 MG/10ML
20.6 INJECTION, POWDER, LYOPHILIZED, FOR SOLUTION INTRAVENOUS
Status: COMPLETED | OUTPATIENT
Start: 2022-03-01 | End: 2022-03-01

## 2022-03-01 RX ADMIN — Medication 20.6 MILLICURIE: at 11:06

## 2022-03-01 RX ADMIN — PERFLUTREN 1 ML: 6.52 INJECTION, SUSPENSION INTRAVENOUS at 13:25

## 2022-03-02 ENCOUNTER — TELEPHONE (OUTPATIENT)
Dept: CARDIOLOGY | Facility: CLINIC | Age: 77
End: 2022-03-02

## 2022-03-02 LAB
BH CV XLRA MEAS LEFT DIST CCA EDV: 15.9 CM/SEC
BH CV XLRA MEAS LEFT DIST CCA PSV: 57.6 CM/SEC
BH CV XLRA MEAS LEFT DIST ICA EDV: -39.2 CM/SEC
BH CV XLRA MEAS LEFT DIST ICA PSV: -99.6 CM/SEC
BH CV XLRA MEAS LEFT ICA/CCA RATIO: 1.73
BH CV XLRA MEAS LEFT MID CCA EDV: -14.7 CM/SEC
BH CV XLRA MEAS LEFT MID CCA PSV: -80.6 CM/SEC
BH CV XLRA MEAS LEFT MID ICA EDV: -28.2 CM/SEC
BH CV XLRA MEAS LEFT MID ICA PSV: -89.4 CM/SEC
BH CV XLRA MEAS LEFT PROX CCA EDV: 20.8 CM/SEC
BH CV XLRA MEAS LEFT PROX CCA PSV: 119.6 CM/SEC
BH CV XLRA MEAS LEFT PROX ECA PSV: -93.3 CM/SEC
BH CV XLRA MEAS LEFT PROX ICA EDV: 18.7 CM/SEC
BH CV XLRA MEAS LEFT PROX ICA PSV: 54.3 CM/SEC
BH CV XLRA MEAS LEFT VERTEBRAL A PSV: 25.2 CM/SEC
BH CV XLRA MEAS RIGHT DIST CCA EDV: 22.4 CM/SEC
BH CV XLRA MEAS RIGHT DIST CCA PSV: 75.2 CM/SEC
BH CV XLRA MEAS RIGHT DIST ICA EDV: -27.3 CM/SEC
BH CV XLRA MEAS RIGHT DIST ICA PSV: -73.3 CM/SEC
BH CV XLRA MEAS RIGHT ICA/CCA RATIO: 1.12
BH CV XLRA MEAS RIGHT MID CCA EDV: 21.1 CM/SEC
BH CV XLRA MEAS RIGHT MID CCA PSV: 80.1 CM/SEC
BH CV XLRA MEAS RIGHT MID ICA EDV: -29.2 CM/SEC
BH CV XLRA MEAS RIGHT MID ICA PSV: -84.5 CM/SEC
BH CV XLRA MEAS RIGHT PROX CCA EDV: -15.5 CM/SEC
BH CV XLRA MEAS RIGHT PROX CCA PSV: -69.6 CM/SEC
BH CV XLRA MEAS RIGHT PROX ECA PSV: -77.7 CM/SEC
BH CV XLRA MEAS RIGHT PROX ICA EDV: -17.4 CM/SEC
BH CV XLRA MEAS RIGHT PROX ICA PSV: -60.3 CM/SEC
BH CV XLRA MEAS RIGHT VERTEBRAL A PSV: 52.2 CM/SEC
MAXIMAL PREDICTED HEART RATE: 144 BPM
STRESS TARGET HR: 122 BPM

## 2022-03-02 NOTE — TELEPHONE ENCOUNTER
Called and left a voicemail for Neela Ramirez requesting call back.     Patient needs results for echo and ultrasound.     Thanks,   Yessy Taveras, LISSETHN, RN  Triage Nurse [facility] [place]

## 2022-03-02 NOTE — TELEPHONE ENCOUNTER
----- Message from RENO Miller sent at 3/2/2022  8:18 AM EST -----  Echo is stable and actually the mitral regurg is better.    Please call   Cerumen removed from both ears today  Ear exam is normal after the cerumen was removed.    Please keep your ears dry, avoid using Q-tips or placing anything in the ear canal to clean your ears.

## 2022-03-02 NOTE — TELEPHONE ENCOUNTER
Left voicemail for Neelakyle Ramirez requesting callback.    Thank you,  Liliana Esparza RN  Triage Nurse [facility]

## 2022-03-02 NOTE — TELEPHONE ENCOUNTER
Notified Neela Ramirez of results, patient verbalized understanding.     Thanks,   Yessy Taveras, LISSETHN, RN  Triage Nurse [facility] [place]

## 2022-03-04 ENCOUNTER — TELEPHONE (OUTPATIENT)
Dept: ONCOLOGY | Facility: CLINIC | Age: 77
End: 2022-03-04

## 2022-03-04 NOTE — TELEPHONE ENCOUNTER
Caller: Jessica Ramirez    Relationship: Emergency Contact        What was the call regarding:     WIFE HAS APPT CHEMO EDU 03/07     BUT JESSICA() HAS TESTED POSITIVE FOR COVID 03/02, NII HAS BEEN EXPOSED, BUT HAS NOT TESTED YET AND IS NOT SHOWING ANY SYMPTOMS    WANTING TO KNOW WHAT TO DO REGARDING 03/07 APPT       WARM TRANSFERRED TO CARMITA IN SCHEDULING TO FURTHER ASSIST.

## 2022-03-07 ENCOUNTER — TELEMEDICINE (OUTPATIENT)
Dept: ONCOLOGY | Facility: CLINIC | Age: 77
End: 2022-03-07

## 2022-03-07 DIAGNOSIS — C50.212 MALIGNANT NEOPLASM OF UPPER-INNER QUADRANT OF LEFT BREAST IN FEMALE, ESTROGEN RECEPTOR POSITIVE: Primary | ICD-10-CM

## 2022-03-07 DIAGNOSIS — Z17.0 MALIGNANT NEOPLASM OF UPPER-INNER QUADRANT OF LEFT BREAST IN FEMALE, ESTROGEN RECEPTOR POSITIVE: Primary | ICD-10-CM

## 2022-03-07 PROCEDURE — 99214 OFFICE O/P EST MOD 30 MIN: CPT | Performed by: NURSE PRACTITIONER

## 2022-03-07 RX ORDER — OLANZAPINE 5 MG/1
5 TABLET ORAL NIGHTLY
Qty: 3 TABLET | Refills: 3 | Status: SHIPPED | OUTPATIENT
Start: 2022-03-07 | End: 2022-04-29 | Stop reason: SDUPTHER

## 2022-03-07 RX ORDER — ONDANSETRON HYDROCHLORIDE 8 MG/1
8 TABLET, FILM COATED ORAL 3 TIMES DAILY PRN
Qty: 30 TABLET | Refills: 5 | Status: SHIPPED | OUTPATIENT
Start: 2022-03-07 | End: 2022-09-28

## 2022-03-07 RX ORDER — PANTOPRAZOLE SODIUM 40 MG/1
40 TABLET, DELAYED RELEASE ORAL DAILY
Qty: 30 TABLET | Refills: 1 | Status: SHIPPED | OUTPATIENT
Start: 2022-03-07 | End: 2022-05-09

## 2022-03-07 NOTE — PROGRESS NOTES
____________________PATIENT EDUCATION____________________    PATIENT EDUCATION:  Today I met with the patient to discuss the chemotherapy regimen recommended for treatment of his/her Malignant neoplasm of upper-inner quadrant of left breast in female, estrogen receptor positive (HCC)  - Provider communication  - OLANZapine (zyPREXA) 5 MG tablet  - pantoprazole (Protonix) 40 MG EC tablet  - ondansetron (ZOFRAN) 8 MG tablet  .  The patient was given explanation of treatment premed side effects including office policy that prohibits patients to drive if sedating medications are administered, MD explanation given regarding benefits, side effects, toxicities and goals of treatment.  The patient received a Chemotherapy/Biotherapy Plan Summary including diagnosis and explanation of specific treatment plan.    SIDE EFFECTS:  Common side effects were discussed with the patient and significant other.  Discussion included where applicable hair loss/discoloration, anemia/fatigue, infection/chills/fever, appetite, bleeding risk/precautions, constipation, diarrhea, mouth sores, taste alteration, loss of appetite, nausea/vomiting, peripheral neuropathy, skin/nail changes, rash, muscle aches/weakness, photosensitivity, weight gain/loss, hearing loss, dizziness, menopausal symptoms, menstrual irregularity, sterility, high blood pressure, heart damage, liver damage, lung damage, kidney damage, DVT/PE risk, fluid retention, pleural/pericardial effusion, somnolence, electrolyte/LFT imbalance, vein exercises and/or the possible need for vascular access/port placement.  The patient was advised that although uncommon, leakage of an infused medication from the vein or venous access device may lead to skin breakdown and/or other tissue damage.  The patient was advised that he/she may have pain, bleeding, and/or bruising from the insertion of a needle in their vein or venous access device (port).  The patient was further advised that, in  spite of proper technique, infection with redness and irritation may rarely occur at the site where the needle was inserted.  The patient was advised that if complications occur, additional medical treatment is available.  Finally, where applicable we have reviewed rare but potential immune mediated side effects including shortness of breath, cough, chest pain (pneumonitis), abdominal pain, diarrhea (colitis), thyroiditis (hypothyroid or hyperthyroid), hepatitis and liver dysfunction, nephritis and renal dysfunction.    Discussion also included side effects specific to drugs in the treatment plan, specifically Adriamycin and Cytoxan.    Prescptions disucssed including pantoprazole to replace omeprazole, olanzapine and ondansetron which were all sent to the pharmacy.    I spent 35 minutes caring for Neela on this date of service. This time includes time spent by me in the following activities: preparing for the visit, counseling and educating the patient/family/caregiver and documenting information in the medical record.

## 2022-03-07 NOTE — PROGRESS NOTES
[facility] Hematology/Oncology Treatment Plan Summary    Name: Neela Ramirez  [facility]# [id number]  MD: Dr. Avendano    Diagnosis:     ICD-10-CM ICD-9-CM   1. Malignant neoplasm of upper-inner quadrant of left breast in female, estrogen receptor positive (HCC)  C50.212 174.2    Z17.0 V86.0     Stage: IIA      Goal of chemotherapy: adjuvant    Treatment Medication(s):   1. Adriamycin  2. Cytoxan  3. Neulasta    Frequency: Every 3 weeks    Number of cycles: 4    Starting on:3/11/2022    Items for home use: Senokot-S (for constipation) and Thermometer    Rx written for: [x] Nausea    [] Pre-Chemo   olanzapine 5mg nightly on days as directed, ondansetron 8 mg by mouth every 8 hours as needed for nausea and pantoprazole 40 mg daily    Notes:         Completing Provider: RENO Worthington           Date/time: 03/07/2022      Please note: You will be seen by a provider frequently with your treatment plan. This plan may change depending on many factors, if so, this will be discussed with you by your physician.  Last update 12/2020.

## 2022-03-08 DIAGNOSIS — Z17.0 MALIGNANT NEOPLASM OF UPPER-INNER QUADRANT OF LEFT BREAST IN FEMALE, ESTROGEN RECEPTOR POSITIVE: Primary | ICD-10-CM

## 2022-03-08 DIAGNOSIS — C50.212 MALIGNANT NEOPLASM OF UPPER-INNER QUADRANT OF LEFT BREAST IN FEMALE, ESTROGEN RECEPTOR POSITIVE: Primary | ICD-10-CM

## 2022-03-10 ENCOUNTER — TRANSCRIBE ORDERS (OUTPATIENT)
Dept: ADMINISTRATIVE | Facility: HOSPITAL | Age: 77
End: 2022-03-10

## 2022-03-10 DIAGNOSIS — Z12.31 OTHER SCREENING MAMMOGRAM: Primary | ICD-10-CM

## 2022-03-11 ENCOUNTER — INFUSION (OUTPATIENT)
Dept: ONCOLOGY | Facility: HOSPITAL | Age: 77
End: 2022-03-11

## 2022-03-11 ENCOUNTER — OFFICE VISIT (OUTPATIENT)
Dept: ONCOLOGY | Facility: CLINIC | Age: 77
End: 2022-03-11

## 2022-03-11 VITALS
OXYGEN SATURATION: 96 % | BODY MASS INDEX: 23.28 KG/M2 | TEMPERATURE: 97.3 F | SYSTOLIC BLOOD PRESSURE: 161 MMHG | HEIGHT: 61 IN | WEIGHT: 123.3 LBS | DIASTOLIC BLOOD PRESSURE: 80 MMHG | HEART RATE: 89 BPM | RESPIRATION RATE: 16 BRPM

## 2022-03-11 DIAGNOSIS — Z17.0 MALIGNANT NEOPLASM OF UPPER-INNER QUADRANT OF LEFT BREAST IN FEMALE, ESTROGEN RECEPTOR POSITIVE: Primary | ICD-10-CM

## 2022-03-11 DIAGNOSIS — C50.212 MALIGNANT NEOPLASM OF UPPER-INNER QUADRANT OF LEFT BREAST IN FEMALE, ESTROGEN RECEPTOR POSITIVE: Primary | ICD-10-CM

## 2022-03-11 DIAGNOSIS — C50.212 MALIGNANT NEOPLASM OF UPPER-INNER QUADRANT OF LEFT BREAST IN FEMALE, ESTROGEN RECEPTOR POSITIVE: ICD-10-CM

## 2022-03-11 DIAGNOSIS — Z79.899 HIGH RISK MEDICATION USE: ICD-10-CM

## 2022-03-11 DIAGNOSIS — Z45.9 ENCOUNTER FOR MANAGEMENT OF IMPLANTED DEVICE: ICD-10-CM

## 2022-03-11 DIAGNOSIS — Z17.0 MALIGNANT NEOPLASM OF UPPER-INNER QUADRANT OF LEFT BREAST IN FEMALE, ESTROGEN RECEPTOR POSITIVE: ICD-10-CM

## 2022-03-11 LAB
ALBUMIN SERPL-MCNC: 3.9 G/DL (ref 3.5–5.2)
ALBUMIN/GLOB SERPL: 1.3 G/DL (ref 1.1–2.4)
ALP SERPL-CCNC: 113 U/L (ref 38–116)
ALT SERPL W P-5'-P-CCNC: 16 U/L (ref 0–33)
ANION GAP SERPL CALCULATED.3IONS-SCNC: 10.1 MMOL/L (ref 5–15)
AST SERPL-CCNC: 22 U/L (ref 0–32)
BASOPHILS # BLD AUTO: 0.06 10*3/MM3 (ref 0–0.2)
BASOPHILS NFR BLD AUTO: 0.9 % (ref 0–1.5)
BILIRUB SERPL-MCNC: 0.2 MG/DL (ref 0.2–1.2)
BUN SERPL-MCNC: 19 MG/DL (ref 6–20)
BUN/CREAT SERPL: 23.5 (ref 7.3–30)
CALCIUM SPEC-SCNC: 9.2 MG/DL (ref 8.5–10.2)
CHLORIDE SERPL-SCNC: 105 MMOL/L (ref 98–107)
CO2 SERPL-SCNC: 22.9 MMOL/L (ref 22–29)
CREAT SERPL-MCNC: 0.81 MG/DL (ref 0.6–1.1)
DEPRECATED RDW RBC AUTO: 45.1 FL (ref 37–54)
EGFRCR SERPLBLD CKD-EPI 2021: 75.3 ML/MIN/1.73
EOSINOPHIL # BLD AUTO: 0.29 10*3/MM3 (ref 0–0.4)
EOSINOPHIL NFR BLD AUTO: 4.3 % (ref 0.3–6.2)
ERYTHROCYTE [DISTWIDTH] IN BLOOD BY AUTOMATED COUNT: 11.8 % (ref 12.3–15.4)
GLOBULIN UR ELPH-MCNC: 2.9 GM/DL (ref 1.8–3.5)
GLUCOSE SERPL-MCNC: 115 MG/DL (ref 74–124)
HCT VFR BLD AUTO: 36.5 % (ref 34–46.6)
HGB BLD-MCNC: 11.5 G/DL (ref 12–15.9)
IMM GRANULOCYTES # BLD AUTO: 0.03 10*3/MM3 (ref 0–0.05)
IMM GRANULOCYTES NFR BLD AUTO: 0.4 % (ref 0–0.5)
LYMPHOCYTES # BLD AUTO: 1.68 10*3/MM3 (ref 0.7–3.1)
LYMPHOCYTES NFR BLD AUTO: 24.9 % (ref 19.6–45.3)
MCH RBC QN AUTO: 32.5 PG (ref 26.6–33)
MCHC RBC AUTO-ENTMCNC: 31.5 G/DL (ref 31.5–35.7)
MCV RBC AUTO: 103.1 FL (ref 79–97)
MONOCYTES # BLD AUTO: 0.67 10*3/MM3 (ref 0.1–0.9)
MONOCYTES NFR BLD AUTO: 9.9 % (ref 5–12)
NEUTROPHILS NFR BLD AUTO: 4.01 10*3/MM3 (ref 1.7–7)
NEUTROPHILS NFR BLD AUTO: 59.6 % (ref 42.7–76)
NRBC BLD AUTO-RTO: 0 /100 WBC (ref 0–0.2)
PLATELET # BLD AUTO: 248 10*3/MM3 (ref 140–450)
PMV BLD AUTO: 10 FL (ref 6–12)
POTASSIUM SERPL-SCNC: 3.8 MMOL/L (ref 3.5–4.7)
PROT SERPL-MCNC: 6.8 G/DL (ref 6.3–8)
RBC # BLD AUTO: 3.54 10*6/MM3 (ref 3.77–5.28)
SODIUM SERPL-SCNC: 138 MMOL/L (ref 134–145)
WBC NRBC COR # BLD: 6.74 10*3/MM3 (ref 3.4–10.8)

## 2022-03-11 PROCEDURE — 25010000002 DEXAMETHASONE SODIUM PHOSPHATE 100 MG/10ML SOLUTION: Performed by: INTERNAL MEDICINE

## 2022-03-11 PROCEDURE — 25010000002 FOSAPREPITANT PER 1 MG: Performed by: INTERNAL MEDICINE

## 2022-03-11 PROCEDURE — 25010000002 CYCLOPHOSPHAMIDE 1 GM/5ML SOLUTION 5 ML VIAL: Performed by: INTERNAL MEDICINE

## 2022-03-11 PROCEDURE — 25010000002 PEGFILGRASTIM 6 MG/0.6ML PREFILLED SYRINGE KIT: Performed by: INTERNAL MEDICINE

## 2022-03-11 PROCEDURE — 96413 CHEMO IV INFUSION 1 HR: CPT

## 2022-03-11 PROCEDURE — 96367 TX/PROPH/DG ADDL SEQ IV INF: CPT

## 2022-03-11 PROCEDURE — 96417 CHEMO IV INFUS EACH ADDL SEQ: CPT

## 2022-03-11 PROCEDURE — 96377 APPLICATON ON-BODY INJECTOR: CPT

## 2022-03-11 PROCEDURE — 25010000002 DOXORUBICIN PER 10 MG: Performed by: INTERNAL MEDICINE

## 2022-03-11 PROCEDURE — 85025 COMPLETE CBC W/AUTO DIFF WBC: CPT

## 2022-03-11 PROCEDURE — 96411 CHEMO IV PUSH ADDL DRUG: CPT

## 2022-03-11 PROCEDURE — 25010000002 PALONOSETRON PER 25 MCG: Performed by: INTERNAL MEDICINE

## 2022-03-11 PROCEDURE — 99214 OFFICE O/P EST MOD 30 MIN: CPT | Performed by: INTERNAL MEDICINE

## 2022-03-11 PROCEDURE — 80053 COMPREHEN METABOLIC PANEL: CPT

## 2022-03-11 PROCEDURE — 96375 TX/PRO/DX INJ NEW DRUG ADDON: CPT

## 2022-03-11 RX ORDER — OLANZAPINE 5 MG/1
5 TABLET ORAL ONCE
Status: COMPLETED | OUTPATIENT
Start: 2022-03-11 | End: 2022-03-11

## 2022-03-11 RX ORDER — DOXORUBICIN HYDROCHLORIDE 2 MG/ML
60 INJECTION, SOLUTION INTRAVENOUS ONCE
Status: CANCELLED | OUTPATIENT
Start: 2022-03-11

## 2022-03-11 RX ORDER — OLANZAPINE 5 MG/1
5 TABLET ORAL ONCE
Status: CANCELLED | OUTPATIENT
Start: 2022-03-11 | End: 2022-03-11

## 2022-03-11 RX ORDER — SODIUM CHLORIDE 9 MG/ML
250 INJECTION, SOLUTION INTRAVENOUS ONCE
Status: CANCELLED | OUTPATIENT
Start: 2022-03-11

## 2022-03-11 RX ORDER — SODIUM CHLORIDE 9 MG/ML
250 INJECTION, SOLUTION INTRAVENOUS ONCE
Status: COMPLETED | OUTPATIENT
Start: 2022-03-11 | End: 2022-03-11

## 2022-03-11 RX ORDER — PALONOSETRON 0.05 MG/ML
0.25 INJECTION, SOLUTION INTRAVENOUS ONCE
Status: CANCELLED | OUTPATIENT
Start: 2022-03-11

## 2022-03-11 RX ORDER — DOXORUBICIN HYDROCHLORIDE 2 MG/ML
60 INJECTION, SOLUTION INTRAVENOUS ONCE
Status: COMPLETED | OUTPATIENT
Start: 2022-03-11 | End: 2022-03-11

## 2022-03-11 RX ORDER — PALONOSETRON 0.05 MG/ML
0.25 INJECTION, SOLUTION INTRAVENOUS ONCE
Status: COMPLETED | OUTPATIENT
Start: 2022-03-11 | End: 2022-03-11

## 2022-03-11 RX ADMIN — DEXAMETHASONE SODIUM PHOSPHATE 12 MG: 10 INJECTION, SOLUTION INTRAMUSCULAR; INTRAVENOUS at 09:17

## 2022-03-11 RX ADMIN — PEGFILGRASTIM 6 MG: KIT SUBCUTANEOUS at 11:06

## 2022-03-11 RX ADMIN — DOXORUBICIN HYDROCHLORIDE 92 MG: 2 INJECTION, SOLUTION INTRAVENOUS at 10:04

## 2022-03-11 RX ADMIN — SODIUM CHLORIDE 250 ML: 9 INJECTION, SOLUTION INTRAVENOUS at 09:13

## 2022-03-11 RX ADMIN — CYCLOPHOSPHAMIDE 920 MG: 200 INJECTION, SOLUTION INTRAVENOUS at 10:25

## 2022-03-11 RX ADMIN — OLANZAPINE 5 MG: 5 TABLET, FILM COATED ORAL at 09:14

## 2022-03-11 RX ADMIN — PALONOSETRON 0.25 MG: 0.05 INJECTION, SOLUTION INTRAVENOUS at 09:14

## 2022-03-11 RX ADMIN — SODIUM CHLORIDE 100 ML: 9 INJECTION, SOLUTION INTRAVENOUS at 09:32

## 2022-03-11 NOTE — PROGRESS NOTES
Subjective   Neela Ramirez is a 76 y.o. female.  Referred by Dr. 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  A bilateral diagnostic mammogram was performed for further evaluation of the same.    8/17/2021-bilateral diagnostic mammogram-scattered fibroglandular densities throughout both breasts.  In the posterior one third upper inner quadrant of the left breast at the site of palpable concern, 11 o'clock position there is a mass with adjacent pleomorphic calcifications.  The mass and the calcifications measure on order of 1.5 cm in greatest dimension.  No evidence of axillary lymphadenopathy appreciated.  Stable microcalcifications seen in other areas of the left breast on right breast.  Ultrasound-at 11 o'clock position, 5 cm from the nipple in the left breast there is an irregular hypoechoic mass which measures 2.4 x 2 x 1.6 cm.    Impression  1.irregular 2.4 cm mass in the left breast at the site of palpable concern at 11:00, 5 cm from the nipple.  Ultrasound-guided biopsy of the mass recommended  No finding suspicious for malignancy in the right breast    9/8/2021-left breast ultrasound-guided biopsy  Pathology consistent with invasive ductal carcinoma with lobular features.  Grade 3.  The carcinoma measures 7 mm in greatest dimension.  Focally suspicious for lymphovascular space invasion.  ER low +1-10%, intensity week  HI negative  HER-2 3+ on immunohistochemistry  Ki-67 70%    MRI of the breast 10/8/2021-Biopsy-proven malignancy in the left breast at 11:00 measuring 2.8 cm with a centrally located metallic clip.  No other suspicious findings are seen within the left breast or no left axillary lymphadenopathy.  No suspicious findings of the right breast    Echocardiogram 10/8/2021 was normal  ejection fraction of 56 to 60% and strain of -20    She denies any new bone pains, dyspnea, cough, abdominal pain nausea vomiting or recent changes in weight.    She had 2 previous breast aspirations in her 20s.  No other breast biopsies  She does not know much about her family hence limited family history.    She received neoadjuvant chemotherapy on EA 1181 trial with Taxol Perjeta and Herceptin.  She completed 12 weeks of Taxol Herceptin and Perjeta.    Had an abnormal EKG preop and hence a stress test was performed on 2/3/2022 which showed a normal left ventricular ejection fraction and LVEF at stress was 73%.  Considered a low risk study with normal myocardial perfusion imaging.    She underwent a left breast lumpectomy and a sentinel lymph node biopsy on 2/10/2022.    Pathology was consistent with residual tumor which was pleomorphic invasive lobular carcinoma, poorly differentiated, grade 3  Tumor measured 18 mm  Margins negative for invasive carcinoma  Associated lobular carcinoma in situ noted  1 out of 10 lymph nodes was positive for metastatic focus measuring 3.5 mm.    Receptors were repeated on the pleomorphic invasive lobular carcinoma  ER +91 to 100% strong  WY +61 to 70% moderate  HER-2 negative  Ki-67 55%    Receptors performed on the metastatic lymph node  ER +81-90% strong  WY negative  HER-2 2+ on immunohistochemistry, FISH pending.    ypT1 cN1 aM0    2/28/2022-CT of the chest abdomen and pelvis  Fluid collection in the upper left breast measuring 6 x 2.5 x 4.9 cm, postoperative seroma.  Skin thickening of the left breast likely postsurgical.  Fluid collections left axilla measuring 2.9 x 1.4 x 2.9 cm.  No other suspicious abnormalities on the CT of the chest  CT abdomen with no evidence of metastatic disease.  Mild colonic diverticulosis.  Small uterine fibroid.    3/1/2022-bone scan  No evidence of metastatic disease.  Multifocal degenerative arthritic uptake without scintigraphic evidence to  suggest metastatic disease.    3/1/2022-echocardiogram  Left ventricular ejection fraction 56-60%.  Normal global LV strain of -20.5%.    Interval history  Presents today to discuss his staging scans as well as start cycle 1 AC.  Recovering well from surgery.  She has some tightness when she is trying to raise her left arm but it is not no new complaints.  She has some questions regarding the antiemetics which have been prescribed.  We reviewed those today.    The following portions of the patient's history were reviewed and updated as appropriate: allergies, current medications, past family history, past medical history, past social history, past surgical history and problem list.    Past Medical History:   Diagnosis Date   • Anxiety    • Arthritis    • Cataract     EARLY. JIMMIE EYES   • Chronic back pain     LOW BACK ACHES AT TIME FROM ARTHRTIS   • Constipation     AT TIMES. NO RECENT ISSUES   • Dermatitis     LEGS. STATES CLEARING UP NOW.   • Disease of thyroid gland     HYPO   • Diverticulosis     Colonoscopy November 2014   • Erythema of face     Transitory Erythema Of The Face   • GERD (gastroesophageal reflux disease)     ON OCC   • History of chemotherapy     FOR LEFT BREAST CANCER   • Hyperlipidemia    • Hypertension    • Malignant neoplasm of female breast (HCC) 9/27/2021   • Osteopenia    • Tachycardia         Past Surgical History:   Procedure Laterality Date   • BREAST BIOPSY  09/2021    Dr. Tirado    • BREAST CYST ASPIRATION Right    • BREAST CYST EXCISION Right    • BREAST LUMPECTOMY WITH SENTINEL NODE BIOPSY Left 2/10/2022    Procedure: LEFT BREAST WIRE LOCALIZED LUMPECTOMY WITH SENTINEL NODE BIOPSY, POSSIBLE LEFT AXILLARY DISSECTION;  Surgeon: Becky Liu MD;  Location: [facility] OR [facility] [place];  Service: General;  Laterality: Left;   • COLONOSCOPY     • COLONOSCOPY  2014   • EXOSTECTOMY Bilateral     Simple Bunion Exostectomy (Silver Procedure)   • FOOT TENDON SURGERY  2012   • VENOUS ACCESS DEVICE (PORT)  "INSERTION N/A 10/18/2021    Procedure: INSERTION VENOUS ACCESS DEVICE;  Surgeon: Becky Liu MD;  [place] [facility] [place] [facility] [place]  Service: General;  Laterality: N/A;        Family History   Problem Relation Age of Onset   • Arthritis Father    • Heart disease Father    • Hypertension Father    • Hypertension Mother    • Malig Hyperthermia Neg Hx         Social History     Socioeconomic History   • Marital status:      Spouse name: Pj   • Number of children: 1   Tobacco Use   • Smoking status: Former Smoker     Types: Cigarettes   • Smokeless tobacco: Never Used   • Tobacco comment: SOCIALLY IN EARLY    Vaping Use   • Vaping Use: Never used   Substance and Sexual Activity   • Alcohol use: Yes     Comment: social drinker   • Drug use: Never   • Sexual activity: Defer        OB History             Para        Term   0            AB        Living           SAB        IAB        Ectopic        Molar        Multiple        Live Births                 Age at menarche-13  Age at menopause-50  Nulliparous  Breast-feeding-N/A   0 para 0  0  Oral contraceptive pill use-none  Hormone replacement therapy-7 years    No Known Allergies     Review of systems as mentioned in the HPI    Objective   Blood pressure 161/80, pulse 89, temperature 97.3 °F (36.3 °C), temperature source Temporal, resp. rate 16, height 154.9 cm (60.98\"), weight 55.9 kg (123 lb 4.8 oz), SpO2 96 %.     ECOG performance status-0    Physical Exam  Vitals reviewed.   Constitutional:       Appearance: Normal appearance.   HENT:      Head: Normocephalic and atraumatic.      Nose: Nose normal.      Mouth/Throat:      Mouth: Mucous membranes are moist.      Pharynx: Oropharynx is clear.   Eyes:      Conjunctiva/sclera: Conjunctivae normal.      Pupils: Pupils are equal, round, and reactive to light.   Cardiovascular:      Rate and Rhythm: Normal rate and regular rhythm.      Pulses: Normal pulses.      Heart sounds: Normal " heart sounds.   Pulmonary:      Effort: Pulmonary effort is normal.      Breath sounds: Normal breath sounds.   Abdominal:      General: Abdomen is flat. Bowel sounds are normal.      Palpations: Abdomen is soft.   Musculoskeletal:         General: Normal range of motion.      Cervical back: Normal range of motion.   Skin:     General: Skin is warm and dry.      Findings: No rash.   Neurological:      General: No focal deficit present.      Mental Status: She is alert and oriented to person, place, and time. Mental status is at baseline.   Psychiatric:         Mood and Affect: Mood normal.         Behavior: Behavior normal.         Thought Content: Thought content normal.         Judgment: Judgment normal.       Breast Exam: Right breast appears normal on inspection.  No palpable abnormalities of the right breast.    Left breast on inspection there is a scar which is healing well.  There is also left axillary scar which is healing well.  The left breast overall appears more erythematous compared to the right breast.  However there is no warmth or no purulent drainage from the incisions.    I have reexamined the patient and the results are consistent with the previously documented exam. Sheri Avendano MD     Results from last 7 days   Lab Units 03/11/22  0747   WBC 10*3/mm3 6.74   NEUTROS ABS 10*3/mm3 4.01   HEMOGLOBIN g/dL 11.5*   HEMATOCRIT % 36.5   PLATELETS 10*3/mm3 248     Results from last 7 days   Lab Units 03/11/22  0747   SODIUM mmol/L 138   POTASSIUM mmol/L 3.8   CHLORIDE mmol/L 105   CO2 mmol/L 22.9   BUN mg/dL 19   CREATININE mg/dL 0.81   CALCIUM mg/dL 9.2   ALBUMIN g/dL 3.90   BILIRUBIN mg/dL 0.2   ALK PHOS U/L 113   ALT (SGPT) U/L 16   AST (SGOT) U/L 22   GLUCOSE mg/dL 115           CT Chest With Contrast Diagnostic    Result Date: 3/1/2022  1. No evidence of metastatic disease in the chest. 2. Fluid collections in the left axilla and left breast, likely postoperative seromas. Correlate clinically to  exclude infection. Signer Name: Elder Borrego MD  Signed: 3/1/2022 11:49 AM  Workstation Name: [facility]    CT Abdomen Pelvis With Contrast    Result Date: 3/1/2022  1. No evidence of metastatic disease in the abdomen or pelvis. 2. No acute abnormalities. 3. Mild lower colonic diverticulosis without diverticulitis or colitis. 4. Small uterine fibroid. Signer Name: Elder Borrego MD  Signed: 3/1/2022 11:56 AM  Workstation Name: [facility]    Mammo Breast Placement Device Initial Without Biopsy    Result Date: 2/10/2022  Technically successful mammographic guided left breast needle localization.  This report was finalized on 2/10/2022 3:44 PM by Dr. Trevor Tirado M.D.      XR Breast Specimen    Result Date: 2/10/2022  Technically successful mammographic guided left breast needle localization.  This report was finalized on 2/10/2022 3:44 PM by Dr. Trevor Tirado M.D.       CBC reviewed and within normal limits.    Assessment/Plan       *Invasive ductal carcinoma of the left breast  · Clinical T2 N0 M0, stage IIa  · On ultrasound the tumor measures 2.4 cm.  Lymph nodes on the left side are normal.  · MRI 10/8/2021 with tumor measuring 2.8 cm.  Lymph nodes appear normal  · Pathology consistent with invasive carcinoma with ductal and lobular features, grade 3, ER +1 to 10% weak, NE negative, HER-2 3+ immunohistochemistry, Ki-67 70%.  · She was evaluated by Dr. Liu and referred for consideration of neoadjuvant chemotherapy.   Since the tumor is HER-2 positive and over 2 cm I think it would be reasonable to proceed with neoadjuvant chemotherapy.  Since the tumor is over 2 cm but lymph node negative I think she would be a great candidate for EA 1181 clinical trial which comprises of administering Taxol Herceptin and Perjeta tamika  adjuvantly followed by surgery and additional adjuvant treatment depending upon the response.  Port placed  10/18/2021  Echocardiogram shows normal ejection fraction  Week 1 TPH on 10/19/2021  Completed 12 weeks of Taxol Perjeta and Herceptin on 1/4/2022 on EA 1181 clinical trial  1/11/2022 due for Herceptin and Perjeta only  2/1/2022-due for dose 2 of Herceptin and Perjeta  MRI 1/14/2022 reviewed and there has been a good tumor response with decrease in size of the mass to 1.2 cm.  2/10/2022-left breast lumpectomy and sentinel lymph node biopsy  Pathology shows ypT1 cN1 aM0, stage IIb, pleomorphic invasive lobular carcinoma, grade 3, ER +% strong, VA +61-70% moderate, HER-2 negative on the residual tumor  The left axillary lymph node was ER positive strong, VA negative and HER-2 2+, FISH amplified.  On the initial biopsy prior to surgery the pathology showed invasive ductal with lobular features and the ER/VA was negative and HER-2 was 3+.  The residual disease obviously appears to be different from the initial pathology.  What is remaining is essentially pleomorphic lobular carcinoma which is high-grade with a high Ki-67 and ER/VA positivity.  Now that the lymph node is also positive I do believe that she will benefit from additional chemotherapy particularly either AC or EC.  Case was presented at multidisciplinary conference.  Decided to proceed with adjuvant Adriamycin and cyclophosphamide.  Given her age we will proceed with every 3 weekly AC over every 2 weeks.  Staging CT chest and pelvis and bone scan without any evidence of metastatic disease  3/11/2022-proceed with cycle 1 of AC    *Right-sided port appears normal    *Hypertension-currently amlodipine and metoprolol.  Blood pressure 161/18    *Anxiety  · Improved    *Hypothyroidism  · Continue Synthroid    *Cardiac health-  · Echocardiogram 1/5/2022 reviewed and stable  · Stress test 2/3/2022 normal  · 3/1/2022-echocardiogram shows normal ejection fraction of 56 to 60%, normal LV strain    *Left breast erythema  · No tenderness or increased warmth  · She  had a follow-up with Dr. Liu on 2/20/2022  · No concerns of infection.  · Improved      PLAN:   1. Cycle 1 AC today  2. Toxicity check with 1 L of normal saline in a week  3. Cycle 2 AC in 3 weeks  4. Neulasta today  5. Recommend Claritin to help pain with Neulasta

## 2022-03-11 NOTE — NURSING NOTE
Pt signed consent for first AC treatment.  Plan of care explained and printed info related to chemo meds and self care tips folder given to pt and .

## 2022-03-18 ENCOUNTER — APPOINTMENT (OUTPATIENT)
Dept: PHYSICAL THERAPY | Facility: HOSPITAL | Age: 77
End: 2022-03-18

## 2022-03-18 ENCOUNTER — INFUSION (OUTPATIENT)
Dept: ONCOLOGY | Facility: HOSPITAL | Age: 77
End: 2022-03-18

## 2022-03-18 VITALS
SYSTOLIC BLOOD PRESSURE: 143 MMHG | HEART RATE: 105 BPM | WEIGHT: 125.4 LBS | OXYGEN SATURATION: 96 % | DIASTOLIC BLOOD PRESSURE: 80 MMHG | TEMPERATURE: 98 F | BODY MASS INDEX: 23.71 KG/M2 | RESPIRATION RATE: 16 BRPM

## 2022-03-18 DIAGNOSIS — Z45.9 ENCOUNTER FOR MANAGEMENT OF IMPLANTED DEVICE: Primary | ICD-10-CM

## 2022-03-18 DIAGNOSIS — Z17.0 MALIGNANT NEOPLASM OF UPPER-INNER QUADRANT OF LEFT BREAST IN FEMALE, ESTROGEN RECEPTOR POSITIVE: ICD-10-CM

## 2022-03-18 DIAGNOSIS — C50.212 MALIGNANT NEOPLASM OF UPPER-INNER QUADRANT OF LEFT BREAST IN FEMALE, ESTROGEN RECEPTOR POSITIVE: ICD-10-CM

## 2022-03-18 LAB
ALBUMIN SERPL-MCNC: 3.8 G/DL (ref 3.5–5.2)
ALBUMIN/GLOB SERPL: 1.6 G/DL (ref 1.1–2.4)
ALP SERPL-CCNC: 123 U/L (ref 38–116)
ALT SERPL W P-5'-P-CCNC: 13 U/L (ref 0–33)
ANION GAP SERPL CALCULATED.3IONS-SCNC: 9.5 MMOL/L (ref 5–15)
AST SERPL-CCNC: 15 U/L (ref 0–32)
BILIRUB SERPL-MCNC: 0.4 MG/DL (ref 0.2–1.2)
BUN SERPL-MCNC: 19 MG/DL (ref 6–20)
BUN/CREAT SERPL: 26 (ref 7.3–30)
CALCIUM SPEC-SCNC: 8.8 MG/DL (ref 8.5–10.2)
CHLORIDE SERPL-SCNC: 106 MMOL/L (ref 98–107)
CO2 SERPL-SCNC: 24.5 MMOL/L (ref 22–29)
CREAT SERPL-MCNC: 0.73 MG/DL (ref 0.6–1.1)
EGFRCR SERPLBLD CKD-EPI 2021: 85.4 ML/MIN/1.73
GLOBULIN UR ELPH-MCNC: 2.4 GM/DL (ref 1.8–3.5)
GLUCOSE SERPL-MCNC: 107 MG/DL (ref 74–124)
POTASSIUM SERPL-SCNC: 4.2 MMOL/L (ref 3.5–4.7)
PROT SERPL-MCNC: 6.2 G/DL (ref 6.3–8)
SODIUM SERPL-SCNC: 140 MMOL/L (ref 134–145)

## 2022-03-18 PROCEDURE — 25010000002 HEPARIN LOCK FLUSH PER 10 UNITS: Performed by: INTERNAL MEDICINE

## 2022-03-18 PROCEDURE — 80053 COMPREHEN METABOLIC PANEL: CPT

## 2022-03-18 PROCEDURE — 96360 HYDRATION IV INFUSION INIT: CPT

## 2022-03-18 RX ORDER — SODIUM CHLORIDE 0.9 % (FLUSH) 0.9 %
10 SYRINGE (ML) INJECTION AS NEEDED
Status: DISCONTINUED | OUTPATIENT
Start: 2022-03-18 | End: 2022-03-18 | Stop reason: HOSPADM

## 2022-03-18 RX ORDER — SODIUM CHLORIDE 0.9 % (FLUSH) 0.9 %
10 SYRINGE (ML) INJECTION AS NEEDED
Status: CANCELLED | OUTPATIENT
Start: 2022-03-18

## 2022-03-18 RX ORDER — HEPARIN SODIUM (PORCINE) LOCK FLUSH IV SOLN 100 UNIT/ML 100 UNIT/ML
500 SOLUTION INTRAVENOUS AS NEEDED
Status: CANCELLED | OUTPATIENT
Start: 2022-03-18

## 2022-03-18 RX ORDER — SODIUM CHLORIDE 9 MG/ML
1000 INJECTION, SOLUTION INTRAVENOUS ONCE
Status: COMPLETED | OUTPATIENT
Start: 2022-03-18 | End: 2022-03-18

## 2022-03-18 RX ORDER — HEPARIN SODIUM (PORCINE) LOCK FLUSH IV SOLN 100 UNIT/ML 100 UNIT/ML
500 SOLUTION INTRAVENOUS AS NEEDED
Status: DISCONTINUED | OUTPATIENT
Start: 2022-03-18 | End: 2022-03-18 | Stop reason: HOSPADM

## 2022-03-18 RX ADMIN — Medication 10 ML: at 10:03

## 2022-03-18 RX ADMIN — SODIUM CHLORIDE 1000 ML: 9 INJECTION, SOLUTION INTRAVENOUS at 08:41

## 2022-03-18 RX ADMIN — Medication 500 UNITS: at 10:04

## 2022-03-18 NOTE — PROGRESS NOTES
Delma, please call patient and let her know her kidney function is normal.  Let patient know we have also forwarded to PCPShanice COHN that her alkaline phosphatase is mildly elevated.    Dr. Romana COHN

## 2022-03-29 DIAGNOSIS — F41.9 ANXIETY: ICD-10-CM

## 2022-03-29 RX ORDER — ESCITALOPRAM OXALATE 20 MG/1
20 TABLET ORAL DAILY
Qty: 30 TABLET | Refills: 0 | Status: SHIPPED | OUTPATIENT
Start: 2022-03-29 | End: 2022-04-25

## 2022-04-01 ENCOUNTER — OFFICE VISIT (OUTPATIENT)
Dept: ONCOLOGY | Facility: CLINIC | Age: 77
End: 2022-04-01

## 2022-04-01 ENCOUNTER — INFUSION (OUTPATIENT)
Dept: ONCOLOGY | Facility: HOSPITAL | Age: 77
End: 2022-04-01

## 2022-04-01 VITALS
WEIGHT: 122.4 LBS | TEMPERATURE: 97.5 F | HEART RATE: 102 BPM | OXYGEN SATURATION: 99 % | DIASTOLIC BLOOD PRESSURE: 81 MMHG | SYSTOLIC BLOOD PRESSURE: 127 MMHG | RESPIRATION RATE: 16 BRPM | HEIGHT: 61 IN | BODY MASS INDEX: 23.11 KG/M2

## 2022-04-01 DIAGNOSIS — C50.212 MALIGNANT NEOPLASM OF UPPER-INNER QUADRANT OF LEFT BREAST IN FEMALE, ESTROGEN RECEPTOR POSITIVE: ICD-10-CM

## 2022-04-01 DIAGNOSIS — C50.212 MALIGNANT NEOPLASM OF UPPER-INNER QUADRANT OF LEFT BREAST IN FEMALE, ESTROGEN RECEPTOR POSITIVE: Primary | ICD-10-CM

## 2022-04-01 DIAGNOSIS — Z17.0 MALIGNANT NEOPLASM OF UPPER-INNER QUADRANT OF LEFT BREAST IN FEMALE, ESTROGEN RECEPTOR POSITIVE: ICD-10-CM

## 2022-04-01 DIAGNOSIS — Z17.0 MALIGNANT NEOPLASM OF UPPER-INNER QUADRANT OF LEFT BREAST IN FEMALE, ESTROGEN RECEPTOR POSITIVE: Primary | ICD-10-CM

## 2022-04-01 LAB
ALBUMIN SERPL-MCNC: 4 G/DL (ref 3.5–5.2)
ALBUMIN/GLOB SERPL: 1.4 G/DL (ref 1.1–2.4)
ALP SERPL-CCNC: 130 U/L (ref 38–116)
ALT SERPL W P-5'-P-CCNC: 17 U/L (ref 0–33)
ANION GAP SERPL CALCULATED.3IONS-SCNC: 8.8 MMOL/L (ref 5–15)
AST SERPL-CCNC: 20 U/L (ref 0–32)
BASOPHILS # BLD AUTO: 0.13 10*3/MM3 (ref 0–0.2)
BASOPHILS NFR BLD AUTO: 1.1 % (ref 0–1.5)
BILIRUB SERPL-MCNC: 0.2 MG/DL (ref 0.2–1.2)
BUN SERPL-MCNC: 17 MG/DL (ref 6–20)
BUN/CREAT SERPL: 21.3 (ref 7.3–30)
CALCIUM SPEC-SCNC: 9.4 MG/DL (ref 8.5–10.2)
CHLORIDE SERPL-SCNC: 105 MMOL/L (ref 98–107)
CO2 SERPL-SCNC: 24.2 MMOL/L (ref 22–29)
CREAT SERPL-MCNC: 0.8 MG/DL (ref 0.6–1.1)
DEPRECATED RDW RBC AUTO: 44.4 FL (ref 37–54)
EGFRCR SERPLBLD CKD-EPI 2021: 76.5 ML/MIN/1.73
EOSINOPHIL # BLD AUTO: 0.01 10*3/MM3 (ref 0–0.4)
EOSINOPHIL NFR BLD AUTO: 0.1 % (ref 0.3–6.2)
ERYTHROCYTE [DISTWIDTH] IN BLOOD BY AUTOMATED COUNT: 12.5 % (ref 12.3–15.4)
GLOBULIN UR ELPH-MCNC: 2.8 GM/DL (ref 1.8–3.5)
GLUCOSE SERPL-MCNC: 109 MG/DL (ref 74–124)
HCT VFR BLD AUTO: 37.2 % (ref 34–46.6)
HGB BLD-MCNC: 12 G/DL (ref 12–15.9)
IMM GRANULOCYTES # BLD AUTO: 0.08 10*3/MM3 (ref 0–0.05)
IMM GRANULOCYTES NFR BLD AUTO: 0.7 % (ref 0–0.5)
LYMPHOCYTES # BLD AUTO: 1.19 10*3/MM3 (ref 0.7–3.1)
LYMPHOCYTES NFR BLD AUTO: 10.1 % (ref 19.6–45.3)
MCH RBC QN AUTO: 32.2 PG (ref 26.6–33)
MCHC RBC AUTO-ENTMCNC: 32.3 G/DL (ref 31.5–35.7)
MCV RBC AUTO: 99.7 FL (ref 79–97)
MONOCYTES # BLD AUTO: 1.09 10*3/MM3 (ref 0.1–0.9)
MONOCYTES NFR BLD AUTO: 9.3 % (ref 5–12)
NEUTROPHILS NFR BLD AUTO: 78.7 % (ref 42.7–76)
NEUTROPHILS NFR BLD AUTO: 9.23 10*3/MM3 (ref 1.7–7)
NRBC BLD AUTO-RTO: 0 /100 WBC (ref 0–0.2)
PLATELET # BLD AUTO: 460 10*3/MM3 (ref 140–450)
PMV BLD AUTO: 9.9 FL (ref 6–12)
POTASSIUM SERPL-SCNC: 4.4 MMOL/L (ref 3.5–4.7)
PROT SERPL-MCNC: 6.8 G/DL (ref 6.3–8)
RBC # BLD AUTO: 3.73 10*6/MM3 (ref 3.77–5.28)
SODIUM SERPL-SCNC: 138 MMOL/L (ref 134–145)
WBC NRBC COR # BLD: 11.73 10*3/MM3 (ref 3.4–10.8)

## 2022-04-01 PROCEDURE — 96413 CHEMO IV INFUSION 1 HR: CPT

## 2022-04-01 PROCEDURE — 80053 COMPREHEN METABOLIC PANEL: CPT

## 2022-04-01 PROCEDURE — 96375 TX/PRO/DX INJ NEW DRUG ADDON: CPT

## 2022-04-01 PROCEDURE — 25010000002 FOSAPREPITANT PER 1 MG: Performed by: INTERNAL MEDICINE

## 2022-04-01 PROCEDURE — 25010000002 PEGFILGRASTIM 6 MG/0.6ML PREFILLED SYRINGE KIT: Performed by: INTERNAL MEDICINE

## 2022-04-01 PROCEDURE — 96367 TX/PROPH/DG ADDL SEQ IV INF: CPT

## 2022-04-01 PROCEDURE — 85025 COMPLETE CBC W/AUTO DIFF WBC: CPT

## 2022-04-01 PROCEDURE — 96377 APPLICATON ON-BODY INJECTOR: CPT

## 2022-04-01 PROCEDURE — 96411 CHEMO IV PUSH ADDL DRUG: CPT

## 2022-04-01 PROCEDURE — 25010000002 DEXAMETHASONE SODIUM PHOSPHATE 100 MG/10ML SOLUTION: Performed by: INTERNAL MEDICINE

## 2022-04-01 PROCEDURE — 25010000002 DOXORUBICIN PER 10 MG: Performed by: INTERNAL MEDICINE

## 2022-04-01 PROCEDURE — 25010000002 PALONOSETRON PER 25 MCG: Performed by: INTERNAL MEDICINE

## 2022-04-01 PROCEDURE — 25010000002 CYCLOPHOSPHAMIDE 1 GM/5ML SOLUTION 5 ML VIAL: Performed by: INTERNAL MEDICINE

## 2022-04-01 PROCEDURE — 99214 OFFICE O/P EST MOD 30 MIN: CPT | Performed by: INTERNAL MEDICINE

## 2022-04-01 RX ORDER — OLANZAPINE 5 MG/1
5 TABLET ORAL ONCE
Status: COMPLETED | OUTPATIENT
Start: 2022-04-01 | End: 2022-04-01

## 2022-04-01 RX ORDER — SODIUM CHLORIDE 9 MG/ML
1000 INJECTION, SOLUTION INTRAVENOUS ONCE
Status: CANCELLED
Start: 2022-04-08 | End: 2022-04-08

## 2022-04-01 RX ORDER — DOXORUBICIN HYDROCHLORIDE 2 MG/ML
60 INJECTION, SOLUTION INTRAVENOUS ONCE
Status: COMPLETED | OUTPATIENT
Start: 2022-04-01 | End: 2022-04-01

## 2022-04-01 RX ORDER — PALONOSETRON 0.05 MG/ML
0.25 INJECTION, SOLUTION INTRAVENOUS ONCE
Status: CANCELLED | OUTPATIENT
Start: 2022-04-01

## 2022-04-01 RX ORDER — DOXORUBICIN HYDROCHLORIDE 2 MG/ML
60 INJECTION, SOLUTION INTRAVENOUS ONCE
Status: CANCELLED | OUTPATIENT
Start: 2022-04-01

## 2022-04-01 RX ORDER — SODIUM CHLORIDE 9 MG/ML
250 INJECTION, SOLUTION INTRAVENOUS ONCE
Status: COMPLETED | OUTPATIENT
Start: 2022-04-01 | End: 2022-04-01

## 2022-04-01 RX ORDER — OLANZAPINE 5 MG/1
5 TABLET ORAL ONCE
Status: CANCELLED | OUTPATIENT
Start: 2022-04-01 | End: 2022-04-01

## 2022-04-01 RX ORDER — SODIUM CHLORIDE 9 MG/ML
250 INJECTION, SOLUTION INTRAVENOUS ONCE
Status: CANCELLED | OUTPATIENT
Start: 2022-04-01

## 2022-04-01 RX ORDER — PALONOSETRON 0.05 MG/ML
0.25 INJECTION, SOLUTION INTRAVENOUS ONCE
Status: COMPLETED | OUTPATIENT
Start: 2022-04-01 | End: 2022-04-01

## 2022-04-01 RX ADMIN — DOXORUBICIN HYDROCHLORIDE 92 MG: 2 INJECTION, SOLUTION INTRAVENOUS at 12:28

## 2022-04-01 RX ADMIN — SODIUM CHLORIDE 150 ML: 900 INJECTION, SOLUTION INTRAVENOUS at 11:50

## 2022-04-01 RX ADMIN — DEXAMETHASONE SODIUM PHOSPHATE 12 MG: 10 INJECTION, SOLUTION INTRAMUSCULAR; INTRAVENOUS at 11:31

## 2022-04-01 RX ADMIN — PEGFILGRASTIM 6 MG: KIT SUBCUTANEOUS at 13:07

## 2022-04-01 RX ADMIN — SODIUM CHLORIDE 250 ML: 9 INJECTION, SOLUTION INTRAVENOUS at 11:28

## 2022-04-01 RX ADMIN — CYCLOPHOSPHAMIDE 920 MG: 200 INJECTION, SOLUTION INTRAVENOUS at 12:40

## 2022-04-01 RX ADMIN — PALONOSETRON HYDROCHLORIDE 0.25 MG: 0.25 INJECTION, SOLUTION INTRAVENOUS at 11:31

## 2022-04-01 RX ADMIN — OLANZAPINE 5 MG: 5 TABLET, FILM COATED ORAL at 11:31

## 2022-04-01 NOTE — PROGRESS NOTES
Subjective   Neela Ramirez is a 76 y.o. female.  Referred by Dr. 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  A bilateral diagnostic mammogram was performed for further evaluation of the same.    8/17/2021-bilateral diagnostic mammogram-scattered fibroglandular densities throughout both breasts.  In the posterior one third upper inner quadrant of the left breast at the site of palpable concern, 11 o'clock position there is a mass with adjacent pleomorphic calcifications.  The mass and the calcifications measure on order of 1.5 cm in greatest dimension.  No evidence of axillary lymphadenopathy appreciated.  Stable microcalcifications seen in other areas of the left breast on right breast.  Ultrasound-at 11 o'clock position, 5 cm from the nipple in the left breast there is an irregular hypoechoic mass which measures 2.4 x 2 x 1.6 cm.    Impression  1.irregular 2.4 cm mass in the left breast at the site of palpable concern at 11:00, 5 cm from the nipple.  Ultrasound-guided biopsy of the mass recommended  No finding suspicious for malignancy in the right breast    9/8/2021-left breast ultrasound-guided biopsy  Pathology consistent with invasive ductal carcinoma with lobular features.  Grade 3.  The carcinoma measures 7 mm in greatest dimension.  Focally suspicious for lymphovascular space invasion.  ER low +1-10%, intensity week  AL negative  HER-2 3+ on immunohistochemistry  Ki-67 70%    MRI of the breast 10/8/2021-Biopsy-proven malignancy in the left breast at 11:00 measuring 2.8 cm with a centrally located metallic clip.  No other suspicious findings are seen within the left breast or no left axillary lymphadenopathy.  No suspicious findings of the right breast    Echocardiogram 10/8/2021 was normal  ejection fraction of 56 to 60% and strain of -20    She denies any new bone pains, dyspnea, cough, abdominal pain nausea vomiting or recent changes in weight.    She had 2 previous breast aspirations in her 20s.  No other breast biopsies  She does not know much about her family hence limited family history.    She received neoadjuvant chemotherapy on EA 1181 trial with Taxol Perjeta and Herceptin.  She completed 12 weeks of Taxol Herceptin and Perjeta.    Had an abnormal EKG preop and hence a stress test was performed on 2/3/2022 which showed a normal left ventricular ejection fraction and LVEF at stress was 73%.  Considered a low risk study with normal myocardial perfusion imaging.    She underwent a left breast lumpectomy and a sentinel lymph node biopsy on 2/10/2022.    Pathology was consistent with residual tumor which was pleomorphic invasive lobular carcinoma, poorly differentiated, grade 3  Tumor measured 18 mm  Margins negative for invasive carcinoma  Associated lobular carcinoma in situ noted  1 out of 10 lymph nodes was positive for metastatic focus measuring 3.5 mm.    Receptors were repeated on the pleomorphic invasive lobular carcinoma  ER +91 to 100% strong  [place] +61 to 70% moderate  HER-2 negative  Ki-67 55%    Receptors performed on the metastatic lymph node  ER +81-90% strong  NY negative  HER-2 2+ on immunohistochemistry, FISH pending.    ypT1 cN1 aM0    2/28/2022-CT of the chest abdomen and pelvis  Fluid collection in the upper left breast measuring 6 x 2.5 x 4.9 cm, postoperative seroma.  Skin thickening of the left breast likely postsurgical.  Fluid collections left axilla measuring 2.9 x 1.4 x 2.9 cm.  No other suspicious abnormalities on the CT of the chest  CT abdomen with no evidence of metastatic disease.  Mild colonic diverticulosis.  Small uterine fibroid.    3/1/2022-bone scan  No evidence of metastatic disease.  Multifocal degenerative arthritic uptake without scintigraphic evidence to  suggest metastatic disease.    3/1/2022-echocardiogram  Left ventricular ejection fraction 56-60%.  Normal global LV strain of -20.5%.    3/11/2022-cycle 1 of AC    Interval history  Ms. Ramirez presents today for follow-up.  She has received cycle 1 AC and has tolerated that extremely well.  She denies any mucositis, diarrhea.  She did have some fatigue between day 7-10.  She received 1 L of normal saline on day 7 which has helped her significantly.  She did have some nausea yesterday.  We discussed about taking Protonix daily, Compazine and Zofran if needed for nausea  She has been taking Zyprexa.    The following portions of the patient's history were reviewed and updated as appropriate: allergies, current medications, past family history, past medical history, past social history, past surgical history and problem list.    Past Medical History:   Diagnosis Date   • Anxiety    • Arthritis    • Cataract     EARLY. JIMMEI EYES   • Chronic back pain     LOW BACK ACHES AT TIME FROM ARTHRTIS   • Constipation     AT TIMES. NO RECENT ISSUES   • Dermatitis     LEGS. STATES CLEARING UP NOW.   • Disease of thyroid gland     HYPO   • Diverticulosis     Colonoscopy November 2014   • Erythema of face     Transitory Erythema Of The Face   • GERD (gastroesophageal reflux disease)     ON OCC   • History of chemotherapy     FOR LEFT BREAST CANCER   • Hyperlipidemia    • Hypertension    • Malignant neoplasm of female breast (HCC) 9/27/2021   • Osteopenia    • Tachycardia         Past Surgical History:   Procedure Laterality Date   • BREAST BIOPSY  09/2021    Dr. Tirado    • BREAST CYST ASPIRATION Right    • BREAST CYST EXCISION Right    • BREAST LUMPECTOMY WITH SENTINEL NODE BIOPSY Left 2/10/2022    Procedure: LEFT BREAST WIRE LOCALIZED LUMPECTOMY WITH SENTINEL NODE BIOPSY, POSSIBLE LEFT AXILLARY DISSECTION;  Surgeon: Becky Liu MD;  Location: [facility] OR [facility] [place];  Service: General;  Laterality: Left;   • COLONOSCOPY     • COLONOSCOPY   "   • EXOSTECTOMY Bilateral     Simple Bunion Exostectomy (Silver Procedure)   • FOOT TENDON SURGERY     • VENOUS ACCESS DEVICE (PORT) INSERTION N/A 10/18/2021    Procedure: INSERTION VENOUS ACCESS DEVICE;  Surgeon: Becky Liu MD;  [place] [facility] [place] [facility] [place]  Service: General;  Laterality: N/A;        Family History   Problem Relation Age of Onset   • Arthritis Father    • Heart disease Father    • Hypertension Father    • Hypertension Mother    • Malig Hyperthermia Neg Hx         Social History     Socioeconomic History   • Marital status:      Spouse name: Pj   • Number of children: 1   Tobacco Use   • Smoking status: Former Smoker     Types: Cigarettes   • Smokeless tobacco: Never Used   • Tobacco comment: SOCIALLY IN EARLY 'S   Vaping Use   • Vaping Use: Never used   Substance and Sexual Activity   • Alcohol use: Yes     Comment: social drinker   • Drug use: Never   • Sexual activity: Defer        OB History             Para        Term   0            AB        Living           SAB        IAB        Ectopic        Molar        Multiple        Live Births                 Age at menarche-13  Age at menopause-50  Nulliparous  Breast-feeding-N/A   0 para 0  0  Oral contraceptive pill use-none  Hormone replacement therapy-7 years    No Known Allergies     Review of systems as mentioned in the HPI    Objective   Blood pressure 127/81, pulse 102, temperature 97.5 °F (36.4 °C), temperature source Temporal, resp. rate 16, height 154.9 cm (60.98\"), weight 55.5 kg (122 lb 6.4 oz), SpO2 99 %.     ECOG performance status-0    Physical Exam  Vitals reviewed.   Constitutional:       Appearance: Normal appearance.   HENT:      Head: Normocephalic and atraumatic.      Nose: Nose normal.      Mouth/Throat:      Mouth: Mucous membranes are moist.      Pharynx: Oropharynx is clear.   Eyes:      Conjunctiva/sclera: Conjunctivae normal.      Pupils: Pupils are equal, round, and " reactive to light.   Cardiovascular:      Rate and Rhythm: Normal rate and regular rhythm.      Pulses: Normal pulses.      Heart sounds: Normal heart sounds.   Pulmonary:      Effort: Pulmonary effort is normal.      Breath sounds: Normal breath sounds.   Abdominal:      General: Abdomen is flat. Bowel sounds are normal.      Palpations: Abdomen is soft.   Musculoskeletal:         General: Normal range of motion.      Cervical back: Normal range of motion.   Skin:     General: Skin is warm and dry.      Findings: No rash.   Neurological:      General: No focal deficit present.      Mental Status: She is alert and oriented to person, place, and time. Mental status is at baseline.   Psychiatric:         Mood and Affect: Mood normal.         Behavior: Behavior normal.         Thought Content: Thought content normal.         Judgment: Judgment normal.       Breast Exam: Right breast appears normal on inspection.  No palpable abnormalities of the right breast.    Left breast on inspection there is a scar which is healing well.  There is also left axillary scar which is healing well.  The left breast overall appears more erythematous compared to the right breast.  However there is no warmth or no purulent drainage from the incisions.    I have reexamined the patient and the results are consistent with the previously documented exam. Sheri Avendano MD     Results from last 7 days   Lab Units 04/01/22  0949   WBC 10*3/mm3 11.73*   NEUTROS ABS 10*3/mm3 9.23*   HEMOGLOBIN g/dL 12.0   HEMATOCRIT % 37.2   PLATELETS 10*3/mm3 460*               No radiology results for the last 30 days.   CBC reviewed and within normal limits.    Assessment/Plan       *Invasive ductal carcinoma of the left breast  · Clinical T2 N0 M0, stage IIa  · On ultrasound the tumor measures 2.4 cm.  Lymph nodes on the left side are normal.  · MRI 10/8/2021 with tumor measuring 2.8 cm.  Lymph nodes appear normal  · Pathology consistent with invasive  carcinoma with ductal and lobular features, grade 3, ER +1 to 10% weak, MA negative, HER-2 3+ immunohistochemistry, Ki-67 70%.  · She was evaluated by Dr. Liu and referred for consideration of neoadjuvant chemotherapy.   Since the tumor is HER-2 positive and over 2 cm I think it would be reasonable to proceed with neoadjuvant chemotherapy.  Since the tumor is over 2 cm but lymph node negative I think she would be a great candidate for EA 1181 clinical trial which comprises of administering Taxol Herceptin and Perjeta tamika  adjuvantly followed by surgery and additional adjuvant treatment depending upon the response.  Port placed 10/18/2021  Echocardiogram shows normal ejection fraction  Week 1 TPH on 10/19/2021  Completed 12 weeks of Taxol Perjeta and Herceptin on 1/4/2022 on EA 1181 clinical trial  1/11/2022 due for Herceptin and Perjeta only  2/1/2022-due for dose 2 of Herceptin and Perjeta  MRI 1/14/2022 reviewed and there has been a good tumor response with decrease in size of the mass to 1.2 cm.  2/10/2022-left breast lumpectomy and sentinel lymph node biopsy  Pathology shows ypT1 cN1 aM0, stage IIb, pleomorphic invasive lobular carcinoma, grade 3, ER +% strong, MA +61-70% moderate, HER-2 negative on the residual tumor  The left axillary lymph node was ER positive strong, MA negative and HER-2 2+, FISH amplified.  On the initial biopsy prior to surgery the pathology showed invasive ductal with lobular features and the ER/MA was negative and HER-2 was 3+.  The residual disease obviously appears to be different from the initial pathology.  What is remaining is essentially pleomorphic lobular carcinoma which is high-grade with a high Ki-67 and ER/MA positivity.  Now that the lymph node is also positive I do believe that she will benefit from additional chemotherapy particularly either AC or EC.  Case was presented at multidisciplinary conference.  Decided to proceed with adjuvant Adriamycin and  cyclophosphamide.  Given her age we will proceed with every 3 weekly AC over every 2 weeks.  Staging CT chest and pelvis and bone scan without any evidence of metastatic disease  3/11/2022-proceed with cycle 1 of AC  4/1/2022-cycle 2 AC  CBC-WBC 11.73, hemoglobin 12.2, platelets 460,000  CMP reviewed and within normal limits  Okay to proceed with chemotherapy    *Right-sided port appears normal, some thinning of the skin on the port.  She will only need a port for another 6 weeks.  Following this we will have Dr. Liu remove the port.    *Hypertension-currently amlodipine and metoprolol.  Blood pressure 127/81    *Anxiety  · Improved  · Continue Zyprexa    *Hypothyroidism  · Continue Synthroid  · Able    *Cardiac health-  · Echocardiogram 1/5/2022 reviewed and stable  · Stress test 2/3/2022 normal  · 3/1/2022-echocardiogram shows normal ejection fraction of 56 to 60%, normal LV strain    *Left breast erythema  · No tenderness or increased warmth  · She had a follow-up with Dr. Liu on 2/20/2022  · No concerns of infection.      PLAN:   1. Cycle 2 AC today  2. Toxicity check with 1 L of normal saline in a week  3. Cycle 2 AC in 3 weeks  4. Neulasta today  5. Recommend Claritin to help pain with Neulasta    Patient is on cytotoxic chemotherapy requiring close monitoring for toxicities.

## 2022-04-04 DIAGNOSIS — I10 ESSENTIAL HYPERTENSION: ICD-10-CM

## 2022-04-04 RX ORDER — METOPROLOL SUCCINATE 25 MG/1
25 TABLET, EXTENDED RELEASE ORAL DAILY
Qty: 90 TABLET | Refills: 1 | Status: SHIPPED | OUTPATIENT
Start: 2022-04-04 | End: 2022-10-03

## 2022-04-04 RX ORDER — AMLODIPINE BESYLATE 5 MG/1
5 TABLET ORAL DAILY
Qty: 90 TABLET | Refills: 2 | Status: SHIPPED | OUTPATIENT
Start: 2022-04-04 | End: 2023-01-05 | Stop reason: SDUPTHER

## 2022-04-06 ENCOUNTER — HOSPITAL ENCOUNTER (OUTPATIENT)
Dept: PHYSICAL THERAPY | Facility: HOSPITAL | Age: 77
Setting detail: THERAPIES SERIES
Discharge: HOME | End: 2022-04-06

## 2022-04-06 DIAGNOSIS — Z17.0 MALIGNANT NEOPLASM OF UPPER-INNER QUADRANT OF LEFT BREAST IN FEMALE, ESTROGEN RECEPTOR POSITIVE: Primary | ICD-10-CM

## 2022-04-06 DIAGNOSIS — Z91.89 AT RISK FOR LYMPHEDEMA: ICD-10-CM

## 2022-04-06 DIAGNOSIS — Z98.890 S/P LUMPECTOMY, LEFT BREAST: ICD-10-CM

## 2022-04-06 DIAGNOSIS — C50.212 MALIGNANT NEOPLASM OF UPPER-INNER QUADRANT OF LEFT BREAST IN FEMALE, ESTROGEN RECEPTOR POSITIVE: Primary | ICD-10-CM

## 2022-04-06 PROCEDURE — 97535 SELF CARE MNGMENT TRAINING: CPT

## 2022-04-06 PROCEDURE — 97161 PT EVAL LOW COMPLEX 20 MIN: CPT

## 2022-04-06 PROCEDURE — 93702 BIS XTRACELL FLUID ANALYSIS: CPT

## 2022-04-06 NOTE — THERAPY EVALUATION
Physical Therapy Lymphedema Initial Evaluation  [facility]     Patient Name: Neela Ramirez  : 1945  MRN: [id number]  Today's Date: 2022      Visit Date: 2022    Visit Dx:    ICD-10-CM ICD-9-CM   1. Malignant neoplasm of upper-inner quadrant of left breast in female, estrogen receptor positive (HCC)  C50.212 174.2    Z17.0 V86.0   2. S/P lumpectomy, left breast  Z98.890 V45.89   3. At risk for lymphedema  Z91.89 V49.89       Patient Active Problem List   Diagnosis   • Anxiety   • DD (diverticular disease)   • Gastroesophageal reflux disease   • Essential hypertension   • Hyperlipidemia   • Osteopenia   • Bilateral low back pain without sciatica   • Malignant neoplasm of female breast (HCC)   • Encounter for management of implanted device        Past Medical History:   Diagnosis Date   • Anxiety    • Arthritis    • Cataract     EARLY. JIMMIE EYES   • Chronic back pain     LOW BACK ACHES AT TIME FROM ARTHRTIS   • Constipation     AT TIMES. NO RECENT ISSUES   • Dermatitis     LEGS. STATES CLEARING UP NOW.   • Disease of thyroid gland     HYPO   • Diverticulosis     Colonoscopy 2014   • Erythema of face     Transitory Erythema Of The Face   • GERD (gastroesophageal reflux disease)     ON OCC   • History of chemotherapy     FOR LEFT BREAST CANCER   • Hyperlipidemia    • Hypertension    • Malignant neoplasm of female breast (HCC) 2021   • Osteopenia    • Tachycardia         Past Surgical History:   Procedure Laterality Date   • BREAST BIOPSY  2021    Dr. Tirado    • BREAST CYST ASPIRATION Right    • BREAST CYST EXCISION Right    • BREAST LUMPECTOMY WITH SENTINEL NODE BIOPSY Left 2/10/2022    Procedure: LEFT BREAST WIRE LOCALIZED LUMPECTOMY WITH SENTINEL NODE BIOPSY, POSSIBLE LEFT AXILLARY DISSECTION;  Surgeon: Becky Liu MD;  Location: [facility] OR [facility];  Service: General;  Laterality: Left;   • COLONOSCOPY     • COLONOSCOPY     • EXOSTECTOMY Bilateral     Simple Bunion  Exostectomy (Silver Procedure)   • FOOT TENDON SURGERY  2012   • VENOUS ACCESS DEVICE (PORT) INSERTION N/A 10/18/2021    Procedure: INSERTION VENOUS ACCESS DEVICE;  Surgeon: Becky Liu MD;  Location: [facility] OR [facility] [place];  Service: General;  Laterality: N/A;       Visit Dx:    ICD-10-CM ICD-9-CM   1. Malignant neoplasm of upper-inner quadrant of left breast in female, estrogen receptor positive (HCC)  C50.212 174.2    Z17.0 V86.0   2. S/P lumpectomy, left breast  Z98.890 V45.89   3. At risk for lymphedema  Z91.89 V49.89        Patient History     Row Name 04/06/22 1200             History    Chief Complaint --  none  -LB      Date Current Problem(s) Began 02/10/22  -LB      Brief Description of Current Complaint Pt s/p L lumpectomy and SLNB 2/10/22. She had 10 LNs removed and 1 was positive. She is R handed. She denies post-op issues and is recovering well. She has had 2 rounds of AC chemo and has 2 more to go. She thinks she will have radiation but has not met for consult yet. She has one daughter and a one year old pupy.  -LB      Previous treatment for THIS PROBLEM Surgery  -LB      Surgery Date: 02/10/22  -LB      Patient/Caregiver Goals Return to prior level of function;Know what to do to help the symptoms  -LB      Hand Dominance right-handed  -LB      Occupation/sports/leisure activities Pt is retired, enjoys playing with new puppy.  -LB      Patient seeing anyone else for problem(s)? yes  -LB      How has patient tried to help current problem? gentle use of LUE  -LB      History of Previous Related Injuries none  -LB              Pain     Pain at Present 0  -LB      Pain at Best 0  -LB      Pain at Worst 0  -LB      Is your sleep disturbed? No  -LB      Difficulties at work? Pt does not work.  -LB      Difficulties with ADL's? Able to perform.  -LB      Difficulties with recreational activities? Able to perform.  -LB              Fall Risk Assessment    Any falls in the past year: No  -LB               Services    Prior Rehab/Home Health Experiences No  -LB      Are you currently receiving Home Health services No  -LB      Do you plan to receive Home Health services in the near future No  -LB              Daily Activities    Primary Language English  -LB      Pt Participated in POC and Goals Yes  -LB              Safety    Are you being hurt, hit, or frightened by anyone at home or in your life? No  -LB      Are you being neglected by a caregiver No  -LB      Have you had any of the following issues with N/A  -LB            User Key  (r) = Recorded By, (t) = Taken By, (c) = Cosigned By    Initials Name Provider Type    LB Edna Condon, PT Physical Therapist                 Lymphedema     Row Name 04/06/22 1300             Subjective Pain    Able to rate subjective pain? yes  -LB      Pre-Treatment Pain Level 0  -LB              Subjective Comments    Subjective Comments I am doing well I think. I really haven't had any issues.  -LB              Lymphedema Assessment    Lymphedema Classification LUE:;at risk/stage 0  -LB      Lymphedema Cancer Related Sx left;lumpectomy;sentinel node biopsy  -LB      Lymph Nodes Removed # 10  -LB      Positive Lymph Nodes # 1  -LB      Chemo Received yes  -LB      Chemo Treatments #/Timeframe 2/4 AC every 3 weeks  -LB      Adverse Chemo Reactions/Complication fatigue, hair loss  -LB      Radiation Therapy Received --  upcoming  -LB      Infections or Cellulitis? no  -LB              Posture/Observations    Posture/Observations Comments WNL; dog scratches/nips on LUE  -LB              General ROM    GENERAL ROM COMMENTS BUE WFL  -LB              MMT (Manual Muscle Testing)    General MMT Comments BUE WFL  -LB              Lymphedema Edema Assessment    Edema Assessment Comment no obvious edema  -LB              Skin Changes/Observations    Skin Observations Comment dog nips and scratches LUE  -LB              Lymphedema Sensation    Lymphedema Sensation Comments normal  -LB               Lymphedema Pulses/Capillary Refill    Lymph Pulses Capillary Refill Comments normal  -LB              LUE Quick Girth (cm)    Axilla 26.6 cm  -LB      Mid upper arm 24.1 cm  -LB      Elbow 22 cm  -LB      Mid forearm 18.4 cm  -LB      Wrist crease 14.4 cm  -LB      LUE Quick Girth Total 105.5  -LB              Manual Lymphatic Drainage    Manual Therapy discussed gentle manual to address soreness  -LB              Compression/Skin Care    Compression/Skin Care Comments reviewed compression garment and wear schedule  -LB              L-Dex Bioimpedence Screening    L-Dex Measurement Extremity LUE  -LB      L-Dex Patient Position Standing  -LB      L-Dex UE Dominate Side Right  -LB      L-Dex UE At Risk Side Left  -LB      L-Dex UE Pre Surgical Value No  -LB      L-Dex UE Score 2.9  -LB      L-Dex UE Baseline Score 2.9  -LB      L-Dex UE Value Change 0  -LB      L-Dex UE Comment WNL  -LB      $ L-Dex Charge yes  -LB            User Key  (r) = Recorded By, (t) = Taken By, (c) = Cosigned By    Initials Name Provider Type    Edna Abdalla, PT Physical Therapist                                Therapy Education  Education Details: discussed bioimpedance results and interpretation, reviewed compression garment and wear schedule, discussed s/s of lymphedema and steps to prevention  Given: Symptoms/condition management, Posture/body mechanics, Edema management  Program: New  How Provided: Verbal, Written, Demonstration  Provided to: Patient  Level of Understanding: Teach back education performed, Verbalized, Demonstrated       OP Exercises     Row Name 04/06/22 1300             Subjective Comments    Subjective Comments I am doing well I think. I really haven't had any issues.  -LB              Subjective Pain    Able to rate subjective pain? yes  -LB      Pre-Treatment Pain Level 0  -LB            User Key  (r) = Recorded By, (t) = Taken By, (c) = Cosigned By    Initials Name Provider Type    Edna Abdalla, PT  Physical Therapist                             PT OP Goals     Row Name 04/06/22 1300          Long Term Goals    LTG Date to Achieve 05/06/22  -LB     LTG 1 Pt will maintain LDex bioimpedance score WNL.  -LB     LTG 1 Progress New  -LB     LTG 2 Pt will acquire appropriately fitted compression garment and verbalize appropriate wear schedule.  -LB     LTG 2 Progress New  -LB     LTG 3 Pt will verbalize signs and symptoms of lymphedema and steps to prevention.  -LB     LTG 3 Progress New  -LB            Time Calculation    PT Goal Re-Cert Due Date 07/05/22  -LB           User Key  (r) = Recorded By, (t) = Taken By, (c) = Cosigned By    Initials Name Provider Type    Edna Abdalla, PT Physical Therapist                 PT Assessment/Plan     Row Name 04/06/22 1346          PT Assessment    Functional Limitations Other (comment)  at risk for lymphedema  -LB     Impairments Impaired flexibility;Impaired lymphatic circulation;Sensation  -LB     Assessment Comments Neela Ramirez is a 76 y.o. female recently diagnosed with Left Breast Cancer, presents to therapy in evolving condition, s/p Left Lumpectomywith [place] Node Biopsy performed on 2/10/22 by surgeons Dr. Liu. Neela Ramirez is at increased risk of post Lumpectomy lymphedema syndrome Left Upper Extremity due to Radiation therapy Breast surgery Lymph Node Removal Lymph Node involvement . She presents without post-operative complaints. Currently, negative s/s of lymphedema, infection, seroma, or axillary cording. We did complete a baseline post operative bioimpedance assessment, with current L-Dex score of 2.9 , which is WNL. At this time, functional limitations can be affected by but not limited to at risk for lymphedema, fatigue. Neela Ramirez will benefit from skilled formal Breast Care Physical Therapy at this time to address listed dysfunctions. Please refer to Plan.  -LB     Please refer to paper survey for additional self-reported information Yes   -LB     Rehab Potential Good  -LB     Patient/caregiver participated in establishment of treatment plan and goals Yes  -LB     Patient would benefit from skilled therapy intervention Yes  -LB            PT Plan    PT Frequency Other (comment)  -LB     Predicted Duration of Therapy Intervention (PT) repeat bioimpedane in 3 months  -LB     Planned CPT's? PT EVAL LOW COMPLEXITY: 01314;PT RE-EVAL: 39004;PT THER PROC EA 15 MIN: 23286;PT THER ACT EA 15 MIN: 57380;PT MANUAL THERAPY EA 15 MIN: 02434;PT NEUROMUSC RE-EDUCATION EA 15 MIN: 82865;PT SELF CARE/HOME MGMT/TRAIN EA 15: 70104;PT BIS XTRACELL FLUID ANALYSIS: 55485  -LB     PT Plan Comments review compression garment wear for radiation, radiation skincare, repeat bioimpedance in 3 months, recommend bra fitting post radiation  -LB           User Key  (r) = Recorded By, (t) = Taken By, (c) = Cosigned By    Initials Name Provider Type    Edna Abdalla PT Physical Therapist                   Outcome Measure Options: Quick DASH  Quick DASH  Open a tight or new jar.: Moderate Difficulty  Do heavy household chores (e.g., wash walls, wash floors): No Difficulty  Carry a shopping bag or briefcase: No Difficulty  Wash your back: No Difficulty  Use a knife to cut food: No Difficulty  Recreational activities in which you take some force or impact through your arm, should or hand (e.g. golf, hammering, tennis, etc.): No Difficulty  During the past week, to what extent has your arm, shoulder, or hand problem interfered with your normal social activites with family, friends, neighbors or groups?: Not at all  During the past week, were you limited in your work or other regular daily activities as a result of your arm, shoulder or hand problem?: Not limited at all  Arm, Shoulder, or hand pain: Mild  Tingling (pins and needles) in your arm, shoulder, or hand: None  During the past week, how much difficulty have you had sleeping because of the pain in your arm, shoulder or hand?: No  difficulty  Number of Questions Answered: 11  Quick DASH Score: 6.82  Quick Dash Comments: 7% disability         Time Calculation:   Start Time: 1200  Stop Time: 1245  Time Calculation (min): 45 min  Total Timed Code Minutes- PT: 15 minute(s)   Therapy Charges for Today     Code Description Service Date Service Provider Modifiers Qty    71680787407 HC PT BIS XTRACELL FLUID ANALYSIS 4/6/2022 Edna Condon, PT  1    88137350512 HC PT EVAL LOW COMPLEXITY 2 4/6/2022 Edna Condon, PT GP 1    20608947960 HC PT SELF CARE/MGMT/TRAIN EA 15 MIN 4/6/2022 Edna Condon, PT GP 1          PT G-Codes  Outcome Measure Options: Quick DASH  Quick DASH Score: 6.82         Edna Condon PT  4/6/2022

## 2022-04-08 ENCOUNTER — INFUSION (OUTPATIENT)
Dept: ONCOLOGY | Facility: HOSPITAL | Age: 77
End: 2022-04-08

## 2022-04-08 VITALS
HEART RATE: 118 BPM | TEMPERATURE: 97.8 F | SYSTOLIC BLOOD PRESSURE: 127 MMHG | DIASTOLIC BLOOD PRESSURE: 79 MMHG | BODY MASS INDEX: 23.03 KG/M2 | OXYGEN SATURATION: 94 % | WEIGHT: 121.8 LBS | RESPIRATION RATE: 18 BRPM

## 2022-04-08 DIAGNOSIS — Z17.0 MALIGNANT NEOPLASM OF UPPER-INNER QUADRANT OF LEFT BREAST IN FEMALE, ESTROGEN RECEPTOR POSITIVE: Primary | ICD-10-CM

## 2022-04-08 DIAGNOSIS — C50.212 MALIGNANT NEOPLASM OF UPPER-INNER QUADRANT OF LEFT BREAST IN FEMALE, ESTROGEN RECEPTOR POSITIVE: Primary | ICD-10-CM

## 2022-04-08 DIAGNOSIS — Z45.9 ENCOUNTER FOR MANAGEMENT OF IMPLANTED DEVICE: ICD-10-CM

## 2022-04-08 LAB
ALBUMIN SERPL-MCNC: 3.7 G/DL (ref 3.5–5.2)
ALBUMIN/GLOB SERPL: 1.4 G/DL (ref 1.1–2.4)
ALP SERPL-CCNC: 127 U/L (ref 38–116)
ALT SERPL W P-5'-P-CCNC: 11 U/L (ref 0–33)
ANION GAP SERPL CALCULATED.3IONS-SCNC: 7.2 MMOL/L (ref 5–15)
AST SERPL-CCNC: 16 U/L (ref 0–32)
BILIRUB SERPL-MCNC: 0.3 MG/DL (ref 0.2–1.2)
BUN SERPL-MCNC: 18 MG/DL (ref 6–20)
BUN/CREAT SERPL: 26.9 (ref 7.3–30)
CALCIUM SPEC-SCNC: 9 MG/DL (ref 8.5–10.2)
CHLORIDE SERPL-SCNC: 105 MMOL/L (ref 98–107)
CO2 SERPL-SCNC: 25.8 MMOL/L (ref 22–29)
CREAT SERPL-MCNC: 0.67 MG/DL (ref 0.6–1.1)
EGFRCR SERPLBLD CKD-EPI 2021: 90.2 ML/MIN/1.73
GLOBULIN UR ELPH-MCNC: 2.6 GM/DL (ref 1.8–3.5)
GLUCOSE SERPL-MCNC: 103 MG/DL (ref 74–124)
POTASSIUM SERPL-SCNC: 4.2 MMOL/L (ref 3.5–4.7)
PROT SERPL-MCNC: 6.3 G/DL (ref 6.3–8)
SODIUM SERPL-SCNC: 138 MMOL/L (ref 134–145)

## 2022-04-08 PROCEDURE — 96360 HYDRATION IV INFUSION INIT: CPT

## 2022-04-08 PROCEDURE — 25010000002 HEPARIN LOCK FLUSH PER 10 UNITS: Performed by: INTERNAL MEDICINE

## 2022-04-08 PROCEDURE — 80053 COMPREHEN METABOLIC PANEL: CPT

## 2022-04-08 RX ORDER — SODIUM CHLORIDE 0.9 % (FLUSH) 0.9 %
10 SYRINGE (ML) INJECTION AS NEEDED
Status: CANCELLED | OUTPATIENT
Start: 2022-04-08

## 2022-04-08 RX ORDER — HEPARIN SODIUM (PORCINE) LOCK FLUSH IV SOLN 100 UNIT/ML 100 UNIT/ML
500 SOLUTION INTRAVENOUS AS NEEDED
Status: DISCONTINUED | OUTPATIENT
Start: 2022-04-08 | End: 2022-04-08 | Stop reason: HOSPADM

## 2022-04-08 RX ORDER — SODIUM CHLORIDE 0.9 % (FLUSH) 0.9 %
10 SYRINGE (ML) INJECTION AS NEEDED
Status: DISCONTINUED | OUTPATIENT
Start: 2022-04-08 | End: 2022-04-08 | Stop reason: HOSPADM

## 2022-04-08 RX ORDER — SODIUM CHLORIDE 9 MG/ML
1000 INJECTION, SOLUTION INTRAVENOUS ONCE
Status: COMPLETED | OUTPATIENT
Start: 2022-04-08 | End: 2022-04-08

## 2022-04-08 RX ORDER — HEPARIN SODIUM (PORCINE) LOCK FLUSH IV SOLN 100 UNIT/ML 100 UNIT/ML
500 SOLUTION INTRAVENOUS AS NEEDED
Status: CANCELLED | OUTPATIENT
Start: 2022-04-08

## 2022-04-08 RX ADMIN — Medication 500 UNITS: at 15:50

## 2022-04-08 RX ADMIN — SODIUM CHLORIDE 1000 ML: 9 INJECTION, SOLUTION INTRAVENOUS at 14:26

## 2022-04-08 RX ADMIN — Medication 10 ML: at 15:50

## 2022-04-22 ENCOUNTER — INFUSION (OUTPATIENT)
Dept: ONCOLOGY | Facility: HOSPITAL | Age: 77
End: 2022-04-22

## 2022-04-22 ENCOUNTER — OFFICE VISIT (OUTPATIENT)
Dept: ONCOLOGY | Facility: CLINIC | Age: 77
End: 2022-04-22

## 2022-04-22 VITALS
SYSTOLIC BLOOD PRESSURE: 122 MMHG | RESPIRATION RATE: 18 BRPM | WEIGHT: 121 LBS | HEART RATE: 91 BPM | OXYGEN SATURATION: 96 % | TEMPERATURE: 97.1 F | DIASTOLIC BLOOD PRESSURE: 75 MMHG | HEIGHT: 61 IN | BODY MASS INDEX: 22.84 KG/M2

## 2022-04-22 DIAGNOSIS — Z17.0 MALIGNANT NEOPLASM OF UPPER-INNER QUADRANT OF LEFT BREAST IN FEMALE, ESTROGEN RECEPTOR POSITIVE: ICD-10-CM

## 2022-04-22 DIAGNOSIS — Z17.0 MALIGNANT NEOPLASM OF UPPER-INNER QUADRANT OF LEFT BREAST IN FEMALE, ESTROGEN RECEPTOR POSITIVE: Primary | ICD-10-CM

## 2022-04-22 DIAGNOSIS — C50.212 MALIGNANT NEOPLASM OF UPPER-INNER QUADRANT OF LEFT BREAST IN FEMALE, ESTROGEN RECEPTOR POSITIVE: ICD-10-CM

## 2022-04-22 DIAGNOSIS — C50.212 MALIGNANT NEOPLASM OF UPPER-INNER QUADRANT OF LEFT BREAST IN FEMALE, ESTROGEN RECEPTOR POSITIVE: Primary | ICD-10-CM

## 2022-04-22 LAB
ALBUMIN SERPL-MCNC: 3.6 G/DL (ref 3.5–5.2)
ALBUMIN/GLOB SERPL: 1.3 G/DL (ref 1.1–2.4)
ALP SERPL-CCNC: 105 U/L (ref 38–116)
ALT SERPL W P-5'-P-CCNC: 14 U/L (ref 0–33)
ANION GAP SERPL CALCULATED.3IONS-SCNC: 8.7 MMOL/L (ref 5–15)
AST SERPL-CCNC: 19 U/L (ref 0–32)
BASOPHILS # BLD AUTO: 0.12 10*3/MM3 (ref 0–0.2)
BASOPHILS NFR BLD AUTO: 1.4 % (ref 0–1.5)
BILIRUB SERPL-MCNC: 0.2 MG/DL (ref 0.2–1.2)
BUN SERPL-MCNC: 15 MG/DL (ref 6–20)
BUN/CREAT SERPL: 19 (ref 7.3–30)
CALCIUM SPEC-SCNC: 8.8 MG/DL (ref 8.5–10.2)
CHLORIDE SERPL-SCNC: 107 MMOL/L (ref 98–107)
CO2 SERPL-SCNC: 22.3 MMOL/L (ref 22–29)
CREAT SERPL-MCNC: 0.79 MG/DL (ref 0.6–1.1)
DEPRECATED RDW RBC AUTO: 46.2 FL (ref 37–54)
EGFRCR SERPLBLD CKD-EPI 2021: 77.2 ML/MIN/1.73
EOSINOPHIL # BLD AUTO: 0.03 10*3/MM3 (ref 0–0.4)
EOSINOPHIL NFR BLD AUTO: 0.3 % (ref 0.3–6.2)
ERYTHROCYTE [DISTWIDTH] IN BLOOD BY AUTOMATED COUNT: 14 % (ref 12.3–15.4)
GLOBULIN UR ELPH-MCNC: 2.7 GM/DL (ref 1.8–3.5)
GLUCOSE SERPL-MCNC: 96 MG/DL (ref 74–124)
HCT VFR BLD AUTO: 33.3 % (ref 34–46.6)
HGB BLD-MCNC: 10.9 G/DL (ref 12–15.9)
IMM GRANULOCYTES # BLD AUTO: 0.08 10*3/MM3 (ref 0–0.05)
IMM GRANULOCYTES NFR BLD AUTO: 0.9 % (ref 0–0.5)
LYMPHOCYTES # BLD AUTO: 1.04 10*3/MM3 (ref 0.7–3.1)
LYMPHOCYTES NFR BLD AUTO: 12.1 % (ref 19.6–45.3)
MCH RBC QN AUTO: 32.1 PG (ref 26.6–33)
MCHC RBC AUTO-ENTMCNC: 32.7 G/DL (ref 31.5–35.7)
MCV RBC AUTO: 97.9 FL (ref 79–97)
MONOCYTES # BLD AUTO: 1.37 10*3/MM3 (ref 0.1–0.9)
MONOCYTES NFR BLD AUTO: 15.9 % (ref 5–12)
NEUTROPHILS NFR BLD AUTO: 5.95 10*3/MM3 (ref 1.7–7)
NEUTROPHILS NFR BLD AUTO: 69.4 % (ref 42.7–76)
NRBC BLD AUTO-RTO: 0 /100 WBC (ref 0–0.2)
PLATELET # BLD AUTO: 416 10*3/MM3 (ref 140–450)
PMV BLD AUTO: 9.3 FL (ref 6–12)
POTASSIUM SERPL-SCNC: 4.1 MMOL/L (ref 3.5–4.7)
PROT SERPL-MCNC: 6.3 G/DL (ref 6.3–8)
RBC # BLD AUTO: 3.4 10*6/MM3 (ref 3.77–5.28)
SODIUM SERPL-SCNC: 138 MMOL/L (ref 134–145)
WBC NRBC COR # BLD: 8.59 10*3/MM3 (ref 3.4–10.8)

## 2022-04-22 PROCEDURE — 96413 CHEMO IV INFUSION 1 HR: CPT

## 2022-04-22 PROCEDURE — 25010000002 FOSAPREPITANT PER 1 MG: Performed by: INTERNAL MEDICINE

## 2022-04-22 PROCEDURE — 96411 CHEMO IV PUSH ADDL DRUG: CPT

## 2022-04-22 PROCEDURE — 96375 TX/PRO/DX INJ NEW DRUG ADDON: CPT

## 2022-04-22 PROCEDURE — 25010000002 DOXORUBICIN PER 10 MG: Performed by: INTERNAL MEDICINE

## 2022-04-22 PROCEDURE — 96377 APPLICATON ON-BODY INJECTOR: CPT

## 2022-04-22 PROCEDURE — 25010000002 PALONOSETRON PER 25 MCG: Performed by: INTERNAL MEDICINE

## 2022-04-22 PROCEDURE — 85025 COMPLETE CBC W/AUTO DIFF WBC: CPT

## 2022-04-22 PROCEDURE — 25010000002 DEXAMETHASONE SODIUM PHOSPHATE 100 MG/10ML SOLUTION: Performed by: INTERNAL MEDICINE

## 2022-04-22 PROCEDURE — 99214 OFFICE O/P EST MOD 30 MIN: CPT | Performed by: INTERNAL MEDICINE

## 2022-04-22 PROCEDURE — 25010000002 CYCLOPHOSPHAMIDE 1 GM/5ML SOLUTION 5 ML VIAL: Performed by: INTERNAL MEDICINE

## 2022-04-22 PROCEDURE — 25010000002 PEGFILGRASTIM 6 MG/0.6ML PREFILLED SYRINGE KIT: Performed by: INTERNAL MEDICINE

## 2022-04-22 PROCEDURE — 96367 TX/PROPH/DG ADDL SEQ IV INF: CPT

## 2022-04-22 PROCEDURE — 80053 COMPREHEN METABOLIC PANEL: CPT

## 2022-04-22 RX ORDER — DOXORUBICIN HYDROCHLORIDE 2 MG/ML
60 INJECTION, SOLUTION INTRAVENOUS ONCE
Status: CANCELLED | OUTPATIENT
Start: 2022-04-22

## 2022-04-22 RX ORDER — OLANZAPINE 5 MG/1
5 TABLET ORAL ONCE
Status: CANCELLED | OUTPATIENT
Start: 2022-04-22 | End: 2022-04-22

## 2022-04-22 RX ORDER — SODIUM CHLORIDE 9 MG/ML
250 INJECTION, SOLUTION INTRAVENOUS ONCE
Status: COMPLETED | OUTPATIENT
Start: 2022-04-22 | End: 2022-04-22

## 2022-04-22 RX ORDER — CYCLOBENZAPRINE HCL 5 MG
1 TABLET ORAL 3 TIMES DAILY PRN
COMMUNITY
Start: 2022-01-18 | End: 2022-07-07 | Stop reason: SDUPTHER

## 2022-04-22 RX ORDER — PALONOSETRON 0.05 MG/ML
0.25 INJECTION, SOLUTION INTRAVENOUS ONCE
Status: CANCELLED | OUTPATIENT
Start: 2022-04-22

## 2022-04-22 RX ORDER — OLANZAPINE 5 MG/1
5 TABLET ORAL ONCE
Status: COMPLETED | OUTPATIENT
Start: 2022-04-22 | End: 2022-04-22

## 2022-04-22 RX ORDER — SODIUM CHLORIDE 9 MG/ML
1000 INJECTION, SOLUTION INTRAVENOUS ONCE
Status: CANCELLED
Start: 2022-04-29 | End: 2022-04-29

## 2022-04-22 RX ORDER — PALONOSETRON 0.05 MG/ML
0.25 INJECTION, SOLUTION INTRAVENOUS ONCE
Status: COMPLETED | OUTPATIENT
Start: 2022-04-22 | End: 2022-04-22

## 2022-04-22 RX ORDER — DOXORUBICIN HYDROCHLORIDE 2 MG/ML
60 INJECTION, SOLUTION INTRAVENOUS ONCE
Status: COMPLETED | OUTPATIENT
Start: 2022-04-22 | End: 2022-04-22

## 2022-04-22 RX ORDER — SODIUM CHLORIDE 9 MG/ML
250 INJECTION, SOLUTION INTRAVENOUS ONCE
Status: CANCELLED | OUTPATIENT
Start: 2022-04-22

## 2022-04-22 RX ADMIN — CYCLOPHOSPHAMIDE 920 MG: 200 INJECTION, SOLUTION INTRAVENOUS at 10:44

## 2022-04-22 RX ADMIN — OLANZAPINE 5 MG: 5 TABLET, FILM COATED ORAL at 09:31

## 2022-04-22 RX ADMIN — DOXORUBICIN HYDROCHLORIDE 92 MG: 2 INJECTION, SOLUTION INTRAVENOUS at 10:29

## 2022-04-22 RX ADMIN — SODIUM CHLORIDE 250 ML: 9 INJECTION, SOLUTION INTRAVENOUS at 09:25

## 2022-04-22 RX ADMIN — PALONOSETRON HYDROCHLORIDE 0.25 MG: 0.25 INJECTION INTRAVENOUS at 09:32

## 2022-04-22 RX ADMIN — DEXAMETHASONE SODIUM PHOSPHATE 12 MG: 10 INJECTION, SOLUTION INTRAMUSCULAR; INTRAVENOUS at 10:06

## 2022-04-22 RX ADMIN — PEGFILGRASTIM 6 MG: KIT SUBCUTANEOUS at 11:19

## 2022-04-22 RX ADMIN — SODIUM CHLORIDE 150 ML: 900 INJECTION, SOLUTION INTRAVENOUS at 09:33

## 2022-04-22 NOTE — NURSING NOTE
Kilo given slow IV push through side arm of free flowing NS. +BR maintained, pt sucked on ice chips during infusion

## 2022-04-22 NOTE — PROGRESS NOTES
Subjective   Neela Ramirez is a 77 y.o. female.  Referred by Dr. 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  A bilateral diagnostic mammogram was performed for further evaluation of the same.    8/17/2021-bilateral diagnostic mammogram-scattered fibroglandular densities throughout both breasts.  In the posterior one third upper inner quadrant of the left breast at the site of palpable concern, 11 o'clock position there is a mass with adjacent pleomorphic calcifications.  The mass and the calcifications measure on order of 1.5 cm in greatest dimension.  No evidence of axillary lymphadenopathy appreciated.  Stable microcalcifications seen in other areas of the left breast on right breast.  Ultrasound-at 11 o'clock position, 5 cm from the nipple in the left breast there is an irregular hypoechoic mass which measures 2.4 x 2 x 1.6 cm.    Impression  1.irregular 2.4 cm mass in the left breast at the site of palpable concern at 11:00, 5 cm from the nipple.  Ultrasound-guided biopsy of the mass recommended  No finding suspicious for malignancy in the right breast    9/8/2021-left breast ultrasound-guided biopsy  Pathology consistent with invasive ductal carcinoma with lobular features.  Grade 3.  The carcinoma measures 7 mm in greatest dimension.  Focally suspicious for lymphovascular space invasion.  ER low +1-10%, intensity week  NE negative  HER-2 3+ on immunohistochemistry  Ki-67 70%    MRI of the breast 10/8/2021-Biopsy-proven malignancy in the left breast at 11:00 measuring 2.8 cm with a centrally located metallic clip.  No other suspicious findings are seen within the left breast or no left axillary lymphadenopathy.  No suspicious findings of the right breast    Echocardiogram 10/8/2021 was normal  ejection fraction of 56 to 60% and strain of -20    She denies any new bone pains, dyspnea, cough, abdominal pain nausea vomiting or recent changes in weight.    She had 2 previous breast aspirations in her 20s.  No other breast biopsies  She does not know much about her family hence limited family history.    She received neoadjuvant chemotherapy on EA 1181 trial with Taxol Perjeta and Herceptin.  She completed 12 weeks of Taxol Herceptin and Perjeta.    Had an abnormal EKG preop and hence a stress test was performed on 2/3/2022 which showed a normal left ventricular ejection fraction and LVEF at stress was 73%.  Considered a low risk study with normal myocardial perfusion imaging.    She underwent a left breast lumpectomy and a sentinel lymph node biopsy on 2/10/2022.    Pathology was consistent with residual tumor which was pleomorphic invasive lobular carcinoma, poorly differentiated, grade 3  Tumor measured 18 mm  Margins negative for invasive carcinoma  Associated lobular carcinoma in situ noted  1 out of 10 lymph nodes was positive for metastatic focus measuring 3.5 mm.    Receptors were repeated on the pleomorphic invasive lobular carcinoma  ER +91 to 100% strong  DE +61 to 70% moderate  HER-2 negative  Ki-67 55%    Receptors performed on the metastatic lymph node  ER +81-90% strong  DE negative  HER-2 2+ on immunohistochemistry, FISH pending.    ypT1 cN1 aM0    2/28/2022-CT of the chest abdomen and pelvis  Fluid collection in the upper left breast measuring 6 x 2.5 x 4.9 cm, postoperative seroma.  Skin thickening of the left breast likely postsurgical.  Fluid collections left axilla measuring 2.9 x 1.4 x 2.9 cm.  No other suspicious abnormalities on the CT of the chest  CT abdomen with no evidence of metastatic disease.  Mild colonic diverticulosis.  Small uterine fibroid.    3/1/2022-bone scan  No evidence of metastatic disease.  Multifocal degenerative arthritic uptake without scintigraphic evidence to  suggest metastatic disease.    3/1/2022-echocardiogram  Left ventricular ejection fraction 56-60%.  Normal global LV strain of -20.5%.    3/11/2022-cycle 1 of AC    Interval history  Ms. Ramirez presents today for follow-up.  She is scheduled for cycle 3 of AC today.  She is overall tolerating treatment extremely well.  Does experience some fatigue which is manageable.  No mucositis diarrhea nausea vomiting.  She has been using the protein shakes to help keep up the weight.  Port site appears normal.    The following portions of the patient's history were reviewed and updated as appropriate: allergies, current medications, past family history, past medical history, past social history, past surgical history and problem list.    Past Medical History:   Diagnosis Date   • Anxiety    • Arthritis    • Cataract     EARLY. JIMMIE EYES   • Chronic back pain     LOW BACK ACHES AT TIME FROM ARTHRTIS   • Constipation     AT TIMES. NO RECENT ISSUES   • Dermatitis     LEGS. STATES CLEARING UP NOW.   • Disease of thyroid gland     HYPO   • Diverticulosis     Colonoscopy November 2014   • Erythema of face     Transitory Erythema Of The Face   • GERD (gastroesophageal reflux disease)     ON OCC   • History of chemotherapy     FOR LEFT BREAST CANCER   • Hyperlipidemia    • Hypertension    • Malignant neoplasm of female breast (HCC) 9/27/2021   • Osteopenia    • Tachycardia         Past Surgical History:   Procedure Laterality Date   • BREAST BIOPSY  09/2021    Dr. Tirado    • BREAST CYST ASPIRATION Right    • BREAST CYST EXCISION Right    • BREAST LUMPECTOMY WITH SENTINEL NODE BIOPSY Left 2/10/2022    Procedure: LEFT BREAST WIRE LOCALIZED LUMPECTOMY WITH SENTINEL NODE BIOPSY, POSSIBLE LEFT AXILLARY DISSECTION;  Surgeon: Becky Liu MD;  Location: [facility] OR [facility] [place];  Service: General;  Laterality: Left;   • COLONOSCOPY     • COLONOSCOPY  2014   • EXOSTECTOMY Bilateral     Simple Bunion Exostectomy (Silver Procedure)   • FOOT TENDON  "SURGERY  2012   • VENOUS ACCESS DEVICE (PORT) INSERTION N/A 10/18/2021    Procedure: INSERTION VENOUS ACCESS DEVICE;  Surgeon: Becky Liu MD;  Location: [facility] OR [facility] [place];  Service: General;  Laterality: N/A;        Family History   Problem Relation Age of Onset   • Arthritis Father    • Heart disease Father    • Hypertension Father    • Hypertension Mother    • Malig Hyperthermia Neg Hx         Social History     Socioeconomic History   • Marital status:      Spouse name: Pj   • Number of children: 1   Tobacco Use   • Smoking status: Former Smoker     Types: Cigarettes   • Smokeless tobacco: Never Used   • Tobacco comment: SOCIALLY IN EARLY    Vaping Use   • Vaping Use: Never used   Substance and Sexual Activity   • Alcohol use: Yes     Comment: social drinker   • Drug use: Never   • Sexual activity: Defer        OB History             Para        Term   0            AB        Living           SAB        IAB        Ectopic        Molar        Multiple        Live Births                 Age at menarche-13  Age at menopause-50  Nulliparous  Breast-feeding-N/A   0 para 0  0  Oral contraceptive pill use-none  Hormone replacement therapy-7 years    No Known Allergies     Review of systems as mentioned in the HPI    Objective   Blood pressure 122/75, pulse 91, temperature 97.1 °F (36.2 °C), temperature source Temporal, resp. rate 18, height 154.9 cm (60.98\"), weight 54.9 kg (121 lb), SpO2 96 %.     ECOG performance status-0    Physical Exam  Vitals reviewed.   Constitutional:       Appearance: Normal appearance.   HENT:      Head: Normocephalic and atraumatic.      Nose: Nose normal.      Mouth/Throat:      Mouth: Mucous membranes are moist.      Pharynx: Oropharynx is clear.   Eyes:      Conjunctiva/sclera: Conjunctivae normal.      Pupils: Pupils are equal, round, and reactive to light.   Cardiovascular:      Rate and Rhythm: Normal rate and regular rhythm.      Pulses: " Normal pulses.      Heart sounds: Normal heart sounds.   Pulmonary:      Effort: Pulmonary effort is normal.      Breath sounds: Normal breath sounds.   Abdominal:      General: Abdomen is flat. Bowel sounds are normal.      Palpations: Abdomen is soft.   Musculoskeletal:         General: Normal range of motion.      Cervical back: Normal range of motion.   Skin:     General: Skin is warm and dry.      Findings: No rash.   Neurological:      General: No focal deficit present.      Mental Status: She is alert and oriented to person, place, and time. Mental status is at baseline.   Psychiatric:         Mood and Affect: Mood normal.         Behavior: Behavior normal.         Thought Content: Thought content normal.         Judgment: Judgment normal.       Breast Exam: Right breast appears normal on inspection.  No palpable abnormalities of the right breast.    Left breast on inspection there is a scar which is healing well.  There is also left axillary scar which is healing well.  The left breast overall appears more erythematous compared to the right breast.  However there is no warmth or no purulent drainage from the incisions.    I have reexamined the patient and the results are consistent with the previously documented exam. Sheri Avendano MD     Results from last 7 days   Lab Units 04/22/22  0817   WBC 10*3/mm3 8.59   NEUTROS ABS 10*3/mm3 5.95   HEMOGLOBIN g/dL 10.9*   HEMATOCRIT % 33.3*   PLATELETS 10*3/mm3 416               No radiology results for the last 30 days.   CBC reviewed and hemoglobin 10.9 otherwise with normal limits  CMP reviewed and within normal limits.    Assessment/Plan       *Invasive ductal carcinoma of the left breast  · Clinical T2 N0 M0, stage IIa  · On ultrasound the tumor measures 2.4 cm.  Lymph nodes on the left side are normal.  · MRI 10/8/2021 with tumor measuring 2.8 cm.  Lymph nodes appear normal  · Pathology consistent with invasive carcinoma with ductal and lobular features,  grade 3, ER +1 to 10% weak, WY negative, HER-2 3+ immunohistochemistry, Ki-67 70%.  · She was evaluated by Dr. Liu and referred for consideration of neoadjuvant chemotherapy.   Since the tumor is HER-2 positive and over 2 cm I think it would be reasonable to proceed with neoadjuvant chemotherapy.  Since the tumor is over 2 cm but lymph node negative I think she would be a great candidate for EA 1181 clinical trial which comprises of administering Taxol Herceptin and Perjeta tamika  adjuvantly followed by surgery and additional adjuvant treatment depending upon the response.  Port placed 10/18/2021  Echocardiogram shows normal ejection fraction  Week 1 TPH on 10/19/2021  Completed 12 weeks of Taxol Perjeta and Herceptin on 1/4/2022 on EA 1181 clinical trial  1/11/2022 due for Herceptin and Perjeta only  2/1/2022-due for dose 2 of Herceptin and Perjeta  MRI 1/14/2022 reviewed and there has been a good tumor response with decrease in size of the mass to 1.2 cm.  2/10/2022-left breast lumpectomy and sentinel lymph node biopsy  Pathology shows ypT1 cN1 aM0, stage IIb, pleomorphic invasive lobular carcinoma, grade 3, ER +% strong, WY +61-70% moderate, HER-2 negative on the residual tumor  The left axillary lymph node was ER positive strong, WY negative and HER-2 2+, FISH amplified.  On the initial biopsy prior to surgery the pathology showed invasive ductal with lobular features and the ER/WY was negative and HER-2 was 3+.  The residual disease obviously appears to be different from the initial pathology.  What is remaining is essentially pleomorphic lobular carcinoma which is high-grade with a high Ki-67 and ER/WY positivity.  Now that the lymph node is also positive I do believe that she will benefit from additional chemotherapy particularly either AC or EC.  Case was presented at multidisciplinary conference.  Decided to proceed with adjuvant Adriamycin and cyclophosphamide.  Given her age we will proceed with  every 3 weekly AC over every 2 weeks.  Staging CT chest and pelvis and bone scan without any evidence of metastatic disease  3/11/2022-proceed with cycle 1 of AC  4/1/2022-cycle 2 AC  4/22/2022-CBC and CMP reviewed and okay to proceed with chemotherapy today      *Right-sided port appears normal, some thinning of the skin on the port.      *Hypertension-currently amlodipine and metoprolol.  Blood pressure 122/75    *Anxiety  · Well-controlled  · Continue Zyprexa    *Hypothyroidism  · Continue Synthroid  · Stable    *Cardiac health-  · Echocardiogram 1/5/2022 reviewed and stable  · Stress test 2/3/2022 normal  · 3/1/2022-echocardiogram shows normal ejection fraction of 56 to 60%, normal LV strain    PLAN:   1. Cycle 3 AC today  2. Toxicity check with 1 L of normal saline in a week  3. Cycle 4 AC in 3-week  4. Neulasta today  5. Recommend Claritin to help pain with Neulasta  6. She will require adjuvant HER2 directed therapy, slightly Kadcyla following completion of AC    Patient is on cytotoxic chemotherapy requiring close monitoring for toxicities.

## 2022-04-23 DIAGNOSIS — F41.9 ANXIETY: ICD-10-CM

## 2022-04-25 RX ORDER — ESCITALOPRAM OXALATE 20 MG/1
TABLET ORAL
Qty: 30 TABLET | Refills: 5 | Status: SHIPPED | OUTPATIENT
Start: 2022-04-25 | End: 2022-04-27

## 2022-04-26 DIAGNOSIS — E78.5 HYPERLIPIDEMIA, UNSPECIFIED HYPERLIPIDEMIA TYPE: ICD-10-CM

## 2022-04-26 RX ORDER — ROSUVASTATIN CALCIUM 10 MG/1
10 TABLET, COATED ORAL DAILY
Qty: 90 TABLET | Refills: 3 | Status: SHIPPED | OUTPATIENT
Start: 2022-04-26

## 2022-04-27 DIAGNOSIS — F41.9 ANXIETY: ICD-10-CM

## 2022-04-28 RX ORDER — ESCITALOPRAM OXALATE 20 MG/1
TABLET ORAL
Qty: 30 TABLET | Refills: 5 | Status: SHIPPED | OUTPATIENT
Start: 2022-04-28

## 2022-04-29 ENCOUNTER — INFUSION (OUTPATIENT)
Dept: ONCOLOGY | Facility: HOSPITAL | Age: 77
End: 2022-04-29

## 2022-04-29 VITALS
TEMPERATURE: 97.8 F | BODY MASS INDEX: 22.84 KG/M2 | WEIGHT: 120.8 LBS | RESPIRATION RATE: 16 BRPM | DIASTOLIC BLOOD PRESSURE: 76 MMHG | HEART RATE: 121 BPM | OXYGEN SATURATION: 97 % | SYSTOLIC BLOOD PRESSURE: 118 MMHG

## 2022-04-29 DIAGNOSIS — Z45.9 ENCOUNTER FOR MANAGEMENT OF IMPLANTED DEVICE: ICD-10-CM

## 2022-04-29 DIAGNOSIS — C50.212 MALIGNANT NEOPLASM OF UPPER-INNER QUADRANT OF LEFT BREAST IN FEMALE, ESTROGEN RECEPTOR POSITIVE: Primary | ICD-10-CM

## 2022-04-29 DIAGNOSIS — Z17.0 MALIGNANT NEOPLASM OF UPPER-INNER QUADRANT OF LEFT BREAST IN FEMALE, ESTROGEN RECEPTOR POSITIVE: Primary | ICD-10-CM

## 2022-04-29 LAB
ALBUMIN SERPL-MCNC: 3.2 G/DL (ref 3.5–5.2)
ALBUMIN/GLOB SERPL: 1.5 G/DL (ref 1.1–2.4)
ALP SERPL-CCNC: 108 U/L (ref 38–116)
ALT SERPL W P-5'-P-CCNC: 10 U/L (ref 0–33)
ANION GAP SERPL CALCULATED.3IONS-SCNC: 6.1 MMOL/L (ref 5–15)
AST SERPL-CCNC: 12 U/L (ref 0–32)
BILIRUB SERPL-MCNC: 0.2 MG/DL (ref 0.2–1.2)
BUN SERPL-MCNC: 19 MG/DL (ref 6–20)
BUN/CREAT SERPL: 28.8 (ref 7.3–30)
CALCIUM SPEC-SCNC: 8.4 MG/DL (ref 8.5–10.2)
CHLORIDE SERPL-SCNC: 107 MMOL/L (ref 98–107)
CO2 SERPL-SCNC: 23.9 MMOL/L (ref 22–29)
CREAT SERPL-MCNC: 0.66 MG/DL (ref 0.6–1.1)
EGFRCR SERPLBLD CKD-EPI 2021: 90.5 ML/MIN/1.73
GLOBULIN UR ELPH-MCNC: 2.2 GM/DL (ref 1.8–3.5)
GLUCOSE SERPL-MCNC: 120 MG/DL (ref 74–124)
POTASSIUM SERPL-SCNC: 4.1 MMOL/L (ref 3.5–4.7)
PROT SERPL-MCNC: 5.4 G/DL (ref 6.3–8)
SODIUM SERPL-SCNC: 137 MMOL/L (ref 134–145)

## 2022-04-29 PROCEDURE — 80053 COMPREHEN METABOLIC PANEL: CPT

## 2022-04-29 PROCEDURE — 96360 HYDRATION IV INFUSION INIT: CPT

## 2022-04-29 PROCEDURE — 96361 HYDRATE IV INFUSION ADD-ON: CPT

## 2022-04-29 PROCEDURE — 25010000002 HEPARIN LOCK FLUSH PER 10 UNITS: Performed by: INTERNAL MEDICINE

## 2022-04-29 RX ORDER — SODIUM CHLORIDE 0.9 % (FLUSH) 0.9 %
10 SYRINGE (ML) INJECTION AS NEEDED
Status: CANCELLED | OUTPATIENT
Start: 2022-04-29

## 2022-04-29 RX ORDER — OLANZAPINE 5 MG/1
5 TABLET ORAL NIGHTLY
Qty: 3 TABLET | Refills: 3 | Status: SHIPPED | OUTPATIENT
Start: 2022-04-29 | End: 2022-05-25

## 2022-04-29 RX ORDER — DEXAMETHASONE 0.5 MG/5ML
1 SOLUTION ORAL 4 TIMES DAILY
Qty: 280 ML | Refills: 0 | Status: SHIPPED | OUTPATIENT
Start: 2022-04-29 | End: 2022-05-23 | Stop reason: SDUPTHER

## 2022-04-29 RX ORDER — SODIUM CHLORIDE 9 MG/ML
1000 INJECTION, SOLUTION INTRAVENOUS ONCE
Status: COMPLETED | OUTPATIENT
Start: 2022-04-29 | End: 2022-04-29

## 2022-04-29 RX ORDER — HEPARIN SODIUM (PORCINE) LOCK FLUSH IV SOLN 100 UNIT/ML 100 UNIT/ML
500 SOLUTION INTRAVENOUS AS NEEDED
Status: DISCONTINUED | OUTPATIENT
Start: 2022-04-29 | End: 2022-04-29 | Stop reason: HOSPADM

## 2022-04-29 RX ORDER — HEPARIN SODIUM (PORCINE) LOCK FLUSH IV SOLN 100 UNIT/ML 100 UNIT/ML
500 SOLUTION INTRAVENOUS AS NEEDED
Status: CANCELLED | OUTPATIENT
Start: 2022-04-29

## 2022-04-29 RX ADMIN — Medication 500 UNITS: at 15:54

## 2022-04-29 RX ADMIN — SODIUM CHLORIDE 1000 ML: 9 INJECTION, SOLUTION INTRAVENOUS at 14:22

## 2022-04-29 NOTE — NURSING NOTE
Pt here for IVFs when she voiced c/o burning when swallowing. She has been using baking soda rinses. Pt has oral redness but no actual open areas noted. Reviewed with OTTO Fraire, and dexamethasone oral solution, swish and spit was e-scribed. Pt also reported she lost one of her Zyprexa tablets therefore the pharmacy refilled her prescription but that will make her one tablet short. Per OTTO Fraire, ok to refill this prescription. Zyprexa refill e-scribed.

## 2022-05-09 DIAGNOSIS — Z17.0 MALIGNANT NEOPLASM OF UPPER-INNER QUADRANT OF LEFT BREAST IN FEMALE, ESTROGEN RECEPTOR POSITIVE: ICD-10-CM

## 2022-05-09 DIAGNOSIS — C50.212 MALIGNANT NEOPLASM OF UPPER-INNER QUADRANT OF LEFT BREAST IN FEMALE, ESTROGEN RECEPTOR POSITIVE: ICD-10-CM

## 2022-05-09 RX ORDER — PANTOPRAZOLE SODIUM 40 MG/1
TABLET, DELAYED RELEASE ORAL
Qty: 30 TABLET | Refills: 0 | Status: SHIPPED | OUTPATIENT
Start: 2022-05-09 | End: 2023-01-20 | Stop reason: SDUPTHER

## 2022-05-13 ENCOUNTER — OFFICE VISIT (OUTPATIENT)
Dept: ONCOLOGY | Facility: CLINIC | Age: 77
End: 2022-05-13

## 2022-05-13 ENCOUNTER — INFUSION (OUTPATIENT)
Dept: ONCOLOGY | Facility: HOSPITAL | Age: 77
End: 2022-05-13

## 2022-05-13 ENCOUNTER — RESEARCH ENCOUNTER (OUTPATIENT)
Dept: ONCOLOGY | Facility: CLINIC | Age: 77
End: 2022-05-13

## 2022-05-13 VITALS
OXYGEN SATURATION: 96 % | WEIGHT: 119.3 LBS | SYSTOLIC BLOOD PRESSURE: 136 MMHG | TEMPERATURE: 97.5 F | RESPIRATION RATE: 16 BRPM | BODY MASS INDEX: 22.52 KG/M2 | HEART RATE: 91 BPM | DIASTOLIC BLOOD PRESSURE: 82 MMHG | HEIGHT: 61 IN

## 2022-05-13 DIAGNOSIS — C50.212 MALIGNANT NEOPLASM OF UPPER-INNER QUADRANT OF LEFT BREAST IN FEMALE, ESTROGEN RECEPTOR POSITIVE: ICD-10-CM

## 2022-05-13 DIAGNOSIS — Z17.0 MALIGNANT NEOPLASM OF UPPER-INNER QUADRANT OF LEFT BREAST IN FEMALE, ESTROGEN RECEPTOR POSITIVE: Primary | ICD-10-CM

## 2022-05-13 DIAGNOSIS — Z17.0 MALIGNANT NEOPLASM OF UPPER-INNER QUADRANT OF LEFT BREAST IN FEMALE, ESTROGEN RECEPTOR POSITIVE: ICD-10-CM

## 2022-05-13 DIAGNOSIS — C50.212 MALIGNANT NEOPLASM OF UPPER-INNER QUADRANT OF LEFT BREAST IN FEMALE, ESTROGEN RECEPTOR POSITIVE: Primary | ICD-10-CM

## 2022-05-13 DIAGNOSIS — Z79.899 HIGH RISK MEDICATION USE: ICD-10-CM

## 2022-05-13 DIAGNOSIS — Z78.0 POST-MENOPAUSAL: Primary | ICD-10-CM

## 2022-05-13 LAB
ALBUMIN SERPL-MCNC: 3.9 G/DL (ref 3.5–5.2)
ALBUMIN/GLOB SERPL: 1.6 G/DL (ref 1.1–2.4)
ALP SERPL-CCNC: 116 U/L (ref 38–116)
ALT SERPL W P-5'-P-CCNC: 16 U/L (ref 0–33)
ANION GAP SERPL CALCULATED.3IONS-SCNC: 8 MMOL/L (ref 5–15)
AST SERPL-CCNC: 20 U/L (ref 0–32)
BASOPHILS # BLD AUTO: 0.09 10*3/MM3 (ref 0–0.2)
BASOPHILS NFR BLD AUTO: 1.1 % (ref 0–1.5)
BILIRUB SERPL-MCNC: 0.3 MG/DL (ref 0.2–1.2)
BUN SERPL-MCNC: 12 MG/DL (ref 6–20)
BUN/CREAT SERPL: 15.4 (ref 7.3–30)
CALCIUM SPEC-SCNC: 9.1 MG/DL (ref 8.5–10.2)
CHLORIDE SERPL-SCNC: 106 MMOL/L (ref 98–107)
CO2 SERPL-SCNC: 25 MMOL/L (ref 22–29)
CREAT SERPL-MCNC: 0.78 MG/DL (ref 0.6–1.1)
DEPRECATED RDW RBC AUTO: 62.7 FL (ref 37–54)
EGFRCR SERPLBLD CKD-EPI 2021: 78.3 ML/MIN/1.73
EOSINOPHIL # BLD AUTO: 0.02 10*3/MM3 (ref 0–0.4)
EOSINOPHIL NFR BLD AUTO: 0.2 % (ref 0.3–6.2)
ERYTHROCYTE [DISTWIDTH] IN BLOOD BY AUTOMATED COUNT: 17.2 % (ref 12.3–15.4)
GLOBULIN UR ELPH-MCNC: 2.5 GM/DL (ref 1.8–3.5)
GLUCOSE SERPL-MCNC: 103 MG/DL (ref 74–124)
HCT VFR BLD AUTO: 32.2 % (ref 34–46.6)
HGB BLD-MCNC: 10.3 G/DL (ref 12–15.9)
IMM GRANULOCYTES # BLD AUTO: 0.05 10*3/MM3 (ref 0–0.05)
IMM GRANULOCYTES NFR BLD AUTO: 0.6 % (ref 0–0.5)
LYMPHOCYTES # BLD AUTO: 1.06 10*3/MM3 (ref 0.7–3.1)
LYMPHOCYTES NFR BLD AUTO: 12.6 % (ref 19.6–45.3)
MCH RBC QN AUTO: 32.5 PG (ref 26.6–33)
MCHC RBC AUTO-ENTMCNC: 32 G/DL (ref 31.5–35.7)
MCV RBC AUTO: 101.6 FL (ref 79–97)
MONOCYTES # BLD AUTO: 1.23 10*3/MM3 (ref 0.1–0.9)
MONOCYTES NFR BLD AUTO: 14.6 % (ref 5–12)
NEUTROPHILS NFR BLD AUTO: 5.99 10*3/MM3 (ref 1.7–7)
NEUTROPHILS NFR BLD AUTO: 70.9 % (ref 42.7–76)
NRBC BLD AUTO-RTO: 0 /100 WBC (ref 0–0.2)
PLATELET # BLD AUTO: 390 10*3/MM3 (ref 140–450)
PMV BLD AUTO: 9.3 FL (ref 6–12)
POTASSIUM SERPL-SCNC: 4.2 MMOL/L (ref 3.5–4.7)
PROT SERPL-MCNC: 6.4 G/DL (ref 6.3–8)
RBC # BLD AUTO: 3.17 10*6/MM3 (ref 3.77–5.28)
SODIUM SERPL-SCNC: 139 MMOL/L (ref 134–145)
WBC NRBC COR # BLD: 8.44 10*3/MM3 (ref 3.4–10.8)

## 2022-05-13 PROCEDURE — 96413 CHEMO IV INFUSION 1 HR: CPT

## 2022-05-13 PROCEDURE — 25010000002 CYCLOPHOSPHAMIDE 1 GM/5ML SOLUTION 5 ML VIAL: Performed by: INTERNAL MEDICINE

## 2022-05-13 PROCEDURE — 25010000002 PEGFILGRASTIM 6 MG/0.6ML PREFILLED SYRINGE KIT: Performed by: INTERNAL MEDICINE

## 2022-05-13 PROCEDURE — 25010000002 DOXORUBICIN PER 10 MG: Performed by: INTERNAL MEDICINE

## 2022-05-13 PROCEDURE — 96377 APPLICATON ON-BODY INJECTOR: CPT

## 2022-05-13 PROCEDURE — 85025 COMPLETE CBC W/AUTO DIFF WBC: CPT

## 2022-05-13 PROCEDURE — 25010000002 PALONOSETRON PER 25 MCG: Performed by: INTERNAL MEDICINE

## 2022-05-13 PROCEDURE — 96375 TX/PRO/DX INJ NEW DRUG ADDON: CPT

## 2022-05-13 PROCEDURE — 99215 OFFICE O/P EST HI 40 MIN: CPT | Performed by: INTERNAL MEDICINE

## 2022-05-13 PROCEDURE — 96411 CHEMO IV PUSH ADDL DRUG: CPT

## 2022-05-13 PROCEDURE — 80053 COMPREHEN METABOLIC PANEL: CPT

## 2022-05-13 PROCEDURE — 96367 TX/PROPH/DG ADDL SEQ IV INF: CPT

## 2022-05-13 PROCEDURE — 25010000002 DEXAMETHASONE SODIUM PHOSPHATE 100 MG/10ML SOLUTION: Performed by: INTERNAL MEDICINE

## 2022-05-13 PROCEDURE — 25010000002 FOSAPREPITANT PER 1 MG: Performed by: INTERNAL MEDICINE

## 2022-05-13 RX ORDER — SODIUM CHLORIDE 9 MG/ML
1000 INJECTION, SOLUTION INTRAVENOUS ONCE
Status: CANCELLED
Start: 2022-05-17 | End: 2022-05-20

## 2022-05-13 RX ORDER — ANASTROZOLE 1 MG/1
1 TABLET ORAL DAILY
Qty: 30 TABLET | Refills: 3 | Status: SHIPPED | OUTPATIENT
Start: 2022-05-13 | End: 2022-11-07

## 2022-05-13 RX ORDER — DOXORUBICIN HYDROCHLORIDE 2 MG/ML
60 INJECTION, SOLUTION INTRAVENOUS ONCE
Status: CANCELLED | OUTPATIENT
Start: 2022-05-13

## 2022-05-13 RX ORDER — OLANZAPINE 5 MG/1
5 TABLET ORAL ONCE
Status: CANCELLED | OUTPATIENT
Start: 2022-05-13 | End: 2022-05-13

## 2022-05-13 RX ORDER — OLANZAPINE 5 MG/1
5 TABLET ORAL ONCE
Status: COMPLETED | OUTPATIENT
Start: 2022-05-13 | End: 2022-05-13

## 2022-05-13 RX ORDER — DOXORUBICIN HYDROCHLORIDE 2 MG/ML
60 INJECTION, SOLUTION INTRAVENOUS ONCE
Status: COMPLETED | OUTPATIENT
Start: 2022-05-13 | End: 2022-05-13

## 2022-05-13 RX ORDER — SODIUM CHLORIDE 9 MG/ML
250 INJECTION, SOLUTION INTRAVENOUS ONCE
Status: COMPLETED | OUTPATIENT
Start: 2022-05-13 | End: 2022-05-13

## 2022-05-13 RX ORDER — PALONOSETRON 0.05 MG/ML
0.25 INJECTION, SOLUTION INTRAVENOUS ONCE
Status: CANCELLED | OUTPATIENT
Start: 2022-05-13

## 2022-05-13 RX ORDER — SODIUM CHLORIDE 9 MG/ML
250 INJECTION, SOLUTION INTRAVENOUS ONCE
Status: CANCELLED | OUTPATIENT
Start: 2022-05-13

## 2022-05-13 RX ORDER — PALONOSETRON 0.05 MG/ML
0.25 INJECTION, SOLUTION INTRAVENOUS ONCE
Status: COMPLETED | OUTPATIENT
Start: 2022-05-13 | End: 2022-05-13

## 2022-05-13 RX ADMIN — SODIUM CHLORIDE 250 ML: 9 INJECTION, SOLUTION INTRAVENOUS at 09:27

## 2022-05-13 RX ADMIN — PEGFILGRASTIM 6 MG: KIT SUBCUTANEOUS at 11:22

## 2022-05-13 RX ADMIN — PALONOSETRON HYDROCHLORIDE 0.25 MG: 0.25 INJECTION, SOLUTION INTRAVENOUS at 09:28

## 2022-05-13 RX ADMIN — DEXAMETHASONE SODIUM PHOSPHATE 12 MG: 10 INJECTION, SOLUTION INTRAMUSCULAR; INTRAVENOUS at 09:27

## 2022-05-13 RX ADMIN — CYCLOPHOSPHAMIDE 920 MG: 200 INJECTION, SOLUTION INTRAVENOUS at 10:46

## 2022-05-13 RX ADMIN — OLANZAPINE 5 MG: 5 TABLET, FILM COATED ORAL at 09:27

## 2022-05-13 RX ADMIN — DOXORUBICIN HYDROCHLORIDE 92 MG: 2 INJECTION, SOLUTION INTRAVENOUS at 10:29

## 2022-05-13 RX ADMIN — SODIUM CHLORIDE 150 ML: 900 INJECTION, SOLUTION INTRAVENOUS at 09:52

## 2022-05-13 NOTE — PROGRESS NOTES
Subjective   Neela Ramirez is a 77 y.o. female.  Referred by Dr. 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  A bilateral diagnostic mammogram was performed for further evaluation of the same.    8/17/2021-bilateral diagnostic mammogram-scattered fibroglandular densities throughout both breasts.  In the posterior one third upper inner quadrant of the left breast at the site of palpable concern, 11 o'clock position there is a mass with adjacent pleomorphic calcifications.  The mass and the calcifications measure on order of 1.5 cm in greatest dimension.  No evidence of axillary lymphadenopathy appreciated.  Stable microcalcifications seen in other areas of the left breast on right breast.  Ultrasound-at 11 o'clock position, 5 cm from the nipple in the left breast there is an irregular hypoechoic mass which measures 2.4 x 2 x 1.6 cm.    Impression  1.irregular 2.4 cm mass in the left breast at the site of palpable concern at 11:00, 5 cm from the nipple.  Ultrasound-guided biopsy of the mass recommended  No finding suspicious for malignancy in the right breast    9/8/2021-left breast ultrasound-guided biopsy  Pathology consistent with invasive ductal carcinoma with lobular features.  Grade 3.  The carcinoma measures 7 mm in greatest dimension.  Focally suspicious for lymphovascular space invasion.  ER low +1-10%, intensity week  VA negative  HER-2 3+ on immunohistochemistry  Ki-67 70%    MRI of the breast 10/8/2021-Biopsy-proven malignancy in the left breast at 11:00 measuring 2.8 cm with a centrally located metallic clip.  No other suspicious findings are seen within the left breast or no left axillary lymphadenopathy.  No suspicious findings of the right breast    Echocardiogram 10/8/2021 was normal  ejection fraction of 56 to 60% and strain of -20    She denies any new bone pains, dyspnea, cough, abdominal pain nausea vomiting or recent changes in weight.    She had 2 previous breast aspirations in her 20s.  No other breast biopsies  She does not know much about her family hence limited family history.    She received neoadjuvant chemotherapy on EA 1181 trial with Taxol Perjeta and Herceptin.  She completed 12 weeks of Taxol Herceptin and Perjeta.    Had an abnormal EKG preop and hence a stress test was performed on 2/3/2022 which showed a normal left ventricular ejection fraction and LVEF at stress was 73%.  Considered a low risk study with normal myocardial perfusion imaging.    She underwent a left breast lumpectomy and a sentinel lymph node biopsy on 2/10/2022.    Pathology was consistent with residual tumor which was pleomorphic invasive lobular carcinoma, poorly differentiated, grade 3  Tumor measured 18 mm  Margins negative for invasive carcinoma  Associated lobular carcinoma in situ noted  1 out of 10 lymph nodes was positive for metastatic focus measuring 3.5 mm.    Receptors were repeated on the pleomorphic invasive lobular carcinoma  ER +91 to 100% strong  MS +61 to 70% moderate  HER-2 negative  Ki-67 55%    Receptors performed on the metastatic lymph node  ER +81-90% strong  MS negative  HER-2 2+ on immunohistochemistry, FISH pending.    ypT1 cN1 aM0    2/28/2022-CT of the chest abdomen and pelvis  Fluid collection in the upper left breast measuring 6 x 2.5 x 4.9 cm, postoperative seroma.  Skin thickening of the left breast likely postsurgical.  Fluid collections left axilla measuring 2.9 x 1.4 x 2.9 cm.  No other suspicious abnormalities on the CT of the chest  CT abdomen with no evidence of metastatic disease.  Mild colonic diverticulosis.  Small uterine fibroid.    3/1/2022-bone scan  No evidence of metastatic disease.  Multifocal degenerative arthritic uptake without scintigraphic evidence to  suggest metastatic disease.    3/1/2022-echocardiogram  Left ventricular ejection fraction 56-60%.  Normal global LV strain of -20.5%.    3/11/2022-cycle 1 of AC    Interval history  Ms. Ramirez presents today for follow-up.  She is scheduled for cycle 4 AC  Tolerating chemotherapy well  Reports some fatigue    The following portions of the patient's history were reviewed and updated as appropriate: allergies, current medications, past family history, past medical history, past social history, past surgical history and problem list.    Past Medical History:   Diagnosis Date   • Anxiety    • Arthritis    • Cataract     EARLY. JIMMIE EYES   • Chronic back pain     LOW BACK ACHES AT TIME FROM ARTHRTIS   • Constipation     AT TIMES. NO RECENT ISSUES   • Dermatitis     LEGS. STATES CLEARING UP NOW.   • Disease of thyroid gland     HYPO   • Diverticulosis     Colonoscopy November 2014   • Erythema of face     Transitory Erythema Of The Face   • GERD (gastroesophageal reflux disease)     ON OCC   • History of chemotherapy     FOR LEFT BREAST CANCER   • Hyperlipidemia    • Hypertension    • Malignant neoplasm of female breast (HCC) 9/27/2021   • Osteopenia    • Tachycardia         Past Surgical History:   Procedure Laterality Date   • BREAST BIOPSY  09/2021    Dr. Tirado    • BREAST CYST ASPIRATION Right    • BREAST CYST EXCISION Right    • BREAST LUMPECTOMY WITH SENTINEL NODE BIOPSY Left 2/10/2022    Procedure: LEFT BREAST WIRE LOCALIZED LUMPECTOMY WITH SENTINEL NODE BIOPSY, POSSIBLE LEFT AXILLARY DISSECTION;  Surgeon: Becky Liu MD;  Location: [facility] OR [facility];  Service: General;  Laterality: Left;   • COLONOSCOPY     • COLONOSCOPY  2014   • EXOSTECTOMY Bilateral     Simple Bunion Exostectomy (Silver Procedure)   • FOOT TENDON SURGERY  2012   • VENOUS ACCESS DEVICE (PORT) INSERTION N/A 10/18/2021    Procedure: INSERTION VENOUS ACCESS DEVICE;  Surgeon: Becky Liu MD;  Location: [facility] OR [facility];  Service: General;   "Laterality: N/A;        Family History   Problem Relation Age of Onset   • Arthritis Father    • Heart disease Father    • Hypertension Father    • Hypertension Mother    • Malig Hyperthermia Neg Hx         Social History     Socioeconomic History   • Marital status:      Spouse name: Pj   • Number of children: 1   Tobacco Use   • Smoking status: Former Smoker     Types: Cigarettes   • Smokeless tobacco: Never Used   • Tobacco comment: SOCIALLY IN EARLY 20'S   Vaping Use   • Vaping Use: Never used   Substance and Sexual Activity   • Alcohol use: Yes     Comment: social drinker   • Drug use: Never   • Sexual activity: Defer        OB History             Para        Term   0            AB        Living           SAB        IAB        Ectopic        Molar        Multiple        Live Births                 Age at menarche-13  Age at menopause-50  Nulliparous  Breast-feeding-N/A   0 para 0  0  Oral contraceptive pill use-none  Hormone replacement therapy-7 years    No Known Allergies     Review of systems as mentioned in the HPI    Objective   Blood pressure 136/82, pulse 91, temperature 97.5 °F (36.4 °C), temperature source Temporal, resp. rate 16, height 154.9 cm (60.98\"), weight 54.1 kg (119 lb 4.8 oz), SpO2 96 %.     ECOG performance status-0    Physical Exam  Vitals reviewed.   Constitutional:       Appearance: Normal appearance.   HENT:      Head: Normocephalic and atraumatic.      Nose: Nose normal.      Mouth/Throat:      Mouth: Mucous membranes are moist.      Pharynx: Oropharynx is clear.   Eyes:      Conjunctiva/sclera: Conjunctivae normal.      Pupils: Pupils are equal, round, and reactive to light.   Cardiovascular:      Rate and Rhythm: Normal rate and regular rhythm.      Pulses: Normal pulses.      Heart sounds: Normal heart sounds.   Pulmonary:      Effort: Pulmonary effort is normal.      Breath sounds: Normal breath sounds.   Abdominal:      General: Abdomen is " flat. Bowel sounds are normal.      Palpations: Abdomen is soft.   Musculoskeletal:         General: Normal range of motion.      Cervical back: Normal range of motion.   Skin:     General: Skin is warm and dry.      Findings: No rash.   Neurological:      General: No focal deficit present.      Mental Status: She is alert and oriented to person, place, and time. Mental status is at baseline.   Psychiatric:         Mood and Affect: Mood normal.         Behavior: Behavior normal.         Thought Content: Thought content normal.         Judgment: Judgment normal.       Breast Exam: Right breast appears normal on inspection.  No palpable abnormalities of the right breast.    Left breast on inspection there is a scar which is healing well.  There is also left axillary scar which is healing well.  The left breast overall appears more erythematous compared to the right breast.  However there is no warmth or no purulent drainage from the incisions.    I have reexamined the patient and the results are consistent with the previously documented exam. Sheri Avendano MD     Results from last 7 days   Lab Units 05/13/22  0748   WBC 10*3/mm3 8.44   NEUTROS ABS 10*3/mm3 5.99   HEMOGLOBIN g/dL 10.3*   HEMATOCRIT % 32.2*   PLATELETS 10*3/mm3 390     Results from last 7 days   Lab Units 05/13/22  0748   SODIUM mmol/L 139   POTASSIUM mmol/L 4.2   CHLORIDE mmol/L 106   CO2 mmol/L 25.0   BUN mg/dL 12   CREATININE mg/dL 0.78   CALCIUM mg/dL 9.1   ALBUMIN g/dL 3.90   BILIRUBIN mg/dL 0.3   ALK PHOS U/L 116   ALT (SGPT) U/L 16   AST (SGOT) U/L 20   GLUCOSE mg/dL 103           No radiology results for the last 30 days.   CBC reviewed and hemoglobin 10.9 otherwise with normal limits  CMP reviewed and within normal limits.    Assessment & Plan       *Invasive ductal carcinoma of the left breast  · Clinical T2 N0 M0, stage IIa  · On ultrasound the tumor measures 2.4 cm.  Lymph nodes on the left side are normal.  · MRI 10/8/2021 with tumor  measuring 2.8 cm.  Lymph nodes appear normal  · Pathology consistent with invasive carcinoma with ductal and lobular features, grade 3, ER +1 to 10% weak, NY negative, HER-2 3+ immunohistochemistry, Ki-67 70%.  · She was evaluated by Dr. Liu and referred for consideration of neoadjuvant chemotherapy.   Since the tumor is HER-2 positive and over 2 cm I think it would be reasonable to proceed with neoadjuvant chemotherapy.  Since the tumor is over 2 cm but lymph node negative I think she would be a great candidate for EA 1181 clinical trial which comprises of administering Taxol Herceptin and Perjeta tamika  adjuvantly followed by surgery and additional adjuvant treatment depending upon the response.  Port placed 10/18/2021  Echocardiogram shows normal ejection fraction  Week 1 TPH on 10/19/2021  Completed 12 weeks of Taxol Perjeta and Herceptin on 1/4/2022 on EA 1181 clinical trial  1/11/2022 due for Herceptin and Perjeta only  2/1/2022-due for dose 2 of Herceptin and Perjeta  MRI 1/14/2022 reviewed and there has been a good tumor response with decrease in size of the mass to 1.2 cm.  2/10/2022-left breast lumpectomy and sentinel lymph node biopsy  Pathology shows ypT1 cN1 aM0, stage IIb, pleomorphic invasive lobular carcinoma, grade 3, ER +% strong, NY +61-70% moderate, HER-2 negative on the residual tumor  The left axillary lymph node was ER positive strong, NY negative and HER-2 2+, FISH amplified.  On the initial biopsy prior to surgery the pathology showed invasive ductal with lobular features and the ER/NY was negative and HER-2 was 3+.  The residual disease obviously appears to be different from the initial pathology.  What is remaining is essentially pleomorphic lobular carcinoma which is high-grade with a high Ki-67 and ER/NY positivity.  Now that the lymph node is also positive I do believe that she will benefit from additional chemotherapy particularly either AC or EC.  Case was presented at  multidisciplinary conference.  Decided to proceed with adjuvant Adriamycin and cyclophosphamide.  Given her age we will proceed with every 3 weekly AC over every 2 weeks.  Staging CT chest and pelvis and bone scan without any evidence of metastatic disease  3/11/2022-proceed with cycle 1 of AC  4/1/2022-cycle 2 AC  5/13/2022-due for cycle 4 AC.  Labs reviewed and stable to proceed with cycle 4  We discussed starting Kadcyla in 3 weeks after completion of AC.  She will be referred to radiation oncology.  We also discussed starting adjuvant endocrine therapy with anastrozole given that the tumor is ER positive.  We discussed adverse effects of Kadcyla including but not limited to myelosuppression, increased risk of infections, neuropathy, liver dysfunction.  Adverse effects of anastrozole including but not limited to hot flashes, mood changes, fatigue, decrease in bone mineral density, cardiovascular risk, arthralgias and myalgias 7 discussed.      *Right-sided port appears normal, some thinning of the skin on the port.      *Hypertension-currently amlodipine and metoprolol.  Blood pressure 136/82    *Anxiety  · Well controlled  · Continue Zyprexa    *Hypothyroidism  · Continue Synthroid  · Stable    *Cardiac health-  · Echocardiogram 1/5/2022 reviewed and stable  · Stress test 2/3/2022 normal  · 3/1/2022-echocardiogram shows normal ejection fraction of 56 to 60%, normal LV strain  · Repeat echocardiogram ordered today  · Recommend that she follow with Dr. Galo    *Bone health-  DEXA scan prior to follow-up    PLAN:   1. Cycle 4 AC today  2. Toxicity check with 1 L of normal saline in a week  3. Kadcyla to start in 3 weeks to complete 1 year of HER2 directed therapy which is 9 more months  4. Neulasta today  5. Continue Claritin  6. Anastrozole prescribed today  7. Referral to radiation oncology made today    Patient is on cytotoxic chemotherapy requiring close monitoring for toxicities.    40 minutes spent on  the encounter including face to face time, documentation on the same day.

## 2022-05-16 ENCOUNTER — RESEARCH ENCOUNTER (OUTPATIENT)
Dept: ONCOLOGY | Facility: CLINIC | Age: 77
End: 2022-05-16

## 2022-05-16 NOTE — RESEARCH
[id number] (CompassHER2 pCR): Preoperative THP and postoperative HP in patients who achieve a pathologic complete response Part 1 Component of: The CompassHER2 Trials (COMprehensive use of Pathologic response ASSessment to optimize therapy in HER2-positive breast cancer)    She is here today for her infusion and visit with MD.  It is also a 3 month follow-up for the study.  She has no complaints and no evidence of progression.  Will continue to follow closely.    ECOG=0

## 2022-05-17 ENCOUNTER — INFUSION (OUTPATIENT)
Dept: ONCOLOGY | Facility: HOSPITAL | Age: 77
End: 2022-05-17

## 2022-05-17 ENCOUNTER — TELEPHONE (OUTPATIENT)
Dept: CARDIOLOGY | Facility: CLINIC | Age: 77
End: 2022-05-17

## 2022-05-17 VITALS
BODY MASS INDEX: 22.65 KG/M2 | OXYGEN SATURATION: 94 % | TEMPERATURE: 98.4 F | RESPIRATION RATE: 18 BRPM | SYSTOLIC BLOOD PRESSURE: 117 MMHG | WEIGHT: 119.8 LBS | DIASTOLIC BLOOD PRESSURE: 65 MMHG | HEART RATE: 121 BPM

## 2022-05-17 DIAGNOSIS — Z45.9 ENCOUNTER FOR MANAGEMENT OF IMPLANTED DEVICE: ICD-10-CM

## 2022-05-17 DIAGNOSIS — Z17.0 MALIGNANT NEOPLASM OF UPPER-INNER QUADRANT OF LEFT BREAST IN FEMALE, ESTROGEN RECEPTOR POSITIVE: Primary | ICD-10-CM

## 2022-05-17 DIAGNOSIS — C50.212 MALIGNANT NEOPLASM OF UPPER-INNER QUADRANT OF LEFT BREAST IN FEMALE, ESTROGEN RECEPTOR POSITIVE: Primary | ICD-10-CM

## 2022-05-17 LAB
ALBUMIN SERPL-MCNC: 3.7 G/DL (ref 3.5–5.2)
ALBUMIN/GLOB SERPL: 1.8 G/DL (ref 1.1–2.4)
ALP SERPL-CCNC: 167 U/L (ref 38–116)
ALT SERPL W P-5'-P-CCNC: 11 U/L (ref 0–33)
ANION GAP SERPL CALCULATED.3IONS-SCNC: 9.9 MMOL/L (ref 5–15)
AST SERPL-CCNC: 14 U/L (ref 0–32)
BILIRUB SERPL-MCNC: 0.6 MG/DL (ref 0.2–1.2)
BUN SERPL-MCNC: 15 MG/DL (ref 6–20)
BUN/CREAT SERPL: 22.7 (ref 7.3–30)
CALCIUM SPEC-SCNC: 8.8 MG/DL (ref 8.5–10.2)
CHLORIDE SERPL-SCNC: 107 MMOL/L (ref 98–107)
CO2 SERPL-SCNC: 23.1 MMOL/L (ref 22–29)
CREAT SERPL-MCNC: 0.66 MG/DL (ref 0.6–1.1)
EGFRCR SERPLBLD CKD-EPI 2021: 90.5 ML/MIN/1.73
GLOBULIN UR ELPH-MCNC: 2.1 GM/DL (ref 1.8–3.5)
GLUCOSE SERPL-MCNC: 84 MG/DL (ref 74–124)
POTASSIUM SERPL-SCNC: 4.1 MMOL/L (ref 3.5–4.7)
PROT SERPL-MCNC: 5.8 G/DL (ref 6.3–8)
SODIUM SERPL-SCNC: 140 MMOL/L (ref 134–145)

## 2022-05-17 PROCEDURE — 25010000002 HEPARIN LOCK FLUSH PER 10 UNITS: Performed by: INTERNAL MEDICINE

## 2022-05-17 PROCEDURE — 96360 HYDRATION IV INFUSION INIT: CPT

## 2022-05-17 PROCEDURE — 80053 COMPREHEN METABOLIC PANEL: CPT

## 2022-05-17 PROCEDURE — 96361 HYDRATE IV INFUSION ADD-ON: CPT

## 2022-05-17 RX ORDER — SODIUM CHLORIDE 0.9 % (FLUSH) 0.9 %
10 SYRINGE (ML) INJECTION AS NEEDED
Status: CANCELLED | OUTPATIENT
Start: 2022-05-17

## 2022-05-17 RX ORDER — HEPARIN SODIUM (PORCINE) LOCK FLUSH IV SOLN 100 UNIT/ML 100 UNIT/ML
500 SOLUTION INTRAVENOUS AS NEEDED
Status: DISCONTINUED | OUTPATIENT
Start: 2022-05-17 | End: 2022-05-17 | Stop reason: HOSPADM

## 2022-05-17 RX ORDER — HEPARIN SODIUM (PORCINE) LOCK FLUSH IV SOLN 100 UNIT/ML 100 UNIT/ML
500 SOLUTION INTRAVENOUS AS NEEDED
Status: CANCELLED | OUTPATIENT
Start: 2022-05-17

## 2022-05-17 RX ORDER — SODIUM CHLORIDE 9 MG/ML
1000 INJECTION, SOLUTION INTRAVENOUS ONCE
Status: COMPLETED | OUTPATIENT
Start: 2022-05-17 | End: 2022-05-17

## 2022-05-17 RX ORDER — SODIUM CHLORIDE 0.9 % (FLUSH) 0.9 %
10 SYRINGE (ML) INJECTION AS NEEDED
Status: DISCONTINUED | OUTPATIENT
Start: 2022-05-17 | End: 2022-05-17 | Stop reason: HOSPADM

## 2022-05-17 RX ADMIN — SODIUM CHLORIDE 1000 ML: 9 INJECTION, SOLUTION INTRAVENOUS at 09:13

## 2022-05-17 RX ADMIN — Medication 10 ML: at 11:09

## 2022-05-17 RX ADMIN — Medication 500 UNITS: at 11:09

## 2022-05-17 NOTE — TELEPHONE ENCOUNTER
Reviewed results with Neela Ramirez and patient verbalized understanding of results.    Thank you,  Liliana Esparza RN  Triage Nurse G

## 2022-05-23 ENCOUNTER — TELEPHONE (OUTPATIENT)
Dept: ONCOLOGY | Facility: CLINIC | Age: 77
End: 2022-05-23

## 2022-05-23 RX ORDER — DEXAMETHASONE 0.5 MG/5ML
1 SOLUTION ORAL 4 TIMES DAILY
Qty: 280 ML | Refills: 0 | Status: SHIPPED | OUTPATIENT
Start: 2022-05-23

## 2022-05-24 ENCOUNTER — TELEPHONE (OUTPATIENT)
Dept: ONCOLOGY | Facility: CLINIC | Age: 77
End: 2022-05-24

## 2022-05-24 NOTE — TELEPHONE ENCOUNTER
Patient did not read the instructions on the anastrozole and has taken three tablets.  She stated that the instructions stated to start taking them in 3-4 weeks.  Please advise when she should specifically start these tablets.  She has her Radiation Oncology consult on 5/25/2022.

## 2022-05-24 NOTE — TELEPHONE ENCOUNTER
Returned call to patient to let her know that she will not start on the anastrozole till after completion of her Radiation Therapy.  She v/u.

## 2022-05-24 NOTE — TELEPHONE ENCOUNTER
Caller: Neela Ramirez    Relationship: Self    Best call back number: [phone]    What is the best time to reach you: ANYTIME    Who are you requesting to speak with (clinical staff, provider,  specific staff member): DR MCRAE OR NURSE    What was the call regarding: PT STATED SHE PICKED UP HER ANASTROZOLE MEDICATION AND DIDN'T READ THE INSTRUCTIONS ON IT - SHE STARTED THE MEDICATION AND HAS TAKEN 2-3 DOSES ALREADY INSTEAD OF WAITING THE 3-4 WEEKS.     PLEASE CALL PT TO ADVISE WHAT SHE SHOULD DO. CAN LEAVE DETAILED VM IF NO ANSWER.     Do you require a callback: YES

## 2022-05-25 ENCOUNTER — CONSULT (OUTPATIENT)
Dept: RADIATION ONCOLOGY | Facility: HOSPITAL | Age: 77
End: 2022-05-25

## 2022-05-25 ENCOUNTER — APPOINTMENT (OUTPATIENT)
Dept: RADIATION ONCOLOGY | Facility: HOSPITAL | Age: 77
End: 2022-05-25

## 2022-05-25 VITALS
SYSTOLIC BLOOD PRESSURE: 131 MMHG | OXYGEN SATURATION: 100 % | WEIGHT: 117.2 LBS | DIASTOLIC BLOOD PRESSURE: 74 MMHG | HEART RATE: 101 BPM | BODY MASS INDEX: 22.16 KG/M2

## 2022-05-25 DIAGNOSIS — Z17.0 MALIGNANT NEOPLASM OF UPPER-INNER QUADRANT OF LEFT BREAST IN FEMALE, ESTROGEN RECEPTOR POSITIVE: Primary | ICD-10-CM

## 2022-05-25 DIAGNOSIS — C50.212 MALIGNANT NEOPLASM OF UPPER-INNER QUADRANT OF LEFT BREAST IN FEMALE, ESTROGEN RECEPTOR POSITIVE: Primary | ICD-10-CM

## 2022-05-25 PROCEDURE — 77333 RADIATION TREATMENT AID(S): CPT | Performed by: RADIOLOGY

## 2022-05-25 PROCEDURE — 99205 OFFICE O/P NEW HI 60 MIN: CPT | Performed by: RADIOLOGY

## 2022-05-25 PROCEDURE — 77290 THER RAD SIMULAJ FIELD CPLX: CPT | Performed by: RADIOLOGY

## 2022-05-25 PROCEDURE — 77263 THER RADIOLOGY TX PLNG CPLX: CPT | Performed by: RADIOLOGY

## 2022-05-25 PROCEDURE — 77370 RADIATION PHYSICS CONSULT: CPT | Performed by: RADIOLOGY

## 2022-05-25 NOTE — PROGRESS NOTES
Subjective     Rula Schneider MD    Cancer Staging  No matching staging information was found for the patient.    CC:cT2N0 ER KY neg Her 2 pos left breast cancer, [id number] ER KY pos Her 2 neg                                Dear Rula Schneider MD    I had the pleasure of seeing Neela Ramirez  [facility].   The patient is a 77 y.o. female with recently diagnosed left breast cancer.  She first palpated a mass in her left breast in 2021.  She had a bilateral diagnostic mammogram on 21 which showed a mass at 11 oclock left breast with calcifications 1.5cm.  Ultrasound confirmed a 2.4cm mass at 11 olcock left breast.   She had an us guided biopsy on 21 with pathology revealing grade 3 invasive ductal carcinoma, ER KY negative and Her 2 3+ ki67 70%.      She had a breast mri on 10/8/21 which showed a 2.8cm mass left breast at 11 oclock with clip with no axillary adenopathy and no findings in right breast.     She received neoadjuvant chemotherapy consisting on 1181 with taxol, perjeta and herceptin.    She underwent a left breast lumpectomy and sentinel node biopsy with Dr. Liu on 2/10/22 with pathology revealing a residual invasive lobular caricinoma, grade 3, 18mm with negative margins and 1/10 lymph nodes positive for a 3.5mm focus of metastatic disease. This tumor was ER % KY 61-70% Her 2 negative.  The lymph nodes was ER 80-90% KY neg and her 2 2+.  Her pathologic stage was [id number].    Staging workup including CT and bone scan showed no evidence of metastatic disease. She went on to receive 4 cycles of AC chemotherapy which she completed 22.  She will getting kadcyla every 3 weeks and anastrozole.      She is , menarche age 13, menopause age 50s. She has one adopted daughter. She took hormone replacement therapy for 7 years.  She is unsure of her family hx of breast or ovarian cancer.     Review of Systems   Constitutional: Positive for appetite  change.   HENT: Negative.    Eyes: Negative.    Respiratory: Negative.    Cardiovascular: Negative.    Gastrointestinal: Negative.    Genitourinary: Negative.    Musculoskeletal: Negative.    Skin: Negative.    Neurological: Negative.    Hematological: Bruises/bleeds easily.   Psychiatric/Behavioral: Positive for confusion.         Past Medical History:   Diagnosis Date   • Anxiety    • Arthritis    • Cataract     EARLY. JIMMIE EYES   • Chronic back pain     LOW BACK ACHES AT TIME FROM ARTHRTIS   • Constipation     AT TIMES. NO RECENT ISSUES   • Dermatitis     LEGS. STATES CLEARING UP NOW.   • Disease of thyroid gland     HYPO   • Diverticulosis     Colonoscopy November 2014   • Erythema of face     Transitory Erythema Of The Face   • GERD (gastroesophageal reflux disease)     ON OCC   • History of chemotherapy     FOR LEFT BREAST CANCER   • Hyperlipidemia    • Hypertension    • Malignant neoplasm of female breast (HCC) 9/27/2021   • Osteopenia    • Tachycardia          Past Surgical History:   Procedure Laterality Date   • BREAST BIOPSY  09/2021    Dr. Tirado    • BREAST CYST ASPIRATION Right    • BREAST CYST EXCISION Right    • BREAST LUMPECTOMY WITH SENTINEL NODE BIOPSY Left 2/10/2022    Procedure: LEFT BREAST WIRE LOCALIZED LUMPECTOMY WITH SENTINEL NODE BIOPSY, POSSIBLE LEFT AXILLARY DISSECTION;  Surgeon: Becky Liu MD;  [place] [facility] [place] [facility] [place]  Service: General;  Laterality: Left;   • COLONOSCOPY     • COLONOSCOPY  2014   • EXOSTECTOMY Bilateral     Simple Bunion Exostectomy (Silver Procedure)   • FOOT TENDON SURGERY  2012   • VENOUS ACCESS DEVICE (PORT) INSERTION N/A 10/18/2021    Procedure: INSERTION VENOUS ACCESS DEVICE;  Surgeon: Becky Liu MD;  [place] [facility] [place] [facility] [place]  Service: General;  Laterality: N/A;         Social History     Socioeconomic History   • Marital status:      Spouse name: Pj   • Number of children: 1   Tobacco Use   • Smoking status: Former Smoker     Types: Cigarettes    • Smokeless tobacco: Never Used   • Tobacco comment: SOCIALLY IN EARLY 20'S   Vaping Use   • Vaping Use: Never used   Substance and Sexual Activity   • Alcohol use: Yes     Comment: social drinker   • Drug use: Never   • Sexual activity: Defer         Family History   Problem Relation Age of Onset   • Arthritis Father    • Heart disease Father    • Hypertension Father    • Hypertension Mother    • Malig Hyperthermia Neg Hx           Objective    Physical Exam  Constitutional:       Appearance: Normal appearance.   Cardiovascular:      Rate and Rhythm: Normal rate.   Pulmonary:      Effort: Pulmonary effort is normal.   Chest:          Comments: Well healed incision, no palpable masses in either breast or axilla  Musculoskeletal:         General: Normal range of motion.   Neurological:      General: No focal deficit present.      Mental Status: She is alert.   Psychiatric:         Mood and Affect: Mood normal.           Current Outpatient Medications on File Prior to Visit   Medication Sig Dispense Refill   • Acetaminophen (Tylenol) 325 MG capsule Take  by mouth.     • acetaminophen (TYLENOL) 500 MG tablet Take 500-1,000 mg by mouth Every 6 (Six) Hours As Needed for Mild Pain .     • alendronate (FOSAMAX) 70 MG tablet Take 1 tablet by mouth 1 (One) Time Per Week. 12 tablet 0   • amLODIPine (NORVASC) 5 MG tablet Take 1 tablet by mouth Daily. 90 tablet 2   • anastrozole (ARIMIDEX) 1 MG tablet Take 1 tablet by mouth Daily. 30 tablet 3   • cyclobenzaprine (FLEXERIL) 5 MG tablet Take 1 tablet by mouth three times daily as needed for muscle spasm (Patient taking differently: Take 5 mg by mouth 3 (Three) Times a Day As Needed for Muscle Spasms.) 30 tablet 0   • cyclobenzaprine (FLEXERIL) 5 MG tablet Take 1 tablet by mouth 3 (Three) Times a Day As Needed.     • desoximetasone (TOPICORT) 0.25 % ointment Apply 1 application topically to the appropriate area as directed 2 (Two) Times a Day As Needed (TO IRRITATED AREA).     •  dexamethasone 0.5 MG/5ML solution Take 10 mL by mouth 4 (Four) Times a Day. Take 10 mls by mouth four times daily. Swish in mouth for 2 minutes and then spit out. Do not swallow. 280 mL 0   • escitalopram (LEXAPRO) 20 MG tablet Take 1 tablet by mouth once daily 30 tablet 5   • levothyroxine (SYNTHROID, LEVOTHROID) 25 MCG tablet TAKE 1 TABLET BY MOUTH ONCE DAILY IN THE MORNING 30 MINUTES BEFORE BREAKFAST OR  ANY  OTHER  MEDICINE 30 tablet 5   • metoprolol succinate XL (TOPROL-XL) 25 MG 24 hr tablet Take 1 tablet by mouth Daily. 90 tablet 1   • mupirocin (BACTROBAN) 2 % ointment APPLY A SMALL AMOUNT OF OINTMENT TOPICALLY TO AFFECTED AREA EVERY NIGHT     • OLANZapine (zyPREXA) 5 MG tablet Take 1 tablet by mouth Every Night. Take on days 2, 3 and 4 after chemotherapy. 3 tablet 3   • ondansetron (ZOFRAN) 8 MG tablet Take 1 tablet by mouth 3 (Three) Times a Day As Needed for Nausea or Vomiting. 30 tablet 5   • pantoprazole (PROTONIX) 40 MG EC tablet Take 1 tablet by mouth once daily 30 tablet 0   • prochlorperazine (COMPAZINE) 10 MG tablet Take 1 tablet by mouth Every 6 (Six) Hours As Needed for Nausea or Vomiting. 30 tablet 3   • rosuvastatin (CRESTOR) 10 MG tablet Take 1 tablet by mouth Daily. 90 tablet 3     No current facility-administered medications on file prior to visit.       ALLERGIES:  No Known Allergies    There were no vitals taken for this visit.     Current Status 4/22/2022   ECOG score 0         Assessment & Plan     77 year old female with cT2N0 left breast cancer, [id number] ER SD pos er 2 neg.  Her initial tumor was Her 2 positive.  I discussed with her my recommendation for post-operative radiation therapy to the left breast and regional nodes to decrease the risk of local recurrence.      I discussed with her in detail the risks, benefits and rationale of radiation therapy to the left breast to include but not limited to the following:    Acute: skin erythema, breakdown/moist desquamation, swelling or  discomfort of the breast, fatigue, pneumonitis resulting in shortness of breath, cough or pain    Late: Permanent skin changes including hyperpigmentation, telangiectasias, fibrosis of the breast resulting in smaller size or poor cosmetic outcome, late edema or cellulitis, late rib fracture, late pulmonary fibrosis and the remote risk of late cardiac damage resulting in increased risk of heart attack or second malignancies.      She voiced understanding and was given an opportunity to ask questions which I believe were answered to her satisfaction.  We will proceed with CT simulation for treatment planning today in anticipation of starting her treatments next week. I plan to treat the left breast and regional nodes to a dose of approximately 5000cGy in 25 fractions followed by a boost to the tumor bed for an additional 1000cGy in 5 fractions.    I personally spent greater than 60 minutes today assessing, managing, discussing and documenting my visit with the patient. That time includes review of records, imaging and pathology reports, obtaining my own history, performing a medically appropriate evaluation, counseling and educating the patient, discussing goals, logistics, alternatives and risks of my recommendations, surveillance options and potential outcomes. It also includes the time documenting the clinical information in the EMR and communicating my recommendations to the other involved physicians.                 Thank you very much for allowing me to participate in the care of this very pleasant patient.    Sincerely,      Rula Schneider MD

## 2022-05-31 ENCOUNTER — TELEPHONE (OUTPATIENT)
Dept: CARDIOLOGY | Facility: CLINIC | Age: 77
End: 2022-05-31

## 2022-05-31 ENCOUNTER — HOSPITAL ENCOUNTER (OUTPATIENT)
Dept: CARDIOLOGY | Facility: HOSPITAL | Age: 77
Discharge: HOME | End: 2022-05-31
Admitting: INTERNAL MEDICINE

## 2022-05-31 VITALS
DIASTOLIC BLOOD PRESSURE: 80 MMHG | SYSTOLIC BLOOD PRESSURE: 120 MMHG | HEART RATE: 64 BPM | WEIGHT: 117 LBS | BODY MASS INDEX: 22.09 KG/M2 | HEIGHT: 61 IN

## 2022-05-31 DIAGNOSIS — C50.212 MALIGNANT NEOPLASM OF UPPER-INNER QUADRANT OF LEFT BREAST IN FEMALE, ESTROGEN RECEPTOR POSITIVE: ICD-10-CM

## 2022-05-31 DIAGNOSIS — Z79.899 HIGH RISK MEDICATION USE: ICD-10-CM

## 2022-05-31 DIAGNOSIS — Z17.0 MALIGNANT NEOPLASM OF UPPER-INNER QUADRANT OF LEFT BREAST IN FEMALE, ESTROGEN RECEPTOR POSITIVE: ICD-10-CM

## 2022-05-31 LAB
AORTIC ARCH: 2.3 CM
AORTIC DIMENSIONLESS INDEX: 0.5 (DI)
ASCENDING AORTA: 2.4 CM
BH CV ECHO LEFT VENTRICLE GLOBAL LONGITUDINAL STRAIN: -21.1 %
BH CV ECHO MEAS - ACS: 1.43 CM
BH CV ECHO MEAS - AI P1/2T: 596.3 MSEC
BH CV ECHO MEAS - AO MAX PG: 11.7 MMHG
BH CV ECHO MEAS - AO MEAN PG: 6.1 MMHG
BH CV ECHO MEAS - AO ROOT DIAM: 2.6 CM
BH CV ECHO MEAS - AO V2 MAX: 171 CM/SEC
BH CV ECHO MEAS - AO V2 VTI: 29.6 CM
BH CV ECHO MEAS - AVA(I,D): 1.8 CM2
BH CV ECHO MEAS - EDV(CUBED): 70.6 ML
BH CV ECHO MEAS - EDV(MOD-SP2): 84 ML
BH CV ECHO MEAS - EDV(MOD-SP4): 124 ML
BH CV ECHO MEAS - EF(MOD-BP): 61.9 %
BH CV ECHO MEAS - EF(MOD-SP2): 57.1 %
BH CV ECHO MEAS - EF(MOD-SP4): 62.9 %
BH CV ECHO MEAS - ESV(CUBED): 20 ML
BH CV ECHO MEAS - ESV(MOD-SP2): 36 ML
BH CV ECHO MEAS - ESV(MOD-SP4): 46 ML
BH CV ECHO MEAS - FS: 34.3 %
BH CV ECHO MEAS - IVS/LVPW: 1.1 CM
BH CV ECHO MEAS - IVSD: 1.05 CM
BH CV ECHO MEAS - LAT PEAK E' VEL: 9.4 CM/SEC
BH CV ECHO MEAS - LV DIASTOLIC VOL/BSA (35-75): 82.4 CM2
BH CV ECHO MEAS - LV MASS(C)D: 134.5 GRAMS
BH CV ECHO MEAS - LV MAX PG: 2.8 MMHG
BH CV ECHO MEAS - LV MEAN PG: 1.66 MMHG
BH CV ECHO MEAS - LV SYSTOLIC VOL/BSA (12-30): 30.6 CM2
BH CV ECHO MEAS - LV V1 MAX: 84.4 CM/SEC
BH CV ECHO MEAS - LV V1 VTI: 16.1 CM
BH CV ECHO MEAS - LVIDD: 4.1 CM
BH CV ECHO MEAS - LVIDS: 2.7 CM
BH CV ECHO MEAS - LVOT AREA: 3.3 CM2
BH CV ECHO MEAS - LVOT DIAM: 2.05 CM
BH CV ECHO MEAS - LVPWD: 0.96 CM
BH CV ECHO MEAS - MED PEAK E' VEL: 6.1 CM/SEC
BH CV ECHO MEAS - MV A DUR: 0.14 SEC
BH CV ECHO MEAS - MV A MAX VEL: 103.3 CM/SEC
BH CV ECHO MEAS - MV DEC SLOPE: 246.1 CM/SEC2
BH CV ECHO MEAS - MV DEC TIME: 0.28 MSEC
BH CV ECHO MEAS - MV E MAX VEL: 53.1 CM/SEC
BH CV ECHO MEAS - MV E/A: 0.51
BH CV ECHO MEAS - MV MAX PG: 3.5 MMHG
BH CV ECHO MEAS - MV MEAN PG: 1.23 MMHG
BH CV ECHO MEAS - MV P1/2T: 88.9 MSEC
BH CV ECHO MEAS - MV V2 VTI: 19.3 CM
BH CV ECHO MEAS - MVA(P1/2T): 2.48 CM2
BH CV ECHO MEAS - MVA(VTI): 2.8 CM2
BH CV ECHO MEAS - PA ACC TIME: 0.15 SEC
BH CV ECHO MEAS - PA PR(ACCEL): 10.1 MMHG
BH CV ECHO MEAS - PA V2 MAX: 82.8 CM/SEC
BH CV ECHO MEAS - PULM A REVS DUR: 0.16 SEC
BH CV ECHO MEAS - PULM A REVS VEL: 48.8 CM/SEC
BH CV ECHO MEAS - PULM DIAS VEL: 50.6 CM/SEC
BH CV ECHO MEAS - PULM S/D: 1.07
BH CV ECHO MEAS - PULM SYS VEL: 54 CM/SEC
BH CV ECHO MEAS - QP/QS: 0.55
BH CV ECHO MEAS - RAP SYSTOLE: 3 MMHG
BH CV ECHO MEAS - RV MAX PG: 1.02 MMHG
BH CV ECHO MEAS - RV V1 MAX: 50.6 CM/SEC
BH CV ECHO MEAS - RV V1 VTI: 10.4 CM
BH CV ECHO MEAS - RVOT DIAM: 1.89 CM
BH CV ECHO MEAS - RVSP: 24.8 MMHG
BH CV ECHO MEAS - SI(MOD-SP2): 31.9 ML/M2
BH CV ECHO MEAS - SI(MOD-SP4): 51.9 ML/M2
BH CV ECHO MEAS - SV(LVOT): 53.2 ML
BH CV ECHO MEAS - SV(MOD-SP2): 48 ML
BH CV ECHO MEAS - SV(MOD-SP4): 78 ML
BH CV ECHO MEAS - SV(RVOT): 29.1 ML
BH CV ECHO MEAS - TAPSE (>1.6): 2.36 CM
BH CV ECHO MEAS - TR MAX PG: 21.8 MMHG
BH CV ECHO MEAS - TR MAX VEL: 233.5 CM/SEC
BH CV ECHO MEASUREMENTS AVERAGE E/E' RATIO: 6.85
BH CV XLRA - RV BASE: 3 CM
BH CV XLRA - RV LENGTH: 5.6 CM
BH CV XLRA - RV MID: 1.99 CM
BH CV XLRA - TDI S': 14.4 CM/SEC
LEFT ATRIUM VOLUME INDEX: 15 ML/M2
MAXIMAL PREDICTED HEART RATE: 143 BPM
SINUS: 2.7 CM
STJ: 2.16 CM
STRESS TARGET HR: 122 BPM

## 2022-05-31 PROCEDURE — 93356 MYOCRD STRAIN IMG SPCKL TRCK: CPT | Performed by: INTERNAL MEDICINE

## 2022-05-31 PROCEDURE — 93356 MYOCRD STRAIN IMG SPCKL TRCK: CPT

## 2022-05-31 PROCEDURE — 93306 TTE W/DOPPLER COMPLETE: CPT | Performed by: INTERNAL MEDICINE

## 2022-05-31 PROCEDURE — 25010000002 PERFLUTREN (DEFINITY) 8.476 MG IN SODIUM CHLORIDE (PF) 0.9 % 10 ML INJECTION: Performed by: INTERNAL MEDICINE

## 2022-05-31 PROCEDURE — 93306 TTE W/DOPPLER COMPLETE: CPT

## 2022-05-31 RX ADMIN — PERFLUTREN 2 ML: 6.52 INJECTION, SUSPENSION INTRAVENOUS at 07:57

## 2022-06-01 ENCOUNTER — APPOINTMENT (OUTPATIENT)
Dept: RADIATION ONCOLOGY | Facility: HOSPITAL | Age: 77
End: 2022-06-01

## 2022-06-03 ENCOUNTER — INFUSION (OUTPATIENT)
Dept: ONCOLOGY | Facility: HOSPITAL | Age: 77
End: 2022-06-03

## 2022-06-03 ENCOUNTER — OFFICE VISIT (OUTPATIENT)
Dept: ONCOLOGY | Facility: CLINIC | Age: 77
End: 2022-06-03

## 2022-06-03 VITALS
DIASTOLIC BLOOD PRESSURE: 68 MMHG | TEMPERATURE: 97.3 F | SYSTOLIC BLOOD PRESSURE: 104 MMHG | HEART RATE: 97 BPM | OXYGEN SATURATION: 98 % | WEIGHT: 116.9 LBS | RESPIRATION RATE: 16 BRPM | HEIGHT: 61 IN | BODY MASS INDEX: 22.07 KG/M2

## 2022-06-03 DIAGNOSIS — Z17.0 MALIGNANT NEOPLASM OF UPPER-INNER QUADRANT OF LEFT BREAST IN FEMALE, ESTROGEN RECEPTOR POSITIVE: ICD-10-CM

## 2022-06-03 DIAGNOSIS — Z45.9 ENCOUNTER FOR MANAGEMENT OF IMPLANTED DEVICE: Primary | ICD-10-CM

## 2022-06-03 DIAGNOSIS — C50.212 MALIGNANT NEOPLASM OF UPPER-INNER QUADRANT OF LEFT BREAST IN FEMALE, ESTROGEN RECEPTOR POSITIVE: Primary | ICD-10-CM

## 2022-06-03 DIAGNOSIS — Z17.0 MALIGNANT NEOPLASM OF UPPER-INNER QUADRANT OF LEFT BREAST IN FEMALE, ESTROGEN RECEPTOR POSITIVE: Primary | ICD-10-CM

## 2022-06-03 DIAGNOSIS — C50.212 MALIGNANT NEOPLASM OF UPPER-INNER QUADRANT OF LEFT BREAST IN FEMALE, ESTROGEN RECEPTOR POSITIVE: ICD-10-CM

## 2022-06-03 DIAGNOSIS — Z78.0 POST-MENOPAUSAL: ICD-10-CM

## 2022-06-03 DIAGNOSIS — Z79.899 HIGH RISK MEDICATION USE: ICD-10-CM

## 2022-06-03 DIAGNOSIS — Z45.9 ENCOUNTER FOR MANAGEMENT OF IMPLANTED DEVICE: ICD-10-CM

## 2022-06-03 LAB
ALBUMIN SERPL-MCNC: 4.1 G/DL (ref 3.5–5.2)
ALBUMIN/GLOB SERPL: 1.8 G/DL (ref 1.1–2.4)
ALP SERPL-CCNC: 107 U/L (ref 38–116)
ALT SERPL W P-5'-P-CCNC: 17 U/L (ref 0–33)
ANION GAP SERPL CALCULATED.3IONS-SCNC: 10.8 MMOL/L (ref 5–15)
AST SERPL-CCNC: 20 U/L (ref 0–32)
BASOPHILS # BLD AUTO: 0.06 10*3/MM3 (ref 0–0.2)
BASOPHILS NFR BLD AUTO: 1 % (ref 0–1.5)
BILIRUB SERPL-MCNC: 0.2 MG/DL (ref 0.2–1.2)
BUN SERPL-MCNC: 12 MG/DL (ref 6–20)
BUN/CREAT SERPL: 17.1 (ref 7.3–30)
CALCIUM SPEC-SCNC: 9.2 MG/DL (ref 8.5–10.2)
CHLORIDE SERPL-SCNC: 107 MMOL/L (ref 98–107)
CO2 SERPL-SCNC: 21.2 MMOL/L (ref 22–29)
CREAT SERPL-MCNC: 0.7 MG/DL (ref 0.6–1.1)
DEPRECATED RDW RBC AUTO: 78.2 FL (ref 37–54)
EGFRCR SERPLBLD CKD-EPI 2021: 89.2 ML/MIN/1.73
EOSINOPHIL # BLD AUTO: 0.01 10*3/MM3 (ref 0–0.4)
EOSINOPHIL NFR BLD AUTO: 0.2 % (ref 0.3–6.2)
ERYTHROCYTE [DISTWIDTH] IN BLOOD BY AUTOMATED COUNT: 19.9 % (ref 12.3–15.4)
GLOBULIN UR ELPH-MCNC: 2.3 GM/DL (ref 1.8–3.5)
GLUCOSE SERPL-MCNC: 86 MG/DL (ref 74–124)
HCT VFR BLD AUTO: 28.3 % (ref 34–46.6)
HGB BLD-MCNC: 9.2 G/DL (ref 12–15.9)
IMM GRANULOCYTES # BLD AUTO: 0.04 10*3/MM3 (ref 0–0.05)
IMM GRANULOCYTES NFR BLD AUTO: 0.6 % (ref 0–0.5)
LYMPHOCYTES # BLD AUTO: 0.79 10*3/MM3 (ref 0.7–3.1)
LYMPHOCYTES NFR BLD AUTO: 12.5 % (ref 19.6–45.3)
MCH RBC QN AUTO: 34.8 PG (ref 26.6–33)
MCHC RBC AUTO-ENTMCNC: 32.5 G/DL (ref 31.5–35.7)
MCV RBC AUTO: 107.2 FL (ref 79–97)
MONOCYTES # BLD AUTO: 0.95 10*3/MM3 (ref 0.1–0.9)
MONOCYTES NFR BLD AUTO: 15.1 % (ref 5–12)
NEUTROPHILS NFR BLD AUTO: 4.45 10*3/MM3 (ref 1.7–7)
NEUTROPHILS NFR BLD AUTO: 70.6 % (ref 42.7–76)
NRBC BLD AUTO-RTO: 0 /100 WBC (ref 0–0.2)
PLATELET # BLD AUTO: 347 10*3/MM3 (ref 140–450)
PMV BLD AUTO: 9.4 FL (ref 6–12)
POTASSIUM SERPL-SCNC: 4.4 MMOL/L (ref 3.5–4.7)
PROT SERPL-MCNC: 6.4 G/DL (ref 6.3–8)
RBC # BLD AUTO: 2.64 10*6/MM3 (ref 3.77–5.28)
SODIUM SERPL-SCNC: 139 MMOL/L (ref 134–145)
WBC NRBC COR # BLD: 6.3 10*3/MM3 (ref 3.4–10.8)

## 2022-06-03 PROCEDURE — 85025 COMPLETE CBC W/AUTO DIFF WBC: CPT

## 2022-06-03 PROCEDURE — 96413 CHEMO IV INFUSION 1 HR: CPT

## 2022-06-03 PROCEDURE — 77334 RADIATION TREATMENT AID(S): CPT | Performed by: RADIOLOGY

## 2022-06-03 PROCEDURE — 77295 3-D RADIOTHERAPY PLAN: CPT | Performed by: RADIOLOGY

## 2022-06-03 PROCEDURE — 77300 RADIATION THERAPY DOSE PLAN: CPT | Performed by: RADIOLOGY

## 2022-06-03 PROCEDURE — 25010000002 HEPARIN LOCK FLUSH PER 10 UNITS: Performed by: INTERNAL MEDICINE

## 2022-06-03 PROCEDURE — 80053 COMPREHEN METABOLIC PANEL: CPT

## 2022-06-03 PROCEDURE — 77293 RESPIRATOR MOTION MGMT SIMUL: CPT | Performed by: RADIOLOGY

## 2022-06-03 PROCEDURE — 25010000002 ADO-TRASTUZUMAB 100 MG RECONSTITUTED SOLUTION 1 EACH VIAL: Performed by: INTERNAL MEDICINE

## 2022-06-03 PROCEDURE — 25010000002 DEXAMETHASONE PER 1 MG: Performed by: INTERNAL MEDICINE

## 2022-06-03 PROCEDURE — 99214 OFFICE O/P EST MOD 30 MIN: CPT | Performed by: INTERNAL MEDICINE

## 2022-06-03 PROCEDURE — 96375 TX/PRO/DX INJ NEW DRUG ADDON: CPT

## 2022-06-03 RX ORDER — HEPARIN SODIUM (PORCINE) LOCK FLUSH IV SOLN 100 UNIT/ML 100 UNIT/ML
500 SOLUTION INTRAVENOUS AS NEEDED
Status: CANCELLED | OUTPATIENT
Start: 2022-06-03

## 2022-06-03 RX ORDER — SODIUM CHLORIDE 0.9 % (FLUSH) 0.9 %
10 SYRINGE (ML) INJECTION AS NEEDED
Status: CANCELLED | OUTPATIENT
Start: 2022-06-03

## 2022-06-03 RX ORDER — SODIUM CHLORIDE 9 MG/ML
250 INJECTION, SOLUTION INTRAVENOUS ONCE
Status: CANCELLED | OUTPATIENT
Start: 2022-06-03

## 2022-06-03 RX ORDER — SODIUM CHLORIDE 9 MG/ML
250 INJECTION, SOLUTION INTRAVENOUS ONCE
Status: COMPLETED | OUTPATIENT
Start: 2022-06-03 | End: 2022-06-03

## 2022-06-03 RX ORDER — HEPARIN SODIUM (PORCINE) LOCK FLUSH IV SOLN 100 UNIT/ML 100 UNIT/ML
500 SOLUTION INTRAVENOUS AS NEEDED
Status: DISCONTINUED | OUTPATIENT
Start: 2022-06-03 | End: 2022-06-03 | Stop reason: HOSPADM

## 2022-06-03 RX ORDER — SODIUM CHLORIDE 0.9 % (FLUSH) 0.9 %
10 SYRINGE (ML) INJECTION AS NEEDED
Status: DISCONTINUED | OUTPATIENT
Start: 2022-06-03 | End: 2022-06-03 | Stop reason: HOSPADM

## 2022-06-03 RX ADMIN — Medication 10 ML: at 16:59

## 2022-06-03 RX ADMIN — DEXAMETHASONE SODIUM PHOSPHATE 12 MG: 10 INJECTION INTRAMUSCULAR; INTRAVENOUS at 13:41

## 2022-06-03 RX ADMIN — Medication 500 UNITS: at 16:59

## 2022-06-03 RX ADMIN — SODIUM CHLORIDE 250 ML: 9 INJECTION, SOLUTION INTRAVENOUS at 13:41

## 2022-06-03 RX ADMIN — ADO-TRASTUZUMAB EMTANSINE 195 MG: 20 INJECTION, POWDER, LYOPHILIZED, FOR SOLUTION INTRAVENOUS at 14:05

## 2022-06-03 NOTE — PROGRESS NOTES
Subjective   Neela Ramirez is a 77 y.o. female.  Referred by Dr. 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  A bilateral diagnostic mammogram was performed for further evaluation of the same.    8/17/2021-bilateral diagnostic mammogram-scattered fibroglandular densities throughout both breasts.  In the posterior one third upper inner quadrant of the left breast at the site of palpable concern, 11 o'clock position there is a mass with adjacent pleomorphic calcifications.  The mass and the calcifications measure on order of 1.5 cm in greatest dimension.  No evidence of axillary lymphadenopathy appreciated.  Stable microcalcifications seen in other areas of the left breast on right breast.  Ultrasound-at 11 o'clock position, 5 cm from the nipple in the left breast there is an irregular hypoechoic mass which measures 2.4 x 2 x 1.6 cm.    Impression  1.irregular 2.4 cm mass in the left breast at the site of palpable concern at 11:00, 5 cm from the nipple.  Ultrasound-guided biopsy of the mass recommended  No finding suspicious for malignancy in the right breast    9/8/2021-left breast ultrasound-guided biopsy  Pathology consistent with invasive ductal carcinoma with lobular features.  Grade 3.  The carcinoma measures 7 mm in greatest dimension.  Focally suspicious for lymphovascular space invasion.  ER low +1-10%, intensity week  SD negative  HER-2 3+ on immunohistochemistry  Ki-67 70%    MRI of the breast 10/8/2021-Biopsy-proven malignancy in the left breast at 11:00 measuring 2.8 cm with a centrally located metallic clip.  No other suspicious findings are seen within the left breast or no left axillary lymphadenopathy.  No suspicious findings of the right breast    Echocardiogram 10/8/2021 was normal  ejection fraction of 56 to 60% and strain of -20    She denies any new bone pains, dyspnea, cough, abdominal pain nausea vomiting or recent changes in weight.    She had 2 previous breast aspirations in her 20s.  No other breast biopsies  She does not know much about her family hence limited family history.    She received neoadjuvant chemotherapy on EA 1181 trial with Taxol Perjeta and Herceptin.  She completed 12 weeks of Taxol Herceptin and Perjeta.    Had an abnormal EKG preop and hence a stress test was performed on 2/3/2022 which showed a normal left ventricular ejection fraction and LVEF at stress was 73%.  Considered a low risk study with normal myocardial perfusion imaging.    She underwent a left breast lumpectomy and a sentinel lymph node biopsy on 2/10/2022.    Pathology was consistent with residual tumor which was pleomorphic invasive lobular carcinoma, poorly differentiated, grade 3  Tumor measured 18 mm  Margins negative for invasive carcinoma  Associated lobular carcinoma in situ noted  1 out of 10 lymph nodes was positive for metastatic focus measuring 3.5 mm.    Receptors were repeated on the pleomorphic invasive lobular carcinoma  ER +91 to 100% strong  IA +61 to 70% moderate  HER-2 negative  Ki-67 55%    Receptors performed on the metastatic lymph node  ER +81-90% strong  IA negative  HER-2 2+ on immunohistochemistry, FISH pending.    ypT1 cN1 aM0    2/28/2022-CT of the chest abdomen and pelvis  Fluid collection in the upper left breast measuring 6 x 2.5 x 4.9 cm, postoperative seroma.  Skin thickening of the left breast likely postsurgical.  Fluid collections left axilla measuring 2.9 x 1.4 x 2.9 cm.  No other suspicious abnormalities on the CT of the chest  CT abdomen with no evidence of metastatic disease.  Mild colonic diverticulosis.  Small uterine fibroid.    3/1/2022-bone scan  No evidence of metastatic disease.  Multifocal degenerative arthritic uptake without scintigraphic evidence to  suggest metastatic disease.    3/1/2022-echocardiogram  Left ventricular ejection fraction 56-60%.  Normal global LV strain of -20.5%.    3/11/2022-cycle 1 of AC    Completed 4 cycles of AC    6/3/2022-cycle 1 Kadcyla    Interval history  She reports feeling fairly well.  She is experiencing runny nose and runny eyes.  Has been somewhat fatigued.  No lightheadedness or dizziness.  Appetite unchanged.  No new breast lesions.    The following portions of the patient's history were reviewed and updated as appropriate: allergies, current medications, past family history, past medical history, past social history, past surgical history and problem list.    Past Medical History:   Diagnosis Date   • Anxiety    • Arthritis    • Cataract     EARLY. JIMMIE EYES   • Chronic back pain     LOW BACK ACHES AT TIME FROM ARTHRTIS   • Constipation     AT TIMES. NO RECENT ISSUES   • Dermatitis     LEGS. STATES CLEARING UP NOW.   • Disease of thyroid gland     HYPO   • Diverticulosis     Colonoscopy November 2014   • Erythema of face     Transitory Erythema Of The Face   • GERD (gastroesophageal reflux disease)     ON OCC   • History of chemotherapy     FOR LEFT BREAST CANCER   • Hyperlipidemia    • Hypertension    • Malignant neoplasm of female breast (HCC) 9/27/2021   • Osteopenia    • Tachycardia         Past Surgical History:   Procedure Laterality Date   • BREAST BIOPSY  09/2021    Dr. Tirado    • BREAST CYST ASPIRATION Right    • BREAST CYST EXCISION Right    • BREAST LUMPECTOMY WITH SENTINEL NODE BIOPSY Left 2/10/2022    Procedure: LEFT BREAST WIRE LOCALIZED LUMPECTOMY WITH SENTINEL NODE BIOPSY, POSSIBLE LEFT AXILLARY DISSECTION;  Surgeon: Becky Liu MD;  [place] [facility] [place] [facility] [place]  Service: General;  Laterality: Left;   • COLONOSCOPY     • COLONOSCOPY  2014   • EXOSTECTOMY Bilateral     Simple Bunion Exostectomy (Silver Procedure)   • FOOT TENDON SURGERY  2012   • VENOUS ACCESS DEVICE (PORT) INSERTION N/A 10/18/2021     "Procedure: INSERTION VENOUS ACCESS DEVICE;  Surgeon: Becky Liu MD;  [place]: [facility] [place] [facility] [place];  Service: General;  Laterality: N/A;        Family History   Problem Relation Age of Onset   • Arthritis Father    • Heart disease Father    • Hypertension Father    • Hypertension Mother    • Malig Hyperthermia Neg Hx         Social History     Socioeconomic History   • Marital status:      Spouse name: Pj   • Number of children: 1   Tobacco Use   • Smoking status: Former Smoker     Types: Cigarettes   • Smokeless tobacco: Never Used   • Tobacco comment: SOCIALLY IN EARLY    Vaping Use   • Vaping Use: Never used   Substance and Sexual Activity   • Alcohol use: Yes     Comment: social drinker   • Drug use: Never   • Sexual activity: Defer        OB History             Para        Term   0            AB        Living           SAB        IAB        Ectopic        Molar        Multiple        Live Births                 Age at menarche-13  Age at menopause-50  Nulliparous  Breast-feeding-N/A   0 para 0  0  Oral contraceptive pill use-none  Hormone replacement therapy-7 years    No Known Allergies     Review of systems as mentioned in the HPI    Objective   Blood pressure 104/68, pulse 97, temperature 97.3 °F (36.3 °C), temperature source Temporal, resp. rate 16, height 154.9 cm (60.98\"), weight 53 kg (116 lb 14.4 oz), SpO2 98 %.     ECOG performance status-0    Physical Exam  Vitals reviewed.   Constitutional:       Appearance: Normal appearance.   HENT:      Head: Normocephalic and atraumatic.      Nose: Nose normal.      Mouth/Throat:      Mouth: Mucous membranes are moist.      Pharynx: Oropharynx is clear.   Eyes:      Conjunctiva/sclera: Conjunctivae normal.      Pupils: Pupils are equal, round, and reactive to light.   Cardiovascular:      Rate and Rhythm: Normal rate and regular rhythm.      Pulses: Normal pulses.      Heart sounds: Normal heart sounds.   Pulmonary:      " Effort: Pulmonary effort is normal.      Breath sounds: Normal breath sounds.   Abdominal:      General: Abdomen is flat. Bowel sounds are normal.      Palpations: Abdomen is soft.   Musculoskeletal:         General: Normal range of motion.      Cervical back: Normal range of motion.   Skin:     General: Skin is warm and dry.      Findings: No rash.   Neurological:      General: No focal deficit present.      Mental Status: She is alert and oriented to person, place, and time. Mental status is at baseline.   Psychiatric:         Mood and Affect: Mood normal.         Behavior: Behavior normal.         Thought Content: Thought content normal.         Judgment: Judgment normal.       Breast Exam: Right breast appears normal on inspection.  No palpable abnormalities of the right breast.    Left breast on inspection there is a scar which is well-healed.  There is also left axillary scar which is well-healed  The left breast overall appears more erythematous compared to the right breast.  However there is no warmth or no purulent drainage from the incisions.    I have reexamined the patient and the results are consistent with the previously documented exam. Sheri Avendano MD     Results from last 7 days   Lab Units 06/03/22  1157   WBC 10*3/mm3 6.30   NEUTROS ABS 10*3/mm3 4.45   HEMOGLOBIN g/dL 9.2*   HEMATOCRIT % 28.3*   PLATELETS 10*3/mm3 347     Results from last 7 days   Lab Units 06/03/22  1157   SODIUM mmol/L 139   POTASSIUM mmol/L 4.4   CHLORIDE mmol/L 107   CO2 mmol/L 21.2*   BUN mg/dL 12   CREATININE mg/dL 0.70   CALCIUM mg/dL 9.2   ALBUMIN g/dL 4.10   BILIRUBIN mg/dL 0.2   ALK PHOS U/L 107   ALT (SGPT) U/L 17   AST (SGOT) U/L 20   GLUCOSE mg/dL 86           No radiology results for the last 30 days.     CBC reviewed and hemoglobin 9.2.  CMP within normal limits    Assessment & Plan       *Invasive ductal carcinoma of the left breast  · Clinical T2 N0 M0, stage IIa  · On ultrasound the tumor measures 2.4 cm.   Lymph nodes on the left side are normal.  · MRI 10/8/2021 with tumor measuring 2.8 cm.  Lymph nodes appear normal  · Pathology consistent with invasive carcinoma with ductal and lobular features, grade 3, ER +1 to 10% weak, FL negative, HER-2 3+ immunohistochemistry, Ki-67 70%.  · She was evaluated by Dr. Liu and referred for consideration of neoadjuvant chemotherapy.   Since the tumor is HER-2 positive and over 2 cm I think it would be reasonable to proceed with neoadjuvant chemotherapy.  Since the tumor is over 2 cm but lymph node negative I think she would be a great candidate for EA 1181 clinical trial which comprises of administering Taxol Herceptin and Perjeta tamika  adjuvantly followed by surgery and additional adjuvant treatment depending upon the response.  Port placed 10/18/2021  Echocardiogram shows normal ejection fraction  Week 1 TPH on 10/19/2021  Completed 12 weeks of Taxol Perjeta and Herceptin on 1/4/2022 on EA 1181 clinical trial  1/11/2022 due for Herceptin and Perjeta only  2/1/2022-due for dose 2 of Herceptin and Perjeta  MRI 1/14/2022 reviewed and there has been a good tumor response with decrease in size of the mass to 1.2 cm.  2/10/2022-left breast lumpectomy and sentinel lymph node biopsy  Pathology shows ypT1 cN1 aM0, stage IIb, pleomorphic invasive lobular carcinoma, grade 3, ER +% strong, FL +61-70% moderate, HER-2 negative on the residual tumor  The left axillary lymph node was ER positive strong, FL negative and HER-2 2+, FISH amplified.  On the initial biopsy prior to surgery the pathology showed invasive ductal with lobular features and the ER/FL was negative and HER-2 was 3+.  The residual disease obviously appears to be different from the initial pathology.  What is remaining is essentially pleomorphic lobular carcinoma which is high-grade with a high Ki-67 and ER/FL positivity.  Now that the lymph node is also positive I do believe that she will benefit from additional  chemotherapy particularly either AC or EC.  Case was presented at multidisciplinary conference.  Decided to proceed with adjuvant Adriamycin and cyclophosphamide.  Given her age we will proceed with every 3 weekly AC over every 2 weeks.  Staging CT chest and pelvis and bone scan without any evidence of metastatic disease  3/11/2022-proceed with cycle 1 of AC  · 5/13/2022-due for cycle 4 AC.    · 6/3/2022-cycle 1 Kadcyla  · Referred to radiation oncology and radiation to start next week.  · Recommend starting anastrozole on completion of radiation.  Prescribed.    *Right-sided port appears normal.    *Hypertension-currently amlodipine and metoprolol.  Blood pressure 104/60    *Anxiety  · Continue Zyprexa    *Hypothyroidism  · Continue Synthroid  · Stable    *Cardiac health-  · Echocardiogram 1/5/2022 reviewed and stable  · Stress test 2/3/2022 normal  · 3/1/2022-echocardiogram shows normal ejection fraction of 56 to 60%, normal LV strain  · Repeat echocardiogram ordered today  · Recommend that she follow with Dr. Galo    *Bone health-  DEXA scan ordered    PLAN:   1. Cycle 1 Kadcyla  2. Kadcyla to start in 3 weeks to complete 1 year of HER2 directed therapy which is 9 more months  3. Adjuvant radiation  4. Anastrozole after completion of radiation    Patient is on cytotoxic chemotherapy requiring close monitoring for toxicities.

## 2022-06-07 LAB
RAD ONC ARIA COURSE ID: NORMAL
RAD ONC ARIA COURSE INTENT: NORMAL
RAD ONC ARIA COURSE LAST TREATMENT DATE: NORMAL
RAD ONC ARIA COURSE START DATE: NORMAL
RAD ONC ARIA COURSE TREATMENT ELAPSED DAYS: 0
RAD ONC ARIA FIRST TREATMENT DATE: NORMAL
RAD ONC ARIA PLAN FRACTIONS TREATED TO DATE: 1
RAD ONC ARIA PLAN FRACTIONS TREATED TO DATE: 1
RAD ONC ARIA PLAN ID: NORMAL
RAD ONC ARIA PLAN ID: NORMAL
RAD ONC ARIA PLAN PRESCRIBED DOSE PER FRACTION: 2 GY
RAD ONC ARIA PLAN PRESCRIBED DOSE PER FRACTION: 2 GY
RAD ONC ARIA PLAN PRIMARY REFERENCE POINT: NORMAL
RAD ONC ARIA PLAN PRIMARY REFERENCE POINT: NORMAL
RAD ONC ARIA PLAN TOTAL FRACTIONS PRESCRIBED: 25
RAD ONC ARIA PLAN TOTAL FRACTIONS PRESCRIBED: 25
RAD ONC ARIA PLAN TOTAL PRESCRIBED DOSE: 5000 CGY
RAD ONC ARIA PLAN TOTAL PRESCRIBED DOSE: 5000 CGY
RAD ONC ARIA REFERENCE POINT DOSAGE GIVEN TO DATE: 1.98 GY
RAD ONC ARIA REFERENCE POINT DOSAGE GIVEN TO DATE: 2 GY
RAD ONC ARIA REFERENCE POINT ID: NORMAL
RAD ONC ARIA REFERENCE POINT SESSION DOSAGE GIVEN: 1.98 GY
RAD ONC ARIA REFERENCE POINT SESSION DOSAGE GIVEN: 2 GY

## 2022-06-07 PROCEDURE — 77280 THER RAD SIMULAJ FIELD SMPL: CPT | Performed by: RADIOLOGY

## 2022-06-07 PROCEDURE — 77412 RADIATION TX DELIVERY LVL 3: CPT | Performed by: RADIOLOGY

## 2022-06-07 PROCEDURE — 77427 RADIATION TX MANAGEMENT X5: CPT | Performed by: RADIOLOGY

## 2022-06-08 LAB
RAD ONC ARIA COURSE ID: NORMAL
RAD ONC ARIA COURSE INTENT: NORMAL
RAD ONC ARIA COURSE LAST TREATMENT DATE: NORMAL
RAD ONC ARIA COURSE START DATE: NORMAL
RAD ONC ARIA COURSE TREATMENT ELAPSED DAYS: 1
RAD ONC ARIA FIRST TREATMENT DATE: NORMAL
RAD ONC ARIA PLAN FRACTIONS TREATED TO DATE: 2
RAD ONC ARIA PLAN FRACTIONS TREATED TO DATE: 2
RAD ONC ARIA PLAN ID: NORMAL
RAD ONC ARIA PLAN ID: NORMAL
RAD ONC ARIA PLAN PRESCRIBED DOSE PER FRACTION: 2 GY
RAD ONC ARIA PLAN PRESCRIBED DOSE PER FRACTION: 2 GY
RAD ONC ARIA PLAN PRIMARY REFERENCE POINT: NORMAL
RAD ONC ARIA PLAN PRIMARY REFERENCE POINT: NORMAL
RAD ONC ARIA PLAN TOTAL FRACTIONS PRESCRIBED: 25
RAD ONC ARIA PLAN TOTAL FRACTIONS PRESCRIBED: 25
RAD ONC ARIA PLAN TOTAL PRESCRIBED DOSE: 5000 CGY
RAD ONC ARIA PLAN TOTAL PRESCRIBED DOSE: 5000 CGY
RAD ONC ARIA REFERENCE POINT DOSAGE GIVEN TO DATE: 3.96 GY
RAD ONC ARIA REFERENCE POINT DOSAGE GIVEN TO DATE: 4 GY
RAD ONC ARIA REFERENCE POINT ID: NORMAL
RAD ONC ARIA REFERENCE POINT SESSION DOSAGE GIVEN: 1.98 GY
RAD ONC ARIA REFERENCE POINT SESSION DOSAGE GIVEN: 2 GY

## 2022-06-08 PROCEDURE — 77280 THER RAD SIMULAJ FIELD SMPL: CPT | Performed by: RADIOLOGY

## 2022-06-08 PROCEDURE — 77387 GUIDANCE FOR RADJ TX DLVR: CPT | Performed by: RADIOLOGY

## 2022-06-08 PROCEDURE — 77412 RADIATION TX DELIVERY LVL 3: CPT | Performed by: RADIOLOGY

## 2022-06-08 PROCEDURE — G6002 STEREOSCOPIC X-RAY GUIDANCE: HCPCS | Performed by: RADIOLOGY

## 2022-06-09 LAB
RAD ONC ARIA COURSE ID: NORMAL
RAD ONC ARIA COURSE INTENT: NORMAL
RAD ONC ARIA COURSE LAST TREATMENT DATE: NORMAL
RAD ONC ARIA COURSE START DATE: NORMAL
RAD ONC ARIA COURSE TREATMENT ELAPSED DAYS: 2
RAD ONC ARIA FIRST TREATMENT DATE: NORMAL
RAD ONC ARIA PLAN FRACTIONS TREATED TO DATE: 3
RAD ONC ARIA PLAN FRACTIONS TREATED TO DATE: 3
RAD ONC ARIA PLAN ID: NORMAL
RAD ONC ARIA PLAN ID: NORMAL
RAD ONC ARIA PLAN PRESCRIBED DOSE PER FRACTION: 2 GY
RAD ONC ARIA PLAN PRESCRIBED DOSE PER FRACTION: 2 GY
RAD ONC ARIA PLAN PRIMARY REFERENCE POINT: NORMAL
RAD ONC ARIA PLAN PRIMARY REFERENCE POINT: NORMAL
RAD ONC ARIA PLAN TOTAL FRACTIONS PRESCRIBED: 25
RAD ONC ARIA PLAN TOTAL FRACTIONS PRESCRIBED: 25
RAD ONC ARIA PLAN TOTAL PRESCRIBED DOSE: 5000 CGY
RAD ONC ARIA PLAN TOTAL PRESCRIBED DOSE: 5000 CGY
RAD ONC ARIA REFERENCE POINT DOSAGE GIVEN TO DATE: 5.95 GY
RAD ONC ARIA REFERENCE POINT DOSAGE GIVEN TO DATE: 6 GY
RAD ONC ARIA REFERENCE POINT ID: NORMAL
RAD ONC ARIA REFERENCE POINT SESSION DOSAGE GIVEN: 1.98 GY
RAD ONC ARIA REFERENCE POINT SESSION DOSAGE GIVEN: 2 GY

## 2022-06-09 PROCEDURE — 77412 RADIATION TX DELIVERY LVL 3: CPT | Performed by: RADIOLOGY

## 2022-06-09 PROCEDURE — 77387 GUIDANCE FOR RADJ TX DLVR: CPT | Performed by: RADIOLOGY

## 2022-06-10 LAB
RAD ONC ARIA COURSE ID: NORMAL
RAD ONC ARIA COURSE INTENT: NORMAL
RAD ONC ARIA COURSE LAST TREATMENT DATE: NORMAL
RAD ONC ARIA COURSE START DATE: NORMAL
RAD ONC ARIA COURSE TREATMENT ELAPSED DAYS: 3
RAD ONC ARIA FIRST TREATMENT DATE: NORMAL
RAD ONC ARIA PLAN FRACTIONS TREATED TO DATE: 4
RAD ONC ARIA PLAN FRACTIONS TREATED TO DATE: 4
RAD ONC ARIA PLAN ID: NORMAL
RAD ONC ARIA PLAN ID: NORMAL
RAD ONC ARIA PLAN PRESCRIBED DOSE PER FRACTION: 2 GY
RAD ONC ARIA PLAN PRESCRIBED DOSE PER FRACTION: 2 GY
RAD ONC ARIA PLAN PRIMARY REFERENCE POINT: NORMAL
RAD ONC ARIA PLAN PRIMARY REFERENCE POINT: NORMAL
RAD ONC ARIA PLAN TOTAL FRACTIONS PRESCRIBED: 25
RAD ONC ARIA PLAN TOTAL FRACTIONS PRESCRIBED: 25
RAD ONC ARIA PLAN TOTAL PRESCRIBED DOSE: 5000 CGY
RAD ONC ARIA PLAN TOTAL PRESCRIBED DOSE: 5000 CGY
RAD ONC ARIA REFERENCE POINT DOSAGE GIVEN TO DATE: 7.93 GY
RAD ONC ARIA REFERENCE POINT DOSAGE GIVEN TO DATE: 8 GY
RAD ONC ARIA REFERENCE POINT ID: NORMAL
RAD ONC ARIA REFERENCE POINT SESSION DOSAGE GIVEN: 1.98 GY
RAD ONC ARIA REFERENCE POINT SESSION DOSAGE GIVEN: 2 GY

## 2022-06-10 PROCEDURE — 77412 RADIATION TX DELIVERY LVL 3: CPT | Performed by: RADIOLOGY

## 2022-06-10 PROCEDURE — 77387 GUIDANCE FOR RADJ TX DLVR: CPT | Performed by: RADIOLOGY

## 2022-06-13 LAB
RAD ONC ARIA COURSE ID: NORMAL
RAD ONC ARIA COURSE INTENT: NORMAL
RAD ONC ARIA COURSE LAST TREATMENT DATE: NORMAL
RAD ONC ARIA COURSE START DATE: NORMAL
RAD ONC ARIA COURSE TREATMENT ELAPSED DAYS: 6
RAD ONC ARIA FIRST TREATMENT DATE: NORMAL
RAD ONC ARIA PLAN FRACTIONS TREATED TO DATE: 5
RAD ONC ARIA PLAN FRACTIONS TREATED TO DATE: 5
RAD ONC ARIA PLAN ID: NORMAL
RAD ONC ARIA PLAN ID: NORMAL
RAD ONC ARIA PLAN PRESCRIBED DOSE PER FRACTION: 2 GY
RAD ONC ARIA PLAN PRESCRIBED DOSE PER FRACTION: 2 GY
RAD ONC ARIA PLAN PRIMARY REFERENCE POINT: NORMAL
RAD ONC ARIA PLAN PRIMARY REFERENCE POINT: NORMAL
RAD ONC ARIA PLAN TOTAL FRACTIONS PRESCRIBED: 25
RAD ONC ARIA PLAN TOTAL FRACTIONS PRESCRIBED: 25
RAD ONC ARIA PLAN TOTAL PRESCRIBED DOSE: 5000 CGY
RAD ONC ARIA PLAN TOTAL PRESCRIBED DOSE: 5000 CGY
RAD ONC ARIA REFERENCE POINT DOSAGE GIVEN TO DATE: 10 GY
RAD ONC ARIA REFERENCE POINT DOSAGE GIVEN TO DATE: 9.91 GY
RAD ONC ARIA REFERENCE POINT ID: NORMAL
RAD ONC ARIA REFERENCE POINT SESSION DOSAGE GIVEN: 1.98 GY
RAD ONC ARIA REFERENCE POINT SESSION DOSAGE GIVEN: 2 GY

## 2022-06-13 PROCEDURE — 77387 GUIDANCE FOR RADJ TX DLVR: CPT | Performed by: RADIOLOGY

## 2022-06-13 PROCEDURE — 77412 RADIATION TX DELIVERY LVL 3: CPT | Performed by: RADIOLOGY

## 2022-06-14 ENCOUNTER — RADIATION ONCOLOGY WEEKLY ASSESSMENT (OUTPATIENT)
Dept: RADIATION ONCOLOGY | Facility: HOSPITAL | Age: 77
End: 2022-06-14

## 2022-06-14 VITALS
DIASTOLIC BLOOD PRESSURE: 76 MMHG | RESPIRATION RATE: 18 BRPM | SYSTOLIC BLOOD PRESSURE: 118 MMHG | WEIGHT: 116.6 LBS | BODY MASS INDEX: 22.04 KG/M2 | HEART RATE: 88 BPM | OXYGEN SATURATION: 98 %

## 2022-06-14 DIAGNOSIS — C50.011 MALIGNANT NEOPLASM OF NIPPLE OF RIGHT BREAST IN FEMALE, UNSPECIFIED ESTROGEN RECEPTOR STATUS: Primary | ICD-10-CM

## 2022-06-14 LAB
RAD ONC ARIA COURSE ID: NORMAL
RAD ONC ARIA COURSE INTENT: NORMAL
RAD ONC ARIA COURSE LAST TREATMENT DATE: NORMAL
RAD ONC ARIA COURSE START DATE: NORMAL
RAD ONC ARIA COURSE TREATMENT ELAPSED DAYS: 7
RAD ONC ARIA FIRST TREATMENT DATE: NORMAL
RAD ONC ARIA PLAN FRACTIONS TREATED TO DATE: 6
RAD ONC ARIA PLAN FRACTIONS TREATED TO DATE: 6
RAD ONC ARIA PLAN ID: NORMAL
RAD ONC ARIA PLAN ID: NORMAL
RAD ONC ARIA PLAN PRESCRIBED DOSE PER FRACTION: 2 GY
RAD ONC ARIA PLAN PRESCRIBED DOSE PER FRACTION: 2 GY
RAD ONC ARIA PLAN PRIMARY REFERENCE POINT: NORMAL
RAD ONC ARIA PLAN PRIMARY REFERENCE POINT: NORMAL
RAD ONC ARIA PLAN TOTAL FRACTIONS PRESCRIBED: 25
RAD ONC ARIA PLAN TOTAL FRACTIONS PRESCRIBED: 25
RAD ONC ARIA PLAN TOTAL PRESCRIBED DOSE: 5000 CGY
RAD ONC ARIA PLAN TOTAL PRESCRIBED DOSE: 5000 CGY
RAD ONC ARIA REFERENCE POINT DOSAGE GIVEN TO DATE: 11.89 GY
RAD ONC ARIA REFERENCE POINT DOSAGE GIVEN TO DATE: 12 GY
RAD ONC ARIA REFERENCE POINT ID: NORMAL
RAD ONC ARIA REFERENCE POINT SESSION DOSAGE GIVEN: 1.98 GY
RAD ONC ARIA REFERENCE POINT SESSION DOSAGE GIVEN: 2 GY

## 2022-06-14 PROCEDURE — 77336 RADIATION PHYSICS CONSULT: CPT | Performed by: RADIOLOGY

## 2022-06-14 PROCEDURE — 77387 GUIDANCE FOR RADJ TX DLVR: CPT | Performed by: RADIOLOGY

## 2022-06-14 PROCEDURE — 77427 RADIATION TX MANAGEMENT X5: CPT | Performed by: RADIOLOGY

## 2022-06-14 PROCEDURE — 77412 RADIATION TX DELIVERY LVL 3: CPT | Performed by: RADIOLOGY

## 2022-06-14 NOTE — PROGRESS NOTES
Patient Name: Neela Ramirez Order Date: 2022       : 1945 Physician: No primary care provider on file.       MRN #: [id number] Diagnosis: No diagnosis found.                  RADIATION ON TREATMENT VISIT NOTE - GENERAL     Treatment Summary      General: Doing well with RT, on fraction 6, 12 Gy to left chest           Review of Systems    [x] No new complaints [] Cough [] Dysphagia  [] Bowel changes [] Fever/chills [] Nausea/vomiting  [] Skin itching/soreness/breakdown  [] Fatigue,  severity: ---------------- [] Pain,  severity: ----------------, location:       Nausea regimen: NONE   Pain medication regimen: NONE   Bowel regimen: NONE   Skin regimen: NONE     Comments/Notes:       KPS: Choose% 100       Vital Signs: /76   Pulse 88   Resp 18   Wt 52.9 kg (116 lb 9.6 oz)   SpO2 98%   BMI 22.04 kg/m²     Weight:   Wt Readings from Last 3 Encounters:   22 52.9 kg (116 lb 9.6 oz)   22 53 kg (116 lb 14.4 oz)   22 53.1 kg (117 lb)       Medication:   Current Outpatient Medications:   •  Acetaminophen (Tylenol) 325 MG capsule, Take  by mouth., Disp: , Rfl:   •  acetaminophen (TYLENOL) 500 MG tablet, Take 500-1,000 mg by mouth Every 6 (Six) Hours As Needed for Mild Pain ., Disp: , Rfl:   •  alendronate (FOSAMAX) 70 MG tablet, Take 1 tablet by mouth 1 (One) Time Per Week., Disp: 12 tablet, Rfl: 0  •  amLODIPine (NORVASC) 5 MG tablet, Take 1 tablet by mouth Daily., Disp: 90 tablet, Rfl: 2  •  anastrozole (ARIMIDEX) 1 MG tablet, Take 1 tablet by mouth Daily., Disp: 30 tablet, Rfl: 3  •  cyclobenzaprine (FLEXERIL) 5 MG tablet, Take 1 tablet by mouth three times daily as needed for muscle spasm (Patient taking differently: Take 5 mg by mouth 3 (Three) Times a Day As Needed for Muscle Spasms.), Disp: 30 tablet, Rfl: 0  •  cyclobenzaprine (FLEXERIL) 5 MG tablet, Take 1 tablet by mouth 3 (Three) Times a Day As Needed., Disp: , Rfl:   •  desoximetasone (TOPICORT) 0.25 % ointment, Apply 1  application topically to the appropriate area as directed 2 (Two) Times a Day As Needed (TO IRRITATED AREA)., Disp: , Rfl:   •  dexamethasone 0.5 MG/5ML solution, Take 10 mL by mouth 4 (Four) Times a Day. Take 10 mls by mouth four times daily. Swish in mouth for 2 minutes and then spit out. Do not swallow., Disp: 280 mL, Rfl: 0  •  escitalopram (LEXAPRO) 20 MG tablet, Take 1 tablet by mouth once daily, Disp: 30 tablet, Rfl: 5  •  levothyroxine (SYNTHROID, LEVOTHROID) 25 MCG tablet, TAKE 1 TABLET BY MOUTH ONCE DAILY IN THE MORNING 30 MINUTES BEFORE BREAKFAST OR  ANY  OTHER  MEDICINE, Disp: 30 tablet, Rfl: 5  •  metoprolol succinate XL (TOPROL-XL) 25 MG 24 hr tablet, Take 1 tablet by mouth Daily., Disp: 90 tablet, Rfl: 1  •  mupirocin (BACTROBAN) 2 % ointment, APPLY A SMALL AMOUNT OF OINTMENT TOPICALLY TO AFFECTED AREA EVERY NIGHT, Disp: , Rfl:   •  ondansetron (ZOFRAN) 8 MG tablet, Take 1 tablet by mouth 3 (Three) Times a Day As Needed for Nausea or Vomiting., Disp: 30 tablet, Rfl: 5  •  pantoprazole (PROTONIX) 40 MG EC tablet, Take 1 tablet by mouth once daily, Disp: 30 tablet, Rfl: 0  •  prochlorperazine (COMPAZINE) 10 MG tablet, Take 1 tablet by mouth Every 6 (Six) Hours As Needed for Nausea or Vomiting., Disp: 30 tablet, Rfl: 3  •  rosuvastatin (CRESTOR) 10 MG tablet, Take 1 tablet by mouth Daily., Disp: 90 tablet, Rfl: 3       LABS (Reviewed):  Hematology WBC   Date Value Ref Range Status   06/03/2022 6.30 3.40 - 10.80 10*3/mm3 Final   04/12/2021 7.65 3.40 - 10.80 10*3/mm3 Final     RBC   Date Value Ref Range Status   06/03/2022 2.64 (L) 3.77 - 5.28 10*6/mm3 Final   04/12/2021 4.48 3.77 - 5.28 10*6/mm3 Final     Hemoglobin   Date Value Ref Range Status   06/03/2022 9.2 (L) 12.0 - 15.9 g/dL Final     Hematocrit   Date Value Ref Range Status   06/03/2022 28.3 (L) 34.0 - 46.6 % Final     Platelets   Date Value Ref Range Status   06/03/2022 347 140 - 450 10*3/mm3 Final      Chemistry   Lab Results   Component Value  Date    GLUCOSE 86 06/03/2022    BUN 12 06/03/2022    CREATININE 0.70 06/03/2022    EGFRIFNONA 69 02/22/2022    EGFRIFAFRI 95 04/12/2021    BCR 17.1 06/03/2022    K 4.4 06/03/2022    CO2 21.2 (L) 06/03/2022    CALCIUM 9.2 06/03/2022    PROTENTOTREF 7.1 04/12/2021    ALBUMIN 4.10 06/03/2022    LABIL2 1.7 04/12/2021    AST 20 06/03/2022    ALT 17 06/03/2022         Physical Exam: no erythema of left breast         Comments/Notes:     [x] Review of labs, images, dosimetry, dose delivered, & treatment parameters.   Comments:     [x] Patient treatment setup reviewed.    Comments:     Recommendations:     [x] Continue RT  [] Change RT Plan [] Hold RT, length:        Approved Electronically By:  Elder Olivares MD, 6/14/2022, 08:40 EDT          Confidentiality of this medical record shall be maintained except when use or disclosure is required or permitted by law, regulation or written authorization by the patient.\

## 2022-06-15 LAB
RAD ONC ARIA COURSE ID: NORMAL
RAD ONC ARIA COURSE INTENT: NORMAL
RAD ONC ARIA COURSE LAST TREATMENT DATE: NORMAL
RAD ONC ARIA COURSE START DATE: NORMAL
RAD ONC ARIA COURSE TREATMENT ELAPSED DAYS: 8
RAD ONC ARIA FIRST TREATMENT DATE: NORMAL
RAD ONC ARIA PLAN FRACTIONS TREATED TO DATE: 7
RAD ONC ARIA PLAN FRACTIONS TREATED TO DATE: 7
RAD ONC ARIA PLAN ID: NORMAL
RAD ONC ARIA PLAN ID: NORMAL
RAD ONC ARIA PLAN PRESCRIBED DOSE PER FRACTION: 2 GY
RAD ONC ARIA PLAN PRESCRIBED DOSE PER FRACTION: 2 GY
RAD ONC ARIA PLAN PRIMARY REFERENCE POINT: NORMAL
RAD ONC ARIA PLAN PRIMARY REFERENCE POINT: NORMAL
RAD ONC ARIA PLAN TOTAL FRACTIONS PRESCRIBED: 25
RAD ONC ARIA PLAN TOTAL FRACTIONS PRESCRIBED: 25
RAD ONC ARIA PLAN TOTAL PRESCRIBED DOSE: 5000 CGY
RAD ONC ARIA PLAN TOTAL PRESCRIBED DOSE: 5000 CGY
RAD ONC ARIA REFERENCE POINT DOSAGE GIVEN TO DATE: 13.87 GY
RAD ONC ARIA REFERENCE POINT DOSAGE GIVEN TO DATE: 14 GY
RAD ONC ARIA REFERENCE POINT ID: NORMAL
RAD ONC ARIA REFERENCE POINT SESSION DOSAGE GIVEN: 1.98 GY
RAD ONC ARIA REFERENCE POINT SESSION DOSAGE GIVEN: 2 GY

## 2022-06-15 PROCEDURE — 77412 RADIATION TX DELIVERY LVL 3: CPT | Performed by: RADIOLOGY

## 2022-06-15 PROCEDURE — 77387 GUIDANCE FOR RADJ TX DLVR: CPT | Performed by: RADIOLOGY

## 2022-06-16 LAB
RAD ONC ARIA COURSE ID: NORMAL
RAD ONC ARIA COURSE INTENT: NORMAL
RAD ONC ARIA COURSE LAST TREATMENT DATE: NORMAL
RAD ONC ARIA COURSE START DATE: NORMAL
RAD ONC ARIA COURSE TREATMENT ELAPSED DAYS: 9
RAD ONC ARIA FIRST TREATMENT DATE: NORMAL
RAD ONC ARIA PLAN FRACTIONS TREATED TO DATE: 8
RAD ONC ARIA PLAN FRACTIONS TREATED TO DATE: 8
RAD ONC ARIA PLAN ID: NORMAL
RAD ONC ARIA PLAN ID: NORMAL
RAD ONC ARIA PLAN PRESCRIBED DOSE PER FRACTION: 2 GY
RAD ONC ARIA PLAN PRESCRIBED DOSE PER FRACTION: 2 GY
RAD ONC ARIA PLAN PRIMARY REFERENCE POINT: NORMAL
RAD ONC ARIA PLAN PRIMARY REFERENCE POINT: NORMAL
RAD ONC ARIA PLAN TOTAL FRACTIONS PRESCRIBED: 25
RAD ONC ARIA PLAN TOTAL FRACTIONS PRESCRIBED: 25
RAD ONC ARIA PLAN TOTAL PRESCRIBED DOSE: 5000 CGY
RAD ONC ARIA PLAN TOTAL PRESCRIBED DOSE: 5000 CGY
RAD ONC ARIA REFERENCE POINT DOSAGE GIVEN TO DATE: 15.85 GY
RAD ONC ARIA REFERENCE POINT DOSAGE GIVEN TO DATE: 16 GY
RAD ONC ARIA REFERENCE POINT ID: NORMAL
RAD ONC ARIA REFERENCE POINT SESSION DOSAGE GIVEN: 1.98 GY
RAD ONC ARIA REFERENCE POINT SESSION DOSAGE GIVEN: 2 GY

## 2022-06-16 PROCEDURE — 77387 GUIDANCE FOR RADJ TX DLVR: CPT | Performed by: RADIOLOGY

## 2022-06-16 PROCEDURE — 77412 RADIATION TX DELIVERY LVL 3: CPT | Performed by: RADIOLOGY

## 2022-06-17 LAB
RAD ONC ARIA COURSE ID: NORMAL
RAD ONC ARIA COURSE INTENT: NORMAL
RAD ONC ARIA COURSE LAST TREATMENT DATE: NORMAL
RAD ONC ARIA COURSE START DATE: NORMAL
RAD ONC ARIA COURSE TREATMENT ELAPSED DAYS: 10
RAD ONC ARIA FIRST TREATMENT DATE: NORMAL
RAD ONC ARIA PLAN FRACTIONS TREATED TO DATE: 9
RAD ONC ARIA PLAN FRACTIONS TREATED TO DATE: 9
RAD ONC ARIA PLAN ID: NORMAL
RAD ONC ARIA PLAN ID: NORMAL
RAD ONC ARIA PLAN PRESCRIBED DOSE PER FRACTION: 2 GY
RAD ONC ARIA PLAN PRESCRIBED DOSE PER FRACTION: 2 GY
RAD ONC ARIA PLAN PRIMARY REFERENCE POINT: NORMAL
RAD ONC ARIA PLAN PRIMARY REFERENCE POINT: NORMAL
RAD ONC ARIA PLAN TOTAL FRACTIONS PRESCRIBED: 25
RAD ONC ARIA PLAN TOTAL FRACTIONS PRESCRIBED: 25
RAD ONC ARIA PLAN TOTAL PRESCRIBED DOSE: 5000 CGY
RAD ONC ARIA PLAN TOTAL PRESCRIBED DOSE: 5000 CGY
RAD ONC ARIA REFERENCE POINT DOSAGE GIVEN TO DATE: 17.84 GY
RAD ONC ARIA REFERENCE POINT DOSAGE GIVEN TO DATE: 18 GY
RAD ONC ARIA REFERENCE POINT ID: NORMAL
RAD ONC ARIA REFERENCE POINT SESSION DOSAGE GIVEN: 1.98 GY
RAD ONC ARIA REFERENCE POINT SESSION DOSAGE GIVEN: 2 GY

## 2022-06-17 PROCEDURE — 77387 GUIDANCE FOR RADJ TX DLVR: CPT | Performed by: RADIOLOGY

## 2022-06-17 PROCEDURE — 77412 RADIATION TX DELIVERY LVL 3: CPT | Performed by: RADIOLOGY

## 2022-06-20 LAB
RAD ONC ARIA COURSE ID: NORMAL
RAD ONC ARIA COURSE INTENT: NORMAL
RAD ONC ARIA COURSE LAST TREATMENT DATE: NORMAL
RAD ONC ARIA COURSE START DATE: NORMAL
RAD ONC ARIA COURSE TREATMENT ELAPSED DAYS: 13
RAD ONC ARIA FIRST TREATMENT DATE: NORMAL
RAD ONC ARIA PLAN FRACTIONS TREATED TO DATE: 10
RAD ONC ARIA PLAN FRACTIONS TREATED TO DATE: 10
RAD ONC ARIA PLAN ID: NORMAL
RAD ONC ARIA PLAN ID: NORMAL
RAD ONC ARIA PLAN PRESCRIBED DOSE PER FRACTION: 2 GY
RAD ONC ARIA PLAN PRESCRIBED DOSE PER FRACTION: 2 GY
RAD ONC ARIA PLAN PRIMARY REFERENCE POINT: NORMAL
RAD ONC ARIA PLAN PRIMARY REFERENCE POINT: NORMAL
RAD ONC ARIA PLAN TOTAL FRACTIONS PRESCRIBED: 25
RAD ONC ARIA PLAN TOTAL FRACTIONS PRESCRIBED: 25
RAD ONC ARIA PLAN TOTAL PRESCRIBED DOSE: 5000 CGY
RAD ONC ARIA PLAN TOTAL PRESCRIBED DOSE: 5000 CGY
RAD ONC ARIA REFERENCE POINT DOSAGE GIVEN TO DATE: 19.82 GY
RAD ONC ARIA REFERENCE POINT DOSAGE GIVEN TO DATE: 20 GY
RAD ONC ARIA REFERENCE POINT ID: NORMAL
RAD ONC ARIA REFERENCE POINT SESSION DOSAGE GIVEN: 1.98 GY
RAD ONC ARIA REFERENCE POINT SESSION DOSAGE GIVEN: 2 GY

## 2022-06-20 PROCEDURE — 77412 RADIATION TX DELIVERY LVL 3: CPT | Performed by: RADIOLOGY

## 2022-06-20 PROCEDURE — 77387 GUIDANCE FOR RADJ TX DLVR: CPT | Performed by: RADIOLOGY

## 2022-06-20 PROCEDURE — 77290 THER RAD SIMULAJ FIELD CPLX: CPT | Performed by: RADIOLOGY

## 2022-06-21 ENCOUNTER — RADIATION ONCOLOGY WEEKLY ASSESSMENT (OUTPATIENT)
Dept: RADIATION ONCOLOGY | Facility: HOSPITAL | Age: 77
End: 2022-06-21

## 2022-06-21 VITALS
DIASTOLIC BLOOD PRESSURE: 71 MMHG | OXYGEN SATURATION: 98 % | HEART RATE: 89 BPM | SYSTOLIC BLOOD PRESSURE: 115 MMHG | WEIGHT: 117 LBS | BODY MASS INDEX: 22.12 KG/M2 | RESPIRATION RATE: 16 BRPM

## 2022-06-21 DIAGNOSIS — C50.011 MALIGNANT NEOPLASM OF NIPPLE OF RIGHT BREAST IN FEMALE, UNSPECIFIED ESTROGEN RECEPTOR STATUS: Primary | ICD-10-CM

## 2022-06-21 LAB
RAD ONC ARIA COURSE ID: NORMAL
RAD ONC ARIA COURSE INTENT: NORMAL
RAD ONC ARIA COURSE LAST TREATMENT DATE: NORMAL
RAD ONC ARIA COURSE START DATE: NORMAL
RAD ONC ARIA COURSE TREATMENT ELAPSED DAYS: 14
RAD ONC ARIA FIRST TREATMENT DATE: NORMAL
RAD ONC ARIA PLAN FRACTIONS TREATED TO DATE: 11
RAD ONC ARIA PLAN FRACTIONS TREATED TO DATE: 11
RAD ONC ARIA PLAN ID: NORMAL
RAD ONC ARIA PLAN ID: NORMAL
RAD ONC ARIA PLAN PRESCRIBED DOSE PER FRACTION: 2 GY
RAD ONC ARIA PLAN PRESCRIBED DOSE PER FRACTION: 2 GY
RAD ONC ARIA PLAN PRIMARY REFERENCE POINT: NORMAL
RAD ONC ARIA PLAN PRIMARY REFERENCE POINT: NORMAL
RAD ONC ARIA PLAN TOTAL FRACTIONS PRESCRIBED: 25
RAD ONC ARIA PLAN TOTAL FRACTIONS PRESCRIBED: 25
RAD ONC ARIA PLAN TOTAL PRESCRIBED DOSE: 5000 CGY
RAD ONC ARIA PLAN TOTAL PRESCRIBED DOSE: 5000 CGY
RAD ONC ARIA REFERENCE POINT DOSAGE GIVEN TO DATE: 21.8 GY
RAD ONC ARIA REFERENCE POINT DOSAGE GIVEN TO DATE: 22 GY
RAD ONC ARIA REFERENCE POINT ID: NORMAL
RAD ONC ARIA REFERENCE POINT SESSION DOSAGE GIVEN: 1.98 GY
RAD ONC ARIA REFERENCE POINT SESSION DOSAGE GIVEN: 2 GY

## 2022-06-21 PROCEDURE — 77387 GUIDANCE FOR RADJ TX DLVR: CPT | Performed by: RADIOLOGY

## 2022-06-21 PROCEDURE — 77412 RADIATION TX DELIVERY LVL 3: CPT | Performed by: RADIOLOGY

## 2022-06-21 PROCEDURE — 77427 RADIATION TX MANAGEMENT X5: CPT | Performed by: RADIOLOGY

## 2022-06-21 PROCEDURE — 77336 RADIATION PHYSICS CONSULT: CPT | Performed by: RADIOLOGY

## 2022-06-21 NOTE — PROGRESS NOTES
Physician Weekly Management Note    Diagnosis:     Diagnosis Plan   1. Malignant neoplasm of nipple of right breast in female, unspecified estrogen receptor status (HCC)         RT Details:  fx 11/30 left breast 2200cGy    Notes on Treatment course, Films, Medical progress:  Doing well, no erythema    Weekly Management:  Medication reviewed?   Yes  New medications given?   No  Problemlist reviewed?   Yes  Problem added?   No  Issues raised requiring referral to support services - task assigned to:  na    Technical aspects reviewed:  Weekly OBI approved?   Yes  Weekly port films approved?   Yes  Change requests noted on port film?   No  Patient setup and plan reviewed?   Yes    Chart Reviewed:  Continue current treatment plan?   Yes  Treatment plan change requested?   No  CBC reviewed?   Yes  Concurrent Chemo?   No    Objective     Toxicities:   none     Review of Systems   Constitutional: Negative.    Skin: Negative.           Vitals:    06/21/22 1034   BP: 115/71   Pulse: 89   Resp: 16   SpO2: 98%       Current Status 4/22/2022   ECOG score 0       Physical Exam  Chest:          Comments: No erythema  Neurological:      Mental Status: She is alert.             Problem Summary List    Diagnosis:     Diagnosis Plan   1. Malignant neoplasm of nipple of right breast in female, unspecified estrogen receptor status (HCC)       Pathology:   breast    Past Medical History:   Diagnosis Date   • Anxiety    • Arthritis    • Cataract     EARLY. JIMMIE EYES   • Chronic back pain     LOW BACK ACHES AT TIME FROM ARTHRTIS   • Constipation     AT TIMES. NO RECENT ISSUES   • Dermatitis     LEGS. STATES CLEARING UP NOW.   • Disease of thyroid gland     HYPO   • Diverticulosis     Colonoscopy November 2014   • Erythema of face     Transitory Erythema Of The Face   • GERD (gastroesophageal reflux disease)     ON OCC   • History of chemotherapy     FOR LEFT BREAST CANCER   • Hyperlipidemia    • Hypertension    • Malignant neoplasm of  female breast (HCC) 9/27/2021   • Osteopenia    • Tachycardia          Past Surgical History:   Procedure Laterality Date   • BREAST BIOPSY  09/2021    Dr. Tirado    • BREAST CYST ASPIRATION Right    • BREAST CYST EXCISION Right    • BREAST LUMPECTOMY WITH SENTINEL NODE BIOPSY Left 2/10/2022    Procedure: LEFT BREAST WIRE LOCALIZED LUMPECTOMY WITH SENTINEL NODE BIOPSY, POSSIBLE LEFT AXILLARY DISSECTION;  Surgeon: Becky Liu MD;  [place] [facility] [place] [facility] [place]  Service: General;  Laterality: Left;   • COLONOSCOPY     • COLONOSCOPY  2014   • EXOSTECTOMY Bilateral     Simple Bunion Exostectomy (Silver Procedure)   • FOOT TENDON SURGERY  2012   • VENOUS ACCESS DEVICE (PORT) INSERTION N/A 10/18/2021    Procedure: INSERTION VENOUS ACCESS DEVICE;  Surgeon: Becky Liu MD;  [place] [facility] [place] [facility] [place]  Service: General;  Laterality: N/A;         Current Outpatient Medications on File Prior to Visit   Medication Sig Dispense Refill   • Acetaminophen (Tylenol) 325 MG capsule Take  by mouth.     • acetaminophen (TYLENOL) 500 MG tablet Take 500-1,000 mg by mouth Every 6 (Six) Hours As Needed for Mild Pain .     • alendronate (FOSAMAX) 70 MG tablet Take 1 tablet by mouth 1 (One) Time Per Week. 12 tablet 0   • amLODIPine (NORVASC) 5 MG tablet Take 1 tablet by mouth Daily. 90 tablet 2   • anastrozole (ARIMIDEX) 1 MG tablet Take 1 tablet by mouth Daily. 30 tablet 3   • cyclobenzaprine (FLEXERIL) 5 MG tablet Take 1 tablet by mouth three times daily as needed for muscle spasm (Patient taking differently: Take 5 mg by mouth 3 (Three) Times a Day As Needed for Muscle Spasms.) 30 tablet 0   • cyclobenzaprine (FLEXERIL) 5 MG tablet Take 1 tablet by mouth 3 (Three) Times a Day As Needed.     • desoximetasone (TOPICORT) 0.25 % ointment Apply 1 application topically to the appropriate area as directed 2 (Two) Times a Day As Needed (TO IRRITATED AREA).     • dexamethasone 0.5 MG/5ML solution Take 10 mL by mouth 4 (Four) Times a Day. Take 10  mls by mouth four times daily. Swish in mouth for 2 minutes and then spit out. Do not swallow. 280 mL 0   • escitalopram (LEXAPRO) 20 MG tablet Take 1 tablet by mouth once daily 30 tablet 5   • levothyroxine (SYNTHROID, LEVOTHROID) 25 MCG tablet TAKE 1 TABLET BY MOUTH ONCE DAILY IN THE MORNING 30 MINUTES BEFORE BREAKFAST OR  ANY  OTHER  MEDICINE 30 tablet 5   • metoprolol succinate XL (TOPROL-XL) 25 MG 24 hr tablet Take 1 tablet by mouth Daily. 90 tablet 1   • mupirocin (BACTROBAN) 2 % ointment APPLY A SMALL AMOUNT OF OINTMENT TOPICALLY TO AFFECTED AREA EVERY NIGHT     • ondansetron (ZOFRAN) 8 MG tablet Take 1 tablet by mouth 3 (Three) Times a Day As Needed for Nausea or Vomiting. 30 tablet 5   • pantoprazole (PROTONIX) 40 MG EC tablet Take 1 tablet by mouth once daily 30 tablet 0   • prochlorperazine (COMPAZINE) 10 MG tablet Take 1 tablet by mouth Every 6 (Six) Hours As Needed for Nausea or Vomiting. 30 tablet 3   • rosuvastatin (CRESTOR) 10 MG tablet Take 1 tablet by mouth Daily. 90 tablet 3     No current facility-administered medications on file prior to visit.       No Known Allergies      Referring Provider:    No referring provider defined for this encounter.    Oncologist:  No primary care provider on file.      Seen and approved by:  Rula Schneider MD  06/21/2022

## 2022-06-22 LAB
RAD ONC ARIA COURSE ID: NORMAL
RAD ONC ARIA COURSE INTENT: NORMAL
RAD ONC ARIA COURSE LAST TREATMENT DATE: NORMAL
RAD ONC ARIA COURSE START DATE: NORMAL
RAD ONC ARIA COURSE TREATMENT ELAPSED DAYS: 15
RAD ONC ARIA FIRST TREATMENT DATE: NORMAL
RAD ONC ARIA PLAN FRACTIONS TREATED TO DATE: 12
RAD ONC ARIA PLAN FRACTIONS TREATED TO DATE: 12
RAD ONC ARIA PLAN ID: NORMAL
RAD ONC ARIA PLAN ID: NORMAL
RAD ONC ARIA PLAN PRESCRIBED DOSE PER FRACTION: 2 GY
RAD ONC ARIA PLAN PRESCRIBED DOSE PER FRACTION: 2 GY
RAD ONC ARIA PLAN PRIMARY REFERENCE POINT: NORMAL
RAD ONC ARIA PLAN PRIMARY REFERENCE POINT: NORMAL
RAD ONC ARIA PLAN TOTAL FRACTIONS PRESCRIBED: 25
RAD ONC ARIA PLAN TOTAL FRACTIONS PRESCRIBED: 25
RAD ONC ARIA PLAN TOTAL PRESCRIBED DOSE: 5000 CGY
RAD ONC ARIA PLAN TOTAL PRESCRIBED DOSE: 5000 CGY
RAD ONC ARIA REFERENCE POINT DOSAGE GIVEN TO DATE: 23.78 GY
RAD ONC ARIA REFERENCE POINT DOSAGE GIVEN TO DATE: 24 GY
RAD ONC ARIA REFERENCE POINT ID: NORMAL
RAD ONC ARIA REFERENCE POINT SESSION DOSAGE GIVEN: 1.98 GY
RAD ONC ARIA REFERENCE POINT SESSION DOSAGE GIVEN: 2 GY

## 2022-06-22 PROCEDURE — 77412 RADIATION TX DELIVERY LVL 3: CPT | Performed by: RADIOLOGY

## 2022-06-22 PROCEDURE — 77387 GUIDANCE FOR RADJ TX DLVR: CPT | Performed by: RADIOLOGY

## 2022-06-23 LAB
RAD ONC ARIA COURSE ID: NORMAL
RAD ONC ARIA COURSE INTENT: NORMAL
RAD ONC ARIA COURSE LAST TREATMENT DATE: NORMAL
RAD ONC ARIA COURSE START DATE: NORMAL
RAD ONC ARIA COURSE TREATMENT ELAPSED DAYS: 16
RAD ONC ARIA FIRST TREATMENT DATE: NORMAL
RAD ONC ARIA PLAN FRACTIONS TREATED TO DATE: 13
RAD ONC ARIA PLAN FRACTIONS TREATED TO DATE: 13
RAD ONC ARIA PLAN ID: NORMAL
RAD ONC ARIA PLAN ID: NORMAL
RAD ONC ARIA PLAN PRESCRIBED DOSE PER FRACTION: 2 GY
RAD ONC ARIA PLAN PRESCRIBED DOSE PER FRACTION: 2 GY
RAD ONC ARIA PLAN PRIMARY REFERENCE POINT: NORMAL
RAD ONC ARIA PLAN PRIMARY REFERENCE POINT: NORMAL
RAD ONC ARIA PLAN TOTAL FRACTIONS PRESCRIBED: 25
RAD ONC ARIA PLAN TOTAL FRACTIONS PRESCRIBED: 25
RAD ONC ARIA PLAN TOTAL PRESCRIBED DOSE: 5000 CGY
RAD ONC ARIA PLAN TOTAL PRESCRIBED DOSE: 5000 CGY
RAD ONC ARIA REFERENCE POINT DOSAGE GIVEN TO DATE: 25.76 GY
RAD ONC ARIA REFERENCE POINT DOSAGE GIVEN TO DATE: 26 GY
RAD ONC ARIA REFERENCE POINT ID: NORMAL
RAD ONC ARIA REFERENCE POINT SESSION DOSAGE GIVEN: 1.98 GY
RAD ONC ARIA REFERENCE POINT SESSION DOSAGE GIVEN: 2 GY

## 2022-06-23 PROCEDURE — 77387 GUIDANCE FOR RADJ TX DLVR: CPT | Performed by: RADIOLOGY

## 2022-06-23 PROCEDURE — 77412 RADIATION TX DELIVERY LVL 3: CPT | Performed by: RADIOLOGY

## 2022-06-24 ENCOUNTER — INFUSION (OUTPATIENT)
Dept: ONCOLOGY | Facility: HOSPITAL | Age: 77
End: 2022-06-24

## 2022-06-24 ENCOUNTER — OFFICE VISIT (OUTPATIENT)
Dept: ONCOLOGY | Facility: CLINIC | Age: 77
End: 2022-06-24

## 2022-06-24 VITALS
SYSTOLIC BLOOD PRESSURE: 111 MMHG | HEIGHT: 61 IN | HEART RATE: 87 BPM | TEMPERATURE: 97.5 F | OXYGEN SATURATION: 97 % | DIASTOLIC BLOOD PRESSURE: 72 MMHG | RESPIRATION RATE: 16 BRPM | WEIGHT: 116.9 LBS | BODY MASS INDEX: 22.07 KG/M2

## 2022-06-24 DIAGNOSIS — C50.011 MALIGNANT NEOPLASM OF NIPPLE OF RIGHT BREAST IN FEMALE, UNSPECIFIED ESTROGEN RECEPTOR STATUS: Primary | ICD-10-CM

## 2022-06-24 DIAGNOSIS — Z17.0 MALIGNANT NEOPLASM OF UPPER-INNER QUADRANT OF LEFT BREAST IN FEMALE, ESTROGEN RECEPTOR POSITIVE: Primary | ICD-10-CM

## 2022-06-24 DIAGNOSIS — C50.212 MALIGNANT NEOPLASM OF UPPER-INNER QUADRANT OF LEFT BREAST IN FEMALE, ESTROGEN RECEPTOR POSITIVE: Primary | ICD-10-CM

## 2022-06-24 DIAGNOSIS — Z79.899 HIGH RISK MEDICATION USE: ICD-10-CM

## 2022-06-24 LAB
ALBUMIN SERPL-MCNC: 3.9 G/DL (ref 3.5–5.2)
ALBUMIN/GLOB SERPL: 1.5 G/DL (ref 1.1–2.4)
ALP SERPL-CCNC: 95 U/L (ref 38–116)
ALT SERPL W P-5'-P-CCNC: 44 U/L (ref 0–33)
ANION GAP SERPL CALCULATED.3IONS-SCNC: 13.2 MMOL/L (ref 5–15)
AST SERPL-CCNC: 38 U/L (ref 0–32)
BASOPHILS # BLD AUTO: 0.03 10*3/MM3 (ref 0–0.2)
BASOPHILS NFR BLD AUTO: 0.5 % (ref 0–1.5)
BILIRUB SERPL-MCNC: 0.3 MG/DL (ref 0.2–1.2)
BUN SERPL-MCNC: 11 MG/DL (ref 6–20)
BUN/CREAT SERPL: 16.9 (ref 7.3–30)
CALCIUM SPEC-SCNC: 9 MG/DL (ref 8.5–10.2)
CHLORIDE SERPL-SCNC: 107 MMOL/L (ref 98–107)
CO2 SERPL-SCNC: 20.8 MMOL/L (ref 22–29)
CREAT SERPL-MCNC: 0.65 MG/DL (ref 0.6–1.1)
DEPRECATED RDW RBC AUTO: 61.6 FL (ref 37–54)
EGFRCR SERPLBLD CKD-EPI 2021: 90.8 ML/MIN/1.73
EOSINOPHIL # BLD AUTO: 0.08 10*3/MM3 (ref 0–0.4)
EOSINOPHIL NFR BLD AUTO: 1.4 % (ref 0.3–6.2)
ERYTHROCYTE [DISTWIDTH] IN BLOOD BY AUTOMATED COUNT: 15.1 % (ref 12.3–15.4)
GLOBULIN UR ELPH-MCNC: 2.6 GM/DL (ref 1.8–3.5)
GLUCOSE SERPL-MCNC: 111 MG/DL (ref 74–124)
HCT VFR BLD AUTO: 35.1 % (ref 34–46.6)
HGB BLD-MCNC: 11.2 G/DL (ref 12–15.9)
IMM GRANULOCYTES # BLD AUTO: 0.02 10*3/MM3 (ref 0–0.05)
IMM GRANULOCYTES NFR BLD AUTO: 0.4 % (ref 0–0.5)
LYMPHOCYTES # BLD AUTO: 0.44 10*3/MM3 (ref 0.7–3.1)
LYMPHOCYTES NFR BLD AUTO: 7.8 % (ref 19.6–45.3)
MCH RBC QN AUTO: 34.7 PG (ref 26.6–33)
MCHC RBC AUTO-ENTMCNC: 31.9 G/DL (ref 31.5–35.7)
MCV RBC AUTO: 108.7 FL (ref 79–97)
MONOCYTES # BLD AUTO: 0.96 10*3/MM3 (ref 0.1–0.9)
MONOCYTES NFR BLD AUTO: 17 % (ref 5–12)
NEUTROPHILS NFR BLD AUTO: 4.11 10*3/MM3 (ref 1.7–7)
NEUTROPHILS NFR BLD AUTO: 72.9 % (ref 42.7–76)
NRBC BLD AUTO-RTO: 0 /100 WBC (ref 0–0.2)
PLATELET # BLD AUTO: 211 10*3/MM3 (ref 140–450)
PMV BLD AUTO: 9.3 FL (ref 6–12)
POTASSIUM SERPL-SCNC: 3.9 MMOL/L (ref 3.5–4.7)
PROT SERPL-MCNC: 6.5 G/DL (ref 6.3–8)
RAD ONC ARIA COURSE ID: NORMAL
RAD ONC ARIA COURSE INTENT: NORMAL
RAD ONC ARIA COURSE LAST TREATMENT DATE: NORMAL
RAD ONC ARIA COURSE START DATE: NORMAL
RAD ONC ARIA COURSE TREATMENT ELAPSED DAYS: 17
RAD ONC ARIA FIRST TREATMENT DATE: NORMAL
RAD ONC ARIA PLAN FRACTIONS TREATED TO DATE: 14
RAD ONC ARIA PLAN FRACTIONS TREATED TO DATE: 14
RAD ONC ARIA PLAN ID: NORMAL
RAD ONC ARIA PLAN ID: NORMAL
RAD ONC ARIA PLAN PRESCRIBED DOSE PER FRACTION: 2 GY
RAD ONC ARIA PLAN PRESCRIBED DOSE PER FRACTION: 2 GY
RAD ONC ARIA PLAN PRIMARY REFERENCE POINT: NORMAL
RAD ONC ARIA PLAN PRIMARY REFERENCE POINT: NORMAL
RAD ONC ARIA PLAN TOTAL FRACTIONS PRESCRIBED: 25
RAD ONC ARIA PLAN TOTAL FRACTIONS PRESCRIBED: 25
RAD ONC ARIA PLAN TOTAL PRESCRIBED DOSE: 5000 CGY
RAD ONC ARIA PLAN TOTAL PRESCRIBED DOSE: 5000 CGY
RAD ONC ARIA REFERENCE POINT DOSAGE GIVEN TO DATE: 27.74 GY
RAD ONC ARIA REFERENCE POINT DOSAGE GIVEN TO DATE: 28 GY
RAD ONC ARIA REFERENCE POINT ID: NORMAL
RAD ONC ARIA REFERENCE POINT SESSION DOSAGE GIVEN: 1.98 GY
RAD ONC ARIA REFERENCE POINT SESSION DOSAGE GIVEN: 2 GY
RBC # BLD AUTO: 3.23 10*6/MM3 (ref 3.77–5.28)
SODIUM SERPL-SCNC: 141 MMOL/L (ref 134–145)
WBC NRBC COR # BLD: 5.64 10*3/MM3 (ref 3.4–10.8)

## 2022-06-24 PROCEDURE — 96413 CHEMO IV INFUSION 1 HR: CPT

## 2022-06-24 PROCEDURE — 25010000002 DEXAMETHASONE SODIUM PHOSPHATE 100 MG/10ML SOLUTION: Performed by: NURSE PRACTITIONER

## 2022-06-24 PROCEDURE — 85025 COMPLETE CBC W/AUTO DIFF WBC: CPT

## 2022-06-24 PROCEDURE — 77387 GUIDANCE FOR RADJ TX DLVR: CPT | Performed by: RADIOLOGY

## 2022-06-24 PROCEDURE — 80053 COMPREHEN METABOLIC PANEL: CPT

## 2022-06-24 PROCEDURE — 77412 RADIATION TX DELIVERY LVL 3: CPT | Performed by: RADIOLOGY

## 2022-06-24 PROCEDURE — 25010000002 ADO-TRASTUZUMAB 100 MG RECONSTITUTED SOLUTION 1 EACH VIAL: Performed by: NURSE PRACTITIONER

## 2022-06-24 PROCEDURE — 96375 TX/PRO/DX INJ NEW DRUG ADDON: CPT

## 2022-06-24 PROCEDURE — 99214 OFFICE O/P EST MOD 30 MIN: CPT | Performed by: NURSE PRACTITIONER

## 2022-06-24 RX ORDER — SODIUM CHLORIDE 9 MG/ML
250 INJECTION, SOLUTION INTRAVENOUS ONCE
Status: COMPLETED | OUTPATIENT
Start: 2022-06-24 | End: 2022-06-24

## 2022-06-24 RX ORDER — SODIUM CHLORIDE 9 MG/ML
250 INJECTION, SOLUTION INTRAVENOUS ONCE
Status: CANCELLED | OUTPATIENT
Start: 2022-06-24

## 2022-06-24 RX ADMIN — SODIUM CHLORIDE 250 ML: 9 INJECTION, SOLUTION INTRAVENOUS at 12:26

## 2022-06-24 RX ADMIN — DEXAMETHASONE SODIUM PHOSPHATE 12 MG: 10 INJECTION, SOLUTION INTRAMUSCULAR; INTRAVENOUS at 12:26

## 2022-06-24 RX ADMIN — ADO-TRASTUZUMAB EMTANSINE 195 MG: 20 INJECTION, POWDER, LYOPHILIZED, FOR SOLUTION INTRAVENOUS at 12:50

## 2022-06-24 NOTE — PROGRESS NOTES
Subjective   Neela Ramirez is a 77 y.o. female.  Referred by Dr. 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  A bilateral diagnostic mammogram was performed for further evaluation of the same.    8/17/2021-bilateral diagnostic mammogram-scattered fibroglandular densities throughout both breasts.  In the posterior one third upper inner quadrant of the left breast at the site of palpable concern, 11 o'clock position there is a mass with adjacent pleomorphic calcifications.  The mass and the calcifications measure on order of 1.5 cm in greatest dimension.  No evidence of axillary lymphadenopathy appreciated.  Stable microcalcifications seen in other areas of the left breast on right breast.  Ultrasound-at 11 o'clock position, 5 cm from the nipple in the left breast there is an irregular hypoechoic mass which measures 2.4 x 2 x 1.6 cm.    Impression  1.irregular 2.4 cm mass in the left breast at the site of palpable concern at 11:00, 5 cm from the nipple.  Ultrasound-guided biopsy of the mass recommended  No finding suspicious for malignancy in the right breast    9/8/2021-left breast ultrasound-guided biopsy  Pathology consistent with invasive ductal carcinoma with lobular features.  Grade 3.  The carcinoma measures 7 mm in greatest dimension.  Focally suspicious for lymphovascular space invasion.  ER low +1-10%, intensity week  CT negative  HER-2 3+ on immunohistochemistry  Ki-67 70%    MRI of the breast 10/8/2021-Biopsy-proven malignancy in the left breast at 11:00 measuring 2.8 cm with a centrally located metallic clip.  No other suspicious findings are seen within the left breast or no left axillary lymphadenopathy.  No suspicious findings of the right breast    Echocardiogram 10/8/2021 was normal  ejection fraction of 56 to 60% and strain of -20    She denies any new bone pains, dyspnea, cough, abdominal pain nausea vomiting or recent changes in weight.    She had 2 previous breast aspirations in her 20s.  No other breast biopsies  She does not know much about her family hence limited family history.    She received neoadjuvant chemotherapy on EA 1181 trial with Taxol Perjeta and Herceptin.  She completed 12 weeks of Taxol Herceptin and Perjeta.    Had an abnormal EKG preop and hence a stress test was performed on 2/3/2022 which showed a normal left ventricular ejection fraction and LVEF at stress was 73%.  Considered a low risk study with normal myocardial perfusion imaging.    She underwent a left breast lumpectomy and a sentinel lymph node biopsy on 2/10/2022.    Pathology was consistent with residual tumor which was pleomorphic invasive lobular carcinoma, poorly differentiated, grade 3  Tumor measured 18 mm  Margins negative for invasive carcinoma  Associated lobular carcinoma in situ noted  1 out of 10 lymph nodes was positive for metastatic focus measuring 3.5 mm.    Receptors were repeated on the pleomorphic invasive lobular carcinoma  ER +91 to 100% strong  AK +61 to 70% moderate  HER-2 negative  Ki-67 55%    Receptors performed on the metastatic lymph node  ER +81-90% strong  AK negative  HER-2 2+ on immunohistochemistry, FISH pending.    ypT1 cN1 aM0    2/28/2022-CT of the chest abdomen and pelvis  Fluid collection in the upper left breast measuring 6 x 2.5 x 4.9 cm, postoperative seroma.  Skin thickening of the left breast likely postsurgical.  Fluid collections left axilla measuring 2.9 x 1.4 x 2.9 cm.  No other suspicious abnormalities on the CT of the chest  CT abdomen with no evidence of metastatic disease.  Mild colonic diverticulosis.  Small uterine fibroid.    3/1/2022-bone scan  No evidence of metastatic disease.  Multifocal degenerative arthritic uptake without scintigraphic evidence to  suggest metastatic disease.    3/1/2022-echocardiogram  Left ventricular ejection fraction 56-60%.  Normal global LV strain of -20.5%.    3/11/2022-cycle 1 of AC    Completed 4 cycles of AC    6/3/2022-cycle 1 Kadcyla    Interval history  Patient reviewed back today for follow-up due for cycle 2 Kadcyla.  She notes having 2 to 3 days of fairly significant fatigue after first Kadcyla dose.  This was short-lived however and she has done well since.  We reviewed her blood work showing improvement in her hemoglobin.  LFTs have mildly bumped up to just above normal.  Plan to monitor but continue on therapy.      Patient did begin radiation therapy and at the direction of Dr. Schneider, on day 14/30 today.  Tolerating well with no skin breakdown currently.    She denies other concerns at this time.    The following portions of the patient's history were reviewed and updated as appropriate: allergies, current medications, past family history, past medical history, past social history, past surgical history and problem list.    Past Medical History:   Diagnosis Date   • Anxiety    • Arthritis    • Cataract     EARLY. JIMMIE EYES   • Chronic back pain     LOW BACK ACHES AT TIME FROM ARTHRTIS   • Constipation     AT TIMES. NO RECENT ISSUES   • Dermatitis     LEGS. STATES CLEARING UP NOW.   • Disease of thyroid gland     HYPO   • Diverticulosis     Colonoscopy November 2014   • Erythema of face     Transitory Erythema Of The Face   • GERD (gastroesophageal reflux disease)     ON OCC   • History of chemotherapy     FOR LEFT BREAST CANCER   • Hyperlipidemia    • Hypertension    • Malignant neoplasm of female breast (HCC) 9/27/2021   • Osteopenia    • Tachycardia         Past Surgical History:   Procedure Laterality Date   • BREAST BIOPSY  09/2021    Dr. Tirado    • BREAST CYST ASPIRATION Right    • BREAST CYST EXCISION Right    • BREAST LUMPECTOMY WITH SENTINEL NODE BIOPSY Left 2/10/2022    Procedure: LEFT BREAST WIRE LOCALIZED  "LUMPECTOMY WITH SENTINEL NODE BIOPSY, POSSIBLE LEFT AXILLARY DISSECTION;  Surgeon: Becky Liu MD;  Location: [facility] OR [facility] [place];  Service: General;  Laterality: Left;   • COLONOSCOPY     • COLONOSCOPY     • EXOSTECTOMY Bilateral     Simple Bunion Exostectomy (Silver Procedure)   • FOOT TENDON SURGERY     • VENOUS ACCESS DEVICE (PORT) INSERTION N/A 10/18/2021    Procedure: INSERTION VENOUS ACCESS DEVICE;  Surgeon: Becky Liu MD;  Location: [facility] OR [facility] [place];  Service: General;  Laterality: N/A;        Family History   Problem Relation Age of Onset   • Arthritis Father    • Heart disease Father    • Hypertension Father    • Hypertension Mother    • Malig Hyperthermia Neg Hx         Social History     Socioeconomic History   • Marital status:      Spouse name: Pj   • Number of children: 1   Tobacco Use   • Smoking status: Former Smoker     Types: Cigarettes   • Smokeless tobacco: Never Used   • Tobacco comment: SOCIALLY IN EARLY S   Vaping Use   • Vaping Use: Never used   Substance and Sexual Activity   • Alcohol use: Yes     Comment: social drinker   • Drug use: Never   • Sexual activity: Defer        OB History             Para        Term   0            AB        Living           SAB        IAB        Ectopic        Molar        Multiple        Live Births                 Age at menarche-13  Age at menopause-50  Nulliparous  Breast-feeding-N/A   0 para 0  0  Oral contraceptive pill use-none  Hormone replacement therapy-7 years    No Known Allergies     Review of systems as mentioned in the HPI    Objective   Blood pressure 111/72, pulse 87, temperature 97.5 °F (36.4 °C), temperature source Temporal, resp. rate 16, height 154 cm (60.63\"), weight 53 kg (116 lb 14.4 oz), SpO2 97 %.     ECOG performance status-0    Physical Exam  Vitals reviewed.   Constitutional:       Appearance: Normal appearance.   HENT:      Head: Normocephalic and atraumatic.      Nose: Nose " normal.      Mouth/Throat:      Mouth: Mucous membranes are moist.      Pharynx: Oropharynx is clear.   Eyes:      Conjunctiva/sclera: Conjunctivae normal.      Pupils: Pupils are equal, round, and reactive to light.   Cardiovascular:      Rate and Rhythm: Normal rate and regular rhythm.      Pulses: Normal pulses.      Heart sounds: Normal heart sounds.   Pulmonary:      Effort: Pulmonary effort is normal.      Breath sounds: Normal breath sounds.   Abdominal:      General: Abdomen is flat. Bowel sounds are normal.      Palpations: Abdomen is soft.   Musculoskeletal:         General: Normal range of motion.      Cervical back: Normal range of motion.   Skin:     General: Skin is warm and dry.      Findings: No rash.   Neurological:      General: No focal deficit present.      Mental Status: She is alert and oriented to person, place, and time. Mental status is at baseline.   Psychiatric:         Mood and Affect: Mood normal.         Behavior: Behavior normal.         Thought Content: Thought content normal.         Judgment: Judgment normal.       Breast Exam: Right breast appears normal on inspection.  No palpable abnormalities of the right breast.    Left breast on inspection there is a scar which is well-healed.  There is also left axillary scar which is well-healed.  Very mild pinking of the skin in the setting of radiation.    I have reexamined the patient and the results are consistent with the previously documented exam except as updated. Shonna Cuadra, APRN     Results from last 7 days   Lab Units 06/24/22  1056   WBC 10*3/mm3 5.64   NEUTROS ABS 10*3/mm3 4.11   HEMOGLOBIN g/dL 11.2*   HEMATOCRIT % 35.1   PLATELETS 10*3/mm3 211     Results from last 7 days   Lab Units 06/24/22  1056   SODIUM mmol/L 141   POTASSIUM mmol/L 3.9   CHLORIDE mmol/L 107   CO2 mmol/L 20.8*   BUN mg/dL 11   CREATININE mg/dL 0.65   CALCIUM mg/dL 9.0   ALBUMIN g/dL 3.90   BILIRUBIN mg/dL 0.3   ALK PHOS U/L 95   ALT (SGPT) U/L  44*   AST (SGOT) U/L 38*   GLUCOSE mg/dL 111           No radiology results for the last 30 days.     CBC reviewed and hemoglobin 9.2.  CMP within normal limits    Assessment & Plan       *Invasive ductal carcinoma of the left breast  · Clinical T2 N0 M0, stage IIa  · On ultrasound the tumor measures 2.4 cm.  Lymph nodes on the left side are normal.  · MRI 10/8/2021 with tumor measuring 2.8 cm.  Lymph nodes appear normal  · Pathology consistent with invasive carcinoma with ductal and lobular features, grade 3, ER +1 to 10% weak, ND negative, HER-2 3+ immunohistochemistry, Ki-67 70%.  · She was evaluated by Dr. Liu and referred for consideration of neoadjuvant chemotherapy.   Since the tumor is HER-2 positive and over 2 cm I think it would be reasonable to proceed with neoadjuvant chemotherapy.  Since the tumor is over 2 cm but lymph node negative I think she would be a great candidate for EA 1181 clinical trial which comprises of administering Taxol Herceptin and Perjeta tamika  adjuvantly followed by surgery and additional adjuvant treatment depending upon the response.  Port placed 10/18/2021  Echocardiogram shows normal ejection fraction  Week 1 TPH on 10/19/2021  Completed 12 weeks of Taxol Perjeta and Herceptin on 1/4/2022 on EA 1181 clinical trial  1/11/2022 due for Herceptin and Perjeta only  2/1/2022-due for dose 2 of Herceptin and Perjeta  MRI 1/14/2022 reviewed and there has been a good tumor response with decrease in size of the mass to 1.2 cm.  2/10/2022-left breast lumpectomy and sentinel lymph node biopsy  Pathology shows ypT1 cN1 aM0, stage IIb, pleomorphic invasive lobular carcinoma, grade 3, ER +% strong, ND +61-70% moderate, HER-2 negative on the residual tumor  The left axillary lymph node was ER positive strong, ND negative and HER-2 2+, FISH amplified.  On the initial biopsy prior to surgery the pathology showed invasive ductal with lobular features and the ER/ND was negative and HER-2 was  3+.  The residual disease obviously appears to be different from the initial pathology.  What is remaining is essentially pleomorphic lobular carcinoma which is high-grade with a high Ki-67 and ER/RI positivity.  Now that the lymph node is also positive I do believe that she will benefit from additional chemotherapy particularly either AC or EC.  Case was presented at multidisciplinary conference.  Decided to proceed with adjuvant Adriamycin and cyclophosphamide.  Given her age we will proceed with every 3 weekly AC over every 2 weeks.  Staging CT chest and pelvis and bone scan without any evidence of metastatic disease  3/11/2022-proceed with cycle 1 of AC  · 5/13/2022-due for cycle 4 AC.    · 6/3/2022-cycle 1 Kadcyla  · 6/24/2022 cycle 2 Kadcyla.  Continue on with radiation therapy, on day 13 of 30 today.  Plan to begin anastrozole upon completion of radiation.  This has been prescribed.    *Right-sided port appears normal.    *Hypertension-currently controlled with amlodipine and metoprolol.  Blood pressure 111/72    *Anxiety  · Stable.  Continue Zyprexa    *Hypothyroidism  · Continue Synthroid  · Stable    *Cardiac health-  · Echocardiogram 1/5/2022 reviewed and stable  · Stress test 2/3/2022 normal  · 3/1/2022-echocardiogram shows normal ejection fraction of 56 to 60%, normal LV strain  · 5/31/2022 EF 61%.  Maintain follow-up with Dr. Taylor.    *Bone health-  · DEXA scan August 2021 showing osteopenia (this was stable compared to imaging 2 years prior).    PLAN:   1. Proceed with cycle 2 Kadcyla.    2. Plan to give total of 1 year of HER2 directed therapy which is roughly 8 more months.    3. Continue radiation therapy under the direction of Dr. Schneider.  4. Upon completion of radiation patient is to begin anastrozole as already prescribed per Dr. Avendano.  5. Return in 3 weeks for cycle 3 Kadcyla.  6. Return in 6 weeks for follow-up with Dr. Avendano and cycle 4 Kadcyla.      Patient is on cytotoxic  chemotherapy requiring close monitoring for toxicities.

## 2022-06-27 LAB
RAD ONC ARIA COURSE ID: NORMAL
RAD ONC ARIA COURSE INTENT: NORMAL
RAD ONC ARIA COURSE LAST TREATMENT DATE: NORMAL
RAD ONC ARIA COURSE START DATE: NORMAL
RAD ONC ARIA COURSE TREATMENT ELAPSED DAYS: 20
RAD ONC ARIA FIRST TREATMENT DATE: NORMAL
RAD ONC ARIA PLAN FRACTIONS TREATED TO DATE: 15
RAD ONC ARIA PLAN FRACTIONS TREATED TO DATE: 15
RAD ONC ARIA PLAN ID: NORMAL
RAD ONC ARIA PLAN ID: NORMAL
RAD ONC ARIA PLAN PRESCRIBED DOSE PER FRACTION: 2 GY
RAD ONC ARIA PLAN PRESCRIBED DOSE PER FRACTION: 2 GY
RAD ONC ARIA PLAN PRIMARY REFERENCE POINT: NORMAL
RAD ONC ARIA PLAN PRIMARY REFERENCE POINT: NORMAL
RAD ONC ARIA PLAN TOTAL FRACTIONS PRESCRIBED: 25
RAD ONC ARIA PLAN TOTAL FRACTIONS PRESCRIBED: 25
RAD ONC ARIA PLAN TOTAL PRESCRIBED DOSE: 5000 CGY
RAD ONC ARIA PLAN TOTAL PRESCRIBED DOSE: 5000 CGY
RAD ONC ARIA REFERENCE POINT DOSAGE GIVEN TO DATE: 29.73 GY
RAD ONC ARIA REFERENCE POINT DOSAGE GIVEN TO DATE: 30 GY
RAD ONC ARIA REFERENCE POINT ID: NORMAL
RAD ONC ARIA REFERENCE POINT SESSION DOSAGE GIVEN: 1.98 GY
RAD ONC ARIA REFERENCE POINT SESSION DOSAGE GIVEN: 2 GY

## 2022-06-27 PROCEDURE — 77412 RADIATION TX DELIVERY LVL 3: CPT | Performed by: RADIOLOGY

## 2022-06-27 PROCEDURE — 77336 RADIATION PHYSICS CONSULT: CPT | Performed by: RADIOLOGY

## 2022-06-27 PROCEDURE — 77387 GUIDANCE FOR RADJ TX DLVR: CPT | Performed by: RADIOLOGY

## 2022-06-28 ENCOUNTER — RADIATION ONCOLOGY WEEKLY ASSESSMENT (OUTPATIENT)
Dept: RADIATION ONCOLOGY | Facility: HOSPITAL | Age: 77
End: 2022-06-28

## 2022-06-28 VITALS
SYSTOLIC BLOOD PRESSURE: 118 MMHG | WEIGHT: 115 LBS | RESPIRATION RATE: 16 BRPM | DIASTOLIC BLOOD PRESSURE: 77 MMHG | OXYGEN SATURATION: 98 % | HEART RATE: 79 BPM | BODY MASS INDEX: 22 KG/M2

## 2022-06-28 DIAGNOSIS — C50.011 MALIGNANT NEOPLASM OF NIPPLE OF RIGHT BREAST IN FEMALE, UNSPECIFIED ESTROGEN RECEPTOR STATUS: Primary | ICD-10-CM

## 2022-06-28 LAB
RAD ONC ARIA COURSE ID: NORMAL
RAD ONC ARIA COURSE INTENT: NORMAL
RAD ONC ARIA COURSE LAST TREATMENT DATE: NORMAL
RAD ONC ARIA COURSE START DATE: NORMAL
RAD ONC ARIA COURSE TREATMENT ELAPSED DAYS: 21
RAD ONC ARIA FIRST TREATMENT DATE: NORMAL
RAD ONC ARIA PLAN FRACTIONS TREATED TO DATE: 16
RAD ONC ARIA PLAN FRACTIONS TREATED TO DATE: 16
RAD ONC ARIA PLAN ID: NORMAL
RAD ONC ARIA PLAN ID: NORMAL
RAD ONC ARIA PLAN PRESCRIBED DOSE PER FRACTION: 2 GY
RAD ONC ARIA PLAN PRESCRIBED DOSE PER FRACTION: 2 GY
RAD ONC ARIA PLAN PRIMARY REFERENCE POINT: NORMAL
RAD ONC ARIA PLAN PRIMARY REFERENCE POINT: NORMAL
RAD ONC ARIA PLAN TOTAL FRACTIONS PRESCRIBED: 25
RAD ONC ARIA PLAN TOTAL FRACTIONS PRESCRIBED: 25
RAD ONC ARIA PLAN TOTAL PRESCRIBED DOSE: 5000 CGY
RAD ONC ARIA PLAN TOTAL PRESCRIBED DOSE: 5000 CGY
RAD ONC ARIA REFERENCE POINT DOSAGE GIVEN TO DATE: 31.71 GY
RAD ONC ARIA REFERENCE POINT DOSAGE GIVEN TO DATE: 32 GY
RAD ONC ARIA REFERENCE POINT ID: NORMAL
RAD ONC ARIA REFERENCE POINT SESSION DOSAGE GIVEN: 1.98 GY
RAD ONC ARIA REFERENCE POINT SESSION DOSAGE GIVEN: 2 GY

## 2022-06-28 PROCEDURE — 77412 RADIATION TX DELIVERY LVL 3: CPT | Performed by: RADIOLOGY

## 2022-06-28 PROCEDURE — 77427 RADIATION TX MANAGEMENT X5: CPT | Performed by: RADIOLOGY

## 2022-06-28 PROCEDURE — 77387 GUIDANCE FOR RADJ TX DLVR: CPT | Performed by: RADIOLOGY

## 2022-06-28 NOTE — PROGRESS NOTES
Physician Weekly Management Note    Diagnosis:     Diagnosis Plan   1. Malignant neoplasm of nipple of right breast in female, unspecified estrogen receptor status (HCC)         RT Details:  fx 16/30 left breast 3200cy    Notes on Treatment course, Films, Medical progress:  Doing well, kps 90% Mild erythema over left breast, cont on     Weekly Management:  Medication reviewed?   Yes  New medications given?   No  Problemlist reviewed?   Yes  Problem added?   No  Issues raised requiring referral to support services - task assigned to:  na    Technical aspects reviewed:  Weekly OBI approved?   Yes  Weekly port films approved?   Yes  Change requests noted on port film?   No  Patient setup and plan reviewed?   Yes    Chart Reviewed:  Continue current treatment plan?   Yes  Treatment plan change requested?   No  CBC reviewed?   Yes  Concurrent Chemo?   No    Objective     Toxicities:   none     Review of Systems   Constitutional: Negative.    Skin: Positive for color change.          There were no vitals filed for this visit.    Current Status 6/24/2022   ECOG score 0       Physical Exam  Constitutional:       Appearance: Normal appearance.   Chest:          Comments: Mild erythema  Neurological:      Mental Status: She is alert.             Problem Summary List    Diagnosis:     Diagnosis Plan   1. Malignant neoplasm of nipple of right breast in female, unspecified estrogen receptor status (HCC)       Pathology:   breast    Past Medical History:   Diagnosis Date   • Anxiety    • Arthritis    • Cataract     EARLY. JIMMIE EYES   • Chronic back pain     LOW BACK ACHES AT TIME FROM ARTHRTIS   • Constipation     AT TIMES. NO RECENT ISSUES   • Dermatitis     LEGS. STATES CLEARING UP NOW.   • Disease of thyroid gland     HYPO   • Diverticulosis     Colonoscopy November 2014   • Erythema of face     Transitory Erythema Of The Face   • GERD (gastroesophageal reflux disease)     ON OCC   • History of chemotherapy     FOR LEFT BREAST  Transitional Care call to [place] 5/26/22[place] [facility] with CABG x 3. Patient states he has been doing well since [place] [facility] [facility] nurse visited, this morning. Instructed patient to follow low salt, low cholesterol, low saturated fat diet. Instructed patient to weight every day and call to report any weight gain of 2 lbs overnight or 5 lbs in 1 week, or any increase in SOB or peripheral edema. Instructed patient to increase activity as tolerated to limit of SOB or fatigue, avoid all heavy lifting or activities that strain the chest or upper arm muscles, and avoid all strenuous activity. Instructed patient to call Dr. Ashkan Sandhu to report any signs of infection, which might include increased redness, swelling, heat, pain, or purulent drainage at incision sites. Reviewed meds, stressing importance of compliance. Reviewed NTG instructions with patient. Advised patient to call office or return to [facility] ER for immediate evaluation of any chest pain or SOB not relieved by NTG. Reminded patient of 6/10/22 2:30 pm MA appointment with Dr. Sujatha Nieto. Advised patient that someone will be calling to schedule cardiac rehab. Advised patient to call with any questions or concerns. Patient verbalized understanding and states he has no questions or concerns at this time. Patient states he is not sure if [facility] on [address] in [place] did not fill NTG, or if he accidentally threw bag with NTG away in trash, but he does not have NTG. At patient's request, NTG 0.4 mg PRN, as below, E-prescribed to [facility] on [address] in [place]. Requested Prescriptions     Signed Prescriptions Disp Refills    nitroGLYCERIN (NITROSTAT) 0.4 MG SL tablet 25 tablet 3     Sig: Place 1 tablet under the tongue every 5 minutes as needed for Chest pain up to max of 3 total doses. If no relief after 1 dose, call 911.      Authorizing Provider: Bin Sanchez     Ordering User: Low Lindsey [facility]   • Hyperlipidemia    • Hypertension    • Malignant neoplasm of female breast (HCC) 9/27/2021   • Osteopenia    • Tachycardia          Past Surgical History:   Procedure Laterality Date   • BREAST BIOPSY  09/2021    Dr. Tirado    • BREAST CYST ASPIRATION Right    • BREAST CYST EXCISION Right    • BREAST LUMPECTOMY WITH SENTINEL NODE BIOPSY Left 2/10/2022    Procedure: LEFT BREAST WIRE LOCALIZED LUMPECTOMY WITH SENTINEL NODE BIOPSY, POSSIBLE LEFT AXILLARY DISSECTION;  Surgeon: Becky Liu MD;  [place] [facility] [place] [facility] [place]  Service: General;  Laterality: Left;   • COLONOSCOPY     • COLONOSCOPY  2014   • EXOSTECTOMY Bilateral     Simple Bunion Exostectomy (Silver Procedure)   • FOOT TENDON SURGERY  2012   • VENOUS ACCESS DEVICE (PORT) INSERTION N/A 10/18/2021    Procedure: INSERTION VENOUS ACCESS DEVICE;  Surgeon: Becky Liu MD;  [place] [facility] [place] [facility] [place]  Service: General;  Laterality: N/A;         Current Outpatient Medications on File Prior to Visit   Medication Sig Dispense Refill   • Acetaminophen (Tylenol) 325 MG capsule Take  by mouth.     • acetaminophen (TYLENOL) 500 MG tablet Take 500-1,000 mg by mouth Every 6 (Six) Hours As Needed for Mild Pain .     • alendronate (FOSAMAX) 70 MG tablet Take 1 tablet by mouth 1 (One) Time Per Week. 12 tablet 0   • amLODIPine (NORVASC) 5 MG tablet Take 1 tablet by mouth Daily. 90 tablet 2   • anastrozole (ARIMIDEX) 1 MG tablet Take 1 tablet by mouth Daily. 30 tablet 3   • cyclobenzaprine (FLEXERIL) 5 MG tablet Take 1 tablet by mouth three times daily as needed for muscle spasm (Patient taking differently: Take 5 mg by mouth 3 (Three) Times a Day As Needed for Muscle Spasms.) 30 tablet 0   • cyclobenzaprine (FLEXERIL) 5 MG tablet Take 1 tablet by mouth 3 (Three) Times a Day As Needed.     • desoximetasone (TOPICORT) 0.25 % ointment Apply 1 application topically to the appropriate area as directed 2 (Two) Times a Day As Needed (TO IRRITATED AREA).     •  dexamethasone 0.5 MG/5ML solution Take 10 mL by mouth 4 (Four) Times a Day. Take 10 mls by mouth four times daily. Swish in mouth for 2 minutes and then spit out. Do not swallow. 280 mL 0   • escitalopram (LEXAPRO) 20 MG tablet Take 1 tablet by mouth once daily 30 tablet 5   • levothyroxine (SYNTHROID, LEVOTHROID) 25 MCG tablet TAKE 1 TABLET BY MOUTH ONCE DAILY IN THE MORNING 30 MINUTES BEFORE BREAKFAST OR  ANY  OTHER  MEDICINE 30 tablet 5   • metoprolol succinate XL (TOPROL-XL) 25 MG 24 hr tablet Take 1 tablet by mouth Daily. 90 tablet 1   • mupirocin (BACTROBAN) 2 % ointment APPLY A SMALL AMOUNT OF OINTMENT TOPICALLY TO AFFECTED AREA EVERY NIGHT     • ondansetron (ZOFRAN) 8 MG tablet Take 1 tablet by mouth 3 (Three) Times a Day As Needed for Nausea or Vomiting. 30 tablet 5   • pantoprazole (PROTONIX) 40 MG EC tablet Take 1 tablet by mouth once daily 30 tablet 0   • prochlorperazine (COMPAZINE) 10 MG tablet Take 1 tablet by mouth Every 6 (Six) Hours As Needed for Nausea or Vomiting. 30 tablet 3   • rosuvastatin (CRESTOR) 10 MG tablet Take 1 tablet by mouth Daily. 90 tablet 3     No current facility-administered medications on file prior to visit.       No Known Allergies      Referring Provider:    No referring provider defined for this encounter.    Oncologist:  No primary care provider on file.      Seen and approved by:  Rula Schneider MD  06/28/2022   Depression (without Suicidality or Psychosis)

## 2022-06-29 LAB
RAD ONC ARIA COURSE ID: NORMAL
RAD ONC ARIA COURSE INTENT: NORMAL
RAD ONC ARIA COURSE LAST TREATMENT DATE: NORMAL
RAD ONC ARIA COURSE START DATE: NORMAL
RAD ONC ARIA COURSE TREATMENT ELAPSED DAYS: 22
RAD ONC ARIA FIRST TREATMENT DATE: NORMAL
RAD ONC ARIA PLAN FRACTIONS TREATED TO DATE: 17
RAD ONC ARIA PLAN FRACTIONS TREATED TO DATE: 17
RAD ONC ARIA PLAN ID: NORMAL
RAD ONC ARIA PLAN ID: NORMAL
RAD ONC ARIA PLAN PRESCRIBED DOSE PER FRACTION: 2 GY
RAD ONC ARIA PLAN PRESCRIBED DOSE PER FRACTION: 2 GY
RAD ONC ARIA PLAN PRIMARY REFERENCE POINT: NORMAL
RAD ONC ARIA PLAN PRIMARY REFERENCE POINT: NORMAL
RAD ONC ARIA PLAN TOTAL FRACTIONS PRESCRIBED: 25
RAD ONC ARIA PLAN TOTAL FRACTIONS PRESCRIBED: 25
RAD ONC ARIA PLAN TOTAL PRESCRIBED DOSE: 5000 CGY
RAD ONC ARIA PLAN TOTAL PRESCRIBED DOSE: 5000 CGY
RAD ONC ARIA REFERENCE POINT DOSAGE GIVEN TO DATE: 33.69 GY
RAD ONC ARIA REFERENCE POINT DOSAGE GIVEN TO DATE: 34 GY
RAD ONC ARIA REFERENCE POINT ID: NORMAL
RAD ONC ARIA REFERENCE POINT SESSION DOSAGE GIVEN: 1.98 GY
RAD ONC ARIA REFERENCE POINT SESSION DOSAGE GIVEN: 2 GY

## 2022-06-29 PROCEDURE — 77387 GUIDANCE FOR RADJ TX DLVR: CPT | Performed by: RADIOLOGY

## 2022-06-29 PROCEDURE — 77412 RADIATION TX DELIVERY LVL 3: CPT | Performed by: RADIOLOGY

## 2022-06-30 LAB
RAD ONC ARIA COURSE ID: NORMAL
RAD ONC ARIA COURSE INTENT: NORMAL
RAD ONC ARIA COURSE LAST TREATMENT DATE: NORMAL
RAD ONC ARIA COURSE START DATE: NORMAL
RAD ONC ARIA COURSE TREATMENT ELAPSED DAYS: 23
RAD ONC ARIA FIRST TREATMENT DATE: NORMAL
RAD ONC ARIA PLAN FRACTIONS TREATED TO DATE: 18
RAD ONC ARIA PLAN FRACTIONS TREATED TO DATE: 18
RAD ONC ARIA PLAN ID: NORMAL
RAD ONC ARIA PLAN ID: NORMAL
RAD ONC ARIA PLAN PRESCRIBED DOSE PER FRACTION: 2 GY
RAD ONC ARIA PLAN PRESCRIBED DOSE PER FRACTION: 2 GY
RAD ONC ARIA PLAN PRIMARY REFERENCE POINT: NORMAL
RAD ONC ARIA PLAN PRIMARY REFERENCE POINT: NORMAL
RAD ONC ARIA PLAN TOTAL FRACTIONS PRESCRIBED: 25
RAD ONC ARIA PLAN TOTAL FRACTIONS PRESCRIBED: 25
RAD ONC ARIA PLAN TOTAL PRESCRIBED DOSE: 5000 CGY
RAD ONC ARIA PLAN TOTAL PRESCRIBED DOSE: 5000 CGY
RAD ONC ARIA REFERENCE POINT DOSAGE GIVEN TO DATE: 35.67 GY
RAD ONC ARIA REFERENCE POINT DOSAGE GIVEN TO DATE: 36 GY
RAD ONC ARIA REFERENCE POINT ID: NORMAL
RAD ONC ARIA REFERENCE POINT SESSION DOSAGE GIVEN: 1.98 GY
RAD ONC ARIA REFERENCE POINT SESSION DOSAGE GIVEN: 2 GY

## 2022-06-30 PROCEDURE — 77387 GUIDANCE FOR RADJ TX DLVR: CPT | Performed by: RADIOLOGY

## 2022-06-30 PROCEDURE — 77412 RADIATION TX DELIVERY LVL 3: CPT | Performed by: RADIOLOGY

## 2022-07-01 ENCOUNTER — APPOINTMENT (OUTPATIENT)
Dept: RADIATION ONCOLOGY | Facility: HOSPITAL | Age: 77
End: 2022-07-01

## 2022-07-01 LAB
RAD ONC ARIA COURSE ID: NORMAL
RAD ONC ARIA COURSE INTENT: NORMAL
RAD ONC ARIA COURSE LAST TREATMENT DATE: NORMAL
RAD ONC ARIA COURSE START DATE: NORMAL
RAD ONC ARIA COURSE TREATMENT ELAPSED DAYS: 24
RAD ONC ARIA FIRST TREATMENT DATE: NORMAL
RAD ONC ARIA PLAN FRACTIONS TREATED TO DATE: 19
RAD ONC ARIA PLAN FRACTIONS TREATED TO DATE: 19
RAD ONC ARIA PLAN ID: NORMAL
RAD ONC ARIA PLAN ID: NORMAL
RAD ONC ARIA PLAN PRESCRIBED DOSE PER FRACTION: 2 GY
RAD ONC ARIA PLAN PRESCRIBED DOSE PER FRACTION: 2 GY
RAD ONC ARIA PLAN PRIMARY REFERENCE POINT: NORMAL
RAD ONC ARIA PLAN PRIMARY REFERENCE POINT: NORMAL
RAD ONC ARIA PLAN TOTAL FRACTIONS PRESCRIBED: 25
RAD ONC ARIA PLAN TOTAL FRACTIONS PRESCRIBED: 25
RAD ONC ARIA PLAN TOTAL PRESCRIBED DOSE: 5000 CGY
RAD ONC ARIA PLAN TOTAL PRESCRIBED DOSE: 5000 CGY
RAD ONC ARIA REFERENCE POINT DOSAGE GIVEN TO DATE: 37.65 GY
RAD ONC ARIA REFERENCE POINT DOSAGE GIVEN TO DATE: 38 GY
RAD ONC ARIA REFERENCE POINT ID: NORMAL
RAD ONC ARIA REFERENCE POINT SESSION DOSAGE GIVEN: 1.98 GY
RAD ONC ARIA REFERENCE POINT SESSION DOSAGE GIVEN: 2 GY

## 2022-07-01 PROCEDURE — 77412 RADIATION TX DELIVERY LVL 3: CPT | Performed by: RADIOLOGY

## 2022-07-01 PROCEDURE — 77387 GUIDANCE FOR RADJ TX DLVR: CPT | Performed by: RADIOLOGY

## 2022-07-05 LAB
RAD ONC ARIA COURSE ID: NORMAL
RAD ONC ARIA COURSE INTENT: NORMAL
RAD ONC ARIA COURSE LAST TREATMENT DATE: NORMAL
RAD ONC ARIA COURSE START DATE: NORMAL
RAD ONC ARIA COURSE TREATMENT ELAPSED DAYS: 28
RAD ONC ARIA FIRST TREATMENT DATE: NORMAL
RAD ONC ARIA PLAN FRACTIONS TREATED TO DATE: 20
RAD ONC ARIA PLAN FRACTIONS TREATED TO DATE: 20
RAD ONC ARIA PLAN ID: NORMAL
RAD ONC ARIA PLAN ID: NORMAL
RAD ONC ARIA PLAN PRESCRIBED DOSE PER FRACTION: 2 GY
RAD ONC ARIA PLAN PRESCRIBED DOSE PER FRACTION: 2 GY
RAD ONC ARIA PLAN PRIMARY REFERENCE POINT: NORMAL
RAD ONC ARIA PLAN PRIMARY REFERENCE POINT: NORMAL
RAD ONC ARIA PLAN TOTAL FRACTIONS PRESCRIBED: 25
RAD ONC ARIA PLAN TOTAL FRACTIONS PRESCRIBED: 25
RAD ONC ARIA PLAN TOTAL PRESCRIBED DOSE: 5000 CGY
RAD ONC ARIA PLAN TOTAL PRESCRIBED DOSE: 5000 CGY
RAD ONC ARIA REFERENCE POINT DOSAGE GIVEN TO DATE: 39.64 GY
RAD ONC ARIA REFERENCE POINT DOSAGE GIVEN TO DATE: 40 GY
RAD ONC ARIA REFERENCE POINT ID: NORMAL
RAD ONC ARIA REFERENCE POINT SESSION DOSAGE GIVEN: 1.98 GY
RAD ONC ARIA REFERENCE POINT SESSION DOSAGE GIVEN: 2 GY

## 2022-07-05 PROCEDURE — 77336 RADIATION PHYSICS CONSULT: CPT | Performed by: RADIOLOGY

## 2022-07-05 PROCEDURE — 77412 RADIATION TX DELIVERY LVL 3: CPT | Performed by: RADIOLOGY

## 2022-07-05 PROCEDURE — 77387 GUIDANCE FOR RADJ TX DLVR: CPT | Performed by: RADIOLOGY

## 2022-07-06 ENCOUNTER — RADIATION ONCOLOGY WEEKLY ASSESSMENT (OUTPATIENT)
Dept: RADIATION ONCOLOGY | Facility: HOSPITAL | Age: 77
End: 2022-07-06

## 2022-07-06 VITALS
DIASTOLIC BLOOD PRESSURE: 80 MMHG | SYSTOLIC BLOOD PRESSURE: 117 MMHG | WEIGHT: 116.8 LBS | BODY MASS INDEX: 22.34 KG/M2 | RESPIRATION RATE: 16 BRPM | OXYGEN SATURATION: 98 % | HEART RATE: 79 BPM

## 2022-07-06 DIAGNOSIS — C50.011 MALIGNANT NEOPLASM OF NIPPLE OF RIGHT BREAST IN FEMALE, UNSPECIFIED ESTROGEN RECEPTOR STATUS: Primary | ICD-10-CM

## 2022-07-06 LAB
RAD ONC ARIA COURSE ID: NORMAL
RAD ONC ARIA COURSE INTENT: NORMAL
RAD ONC ARIA COURSE LAST TREATMENT DATE: NORMAL
RAD ONC ARIA COURSE START DATE: NORMAL
RAD ONC ARIA COURSE TREATMENT ELAPSED DAYS: 29
RAD ONC ARIA FIRST TREATMENT DATE: NORMAL
RAD ONC ARIA PLAN FRACTIONS TREATED TO DATE: 21
RAD ONC ARIA PLAN FRACTIONS TREATED TO DATE: 21
RAD ONC ARIA PLAN ID: NORMAL
RAD ONC ARIA PLAN ID: NORMAL
RAD ONC ARIA PLAN PRESCRIBED DOSE PER FRACTION: 2 GY
RAD ONC ARIA PLAN PRESCRIBED DOSE PER FRACTION: 2 GY
RAD ONC ARIA PLAN PRIMARY REFERENCE POINT: NORMAL
RAD ONC ARIA PLAN PRIMARY REFERENCE POINT: NORMAL
RAD ONC ARIA PLAN TOTAL FRACTIONS PRESCRIBED: 25
RAD ONC ARIA PLAN TOTAL FRACTIONS PRESCRIBED: 25
RAD ONC ARIA PLAN TOTAL PRESCRIBED DOSE: 5000 CGY
RAD ONC ARIA PLAN TOTAL PRESCRIBED DOSE: 5000 CGY
RAD ONC ARIA REFERENCE POINT DOSAGE GIVEN TO DATE: 41.62 GY
RAD ONC ARIA REFERENCE POINT DOSAGE GIVEN TO DATE: 42 GY
RAD ONC ARIA REFERENCE POINT ID: NORMAL
RAD ONC ARIA REFERENCE POINT SESSION DOSAGE GIVEN: 1.98 GY
RAD ONC ARIA REFERENCE POINT SESSION DOSAGE GIVEN: 2 GY

## 2022-07-06 PROCEDURE — 77387 GUIDANCE FOR RADJ TX DLVR: CPT | Performed by: RADIOLOGY

## 2022-07-06 PROCEDURE — 77412 RADIATION TX DELIVERY LVL 3: CPT | Performed by: RADIOLOGY

## 2022-07-06 PROCEDURE — 77427 RADIATION TX MANAGEMENT X5: CPT | Performed by: RADIOLOGY

## 2022-07-06 NOTE — PROGRESS NOTES
Physician Weekly Management Note    Diagnosis:     Diagnosis Plan   1. Malignant neoplasm of nipple of right breast in female, unspecified estrogen receptor status (HCC)         RT Details:  fx 21/30 left breast and nodes 4200cGy/5000 plus boost     Notes on Treatment course, Films, Medical progress:  Doing well, mild eryhtema left breast kps 90%    Weekly Management:  Medication reviewed?   Yes  New medications given?   No  Problemlist reviewed?   Yes  Problem added?   No  Issues raised requiring referral to support services - task assigned to:  ernst    Technical aspects reviewed:  Weekly OBI approved?   Yes  Weekly port films approved?   Yes  Change requests noted on port film?   No  Patient setup and plan reviewed?   Yes    Chart Reviewed:  Continue current treatment plan?   Yes  Treatment plan change requested?   No  CBC reviewed?   Yes  Concurrent Chemo?   No    Objective     Toxicities:   Grade 1 erythema     Review of Systems   Constitutional: Negative.    Skin: Positive for color change.          Vitals:    07/06/22 1121   BP: 117/80   Pulse: 79   Resp: 16   SpO2: 98%       Current Status 6/24/2022   ECOG score 0       Physical Exam  Constitutional:       Appearance: Normal appearance.   Chest:          Comments: Mild erythema  Neurological:      Mental Status: She is alert.             Problem Summary List    Diagnosis:     Diagnosis Plan   1. Malignant neoplasm of nipple of right breast in female, unspecified estrogen receptor status (HCC)       Pathology:   breast    Past Medical History:   Diagnosis Date   • Anxiety    • Arthritis    • Cataract     EARLY. JIMMIE EYES   • Chronic back pain     LOW BACK ACHES AT TIME FROM ARTHRTIS   • Constipation     AT TIMES. NO RECENT ISSUES   • Dermatitis     LEGS. STATES CLEARING UP NOW.   • Disease of thyroid gland     HYPO   • Diverticulosis     Colonoscopy November 2014   • Erythema of face     Transitory Erythema Of The Face   • GERD (gastroesophageal reflux disease)      ON OCC   • History of chemotherapy     FOR LEFT BREAST CANCER   • Hyperlipidemia    • Hypertension    • Malignant neoplasm of female breast (HCC) 9/27/2021   • Osteopenia    • Tachycardia          Past Surgical History:   Procedure Laterality Date   • BREAST BIOPSY  09/2021    Dr. Tirado    • BREAST CYST ASPIRATION Right    • BREAST CYST EXCISION Right    • BREAST LUMPECTOMY WITH SENTINEL NODE BIOPSY Left 2/10/2022    Procedure: LEFT BREAST WIRE LOCALIZED LUMPECTOMY WITH SENTINEL NODE BIOPSY, POSSIBLE LEFT AXILLARY DISSECTION;  Surgeon: Becky Liu MD;  Location[place] [facility] [place];  Service: General;  Laterality: Left;   • COLONOSCOPY     • COLONOSCOPY  2014   • EXOSTECTOMY Bilateral     Simple Bunion Exostectomy (Silver Procedure)   • FOOT TENDON SURGERY  2012   • VENOUS ACCESS DEVICE (PORT) INSERTION N/A 10/18/2021    Procedure: INSERTION VENOUS ACCESS DEVICE;  Surgeon: Becky Liu MD;  Location: [facility] [place];  Service: General;  Laterality: N/A;         Current Outpatient Medications on File Prior to Visit   Medication Sig Dispense Refill   • Acetaminophen (Tylenol) 325 MG capsule Take  by mouth.     • acetaminophen (TYLENOL) 500 MG tablet Take 500-1,000 mg by mouth Every 6 (Six) Hours As Needed for Mild Pain .     • alendronate (FOSAMAX) 70 MG tablet Take 1 tablet by mouth 1 (One) Time Per Week. 12 tablet 0   • amLODIPine (NORVASC) 5 MG tablet Take 1 tablet by mouth Daily. 90 tablet 2   • anastrozole (ARIMIDEX) 1 MG tablet Take 1 tablet by mouth Daily. 30 tablet 3   • cyclobenzaprine (FLEXERIL) 5 MG tablet Take 1 tablet by mouth three times daily as needed for muscle spasm (Patient taking differently: Take 5 mg by mouth 3 (Three) Times a Day As Needed for Muscle Spasms.) 30 tablet 0   • cyclobenzaprine (FLEXERIL) 5 MG tablet Take 1 tablet by mouth 3 (Three) Times a Day As Needed.     • desoximetasone (TOPICORT) 0.25 % ointment Apply 1 application topically to the appropriate area as directed 2 (Two)  Times a Day As Needed (TO IRRITATED AREA).     • dexamethasone 0.5 MG/5ML solution Take 10 mL by mouth 4 (Four) Times a Day. Take 10 mls by mouth four times daily. Swish in mouth for 2 minutes and then spit out. Do not swallow. 280 mL 0   • escitalopram (LEXAPRO) 20 MG tablet Take 1 tablet by mouth once daily 30 tablet 5   • levothyroxine (SYNTHROID, LEVOTHROID) 25 MCG tablet TAKE 1 TABLET BY MOUTH ONCE DAILY IN THE MORNING 30 MINUTES BEFORE BREAKFAST OR  ANY  OTHER  MEDICINE 30 tablet 5   • metoprolol succinate XL (TOPROL-XL) 25 MG 24 hr tablet Take 1 tablet by mouth Daily. 90 tablet 1   • mupirocin (BACTROBAN) 2 % ointment APPLY A SMALL AMOUNT OF OINTMENT TOPICALLY TO AFFECTED AREA EVERY NIGHT     • ondansetron (ZOFRAN) 8 MG tablet Take 1 tablet by mouth 3 (Three) Times a Day As Needed for Nausea or Vomiting. 30 tablet 5   • pantoprazole (PROTONIX) 40 MG EC tablet Take 1 tablet by mouth once daily 30 tablet 0   • prochlorperazine (COMPAZINE) 10 MG tablet Take 1 tablet by mouth Every 6 (Six) Hours As Needed for Nausea or Vomiting. 30 tablet 3   • rosuvastatin (CRESTOR) 10 MG tablet Take 1 tablet by mouth Daily. 90 tablet 3     No current facility-administered medications on file prior to visit.       No Known Allergies      Referring Provider:    No referring provider defined for this encounter.    Oncologist:  No primary care provider on file.      Seen and approved by:  Rula Schneider MD  07/06/2022

## 2022-07-07 ENCOUNTER — OFFICE VISIT (OUTPATIENT)
Dept: INTERNAL MEDICINE | Age: 77
End: 2022-07-07

## 2022-07-07 VITALS
BODY MASS INDEX: 21.86 KG/M2 | DIASTOLIC BLOOD PRESSURE: 68 MMHG | HEIGHT: 61 IN | SYSTOLIC BLOOD PRESSURE: 116 MMHG | TEMPERATURE: 97.3 F | OXYGEN SATURATION: 99 % | WEIGHT: 115.8 LBS | HEART RATE: 83 BPM

## 2022-07-07 DIAGNOSIS — K21.9 GASTROESOPHAGEAL REFLUX DISEASE WITHOUT ESOPHAGITIS: ICD-10-CM

## 2022-07-07 DIAGNOSIS — I10 ESSENTIAL HYPERTENSION: Primary | ICD-10-CM

## 2022-07-07 DIAGNOSIS — E78.5 HYPERLIPIDEMIA, UNSPECIFIED HYPERLIPIDEMIA TYPE: ICD-10-CM

## 2022-07-07 DIAGNOSIS — F41.9 ANXIETY: ICD-10-CM

## 2022-07-07 DIAGNOSIS — E03.9 HYPOTHYROIDISM, UNSPECIFIED TYPE: ICD-10-CM

## 2022-07-07 LAB
RAD ONC ARIA COURSE ID: NORMAL
RAD ONC ARIA COURSE INTENT: NORMAL
RAD ONC ARIA COURSE LAST TREATMENT DATE: NORMAL
RAD ONC ARIA COURSE START DATE: NORMAL
RAD ONC ARIA COURSE TREATMENT ELAPSED DAYS: 30
RAD ONC ARIA FIRST TREATMENT DATE: NORMAL
RAD ONC ARIA PLAN FRACTIONS TREATED TO DATE: 22
RAD ONC ARIA PLAN FRACTIONS TREATED TO DATE: 22
RAD ONC ARIA PLAN ID: NORMAL
RAD ONC ARIA PLAN ID: NORMAL
RAD ONC ARIA PLAN PRESCRIBED DOSE PER FRACTION: 2 GY
RAD ONC ARIA PLAN PRESCRIBED DOSE PER FRACTION: 2 GY
RAD ONC ARIA PLAN PRIMARY REFERENCE POINT: NORMAL
RAD ONC ARIA PLAN PRIMARY REFERENCE POINT: NORMAL
RAD ONC ARIA PLAN TOTAL FRACTIONS PRESCRIBED: 25
RAD ONC ARIA PLAN TOTAL FRACTIONS PRESCRIBED: 25
RAD ONC ARIA PLAN TOTAL PRESCRIBED DOSE: 5000 CGY
RAD ONC ARIA PLAN TOTAL PRESCRIBED DOSE: 5000 CGY
RAD ONC ARIA REFERENCE POINT DOSAGE GIVEN TO DATE: 43.6 GY
RAD ONC ARIA REFERENCE POINT DOSAGE GIVEN TO DATE: 44 GY
RAD ONC ARIA REFERENCE POINT ID: NORMAL
RAD ONC ARIA REFERENCE POINT SESSION DOSAGE GIVEN: 1.98 GY
RAD ONC ARIA REFERENCE POINT SESSION DOSAGE GIVEN: 2 GY

## 2022-07-07 PROCEDURE — 77387 GUIDANCE FOR RADJ TX DLVR: CPT | Performed by: RADIOLOGY

## 2022-07-07 PROCEDURE — 77412 RADIATION TX DELIVERY LVL 3: CPT | Performed by: RADIOLOGY

## 2022-07-07 PROCEDURE — 99214 OFFICE O/P EST MOD 30 MIN: CPT | Performed by: NURSE PRACTITIONER

## 2022-07-07 RX ORDER — MULTIVIT-MIN/IRON/FOLIC ACID/K 18-600-40
CAPSULE ORAL
COMMUNITY

## 2022-07-07 NOTE — PROGRESS NOTES
"    I N T E R N A L  M E D I C I N E  JULEE ALEMAN, APRN      ENCOUNTER DATE:  07/07/2022    Neela Ramirez / 77 y.o. / female      CHIEF COMPLAINT / REASON FOR OFFICE VISIT     Establish Care, Hyperlipidemia, Hypertension, Hypothyroidism, and Anxiety      ASSESSMENT & PLAN     1. Essential hypertension  -Continue amlodipine 5, metoprolol succinate 25 daily  - Comprehensive Metabolic Panel  - TSH+Free T4    2. Gastroesophageal reflux disease without esophagitis  -Pantoprazole as needed    3. Anxiety  -Continue Lexapro 20    4. Hypothyroidism, unspecified type  -Continue levothyroxine 25    5. Hyperlipidemia, unspecified hyperlipidemia type  -Continue rosuvastatin 10  - Lipid Panel With / Chol / HDL Ratio    Orders Placed This Encounter   Procedures   • Comprehensive Metabolic Panel   • Lipid Panel With / Chol / HDL Ratio   • TSH+Free T4     No orders of the defined types were placed in this encounter.      SUMMARY/DISCUSSION  • Follow-up in 6 months for Medicare wellness, earlier if needed    Next Appointment with me: Visit date not found    Return in about 6 months (around 1/7/2023) for Medicare Wellness.      VITAL SIGNS     Visit Vitals  /68 (Cuff Size: Adult)   Pulse 83   Temp 97.3 °F (36.3 °C) (Temporal)   Ht 154 cm (60.63\")   Wt 52.5 kg (115 lb 12.8 oz)   SpO2 99%   BMI 22.15 kg/m²     Wt Readings from Last 3 Encounters:   07/07/22 52.5 kg (115 lb 12.8 oz)   07/06/22 53 kg (116 lb 12.8 oz)   06/28/22 52.2 kg (115 lb)     Body mass index is 22.15 kg/m².      MEDICATIONS AT THE TIME OF OFFICE VISIT     Current Outpatient Medications on File Prior to Visit   Medication Sig   • Acetaminophen (Tylenol) 325 MG capsule Take  by mouth.   • acetaminophen (TYLENOL) 500 MG tablet Take 500-1,000 mg by mouth Every 6 (Six) Hours As Needed for Mild Pain .   • alendronate (FOSAMAX) 70 MG tablet Take 1 tablet by mouth 1 (One) Time Per Week.   • amLODIPine (NORVASC) 5 MG tablet Take 1 tablet by mouth Daily.   • " Cholecalciferol (Vitamin D) 50 MCG (2000 UT) capsule Take  by mouth.   • cyclobenzaprine (FLEXERIL) 5 MG tablet Take 1 tablet by mouth three times daily as needed for muscle spasm (Patient taking differently: Take 5 mg by mouth 3 (Three) Times a Day As Needed for Muscle Spasms.)   • desoximetasone (TOPICORT) 0.25 % ointment Apply 1 application topically to the appropriate area as directed 2 (Two) Times a Day As Needed (TO IRRITATED AREA).   • dexamethasone 0.5 MG/5ML solution Take 10 mL by mouth 4 (Four) Times a Day. Take 10 mls by mouth four times daily. Swish in mouth for 2 minutes and then spit out. Do not swallow.   • escitalopram (LEXAPRO) 20 MG tablet Take 1 tablet by mouth once daily   • levothyroxine (SYNTHROID, LEVOTHROID) 25 MCG tablet TAKE 1 TABLET BY MOUTH ONCE DAILY IN THE MORNING 30 MINUTES BEFORE BREAKFAST OR  ANY  OTHER  MEDICINE   • metoprolol succinate XL (TOPROL-XL) 25 MG 24 hr tablet Take 1 tablet by mouth Daily.   • mupirocin (BACTROBAN) 2 % ointment APPLY A SMALL AMOUNT OF OINTMENT TOPICALLY TO AFFECTED AREA EVERY NIGHT   • ondansetron (ZOFRAN) 8 MG tablet Take 1 tablet by mouth 3 (Three) Times a Day As Needed for Nausea or Vomiting.   • pantoprazole (PROTONIX) 40 MG EC tablet Take 1 tablet by mouth once daily   • prochlorperazine (COMPAZINE) 10 MG tablet Take 1 tablet by mouth Every 6 (Six) Hours As Needed for Nausea or Vomiting.   • rosuvastatin (CRESTOR) 10 MG tablet Take 1 tablet by mouth Daily.   • anastrozole (ARIMIDEX) 1 MG tablet Take 1 tablet by mouth Daily.   • [DISCONTINUED] cyclobenzaprine (FLEXERIL) 5 MG tablet Take 1 tablet by mouth 3 (Three) Times a Day As Needed.     No current facility-administered medications on file prior to visit.         HISTORY OF PRESENT ILLNESS     Hypertension stable on amlodipine 5 mg metoprolol 25. Denies any chest pain, shortness of air, chest palpitations.      Hyperlipidemia Moderately controlled with rosuvastatin 5 mg daily, last . No  myalgias with medication. Liver enzymes in normal range.     Hypothyroidism controlled with thyroxine 25 MCG daily. Last TSH and free T4 within normal     Last DEXA scan in July 2021 showing osteopenia. Currently not on any vitamin D supplementation. On Fosamax 70 mg weekly.  Mammogram and DEXA scan scheduled by hematology office.      GERD well controlled with 20 mg of protonix prn      Generalized anxiety well-controlled Lexapro 20 mg daily no panic attack or thoughts of suicide or self-harm.     Breast malignancy followed by breast surgery Becky Liu in hematology oncology Dr. Avendano, diagnosed September 2021. Previously on oncology research protocol, finished in February 2022.  Last week and a half of radiation scheduled, on Arimidex.    Followed by Dr. Galo cardiology seen February 2022, presenting for cardio oncology visit for preop clearance for lumpectomy of the left breast.  Low risk stress test completed February 2022.  Carotid Doppler bilateral internal carotid artery plaque without significant stenosis echocardiogram showed left ventricular EF of 56 to 60%.  Mild calcification of the aortic valve, mild aortic valve regurg, mild aortic valve stenosis.     Seen by Dr. Peck urology in the past due to hematuria, follows yearly.    Routine ophthalmology checks with Dr. Olmos.    Colonoscopy due in 2024.     REVIEW OF SYSTEMS     Constitutional neg except per HPI   Resp neg  CV neg    PHYSICAL EXAMINATION     Physical Exam  Constitutional  No distress  Cardiovascular Rate  normal . Rhythm: regular . Heart sounds:  normal  Pulmonary/Chest  Effort normal. Breath sounds:  normal  Psychiatric  Alert. Judgment and thought content normal. Mood normal     REVIEWED DATA     Labs:   Lab Results   Component Value Date    GLUCOSE 111 06/24/2022    BUN 11 06/24/2022    CREATININE 0.65 06/24/2022    EGFRIFNONA 69 02/22/2022    EGFRIFAFRI 95 04/12/2021    BCR 16.9 06/24/2022    K 3.9 06/24/2022    CO2 20.8 (L)  06/24/2022    CALCIUM 9.0 06/24/2022    PROTENTOTREF 7.1 04/12/2021    ALBUMIN 3.90 06/24/2022    LABIL2 1.7 04/12/2021    AST 38 (H) 06/24/2022    ALT 44 (H) 06/24/2022     Lab Results   Component Value Date    HGBA1C 5.3 02/23/2022     Lab Results   Component Value Date    TSH 1.100 02/23/2022     Lab Results   Component Value Date    CHLPL 201 (H) 02/23/2022    TRIG 63 02/23/2022    HDL 69 02/23/2022     (H) 02/23/2022     Lab Results   Component Value Date    WBC 5.64 06/24/2022    HGB 11.2 (L) 06/24/2022    HCT 35.1 06/24/2022    .7 (H) 06/24/2022     06/24/2022     Imaging:           Medical Tests:             Summary of old records / correspondence / consultant report:           Request outside records:           *Examiner was wearing medical surgical mask, face shield and exam gloves during the entire duration of the visit. Patient was masked the entire time.   Minimum social distance of 6 ft maintained entire visit except if physical contact was necessary as documented.     Dictated utilizing Dragon dictation

## 2022-07-08 ENCOUNTER — TELEPHONE (OUTPATIENT)
Dept: CARDIOLOGY | Facility: CLINIC | Age: 77
End: 2022-07-08

## 2022-07-08 ENCOUNTER — TELEPHONE (OUTPATIENT)
Dept: INTERNAL MEDICINE | Age: 77
End: 2022-07-08

## 2022-07-08 LAB
ALBUMIN SERPL-MCNC: 4.3 G/DL (ref 3.7–4.7)
ALBUMIN/GLOB SERPL: 1.7 {RATIO} (ref 1.2–2.2)
ALP SERPL-CCNC: 106 IU/L (ref 44–121)
ALT SERPL-CCNC: 53 IU/L (ref 0–32)
AST SERPL-CCNC: 59 IU/L (ref 0–40)
BILIRUB SERPL-MCNC: 0.4 MG/DL (ref 0–1.2)
BUN SERPL-MCNC: 11 MG/DL (ref 8–27)
BUN/CREAT SERPL: 15 (ref 12–28)
CALCIUM SERPL-MCNC: 9.6 MG/DL (ref 8.7–10.3)
CHLORIDE SERPL-SCNC: 101 MMOL/L (ref 96–106)
CHOLEST SERPL-MCNC: 186 MG/DL (ref 100–199)
CHOLEST/HDLC SERPL: 3 RATIO (ref 0–4.4)
CO2 SERPL-SCNC: 23 MMOL/L (ref 20–29)
CREAT SERPL-MCNC: 0.72 MG/DL (ref 0.57–1)
EGFRCR SERPLBLD CKD-EPI 2021: 86 ML/MIN/1.73
GLOBULIN SER CALC-MCNC: 2.5 G/DL (ref 1.5–4.5)
GLUCOSE SERPL-MCNC: 91 MG/DL (ref 65–99)
HDLC SERPL-MCNC: 61 MG/DL
LDLC SERPL CALC-MCNC: 106 MG/DL (ref 0–99)
POTASSIUM SERPL-SCNC: 3.8 MMOL/L (ref 3.5–5.2)
PROT SERPL-MCNC: 6.8 G/DL (ref 6–8.5)
RAD ONC ARIA COURSE ID: NORMAL
RAD ONC ARIA COURSE INTENT: NORMAL
RAD ONC ARIA COURSE LAST TREATMENT DATE: NORMAL
RAD ONC ARIA COURSE START DATE: NORMAL
RAD ONC ARIA COURSE TREATMENT ELAPSED DAYS: 31
RAD ONC ARIA FIRST TREATMENT DATE: NORMAL
RAD ONC ARIA PLAN FRACTIONS TREATED TO DATE: 23
RAD ONC ARIA PLAN FRACTIONS TREATED TO DATE: 23
RAD ONC ARIA PLAN ID: NORMAL
RAD ONC ARIA PLAN ID: NORMAL
RAD ONC ARIA PLAN PRESCRIBED DOSE PER FRACTION: 2 GY
RAD ONC ARIA PLAN PRESCRIBED DOSE PER FRACTION: 2 GY
RAD ONC ARIA PLAN PRIMARY REFERENCE POINT: NORMAL
RAD ONC ARIA PLAN PRIMARY REFERENCE POINT: NORMAL
RAD ONC ARIA PLAN TOTAL FRACTIONS PRESCRIBED: 25
RAD ONC ARIA PLAN TOTAL FRACTIONS PRESCRIBED: 25
RAD ONC ARIA PLAN TOTAL PRESCRIBED DOSE: 5000 CGY
RAD ONC ARIA PLAN TOTAL PRESCRIBED DOSE: 5000 CGY
RAD ONC ARIA REFERENCE POINT DOSAGE GIVEN TO DATE: 45.58 GY
RAD ONC ARIA REFERENCE POINT DOSAGE GIVEN TO DATE: 46 GY
RAD ONC ARIA REFERENCE POINT ID: NORMAL
RAD ONC ARIA REFERENCE POINT SESSION DOSAGE GIVEN: 1.98 GY
RAD ONC ARIA REFERENCE POINT SESSION DOSAGE GIVEN: 2 GY
SODIUM SERPL-SCNC: 140 MMOL/L (ref 134–144)
T4 FREE SERPL-MCNC: 1.14 NG/DL (ref 0.82–1.77)
TRIGL SERPL-MCNC: 108 MG/DL (ref 0–149)
TSH SERPL DL<=0.005 MIU/L-ACNC: 1.49 UIU/ML (ref 0.45–4.5)
VLDLC SERPL CALC-MCNC: 19 MG/DL (ref 5–40)

## 2022-07-08 PROCEDURE — 77412 RADIATION TX DELIVERY LVL 3: CPT | Performed by: RADIOLOGY

## 2022-07-08 PROCEDURE — 77387 GUIDANCE FOR RADJ TX DLVR: CPT | Performed by: RADIOLOGY

## 2022-07-08 NOTE — TELEPHONE ENCOUNTER
----- Message from RENO Nolasco sent at 7/8/2022 12:17 PM EDT -----  Please asked patient questions regarding the liver.    Thyroid is in normal range.  Cholesterol overall looks good.  Normal kidney and electrolytes, however your liver enzymes are increasing.  If you been taking increasing amounts of Tylenol or increased her alcohol intake?

## 2022-07-08 NOTE — TELEPHONE ENCOUNTER
GLORIATVM INSTRUCTING PT TO RETURN CALL TO BE READ LAB RESULTS. LETTER SENT VIA [employer]/MAILED. MM

## 2022-07-11 LAB
RAD ONC ARIA COURSE ID: NORMAL
RAD ONC ARIA COURSE INTENT: NORMAL
RAD ONC ARIA COURSE LAST TREATMENT DATE: NORMAL
RAD ONC ARIA COURSE START DATE: NORMAL
RAD ONC ARIA COURSE TREATMENT ELAPSED DAYS: 34
RAD ONC ARIA FIRST TREATMENT DATE: NORMAL
RAD ONC ARIA PLAN FRACTIONS TREATED TO DATE: 24
RAD ONC ARIA PLAN FRACTIONS TREATED TO DATE: 24
RAD ONC ARIA PLAN ID: NORMAL
RAD ONC ARIA PLAN ID: NORMAL
RAD ONC ARIA PLAN PRESCRIBED DOSE PER FRACTION: 2 GY
RAD ONC ARIA PLAN PRESCRIBED DOSE PER FRACTION: 2 GY
RAD ONC ARIA PLAN PRIMARY REFERENCE POINT: NORMAL
RAD ONC ARIA PLAN PRIMARY REFERENCE POINT: NORMAL
RAD ONC ARIA PLAN TOTAL FRACTIONS PRESCRIBED: 25
RAD ONC ARIA PLAN TOTAL FRACTIONS PRESCRIBED: 25
RAD ONC ARIA PLAN TOTAL PRESCRIBED DOSE: 5000 CGY
RAD ONC ARIA PLAN TOTAL PRESCRIBED DOSE: 5000 CGY
RAD ONC ARIA REFERENCE POINT DOSAGE GIVEN TO DATE: 47.56 GY
RAD ONC ARIA REFERENCE POINT DOSAGE GIVEN TO DATE: 48 GY
RAD ONC ARIA REFERENCE POINT ID: NORMAL
RAD ONC ARIA REFERENCE POINT SESSION DOSAGE GIVEN: 1.98 GY
RAD ONC ARIA REFERENCE POINT SESSION DOSAGE GIVEN: 2 GY

## 2022-07-11 PROCEDURE — 77387 GUIDANCE FOR RADJ TX DLVR: CPT | Performed by: RADIOLOGY

## 2022-07-11 PROCEDURE — 77412 RADIATION TX DELIVERY LVL 3: CPT | Performed by: RADIOLOGY

## 2022-07-11 NOTE — TELEPHONE ENCOUNTER
Pt denies use of increased Tylenol and Alcohol. Pt does state she has infusions that may do this, pt was unsure of chemo tx side effects. MM

## 2022-07-12 ENCOUNTER — RADIATION ONCOLOGY WEEKLY ASSESSMENT (OUTPATIENT)
Dept: RADIATION ONCOLOGY | Facility: HOSPITAL | Age: 77
End: 2022-07-12

## 2022-07-12 VITALS
BODY MASS INDEX: 22.53 KG/M2 | HEART RATE: 87 BPM | WEIGHT: 117.8 LBS | SYSTOLIC BLOOD PRESSURE: 112 MMHG | OXYGEN SATURATION: 99 % | DIASTOLIC BLOOD PRESSURE: 72 MMHG

## 2022-07-12 DIAGNOSIS — C50.011 MALIGNANT NEOPLASM OF NIPPLE OF RIGHT BREAST IN FEMALE, UNSPECIFIED ESTROGEN RECEPTOR STATUS: Primary | ICD-10-CM

## 2022-07-12 LAB
RAD ONC ARIA COURSE ID: NORMAL
RAD ONC ARIA COURSE INTENT: NORMAL
RAD ONC ARIA COURSE LAST TREATMENT DATE: NORMAL
RAD ONC ARIA COURSE START DATE: NORMAL
RAD ONC ARIA COURSE TREATMENT ELAPSED DAYS: 35
RAD ONC ARIA FIRST TREATMENT DATE: NORMAL
RAD ONC ARIA PLAN FRACTIONS TREATED TO DATE: 25
RAD ONC ARIA PLAN FRACTIONS TREATED TO DATE: 25
RAD ONC ARIA PLAN ID: NORMAL
RAD ONC ARIA PLAN ID: NORMAL
RAD ONC ARIA PLAN PRESCRIBED DOSE PER FRACTION: 2 GY
RAD ONC ARIA PLAN PRESCRIBED DOSE PER FRACTION: 2 GY
RAD ONC ARIA PLAN PRIMARY REFERENCE POINT: NORMAL
RAD ONC ARIA PLAN PRIMARY REFERENCE POINT: NORMAL
RAD ONC ARIA PLAN TOTAL FRACTIONS PRESCRIBED: 25
RAD ONC ARIA PLAN TOTAL FRACTIONS PRESCRIBED: 25
RAD ONC ARIA PLAN TOTAL PRESCRIBED DOSE: 5000 CGY
RAD ONC ARIA PLAN TOTAL PRESCRIBED DOSE: 5000 CGY
RAD ONC ARIA REFERENCE POINT DOSAGE GIVEN TO DATE: 49.54 GY
RAD ONC ARIA REFERENCE POINT DOSAGE GIVEN TO DATE: 50 GY
RAD ONC ARIA REFERENCE POINT ID: NORMAL
RAD ONC ARIA REFERENCE POINT SESSION DOSAGE GIVEN: 1.98 GY
RAD ONC ARIA REFERENCE POINT SESSION DOSAGE GIVEN: 2 GY

## 2022-07-12 PROCEDURE — 77336 RADIATION PHYSICS CONSULT: CPT | Performed by: RADIOLOGY

## 2022-07-12 PROCEDURE — 77412 RADIATION TX DELIVERY LVL 3: CPT | Performed by: RADIOLOGY

## 2022-07-12 PROCEDURE — 77387 GUIDANCE FOR RADJ TX DLVR: CPT | Performed by: RADIOLOGY

## 2022-07-12 NOTE — PROGRESS NOTES
Physician Weekly Management Note    Diagnosis:     Diagnosis Plan   1. Malignant neoplasm of nipple of right breast in female, unspecified estrogen receptor status (HCC)         RT Details:  fx 25/30 left breast and nodes 50Gy    Notes on Treatment course, Films, Medical progress:  kps 90% , doing well, mild erythema over left breast and supraclav, minimal fatigue    Weekly Management:  Medication reviewed?   Yes  New medications given?   No  Problemlist reviewed?   Yes  Problem added?   No  Issues raised requiring referral to support services - task assigned to:  ernst    Technical aspects reviewed:  Weekly OBI approved?   Yes  Weekly port films approved?   Yes  Change requests noted on port film?   No  Patient setup and plan reviewed?   Yes    Chart Reviewed:  Continue current treatment plan?   Yes  Treatment plan change requested?   No  CBC reviewed?   Yes  Concurrent Chemo?   No    Objective     Toxicities:   Grade 1/2 erythema     Review of Systems   Constitutional: Positive for fatigue.   Skin: Positive for color change.          Vitals:    07/12/22 1423   BP: 112/72   Pulse: 87   SpO2: 99%       Current Status 6/24/2022   ECOG score 0       Physical Exam  Constitutional:       Appearance: Normal appearance.   Chest:      Comments: Mild erythema over left breast and neck  Neurological:      Mental Status: She is alert.             Problem Summary List    Diagnosis:     Diagnosis Plan   1. Malignant neoplasm of nipple of right breast in female, unspecified estrogen receptor status (HCC)       Pathology:   Breast cancer    Past Medical History:   Diagnosis Date   • Anxiety    • Arthritis    • Cataract     EARLY. JIMMIE EYES   • Chronic back pain     LOW BACK ACHES AT TIME FROM ARTHRTIS   • Constipation     AT TIMES. NO RECENT ISSUES   • Dermatitis     LEGS. STATES CLEARING UP NOW.   • Disease of thyroid gland     HYPO   • Diverticulosis     Colonoscopy November 2014   • Erythema of face     Transitory Erythema Of The  Face   • GERD (gastroesophageal reflux disease)     ON OCC   • History of chemotherapy     FOR LEFT BREAST CANCER   • Hyperlipidemia    • Hypertension    • Malignant neoplasm of female breast (HCC) 9/27/2021   • Osteopenia    • Tachycardia          Past Surgical History:   Procedure Laterality Date   • BREAST BIOPSY  09/2021    Dr. Tirado    • BREAST CYST ASPIRATION Right    • BREAST CYST EXCISION Right    • BREAST LUMPECTOMY WITH SENTINEL NODE BIOPSY Left 2/10/2022    Procedure: LEFT BREAST WIRE LOCALIZED LUMPECTOMY WITH SENTINEL NODE BIOPSY, POSSIBLE LEFT AXILLARY DISSECTION;  Surgeon: Becky Liu MD;  Location: [facility] OR [facility] [place];  Service: General;  Laterality: Left;   • COLONOSCOPY     • COLONOSCOPY  2014   • EXOSTECTOMY Bilateral     Simple Bunion Exostectomy (Silver Procedure)   • FOOT TENDON SURGERY  2012   • VENOUS ACCESS DEVICE (PORT) INSERTION N/A 10/18/2021    Procedure: INSERTION VENOUS ACCESS DEVICE;  Surgeon: Becky Liu MD;  Location: [facility] OR [facility] [place];  Service: General;  Laterality: N/A;         Current Outpatient Medications on File Prior to Visit   Medication Sig Dispense Refill   • Acetaminophen (Tylenol) 325 MG capsule Take  by mouth.     • acetaminophen (TYLENOL) 500 MG tablet Take 500-1,000 mg by mouth Every 6 (Six) Hours As Needed for Mild Pain .     • alendronate (FOSAMAX) 70 MG tablet Take 1 tablet by mouth 1 (One) Time Per Week. 12 tablet 0   • amLODIPine (NORVASC) 5 MG tablet Take 1 tablet by mouth Daily. 90 tablet 2   • anastrozole (ARIMIDEX) 1 MG tablet Take 1 tablet by mouth Daily. 30 tablet 3   • Cholecalciferol (Vitamin D) 50 MCG (2000 UT) capsule Take  by mouth.     • cyclobenzaprine (FLEXERIL) 5 MG tablet Take 1 tablet by mouth three times daily as needed for muscle spasm (Patient taking differently: Take 5 mg by mouth 3 (Three) Times a Day As Needed for Muscle Spasms.) 30 tablet 0   • desoximetasone (TOPICORT) 0.25 % ointment Apply 1 application topically to the appropriate area  as directed 2 (Two) Times a Day As Needed (TO IRRITATED AREA).     • dexamethasone 0.5 MG/5ML solution Take 10 mL by mouth 4 (Four) Times a Day. Take 10 mls by mouth four times daily. Swish in mouth for 2 minutes and then spit out. Do not swallow. 280 mL 0   • escitalopram (LEXAPRO) 20 MG tablet Take 1 tablet by mouth once daily 30 tablet 5   • levothyroxine (SYNTHROID, LEVOTHROID) 25 MCG tablet TAKE 1 TABLET BY MOUTH ONCE DAILY IN THE MORNING 30 MINUTES BEFORE BREAKFAST OR  ANY  OTHER  MEDICINE 30 tablet 5   • metoprolol succinate XL (TOPROL-XL) 25 MG 24 hr tablet Take 1 tablet by mouth Daily. 90 tablet 1   • mupirocin (BACTROBAN) 2 % ointment APPLY A SMALL AMOUNT OF OINTMENT TOPICALLY TO AFFECTED AREA EVERY NIGHT     • ondansetron (ZOFRAN) 8 MG tablet Take 1 tablet by mouth 3 (Three) Times a Day As Needed for Nausea or Vomiting. 30 tablet 5   • pantoprazole (PROTONIX) 40 MG EC tablet Take 1 tablet by mouth once daily 30 tablet 0   • prochlorperazine (COMPAZINE) 10 MG tablet Take 1 tablet by mouth Every 6 (Six) Hours As Needed for Nausea or Vomiting. 30 tablet 3   • rosuvastatin (CRESTOR) 10 MG tablet Take 1 tablet by mouth Daily. 90 tablet 3     No current facility-administered medications on file prior to visit.       No Known Allergies      Referring Provider:    No referring provider defined for this encounter.    Oncologist:  No primary care provider on file.      Seen and approved by:  Rula Schneider MD  07/12/2022

## 2022-07-13 LAB
RAD ONC ARIA COURSE ID: NORMAL
RAD ONC ARIA COURSE INTENT: NORMAL
RAD ONC ARIA COURSE LAST TREATMENT DATE: NORMAL
RAD ONC ARIA COURSE START DATE: NORMAL
RAD ONC ARIA COURSE TREATMENT ELAPSED DAYS: 36
RAD ONC ARIA FIRST TREATMENT DATE: NORMAL
RAD ONC ARIA PLAN FRACTIONS TREATED TO DATE: 1
RAD ONC ARIA PLAN ID: NORMAL
RAD ONC ARIA PLAN PRESCRIBED DOSE PER FRACTION: 2 GY
RAD ONC ARIA PLAN PRIMARY REFERENCE POINT: NORMAL
RAD ONC ARIA PLAN TOTAL FRACTIONS PRESCRIBED: 5
RAD ONC ARIA PLAN TOTAL PRESCRIBED DOSE: 1000 CGY
RAD ONC ARIA REFERENCE POINT DOSAGE GIVEN TO DATE: 2 GY
RAD ONC ARIA REFERENCE POINT DOSAGE GIVEN TO DATE: 2.07 GY
RAD ONC ARIA REFERENCE POINT ID: NORMAL
RAD ONC ARIA REFERENCE POINT ID: NORMAL
RAD ONC ARIA REFERENCE POINT SESSION DOSAGE GIVEN: 2 GY
RAD ONC ARIA REFERENCE POINT SESSION DOSAGE GIVEN: 2.07 GY

## 2022-07-13 PROCEDURE — 77427 RADIATION TX MANAGEMENT X5: CPT | Performed by: RADIOLOGY

## 2022-07-13 PROCEDURE — 77412 RADIATION TX DELIVERY LVL 3: CPT | Performed by: RADIOLOGY

## 2022-07-13 PROCEDURE — 77280 THER RAD SIMULAJ FIELD SMPL: CPT | Performed by: RADIOLOGY

## 2022-07-14 LAB
RAD ONC ARIA COURSE ID: NORMAL
RAD ONC ARIA COURSE INTENT: NORMAL
RAD ONC ARIA COURSE LAST TREATMENT DATE: NORMAL
RAD ONC ARIA COURSE START DATE: NORMAL
RAD ONC ARIA COURSE TREATMENT ELAPSED DAYS: 37
RAD ONC ARIA FIRST TREATMENT DATE: NORMAL
RAD ONC ARIA PLAN FRACTIONS TREATED TO DATE: 2
RAD ONC ARIA PLAN ID: NORMAL
RAD ONC ARIA PLAN PRESCRIBED DOSE PER FRACTION: 2 GY
RAD ONC ARIA PLAN PRIMARY REFERENCE POINT: NORMAL
RAD ONC ARIA PLAN TOTAL FRACTIONS PRESCRIBED: 5
RAD ONC ARIA PLAN TOTAL PRESCRIBED DOSE: 1000 CGY
RAD ONC ARIA REFERENCE POINT DOSAGE GIVEN TO DATE: 4 GY
RAD ONC ARIA REFERENCE POINT DOSAGE GIVEN TO DATE: 4.15 GY
RAD ONC ARIA REFERENCE POINT ID: NORMAL
RAD ONC ARIA REFERENCE POINT ID: NORMAL
RAD ONC ARIA REFERENCE POINT SESSION DOSAGE GIVEN: 2 GY
RAD ONC ARIA REFERENCE POINT SESSION DOSAGE GIVEN: 2.07 GY

## 2022-07-14 PROCEDURE — 77412 RADIATION TX DELIVERY LVL 3: CPT | Performed by: RADIOLOGY

## 2022-07-14 PROCEDURE — 77387 GUIDANCE FOR RADJ TX DLVR: CPT | Performed by: RADIOLOGY

## 2022-07-15 ENCOUNTER — INFUSION (OUTPATIENT)
Dept: ONCOLOGY | Facility: HOSPITAL | Age: 77
End: 2022-07-15

## 2022-07-15 VITALS
BODY MASS INDEX: 22.26 KG/M2 | WEIGHT: 116.4 LBS | RESPIRATION RATE: 16 BRPM | HEART RATE: 100 BPM | TEMPERATURE: 97.7 F | SYSTOLIC BLOOD PRESSURE: 113 MMHG | DIASTOLIC BLOOD PRESSURE: 77 MMHG | OXYGEN SATURATION: 96 %

## 2022-07-15 DIAGNOSIS — C50.212 MALIGNANT NEOPLASM OF UPPER-INNER QUADRANT OF LEFT BREAST IN FEMALE, ESTROGEN RECEPTOR POSITIVE: Primary | ICD-10-CM

## 2022-07-15 DIAGNOSIS — Z45.9 ENCOUNTER FOR MANAGEMENT OF IMPLANTED DEVICE: ICD-10-CM

## 2022-07-15 DIAGNOSIS — Z17.0 MALIGNANT NEOPLASM OF UPPER-INNER QUADRANT OF LEFT BREAST IN FEMALE, ESTROGEN RECEPTOR POSITIVE: Primary | ICD-10-CM

## 2022-07-15 LAB
ALBUMIN SERPL-MCNC: 4.1 G/DL (ref 3.5–5.2)
ALBUMIN/GLOB SERPL: 1.4 G/DL (ref 1.1–2.4)
ALP SERPL-CCNC: 104 U/L (ref 38–116)
ALT SERPL W P-5'-P-CCNC: 38 U/L (ref 0–33)
ANION GAP SERPL CALCULATED.3IONS-SCNC: 11.2 MMOL/L (ref 5–15)
AST SERPL-CCNC: 35 U/L (ref 0–32)
BASOPHILS # BLD AUTO: 0.02 10*3/MM3 (ref 0–0.2)
BASOPHILS NFR BLD AUTO: 0.3 % (ref 0–1.5)
BILIRUB SERPL-MCNC: 0.4 MG/DL (ref 0.2–1.2)
BUN SERPL-MCNC: 15 MG/DL (ref 6–20)
BUN/CREAT SERPL: 22.1 (ref 7.3–30)
CALCIUM SPEC-SCNC: 9.3 MG/DL (ref 8.5–10.2)
CHLORIDE SERPL-SCNC: 104 MMOL/L (ref 98–107)
CO2 SERPL-SCNC: 21.8 MMOL/L (ref 22–29)
CREAT SERPL-MCNC: 0.68 MG/DL (ref 0.6–1.1)
DEPRECATED RDW RBC AUTO: 47.8 FL (ref 37–54)
EGFRCR SERPLBLD CKD-EPI 2021: 89.8 ML/MIN/1.73
EOSINOPHIL # BLD AUTO: 0.07 10*3/MM3 (ref 0–0.4)
EOSINOPHIL NFR BLD AUTO: 1.1 % (ref 0.3–6.2)
ERYTHROCYTE [DISTWIDTH] IN BLOOD BY AUTOMATED COUNT: 12.1 % (ref 12.3–15.4)
GLOBULIN UR ELPH-MCNC: 3 GM/DL (ref 1.8–3.5)
GLUCOSE SERPL-MCNC: 113 MG/DL (ref 74–124)
HCT VFR BLD AUTO: 34.3 % (ref 34–46.6)
HGB BLD-MCNC: 11.4 G/DL (ref 12–15.9)
IMM GRANULOCYTES # BLD AUTO: 0.02 10*3/MM3 (ref 0–0.05)
IMM GRANULOCYTES NFR BLD AUTO: 0.3 % (ref 0–0.5)
LYMPHOCYTES # BLD AUTO: 0.5 10*3/MM3 (ref 0.7–3.1)
LYMPHOCYTES NFR BLD AUTO: 8.1 % (ref 19.6–45.3)
MCH RBC QN AUTO: 35.4 PG (ref 26.6–33)
MCHC RBC AUTO-ENTMCNC: 33.2 G/DL (ref 31.5–35.7)
MCV RBC AUTO: 106.5 FL (ref 79–97)
MONOCYTES # BLD AUTO: 0.78 10*3/MM3 (ref 0.1–0.9)
MONOCYTES NFR BLD AUTO: 12.7 % (ref 5–12)
NEUTROPHILS NFR BLD AUTO: 4.75 10*3/MM3 (ref 1.7–7)
NEUTROPHILS NFR BLD AUTO: 77.5 % (ref 42.7–76)
NRBC BLD AUTO-RTO: 0 /100 WBC (ref 0–0.2)
PLATELET # BLD AUTO: 199 10*3/MM3 (ref 140–450)
PMV BLD AUTO: 8.9 FL (ref 6–12)
POTASSIUM SERPL-SCNC: 3.8 MMOL/L (ref 3.5–4.7)
PROT SERPL-MCNC: 7.1 G/DL (ref 6.3–8)
RAD ONC ARIA COURSE ID: NORMAL
RAD ONC ARIA COURSE INTENT: NORMAL
RAD ONC ARIA COURSE LAST TREATMENT DATE: NORMAL
RAD ONC ARIA COURSE START DATE: NORMAL
RAD ONC ARIA COURSE TREATMENT ELAPSED DAYS: 38
RAD ONC ARIA FIRST TREATMENT DATE: NORMAL
RAD ONC ARIA PLAN FRACTIONS TREATED TO DATE: 3
RAD ONC ARIA PLAN ID: NORMAL
RAD ONC ARIA PLAN PRESCRIBED DOSE PER FRACTION: 2 GY
RAD ONC ARIA PLAN PRIMARY REFERENCE POINT: NORMAL
RAD ONC ARIA PLAN TOTAL FRACTIONS PRESCRIBED: 5
RAD ONC ARIA PLAN TOTAL PRESCRIBED DOSE: 1000 CGY
RAD ONC ARIA REFERENCE POINT DOSAGE GIVEN TO DATE: 6 GY
RAD ONC ARIA REFERENCE POINT DOSAGE GIVEN TO DATE: 6.22 GY
RAD ONC ARIA REFERENCE POINT ID: NORMAL
RAD ONC ARIA REFERENCE POINT ID: NORMAL
RAD ONC ARIA REFERENCE POINT SESSION DOSAGE GIVEN: 2 GY
RAD ONC ARIA REFERENCE POINT SESSION DOSAGE GIVEN: 2.07 GY
RBC # BLD AUTO: 3.22 10*6/MM3 (ref 3.77–5.28)
SODIUM SERPL-SCNC: 137 MMOL/L (ref 134–145)
WBC NRBC COR # BLD: 6.14 10*3/MM3 (ref 3.4–10.8)

## 2022-07-15 PROCEDURE — 77387 GUIDANCE FOR RADJ TX DLVR: CPT | Performed by: RADIOLOGY

## 2022-07-15 PROCEDURE — 85025 COMPLETE CBC W/AUTO DIFF WBC: CPT

## 2022-07-15 PROCEDURE — 77412 RADIATION TX DELIVERY LVL 3: CPT | Performed by: RADIOLOGY

## 2022-07-15 PROCEDURE — 80053 COMPREHEN METABOLIC PANEL: CPT

## 2022-07-15 PROCEDURE — 96375 TX/PRO/DX INJ NEW DRUG ADDON: CPT

## 2022-07-15 PROCEDURE — 25010000002 HEPARIN LOCK FLUSH PER 10 UNITS: Performed by: INTERNAL MEDICINE

## 2022-07-15 PROCEDURE — 96413 CHEMO IV INFUSION 1 HR: CPT

## 2022-07-15 PROCEDURE — 25010000002 DEXAMETHASONE SODIUM PHOSPHATE 100 MG/10ML SOLUTION: Performed by: NURSE PRACTITIONER

## 2022-07-15 PROCEDURE — 25010000002 ADO-TRASTUZUMAB 100 MG RECONSTITUTED SOLUTION 1 EACH VIAL: Performed by: NURSE PRACTITIONER

## 2022-07-15 RX ORDER — HEPARIN SODIUM (PORCINE) LOCK FLUSH IV SOLN 100 UNIT/ML 100 UNIT/ML
500 SOLUTION INTRAVENOUS AS NEEDED
Status: CANCELLED | OUTPATIENT
Start: 2022-07-15

## 2022-07-15 RX ORDER — HEPARIN SODIUM (PORCINE) LOCK FLUSH IV SOLN 100 UNIT/ML 100 UNIT/ML
500 SOLUTION INTRAVENOUS AS NEEDED
Status: DISCONTINUED | OUTPATIENT
Start: 2022-07-15 | End: 2022-07-15 | Stop reason: HOSPADM

## 2022-07-15 RX ORDER — SODIUM CHLORIDE 9 MG/ML
250 INJECTION, SOLUTION INTRAVENOUS ONCE
Status: COMPLETED | OUTPATIENT
Start: 2022-07-15 | End: 2022-07-15

## 2022-07-15 RX ORDER — SODIUM CHLORIDE 0.9 % (FLUSH) 0.9 %
10 SYRINGE (ML) INJECTION AS NEEDED
Status: CANCELLED | OUTPATIENT
Start: 2022-07-15

## 2022-07-15 RX ORDER — SODIUM CHLORIDE 0.9 % (FLUSH) 0.9 %
10 SYRINGE (ML) INJECTION AS NEEDED
Status: DISCONTINUED | OUTPATIENT
Start: 2022-07-15 | End: 2022-07-15 | Stop reason: HOSPADM

## 2022-07-15 RX ADMIN — DEXAMETHASONE SODIUM PHOSPHATE 12 MG: 10 INJECTION, SOLUTION INTRAMUSCULAR; INTRAVENOUS at 12:15

## 2022-07-15 RX ADMIN — Medication 10 ML: at 13:14

## 2022-07-15 RX ADMIN — ADO-TRASTUZUMAB EMTANSINE 195 MG: 20 INJECTION, POWDER, LYOPHILIZED, FOR SOLUTION INTRAVENOUS at 12:38

## 2022-07-15 RX ADMIN — Medication 500 UNITS: at 13:14

## 2022-07-15 RX ADMIN — SODIUM CHLORIDE 250 ML: 9 INJECTION, SOLUTION INTRAVENOUS at 12:12

## 2022-07-18 LAB
RAD ONC ARIA COURSE ID: NORMAL
RAD ONC ARIA COURSE INTENT: NORMAL
RAD ONC ARIA COURSE LAST TREATMENT DATE: NORMAL
RAD ONC ARIA COURSE START DATE: NORMAL
RAD ONC ARIA COURSE TREATMENT ELAPSED DAYS: 41
RAD ONC ARIA FIRST TREATMENT DATE: NORMAL
RAD ONC ARIA PLAN FRACTIONS TREATED TO DATE: 4
RAD ONC ARIA PLAN ID: NORMAL
RAD ONC ARIA PLAN PRESCRIBED DOSE PER FRACTION: 2 GY
RAD ONC ARIA PLAN PRIMARY REFERENCE POINT: NORMAL
RAD ONC ARIA PLAN TOTAL FRACTIONS PRESCRIBED: 5
RAD ONC ARIA PLAN TOTAL PRESCRIBED DOSE: 1000 CGY
RAD ONC ARIA REFERENCE POINT DOSAGE GIVEN TO DATE: 8 GY
RAD ONC ARIA REFERENCE POINT DOSAGE GIVEN TO DATE: 8.29 GY
RAD ONC ARIA REFERENCE POINT ID: NORMAL
RAD ONC ARIA REFERENCE POINT ID: NORMAL
RAD ONC ARIA REFERENCE POINT SESSION DOSAGE GIVEN: 2 GY
RAD ONC ARIA REFERENCE POINT SESSION DOSAGE GIVEN: 2.07 GY

## 2022-07-18 PROCEDURE — 77412 RADIATION TX DELIVERY LVL 3: CPT | Performed by: RADIOLOGY

## 2022-07-18 PROCEDURE — 77387 GUIDANCE FOR RADJ TX DLVR: CPT | Performed by: RADIOLOGY

## 2022-07-19 ENCOUNTER — RADIATION ONCOLOGY WEEKLY ASSESSMENT (OUTPATIENT)
Dept: RADIATION ONCOLOGY | Facility: HOSPITAL | Age: 77
End: 2022-07-19

## 2022-07-19 VITALS
WEIGHT: 117.2 LBS | DIASTOLIC BLOOD PRESSURE: 76 MMHG | HEART RATE: 90 BPM | BODY MASS INDEX: 22.42 KG/M2 | SYSTOLIC BLOOD PRESSURE: 115 MMHG | RESPIRATION RATE: 20 BRPM | OXYGEN SATURATION: 98 %

## 2022-07-19 DIAGNOSIS — C50.011 MALIGNANT NEOPLASM OF NIPPLE OF RIGHT BREAST IN FEMALE, UNSPECIFIED ESTROGEN RECEPTOR STATUS: Primary | ICD-10-CM

## 2022-07-19 LAB
RAD ONC ARIA COURSE END DATE: NORMAL
RAD ONC ARIA COURSE ID: NORMAL
RAD ONC ARIA COURSE ID: NORMAL
RAD ONC ARIA COURSE INTENT: NORMAL
RAD ONC ARIA COURSE INTENT: NORMAL
RAD ONC ARIA COURSE LAST TREATMENT DATE: NORMAL
RAD ONC ARIA COURSE LAST TREATMENT DATE: NORMAL
RAD ONC ARIA COURSE START DATE: NORMAL
RAD ONC ARIA COURSE START DATE: NORMAL
RAD ONC ARIA COURSE TREATMENT ELAPSED DAYS: 42
RAD ONC ARIA COURSE TREATMENT ELAPSED DAYS: 42
RAD ONC ARIA FIRST TREATMENT DATE: NORMAL
RAD ONC ARIA FIRST TREATMENT DATE: NORMAL
RAD ONC ARIA PLAN FRACTIONS TREATED TO DATE: 25
RAD ONC ARIA PLAN FRACTIONS TREATED TO DATE: 25
RAD ONC ARIA PLAN FRACTIONS TREATED TO DATE: 5
RAD ONC ARIA PLAN FRACTIONS TREATED TO DATE: 5
RAD ONC ARIA PLAN ID: NORMAL
RAD ONC ARIA PLAN NAME: NORMAL
RAD ONC ARIA PLAN PRESCRIBED DOSE PER FRACTION: 2 GY
RAD ONC ARIA PLAN PRIMARY REFERENCE POINT: NORMAL
RAD ONC ARIA PLAN TOTAL FRACTIONS PRESCRIBED: 25
RAD ONC ARIA PLAN TOTAL FRACTIONS PRESCRIBED: 25
RAD ONC ARIA PLAN TOTAL FRACTIONS PRESCRIBED: 5
RAD ONC ARIA PLAN TOTAL FRACTIONS PRESCRIBED: 5
RAD ONC ARIA PLAN TOTAL PRESCRIBED DOSE: 1000 CGY
RAD ONC ARIA PLAN TOTAL PRESCRIBED DOSE: 1000 CGY
RAD ONC ARIA PLAN TOTAL PRESCRIBED DOSE: 5000 CGY
RAD ONC ARIA PLAN TOTAL PRESCRIBED DOSE: 5000 CGY
RAD ONC ARIA REFERENCE POINT DOSAGE GIVEN TO DATE: 10 GY
RAD ONC ARIA REFERENCE POINT DOSAGE GIVEN TO DATE: 10 GY
RAD ONC ARIA REFERENCE POINT DOSAGE GIVEN TO DATE: 10.36 GY
RAD ONC ARIA REFERENCE POINT DOSAGE GIVEN TO DATE: 10.36 GY
RAD ONC ARIA REFERENCE POINT DOSAGE GIVEN TO DATE: 49.54 GY
RAD ONC ARIA REFERENCE POINT DOSAGE GIVEN TO DATE: 50 GY
RAD ONC ARIA REFERENCE POINT ID: NORMAL
RAD ONC ARIA REFERENCE POINT SESSION DOSAGE GIVEN: 2 GY
RAD ONC ARIA REFERENCE POINT SESSION DOSAGE GIVEN: 2.07 GY

## 2022-07-19 PROCEDURE — 77412 RADIATION TX DELIVERY LVL 3: CPT | Performed by: RADIOLOGY

## 2022-07-19 PROCEDURE — 77387 GUIDANCE FOR RADJ TX DLVR: CPT | Performed by: RADIOLOGY

## 2022-07-19 PROCEDURE — 77336 RADIATION PHYSICS CONSULT: CPT | Performed by: RADIOLOGY

## 2022-07-19 NOTE — PROGRESS NOTES
Physician Weekly Management Note    Diagnosis:     Diagnosis Plan   1. Malignant neoplasm of nipple of right breast in female, unspecified estrogen receptor status (HCC)         RT Details:  fx 30/30 left breast 60Gy/60 today    Notes on Treatment course, Films, Medical progress:  Doing well, completes today, moderate erythema over left breast as expected, follow up 4 weeks, kps90%    Weekly Management:  Medication reviewed?   Yes  New medications given?   No  Problemlist reviewed?   Yes  Problem added?   No  Issues raised requiring referral to support services - task assigned to:  na    Technical aspects reviewed:  Weekly OBI approved?   Yes  Weekly port films approved?   Yes  Change requests noted on port film?   No  Patient setup and plan reviewed?   Yes    Chart Reviewed:  Continue current treatment plan?   Yes  Treatment plan change requested?   No  CBC reviewed?   Yes  Concurrent Chemo?   No    Objective     Toxicities:   Grade 2 erythema     Review of Systems   Constitutional: Positive for fatigue.   Skin: Positive for color change.          There were no vitals filed for this visit.    Current Status 7/15/2022   ECOG score 1       Physical Exam  Constitutional:       Appearance: Normal appearance.   Chest:          Comments: Moderate erythema over left breast  Neurological:      Mental Status: She is alert.             Problem Summary List    Diagnosis:     Diagnosis Plan   1. Malignant neoplasm of nipple of right breast in female, unspecified estrogen receptor status (HCC)       Pathology:   breast    Past Medical History:   Diagnosis Date   • Anxiety    • Arthritis    • Cataract     EARLY. JIMMIE EYES   • Chronic back pain     LOW BACK ACHES AT TIME FROM ARTHRTIS   • Constipation     AT TIMES. NO RECENT ISSUES   • Dermatitis     LEGS. STATES CLEARING UP NOW.   • Disease of thyroid gland     HYPO   • Diverticulosis     Colonoscopy November 2014   • Erythema of face     Transitory Erythema Of The Face   • GERD  (gastroesophageal reflux disease)     ON OCC   • History of chemotherapy     FOR LEFT BREAST CANCER   • Hyperlipidemia    • Hypertension    • Malignant neoplasm of female breast (HCC) 9/27/2021   • Osteopenia    • Tachycardia          Past Surgical History:   Procedure Laterality Date   • BREAST BIOPSY  09/2021    Dr. Tirado    • BREAST CYST ASPIRATION Right    • BREAST CYST EXCISION Right    • BREAST LUMPECTOMY WITH SENTINEL NODE BIOPSY Left 2/10/2022    Procedure: LEFT BREAST WIRE LOCALIZED LUMPECTOMY WITH SENTINEL NODE BIOPSY, POSSIBLE LEFT AXILLARY DISSECTION;  Surgeon: Becky Liu MD;  Location: [facility] OR [facility] [place];  Service: General;  Laterality: Left;   • COLONOSCOPY     • COLONOSCOPY  2014   • EXOSTECTOMY Bilateral     Simple Bunion Exostectomy (Silver Procedure)   • FOOT TENDON SURGERY  2012   • VENOUS ACCESS DEVICE (PORT) INSERTION N/A 10/18/2021    Procedure: INSERTION VENOUS ACCESS DEVICE;  Surgeon: Becky Liu MD;  Location: [facility] OR [facility] [place];  Service: General;  Laterality: N/A;         Current Outpatient Medications on File Prior to Visit   Medication Sig Dispense Refill   • Acetaminophen (Tylenol) 325 MG capsule Take  by mouth.     • acetaminophen (TYLENOL) 500 MG tablet Take 500-1,000 mg by mouth Every 6 (Six) Hours As Needed for Mild Pain .     • alendronate (FOSAMAX) 70 MG tablet Take 1 tablet by mouth 1 (One) Time Per Week. 12 tablet 0   • amLODIPine (NORVASC) 5 MG tablet Take 1 tablet by mouth Daily. 90 tablet 2   • anastrozole (ARIMIDEX) 1 MG tablet Take 1 tablet by mouth Daily. 30 tablet 3   • Cholecalciferol (Vitamin D) 50 MCG (2000 UT) capsule Take  by mouth.     • cyclobenzaprine (FLEXERIL) 5 MG tablet Take 1 tablet by mouth three times daily as needed for muscle spasm (Patient taking differently: Take 5 mg by mouth 3 (Three) Times a Day As Needed for Muscle Spasms.) 30 tablet 0   • desoximetasone (TOPICORT) 0.25 % ointment Apply 1 application topically to the appropriate area as directed 2  (Two) Times a Day As Needed (TO IRRITATED AREA).     • dexamethasone 0.5 MG/5ML solution Take 10 mL by mouth 4 (Four) Times a Day. Take 10 mls by mouth four times daily. Swish in mouth for 2 minutes and then spit out. Do not swallow. 280 mL 0   • escitalopram (LEXAPRO) 20 MG tablet Take 1 tablet by mouth once daily 30 tablet 5   • levothyroxine (SYNTHROID, LEVOTHROID) 25 MCG tablet TAKE 1 TABLET BY MOUTH ONCE DAILY IN THE MORNING 30 MINUTES BEFORE BREAKFAST OR  ANY  OTHER  MEDICINE 30 tablet 5   • metoprolol succinate XL (TOPROL-XL) 25 MG 24 hr tablet Take 1 tablet by mouth Daily. 90 tablet 1   • mupirocin (BACTROBAN) 2 % ointment APPLY A SMALL AMOUNT OF OINTMENT TOPICALLY TO AFFECTED AREA EVERY NIGHT     • ondansetron (ZOFRAN) 8 MG tablet Take 1 tablet by mouth 3 (Three) Times a Day As Needed for Nausea or Vomiting. 30 tablet 5   • pantoprazole (PROTONIX) 40 MG EC tablet Take 1 tablet by mouth once daily 30 tablet 0   • prochlorperazine (COMPAZINE) 10 MG tablet Take 1 tablet by mouth Every 6 (Six) Hours As Needed for Nausea or Vomiting. 30 tablet 3   • rosuvastatin (CRESTOR) 10 MG tablet Take 1 tablet by mouth Daily. 90 tablet 3     No current facility-administered medications on file prior to visit.       No Known Allergies      Referring Provider:    No referring provider defined for this encounter.    Oncologist:  No primary care provider on file.      Seen and approved by:  Rula Schneider MD  07/19/2022

## 2022-07-20 ENCOUNTER — TELEPHONE (OUTPATIENT)
Dept: ONCOLOGY | Facility: CLINIC | Age: 77
End: 2022-07-20

## 2022-07-20 NOTE — TELEPHONE ENCOUNTER
Returned pt's call and let her know she can start her anastrozole today. She v/u. Also states she still has some of her protonix left and she will continue taking this as prior to chemo she was taking prilosec. Advised her she can go back to her prilosec when she is out of protinix.

## 2022-07-20 NOTE — TELEPHONE ENCOUNTER
Caller: Neela Ramirez    Relationship: Self    Best call back number: [phone]    What is the best time to reach you: ANYTIME    Who are you requesting to speak with (clinical staff, provider,  specific staff member): DR MCRAE OR NURSE    What was the call regarding: PT COMPLETED HER RADIATION YESTERDAY 7/19, WASN'T SURE WHEN TO START THE ANASTROZOLE. PLEASE CALL TO ADVISE.     Do you require a callback: YES

## 2022-08-05 ENCOUNTER — OFFICE VISIT (OUTPATIENT)
Dept: ONCOLOGY | Facility: CLINIC | Age: 77
End: 2022-08-05

## 2022-08-05 ENCOUNTER — INFUSION (OUTPATIENT)
Dept: ONCOLOGY | Facility: HOSPITAL | Age: 77
End: 2022-08-05

## 2022-08-05 VITALS
RESPIRATION RATE: 16 BRPM | HEART RATE: 88 BPM | BODY MASS INDEX: 22.13 KG/M2 | SYSTOLIC BLOOD PRESSURE: 106 MMHG | TEMPERATURE: 97.3 F | DIASTOLIC BLOOD PRESSURE: 70 MMHG | HEIGHT: 61 IN | WEIGHT: 117.2 LBS | OXYGEN SATURATION: 94 %

## 2022-08-05 DIAGNOSIS — Z45.9 ENCOUNTER FOR MANAGEMENT OF IMPLANTED DEVICE: ICD-10-CM

## 2022-08-05 DIAGNOSIS — Z51.11 ENCOUNTER FOR ANTINEOPLASTIC CHEMOTHERAPY: ICD-10-CM

## 2022-08-05 DIAGNOSIS — Z17.0 MALIGNANT NEOPLASM OF UPPER-INNER QUADRANT OF LEFT BREAST IN FEMALE, ESTROGEN RECEPTOR POSITIVE: ICD-10-CM

## 2022-08-05 DIAGNOSIS — Z17.0 MALIGNANT NEOPLASM OF UPPER-INNER QUADRANT OF LEFT BREAST IN FEMALE, ESTROGEN RECEPTOR POSITIVE: Primary | ICD-10-CM

## 2022-08-05 DIAGNOSIS — C50.212 MALIGNANT NEOPLASM OF UPPER-INNER QUADRANT OF LEFT BREAST IN FEMALE, ESTROGEN RECEPTOR POSITIVE: Primary | ICD-10-CM

## 2022-08-05 DIAGNOSIS — C50.212 MALIGNANT NEOPLASM OF UPPER-INNER QUADRANT OF LEFT BREAST IN FEMALE, ESTROGEN RECEPTOR POSITIVE: ICD-10-CM

## 2022-08-05 LAB
ALBUMIN SERPL-MCNC: 4.2 G/DL (ref 3.5–5.2)
ALBUMIN/GLOB SERPL: 1.4 G/DL (ref 1.1–2.4)
ALP SERPL-CCNC: 114 U/L (ref 38–116)
ALT SERPL W P-5'-P-CCNC: 31 U/L (ref 0–33)
ANION GAP SERPL CALCULATED.3IONS-SCNC: 11.9 MMOL/L (ref 5–15)
AST SERPL-CCNC: 33 U/L (ref 0–32)
BASOPHILS # BLD AUTO: 0.04 10*3/MM3 (ref 0–0.2)
BASOPHILS NFR BLD AUTO: 0.7 % (ref 0–1.5)
BILIRUB SERPL-MCNC: 0.4 MG/DL (ref 0.2–1.2)
BUN SERPL-MCNC: 15 MG/DL (ref 6–20)
BUN/CREAT SERPL: 19.2 (ref 7.3–30)
CALCIUM SPEC-SCNC: 9.5 MG/DL (ref 8.5–10.2)
CHLORIDE SERPL-SCNC: 104 MMOL/L (ref 98–107)
CO2 SERPL-SCNC: 23.1 MMOL/L (ref 22–29)
CREAT SERPL-MCNC: 0.78 MG/DL (ref 0.6–1.1)
DEPRECATED RDW RBC AUTO: 43.9 FL (ref 37–54)
EGFRCR SERPLBLD CKD-EPI 2021: 78.3 ML/MIN/1.73
EOSINOPHIL # BLD AUTO: 0.13 10*3/MM3 (ref 0–0.4)
EOSINOPHIL NFR BLD AUTO: 2.3 % (ref 0.3–6.2)
ERYTHROCYTE [DISTWIDTH] IN BLOOD BY AUTOMATED COUNT: 11.7 % (ref 12.3–15.4)
GLOBULIN UR ELPH-MCNC: 3.1 GM/DL (ref 1.8–3.5)
GLUCOSE SERPL-MCNC: 101 MG/DL (ref 74–124)
HCT VFR BLD AUTO: 37 % (ref 34–46.6)
HGB BLD-MCNC: 12.4 G/DL (ref 12–15.9)
IMM GRANULOCYTES # BLD AUTO: 0.01 10*3/MM3 (ref 0–0.05)
IMM GRANULOCYTES NFR BLD AUTO: 0.2 % (ref 0–0.5)
LYMPHOCYTES # BLD AUTO: 0.64 10*3/MM3 (ref 0.7–3.1)
LYMPHOCYTES NFR BLD AUTO: 11.4 % (ref 19.6–45.3)
MCH RBC QN AUTO: 34.3 PG (ref 26.6–33)
MCHC RBC AUTO-ENTMCNC: 33.5 G/DL (ref 31.5–35.7)
MCV RBC AUTO: 102.5 FL (ref 79–97)
MONOCYTES # BLD AUTO: 0.78 10*3/MM3 (ref 0.1–0.9)
MONOCYTES NFR BLD AUTO: 13.9 % (ref 5–12)
NEUTROPHILS NFR BLD AUTO: 4.01 10*3/MM3 (ref 1.7–7)
NEUTROPHILS NFR BLD AUTO: 71.5 % (ref 42.7–76)
NRBC BLD AUTO-RTO: 0 /100 WBC (ref 0–0.2)
PLATELET # BLD AUTO: 232 10*3/MM3 (ref 140–450)
PMV BLD AUTO: 9.1 FL (ref 6–12)
POTASSIUM SERPL-SCNC: 4.2 MMOL/L (ref 3.5–4.7)
PROT SERPL-MCNC: 7.3 G/DL (ref 6.3–8)
RBC # BLD AUTO: 3.61 10*6/MM3 (ref 3.77–5.28)
SODIUM SERPL-SCNC: 139 MMOL/L (ref 134–145)
WBC NRBC COR # BLD: 5.61 10*3/MM3 (ref 3.4–10.8)

## 2022-08-05 PROCEDURE — 25010000002 DEXAMETHASONE PER 1 MG: Performed by: INTERNAL MEDICINE

## 2022-08-05 PROCEDURE — 96375 TX/PRO/DX INJ NEW DRUG ADDON: CPT

## 2022-08-05 PROCEDURE — 25010000002 HEPARIN LOCK FLUSH PER 10 UNITS: Performed by: INTERNAL MEDICINE

## 2022-08-05 PROCEDURE — 96413 CHEMO IV INFUSION 1 HR: CPT

## 2022-08-05 PROCEDURE — 80053 COMPREHEN METABOLIC PANEL: CPT

## 2022-08-05 PROCEDURE — 85025 COMPLETE CBC W/AUTO DIFF WBC: CPT

## 2022-08-05 PROCEDURE — 99214 OFFICE O/P EST MOD 30 MIN: CPT | Performed by: INTERNAL MEDICINE

## 2022-08-05 PROCEDURE — 25010000002 ADO-TRASTUZUMAB 160 MG RECONSTITUTED SOLUTION 160 MG VIAL: Performed by: INTERNAL MEDICINE

## 2022-08-05 PROCEDURE — 25010000002 ADO-TRASTUZUMAB 100 MG RECONSTITUTED SOLUTION 1 EACH VIAL: Performed by: INTERNAL MEDICINE

## 2022-08-05 RX ORDER — SODIUM CHLORIDE 9 MG/ML
250 INJECTION, SOLUTION INTRAVENOUS ONCE
Status: CANCELLED | OUTPATIENT
Start: 2022-08-05

## 2022-08-05 RX ORDER — SODIUM CHLORIDE 0.9 % (FLUSH) 0.9 %
10 SYRINGE (ML) INJECTION AS NEEDED
Status: DISCONTINUED | OUTPATIENT
Start: 2022-08-05 | End: 2022-08-05 | Stop reason: HOSPADM

## 2022-08-05 RX ORDER — HEPARIN SODIUM (PORCINE) LOCK FLUSH IV SOLN 100 UNIT/ML 100 UNIT/ML
500 SOLUTION INTRAVENOUS AS NEEDED
Status: DISCONTINUED | OUTPATIENT
Start: 2022-08-05 | End: 2022-08-05 | Stop reason: HOSPADM

## 2022-08-05 RX ORDER — SODIUM CHLORIDE 9 MG/ML
250 INJECTION, SOLUTION INTRAVENOUS ONCE
Status: COMPLETED | OUTPATIENT
Start: 2022-08-05 | End: 2022-08-05

## 2022-08-05 RX ORDER — SODIUM CHLORIDE 0.9 % (FLUSH) 0.9 %
10 SYRINGE (ML) INJECTION AS NEEDED
Status: CANCELLED | OUTPATIENT
Start: 2022-08-05

## 2022-08-05 RX ORDER — HEPARIN SODIUM (PORCINE) LOCK FLUSH IV SOLN 100 UNIT/ML 100 UNIT/ML
500 SOLUTION INTRAVENOUS AS NEEDED
Status: CANCELLED | OUTPATIENT
Start: 2022-08-05

## 2022-08-05 RX ADMIN — Medication 10 ML: at 14:20

## 2022-08-05 RX ADMIN — Medication 500 UNITS: at 14:21

## 2022-08-05 RX ADMIN — ADO-TRASTUZUMAB EMTANSINE 195 MG: 20 INJECTION, POWDER, LYOPHILIZED, FOR SOLUTION INTRAVENOUS at 13:19

## 2022-08-05 RX ADMIN — DEXAMETHASONE SODIUM PHOSPHATE 12 MG: 10 INJECTION INTRAMUSCULAR; INTRAVENOUS at 13:00

## 2022-08-05 RX ADMIN — SODIUM CHLORIDE 250 ML: 9 INJECTION, SOLUTION INTRAVENOUS at 13:00

## 2022-08-05 NOTE — PROGRESS NOTES
Subjective   Neela Ramirez is a 77 y.o. female.  Referred by Dr. 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  A bilateral diagnostic mammogram was performed for further evaluation of the same.    8/17/2021-bilateral diagnostic mammogram-scattered fibroglandular densities throughout both breasts.  In the posterior one third upper inner quadrant of the left breast at the site of palpable concern, 11 o'clock position there is a mass with adjacent pleomorphic calcifications.  The mass and the calcifications measure on order of 1.5 cm in greatest dimension.  No evidence of axillary lymphadenopathy appreciated.  Stable microcalcifications seen in other areas of the left breast on right breast.  Ultrasound-at 11 o'clock position, 5 cm from the nipple in the left breast there is an irregular hypoechoic mass which measures 2.4 x 2 x 1.6 cm.    Impression  1.irregular 2.4 cm mass in the left breast at the site of palpable concern at 11:00, 5 cm from the nipple.  Ultrasound-guided biopsy of the mass recommended  No finding suspicious for malignancy in the right breast    9/8/2021-left breast ultrasound-guided biopsy  Pathology consistent with invasive ductal carcinoma with lobular features.  Grade 3.  The carcinoma measures 7 mm in greatest dimension.  Focally suspicious for lymphovascular space invasion.  ER low +1-10%, intensity week  VA negative  HER-2 3+ on immunohistochemistry  Ki-67 70%    MRI of the breast 10/8/2021-Biopsy-proven malignancy in the left breast at 11:00 measuring 2.8 cm with a centrally located metallic clip.  No other suspicious findings are seen within the left breast or no left axillary lymphadenopathy.  No suspicious findings of the right breast    Echocardiogram 10/8/2021 was normal  ejection fraction of 56 to 60% and strain of -20    She denies any new bone pains, dyspnea, cough, abdominal pain nausea vomiting or recent changes in weight.    She had 2 previous breast aspirations in her 20s.  No other breast biopsies  She does not know much about her family hence limited family history.    She received neoadjuvant chemotherapy on EA 1181 trial with Taxol Perjeta and Herceptin.  She completed 12 weeks of Taxol Herceptin and Perjeta.    Had an abnormal EKG preop and hence a stress test was performed on 2/3/2022 which showed a normal left ventricular ejection fraction and LVEF at stress was 73%.  Considered a low risk study with normal myocardial perfusion imaging.    She underwent a left breast lumpectomy and a sentinel lymph node biopsy on 2/10/2022.    Pathology was consistent with residual tumor which was pleomorphic invasive lobular carcinoma, poorly differentiated, grade 3  Tumor measured 18 mm  Margins negative for invasive carcinoma  Associated lobular carcinoma in situ noted  1 out of 10 lymph nodes was positive for metastatic focus measuring 3.5 mm.    Receptors were repeated on the pleomorphic invasive lobular carcinoma  ER +91 to 100% strong  SC +61 to 70% moderate  HER-2 negative  Ki-67 55%    Receptors performed on the metastatic lymph node  ER +81-90% strong  SC negative  HER-2 2+ on immunohistochemistry, FISH pending.    ypT1 cN1 aM0    2/28/2022-CT of the chest abdomen and pelvis  Fluid collection in the upper left breast measuring 6 x 2.5 x 4.9 cm, postoperative seroma.  Skin thickening of the left breast likely postsurgical.  Fluid collections left axilla measuring 2.9 x 1.4 x 2.9 cm.  No other suspicious abnormalities on the CT of the chest  CT abdomen with no evidence of metastatic disease.  Mild colonic diverticulosis.  Small uterine fibroid.    3/1/2022-bone scan  No evidence of metastatic disease.  Multifocal degenerative arthritic uptake without scintigraphic evidence to  suggest metastatic disease.    3/1/2022-echocardiogram  Left ventricular ejection fraction 56-60%.  Normal global LV strain of -20.5%.    3/11/2022-cycle 1 of AC    Completed 4 cycles of AC    6/3/2022-cycle 1 Kadcyla    7/19/2022-completed adjuvant radiation    7/20/2022-started adjuvant anastrozole    Interval history  Presents today for Kadcyla.  Tolerating treatment fairly well.  Denies any worsening of neuropathy.  Denies any arthralgias or myalgias.  She is experiencing some pain in the left breast in the scar tissue.  Complaining of dry mouth.    The following portions of the patient's history were reviewed and updated as appropriate: allergies, current medications, past family history, past medical history, past social history, past surgical history and problem list.    Past Medical History:   Diagnosis Date   • Anxiety    • Arthritis    • Cataract     EARLY. JIMMIE EYES   • Chronic back pain     LOW BACK ACHES AT TIME FROM ARTHRTIS   • Constipation     AT TIMES. NO RECENT ISSUES   • Dermatitis     LEGS. STATES CLEARING UP NOW.   • Disease of thyroid gland     HYPO   • Diverticulosis     Colonoscopy November 2014   • Erythema of face     Transitory Erythema Of The Face   • GERD (gastroesophageal reflux disease)     ON OCC   • History of chemotherapy     FOR LEFT BREAST CANCER   • Hyperlipidemia    • Hypertension    • Malignant neoplasm of female breast (HCC) 9/27/2021   • Osteopenia    • Tachycardia         Past Surgical History:   Procedure Laterality Date   • BREAST BIOPSY  09/2021    Dr. Tirado    • BREAST CYST ASPIRATION Right    • BREAST CYST EXCISION Right    • BREAST LUMPECTOMY WITH SENTINEL NODE BIOPSY Left 2/10/2022    Procedure: LEFT BREAST WIRE LOCALIZED LUMPECTOMY WITH SENTINEL NODE BIOPSY, POSSIBLE LEFT AXILLARY DISSECTION;  Surgeon: Becky Liu MD;  Location: [facility] OR [facility] [place];  Service: General;  Laterality: Left;   • COLONOSCOPY     • COLONOSCOPY  2014   • EXOSTECTOMY Bilateral     Simple Bunion  "Exostectomy (Silver Procedure)   • FOOT TENDON SURGERY  2012   • VENOUS ACCESS DEVICE (PORT) INSERTION N/A 10/18/2021    Procedure: INSERTION VENOUS ACCESS DEVICE;  Surgeon: Becky Liu MD;  Location: [facility] OR [facility] [place];  Service: General;  Laterality: N/A;        Family History   Problem Relation Age of Onset   • Arthritis Father    • Heart disease Father    • Hypertension Father    • Hypertension Mother    • Malig Hyperthermia Neg Hx         Social History     Socioeconomic History   • Marital status:      Spouse name: Pj   • Number of children: 1   Tobacco Use   • Smoking status: Former Smoker     Types: Cigarettes   • Smokeless tobacco: Never Used   • Tobacco comment: SOCIALLY IN EARLY    Vaping Use   • Vaping Use: Never used   Substance and Sexual Activity   • Alcohol use: Yes     Comment: social drinker   • Drug use: Never   • Sexual activity: Defer        OB History             Para        Term   0            AB        Living           SAB        IAB        Ectopic        Molar        Multiple        Live Births                 Age at menarche-13  Age at menopause-50  Nulliparous  Breast-feeding-N/A   0 para 0  0  Oral contraceptive pill use-none  Hormone replacement therapy-7 years    No Known Allergies     Review of systems as mentioned in the HPI    Objective   Blood pressure 106/70, pulse 88, temperature 97.3 °F (36.3 °C), temperature source Temporal, resp. rate 16, height 154 cm (60.63\"), weight 53.2 kg (117 lb 3.2 oz), SpO2 94 %.     ECOG performance status-0    Physical Exam  Vitals reviewed.   Constitutional:       Appearance: Normal appearance.   HENT:      Head: Normocephalic and atraumatic.      Nose: Nose normal.      Mouth/Throat:      Mouth: Mucous membranes are moist.      Pharynx: Oropharynx is clear.   Eyes:      Conjunctiva/sclera: Conjunctivae normal.      Pupils: Pupils are equal, round, and reactive to light.   Cardiovascular:      Rate and " Rhythm: Normal rate and regular rhythm.      Pulses: Normal pulses.      Heart sounds: Normal heart sounds.   Pulmonary:      Effort: Pulmonary effort is normal.      Breath sounds: Normal breath sounds.   Abdominal:      General: Abdomen is flat. Bowel sounds are normal.      Palpations: Abdomen is soft.   Musculoskeletal:         General: Normal range of motion.      Cervical back: Normal range of motion.   Skin:     General: Skin is warm and dry.      Findings: No rash.   Neurological:      General: No focal deficit present.      Mental Status: She is alert and oriented to person, place, and time. Mental status is at baseline.   Psychiatric:         Mood and Affect: Mood normal.         Behavior: Behavior normal.         Thought Content: Thought content normal.         Judgment: Judgment normal.       Breast Exam: Right breast appears normal on inspection.  No palpable abnormalities of the right breast.    Left breast on inspection there is a scar which is well-healed.  There is also left axillary scar which is well-healed.  Skin changes consistent with radiation dermatitis.    I have reexamined the patient and the results are consistent with the previously documented exam. Sheri Avendano MD     Results from last 7 days   Lab Units 08/05/22  1207   WBC 10*3/mm3 5.61   NEUTROS ABS 10*3/mm3 4.01   HEMOGLOBIN g/dL 12.4   HEMATOCRIT % 37.0   PLATELETS 10*3/mm3 232               No radiology results for the last 30 days.     8/5/2022  CBC reviewed and within normal limits  CMP reviewed and within normal limits except for mild elevation in AST at 33    Assessment & Plan       *Invasive ductal carcinoma of the left breast  · Clinical T2 N0 M0, stage IIa  · On ultrasound the tumor measures 2.4 cm.  Lymph nodes on the left side are normal.  · MRI 10/8/2021 with tumor measuring 2.8 cm.  Lymph nodes appear normal  · Pathology consistent with invasive carcinoma with ductal and lobular features, grade 3, ER +1 to 10% weak,  WY negative, HER-2 3+ immunohistochemistry, Ki-67 70%.  · She was evaluated by Dr. Liu and referred for consideration of neoadjuvant chemotherapy.   Since the tumor is HER-2 positive and over 2 cm I think it would be reasonable to proceed with neoadjuvant chemotherapy.  Since the tumor is over 2 cm but lymph node negative I think she would be a great candidate for EA 1181 clinical trial which comprises of administering Taxol Herceptin and Perjeta tamika  adjuvantly followed by surgery and additional adjuvant treatment depending upon the response.  Port placed 10/18/2021  Echocardiogram shows normal ejection fraction  Week 1 TPH on 10/19/2021  Completed 12 weeks of Taxol Perjeta and Herceptin on 1/4/2022 on EA 1181 clinical trial  1/11/2022 due for Herceptin and Perjeta only  2/1/2022-due for dose 2 of Herceptin and Perjeta  MRI 1/14/2022 reviewed and there has been a good tumor response with decrease in size of the mass to 1.2 cm.  2/10/2022-left breast lumpectomy and sentinel lymph node biopsy  Pathology shows ypT1 cN1 aM0, stage IIb, pleomorphic invasive lobular carcinoma, grade 3, ER +% strong, WY +61-70% moderate, HER-2 negative on the residual tumor  The left axillary lymph node was ER positive strong, WY negative and HER-2 2+, FISH amplified.  On the initial biopsy prior to surgery the pathology showed invasive ductal with lobular features and the ER/WY was negative and HER-2 was 3+.  The residual disease obviously appears to be different from the initial pathology.  What is remaining is essentially pleomorphic lobular carcinoma which is high-grade with a high Ki-67 and ER/WY positivity.  Now that the lymph node is also positive I do believe that she will benefit from additional chemotherapy particularly either AC or EC.  Case was presented at multidisciplinary conference.  Decided to proceed with adjuvant Adriamycin and cyclophosphamide.  Given her age we will proceed with every 3 weekly AC over every 2  weeks.  Staging CT chest and pelvis and bone scan without any evidence of metastatic disease  3/11/2022-proceed with cycle 1 of AC  · 5/13/2022-due for cycle 4 AC.    · 6/3/2022-cycle 1 Kadcyla  · 7/19/2022-completed adjuvant radiation  · 7/20/2022-started anastrozole  · 8/5/2022-scheduled for cycle 4 of Kadcyla.  Continue anastrozole.  Labs reviewed and stable to proceed with Kadcyla  · She is scheduled for screening mammogram on 8/17/2022.    *Right-sided port appears normal    *Hypertension-currently controlled with amlodipine and metoprolol.  Blood pressure 106/70    *Anxiety  · Stable on Zyprexa  · Continue the same    *Hypothyroidism  · Continue Synthroid  · Stable    *Cardiac health-  · Echocardiogram 1/5/2022 reviewed and stable  · Stress test 2/3/2022 normal  · 3/1/2022-echocardiogram shows normal ejection fraction of 56 to 60%, normal LV strain  · 5/31/2022 EF 61%.  Maintain follow-up with Dr. Galo   · Repeat echocardiogram ordered    *Bone health-  · DEXA scan August 2021 showing osteopenia (this was stable compared to imaging 2 years prior).  · I will follow-up on the DEXA scan which has been ordered and we will discuss them at her next visit    PLAN:   1. Continue every 3 weekly Kadcyla  2. Plan to give total of 1 year of HER2 directed therapy which is roughly 8 more months.    3. Continue anastrozole.  4. Follow-up in 9 weeks.      Patient is on cytotoxic chemotherapy requiring close monitoring for toxicities.

## 2022-08-07 DIAGNOSIS — M85.80 OSTEOPENIA, UNSPECIFIED LOCATION: ICD-10-CM

## 2022-08-08 RX ORDER — ALENDRONATE SODIUM 70 MG/1
TABLET ORAL
Qty: 12 TABLET | Refills: 0 | Status: SHIPPED | OUTPATIENT
Start: 2022-08-08 | End: 2023-01-26 | Stop reason: SDUPTHER

## 2022-08-09 ENCOUNTER — TELEPHONE (OUTPATIENT)
Dept: ONCOLOGY | Facility: CLINIC | Age: 77
End: 2022-08-09

## 2022-08-09 NOTE — TELEPHONE ENCOUNTER
Returned call to patient with the information to wait till she has her mammogram to get her Covid Booster and in between her infusions as long as her white blood cells are within a normal range.  She v/u.

## 2022-08-09 NOTE — TELEPHONE ENCOUNTER
Caller: Neela Ramirez    Relationship: Self    Best call back number: [phone]     Who are you requesting to speak with (clinical staff, provider,  specific staff member): CLINICAL    What was the call regarding: CALLING TO ASK IF ANY MEDICATIONS OR TREATMENTS WOULD INTERFERE WITH SECOND COVID BOOSTER    Do you require a callback: YES

## 2022-08-11 ENCOUNTER — NURSE NAVIGATOR (OUTPATIENT)
Dept: OTHER | Facility: HOSPITAL | Age: 77
End: 2022-08-11

## 2022-08-11 DIAGNOSIS — C50.212 MALIGNANT NEOPLASM OF UPPER-INNER QUADRANT OF LEFT BREAST IN FEMALE, ESTROGEN RECEPTOR POSITIVE: Primary | ICD-10-CM

## 2022-08-11 DIAGNOSIS — Z17.0 MALIGNANT NEOPLASM OF UPPER-INNER QUADRANT OF LEFT BREAST IN FEMALE, ESTROGEN RECEPTOR POSITIVE: Primary | ICD-10-CM

## 2022-08-15 ENCOUNTER — OFFICE VISIT (OUTPATIENT)
Dept: RADIATION ONCOLOGY | Facility: HOSPITAL | Age: 77
End: 2022-08-15

## 2022-08-15 ENCOUNTER — APPOINTMENT (OUTPATIENT)
Dept: RADIATION ONCOLOGY | Facility: HOSPITAL | Age: 77
End: 2022-08-15

## 2022-08-15 VITALS
WEIGHT: 117.2 LBS | DIASTOLIC BLOOD PRESSURE: 72 MMHG | OXYGEN SATURATION: 95 % | BODY MASS INDEX: 22.42 KG/M2 | HEART RATE: 89 BPM | SYSTOLIC BLOOD PRESSURE: 126 MMHG

## 2022-08-15 DIAGNOSIS — C50.011 MALIGNANT NEOPLASM OF NIPPLE OF RIGHT BREAST IN FEMALE, UNSPECIFIED ESTROGEN RECEPTOR STATUS: Primary | ICD-10-CM

## 2022-08-15 PROCEDURE — 99212-NC PR NO CHARGE CBC OFFICE OUTPATIENT VISIT 10 MINUTES: Performed by: RADIOLOGY

## 2022-08-15 PROCEDURE — G0463 HOSPITAL OUTPT CLINIC VISIT: HCPCS | Performed by: RADIOLOGY

## 2022-08-15 NOTE — PROGRESS NOTES
Subjective     No ref. provider found    Cancer Staging  No matching staging information was found for the patient.   No chief complaint on file.   4 week follow up                                  S:  I had the pleasure of seeing Neela Ramirez  [facility].  She returns today for follow up now 4 weeks out from completion of her radiation therapy to the breast.  77 year old female with cT2N0 left breast cancer, [id number] ER MT pos er 2 neg.She has been doing well since I last saw her.  She completed adjuvant radiation on 7/19/22.  She received a dose of 50Gy to the left breast and regional nodes followed by a 1000cgy boost.        Review of Systems   Constitutional: Negative.    Respiratory: Negative.    Skin: Negative.          Past Medical History:   Diagnosis Date   • Anxiety    • Arthritis    • Cataract     EARLY. JIMMIE EYES   • Chronic back pain     LOW BACK ACHES AT TIME FROM ARTHRTIS   • Constipation     AT TIMES. NO RECENT ISSUES   • Dermatitis     LEGS. STATES CLEARING UP NOW.   • Disease of thyroid gland     HYPO   • Diverticulosis     Colonoscopy November 2014   • Erythema of face     Transitory Erythema Of The Face   • GERD (gastroesophageal reflux disease)     ON OCC   • History of chemotherapy     FOR LEFT BREAST CANCER   • Hyperlipidemia    • Hypertension    • Malignant neoplasm of female breast (HCC) 9/27/2021   • Osteopenia    • Tachycardia          Past Surgical History:   Procedure Laterality Date   • BREAST BIOPSY  09/2021    Dr. Tirado    • BREAST CYST ASPIRATION Right    • BREAST CYST EXCISION Right    • BREAST LUMPECTOMY WITH SENTINEL NODE BIOPSY Left 2/10/2022    Procedure: LEFT BREAST WIRE LOCALIZED LUMPECTOMY WITH SENTINEL NODE BIOPSY, POSSIBLE LEFT AXILLARY DISSECTION;  Surgeon: Becky Liu MD;  Location: [facility] OR [facility] [place];  Service: General;  Laterality: Left;   • COLONOSCOPY     • COLONOSCOPY  2014   • EXOSTECTOMY Bilateral     Simple Bunion Exostectomy (Silver Procedure)    • FOOT TENDON SURGERY  2012   • VENOUS ACCESS DEVICE (PORT) INSERTION N/A 10/18/2021    Procedure: INSERTION VENOUS ACCESS DEVICE;  Surgeon: Becky Liu MD;  Location: [facility] OR [facility] [place];  Service: General;  Laterality: N/A;         Social History     Socioeconomic History   • Marital status:      Spouse name: Pj   • Number of children: 1   Tobacco Use   • Smoking status: Former Smoker     Types: Cigarettes   • Smokeless tobacco: Never Used   • Tobacco comment: SOCIALLY IN EARLY 20'S   Vaping Use   • Vaping Use: Never used   Substance and Sexual Activity   • Alcohol use: Yes     Comment: social drinker   • Drug use: Never   • Sexual activity: Defer         Family History   Problem Relation Age of Onset   • Arthritis Father    • Heart disease Father    • Hypertension Father    • Hypertension Mother    • Malig Hyperthermia Neg Hx           Objective    Physical Exam  Constitutional:       Appearance: Normal appearance.   Chest:          Comments: Minimal hyperpigmenation over left breast, no palpable masses in either breast  Neurological:      Mental Status: She is alert.           Current Outpatient Medications on File Prior to Visit   Medication Sig Dispense Refill   • Acetaminophen (Tylenol) 325 MG capsule Take  by mouth.     • acetaminophen (TYLENOL) 500 MG tablet Take 500-1,000 mg by mouth Every 6 (Six) Hours As Needed for Mild Pain .     • alendronate (FOSAMAX) 70 MG tablet Take 1 tablet by mouth once a week 12 tablet 0   • amLODIPine (NORVASC) 5 MG tablet Take 1 tablet by mouth Daily. 90 tablet 2   • anastrozole (ARIMIDEX) 1 MG tablet Take 1 tablet by mouth Daily. 30 tablet 3   • Cholecalciferol (Vitamin D) 50 MCG (2000 UT) capsule Take  by mouth.     • cyclobenzaprine (FLEXERIL) 5 MG tablet Take 1 tablet by mouth three times daily as needed for muscle spasm (Patient taking differently: Take 5 mg by mouth 3 (Three) Times a Day As Needed for Muscle Spasms.) 30 tablet 0   • desoximetasone (TOPICORT)  0.25 % ointment Apply 1 application topically to the appropriate area as directed 2 (Two) Times a Day As Needed (TO IRRITATED AREA).     • dexamethasone 0.5 MG/5ML solution Take 10 mL by mouth 4 (Four) Times a Day. Take 10 mls by mouth four times daily. Swish in mouth for 2 minutes and then spit out. Do not swallow. 280 mL 0   • escitalopram (LEXAPRO) 20 MG tablet Take 1 tablet by mouth once daily 30 tablet 5   • levothyroxine (SYNTHROID, LEVOTHROID) 25 MCG tablet TAKE 1 TABLET BY MOUTH ONCE DAILY IN THE MORNING 30 MINUTES BEFORE BREAKFAST OR  ANY  OTHER  MEDICINE 30 tablet 5   • metoprolol succinate XL (TOPROL-XL) 25 MG 24 hr tablet Take 1 tablet by mouth Daily. 90 tablet 1   • mupirocin (BACTROBAN) 2 % ointment APPLY A SMALL AMOUNT OF OINTMENT TOPICALLY TO AFFECTED AREA EVERY NIGHT     • ondansetron (ZOFRAN) 8 MG tablet Take 1 tablet by mouth 3 (Three) Times a Day As Needed for Nausea or Vomiting. 30 tablet 5   • pantoprazole (PROTONIX) 40 MG EC tablet Take 1 tablet by mouth once daily 30 tablet 0   • prochlorperazine (COMPAZINE) 10 MG tablet Take 1 tablet by mouth Every 6 (Six) Hours As Needed for Nausea or Vomiting. 30 tablet 3   • rosuvastatin (CRESTOR) 10 MG tablet Take 1 tablet by mouth Daily. 90 tablet 3     No current facility-administered medications on file prior to visit.       ALLERGIES:  No Known Allergies    There were no vitals taken for this visit.     Current Status 8/5/2022   ECOG score 0         Assessment & Plan     77 year old female with cT2N0 left breast cancer, [id number] ER AK pos er 2 neg now 4 weeks out from radiation.  She is scheduled for her yearly mammogram august 18 and follow up with  in September. She will have regular follow up with her medical oncologist.  I will see her back in one year for follow up.  She knows to call for this appointment.             Thank you very much for allowing me to participate in the care of this very pleasant patient.    Sincerely,      Rula  COLLEEN Schneider MD     Subjective     No ref. provider found    Cancer Staging

## 2022-08-17 ENCOUNTER — TELEPHONE (OUTPATIENT)
Dept: OTHER | Facility: HOSPITAL | Age: 77
End: 2022-08-17

## 2022-08-17 ENCOUNTER — HOSPITAL ENCOUNTER (OUTPATIENT)
Dept: PHYSICAL THERAPY | Facility: HOSPITAL | Age: 77
Setting detail: THERAPIES SERIES
Discharge: HOME | End: 2022-08-17

## 2022-08-17 ENCOUNTER — HOSPITAL ENCOUNTER (OUTPATIENT)
Dept: MAMMOGRAPHY | Facility: HOSPITAL | Age: 77
Discharge: HOME | End: 2022-08-17
Admitting: INTERNAL MEDICINE

## 2022-08-17 DIAGNOSIS — Z17.0 MALIGNANT NEOPLASM OF UPPER-INNER QUADRANT OF LEFT BREAST IN FEMALE, ESTROGEN RECEPTOR POSITIVE: Primary | ICD-10-CM

## 2022-08-17 DIAGNOSIS — Z98.890 S/P LUMPECTOMY, LEFT BREAST: ICD-10-CM

## 2022-08-17 DIAGNOSIS — Z17.0 MALIGNANT NEOPLASM OF UPPER-INNER QUADRANT OF LEFT BREAST IN FEMALE, ESTROGEN RECEPTOR POSITIVE: ICD-10-CM

## 2022-08-17 DIAGNOSIS — C50.212 MALIGNANT NEOPLASM OF UPPER-INNER QUADRANT OF LEFT BREAST IN FEMALE, ESTROGEN RECEPTOR POSITIVE: ICD-10-CM

## 2022-08-17 DIAGNOSIS — C50.212 MALIGNANT NEOPLASM OF UPPER-INNER QUADRANT OF LEFT BREAST IN FEMALE, ESTROGEN RECEPTOR POSITIVE: Primary | ICD-10-CM

## 2022-08-17 DIAGNOSIS — Z91.89 AT RISK FOR LYMPHEDEMA: ICD-10-CM

## 2022-08-17 PROCEDURE — 93702 BIS XTRACELL FLUID ANALYSIS: CPT

## 2022-08-17 PROCEDURE — 77066 DX MAMMO INCL CAD BI: CPT

## 2022-08-17 PROCEDURE — 97110 THERAPEUTIC EXERCISES: CPT

## 2022-08-17 PROCEDURE — G0279 TOMOSYNTHESIS, MAMMO: HCPCS

## 2022-08-17 NOTE — TELEPHONE ENCOUNTER
[facility]  SURVIVORSHIP SERVICES CARE COORDINATION NOTE  PHONE      Call placed to patient RE: open referral to survivorship treatment summary visit.    Left voice mail for patient to return call to schedule.      Introduced myself and reviewed purpose and goals of survivorship treatment summary visit as well as what to expect..     name and contact information for RENO Salguero DNP., survivorship program informational brochure.    Patient encouraged to call the office at any point for additional information, resources or support.

## 2022-08-17 NOTE — THERAPY RE-EVALUATION
Physical Therapy Lymphedema Re-Evaluation  [facility]     Patient Name: Neela Ramirez  : 1945  MRN: [id number]  Today's Date: 2022      Visit Date: 2022    Visit Dx:    ICD-10-CM ICD-9-CM   1. Malignant neoplasm of upper-inner quadrant of left breast in female, estrogen receptor positive (HCC)  C50.212 174.2    Z17.0 V86.0   2. At risk for lymphedema  Z91.89 V49.89   3. S/P lumpectomy, left breast  Z98.890 V45.89       Patient Active Problem List   Diagnosis   • Anxiety   • DD (diverticular disease)   • Gastroesophageal reflux disease   • Essential hypertension   • Hyperlipidemia   • Osteopenia   • Bilateral low back pain without sciatica   • Malignant neoplasm of female breast (HCC)   • Encounter for management of implanted device        Past Medical History:   Diagnosis Date   • Anxiety    • Arthritis    • Cataract     EARLY. JIMMIE EYES   • Chronic back pain     LOW BACK ACHES AT TIME FROM ARTHRTIS   • Constipation     AT TIMES. NO RECENT ISSUES   • Dermatitis     LEGS. STATES CLEARING UP NOW.   • Disease of thyroid gland     HYPO   • Diverticulosis     Colonoscopy 2014   • Erythema of face     Transitory Erythema Of The Face   • GERD (gastroesophageal reflux disease)     ON OCC   • History of chemotherapy     FOR LEFT BREAST CANCER   • Hyperlipidemia    • Hypertension    • Malignant neoplasm of female breast (HCC) 2021   • Osteopenia    • Tachycardia         Past Surgical History:   Procedure Laterality Date   • BREAST BIOPSY  2021    Dr. Tirado    • BREAST CYST ASPIRATION Right    • BREAST CYST EXCISION Right    • BREAST LUMPECTOMY WITH SENTINEL NODE BIOPSY Left 2/10/2022    Procedure: LEFT BREAST WIRE LOCALIZED LUMPECTOMY WITH SENTINEL NODE BIOPSY, POSSIBLE LEFT AXILLARY DISSECTION;  Surgeon: Becky Liu MD;  Location: [facility] OR [facility] [place];  Service: General;  Laterality: Left;   • COLONOSCOPY     • COLONOSCOPY     • EXOSTECTOMY Bilateral     Simple Bunion Exostectomy  (Silver Procedure)   • FOOT TENDON SURGERY  2012   • VENOUS ACCESS DEVICE (PORT) INSERTION N/A 10/18/2021    Procedure: INSERTION VENOUS ACCESS DEVICE;  Surgeon: Becky Liu MD;  Location: [facility] OR [facility] [place];  Service: General;  Laterality: N/A;       Visit Dx:    ICD-10-CM ICD-9-CM   1. Malignant neoplasm of upper-inner quadrant of left breast in female, estrogen receptor positive (HCC)  C50.212 174.2    Z17.0 V86.0   2. At risk for lymphedema  Z91.89 V49.89   3. S/P lumpectomy, left breast  Z98.890 V45.89            Lymphedema     Row Name 08/17/22 1300             Subjective Pain    Able to rate subjective pain? yes  -LB      Pre-Treatment Pain Level 0  -LB      Subjective Pain Comment I occasionally have a crunching in my shoulder.  -LB              Subjective Comments    Subjective Comments I am doing well. I have finished radiation. I didn't wear my sleeve but I do have it with me today.  -LB              Lymphedema Assessment    Lymphedema Classification LUE:;at risk/stage 0  -LB      Lymphedema Cancer Related Sx left;lumpectomy;sentinel node biopsy  -LB      Lymph Nodes Removed # 10  -LB      Positive Lymph Nodes # 1  -LB      Chemo Received yes  -LB      Chemo Treatments #/Timeframe continued infusion every 3 weeks  -LB      Adverse Chemo Reactions/Complication fatigue, hair loss  -LB      Radiation Therapy Received yes  -LB      Radiation Treatments #/Timeframe completed  -LB      Infections or Cellulitis? no  -LB              L-Dex Bioimpedence Screening    L-Dex Measurement Extremity LUE  -LB      L-Dex Patient Position Standing  -LB      L-Dex UE Dominate Side Right  -LB      L-Dex UE At Risk Side Left  -LB      L-Dex UE Pre Surgical Value No  -LB      L-Dex UE Score 2.9  -LB      L-Dex UE Baseline Score 2.9  -LB      L-Dex UE Value Change 0  -LB      L-Dex UE Comment WNL; stable since last visit  -LB      $ L-Dex Charge yes  -LB            User Key  (r) = Recorded By, (t) = Taken By, (c) = Cosigned By  "   Initials Name Provider Type    Edna Abdalla, PT Physical Therapist                                Therapy Education  Education Details: discussed bioimpedance results and interpretation, issued HEP to address L scapular \"crunching\", discussed s/s of lymphedeman and steps to prevention  Given: Symptoms/condition management, Posture/body mechanics, Edema management, HEP, Mobility training  Program: Reinforced, New  How Provided: Verbal, Written, Demonstration  Provided to: Patient  Level of Understanding: Teach back education performed, Verbalized, Demonstrated       OP Exercises     Row Name 08/17/22 1300             Subjective Comments    Subjective Comments I am doing well. I have finished radiation. I didn't wear my sleeve but I do have it with me today.  -LB              Subjective Pain    Able to rate subjective pain? yes  -LB      Pre-Treatment Pain Level 0  -LB      Subjective Pain Comment I occasionally have a crunching in my shoulder.  -LB              Total Minutes    32633 - PT Therapeutic Exercise Minutes 15  -LB              Exercise 1    Exercise Name 1 shoulder rolls  -LB      Reps 1 10  -LB              Exercise 2    Exercise Name 2 scap retraction  -LB      Reps 2 10  -LB      Time 2 5  -LB              Exercise 3    Exercise Name 3 tband row  -LB      Sets 3 2  -LB      Reps 3 10  -LB      Time 3 RTB  -LB              Exercise 4    Exercise Name 4 SAP  -LB      Sets 4 2  -LB      Reps 4 10  -LB      Time 4 cuing for scapular retraction  -LB            User Key  (r) = Recorded By, (t) = Taken By, (c) = Cosigned By    Initials Name Provider Type    Edna Abdalla, PT Physical Therapist                             PT OP Goals     Row Name 08/17/22 1300          Long Term Goals    LTG Date to Achieve 05/06/22  -LB     LTG 1 Pt will maintain LDex bioimpedance score WNL.  -LB     LTG 1 Progress Ongoing  -LB     LTG 1 Progress Comments 2.9 today; stable and WNL  -LB     LTG 2 Pt will acquire " "appropriately fitted compression garment and verbalize appropriate wear schedule.  -LB     LTG 2 Progress Partially Met  -LB     LTG 2 Progress Comments Compression garment fits well; pt did not wear during radiation  -LB     LTG 3 Pt will verbalize signs and symptoms of lymphedema and steps to prevention.  -LB     LTG 3 Progress Ongoing  -LB     LTG 3 Progress Comments Reviewed today  -LB           User Key  (r) = Recorded By, (t) = Taken By, (c) = Cosigned By    Initials Name Provider Type    Edna Abdalla, PT Physical Therapist                 PT Assessment/Plan     Row Name 08/17/22 4662          PT Assessment    Functional Limitations Other (comment)  at risk for lymphedema  -LB     Impairments Impaired flexibility;Impaired lymphatic circulation;Sensation  -LB     Assessment Comments Pt returns for first follow up since evaluation reporting good tolerance to now completed radiation with some skin peeling but otherwise good recovery. ROM remains WNL. She states she does have occasional \"crunching\" L shoulder blade and on examination L shoulder blade does demonstrate inc winging compared to R. Issued HEP to address dec L scapular strength and winging. Reviewed s/s of lymphedema and steps to prevention. Pt has compression garment with her, we tried it on today and it fits well. She states she did not wear it during radiation. Repeated bioimpedance today with score remaining stable and WNL. She remains appropriate for continued skilled PT services to continue to monitor for bioimpedance and address L shoulder complaints as needed. Follow up in 3 months unless symptoms present.  -LB            PT Plan    PT Plan Comments repeat bioimpedance, has L shoulder \"crunching\" improved?, radiation skincare, consider bra fitting at this time if pt wishes  -LB           User Key  (r) = Recorded By, (t) = Taken By, (c) = Cosigned By    Initials Name Provider Type    Edna Abdalla, PT Physical Therapist                    "          Time Calculation:   Start Time: 1315  Stop Time: 1345  Time Calculation (min): 30 min  Total Timed Code Minutes- PT: 15 minute(s)  Timed Charges  38095 - PT Therapeutic Exercise Minutes: 15  Total Minutes  Timed Charges Total Minutes: 15   Total Minutes: 15   Therapy Charges for Today     Code Description Service Date Service Provider Modifiers Qty    47630488488 HC PT BIS XTRACELL FLUID ANALYSIS 8/17/2022 Edna Condon, PT  1    54523071093 HC PT THER PROC EA 15 MIN 8/17/2022 Edna Condon, PT GP 1                    Edna Condon, PT  8/17/2022

## 2022-08-18 ENCOUNTER — HOSPITAL ENCOUNTER (OUTPATIENT)
Dept: CARDIOLOGY | Facility: HOSPITAL | Age: 77
Discharge: HOME | End: 2022-08-18
Admitting: INTERNAL MEDICINE

## 2022-08-18 VITALS
SYSTOLIC BLOOD PRESSURE: 120 MMHG | BODY MASS INDEX: 22.09 KG/M2 | WEIGHT: 117 LBS | HEART RATE: 64 BPM | HEIGHT: 61 IN | DIASTOLIC BLOOD PRESSURE: 60 MMHG

## 2022-08-18 DIAGNOSIS — Z51.11 ENCOUNTER FOR ANTINEOPLASTIC CHEMOTHERAPY: ICD-10-CM

## 2022-08-18 DIAGNOSIS — Z17.0 MALIGNANT NEOPLASM OF UPPER-INNER QUADRANT OF LEFT BREAST IN FEMALE, ESTROGEN RECEPTOR POSITIVE: ICD-10-CM

## 2022-08-18 DIAGNOSIS — C50.212 MALIGNANT NEOPLASM OF UPPER-INNER QUADRANT OF LEFT BREAST IN FEMALE, ESTROGEN RECEPTOR POSITIVE: ICD-10-CM

## 2022-08-18 PROCEDURE — 93356 MYOCRD STRAIN IMG SPCKL TRCK: CPT

## 2022-08-18 PROCEDURE — 93356 MYOCRD STRAIN IMG SPCKL TRCK: CPT | Performed by: INTERNAL MEDICINE

## 2022-08-18 PROCEDURE — 25010000002 PERFLUTREN (DEFINITY) 8.476 MG IN SODIUM CHLORIDE (PF) 0.9 % 10 ML INJECTION: Performed by: INTERNAL MEDICINE

## 2022-08-18 PROCEDURE — 76376 3D RENDER W/INTRP POSTPROCES: CPT | Performed by: INTERNAL MEDICINE

## 2022-08-18 PROCEDURE — 93306 TTE W/DOPPLER COMPLETE: CPT | Performed by: INTERNAL MEDICINE

## 2022-08-18 PROCEDURE — 93306 TTE W/DOPPLER COMPLETE: CPT

## 2022-08-18 RX ADMIN — PERFLUTREN 1.5 ML: 6.52 INJECTION, SUSPENSION INTRAVENOUS at 15:41

## 2022-08-19 ENCOUNTER — TELEPHONE (OUTPATIENT)
Dept: CARDIOLOGY | Facility: CLINIC | Age: 77
End: 2022-08-19

## 2022-08-19 LAB
ASCENDING AORTA: 2.1 CM
BH CV ECHO LEFT VENTRICLE GLOBAL LONGITUDINAL STRAIN: -22 %
BH CV ECHO MEAS - ACS: 1.53 CM
BH CV ECHO MEAS - AI P1/2T: 409.4 MSEC
BH CV ECHO MEAS - AO MAX PG: 7.2 MMHG
BH CV ECHO MEAS - AO MEAN PG: 4.3 MMHG
BH CV ECHO MEAS - AO ROOT DIAM: 2.8 CM
BH CV ECHO MEAS - AO V2 MAX: 134.4 CM/SEC
BH CV ECHO MEAS - AO V2 VTI: 23.6 CM
BH CV ECHO MEAS - AVA(I,D): 1.78 CM2
BH CV ECHO MEAS - EDV(CUBED): 53 ML
BH CV ECHO MEAS - EDV(MOD-SP2): 89 ML
BH CV ECHO MEAS - EDV(MOD-SP4): 101 ML
BH CV ECHO MEAS - EF(MOD-SP2): 60.7 %
BH CV ECHO MEAS - EF(MOD-SP4): 62.4 %
BH CV ECHO MEAS - EF_3D-VOL: 55 %
BH CV ECHO MEAS - ESV(CUBED): 14.4 ML
BH CV ECHO MEAS - ESV(MOD-SP2): 35 ML
BH CV ECHO MEAS - ESV(MOD-SP4): 38 ML
BH CV ECHO MEAS - FS: 35.2 %
BH CV ECHO MEAS - IVS/LVPW: 1.06 CM
BH CV ECHO MEAS - IVSD: 0.95 CM
BH CV ECHO MEAS - LAT PEAK E' VEL: 8.6 CM/SEC
BH CV ECHO MEAS - LV DIASTOLIC VOL/BSA (35-75): 68 CM2
BH CV ECHO MEAS - LV MASS(C)D: 102.3 GRAMS
BH CV ECHO MEAS - LV MAX PG: 2.7 MMHG
BH CV ECHO MEAS - LV MEAN PG: 1.6 MMHG
BH CV ECHO MEAS - LV SYSTOLIC VOL/BSA (12-30): 25.6 CM2
BH CV ECHO MEAS - LV V1 MAX: 81.4 CM/SEC
BH CV ECHO MEAS - LV V1 VTI: 14.5 CM
BH CV ECHO MEAS - LVIDD: 3.8 CM
BH CV ECHO MEAS - LVIDS: 2.44 CM
BH CV ECHO MEAS - LVOT AREA: 2.9 CM2
BH CV ECHO MEAS - LVOT DIAM: 1.92 CM
BH CV ECHO MEAS - LVPWD: 0.89 CM
BH CV ECHO MEAS - MED PEAK E' VEL: 7.2 CM/SEC
BH CV ECHO MEAS - MV A DUR: 0.15 SEC
BH CV ECHO MEAS - MV A MAX VEL: 84.4 CM/SEC
BH CV ECHO MEAS - MV DEC SLOPE: 313.6 CM/SEC2
BH CV ECHO MEAS - MV DEC TIME: 0.14 MSEC
BH CV ECHO MEAS - MV E MAX VEL: 58.7 CM/SEC
BH CV ECHO MEAS - MV E/A: 0.7
BH CV ECHO MEAS - MV MAX PG: 2.47 MMHG
BH CV ECHO MEAS - MV MEAN PG: 1.25 MMHG
BH CV ECHO MEAS - MV P1/2T: 46.2 MSEC
BH CV ECHO MEAS - MV V2 VTI: 11.6 CM
BH CV ECHO MEAS - MVA(P1/2T): 4.8 CM2
BH CV ECHO MEAS - MVA(VTI): 3.6 CM2
BH CV ECHO MEAS - PA ACC TIME: 0.08 SEC
BH CV ECHO MEAS - PA PR(ACCEL): 41 MMHG
BH CV ECHO MEAS - PA V2 MAX: 81.4 CM/SEC
BH CV ECHO MEAS - PULM A REVS DUR: 0.15 SEC
BH CV ECHO MEAS - PULM A REVS VEL: 32.1 CM/SEC
BH CV ECHO MEAS - PULM DIAS VEL: 31.3 CM/SEC
BH CV ECHO MEAS - PULM S/D: 1.68
BH CV ECHO MEAS - PULM SYS VEL: 52.7 CM/SEC
BH CV ECHO MEAS - QP/QS: 0.79
BH CV ECHO MEAS - RAP SYSTOLE: 3 MMHG
BH CV ECHO MEAS - RV MAX PG: 1.42 MMHG
BH CV ECHO MEAS - RV V1 MAX: 59.6 CM/SEC
BH CV ECHO MEAS - RV V1 VTI: 11.3 CM
BH CV ECHO MEAS - RVOT DIAM: 1.93 CM
BH CV ECHO MEAS - RVSP: 22.6 MMHG
BH CV ECHO MEAS - SI(MOD-SP2): 36.3 ML/M2
BH CV ECHO MEAS - SI(MOD-SP4): 42.4 ML/M2
BH CV ECHO MEAS - SV(LVOT): 41.9 ML
BH CV ECHO MEAS - SV(MOD-SP2): 54 ML
BH CV ECHO MEAS - SV(MOD-SP4): 63 ML
BH CV ECHO MEAS - SV(RVOT): 33.2 ML
BH CV ECHO MEAS - TAPSE (>1.6): 1.52 CM
BH CV ECHO MEAS - TR MAX PG: 19.6 MMHG
BH CV ECHO MEAS - TR MAX VEL: 221.3 CM/SEC
BH CV ECHO MEASUREMENTS AVERAGE E/E' RATIO: 7.43
BH CV XLRA - RV BASE: 3.1 CM
BH CV XLRA - RV LENGTH: 5.8 CM
BH CV XLRA - RV MID: 2.46 CM
BH CV XLRA - TDI S': 17.2 CM/SEC
LEFT ATRIUM VOLUME INDEX: 17.9 ML/M2
LV EF 2D ECHO EST: 55 %
MAXIMAL PREDICTED HEART RATE: 143 BPM
SINUS: 2.48 CM
STJ: 1.96 CM
STRESS TARGET HR: 122 BPM

## 2022-08-19 NOTE — PROGRESS NOTES
Please let her know that echocardiogram is stable; unchanged compared to last echo. No changes for now.     Thanks!  Hannah Kaiser APRN

## 2022-08-24 ENCOUNTER — DOCUMENTATION (OUTPATIENT)
Dept: RADIATION ONCOLOGY | Facility: HOSPITAL | Age: 77
End: 2022-08-24

## 2022-08-24 DIAGNOSIS — C50.011 MALIGNANT NEOPLASM OF NIPPLE OF RIGHT BREAST IN FEMALE, UNSPECIFIED ESTROGEN RECEPTOR STATUS: Primary | ICD-10-CM

## 2022-08-24 NOTE — PROGRESS NOTES
Subjective     Referring: Dr. Hailey Liu    Cancer Staging   cT2N0 left breast cancer, [id number] ER NC pos er 2 neg    Dear Dr. Liu:    I am writing to let you know Neela Ramirez has recently completed the radiation therapy portion of treatment for left breast cancer.  Her treatment course is summarized below:    Site Treated:   Left breast, axilla and supraclavicular fossa      Dates Of Treatment:  June 7, 2022- July 19, 2022    Intent:  Curative/post op    Total Dose and Treatment Technique:    She received a total dose of 5000cgy in 25 fractions to the left breast delivered with tangential fields using 6 and 18 mv photons.  She received a total dose of 5000cgy in 25 fractions to the left axilla and supraclavicular nodes delivered with an AP field using 6 and 18 MV photons. She then received a boost to the tumor bed for an additional 1000cgy in 5 fractions using      Patient Tolerance and Response to Treatment:     She tolerated her  treatments well.  She had no breaks in the course of treatment.  She developed mild to moderate erythema in the treatment area and mild fatigue near the completion  She completed in 42 elapsed days      Status of Tumor/Patient at Completion of Therapy:  No evidence of disease.       Disposition:  Follow up 4 weeks or sooner if necessary.  I have also placed a referral to the Survivorship clinic.

## 2022-08-25 ENCOUNTER — TELEPHONE (OUTPATIENT)
Dept: ONCOLOGY | Facility: CLINIC | Age: 77
End: 2022-08-25

## 2022-08-25 NOTE — TELEPHONE ENCOUNTER
----- Message from Delma Campos RN sent at 2022 11:06 AM EDT -----  Would not do mammo early, but can delay dexa to february.  Do you need these orders?    ----- Message -----  From: Olamide Jenkins  Sent: 2022  10:16 AM EDT  To: Delma Campos RN    This pt is not due for her Dexa until after 08-10-22, I need a new order because the one in there will be . Also she is due for her mammogram in Feb. Is is okay to  move the mammogram from Feb to August? Just let me know. Thanks, Olamide   ----- Message -----  From: Mesha Bar RegSched Rep  Sent: 2022  10:02 AM EDT  To: Delma Yo, RN     SPOKE TO PATIENT. WE NEED A UPDATED ORDER, PATIENT WOULD LIKE TO SCHEDULE BONE DENSITY AND HER MAMMOGRAM TOGETHER FOR NEXT YEAR       Delma,    I don't know if this is something we would do this far out - but patient is requesting her dexa/mammo be scheduled together for ? Please advise - thank you -Shannan Mejia in the [facility] [place] missing info w q

## 2022-08-26 ENCOUNTER — RESEARCH ENCOUNTER (OUTPATIENT)
Dept: CARDIOLOGY | Facility: CLINIC | Age: 77
End: 2022-08-26

## 2022-08-26 ENCOUNTER — INFUSION (OUTPATIENT)
Dept: ONCOLOGY | Facility: HOSPITAL | Age: 77
End: 2022-08-26

## 2022-08-26 VITALS
SYSTOLIC BLOOD PRESSURE: 135 MMHG | BODY MASS INDEX: 22.51 KG/M2 | WEIGHT: 117.6 LBS | HEART RATE: 107 BPM | DIASTOLIC BLOOD PRESSURE: 81 MMHG | RESPIRATION RATE: 16 BRPM | TEMPERATURE: 98 F | OXYGEN SATURATION: 94 %

## 2022-08-26 DIAGNOSIS — Z45.9 ENCOUNTER FOR MANAGEMENT OF IMPLANTED DEVICE: ICD-10-CM

## 2022-08-26 DIAGNOSIS — C50.212 MALIGNANT NEOPLASM OF UPPER-INNER QUADRANT OF LEFT BREAST IN FEMALE, ESTROGEN RECEPTOR POSITIVE: Primary | ICD-10-CM

## 2022-08-26 DIAGNOSIS — Z17.0 MALIGNANT NEOPLASM OF UPPER-INNER QUADRANT OF LEFT BREAST IN FEMALE, ESTROGEN RECEPTOR POSITIVE: Primary | ICD-10-CM

## 2022-08-26 LAB
ALBUMIN SERPL-MCNC: 3.9 G/DL (ref 3.5–5.2)
ALBUMIN/GLOB SERPL: 1.3 G/DL (ref 1.1–2.4)
ALP SERPL-CCNC: 123 U/L (ref 38–116)
ALT SERPL W P-5'-P-CCNC: 33 U/L (ref 0–33)
ANION GAP SERPL CALCULATED.3IONS-SCNC: 11.5 MMOL/L (ref 5–15)
AST SERPL-CCNC: 35 U/L (ref 0–32)
BASOPHILS # BLD AUTO: 0.04 10*3/MM3 (ref 0–0.2)
BASOPHILS NFR BLD AUTO: 0.7 % (ref 0–1.5)
BILIRUB SERPL-MCNC: 0.4 MG/DL (ref 0.2–1.2)
BUN SERPL-MCNC: 14 MG/DL (ref 6–20)
BUN/CREAT SERPL: 18.4 (ref 7.3–30)
CALCIUM SPEC-SCNC: 9.4 MG/DL (ref 8.5–10.2)
CHLORIDE SERPL-SCNC: 104 MMOL/L (ref 98–107)
CO2 SERPL-SCNC: 22.5 MMOL/L (ref 22–29)
CREAT SERPL-MCNC: 0.76 MG/DL (ref 0.6–1.1)
DEPRECATED RDW RBC AUTO: 44.2 FL (ref 37–54)
EGFRCR SERPLBLD CKD-EPI 2021: 80.8 ML/MIN/1.73
EOSINOPHIL # BLD AUTO: 0.25 10*3/MM3 (ref 0–0.4)
EOSINOPHIL NFR BLD AUTO: 4.3 % (ref 0.3–6.2)
ERYTHROCYTE [DISTWIDTH] IN BLOOD BY AUTOMATED COUNT: 11.9 % (ref 12.3–15.4)
GLOBULIN UR ELPH-MCNC: 3 GM/DL (ref 1.8–3.5)
GLUCOSE SERPL-MCNC: 107 MG/DL (ref 74–124)
HCT VFR BLD AUTO: 35.9 % (ref 34–46.6)
HGB BLD-MCNC: 12.1 G/DL (ref 12–15.9)
IMM GRANULOCYTES # BLD AUTO: 0.02 10*3/MM3 (ref 0–0.05)
IMM GRANULOCYTES NFR BLD AUTO: 0.3 % (ref 0–0.5)
LYMPHOCYTES # BLD AUTO: 0.58 10*3/MM3 (ref 0.7–3.1)
LYMPHOCYTES NFR BLD AUTO: 9.9 % (ref 19.6–45.3)
MCH RBC QN AUTO: 34.1 PG (ref 26.6–33)
MCHC RBC AUTO-ENTMCNC: 33.7 G/DL (ref 31.5–35.7)
MCV RBC AUTO: 101.1 FL (ref 79–97)
MONOCYTES # BLD AUTO: 0.72 10*3/MM3 (ref 0.1–0.9)
MONOCYTES NFR BLD AUTO: 12.3 % (ref 5–12)
NEUTROPHILS NFR BLD AUTO: 4.26 10*3/MM3 (ref 1.7–7)
NEUTROPHILS NFR BLD AUTO: 72.5 % (ref 42.7–76)
NRBC BLD AUTO-RTO: 0 /100 WBC (ref 0–0.2)
PLATELET # BLD AUTO: 238 10*3/MM3 (ref 140–450)
PMV BLD AUTO: 8.9 FL (ref 6–12)
POTASSIUM SERPL-SCNC: 4.1 MMOL/L (ref 3.5–4.7)
PROT SERPL-MCNC: 6.9 G/DL (ref 6.3–8)
RBC # BLD AUTO: 3.55 10*6/MM3 (ref 3.77–5.28)
SODIUM SERPL-SCNC: 138 MMOL/L (ref 134–145)
WBC NRBC COR # BLD: 5.87 10*3/MM3 (ref 3.4–10.8)

## 2022-08-26 PROCEDURE — 25010000002 HEPARIN LOCK FLUSH PER 10 UNITS: Performed by: INTERNAL MEDICINE

## 2022-08-26 PROCEDURE — 80053 COMPREHEN METABOLIC PANEL: CPT

## 2022-08-26 PROCEDURE — 96413 CHEMO IV INFUSION 1 HR: CPT

## 2022-08-26 PROCEDURE — 25010000002 ADO-TRASTUZUMAB 100 MG RECONSTITUTED SOLUTION 1 EACH VIAL: Performed by: NURSE PRACTITIONER

## 2022-08-26 PROCEDURE — 96375 TX/PRO/DX INJ NEW DRUG ADDON: CPT

## 2022-08-26 PROCEDURE — 25010000002 DEXAMETHASONE SODIUM PHOSPHATE 100 MG/10ML SOLUTION: Performed by: NURSE PRACTITIONER

## 2022-08-26 PROCEDURE — 85025 COMPLETE CBC W/AUTO DIFF WBC: CPT

## 2022-08-26 RX ORDER — SODIUM CHLORIDE 0.9 % (FLUSH) 0.9 %
10 SYRINGE (ML) INJECTION AS NEEDED
Status: DISCONTINUED | OUTPATIENT
Start: 2022-08-26 | End: 2022-08-26 | Stop reason: HOSPADM

## 2022-08-26 RX ORDER — HEPARIN SODIUM (PORCINE) LOCK FLUSH IV SOLN 100 UNIT/ML 100 UNIT/ML
500 SOLUTION INTRAVENOUS AS NEEDED
Status: DISCONTINUED | OUTPATIENT
Start: 2022-08-26 | End: 2022-08-26 | Stop reason: HOSPADM

## 2022-08-26 RX ORDER — SODIUM CHLORIDE 0.9 % (FLUSH) 0.9 %
10 SYRINGE (ML) INJECTION AS NEEDED
Status: CANCELLED | OUTPATIENT
Start: 2022-08-26

## 2022-08-26 RX ORDER — SODIUM CHLORIDE 9 MG/ML
250 INJECTION, SOLUTION INTRAVENOUS ONCE
Status: COMPLETED | OUTPATIENT
Start: 2022-08-26 | End: 2022-08-26

## 2022-08-26 RX ORDER — HEPARIN SODIUM (PORCINE) LOCK FLUSH IV SOLN 100 UNIT/ML 100 UNIT/ML
500 SOLUTION INTRAVENOUS AS NEEDED
Status: CANCELLED | OUTPATIENT
Start: 2022-08-26

## 2022-08-26 RX ADMIN — ADO-TRASTUZUMAB EMTANSINE 195 MG: 20 INJECTION, POWDER, LYOPHILIZED, FOR SOLUTION INTRAVENOUS at 11:15

## 2022-08-26 RX ADMIN — SODIUM CHLORIDE 250 ML: 9 INJECTION, SOLUTION INTRAVENOUS at 10:50

## 2022-08-26 RX ADMIN — DEXAMETHASONE SODIUM PHOSPHATE 12 MG: 10 INJECTION, SOLUTION INTRAMUSCULAR; INTRAVENOUS at 10:51

## 2022-08-26 RX ADMIN — Medication 500 UNITS: at 11:50

## 2022-08-26 RX ADMIN — Medication 10 ML: at 11:50

## 2022-08-26 NOTE — RESEARCH
[id number] (CompassHER2 pCR): Preoperative THP and postoperative HP in patients who achieve a pathologic complete response Part 1 Component of: The CompassHER2 Trials (COMprehensive use of Pathologic response ASSessment to optimize therapy in HER2-positive breast cancer)     She is here today for her infusion and visit with MD.  It is also a 3 month follow-up for the study.  She has no complaints and no evidence of progression. Her hair is growing back.  Her  is with her and is very supportive.  Will continue to follow closely.     ECOG=0

## 2022-08-29 ENCOUNTER — APPOINTMENT (OUTPATIENT)
Dept: BONE DENSITY | Facility: HOSPITAL | Age: 77
End: 2022-08-29

## 2022-09-16 ENCOUNTER — OFFICE VISIT (OUTPATIENT)
Dept: ONCOLOGY | Facility: CLINIC | Age: 77
End: 2022-09-16

## 2022-09-16 ENCOUNTER — INFUSION (OUTPATIENT)
Dept: ONCOLOGY | Facility: HOSPITAL | Age: 77
End: 2022-09-16

## 2022-09-16 ENCOUNTER — TELEPHONE (OUTPATIENT)
Dept: CARDIOLOGY | Facility: CLINIC | Age: 77
End: 2022-09-16

## 2022-09-16 VITALS
TEMPERATURE: 97.7 F | HEART RATE: 89 BPM | OXYGEN SATURATION: 95 % | RESPIRATION RATE: 16 BRPM | SYSTOLIC BLOOD PRESSURE: 120 MMHG | BODY MASS INDEX: 23.44 KG/M2 | DIASTOLIC BLOOD PRESSURE: 79 MMHG | WEIGHT: 119.4 LBS | HEIGHT: 60 IN

## 2022-09-16 DIAGNOSIS — C50.212 MALIGNANT NEOPLASM OF UPPER-INNER QUADRANT OF LEFT BREAST IN FEMALE, ESTROGEN RECEPTOR POSITIVE: ICD-10-CM

## 2022-09-16 DIAGNOSIS — Z17.0 MALIGNANT NEOPLASM OF UPPER-INNER QUADRANT OF LEFT BREAST IN FEMALE, ESTROGEN RECEPTOR POSITIVE: Primary | ICD-10-CM

## 2022-09-16 DIAGNOSIS — Z45.9 ENCOUNTER FOR MANAGEMENT OF IMPLANTED DEVICE: ICD-10-CM

## 2022-09-16 DIAGNOSIS — Z17.0 MALIGNANT NEOPLASM OF UPPER-INNER QUADRANT OF LEFT BREAST IN FEMALE, ESTROGEN RECEPTOR POSITIVE: ICD-10-CM

## 2022-09-16 DIAGNOSIS — C50.212 MALIGNANT NEOPLASM OF UPPER-INNER QUADRANT OF LEFT BREAST IN FEMALE, ESTROGEN RECEPTOR POSITIVE: Primary | ICD-10-CM

## 2022-09-16 LAB
ALBUMIN SERPL-MCNC: 3.2 G/DL (ref 3.5–5.2)
ALBUMIN/GLOB SERPL: 1.1 G/DL (ref 1.1–2.4)
ALP SERPL-CCNC: 112 U/L (ref 38–116)
ALT SERPL W P-5'-P-CCNC: 27 U/L (ref 0–33)
ANION GAP SERPL CALCULATED.3IONS-SCNC: 10.7 MMOL/L (ref 5–15)
AST SERPL-CCNC: 37 U/L (ref 0–32)
BASOPHILS # BLD AUTO: 0.03 10*3/MM3 (ref 0–0.2)
BASOPHILS NFR BLD AUTO: 0.5 % (ref 0–1.5)
BILIRUB SERPL-MCNC: 0.2 MG/DL (ref 0.2–1.2)
BUN SERPL-MCNC: 13 MG/DL (ref 6–20)
BUN/CREAT SERPL: 19.4 (ref 7.3–30)
CALCIUM SPEC-SCNC: 8.4 MG/DL (ref 8.5–10.2)
CHLORIDE SERPL-SCNC: 108 MMOL/L (ref 98–107)
CO2 SERPL-SCNC: 20.3 MMOL/L (ref 22–29)
CREAT SERPL-MCNC: 0.67 MG/DL (ref 0.6–1.1)
DEPRECATED RDW RBC AUTO: 46.4 FL (ref 37–54)
EGFRCR SERPLBLD CKD-EPI 2021: 90.2 ML/MIN/1.73
EOSINOPHIL # BLD AUTO: 0.29 10*3/MM3 (ref 0–0.4)
EOSINOPHIL NFR BLD AUTO: 5 % (ref 0.3–6.2)
ERYTHROCYTE [DISTWIDTH] IN BLOOD BY AUTOMATED COUNT: 12.6 % (ref 12.3–15.4)
GLOBULIN UR ELPH-MCNC: 2.8 GM/DL (ref 1.8–3.5)
GLUCOSE SERPL-MCNC: 102 MG/DL (ref 74–124)
HCT VFR BLD AUTO: 35.5 % (ref 34–46.6)
HGB BLD-MCNC: 11.6 G/DL (ref 12–15.9)
IMM GRANULOCYTES # BLD AUTO: 0.02 10*3/MM3 (ref 0–0.05)
IMM GRANULOCYTES NFR BLD AUTO: 0.3 % (ref 0–0.5)
LYMPHOCYTES # BLD AUTO: 0.74 10*3/MM3 (ref 0.7–3.1)
LYMPHOCYTES NFR BLD AUTO: 12.7 % (ref 19.6–45.3)
MCH RBC QN AUTO: 33 PG (ref 26.6–33)
MCHC RBC AUTO-ENTMCNC: 32.7 G/DL (ref 31.5–35.7)
MCV RBC AUTO: 100.9 FL (ref 79–97)
MONOCYTES # BLD AUTO: 0.94 10*3/MM3 (ref 0.1–0.9)
MONOCYTES NFR BLD AUTO: 16.2 % (ref 5–12)
NEUTROPHILS NFR BLD AUTO: 3.8 10*3/MM3 (ref 1.7–7)
NEUTROPHILS NFR BLD AUTO: 65.3 % (ref 42.7–76)
NRBC BLD AUTO-RTO: 0 /100 WBC (ref 0–0.2)
PLATELET # BLD AUTO: 259 10*3/MM3 (ref 140–450)
PMV BLD AUTO: 9 FL (ref 6–12)
POTASSIUM SERPL-SCNC: 3.3 MMOL/L (ref 3.5–4.7)
PROT SERPL-MCNC: 6 G/DL (ref 6.3–8)
RBC # BLD AUTO: 3.52 10*6/MM3 (ref 3.77–5.28)
SODIUM SERPL-SCNC: 139 MMOL/L (ref 134–145)
WBC NRBC COR # BLD: 5.82 10*3/MM3 (ref 3.4–10.8)

## 2022-09-16 PROCEDURE — 25010000002 DEXAMETHASONE SODIUM PHOSPHATE 100 MG/10ML SOLUTION: Performed by: INTERNAL MEDICINE

## 2022-09-16 PROCEDURE — 96413 CHEMO IV INFUSION 1 HR: CPT

## 2022-09-16 PROCEDURE — 85025 COMPLETE CBC W/AUTO DIFF WBC: CPT

## 2022-09-16 PROCEDURE — 99214 OFFICE O/P EST MOD 30 MIN: CPT | Performed by: INTERNAL MEDICINE

## 2022-09-16 PROCEDURE — 96375 TX/PRO/DX INJ NEW DRUG ADDON: CPT

## 2022-09-16 PROCEDURE — 80053 COMPREHEN METABOLIC PANEL: CPT

## 2022-09-16 PROCEDURE — 25010000002 HEPARIN LOCK FLUSH PER 10 UNITS: Performed by: INTERNAL MEDICINE

## 2022-09-16 PROCEDURE — 25010000002 ADO-TRASTUZUMAB 100 MG RECONSTITUTED SOLUTION 1 EACH VIAL: Performed by: INTERNAL MEDICINE

## 2022-09-16 RX ORDER — SODIUM CHLORIDE 9 MG/ML
250 INJECTION, SOLUTION INTRAVENOUS ONCE
Status: CANCELLED | OUTPATIENT
Start: 2022-09-16

## 2022-09-16 RX ORDER — SODIUM CHLORIDE 0.9 % (FLUSH) 0.9 %
10 SYRINGE (ML) INJECTION AS NEEDED
Status: CANCELLED | OUTPATIENT
Start: 2022-09-16

## 2022-09-16 RX ORDER — HEPARIN SODIUM (PORCINE) LOCK FLUSH IV SOLN 100 UNIT/ML 100 UNIT/ML
500 SOLUTION INTRAVENOUS AS NEEDED
Status: DISCONTINUED | OUTPATIENT
Start: 2022-09-16 | End: 2022-09-16 | Stop reason: HOSPADM

## 2022-09-16 RX ORDER — SODIUM CHLORIDE 0.9 % (FLUSH) 0.9 %
10 SYRINGE (ML) INJECTION AS NEEDED
Status: DISCONTINUED | OUTPATIENT
Start: 2022-09-16 | End: 2022-09-16 | Stop reason: HOSPADM

## 2022-09-16 RX ORDER — SODIUM CHLORIDE 9 MG/ML
250 INJECTION, SOLUTION INTRAVENOUS ONCE
Status: COMPLETED | OUTPATIENT
Start: 2022-09-16 | End: 2022-09-16

## 2022-09-16 RX ORDER — HEPARIN SODIUM (PORCINE) LOCK FLUSH IV SOLN 100 UNIT/ML 100 UNIT/ML
500 SOLUTION INTRAVENOUS AS NEEDED
Status: CANCELLED | OUTPATIENT
Start: 2022-09-16

## 2022-09-16 RX ADMIN — Medication 500 UNITS: at 11:33

## 2022-09-16 RX ADMIN — SODIUM CHLORIDE 250 ML: 9 INJECTION, SOLUTION INTRAVENOUS at 10:44

## 2022-09-16 RX ADMIN — DEXAMETHASONE SODIUM PHOSPHATE 12 MG: 10 INJECTION, SOLUTION INTRAMUSCULAR; INTRAVENOUS at 10:45

## 2022-09-16 RX ADMIN — Medication 10 ML: at 11:32

## 2022-09-16 RX ADMIN — ADO-TRASTUZUMAB EMTANSINE 195 MG: 20 INJECTION, POWDER, LYOPHILIZED, FOR SOLUTION INTRAVENOUS at 11:00

## 2022-09-16 NOTE — PROGRESS NOTES
Subjective   Neela Ramirez is a 77 y.o. female.  Referred by Dr. 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  A bilateral diagnostic mammogram was performed for further evaluation of the same.    8/17/2021-bilateral diagnostic mammogram-scattered fibroglandular densities throughout both breasts.  In the posterior one third upper inner quadrant of the left breast at the site of palpable concern, 11 o'clock position there is a mass with adjacent pleomorphic calcifications.  The mass and the calcifications measure on order of 1.5 cm in greatest dimension.  No evidence of axillary lymphadenopathy appreciated.  Stable microcalcifications seen in other areas of the left breast on right breast.  Ultrasound-at 11 o'clock position, 5 cm from the nipple in the left breast there is an irregular hypoechoic mass which measures 2.4 x 2 x 1.6 cm.    Impression  1.irregular 2.4 cm mass in the left breast at the site of palpable concern at 11:00, 5 cm from the nipple.  Ultrasound-guided biopsy of the mass recommended  No finding suspicious for malignancy in the right breast    9/8/2021-left breast ultrasound-guided biopsy  Pathology consistent with invasive ductal carcinoma with lobular features.  Grade 3.  The carcinoma measures 7 mm in greatest dimension.  Focally suspicious for lymphovascular space invasion.  ER low +1-10%, intensity week  HI negative  HER-2 3+ on immunohistochemistry  Ki-67 70%    MRI of the breast 10/8/2021-Biopsy-proven malignancy in the left breast at 11:00 measuring 2.8 cm with a centrally located metallic clip.  No other suspicious findings are seen within the left breast or no left axillary lymphadenopathy.  No suspicious findings of the right breast    Echocardiogram 10/8/2021 was normal  ejection fraction of 56 to 60% and strain of -20    She denies any new bone pains, dyspnea, cough, abdominal pain nausea vomiting or recent changes in weight.    She had 2 previous breast aspirations in her 20s.  No other breast biopsies  She does not know much about her family hence limited family history.    She received neoadjuvant chemotherapy on EA 1181 trial with Taxol Perjeta and Herceptin.  She completed 12 weeks of Taxol Herceptin and Perjeta.    Had an abnormal EKG preop and hence a stress test was performed on 2/3/2022 which showed a normal left ventricular ejection fraction and LVEF at stress was 73%.  Considered a low risk study with normal myocardial perfusion imaging.    She underwent a left breast lumpectomy and a sentinel lymph node biopsy on 2/10/2022.    Pathology was consistent with residual tumor which was pleomorphic invasive lobular carcinoma, poorly differentiated, grade 3  Tumor measured 18 mm  Margins negative for invasive carcinoma  Associated lobular carcinoma in situ noted  1 out of 10 lymph nodes was positive for metastatic focus measuring 3.5 mm.    Receptors were repeated on the pleomorphic invasive lobular carcinoma  ER +91 to 100% strong  VT +61 to 70% moderate  HER-2 negative  Ki-67 55%    Receptors performed on the metastatic lymph node  ER +81-90% strong  VT negative  HER-2 2+ on immunohistochemistry, FISH pending.    ypT1 cN1 aM0    2/28/2022-CT of the chest abdomen and pelvis  Fluid collection in the upper left breast measuring 6 x 2.5 x 4.9 cm, postoperative seroma.  Skin thickening of the left breast likely postsurgical.  Fluid collections left axilla measuring 2.9 x 1.4 x 2.9 cm.  No other suspicious abnormalities on the CT of the chest  CT abdomen with no evidence of metastatic disease.  Mild colonic diverticulosis.  Small uterine fibroid.    3/1/2022-bone scan  No evidence of metastatic disease.  Multifocal degenerative arthritic uptake without scintigraphic evidence to  suggest metastatic disease.    3/1/2022-echocardiogram  Left ventricular ejection fraction 56-60%.  Normal global LV strain of -20.5%.    3/11/2022-cycle 1 of AC    Completed 4 cycles of AC    6/3/2022-cycle 1 Kadcyla    7/19/2022-completed adjuvant radiation    7/20/2022-started adjuvant anastrozole    Interval history  Ms. Ramirez presents to the clinic today for follow-up.  She reports some discomfort in the left axilla but no obvious masses.  Tolerating Kadcyla well.  No neuropathy.  No extra bleeding or bruising.  She has been compliant with anastrozole.  Reports some fatigue.  She has questions regarding COVID-19 vaccinations.    The following portions of the patient's history were reviewed and updated as appropriate: allergies, current medications, past family history, past medical history, past social history, past surgical history and problem list.    Past Medical History:   Diagnosis Date   • Anxiety    • Arthritis    • Cataract     EARLY. JIMMIE EYES   • Chronic back pain     LOW BACK ACHES AT TIME FROM ARTHRTIS   • Constipation     AT TIMES. NO RECENT ISSUES   • Dermatitis     LEGS. STATES CLEARING UP NOW.   • Disease of thyroid gland     HYPO   • Diverticulosis     Colonoscopy November 2014   • Erythema of face     Transitory Erythema Of The Face   • GERD (gastroesophageal reflux disease)     ON OCC   • History of chemotherapy     FOR LEFT BREAST CANCER   • Hyperlipidemia    • Hypertension    • Malignant neoplasm of female breast (HCC) 9/27/2021   • Osteopenia    • Tachycardia         Past Surgical History:   Procedure Laterality Date   • BREAST BIOPSY  09/2021    Dr. Tirado    • BREAST CYST ASPIRATION Right    • BREAST CYST EXCISION Right    • BREAST LUMPECTOMY WITH SENTINEL NODE BIOPSY Left 2/10/2022    Procedure: LEFT BREAST WIRE LOCALIZED LUMPECTOMY WITH SENTINEL NODE BIOPSY, POSSIBLE LEFT AXILLARY DISSECTION;  Surgeon: Becky Liu MD;  Location: [facility] OR [facility] [place];  Service: General;  Laterality: Left;  "  • COLONOSCOPY     • COLONOSCOPY     • EXOSTECTOMY Bilateral     Simple Bunion Exostectomy (Silver Procedure)   • FOOT TENDON SURGERY     • VENOUS ACCESS DEVICE (PORT) INSERTION N/A 10/18/2021    Procedure: INSERTION VENOUS ACCESS DEVICE;  Surgeon: Becky Liu MD;  [place] [facility] [place] [facility] [place]  Service: General;  Laterality: N/A;        Family History   Problem Relation Age of Onset   • Arthritis Father    • Heart disease Father    • Hypertension Father    • Hypertension Mother    • Malig Hyperthermia Neg Hx         Social History     Socioeconomic History   • Marital status:      Spouse name: Pj   • Number of children: 1   Tobacco Use   • Smoking status: Former Smoker     Types: Cigarettes   • Smokeless tobacco: Never Used   • Tobacco comment: SOCIALLY IN EARLY    Vaping Use   • Vaping Use: Never used   Substance and Sexual Activity   • Alcohol use: Yes     Comment: social drinker   • Drug use: Never   • Sexual activity: Defer        OB History             Para        Term   0            AB        Living           SAB        IAB        Ectopic        Molar        Multiple        Live Births                 Age at menarche-13  Age at menopause-50  Nulliparous  Breast-feeding-N/A   0 para 0  0  Oral contraceptive pill use-none  Hormone replacement therapy-7 years    No Known Allergies     Review of systems as mentioned in the HPI    Objective   Blood pressure 120/79, pulse 89, temperature 97.7 °F (36.5 °C), temperature source Temporal, resp. rate 16, height 153 cm (60.24\"), weight 54.2 kg (119 lb 6.4 oz), SpO2 95 %.     ECOG performance status-0    Physical Exam  Vitals reviewed.   Constitutional:       Appearance: Normal appearance.   HENT:      Head: Normocephalic and atraumatic.      Nose: Nose normal.      Mouth/Throat:      Mouth: Mucous membranes are moist.      Pharynx: Oropharynx is clear.   Eyes:      Conjunctiva/sclera: Conjunctivae normal.      Pupils: " Pupils are equal, round, and reactive to light.   Cardiovascular:      Rate and Rhythm: Normal rate and regular rhythm.      Pulses: Normal pulses.      Heart sounds: Normal heart sounds.   Pulmonary:      Effort: Pulmonary effort is normal.      Breath sounds: Normal breath sounds.   Abdominal:      General: Abdomen is flat. Bowel sounds are normal.      Palpations: Abdomen is soft.   Musculoskeletal:         General: Normal range of motion.      Cervical back: Normal range of motion.   Skin:     General: Skin is warm and dry.      Findings: No rash.   Neurological:      General: No focal deficit present.      Mental Status: She is alert and oriented to person, place, and time. Mental status is at baseline.   Psychiatric:         Mood and Affect: Mood normal.         Behavior: Behavior normal.         Thought Content: Thought content normal.         Judgment: Judgment normal.       Breast Exam: Right breast appears normal on inspection.  No palpable abnormalities of the right breast.    Left breast on inspection there is a scar which is well-healed.  There is also left axillary scar which is well-healed.  Skin changes consistent with radiation dermatitis.    I have reexamined the patient and the results are consistent with the previously documented exam. Sheri Avendano MD     Results from last 7 days   Lab Units 09/16/22  0855   WBC 10*3/mm3 5.82   NEUTROS ABS 10*3/mm3 3.80   HEMOGLOBIN g/dL 11.6*   HEMATOCRIT % 35.5   PLATELETS 10*3/mm3 259     Results from last 7 days   Lab Units 09/16/22  0855   SODIUM mmol/L 139   POTASSIUM mmol/L 3.3*   CHLORIDE mmol/L 108*   CO2 mmol/L 20.3*   BUN mg/dL 13   CREATININE mg/dL 0.67   CALCIUM mg/dL 8.4*   ALBUMIN g/dL 3.20*   BILIRUBIN mg/dL 0.2   ALK PHOS U/L 112   ALT (SGPT) U/L 27   AST (SGOT) U/L 37*   GLUCOSE mg/dL 102           Mammo Diagnostic Digital Tomosynthesis Bilateral With CAD    Result Date: 8/17/2022  No mammographic evidence of malignancy in either breast.  Recommend annual screening mammogram in one year.  BI-RADS Category 2: Benign  This report was finalized on 8/17/2022 10:47 AM by Dr. Gloria Moctezuma M.D.         9/16/2022 CBC reviewed and hemoglobin stable at 11.6  CMP-potassium low at 3.3, albumin 3.2, AST 37    Assessment & Plan       *Invasive ductal carcinoma of the left breast  · Clinical T2 N0 M0, stage IIa  · On ultrasound the tumor measures 2.4 cm.  Lymph nodes on the left side are normal.  · MRI 10/8/2021 with tumor measuring 2.8 cm.  Lymph nodes appear normal  · Pathology consistent with invasive carcinoma with ductal and lobular features, grade 3, ER +1 to 10% weak, PA negative, HER-2 3+ immunohistochemistry, Ki-67 70%.  · She was evaluated by Dr. Liu and referred for consideration of neoadjuvant chemotherapy.   Since the tumor is HER-2 positive and over 2 cm I think it would be reasonable to proceed with neoadjuvant chemotherapy.  Since the tumor is over 2 cm but lymph node negative I think she would be a great candidate for EA 1181 clinical trial which comprises of administering Taxol Herceptin and Perjeta tamika  adjuvantly followed by surgery and additional adjuvant treatment depending upon the response.  Port placed 10/18/2021  Echocardiogram shows normal ejection fraction  Week 1 TPH on 10/19/2021  Completed 12 weeks of Taxol Perjeta and Herceptin on 1/4/2022 on EA 1181 clinical trial  1/11/2022 due for Herceptin and Perjeta only  2/1/2022-due for dose 2 of Herceptin and Perjeta  MRI 1/14/2022 reviewed and there has been a good tumor response with decrease in size of the mass to 1.2 cm.  2/10/2022-left breast lumpectomy and sentinel lymph node biopsy  Pathology shows ypT1 cN1 aM0, stage IIb, pleomorphic invasive lobular carcinoma, grade 3, ER +% strong, PA +61-70% moderate, HER-2 negative on the residual tumor  The left axillary lymph node was ER positive strong, PA negative and HER-2 2+, FISH amplified.  On the initial biopsy prior to surgery  the pathology showed invasive ductal with lobular features and the ER/WV was negative and HER-2 was 3+.  The residual disease obviously appears to be different from the initial pathology.  What is remaining is essentially pleomorphic lobular carcinoma which is high-grade with a high Ki-67 and ER/WV positivity.  Now that the lymph node is also positive I do believe that she will benefit from additional chemotherapy particularly either AC or EC.  Case was presented at multidisciplinary conference.  Decided to proceed with adjuvant Adriamycin and cyclophosphamide.  Given her age we will proceed with every 3 weekly AC over every 2 weeks.  Staging CT chest and pelvis and bone scan without any evidence of metastatic disease  3/11/2022-proceed with cycle 1 of AC  · 5/13/2022-due for cycle 4 AC.    · 6/3/2022-cycle 1 Kadcyla  · 7/19/2022-completed adjuvant radiation  · 7/20/2022-started anastrozole  · Continues to tolerate Kadcyla and anastrozole well.  · Labs reviewed and stable to proceed with treatment today.    *Right-sided port appears normal    *Hypertension-currently controlled with amlodipine and metoprolol.  Blood pressure 120/79    *Anxiety  · Stable on Zyprexa  · Continue this    *Hypothyroidism  · Continue Synthroid  · Stable    *Cardiac health-  · Echocardiogram 1/5/2022 reviewed and stable  · Stress test 2/3/2022 normal  · 3/1/2022-echocardiogram shows normal ejection fraction of 56 to 60%, normal LV strain  · 5/31/2022 EF 61%.  Maintain follow-up with Dr. Galo   · 8/19/2022-2D echocardiogram shows an ejection fraction of 55% and a strain of -22%.  Stable    *Bone health-  · DEXA scan August 2021 showing osteopenia (this was stable compared to imaging 2 years prior).    PLAN:   1. Continue every 3 weekly Kadcyla  2. Plan to give total of 1 year of HER2 directed therapy which is roughly 8 more months.    3. Continue anastrozole.  4. Follow-up in 9 weeks.      Patient is on cytotoxic chemotherapy requiring  close monitoring for toxicities.

## 2022-09-16 NOTE — TELEPHONE ENCOUNTER
Dr. Galo,    I attempted to call the patient, but no answer.  From reviewing her chart looks like Dr. Avendano (heme/onc) had ordered them.  It appears from the chart she is in their office for an appointment that is scheduled for 1000.      Ceci Fitzpatrick RN  [facility] Triage  09/16/22 10:24 EDT

## 2022-09-28 ENCOUNTER — OFFICE VISIT (OUTPATIENT)
Dept: SURGERY | Facility: CLINIC | Age: 77
End: 2022-09-28

## 2022-09-28 VITALS — HEIGHT: 60 IN | BODY MASS INDEX: 23.75 KG/M2 | WEIGHT: 121 LBS

## 2022-09-28 DIAGNOSIS — C50.912 MALIGNANT NEOPLASM OF LEFT FEMALE BREAST, UNSPECIFIED ESTROGEN RECEPTOR STATUS, UNSPECIFIED SITE OF BREAST: Primary | ICD-10-CM

## 2022-09-28 PROCEDURE — 99213 OFFICE O/P EST LOW 20 MIN: CPT

## 2022-10-02 DIAGNOSIS — I10 ESSENTIAL HYPERTENSION: ICD-10-CM

## 2022-10-03 RX ORDER — METOPROLOL SUCCINATE 25 MG/1
TABLET, EXTENDED RELEASE ORAL
Qty: 30 TABLET | Refills: 0 | Status: SHIPPED | OUTPATIENT
Start: 2022-10-03 | End: 2022-11-04

## 2022-10-07 ENCOUNTER — INFUSION (OUTPATIENT)
Dept: ONCOLOGY | Facility: HOSPITAL | Age: 77
End: 2022-10-07

## 2022-10-07 VITALS
SYSTOLIC BLOOD PRESSURE: 130 MMHG | TEMPERATURE: 97.8 F | OXYGEN SATURATION: 94 % | DIASTOLIC BLOOD PRESSURE: 71 MMHG | WEIGHT: 119.6 LBS | HEART RATE: 111 BPM | BODY MASS INDEX: 23.36 KG/M2 | RESPIRATION RATE: 16 BRPM

## 2022-10-07 DIAGNOSIS — C50.212 MALIGNANT NEOPLASM OF UPPER-INNER QUADRANT OF LEFT BREAST IN FEMALE, ESTROGEN RECEPTOR POSITIVE: Primary | ICD-10-CM

## 2022-10-07 DIAGNOSIS — Z45.9 ENCOUNTER FOR MANAGEMENT OF IMPLANTED DEVICE: ICD-10-CM

## 2022-10-07 DIAGNOSIS — Z17.0 MALIGNANT NEOPLASM OF UPPER-INNER QUADRANT OF LEFT BREAST IN FEMALE, ESTROGEN RECEPTOR POSITIVE: Primary | ICD-10-CM

## 2022-10-07 LAB
ALBUMIN SERPL-MCNC: 3.8 G/DL (ref 3.5–5.2)
ALBUMIN/GLOB SERPL: 1.3 G/DL (ref 1.1–2.4)
ALP SERPL-CCNC: 118 U/L (ref 38–116)
ALT SERPL W P-5'-P-CCNC: 26 U/L (ref 0–33)
ANION GAP SERPL CALCULATED.3IONS-SCNC: 12.2 MMOL/L (ref 5–15)
AST SERPL-CCNC: 34 U/L (ref 0–32)
BASOPHILS # BLD AUTO: 0.05 10*3/MM3 (ref 0–0.2)
BASOPHILS NFR BLD AUTO: 0.8 % (ref 0–1.5)
BILIRUB SERPL-MCNC: 0.3 MG/DL (ref 0.2–1.2)
BUN SERPL-MCNC: 14 MG/DL (ref 6–20)
BUN/CREAT SERPL: 19.2 (ref 7.3–30)
CALCIUM SPEC-SCNC: 9.6 MG/DL (ref 8.5–10.2)
CHLORIDE SERPL-SCNC: 102 MMOL/L (ref 98–107)
CO2 SERPL-SCNC: 23.8 MMOL/L (ref 22–29)
CREAT SERPL-MCNC: 0.73 MG/DL (ref 0.6–1.1)
DEPRECATED RDW RBC AUTO: 50.2 FL (ref 37–54)
EGFRCR SERPLBLD CKD-EPI 2021: 84.8 ML/MIN/1.73
EOSINOPHIL # BLD AUTO: 0.23 10*3/MM3 (ref 0–0.4)
EOSINOPHIL NFR BLD AUTO: 3.7 % (ref 0.3–6.2)
ERYTHROCYTE [DISTWIDTH] IN BLOOD BY AUTOMATED COUNT: 13.2 % (ref 12.3–15.4)
GLOBULIN UR ELPH-MCNC: 3 GM/DL (ref 1.8–3.5)
GLUCOSE SERPL-MCNC: 134 MG/DL (ref 74–124)
HCT VFR BLD AUTO: 36.6 % (ref 34–46.6)
HGB BLD-MCNC: 11.7 G/DL (ref 12–15.9)
IMM GRANULOCYTES # BLD AUTO: 0.02 10*3/MM3 (ref 0–0.05)
IMM GRANULOCYTES NFR BLD AUTO: 0.3 % (ref 0–0.5)
LYMPHOCYTES # BLD AUTO: 0.76 10*3/MM3 (ref 0.7–3.1)
LYMPHOCYTES NFR BLD AUTO: 12.3 % (ref 19.6–45.3)
MCH RBC QN AUTO: 32.7 PG (ref 26.6–33)
MCHC RBC AUTO-ENTMCNC: 32 G/DL (ref 31.5–35.7)
MCV RBC AUTO: 102.2 FL (ref 79–97)
MONOCYTES # BLD AUTO: 0.81 10*3/MM3 (ref 0.1–0.9)
MONOCYTES NFR BLD AUTO: 13.1 % (ref 5–12)
NEUTROPHILS NFR BLD AUTO: 4.3 10*3/MM3 (ref 1.7–7)
NEUTROPHILS NFR BLD AUTO: 69.8 % (ref 42.7–76)
NRBC BLD AUTO-RTO: 0 /100 WBC (ref 0–0.2)
PLATELET # BLD AUTO: 222 10*3/MM3 (ref 140–450)
PMV BLD AUTO: 9.2 FL (ref 6–12)
POTASSIUM SERPL-SCNC: 3.8 MMOL/L (ref 3.5–4.7)
PROT SERPL-MCNC: 6.8 G/DL (ref 6.3–8)
RBC # BLD AUTO: 3.58 10*6/MM3 (ref 3.77–5.28)
SODIUM SERPL-SCNC: 138 MMOL/L (ref 134–145)
WBC NRBC COR # BLD: 6.17 10*3/MM3 (ref 3.4–10.8)

## 2022-10-07 PROCEDURE — 96375 TX/PRO/DX INJ NEW DRUG ADDON: CPT

## 2022-10-07 PROCEDURE — 25010000002 DEXAMETHASONE SODIUM PHOSPHATE 100 MG/10ML SOLUTION: Performed by: INTERNAL MEDICINE

## 2022-10-07 PROCEDURE — 80053 COMPREHEN METABOLIC PANEL: CPT

## 2022-10-07 PROCEDURE — 96413 CHEMO IV INFUSION 1 HR: CPT

## 2022-10-07 PROCEDURE — 25010000002 HEPARIN LOCK FLUSH PER 10 UNITS: Performed by: INTERNAL MEDICINE

## 2022-10-07 PROCEDURE — 25010000002 ADO-TRASTUZUMAB 100 MG RECONSTITUTED SOLUTION 1 EACH VIAL: Performed by: NURSE PRACTITIONER

## 2022-10-07 PROCEDURE — 85025 COMPLETE CBC W/AUTO DIFF WBC: CPT

## 2022-10-07 RX ORDER — HEPARIN SODIUM (PORCINE) LOCK FLUSH IV SOLN 100 UNIT/ML 100 UNIT/ML
500 SOLUTION INTRAVENOUS AS NEEDED
Status: DISCONTINUED | OUTPATIENT
Start: 2022-10-07 | End: 2022-10-07 | Stop reason: HOSPADM

## 2022-10-07 RX ORDER — SODIUM CHLORIDE 0.9 % (FLUSH) 0.9 %
10 SYRINGE (ML) INJECTION AS NEEDED
Status: CANCELLED | OUTPATIENT
Start: 2022-10-07

## 2022-10-07 RX ORDER — SODIUM CHLORIDE 0.9 % (FLUSH) 0.9 %
10 SYRINGE (ML) INJECTION AS NEEDED
Status: DISCONTINUED | OUTPATIENT
Start: 2022-10-07 | End: 2022-10-07 | Stop reason: HOSPADM

## 2022-10-07 RX ORDER — SODIUM CHLORIDE 9 MG/ML
250 INJECTION, SOLUTION INTRAVENOUS ONCE
Status: COMPLETED | OUTPATIENT
Start: 2022-10-07 | End: 2022-10-07

## 2022-10-07 RX ORDER — HEPARIN SODIUM (PORCINE) LOCK FLUSH IV SOLN 100 UNIT/ML 100 UNIT/ML
500 SOLUTION INTRAVENOUS AS NEEDED
Status: CANCELLED | OUTPATIENT
Start: 2022-10-07

## 2022-10-07 RX ADMIN — Medication 10 ML: at 14:57

## 2022-10-07 RX ADMIN — DEXAMETHASONE SODIUM PHOSPHATE 12 MG: 100 INJECTION INTRAMUSCULAR; INTRAVENOUS at 13:29

## 2022-10-07 RX ADMIN — ADO-TRASTUZUMAB EMTANSINE 195 MG: 20 INJECTION, POWDER, LYOPHILIZED, FOR SOLUTION INTRAVENOUS at 14:26

## 2022-10-07 RX ADMIN — Medication 500 UNITS: at 14:57

## 2022-10-07 RX ADMIN — SODIUM CHLORIDE 250 ML: 9 INJECTION, SOLUTION INTRAVENOUS at 13:29

## 2022-10-28 ENCOUNTER — OFFICE VISIT (OUTPATIENT)
Dept: ONCOLOGY | Facility: CLINIC | Age: 77
End: 2022-10-28

## 2022-10-28 ENCOUNTER — INFUSION (OUTPATIENT)
Dept: ONCOLOGY | Facility: HOSPITAL | Age: 77
End: 2022-10-28

## 2022-10-28 VITALS
SYSTOLIC BLOOD PRESSURE: 110 MMHG | RESPIRATION RATE: 16 BRPM | BODY MASS INDEX: 23.46 KG/M2 | OXYGEN SATURATION: 96 % | TEMPERATURE: 97.5 F | HEART RATE: 99 BPM | DIASTOLIC BLOOD PRESSURE: 74 MMHG | HEIGHT: 60 IN | WEIGHT: 119.5 LBS

## 2022-10-28 DIAGNOSIS — Z17.0 MALIGNANT NEOPLASM OF UPPER-INNER QUADRANT OF LEFT BREAST IN FEMALE, ESTROGEN RECEPTOR POSITIVE: Primary | ICD-10-CM

## 2022-10-28 DIAGNOSIS — C50.212 MALIGNANT NEOPLASM OF UPPER-INNER QUADRANT OF LEFT BREAST IN FEMALE, ESTROGEN RECEPTOR POSITIVE: Primary | ICD-10-CM

## 2022-10-28 DIAGNOSIS — Z17.0 MALIGNANT NEOPLASM OF UPPER-INNER QUADRANT OF LEFT BREAST IN FEMALE, ESTROGEN RECEPTOR POSITIVE: ICD-10-CM

## 2022-10-28 DIAGNOSIS — Z51.11 ENCOUNTER FOR ANTINEOPLASTIC CHEMOTHERAPY: ICD-10-CM

## 2022-10-28 DIAGNOSIS — Z45.9 ENCOUNTER FOR MANAGEMENT OF IMPLANTED DEVICE: ICD-10-CM

## 2022-10-28 DIAGNOSIS — C50.212 MALIGNANT NEOPLASM OF UPPER-INNER QUADRANT OF LEFT BREAST IN FEMALE, ESTROGEN RECEPTOR POSITIVE: ICD-10-CM

## 2022-10-28 LAB
ALBUMIN SERPL-MCNC: 3.8 G/DL (ref 3.5–5.2)
ALBUMIN/GLOB SERPL: 1.3 G/DL (ref 1.1–2.4)
ALP SERPL-CCNC: 125 U/L (ref 38–116)
ALT SERPL W P-5'-P-CCNC: 24 U/L (ref 0–33)
ANION GAP SERPL CALCULATED.3IONS-SCNC: 12.7 MMOL/L (ref 5–15)
AST SERPL-CCNC: 32 U/L (ref 0–32)
BASOPHILS # BLD AUTO: 0.04 10*3/MM3 (ref 0–0.2)
BASOPHILS NFR BLD AUTO: 0.6 % (ref 0–1.5)
BILIRUB SERPL-MCNC: 0.4 MG/DL (ref 0.2–1.2)
BUN SERPL-MCNC: 17 MG/DL (ref 6–20)
BUN/CREAT SERPL: 23.9 (ref 7.3–30)
CALCIUM SPEC-SCNC: 9.5 MG/DL (ref 8.5–10.2)
CHLORIDE SERPL-SCNC: 104 MMOL/L (ref 98–107)
CO2 SERPL-SCNC: 23.3 MMOL/L (ref 22–29)
CREAT SERPL-MCNC: 0.71 MG/DL (ref 0.6–1.1)
DEPRECATED RDW RBC AUTO: 49.7 FL (ref 37–54)
EGFRCR SERPLBLD CKD-EPI 2021: 87.7 ML/MIN/1.73
EOSINOPHIL # BLD AUTO: 0.4 10*3/MM3 (ref 0–0.4)
EOSINOPHIL NFR BLD AUTO: 6.4 % (ref 0.3–6.2)
ERYTHROCYTE [DISTWIDTH] IN BLOOD BY AUTOMATED COUNT: 13.5 % (ref 12.3–15.4)
GLOBULIN UR ELPH-MCNC: 3 GM/DL (ref 1.8–3.5)
GLUCOSE SERPL-MCNC: 121 MG/DL (ref 74–124)
HCT VFR BLD AUTO: 36.8 % (ref 34–46.6)
HGB BLD-MCNC: 12 G/DL (ref 12–15.9)
IMM GRANULOCYTES # BLD AUTO: 0.02 10*3/MM3 (ref 0–0.05)
IMM GRANULOCYTES NFR BLD AUTO: 0.3 % (ref 0–0.5)
LYMPHOCYTES # BLD AUTO: 0.77 10*3/MM3 (ref 0.7–3.1)
LYMPHOCYTES NFR BLD AUTO: 12.3 % (ref 19.6–45.3)
MCH RBC QN AUTO: 32.6 PG (ref 26.6–33)
MCHC RBC AUTO-ENTMCNC: 32.6 G/DL (ref 31.5–35.7)
MCV RBC AUTO: 100 FL (ref 79–97)
MONOCYTES # BLD AUTO: 0.82 10*3/MM3 (ref 0.1–0.9)
MONOCYTES NFR BLD AUTO: 13.1 % (ref 5–12)
NEUTROPHILS NFR BLD AUTO: 4.19 10*3/MM3 (ref 1.7–7)
NEUTROPHILS NFR BLD AUTO: 67.3 % (ref 42.7–76)
NRBC BLD AUTO-RTO: 0 /100 WBC (ref 0–0.2)
PLATELET # BLD AUTO: 266 10*3/MM3 (ref 140–450)
PMV BLD AUTO: 8.7 FL (ref 6–12)
POTASSIUM SERPL-SCNC: 3.8 MMOL/L (ref 3.5–4.7)
PROT SERPL-MCNC: 6.8 G/DL (ref 6.3–8)
RBC # BLD AUTO: 3.68 10*6/MM3 (ref 3.77–5.28)
SODIUM SERPL-SCNC: 140 MMOL/L (ref 134–145)
WBC NRBC COR # BLD: 6.24 10*3/MM3 (ref 3.4–10.8)

## 2022-10-28 PROCEDURE — 85025 COMPLETE CBC W/AUTO DIFF WBC: CPT

## 2022-10-28 PROCEDURE — 80053 COMPREHEN METABOLIC PANEL: CPT

## 2022-10-28 PROCEDURE — 25010000002 HEPARIN LOCK FLUSH PER 10 UNITS: Performed by: INTERNAL MEDICINE

## 2022-10-28 PROCEDURE — 96413 CHEMO IV INFUSION 1 HR: CPT

## 2022-10-28 PROCEDURE — 25010000002 ADO-TRASTUZUMAB 100 MG RECONSTITUTED SOLUTION 1 EACH VIAL: Performed by: INTERNAL MEDICINE

## 2022-10-28 PROCEDURE — 25010000002 DEXAMETHASONE SODIUM PHOSPHATE 100 MG/10ML SOLUTION: Performed by: INTERNAL MEDICINE

## 2022-10-28 PROCEDURE — 99214 OFFICE O/P EST MOD 30 MIN: CPT | Performed by: INTERNAL MEDICINE

## 2022-10-28 PROCEDURE — 96375 TX/PRO/DX INJ NEW DRUG ADDON: CPT

## 2022-10-28 RX ORDER — SODIUM CHLORIDE 9 MG/ML
250 INJECTION, SOLUTION INTRAVENOUS ONCE
Status: CANCELLED | OUTPATIENT
Start: 2022-10-28

## 2022-10-28 RX ORDER — HEPARIN SODIUM (PORCINE) LOCK FLUSH IV SOLN 100 UNIT/ML 100 UNIT/ML
500 SOLUTION INTRAVENOUS AS NEEDED
Status: CANCELLED | OUTPATIENT
Start: 2022-10-28

## 2022-10-28 RX ORDER — HEPARIN SODIUM (PORCINE) LOCK FLUSH IV SOLN 100 UNIT/ML 100 UNIT/ML
500 SOLUTION INTRAVENOUS AS NEEDED
Status: DISCONTINUED | OUTPATIENT
Start: 2022-10-28 | End: 2022-10-28 | Stop reason: HOSPADM

## 2022-10-28 RX ORDER — SODIUM CHLORIDE 0.9 % (FLUSH) 0.9 %
10 SYRINGE (ML) INJECTION AS NEEDED
Status: DISCONTINUED | OUTPATIENT
Start: 2022-10-28 | End: 2022-10-28 | Stop reason: HOSPADM

## 2022-10-28 RX ORDER — SODIUM CHLORIDE 0.9 % (FLUSH) 0.9 %
10 SYRINGE (ML) INJECTION AS NEEDED
Status: CANCELLED | OUTPATIENT
Start: 2022-10-28

## 2022-10-28 RX ORDER — ONDANSETRON HYDROCHLORIDE 8 MG/1
8 TABLET, FILM COATED ORAL EVERY 8 HOURS PRN
Qty: 60 TABLET | Refills: 3 | Status: SHIPPED | OUTPATIENT
Start: 2022-10-28

## 2022-10-28 RX ORDER — SODIUM CHLORIDE 9 MG/ML
250 INJECTION, SOLUTION INTRAVENOUS ONCE
Status: COMPLETED | OUTPATIENT
Start: 2022-10-28 | End: 2022-10-28

## 2022-10-28 RX ADMIN — Medication 500 UNITS: at 10:21

## 2022-10-28 RX ADMIN — DEXAMETHASONE SODIUM PHOSPHATE 12 MG: 10 INJECTION, SOLUTION INTRAMUSCULAR; INTRAVENOUS at 09:29

## 2022-10-28 RX ADMIN — ADO-TRASTUZUMAB EMTANSINE 195 MG: 20 INJECTION, POWDER, LYOPHILIZED, FOR SOLUTION INTRAVENOUS at 09:49

## 2022-10-28 RX ADMIN — Medication 10 ML: at 10:21

## 2022-10-28 RX ADMIN — SODIUM CHLORIDE 250 ML: 9 INJECTION, SOLUTION INTRAVENOUS at 09:29

## 2022-10-28 NOTE — PROGRESS NOTES
Subjective   Neela Ramirez is a 77 y.o. female.  Referred by Dr. 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  A bilateral diagnostic mammogram was performed for further evaluation of the same.    8/17/2021-bilateral diagnostic mammogram-scattered fibroglandular densities throughout both breasts.  In the posterior one third upper inner quadrant of the left breast at the site of palpable concern, 11 o'clock position there is a mass with adjacent pleomorphic calcifications.  The mass and the calcifications measure on order of 1.5 cm in greatest dimension.  No evidence of axillary lymphadenopathy appreciated.  Stable microcalcifications seen in other areas of the left breast on right breast.  Ultrasound-at 11 o'clock position, 5 cm from the nipple in the left breast there is an irregular hypoechoic mass which measures 2.4 x 2 x 1.6 cm.    Impression  1.irregular 2.4 cm mass in the left breast at the site of palpable concern at 11:00, 5 cm from the nipple.  Ultrasound-guided biopsy of the mass recommended  No finding suspicious for malignancy in the right breast    9/8/2021-left breast ultrasound-guided biopsy  Pathology consistent with invasive ductal carcinoma with lobular features.  Grade 3.  The carcinoma measures 7 mm in greatest dimension.  Focally suspicious for lymphovascular space invasion.  ER low +1-10%, intensity week  GA negative  HER-2 3+ on immunohistochemistry  Ki-67 70%    MRI of the breast 10/8/2021-Biopsy-proven malignancy in the left breast at 11:00 measuring 2.8 cm with a centrally located metallic clip.  No other suspicious findings are seen within the left breast or no left axillary lymphadenopathy.  No suspicious findings of the right breast    Echocardiogram 10/8/2021 was normal  ejection fraction of 56 to 60% and strain of -20    She denies any new bone pains, dyspnea, cough, abdominal pain nausea vomiting or recent changes in weight.    She had 2 previous breast aspirations in her 20s.  No other breast biopsies  She does not know much about her family hence limited family history.    She received neoadjuvant chemotherapy on EA 1181 trial with Taxol Perjeta and Herceptin.  She completed 12 weeks of Taxol Herceptin and Perjeta.    Had an abnormal EKG preop and hence a stress test was performed on 2/3/2022 which showed a normal left ventricular ejection fraction and LVEF at stress was 73%.  Considered a low risk study with normal myocardial perfusion imaging.    She underwent a left breast lumpectomy and a sentinel lymph node biopsy on 2/10/2022.    Pathology was consistent with residual tumor which was pleomorphic invasive lobular carcinoma, poorly differentiated, grade 3  Tumor measured 18 mm  Margins negative for invasive carcinoma  Associated lobular carcinoma in situ noted  1 out of 10 lymph nodes was positive for metastatic focus measuring 3.5 mm.    Receptors were repeated on the pleomorphic invasive lobular carcinoma  ER +91 to 100% strong  NH +61 to 70% moderate  HER-2 negative  Ki-67 55%    Receptors performed on the metastatic lymph node  ER +81-90% strong  NH negative  HER-2 2+ on immunohistochemistry, FISH pending.    ypT1 cN1 aM0    2/28/2022-CT of the chest abdomen and pelvis  Fluid collection in the upper left breast measuring 6 x 2.5 x 4.9 cm, postoperative seroma.  Skin thickening of the left breast likely postsurgical.  Fluid collections left axilla measuring 2.9 x 1.4 x 2.9 cm.  No other suspicious abnormalities on the CT of the chest  CT abdomen with no evidence of metastatic disease.  Mild colonic diverticulosis.  Small uterine fibroid.    3/1/2022-bone scan  No evidence of metastatic disease.  Multifocal degenerative arthritic uptake without scintigraphic evidence to  suggest metastatic disease.    3/1/2022-echocardiogram  Left ventricular ejection fraction 56-60%.  Normal global LV strain of -20.5%.    3/11/2022-cycle 1 of AC    Completed 4 cycles of AC    6/3/2022-cycle 1 Kadcyla    7/19/2022-completed adjuvant radiation    7/20/2022-started adjuvant anastrozole    8/17/2022-bilateral diagnostic mammogram-benign    Interval history  Ms. Ramirez presents to the clinic today for follow-up.  Scheduled for cycle 8 of maintenance Kadcyla.  Denies any neuropathy  Intermittent nausea which is controlled with antiemetics.  Occasional heartburn for which she is taking pantoprazole.  Reports some tightness on the left axilla since radiation.  Erythema of the left breast unchanged.    The following portions of the patient's history were reviewed and updated as appropriate: allergies, current medications, past family history, past medical history, past social history, past surgical history and problem list.    Past Medical History:   Diagnosis Date   • Anxiety    • Arthritis    • Cataract     EARLY. JIMMIE EYES   • Chronic back pain     LOW BACK ACHES AT TIME FROM ARTHRTIS   • Constipation     AT TIMES. NO RECENT ISSUES   • Dermatitis     LEGS. STATES CLEARING UP NOW.   • Disease of thyroid gland     HYPO   • Diverticulosis     Colonoscopy November 2014   • Erythema of face     Transitory Erythema Of The Face   • GERD (gastroesophageal reflux disease)     ON OCC   • History of chemotherapy     FOR LEFT BREAST CANCER   • Hyperlipidemia    • Hypertension    • Malignant neoplasm of female breast (HCC) 9/27/2021   • Osteopenia    • Tachycardia         Past Surgical History:   Procedure Laterality Date   • BREAST BIOPSY  09/2021    Dr. Tirado    • BREAST CYST ASPIRATION Right    • BREAST CYST EXCISION Right    • BREAST LUMPECTOMY WITH SENTINEL NODE BIOPSY Left 2/10/2022    Procedure: LEFT BREAST WIRE LOCALIZED LUMPECTOMY WITH SENTINEL NODE BIOPSY, POSSIBLE LEFT AXILLARY DISSECTION;  Surgeon: Noe  MD Becky;  Location:  [facility];  Service: General;  Laterality: Left;   • COLONOSCOPY     • COLONOSCOPY     • EXOSTECTOMY Bilateral     Simple Bunion Exostectomy (Silver Procedure)   • FOOT TENDON SURGERY     • VENOUS ACCESS DEVICE (PORT) INSERTION N/A 10/18/2021    Procedure: INSERTION VENOUS ACCESS DEVICE;  Surgeon: Becky Liu MD;  [place]  [facility] [place] [facility] [place]  Service: General;  Laterality: N/A;        Family History   Problem Relation Age of Onset   • Arthritis Father    • Heart disease Father    • Hypertension Father    • Hypertension Mother    • Malig Hyperthermia Neg Hx         Social History     Socioeconomic History   • Marital status:      Spouse name: Pj   • Number of children: 1   Tobacco Use   • Smoking status: Former     Types: Cigarettes   • Smokeless tobacco: Never   • Tobacco comments:     SOCIALLY IN EARLY    Vaping Use   • Vaping Use: Never used   Substance and Sexual Activity   • Alcohol use: Yes     Comment: social drinker   • Drug use: Never   • Sexual activity: Defer        OB History             Para        Term   0            AB        Living           SAB        IAB        Ectopic        Molar        Multiple        Live Births                 Age at menarche-13  Age at menopause-50  Nulliparous  Breast-feeding-N/A   0 para 0  0  Oral contraceptive pill use-none  Hormone replacement therapy-7 years    No Known Allergies     Review of systems mentioned in the HPI    Objective   There were no vitals taken for this visit.     ECOG performance status-0    Physical Exam  Vitals reviewed.   Constitutional:       Appearance: Normal appearance.   HENT:      Head: Normocephalic and atraumatic.      Nose: Nose normal.      Mouth/Throat:      Mouth: Mucous membranes are moist.      Pharynx: Oropharynx is clear.   Eyes:      Conjunctiva/sclera: Conjunctivae normal.      Pupils: Pupils are equal, round, and reactive to light.   Cardiovascular:       Rate and Rhythm: Normal rate and regular rhythm.      Pulses: Normal pulses.      Heart sounds: Normal heart sounds.   Pulmonary:      Effort: Pulmonary effort is normal.      Breath sounds: Normal breath sounds.   Abdominal:      General: Abdomen is flat. Bowel sounds are normal.      Palpations: Abdomen is soft.   Musculoskeletal:         General: Normal range of motion.      Cervical back: Normal range of motion.   Skin:     General: Skin is warm and dry.      Findings: No rash.   Neurological:      General: No focal deficit present.      Mental Status: She is alert and oriented to person, place, and time. Mental status is at baseline.   Psychiatric:         Mood and Affect: Mood normal.         Behavior: Behavior normal.         Thought Content: Thought content normal.         Judgment: Judgment normal.       Breast Exam: Right breast appears normal on inspection.  No palpable abnormalities of the right breast.    Left breast on inspection there is a scar which is well-healed.  There is also left axillary scar which is well-healed.  Skin changes consistent with radiation dermatitis.    I have reexamined the patient and the results are consistent with the previously documented exam. Sheri Avendano MD                     No radiology results for the last 30 days.     10/28/2022-CBC reviewed and WBC 6.24, hemoglobin 12.2, platelets 266,000 CMP reviewed and within normal limits    Assessment & Plan       *Invasive ductal carcinoma of the left breast  · Clinical T2 N0 M0, stage IIa  · On ultrasound the tumor measures 2.4 cm.  Lymph nodes on the left side are normal.  · MRI 10/8/2021 with tumor measuring 2.8 cm.  Lymph nodes appear normal  · Pathology consistent with invasive carcinoma with ductal and lobular features, grade 3, ER +1 to 10% weak, CO negative, HER-2 3+ immunohistochemistry, Ki-67 70%.  · She was evaluated by Dr. Liu and referred for consideration of neoadjuvant chemotherapy.   Since the tumor  is HER-2 positive and over 2 cm I think it would be reasonable to proceed with neoadjuvant chemotherapy.  Since the tumor is over 2 cm but lymph node negative I think she would be a great candidate for EA 1181 clinical trial which comprises of administering Taxol Herceptin and Perjeta tamika  adjuvantly followed by surgery and additional adjuvant treatment depending upon the response.  Port placed 10/18/2021  Echocardiogram shows normal ejection fraction  Week 1 TPH on 10/19/2021  Completed 12 weeks of Taxol Perjeta and Herceptin on 1/4/2022 on EA 1181 clinical trial  1/11/2022 due for Herceptin and Perjeta only  2/1/2022-due for dose 2 of Herceptin and Perjeta  MRI 1/14/2022 reviewed and there has been a good tumor response with decrease in size of the mass to 1.2 cm.  2/10/2022-left breast lumpectomy and sentinel lymph node biopsy  Pathology shows ypT1 cN1 aM0, stage IIb, pleomorphic invasive lobular carcinoma, grade 3, ER +% strong, FL +61-70% moderate, HER-2 negative on the residual tumor  The left axillary lymph node was ER positive strong, FL negative and HER-2 2+, FISH amplified.  On the initial biopsy prior to surgery the pathology showed invasive ductal with lobular features and the ER/FL was negative and HER-2 was 3+.  The residual disease obviously appears to be different from the initial pathology.  What is remaining is essentially pleomorphic lobular carcinoma which is high-grade with a high Ki-67 and ER/FL positivity.  Now that the lymph node is also positive I do believe that she will benefit from additional chemotherapy particularly either AC or EC.  Case was presented at multidisciplinary conference.  Decided to proceed with adjuvant Adriamycin and cyclophosphamide.  Given her age we will proceed with every 3 weekly AC over every 2 weeks.  Staging CT chest and pelvis and bone scan without any evidence of metastatic disease  3/11/2022-proceed with cycle 1 of AC  · 5/13/2022-due for cycle 4 AC.     · 6/3/2022-cycle 1 Kadcyla  · 7/19/2022-completed adjuvant radiation  · 7/20/2022-started anastrozole  · Scheduled for week 8 of Kadcyla.  · Labs reviewed and stable to proceed with treatment    *Right-sided port appears normal    *Hypertension-currently controlled with amlodipine and metoprolol.  Blood pressure 110/74    *Anxiety  · No longer on Zyprexa  · Mood normal    *Hypothyroidism  · Continue Synthroid  · Stable    *Cardiac health-  · Echocardiogram 1/5/2022 reviewed and stable  · Stress test 2/3/2022 normal  · 3/1/2022-echocardiogram shows normal ejection fraction of 56 to 60%, normal LV strain  · 5/31/2022 EF 61%.  Maintain follow-up with Dr. Galo   · 8/19/2022-2D echocardiogram shows an ejection fraction of 55% and a strain of -22%.  Stable  · Repeat echocardiogram ordered and scheduled to    *Bone health-  · DEXA scan August 2021 showing osteopenia (this was stable compared to imaging 2 years prior).  · I will discuss Zometa/prolia at next visit    *Nausea-Zofran prescribed today    *Left axillary stiffness-recommend regular exercises to increase range of motion    PLAN:   1. Continue every 3 weekly Kadcyla  2. Plan to give total of 1 year of HER2 directed therapy which is roughly 8 more months.     3. Continue anastrozole.  4. Follow-up in 9 weeks.      Patient is on cytotoxic chemotherapy requiring close monitoring for toxicities.

## 2022-11-04 DIAGNOSIS — I10 ESSENTIAL HYPERTENSION: ICD-10-CM

## 2022-11-04 RX ORDER — METOPROLOL SUCCINATE 25 MG/1
TABLET, EXTENDED RELEASE ORAL
Qty: 30 TABLET | Refills: 0 | Status: SHIPPED | OUTPATIENT
Start: 2022-11-04 | End: 2022-12-06

## 2022-11-04 NOTE — TELEPHONE ENCOUNTER
Rx Refill Note  Requested Prescriptions     Pending Prescriptions Disp Refills   • metoprolol succinate XL (TOPROL-XL) 25 MG 24 hr tablet [Pharmacy Med Name: Metoprolol Succinate ER 25 MG Oral Tablet Extended Release 24 Hour] 30 tablet 0     Sig: Take 1 tablet by mouth once daily      Last office visit with prescribing clinician: Visit date not found      Next office visit with prescribing clinician: Visit date not found       {TIP  Please add Last Relevant Lab Date if appropriate: 7/07/2022    Layne Hatfield, PCT  11/04/22, 14:44 EDT

## 2022-11-07 RX ORDER — ANASTROZOLE 1 MG/1
TABLET ORAL
Qty: 30 TABLET | Refills: 1 | Status: SHIPPED | OUTPATIENT
Start: 2022-11-07 | End: 2023-01-03

## 2022-11-11 ENCOUNTER — HOSPITAL ENCOUNTER (OUTPATIENT)
Dept: CARDIOLOGY | Facility: HOSPITAL | Age: 77
Discharge: HOME | End: 2022-11-11
Admitting: INTERNAL MEDICINE

## 2022-11-11 VITALS
BODY MASS INDEX: 23.46 KG/M2 | HEIGHT: 60 IN | HEART RATE: 64 BPM | DIASTOLIC BLOOD PRESSURE: 60 MMHG | WEIGHT: 119.49 LBS | SYSTOLIC BLOOD PRESSURE: 124 MMHG

## 2022-11-11 DIAGNOSIS — C50.212 MALIGNANT NEOPLASM OF UPPER-INNER QUADRANT OF LEFT BREAST IN FEMALE, ESTROGEN RECEPTOR POSITIVE: ICD-10-CM

## 2022-11-11 DIAGNOSIS — Z17.0 MALIGNANT NEOPLASM OF UPPER-INNER QUADRANT OF LEFT BREAST IN FEMALE, ESTROGEN RECEPTOR POSITIVE: ICD-10-CM

## 2022-11-11 DIAGNOSIS — Z51.11 ENCOUNTER FOR ANTINEOPLASTIC CHEMOTHERAPY: ICD-10-CM

## 2022-11-11 LAB
AORTIC ARCH: 2.6 CM
ASCENDING AORTA: 2.4 CM
BH CV ECHO LEFT VENTRICLE GLOBAL LONGITUDINAL STRAIN: -19 %
BH CV ECHO MEAS - ACS: 1.54 CM
BH CV ECHO MEAS - AI P1/2T: 1043 MSEC
BH CV ECHO MEAS - AO MAX PG: 7 MMHG
BH CV ECHO MEAS - AO MEAN PG: 4 MMHG
BH CV ECHO MEAS - AO ROOT DIAM: 2.6 CM
BH CV ECHO MEAS - AO V2 MAX: 132 CM/SEC
BH CV ECHO MEAS - AO V2 VTI: 26.7 CM
BH CV ECHO MEAS - AVA(I,D): 1.71 CM2
BH CV ECHO MEAS - EDV(CUBED): 75.9 ML
BH CV ECHO MEAS - EDV(MOD-SP2): 100 ML
BH CV ECHO MEAS - EDV(MOD-SP4): 84 ML
BH CV ECHO MEAS - EF(MOD-BP): 51.8 %
BH CV ECHO MEAS - EF(MOD-SP2): 53 %
BH CV ECHO MEAS - EF(MOD-SP4): 52.4 %
BH CV ECHO MEAS - EF_3D-VOL: 52 %
BH CV ECHO MEAS - ESV(CUBED): 27.4 ML
BH CV ECHO MEAS - ESV(MOD-SP2): 47 ML
BH CV ECHO MEAS - ESV(MOD-SP4): 40 ML
BH CV ECHO MEAS - FS: 28.8 %
BH CV ECHO MEAS - IVS/LVPW: 0.8 CM
BH CV ECHO MEAS - IVSD: 0.79 CM
BH CV ECHO MEAS - LAT PEAK E' VEL: 6 CM/SEC
BH CV ECHO MEAS - LV DIASTOLIC VOL/BSA (35-75): 56.8 CM2
BH CV ECHO MEAS - LV MASS(C)D: 118.8 GRAMS
BH CV ECHO MEAS - LV MAX PG: 2.15 MMHG
BH CV ECHO MEAS - LV MEAN PG: 1.28 MMHG
BH CV ECHO MEAS - LV SYSTOLIC VOL/BSA (12-30): 27 CM2
BH CV ECHO MEAS - LV V1 MAX: 73.3 CM/SEC
BH CV ECHO MEAS - LV V1 VTI: 15.5 CM
BH CV ECHO MEAS - LVIDD: 4.2 CM
BH CV ECHO MEAS - LVIDS: 3 CM
BH CV ECHO MEAS - LVOT AREA: 3 CM2
BH CV ECHO MEAS - LVOT DIAM: 1.94 CM
BH CV ECHO MEAS - LVPWD: 0.99 CM
BH CV ECHO MEAS - MED PEAK E' VEL: 7 CM/SEC
BH CV ECHO MEAS - MV A DUR: 0.14 SEC
BH CV ECHO MEAS - MV A MAX VEL: 77.6 CM/SEC
BH CV ECHO MEAS - MV DEC SLOPE: 147.1 CM/SEC2
BH CV ECHO MEAS - MV DEC TIME: 0.27 MSEC
BH CV ECHO MEAS - MV E MAX VEL: 45.1 CM/SEC
BH CV ECHO MEAS - MV E/A: 0.58
BH CV ECHO MEAS - MV MAX PG: 2.7 MMHG
BH CV ECHO MEAS - MV MEAN PG: 1.1 MMHG
BH CV ECHO MEAS - MV V2 VTI: 11.8 CM
BH CV ECHO MEAS - MVA(VTI): 3.9 CM2
BH CV ECHO MEAS - PA ACC TIME: 0.08 SEC
BH CV ECHO MEAS - PA PR(ACCEL): 43.3 MMHG
BH CV ECHO MEAS - PA V2 MAX: 85.5 CM/SEC
BH CV ECHO MEAS - PULM A REVS DUR: 0.16 SEC
BH CV ECHO MEAS - PULM A REVS VEL: 42.8 CM/SEC
BH CV ECHO MEAS - PULM DIAS VEL: 22.3 CM/SEC
BH CV ECHO MEAS - PULM S/D: 1.42
BH CV ECHO MEAS - PULM SYS VEL: 31.7 CM/SEC
BH CV ECHO MEAS - QP/QS: 0.56
BH CV ECHO MEAS - RAP SYSTOLE: 3 MMHG
BH CV ECHO MEAS - RV MAX PG: 1.36 MMHG
BH CV ECHO MEAS - RV V1 MAX: 58.3 CM/SEC
BH CV ECHO MEAS - RV V1 VTI: 11.1 CM
BH CV ECHO MEAS - RVOT DIAM: 1.72 CM
BH CV ECHO MEAS - RVSP: 14.1 MMHG
BH CV ECHO MEAS - SI(MOD-SP2): 35.8 ML/M2
BH CV ECHO MEAS - SI(MOD-SP4): 29.8 ML/M2
BH CV ECHO MEAS - SV(LVOT): 45.7 ML
BH CV ECHO MEAS - SV(MOD-SP2): 53 ML
BH CV ECHO MEAS - SV(MOD-SP4): 44 ML
BH CV ECHO MEAS - SV(RVOT): 25.6 ML
BH CV ECHO MEAS - TAPSE (>1.6): 1.81 CM
BH CV ECHO MEAS - TR MAX PG: 11.1 MMHG
BH CV ECHO MEAS - TR MAX VEL: 167 CM/SEC
BH CV ECHO MEASUREMENTS AVERAGE E/E' RATIO: 6.94
BH CV XLRA - RV BASE: 2.7 CM
BH CV XLRA - RV LENGTH: 5.3 CM
BH CV XLRA - RV MID: 2.25 CM
BH CV XLRA - TDI S': 11.9 CM/SEC
LEFT ATRIUM VOLUME INDEX: 21.1 ML/M2
MAXIMAL PREDICTED HEART RATE: 143 BPM
SINUS: 2.6 CM
STJ: 2.14 CM
STRESS TARGET HR: 122 BPM

## 2022-11-11 PROCEDURE — 93306 TTE W/DOPPLER COMPLETE: CPT

## 2022-11-11 PROCEDURE — 93356 MYOCRD STRAIN IMG SPCKL TRCK: CPT

## 2022-11-11 PROCEDURE — 93306 TTE W/DOPPLER COMPLETE: CPT | Performed by: INTERNAL MEDICINE

## 2022-11-11 PROCEDURE — 25010000002 PERFLUTREN (DEFINITY) 8.476 MG IN SODIUM CHLORIDE (PF) 0.9 % 10 ML INJECTION: Performed by: INTERNAL MEDICINE

## 2022-11-11 PROCEDURE — 93356 MYOCRD STRAIN IMG SPCKL TRCK: CPT | Performed by: INTERNAL MEDICINE

## 2022-11-11 RX ADMIN — PERFLUTREN 2 ML: 6.52 INJECTION, SUSPENSION INTRAVENOUS at 09:01

## 2022-11-14 ENCOUNTER — TELEPHONE (OUTPATIENT)
Dept: CARDIOLOGY | Facility: CLINIC | Age: 77
End: 2022-11-14

## 2022-11-14 DIAGNOSIS — Z51.11 ENCOUNTER FOR ANTINEOPLASTIC CHEMOTHERAPY: Primary | ICD-10-CM

## 2022-11-15 NOTE — TELEPHONE ENCOUNTER
Notified pt of results and orders. She verbalized understanding.    Thank you,    Yessy Jarvis, RN  [place] [facility] [place]  11/15/22 10:10 EST

## 2022-11-17 ENCOUNTER — HOSPITAL ENCOUNTER (OUTPATIENT)
Dept: PHYSICAL THERAPY | Facility: HOSPITAL | Age: 77
Setting detail: THERAPIES SERIES
Discharge: HOME | End: 2022-11-17

## 2022-11-17 DIAGNOSIS — Z98.890 S/P LUMPECTOMY, LEFT BREAST: ICD-10-CM

## 2022-11-17 DIAGNOSIS — Z91.89 AT RISK FOR LYMPHEDEMA: ICD-10-CM

## 2022-11-17 DIAGNOSIS — Z17.0 MALIGNANT NEOPLASM OF UPPER-INNER QUADRANT OF LEFT BREAST IN FEMALE, ESTROGEN RECEPTOR POSITIVE: Primary | ICD-10-CM

## 2022-11-17 DIAGNOSIS — C50.212 MALIGNANT NEOPLASM OF UPPER-INNER QUADRANT OF LEFT BREAST IN FEMALE, ESTROGEN RECEPTOR POSITIVE: Primary | ICD-10-CM

## 2022-11-17 PROCEDURE — 97110 THERAPEUTIC EXERCISES: CPT

## 2022-11-17 PROCEDURE — 93702 BIS XTRACELL FLUID ANALYSIS: CPT

## 2022-11-17 PROCEDURE — 97535 SELF CARE MNGMENT TRAINING: CPT

## 2022-11-17 NOTE — THERAPY RE-EVALUATION
Physical Therapy Lymphedema Re-Evaluation  [facility]     Patient Name: Neela Ramirez  : 1945  MRN: [id number]  Today's Date: 2022      Visit Date: 2022    Visit Dx:    ICD-10-CM ICD-9-CM   1. Malignant neoplasm of upper-inner quadrant of left breast in female, estrogen receptor positive (HCC)  C50.212 174.2    Z17.0 V86.0   2. S/P lumpectomy, left breast  Z98.890 V45.89   3. At risk for lymphedema  Z91.89 V49.89       Patient Active Problem List   Diagnosis   • Anxiety   • DD (diverticular disease)   • Gastroesophageal reflux disease   • Essential hypertension   • Hyperlipidemia   • Osteopenia   • Bilateral low back pain without sciatica   • Malignant neoplasm of female breast (HCC)   • Encounter for management of implanted device        Past Medical History:   Diagnosis Date   • Anxiety    • Arthritis    • Cataract     EARLY. JIMMIE EYES   • Chronic back pain     LOW BACK ACHES AT TIME FROM ARTHRTIS   • Constipation     AT TIMES. NO RECENT ISSUES   • Dermatitis     LEGS. STATES CLEARING UP NOW.   • Disease of thyroid gland     HYPO   • Diverticulosis     Colonoscopy 2014   • Erythema of face     Transitory Erythema Of The Face   • GERD (gastroesophageal reflux disease)     ON OCC   • History of chemotherapy     FOR LEFT BREAST CANCER   • Hyperlipidemia    • Hypertension    • Malignant neoplasm of female breast (HCC) 2021   • Osteopenia    • Tachycardia         Past Surgical History:   Procedure Laterality Date   • BREAST BIOPSY  2021    Dr. Tirado    • BREAST CYST ASPIRATION Right    • BREAST CYST EXCISION Right    • BREAST LUMPECTOMY WITH SENTINEL NODE BIOPSY Left 2/10/2022    Procedure: LEFT BREAST WIRE LOCALIZED LUMPECTOMY WITH SENTINEL NODE BIOPSY, POSSIBLE LEFT AXILLARY DISSECTION;  Surgeon: Becky Liu MD;  [place] [facility] [place] [facility] [place]  Service: General;  Laterality: Left;   • COLONOSCOPY     • COLONOSCOPY     • EXOSTECTOMY Bilateral     Simple Bunion Exostectomy  (Silver Procedure)   • FOOT TENDON SURGERY  2012   • VENOUS ACCESS DEVICE (PORT) INSERTION N/A 10/18/2021    Procedure: INSERTION VENOUS ACCESS DEVICE;  Surgeon: Becky Liu MD;  [facility] [place];  Service: General;  Laterality: N/A;       Visit Dx:    ICD-10-CM ICD-9-CM   1. Malignant neoplasm of upper-inner quadrant of left breast in female, estrogen receptor positive (HCC)  C50.212 174.2    Z17.0 V86.0   2. S/P lumpectomy, left breast  Z98.890 V45.89   3. At risk for lymphedema  Z91.89 V49.89            Lymphedema     Row Name 11/17/22 1100             Subjective Pain    Able to rate subjective pain? yes  -LB      Pre-Treatment Pain Level 0  -LB         Subjective Comments    Subjective Comments I am doing well. I haven't had any issues.  -LB         Lymphedema Assessment    Lymphedema Classification LUE:;at risk/stage 0  -LB      Lymphedema Cancer Related Sx left;lumpectomy;sentinel node biopsy  -LB      Lymph Nodes Removed # 10  -LB      Positive Lymph Nodes # 1  -LB      Chemo Received yes  -LB      Radiation Therapy Received yes  -LB      Radiation Treatments #/Timeframe completed  -LB      Infections or Cellulitis? no  -LB         L-Dex Bioimpedence Screening    L-Dex Measurement Extremity LUE  -LB      L-Dex Patient Position Standing  -LB      L-Dex UE Dominate Side Right  -LB      L-Dex UE At Risk Side Left  -LB      L-Dex UE Pre Surgical Value No  -LB      L-Dex UE Score 10.2  -LB      L-Dex UE Baseline Score 2.9  -LB      L-Dex UE Value Change 7.3  -LB      L-Dex UE Comment outside of normal range  -LB      $ L-Dex Charge yes  -LB            User Key  (r) = Recorded By, (t) = Taken By, (c) = Cosigned By    Initials Name Provider Type    Edna Abdalla PT Physical Therapist                                Therapy Education  Education Details: discussed bioimpedance results and interpretation, reviewed compression garment use and wear schedule, need for healing of LUE scratches from puppy and  protection of LUE going forward to reduce scratches and cuts in LUE  Given: Symptoms/condition management, Posture/body mechanics, Edema management, HEP, Mobility training  Program: Reinforced, Progressed, New  How Provided: Verbal, Written, Demonstration  Provided to: Patient  Level of Understanding: Teach back education performed, Verbalized, Demonstrated  27038 - PT Self Care/Mgmt Minutes: 15       OP Exercises     Row Name 11/17/22 1100             Subjective Comments    Subjective Comments I am doing well. I haven't had any issues.  -LB         Subjective Pain    Able to rate subjective pain? yes  -LB      Pre-Treatment Pain Level 0  -LB         Total Minutes    15052 - PT Therapeutic Exercise Minutes 15  -LB         Exercise 1    Exercise Name 1 shoulder rolls  -LB      Reps 1 10  -LB         Exercise 2    Exercise Name 2 scap retraction  -LB      Reps 2 10  -LB      Time 2 5  -LB         Exercise 3    Exercise Name 3 standing wall wash flexion  -LB      Reps 3 10  -LB         Exercise 4    Exercise Name 4 supine AAROM flexion  -LB      Reps 4 10  -LB         Exercise 5    Exercise Name 5 standing AROM ER  -LB      Reps 5 10  -LB            User Key  (r) = Recorded By, (t) = Taken By, (c) = Cosigned By    Initials Name Provider Type    Edna Abdalla, PT Physical Therapist                             PT OP Goals     Row Name 11/17/22 1100          Long Term Goals    LTG Date to Achieve 05/06/22  -LB     LTG 1 Pt will maintain LDex bioimpedance score WNL.  -LB     LTG 1 Progress Ongoing  -LB     LTG 1 Progress Comments outside of normal range at 10.2 today  -LB     LTG 2 Pt will acquire appropriately fitted compression garment and verbalize appropriate wear schedule.  -LB     LTG 2 Progress Partially Met  -LB     LTG 2 Progress Comments Pt continues to verbalize she has not worn her sleeve  -LB     LTG 3 Pt will verbalize signs and symptoms of lymphedema and steps to prevention.  -LB     LTG 3 Progress  Ongoing  -LB     LTG 3 Progress Comments Reviewed today; strong encouragement to protect LUE from puppy biting/scratching  -LB           User Key  (r) = Recorded By, (t) = Taken By, (c) = Cosigned By    Initials Name Provider Type    LB Edna Condon, PT Physical Therapist                 PT Assessment/Plan     Row Name 11/17/22 1247          PT Assessment    Functional Limitations Other (comment)  at risk for lymphedema  -LB     Impairments Impaired flexibility;Impaired lymphatic circulation;Sensation  -LB     Assessment Comments Neela Ramirez is a 77 y.o. female who returns for 3 month f/u biompedance spectroscopy diagnostic assessment of lymphatics of the upper extremities due to increased risk of post lumpectomy lymphedema Left upper extremity due to history of s/p Left Lumpectomy with 10 LNs removed performed on 2/10/22 by surgeons Dr. Liu. Neela Ramirez   is at increased risk of post lumpectomy lymphedema syndrome Left Upper Extremity due to Radiation therapy Breast surgery Lymph Node Removal Lymph Node involvement. Current L-Dex score is 10.2, which is progressively increased outside of normal range. She does not have obvious swelling in LUE but she does have obvious open scratches and cuts from puppy that are red and scabbing. We discussed need to avoid abrasions to LUE and pt verbalizes understanding. She is being treated by dermatologist for skin impairments. She will wear sleeve on LUE to protect her arm and return in one month for repeat bioimpedance. Neela Ramirez  was encouraged to contact me during this time with any questions, concerns or changes in symptoms.  -LB     Rehab Potential Good  -LB     Patient/caregiver participated in establishment of treatment plan and goals Yes  -LB     Patient would benefit from skilled therapy intervention Yes  -LB        PT Plan    PT Frequency Other (comment)  -LB     Predicted Duration of Therapy Intervention (PT) repeat bioimpedance in one month  -LB      Planned CPT's? PT EVAL LOW COMPLEXITY: 02958;PT RE-EVAL: 41551;PT THER PROC EA 15 MIN: 39218;PT THER ACT EA 15 MIN: 22723;PT MANUAL THERAPY EA 15 MIN: 68778;PT NEUROMUSC RE-EDUCATION EA 15 MIN: 48845;PT SELF CARE/HOME MGMT/TRAIN EA 15: 02497;PT BIS XTRACELL FLUID ANALYSIS: 81012  -LB     PT Plan Comments repeat bioimpedance, has L forearm improved, shoulder ROM?  -LB           User Key  (r) = Recorded By, (t) = Taken By, (c) = Cosigned By    Initials Name Provider Type    LB Edna Condon, PT Physical Therapist                             Time Calculation:   Start Time: 1045  Stop Time: 1130  Time Calculation (min): 45 min  Total Timed Code Minutes- PT: 30 minute(s)  Timed Charges  55590 - PT Therapeutic Exercise Minutes: 15  99893 - PT Self Care/Mgmt Minutes: 15  Total Minutes  Timed Charges Total Minutes: 15   Total Minutes: 15   Therapy Charges for Today     Code Description Service Date Service Provider Modifiers Qty    66532263144 HC PT BIS XTRACELL FLUID ANALYSIS 11/17/2022 Edna Condon, PT  1    76194240263 HC PT SELF CARE/MGMT/TRAIN EA 15 MIN 11/17/2022 Edna Condon, PT GP 1    51664103515 HC PT THER PROC EA 15 MIN 11/17/2022 Edna Condon, PT GP 1                    Edna Condon PT  11/17/2022     .

## 2022-11-18 ENCOUNTER — INFUSION (OUTPATIENT)
Dept: ONCOLOGY | Facility: HOSPITAL | Age: 77
End: 2022-11-18

## 2022-11-18 VITALS
TEMPERATURE: 97.8 F | HEART RATE: 94 BPM | BODY MASS INDEX: 23.13 KG/M2 | RESPIRATION RATE: 16 BRPM | WEIGHT: 117.8 LBS | SYSTOLIC BLOOD PRESSURE: 125 MMHG | OXYGEN SATURATION: 95 % | DIASTOLIC BLOOD PRESSURE: 75 MMHG

## 2022-11-18 DIAGNOSIS — C50.212 MALIGNANT NEOPLASM OF UPPER-INNER QUADRANT OF LEFT BREAST IN FEMALE, ESTROGEN RECEPTOR POSITIVE: Primary | ICD-10-CM

## 2022-11-18 DIAGNOSIS — Z45.9 ENCOUNTER FOR MANAGEMENT OF IMPLANTED DEVICE: ICD-10-CM

## 2022-11-18 DIAGNOSIS — Z17.0 MALIGNANT NEOPLASM OF UPPER-INNER QUADRANT OF LEFT BREAST IN FEMALE, ESTROGEN RECEPTOR POSITIVE: Primary | ICD-10-CM

## 2022-11-18 LAB
ALBUMIN SERPL-MCNC: 4 G/DL (ref 3.5–5.2)
ALBUMIN/GLOB SERPL: 1.3 G/DL (ref 1.1–2.4)
ALP SERPL-CCNC: 112 U/L (ref 38–116)
ALT SERPL W P-5'-P-CCNC: 27 U/L (ref 0–33)
ANION GAP SERPL CALCULATED.3IONS-SCNC: 13.4 MMOL/L (ref 5–15)
AST SERPL-CCNC: 37 U/L (ref 0–32)
BASOPHILS # BLD AUTO: 0.05 10*3/MM3 (ref 0–0.2)
BASOPHILS NFR BLD AUTO: 0.8 % (ref 0–1.5)
BILIRUB SERPL-MCNC: 0.5 MG/DL (ref 0.2–1.2)
BUN SERPL-MCNC: 14 MG/DL (ref 6–20)
BUN/CREAT SERPL: 18.9 (ref 7.3–30)
CALCIUM SPEC-SCNC: 9.5 MG/DL (ref 8.5–10.2)
CHLORIDE SERPL-SCNC: 103 MMOL/L (ref 98–107)
CO2 SERPL-SCNC: 22.6 MMOL/L (ref 22–29)
CREAT SERPL-MCNC: 0.74 MG/DL (ref 0.6–1.1)
DEPRECATED RDW RBC AUTO: 50.7 FL (ref 37–54)
EGFRCR SERPLBLD CKD-EPI 2021: 83.4 ML/MIN/1.73
EOSINOPHIL # BLD AUTO: 0.21 10*3/MM3 (ref 0–0.4)
EOSINOPHIL NFR BLD AUTO: 3.2 % (ref 0.3–6.2)
ERYTHROCYTE [DISTWIDTH] IN BLOOD BY AUTOMATED COUNT: 13.9 % (ref 12.3–15.4)
GLOBULIN UR ELPH-MCNC: 3 GM/DL (ref 1.8–3.5)
GLUCOSE SERPL-MCNC: 115 MG/DL (ref 74–124)
HCT VFR BLD AUTO: 37.2 % (ref 34–46.6)
HGB BLD-MCNC: 12.1 G/DL (ref 12–15.9)
IMM GRANULOCYTES # BLD AUTO: 0.03 10*3/MM3 (ref 0–0.05)
IMM GRANULOCYTES NFR BLD AUTO: 0.5 % (ref 0–0.5)
LYMPHOCYTES # BLD AUTO: 1.11 10*3/MM3 (ref 0.7–3.1)
LYMPHOCYTES NFR BLD AUTO: 17 % (ref 19.6–45.3)
MCH RBC QN AUTO: 32.3 PG (ref 26.6–33)
MCHC RBC AUTO-ENTMCNC: 32.5 G/DL (ref 31.5–35.7)
MCV RBC AUTO: 99.2 FL (ref 79–97)
MONOCYTES # BLD AUTO: 0.86 10*3/MM3 (ref 0.1–0.9)
MONOCYTES NFR BLD AUTO: 13.1 % (ref 5–12)
NEUTROPHILS NFR BLD AUTO: 4.28 10*3/MM3 (ref 1.7–7)
NEUTROPHILS NFR BLD AUTO: 65.4 % (ref 42.7–76)
NRBC BLD AUTO-RTO: 0 /100 WBC (ref 0–0.2)
PLATELET # BLD AUTO: 221 10*3/MM3 (ref 140–450)
PMV BLD AUTO: 9.4 FL (ref 6–12)
POTASSIUM SERPL-SCNC: 3.9 MMOL/L (ref 3.5–4.7)
PROT SERPL-MCNC: 7 G/DL (ref 6.3–8)
RBC # BLD AUTO: 3.75 10*6/MM3 (ref 3.77–5.28)
SODIUM SERPL-SCNC: 139 MMOL/L (ref 134–145)
WBC NRBC COR # BLD: 6.54 10*3/MM3 (ref 3.4–10.8)

## 2022-11-18 PROCEDURE — 85025 COMPLETE CBC W/AUTO DIFF WBC: CPT

## 2022-11-18 PROCEDURE — 80053 COMPREHEN METABOLIC PANEL: CPT

## 2022-11-18 PROCEDURE — 96375 TX/PRO/DX INJ NEW DRUG ADDON: CPT

## 2022-11-18 PROCEDURE — 96413 CHEMO IV INFUSION 1 HR: CPT

## 2022-11-18 PROCEDURE — 25010000002 ADO-TRASTUZUMAB 100 MG RECONSTITUTED SOLUTION 1 EACH VIAL: Performed by: NURSE PRACTITIONER

## 2022-11-18 PROCEDURE — 25010000002 DEXAMETHASONE SODIUM PHOSPHATE 100 MG/10ML SOLUTION: Performed by: NURSE PRACTITIONER

## 2022-11-18 PROCEDURE — 25010000002 HEPARIN LOCK FLUSH PER 10 UNITS: Performed by: INTERNAL MEDICINE

## 2022-11-18 RX ORDER — HEPARIN SODIUM (PORCINE) LOCK FLUSH IV SOLN 100 UNIT/ML 100 UNIT/ML
500 SOLUTION INTRAVENOUS AS NEEDED
Status: DISCONTINUED | OUTPATIENT
Start: 2022-11-18 | End: 2022-11-18 | Stop reason: HOSPADM

## 2022-11-18 RX ORDER — SODIUM CHLORIDE 0.9 % (FLUSH) 0.9 %
10 SYRINGE (ML) INJECTION AS NEEDED
Status: DISCONTINUED | OUTPATIENT
Start: 2022-11-18 | End: 2022-11-18 | Stop reason: HOSPADM

## 2022-11-18 RX ORDER — SODIUM CHLORIDE 9 MG/ML
250 INJECTION, SOLUTION INTRAVENOUS ONCE
Status: COMPLETED | OUTPATIENT
Start: 2022-11-18 | End: 2022-11-18

## 2022-11-18 RX ADMIN — SODIUM CHLORIDE 250 ML: 9 INJECTION, SOLUTION INTRAVENOUS at 14:42

## 2022-11-18 RX ADMIN — Medication 10 ML: at 16:28

## 2022-11-18 RX ADMIN — DEXAMETHASONE SODIUM PHOSPHATE 12 MG: 10 INJECTION, SOLUTION INTRAMUSCULAR; INTRAVENOUS at 14:42

## 2022-11-18 RX ADMIN — Medication 500 UNITS: at 16:28

## 2022-11-18 RX ADMIN — ADO-TRASTUZUMAB EMTANSINE 195 MG: 20 INJECTION, POWDER, LYOPHILIZED, FOR SOLUTION INTRAVENOUS at 15:56

## 2022-11-26 DIAGNOSIS — E03.8 SUBCLINICAL HYPOTHYROIDISM: ICD-10-CM

## 2022-11-28 RX ORDER — LEVOTHYROXINE SODIUM 0.03 MG/1
TABLET ORAL
Qty: 30 TABLET | Refills: 0 | Status: SHIPPED | OUTPATIENT
Start: 2022-11-28 | End: 2022-12-27

## 2022-12-06 DIAGNOSIS — I10 ESSENTIAL HYPERTENSION: ICD-10-CM

## 2022-12-06 RX ORDER — METOPROLOL SUCCINATE 25 MG/1
TABLET, EXTENDED RELEASE ORAL
Qty: 30 TABLET | Refills: 0 | Status: SHIPPED | OUTPATIENT
Start: 2022-12-06 | End: 2023-01-05 | Stop reason: SDUPTHER

## 2022-12-09 ENCOUNTER — INFUSION (OUTPATIENT)
Dept: ONCOLOGY | Facility: HOSPITAL | Age: 77
End: 2022-12-09

## 2022-12-09 VITALS
HEART RATE: 104 BPM | DIASTOLIC BLOOD PRESSURE: 69 MMHG | OXYGEN SATURATION: 94 % | SYSTOLIC BLOOD PRESSURE: 123 MMHG | BODY MASS INDEX: 23.44 KG/M2 | TEMPERATURE: 97.8 F | RESPIRATION RATE: 16 BRPM | WEIGHT: 119.4 LBS

## 2022-12-09 DIAGNOSIS — C50.212 MALIGNANT NEOPLASM OF UPPER-INNER QUADRANT OF LEFT BREAST IN FEMALE, ESTROGEN RECEPTOR POSITIVE: Primary | ICD-10-CM

## 2022-12-09 DIAGNOSIS — Z17.0 MALIGNANT NEOPLASM OF UPPER-INNER QUADRANT OF LEFT BREAST IN FEMALE, ESTROGEN RECEPTOR POSITIVE: Primary | ICD-10-CM

## 2022-12-09 LAB
ALBUMIN SERPL-MCNC: 3.9 G/DL (ref 3.5–5.2)
ALBUMIN/GLOB SERPL: 1.4 G/DL (ref 1.1–2.4)
ALP SERPL-CCNC: 113 U/L (ref 38–116)
ALT SERPL W P-5'-P-CCNC: 23 U/L (ref 0–33)
ANION GAP SERPL CALCULATED.3IONS-SCNC: 12.8 MMOL/L (ref 5–15)
AST SERPL-CCNC: 31 U/L (ref 0–32)
BASOPHILS # BLD AUTO: 0.03 10*3/MM3 (ref 0–0.2)
BASOPHILS NFR BLD AUTO: 0.4 % (ref 0–1.5)
BILIRUB SERPL-MCNC: 0.5 MG/DL (ref 0.2–1.2)
BUN SERPL-MCNC: 16 MG/DL (ref 6–20)
BUN/CREAT SERPL: 22.2 (ref 7.3–30)
CALCIUM SPEC-SCNC: 9.3 MG/DL (ref 8.5–10.2)
CHLORIDE SERPL-SCNC: 102 MMOL/L (ref 98–107)
CO2 SERPL-SCNC: 24.2 MMOL/L (ref 22–29)
CREAT SERPL-MCNC: 0.72 MG/DL (ref 0.6–1.1)
DEPRECATED RDW RBC AUTO: 49.2 FL (ref 37–54)
EGFRCR SERPLBLD CKD-EPI 2021: 86.2 ML/MIN/1.73
EOSINOPHIL # BLD AUTO: 0.16 10*3/MM3 (ref 0–0.4)
EOSINOPHIL NFR BLD AUTO: 2.4 % (ref 0.3–6.2)
ERYTHROCYTE [DISTWIDTH] IN BLOOD BY AUTOMATED COUNT: 13.5 % (ref 12.3–15.4)
GLOBULIN UR ELPH-MCNC: 2.8 GM/DL (ref 1.8–3.5)
GLUCOSE SERPL-MCNC: 126 MG/DL (ref 74–124)
HCT VFR BLD AUTO: 37.1 % (ref 34–46.6)
HGB BLD-MCNC: 12.2 G/DL (ref 12–15.9)
IMM GRANULOCYTES # BLD AUTO: 0.01 10*3/MM3 (ref 0–0.05)
IMM GRANULOCYTES NFR BLD AUTO: 0.1 % (ref 0–0.5)
LYMPHOCYTES # BLD AUTO: 0.99 10*3/MM3 (ref 0.7–3.1)
LYMPHOCYTES NFR BLD AUTO: 14.7 % (ref 19.6–45.3)
MCH RBC QN AUTO: 32.4 PG (ref 26.6–33)
MCHC RBC AUTO-ENTMCNC: 32.9 G/DL (ref 31.5–35.7)
MCV RBC AUTO: 98.4 FL (ref 79–97)
MONOCYTES # BLD AUTO: 0.74 10*3/MM3 (ref 0.1–0.9)
MONOCYTES NFR BLD AUTO: 11 % (ref 5–12)
NEUTROPHILS NFR BLD AUTO: 4.8 10*3/MM3 (ref 1.7–7)
NEUTROPHILS NFR BLD AUTO: 71.4 % (ref 42.7–76)
NRBC BLD AUTO-RTO: 0 /100 WBC (ref 0–0.2)
PLATELET # BLD AUTO: 233 10*3/MM3 (ref 140–450)
PMV BLD AUTO: 9.1 FL (ref 6–12)
POTASSIUM SERPL-SCNC: 3.9 MMOL/L (ref 3.5–4.7)
PROT SERPL-MCNC: 6.7 G/DL (ref 6.3–8)
RBC # BLD AUTO: 3.77 10*6/MM3 (ref 3.77–5.28)
SODIUM SERPL-SCNC: 139 MMOL/L (ref 134–145)
WBC NRBC COR # BLD: 6.73 10*3/MM3 (ref 3.4–10.8)

## 2022-12-09 PROCEDURE — 25010000002 DEXAMETHASONE SODIUM PHOSPHATE 100 MG/10ML SOLUTION: Performed by: NURSE PRACTITIONER

## 2022-12-09 PROCEDURE — 85025 COMPLETE CBC W/AUTO DIFF WBC: CPT

## 2022-12-09 PROCEDURE — 80053 COMPREHEN METABOLIC PANEL: CPT

## 2022-12-09 PROCEDURE — 25010000002 ADO-TRASTUZUMAB 100 MG RECONSTITUTED SOLUTION 1 EACH VIAL: Performed by: NURSE PRACTITIONER

## 2022-12-09 PROCEDURE — 96413 CHEMO IV INFUSION 1 HR: CPT

## 2022-12-09 PROCEDURE — 96375 TX/PRO/DX INJ NEW DRUG ADDON: CPT

## 2022-12-09 RX ORDER — SODIUM CHLORIDE 9 MG/ML
250 INJECTION, SOLUTION INTRAVENOUS ONCE
Status: COMPLETED | OUTPATIENT
Start: 2022-12-09 | End: 2022-12-09

## 2022-12-09 RX ADMIN — SODIUM CHLORIDE 250 ML: 9 INJECTION, SOLUTION INTRAVENOUS at 14:51

## 2022-12-09 RX ADMIN — DEXAMETHASONE SODIUM PHOSPHATE 12 MG: 10 INJECTION, SOLUTION INTRAMUSCULAR; INTRAVENOUS at 14:51

## 2022-12-09 RX ADMIN — ADO-TRASTUZUMAB EMTANSINE 195 MG: 20 INJECTION, POWDER, LYOPHILIZED, FOR SOLUTION INTRAVENOUS at 15:29

## 2022-12-25 DIAGNOSIS — E03.8 SUBCLINICAL HYPOTHYROIDISM: ICD-10-CM

## 2022-12-27 RX ORDER — LEVOTHYROXINE SODIUM 0.03 MG/1
TABLET ORAL
Qty: 90 TABLET | Refills: 1 | Status: SHIPPED | OUTPATIENT
Start: 2022-12-27

## 2022-12-30 ENCOUNTER — OFFICE VISIT (OUTPATIENT)
Dept: ONCOLOGY | Facility: CLINIC | Age: 77
End: 2022-12-30

## 2022-12-30 ENCOUNTER — INFUSION (OUTPATIENT)
Dept: ONCOLOGY | Facility: HOSPITAL | Age: 77
End: 2022-12-30

## 2022-12-30 VITALS
SYSTOLIC BLOOD PRESSURE: 110 MMHG | HEIGHT: 60 IN | TEMPERATURE: 97.1 F | OXYGEN SATURATION: 94 % | DIASTOLIC BLOOD PRESSURE: 68 MMHG | HEART RATE: 104 BPM | RESPIRATION RATE: 16 BRPM | WEIGHT: 120.3 LBS | BODY MASS INDEX: 23.62 KG/M2

## 2022-12-30 DIAGNOSIS — C50.212 MALIGNANT NEOPLASM OF UPPER-INNER QUADRANT OF LEFT BREAST IN FEMALE, ESTROGEN RECEPTOR POSITIVE: Primary | ICD-10-CM

## 2022-12-30 DIAGNOSIS — C50.011 MALIGNANT NEOPLASM OF NIPPLE OF RIGHT BREAST IN FEMALE, UNSPECIFIED ESTROGEN RECEPTOR STATUS: Primary | ICD-10-CM

## 2022-12-30 DIAGNOSIS — Z17.0 MALIGNANT NEOPLASM OF UPPER-INNER QUADRANT OF LEFT BREAST IN FEMALE, ESTROGEN RECEPTOR POSITIVE: Primary | ICD-10-CM

## 2022-12-30 DIAGNOSIS — Z17.0 MALIGNANT NEOPLASM OF UPPER-INNER QUADRANT OF LEFT BREAST IN FEMALE, ESTROGEN RECEPTOR POSITIVE: ICD-10-CM

## 2022-12-30 DIAGNOSIS — Z45.9 ENCOUNTER FOR MANAGEMENT OF IMPLANTED DEVICE: ICD-10-CM

## 2022-12-30 DIAGNOSIS — Z79.899 HIGH RISK MEDICATION USE: ICD-10-CM

## 2022-12-30 DIAGNOSIS — C50.212 MALIGNANT NEOPLASM OF UPPER-INNER QUADRANT OF LEFT BREAST IN FEMALE, ESTROGEN RECEPTOR POSITIVE: ICD-10-CM

## 2022-12-30 DIAGNOSIS — R68.83 CHILLS: ICD-10-CM

## 2022-12-30 LAB
ALBUMIN SERPL-MCNC: 3.7 G/DL (ref 3.5–5.2)
ALBUMIN/GLOB SERPL: 1.2 G/DL (ref 1.1–2.4)
ALP SERPL-CCNC: 112 U/L (ref 38–116)
ALT SERPL W P-5'-P-CCNC: 23 U/L (ref 0–33)
ANION GAP SERPL CALCULATED.3IONS-SCNC: 13.2 MMOL/L (ref 5–15)
AST SERPL-CCNC: 40 U/L (ref 0–32)
BACTERIA UR QL AUTO: NEGATIVE /HPF
BASOPHILS # BLD AUTO: 0.04 10*3/MM3 (ref 0–0.2)
BASOPHILS NFR BLD AUTO: 0.6 % (ref 0–1.5)
BILIRUB SERPL-MCNC: 0.3 MG/DL (ref 0.2–1.2)
BILIRUB UR QL STRIP: NEGATIVE
BUN SERPL-MCNC: 19 MG/DL (ref 6–20)
BUN/CREAT SERPL: 26.8 (ref 7.3–30)
CALCIUM SPEC-SCNC: 9.3 MG/DL (ref 8.5–10.2)
CHLORIDE SERPL-SCNC: 103 MMOL/L (ref 98–107)
CLARITY UR: CLEAR
CO2 SERPL-SCNC: 22.8 MMOL/L (ref 22–29)
COLOR UR: YELLOW
CREAT SERPL-MCNC: 0.71 MG/DL (ref 0.6–1.1)
DEPRECATED RDW RBC AUTO: 52.7 FL (ref 37–54)
EGFRCR SERPLBLD CKD-EPI 2021: 87.7 ML/MIN/1.73
EOSINOPHIL # BLD AUTO: 0.08 10*3/MM3 (ref 0–0.4)
EOSINOPHIL NFR BLD AUTO: 1.1 % (ref 0.3–6.2)
ERYTHROCYTE [DISTWIDTH] IN BLOOD BY AUTOMATED COUNT: 14.3 % (ref 12.3–15.4)
GLOBULIN UR ELPH-MCNC: 3 GM/DL (ref 1.8–3.5)
GLUCOSE SERPL-MCNC: 112 MG/DL (ref 74–124)
GLUCOSE UR STRIP-MCNC: NEGATIVE MG/DL
HCT VFR BLD AUTO: 39.6 % (ref 34–46.6)
HGB BLD-MCNC: 12.8 G/DL (ref 12–15.9)
HGB UR QL STRIP.AUTO: ABNORMAL
HYALINE CASTS UR QL AUTO: ABNORMAL /LPF
IMM GRANULOCYTES # BLD AUTO: 0.04 10*3/MM3 (ref 0–0.05)
IMM GRANULOCYTES NFR BLD AUTO: 0.6 % (ref 0–0.5)
KETONES UR QL STRIP: NEGATIVE
LEUKOCYTE ESTERASE UR QL STRIP.AUTO: NEGATIVE
LYMPHOCYTES # BLD AUTO: 0.89 10*3/MM3 (ref 0.7–3.1)
LYMPHOCYTES NFR BLD AUTO: 12.6 % (ref 19.6–45.3)
MCH RBC QN AUTO: 32.7 PG (ref 26.6–33)
MCHC RBC AUTO-ENTMCNC: 32.3 G/DL (ref 31.5–35.7)
MCV RBC AUTO: 101.3 FL (ref 79–97)
MONOCYTES # BLD AUTO: 0.63 10*3/MM3 (ref 0.1–0.9)
MONOCYTES NFR BLD AUTO: 8.9 % (ref 5–12)
NEUTROPHILS NFR BLD AUTO: 5.36 10*3/MM3 (ref 1.7–7)
NEUTROPHILS NFR BLD AUTO: 76.2 % (ref 42.7–76)
NITRITE UR QL STRIP: NEGATIVE
NRBC BLD AUTO-RTO: 0 /100 WBC (ref 0–0.2)
PH UR STRIP.AUTO: 6 [PH] (ref 4.5–8)
PLATELET # BLD AUTO: 247 10*3/MM3 (ref 140–450)
PMV BLD AUTO: 9.4 FL (ref 6–12)
POTASSIUM SERPL-SCNC: 4 MMOL/L (ref 3.5–4.7)
PROT SERPL-MCNC: 6.7 G/DL (ref 6.3–8)
PROT UR QL STRIP: NEGATIVE
RBC # BLD AUTO: 3.91 10*6/MM3 (ref 3.77–5.28)
RBC # UR STRIP: ABNORMAL /HPF
REF LAB TEST METHOD: ABNORMAL
SODIUM SERPL-SCNC: 139 MMOL/L (ref 134–145)
SP GR UR STRIP: 1.02 (ref 1–1.03)
SQUAMOUS #/AREA URNS HPF: ABNORMAL /HPF
UROBILINOGEN UR QL STRIP: ABNORMAL
WBC # UR STRIP: ABNORMAL /HPF
WBC NRBC COR # BLD: 7.04 10*3/MM3 (ref 3.4–10.8)

## 2022-12-30 PROCEDURE — 25010000002 ADO-TRASTUZUMAB 100 MG RECONSTITUTED SOLUTION 1 EACH VIAL: Performed by: NURSE PRACTITIONER

## 2022-12-30 PROCEDURE — 80053 COMPREHEN METABOLIC PANEL: CPT

## 2022-12-30 PROCEDURE — 99214 OFFICE O/P EST MOD 30 MIN: CPT | Performed by: NURSE PRACTITIONER

## 2022-12-30 PROCEDURE — 85025 COMPLETE CBC W/AUTO DIFF WBC: CPT

## 2022-12-30 PROCEDURE — 25010000002 HEPARIN LOCK FLUSH PER 10 UNITS: Performed by: INTERNAL MEDICINE

## 2022-12-30 PROCEDURE — 96375 TX/PRO/DX INJ NEW DRUG ADDON: CPT

## 2022-12-30 PROCEDURE — 81001 URINALYSIS AUTO W/SCOPE: CPT | Performed by: NURSE PRACTITIONER

## 2022-12-30 PROCEDURE — 25010000002 DEXAMETHASONE SODIUM PHOSPHATE 100 MG/10ML SOLUTION: Performed by: NURSE PRACTITIONER

## 2022-12-30 PROCEDURE — 96413 CHEMO IV INFUSION 1 HR: CPT

## 2022-12-30 RX ORDER — HEPARIN SODIUM (PORCINE) LOCK FLUSH IV SOLN 100 UNIT/ML 100 UNIT/ML
500 SOLUTION INTRAVENOUS AS NEEDED
Status: DISCONTINUED | OUTPATIENT
Start: 2022-12-30 | End: 2022-12-30 | Stop reason: HOSPADM

## 2022-12-30 RX ORDER — SODIUM CHLORIDE 9 MG/ML
250 INJECTION, SOLUTION INTRAVENOUS ONCE
Status: CANCELLED | OUTPATIENT
Start: 2022-12-30

## 2022-12-30 RX ORDER — SODIUM CHLORIDE 9 MG/ML
250 INJECTION, SOLUTION INTRAVENOUS ONCE
Status: COMPLETED | OUTPATIENT
Start: 2022-12-30 | End: 2022-12-30

## 2022-12-30 RX ORDER — SODIUM CHLORIDE 0.9 % (FLUSH) 0.9 %
10 SYRINGE (ML) INJECTION AS NEEDED
Status: DISCONTINUED | OUTPATIENT
Start: 2022-12-30 | End: 2022-12-30 | Stop reason: HOSPADM

## 2022-12-30 RX ADMIN — Medication 10 ML: at 14:44

## 2022-12-30 RX ADMIN — SODIUM CHLORIDE 250 ML: 9 INJECTION, SOLUTION INTRAVENOUS at 13:47

## 2022-12-30 RX ADMIN — DEXAMETHASONE SODIUM PHOSPHATE 12 MG: 10 INJECTION, SOLUTION INTRAMUSCULAR; INTRAVENOUS at 13:48

## 2022-12-30 RX ADMIN — ADO-TRASTUZUMAB EMTANSINE 195 MG: 20 INJECTION, POWDER, LYOPHILIZED, FOR SOLUTION INTRAVENOUS at 14:09

## 2022-12-30 RX ADMIN — Medication 500 UNITS: at 14:44

## 2022-12-30 NOTE — PROGRESS NOTES
Subjective   Neela Ramirez is a 77 y.o. female.  Referred by Dr. 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  A bilateral diagnostic mammogram was performed for further evaluation of the same.    8/17/2021-bilateral diagnostic mammogram-scattered fibroglandular densities throughout both breasts.  In the posterior one third upper inner quadrant of the left breast at the site of palpable concern, 11 o'clock position there is a mass with adjacent pleomorphic calcifications.  The mass and the calcifications measure on order of 1.5 cm in greatest dimension.  No evidence of axillary lymphadenopathy appreciated.  Stable microcalcifications seen in other areas of the left breast on right breast.  Ultrasound-at 11 o'clock position, 5 cm from the nipple in the left breast there is an irregular hypoechoic mass which measures 2.4 x 2 x 1.6 cm.    Impression  1.irregular 2.4 cm mass in the left breast at the site of palpable concern at 11:00, 5 cm from the nipple.  Ultrasound-guided biopsy of the mass recommended  No finding suspicious for malignancy in the right breast    9/8/2021-left breast ultrasound-guided biopsy  Pathology consistent with invasive ductal carcinoma with lobular features.  Grade 3.  The carcinoma measures 7 mm in greatest dimension.  Focally suspicious for lymphovascular space invasion.  ER low +1-10%, intensity week  MI negative  HER-2 3+ on immunohistochemistry  Ki-67 70%    MRI of the breast 10/8/2021-Biopsy-proven malignancy in the left breast at 11:00 measuring 2.8 cm with a centrally located metallic clip.  No other suspicious findings are seen within the left breast or no left axillary lymphadenopathy.  No suspicious findings of the right breast    Echocardiogram 10/8/2021 was normal  ejection fraction of 56 to 60% and strain of -20    She denies any new bone pains, dyspnea, cough, abdominal pain nausea vomiting or recent changes in weight.    She had 2 previous breast aspirations in her 20s.  No other breast biopsies  She does not know much about her family hence limited family history.    She received neoadjuvant chemotherapy on EA 1181 trial with Taxol Perjeta and Herceptin.  She completed 12 weeks of Taxol Herceptin and Perjeta.    Had an abnormal EKG preop and hence a stress test was performed on 2/3/2022 which showed a normal left ventricular ejection fraction and LVEF at stress was 73%.  Considered a low risk study with normal myocardial perfusion imaging.    She underwent a left breast lumpectomy and a sentinel lymph node biopsy on 2/10/2022.    Pathology was consistent with residual tumor which was pleomorphic invasive lobular carcinoma, poorly differentiated, grade 3  Tumor measured 18 mm  Margins negative for invasive carcinoma  Associated lobular carcinoma in situ noted  1 out of 10 lymph nodes was positive for metastatic focus measuring 3.5 mm.    Receptors were repeated on the pleomorphic invasive lobular carcinoma  ER +91 to 100% strong  LA +61 to 70% moderate  HER-2 negative  Ki-67 55%    Receptors performed on the metastatic lymph node  ER +81-90% strong  LA negative  HER-2 2+ on immunohistochemistry, FISH pending.    ypT1 cN1 aM0    2/28/2022-CT of the chest abdomen and pelvis  Fluid collection in the upper left breast measuring 6 x 2.5 x 4.9 cm, postoperative seroma.  Skin thickening of the left breast likely postsurgical.  Fluid collections left axilla measuring 2.9 x 1.4 x 2.9 cm.  No other suspicious abnormalities on the CT of the chest  CT abdomen with no evidence of metastatic disease.  Mild colonic diverticulosis.  Small uterine fibroid.    3/1/2022-bone scan  No evidence of metastatic disease.  Multifocal degenerative arthritic uptake without scintigraphic evidence to  suggest metastatic disease.    3/1/2022-echocardiogram  Left ventricular ejection fraction 56-60%.  Normal global LV strain of -20.5%.    3/11/2022-cycle 1 of AC    Completed 4 cycles of AC    6/3/2022-cycle 1 Kadcyla    7/19/2022-completed adjuvant radiation    7/20/2022-started adjuvant anastrozole    8/17/2022-bilateral diagnostic mammogram-benign    Interval history  Ms. Ramirez presents to the clinic today for follow-up.  Scheduled for cycle 11 of maintenance Kadcyla.  Thankfully she continues to tolerate treatment well.    She has ongoing tightness in the left side/underneath the left breast.  She has had physical therapy for this with exercises she knows to do though admittedly is not consistent with it.    She is noting some discomfort in her left ear and some feelings of lightheadedness.  She has noticed some chills running through her body this week which is new for her.  No actual recorded fever.  Denies other concerns at this time.    The following portions of the patient's history were reviewed and updated as appropriate: allergies, current medications, past family history, past medical history, past social history, past surgical history and problem list.    Past Medical History:   Diagnosis Date   • Anxiety    • Arthritis    • Cataract     EARLY. JIMMIE EYES   • Chronic back pain     LOW BACK ACHES AT TIME FROM ARTHRTIS   • Constipation     AT TIMES. NO RECENT ISSUES   • Dermatitis     LEGS. STATES CLEARING UP NOW.   • Disease of thyroid gland     HYPO   • Diverticulosis     Colonoscopy November 2014   • Erythema of face     Transitory Erythema Of The Face   • GERD (gastroesophageal reflux disease)     ON OCC   • History of chemotherapy     FOR LEFT BREAST CANCER   • Hyperlipidemia    • Hypertension    • Malignant neoplasm of female breast (HCC) 9/27/2021   • Osteopenia    • Tachycardia         Past Surgical History:   Procedure Laterality Date   • BREAST BIOPSY  09/2021    Dr. Tirado    • BREAST CYST  "ASPIRATION Right    • BREAST CYST EXCISION Right    • BREAST LUMPECTOMY WITH SENTINEL NODE BIOPSY Left 2/10/2022    Procedure: LEFT BREAST WIRE LOCALIZED LUMPECTOMY WITH SENTINEL NODE BIOPSY, POSSIBLE LEFT AXILLARY DISSECTION;  Surgeon: Becky Liu MD;  Location: [facility];  Service: General;  Laterality: Left;   • COLONOSCOPY     • COLONOSCOPY     • EXOSTECTOMY Bilateral     Simple Bunion Exostectomy (Silver Procedure)   • FOOT TENDON SURGERY     • VENOUS ACCESS DEVICE (PORT) INSERTION N/A 10/18/2021    Procedure: INSERTION VENOUS ACCESS DEVICE;  Surgeon: Becky Liu MD;  Location: [facility] OR [facility] [place];  Service: General;  Laterality: N/A;        Family History   Problem Relation Age of Onset   • Arthritis Father    • Heart disease Father    • Hypertension Father    • Hypertension Mother    • Malig Hyperthermia Neg Hx         Social History     Socioeconomic History   • Marital status:      Spouse name: Pj   • Number of children: 1   Tobacco Use   • Smoking status: Former     Types: Cigarettes   • Smokeless tobacco: Never   • Tobacco comments:     SOCIALLY IN EARLY S   Vaping Use   • Vaping Use: Never used   Substance and Sexual Activity   • Alcohol use: Yes     Comment: social drinker   • Drug use: Never   • Sexual activity: Defer        OB History             Para        Term   0            AB        Living           SAB        IAB        Ectopic        Molar        Multiple        Live Births                 Age at menarche-13  Age at menopause-50  Nulliparous  Breast-feeding-N/A   0 para 0  0  Oral contraceptive pill use-none  Hormone replacement therapy-7 years    No Known Allergies     Review of systems mentioned in the HPI    Objective   Blood pressure 110/68, pulse 104, temperature 97.1 °F (36.2 °C), temperature source Temporal, resp. rate 16, height 152 cm (59.84\"), weight 54.6 kg (120 lb 4.8 oz), SpO2 94 %.     ECOG performance status-0    Physical " Exam  Vitals reviewed.   Constitutional:       Appearance: Normal appearance.   HENT:      Head: Normocephalic and atraumatic.      Right Ear: There is impacted cerumen.      Left Ear: There is impacted cerumen.      Nose: Nose normal.      Mouth/Throat:      Mouth: Mucous membranes are moist.      Pharynx: Oropharynx is clear.   Eyes:      Conjunctiva/sclera: Conjunctivae normal.      Pupils: Pupils are equal, round, and reactive to light.   Cardiovascular:      Rate and Rhythm: Normal rate and regular rhythm.      Pulses: Normal pulses.      Heart sounds: Normal heart sounds.   Pulmonary:      Effort: Pulmonary effort is normal.      Breath sounds: Normal breath sounds.   Abdominal:      General: Abdomen is flat. Bowel sounds are normal.      Palpations: Abdomen is soft.   Musculoskeletal:         General: Normal range of motion.      Cervical back: Normal range of motion.   Skin:     General: Skin is warm and dry.      Findings: No rash.   Neurological:      General: No focal deficit present.      Mental Status: She is alert and oriented to person, place, and time. Mental status is at baseline.   Psychiatric:         Mood and Affect: Mood normal.         Behavior: Behavior normal.         Thought Content: Thought content normal.         Judgment: Judgment normal.       Breast Exam: Right breast appears normal on inspection.  No palpable abnormalities of the right breast.    Left breast on inspection there is a scar which is well-healed.  There is also left axillary scar which is well-healed.  Skin changes consistent with radiation dermatitis.        Results from last 7 days   Lab Units 12/30/22  1218   WBC 10*3/mm3 7.04   NEUTROS ABS 10*3/mm3 5.36   HEMOGLOBIN g/dL 12.8   HEMATOCRIT % 39.6   PLATELETS 10*3/mm3 247               No radiology results for the last 30 days.         Assessment & Plan       *Invasive ductal carcinoma of the left breast  · Clinical T2 N0 M0, stage IIa  · On ultrasound the tumor measures  2.4 cm.  Lymph nodes on the left side are normal.  · MRI 10/8/2021 with tumor measuring 2.8 cm.  Lymph nodes appear normal  · Pathology consistent with invasive carcinoma with ductal and lobular features, grade 3, ER +1 to 10% weak, IA negative, HER-2 3+ immunohistochemistry, Ki-67 70%.  · She was evaluated by Dr. Liu and referred for consideration of neoadjuvant chemotherapy.   Since the tumor is HER-2 positive and over 2 cm I think it would be reasonable to proceed with neoadjuvant chemotherapy.  Since the tumor is over 2 cm but lymph node negative I think she would be a great candidate for EA 1181 clinical trial which comprises of administering Taxol Herceptin and Perjeta tamika  adjuvantly followed by surgery and additional adjuvant treatment depending upon the response.  Port placed 10/18/2021  Echocardiogram shows normal ejection fraction  Week 1 TPH on 10/19/2021  Completed 12 weeks of Taxol Perjeta and Herceptin on 1/4/2022 on EA 1181 clinical trial  1/11/2022 due for Herceptin and Perjeta only  2/1/2022-due for dose 2 of Herceptin and Perjeta  MRI 1/14/2022 reviewed and there has been a good tumor response with decrease in size of the mass to 1.2 cm.  2/10/2022-left breast lumpectomy and sentinel lymph node biopsy  Pathology shows ypT1 cN1 aM0, stage IIb, pleomorphic invasive lobular carcinoma, grade 3, ER +% strong, IA +61-70% moderate, HER-2 negative on the residual tumor  The left axillary lymph node was ER positive strong, IA negative and HER-2 2+, FISH amplified.  On the initial biopsy prior to surgery the pathology showed invasive ductal with lobular features and the ER/IA was negative and HER-2 was 3+.  The residual disease obviously appears to be different from the initial pathology.  What is remaining is essentially pleomorphic lobular carcinoma which is high-grade with a high Ki-67 and ER/IA positivity.  Now that the lymph node is also positive I do believe that she will benefit from  additional chemotherapy particularly either AC or EC.  Case was presented at multidisciplinary conference.  Decided to proceed with adjuvant Adriamycin and cyclophosphamide.  Given her age we will proceed with every 3 weekly AC over every 2 weeks.  Staging CT chest and pelvis and bone scan without any evidence of metastatic disease  3/11/2022-proceed with cycle 1 of AC  · 5/13/2022-due for cycle 4 AC.    · 6/3/2022-cycle 1 Kadcyla  · 7/19/2022-completed adjuvant radiation  · 7/20/2022-started anastrozole  · 12/30/2022 due today for cycle 11 Kadcyla.  Tolerating well.  Continue.    *Right-sided port appears normal    *Hypertension-currently controlled with amlodipine and metoprolol.  Blood pressure 110/74    *Anxiety  · No longer on Zyprexa  · Mood normal    *Hypothyroidism  · Continue Synthroid  · Stable    *Cardiac health-  · Echocardiogram 1/5/2022 reviewed and stable  · Stress test 2/3/2022 normal  · 3/1/2022-echocardiogram shows normal ejection fraction of 56 to 60%, normal LV strain  · 5/31/2022 EF 61%.  Maintain follow-up with Dr. Galo   · 8/19/2022-2D echocardiogram shows an ejection fraction of 55% and a strain of -22%.  Stable  · Repeat echocardiogram scheduled 2/13/2023     *Bone health-  · DEXA scan August 2021 showing osteopenia (this was stable compared to imaging 2 years prior).  · Dr. Avednano plans to discuss Zometa/prolia at next visit    *Nausea-Zofran prescribed today    *Left axillary stiffness-patient has been seen by physical therapy with exercises she knows to do to help with this.  She admits that she needs to increase routine practice of this.      *Decreased hearing, feelings of lightheadedness and left ear bothering patient today.  Per examination she has significant cerumen in both external ear canals.  Patient has required visit with ENT before to have this removed.  I recommended that she obtain Debrox in the short-term until she gets through the holiday season and then to have  repeat visit to have this cleared out.      PLAN:   1. Proceed with cycle 11 Kadcyla.  Continuing every 3 weeks.   2. Plan to give total of 1 year of HER2 directed therapy.  Excluding the 4 months she did not receive HER2 targeted therapy from mid February to June, she will complete 12 months of HER2 targeted therapy as of February 2023.  3. Continue anastrozole.  4. Return in 3 weeks for Kadcyla cycle 12.  5. Return in 6 weeks for follow-up with Dr. Avendano and cycle 13 Kadcyla.  Discuss Zometa/Prolia at that visit.      Patient is on cytotoxic chemotherapy requiring close monitoring for toxicities.

## 2023-01-03 ENCOUNTER — TELEPHONE (OUTPATIENT)
Dept: CARDIOLOGY | Facility: CLINIC | Age: 78
End: 2023-01-03
Payer: MEDICARE

## 2023-01-03 RX ORDER — ANASTROZOLE 1 MG/1
TABLET ORAL
Qty: 90 TABLET | Refills: 3 | Status: SHIPPED | OUTPATIENT
Start: 2023-01-03

## 2023-01-03 NOTE — TELEPHONE ENCOUNTER
Attempted to call patient, no answer.  Left a voicemail for patient to call back.  Will continue to try to reach patient.    Ceci Fitzpatrick RN  [facility] Triage  01/03/23 10:05 EST

## 2023-01-04 NOTE — TELEPHONE ENCOUNTER
Attempted to call patient, no answer.  Left a voicemail for patient to call back.  Will continue to try to reach patient.    Ceci Fitzpatrick RN  [facility] Triage  01/04/23 10:25 EST

## 2023-01-05 DIAGNOSIS — I10 ESSENTIAL HYPERTENSION: ICD-10-CM

## 2023-01-05 RX ORDER — AMLODIPINE BESYLATE 5 MG/1
5 TABLET ORAL DAILY
Qty: 90 TABLET | Refills: 2 | Status: SHIPPED | OUTPATIENT
Start: 2023-01-05

## 2023-01-05 RX ORDER — METOPROLOL SUCCINATE 25 MG/1
25 TABLET, EXTENDED RELEASE ORAL DAILY
Qty: 90 TABLET | Refills: 3 | Status: SHIPPED | OUTPATIENT
Start: 2023-01-05

## 2023-01-05 NOTE — TELEPHONE ENCOUNTER
Caller: Neela Ramirez    Relationship: Self    Best call back number: [phone]    Requested Prescriptions:   Requested Prescriptions     Pending Prescriptions Disp Refills   • amLODIPine (NORVASC) 5 MG tablet 90 tablet 2     Sig: Take 1 tablet by mouth Daily.   • metoprolol succinate XL (TOPROL-XL) 25 MG 24 hr tablet 30 tablet 0     Sig: Take 1 tablet by mouth Daily.        Pharmacy where request should be sent: [facility] [id number] [address] AT [facility] [address] - [phone] [facility] [phone] FX     Additional details provided by patient: 2 PILLS OF AMLODIPINE LEFT/NEW PHARMACY    Does the patient have less than a 3 day supply:  [x] Yes  [] No    Would you like a call back once the refill request has been completed: [x] Yes [] No    If the office needs to give you a call back, can they leave a voicemail: [x] Yes [] No    Mary Valdez Rep   01/05/23 14:34 EST

## 2023-01-05 NOTE — TELEPHONE ENCOUNTER
Notified patient of results, patient verbalizes understanding.    Ceci Fitzpatrick RN  [facility] Triage  01/05/23 14:27 EST

## 2023-01-20 ENCOUNTER — INFUSION (OUTPATIENT)
Dept: ONCOLOGY | Facility: HOSPITAL | Age: 78
End: 2023-01-20
Payer: MEDICARE

## 2023-01-20 VITALS
SYSTOLIC BLOOD PRESSURE: 117 MMHG | BODY MASS INDEX: 23.52 KG/M2 | DIASTOLIC BLOOD PRESSURE: 76 MMHG | HEART RATE: 102 BPM | OXYGEN SATURATION: 93 % | RESPIRATION RATE: 16 BRPM | WEIGHT: 119.8 LBS | TEMPERATURE: 97.8 F

## 2023-01-20 DIAGNOSIS — Z45.9 ENCOUNTER FOR MANAGEMENT OF IMPLANTED DEVICE: ICD-10-CM

## 2023-01-20 DIAGNOSIS — Z17.0 MALIGNANT NEOPLASM OF UPPER-INNER QUADRANT OF LEFT BREAST IN FEMALE, ESTROGEN RECEPTOR POSITIVE: Primary | ICD-10-CM

## 2023-01-20 DIAGNOSIS — Z17.0 MALIGNANT NEOPLASM OF UPPER-INNER QUADRANT OF LEFT BREAST IN FEMALE, ESTROGEN RECEPTOR POSITIVE: ICD-10-CM

## 2023-01-20 DIAGNOSIS — C50.212 MALIGNANT NEOPLASM OF UPPER-INNER QUADRANT OF LEFT BREAST IN FEMALE, ESTROGEN RECEPTOR POSITIVE: ICD-10-CM

## 2023-01-20 DIAGNOSIS — C50.212 MALIGNANT NEOPLASM OF UPPER-INNER QUADRANT OF LEFT BREAST IN FEMALE, ESTROGEN RECEPTOR POSITIVE: Primary | ICD-10-CM

## 2023-01-20 LAB
ALBUMIN SERPL-MCNC: 3.7 G/DL (ref 3.5–5.2)
ALBUMIN/GLOB SERPL: 1.2 G/DL (ref 1.1–2.4)
ALP SERPL-CCNC: 121 U/L (ref 38–116)
ALT SERPL W P-5'-P-CCNC: 25 U/L (ref 0–33)
ANION GAP SERPL CALCULATED.3IONS-SCNC: 10 MMOL/L (ref 5–15)
AST SERPL-CCNC: 36 U/L (ref 0–32)
BASOPHILS # BLD AUTO: 0.05 10*3/MM3 (ref 0–0.2)
BASOPHILS NFR BLD AUTO: 0.6 % (ref 0–1.5)
BILIRUB SERPL-MCNC: 0.4 MG/DL (ref 0.2–1.2)
BUN SERPL-MCNC: 19 MG/DL (ref 6–20)
BUN/CREAT SERPL: 23.5 (ref 7.3–30)
CALCIUM SPEC-SCNC: 9.3 MG/DL (ref 8.5–10.2)
CHLORIDE SERPL-SCNC: 105 MMOL/L (ref 98–107)
CO2 SERPL-SCNC: 25 MMOL/L (ref 22–29)
CREAT SERPL-MCNC: 0.81 MG/DL (ref 0.6–1.1)
DEPRECATED RDW RBC AUTO: 51.5 FL (ref 37–54)
EGFRCR SERPLBLD CKD-EPI 2021: 74.9 ML/MIN/1.73
EOSINOPHIL # BLD AUTO: 0.16 10*3/MM3 (ref 0–0.4)
EOSINOPHIL NFR BLD AUTO: 2 % (ref 0.3–6.2)
ERYTHROCYTE [DISTWIDTH] IN BLOOD BY AUTOMATED COUNT: 14 % (ref 12.3–15.4)
GLOBULIN UR ELPH-MCNC: 3.1 GM/DL (ref 1.8–3.5)
GLUCOSE SERPL-MCNC: 109 MG/DL (ref 74–124)
HCT VFR BLD AUTO: 38.2 % (ref 34–46.6)
HGB BLD-MCNC: 12.5 G/DL (ref 12–15.9)
IMM GRANULOCYTES # BLD AUTO: 0.03 10*3/MM3 (ref 0–0.05)
IMM GRANULOCYTES NFR BLD AUTO: 0.4 % (ref 0–0.5)
LYMPHOCYTES # BLD AUTO: 1 10*3/MM3 (ref 0.7–3.1)
LYMPHOCYTES NFR BLD AUTO: 12.8 % (ref 19.6–45.3)
MCH RBC QN AUTO: 33.1 PG (ref 26.6–33)
MCHC RBC AUTO-ENTMCNC: 32.7 G/DL (ref 31.5–35.7)
MCV RBC AUTO: 101.1 FL (ref 79–97)
MONOCYTES # BLD AUTO: 0.75 10*3/MM3 (ref 0.1–0.9)
MONOCYTES NFR BLD AUTO: 9.6 % (ref 5–12)
NEUTROPHILS NFR BLD AUTO: 5.83 10*3/MM3 (ref 1.7–7)
NEUTROPHILS NFR BLD AUTO: 74.6 % (ref 42.7–76)
NRBC BLD AUTO-RTO: 0 /100 WBC (ref 0–0.2)
PLATELET # BLD AUTO: 277 10*3/MM3 (ref 140–450)
PMV BLD AUTO: 9.1 FL (ref 6–12)
POTASSIUM SERPL-SCNC: 3.9 MMOL/L (ref 3.5–4.7)
PROT SERPL-MCNC: 6.8 G/DL (ref 6.3–8)
RBC # BLD AUTO: 3.78 10*6/MM3 (ref 3.77–5.28)
SODIUM SERPL-SCNC: 140 MMOL/L (ref 134–145)
WBC NRBC COR # BLD: 7.82 10*3/MM3 (ref 3.4–10.8)

## 2023-01-20 PROCEDURE — 80053 COMPREHEN METABOLIC PANEL: CPT

## 2023-01-20 PROCEDURE — 85025 COMPLETE CBC W/AUTO DIFF WBC: CPT

## 2023-01-20 PROCEDURE — 25010000002 HEPARIN LOCK FLUSH PER 10 UNITS: Performed by: INTERNAL MEDICINE

## 2023-01-20 PROCEDURE — 25010000002 ADO-TRASTUZUMAB 100 MG RECONSTITUTED SOLUTION 1 EACH VIAL: Performed by: NURSE PRACTITIONER

## 2023-01-20 PROCEDURE — 96413 CHEMO IV INFUSION 1 HR: CPT

## 2023-01-20 PROCEDURE — 96375 TX/PRO/DX INJ NEW DRUG ADDON: CPT

## 2023-01-20 PROCEDURE — 25010000002 DEXAMETHASONE SODIUM PHOSPHATE 100 MG/10ML SOLUTION: Performed by: NURSE PRACTITIONER

## 2023-01-20 PROCEDURE — 96367 TX/PROPH/DG ADDL SEQ IV INF: CPT

## 2023-01-20 RX ORDER — PANTOPRAZOLE SODIUM 40 MG/1
40 TABLET, DELAYED RELEASE ORAL DAILY
Qty: 30 TABLET | Refills: 0 | Status: SHIPPED | OUTPATIENT
Start: 2023-01-20

## 2023-01-20 RX ORDER — SODIUM CHLORIDE 9 MG/ML
250 INJECTION, SOLUTION INTRAVENOUS ONCE
Status: COMPLETED | OUTPATIENT
Start: 2023-01-20 | End: 2023-01-20

## 2023-01-20 RX ORDER — HEPARIN SODIUM (PORCINE) LOCK FLUSH IV SOLN 100 UNIT/ML 100 UNIT/ML
500 SOLUTION INTRAVENOUS AS NEEDED
Status: DISCONTINUED | OUTPATIENT
Start: 2023-01-20 | End: 2023-01-20 | Stop reason: HOSPADM

## 2023-01-20 RX ORDER — SODIUM CHLORIDE 0.9 % (FLUSH) 0.9 %
10 SYRINGE (ML) INJECTION AS NEEDED
Status: DISCONTINUED | OUTPATIENT
Start: 2023-01-20 | End: 2023-01-20 | Stop reason: HOSPADM

## 2023-01-20 RX ADMIN — DEXAMETHASONE SODIUM PHOSPHATE 12 MG: 10 INJECTION, SOLUTION INTRAMUSCULAR; INTRAVENOUS at 14:41

## 2023-01-20 RX ADMIN — Medication 10 ML: at 15:31

## 2023-01-20 RX ADMIN — ADO-TRASTUZUMAB EMTANSINE 195 MG: 20 INJECTION, POWDER, LYOPHILIZED, FOR SOLUTION INTRAVENOUS at 14:58

## 2023-01-20 RX ADMIN — SODIUM CHLORIDE 250 ML: 9 INJECTION, SOLUTION INTRAVENOUS at 14:40

## 2023-01-20 RX ADMIN — Medication 500 UNITS: at 15:31

## 2023-01-20 NOTE — TELEPHONE ENCOUNTER
Called Neela Ramirez regarding recent surgical pathology. Spoke with her.    Left breast invasive lobular carcinoma with LCIS, grade III, margins negative, ER-/SD-, her2+. 1/10 lymph nodes.   [id number]    Follow up with Dr. Avendano as scheduled.   Follow up in two weeks for wound check and further cancer surveillance planning.   Will discuss at Multi-Disciplinary Conference this Friday.     Becky Liu MD       
4 = No assist / stand by assistance

## 2023-01-20 NOTE — NURSING NOTE
Pt c/o constipation for the past few days. She states she has been taking senokot 2 tablets daily and did have BM this morning. Reviewed laxative protocol for pt and recommended she try milk of magnesia or miralax if she does not get relief with senokot alone. Pt also encouraged to increase fluid intake. Pt also c/o reflux and request refill on protonix. Escribed to [facility] pharmacy and instructed pt to call if it does not help or reflux symptoms worsen. Pt v/u. Discharged ambulatory and stable.

## 2023-01-26 ENCOUNTER — OFFICE VISIT (OUTPATIENT)
Dept: INTERNAL MEDICINE | Age: 78
End: 2023-01-26
Payer: MEDICARE

## 2023-01-26 VITALS
TEMPERATURE: 97.1 F | DIASTOLIC BLOOD PRESSURE: 72 MMHG | SYSTOLIC BLOOD PRESSURE: 122 MMHG | WEIGHT: 119 LBS | OXYGEN SATURATION: 97 % | BODY MASS INDEX: 23.36 KG/M2 | HEIGHT: 60 IN | HEART RATE: 103 BPM

## 2023-01-26 DIAGNOSIS — E78.5 HYPERLIPIDEMIA, UNSPECIFIED HYPERLIPIDEMIA TYPE: ICD-10-CM

## 2023-01-26 DIAGNOSIS — F41.9 ANXIETY: ICD-10-CM

## 2023-01-26 DIAGNOSIS — Z00.00 MEDICARE ANNUAL WELLNESS VISIT, SUBSEQUENT: Primary | ICD-10-CM

## 2023-01-26 DIAGNOSIS — E03.9 HYPOTHYROIDISM, UNSPECIFIED TYPE: ICD-10-CM

## 2023-01-26 DIAGNOSIS — I10 ESSENTIAL HYPERTENSION: ICD-10-CM

## 2023-01-26 DIAGNOSIS — M85.80 OSTEOPENIA, UNSPECIFIED LOCATION: ICD-10-CM

## 2023-01-26 DIAGNOSIS — K21.9 GASTROESOPHAGEAL REFLUX DISEASE WITHOUT ESOPHAGITIS: ICD-10-CM

## 2023-01-26 PROBLEM — Z85.3 PERSONAL HISTORY OF BREAST CANCER: Status: ACTIVE | Noted: 2022-03-08

## 2023-01-26 LAB
CHOLEST SERPL-MCNC: 160 MG/DL (ref 0–200)
CHOLEST/HDLC SERPL: 2.58 {RATIO}
HDLC SERPL-MCNC: 62 MG/DL (ref 40–60)
LDLC SERPL CALC-MCNC: 83 MG/DL (ref 0–100)
T4 FREE SERPL-MCNC: 1.25 NG/DL (ref 0.93–1.7)
TRIGL SERPL-MCNC: 82 MG/DL (ref 0–150)
TSH SERPL DL<=0.005 MIU/L-ACNC: 2.66 UIU/ML (ref 0.27–4.2)
VLDLC SERPL CALC-MCNC: 15 MG/DL (ref 5–40)

## 2023-01-26 PROCEDURE — 1159F MED LIST DOCD IN RCRD: CPT | Performed by: NURSE PRACTITIONER

## 2023-01-26 PROCEDURE — 99214 OFFICE O/P EST MOD 30 MIN: CPT | Performed by: NURSE PRACTITIONER

## 2023-01-26 PROCEDURE — 91312 COVID-19 (PFIZER) BIVALENT BOOSTER 12+YRS: CPT | Performed by: NURSE PRACTITIONER

## 2023-01-26 PROCEDURE — 0124A PR ADM SARSCOV2 30MCG/0.3ML BST: CPT | Performed by: NURSE PRACTITIONER

## 2023-01-26 PROCEDURE — G0439 PPPS, SUBSEQ VISIT: HCPCS | Performed by: NURSE PRACTITIONER

## 2023-01-26 PROCEDURE — G0008 ADMIN INFLUENZA VIRUS VAC: HCPCS | Performed by: NURSE PRACTITIONER

## 2023-01-26 PROCEDURE — 1170F FXNL STATUS ASSESSED: CPT | Performed by: NURSE PRACTITIONER

## 2023-01-26 PROCEDURE — 90662 IIV NO PRSV INCREASED AG IM: CPT | Performed by: NURSE PRACTITIONER

## 2023-01-26 PROCEDURE — 1126F AMNT PAIN NOTED NONE PRSNT: CPT | Performed by: NURSE PRACTITIONER

## 2023-01-26 RX ORDER — ALENDRONATE SODIUM 70 MG/1
70 TABLET ORAL WEEKLY
Qty: 12 TABLET | Refills: 3 | Status: SHIPPED | OUTPATIENT
Start: 2023-01-26

## 2023-01-26 NOTE — PROGRESS NOTES
I N T E R N A L  M E D I C I N E  JULEE ALEMAN, APRN      ENCOUNTER DATE:  01/26/2023    Neela Ramirez / 77 y.o. / female        MEDICARE ANNUAL WELLNESS VISIT       Chief Complaint: Medicare Wellness-subsequent, Hypertension, Hyperlipidemia, Hypothyroidism, Heartburn, and Anxiety       Patient's general assessment of her health since a year ago:     - Compared to one year ago, she feels her physical health is about the same without significant change.    - Compared to one year ago, she feels her mental health is about the same without significant change.      HPI for other active medical problems:     Hypertension stable on amlodipine 5 mg metoprolol 25. Denies any chest pain, shortness of air, chest palpitations.      Hyperlipidemia Moderately controlled with rosuvastatin 5 mg daily, last . No myalgias with medication.  ALT within normal, AST last 36.     Hypothyroidism controlled with thyroxine 25 MCG daily. Last TSH and free T4 within normal     Last DEXA scan in July 2021 showing osteopenia. Currently not on any vitamin D supplementation. On Fosamax 70 mg weekly.  Mammogram and DEXA scan scheduled by hematology office.  Scheduled for updated DEXA scan August 14, 2023, mammogram 3/13/2023.      GERD well controlled with 20 mg of protonix prn      Generalized anxiety well-controlled Lexapro 20 mg daily no panic attack or thoughts of suicide or self-harm.     Breast malignancy followed hematology oncology Dr. Avendano, diagnosed September 2021. Previously on oncology research protocol, finished in February 2022.  Previous radiation, on Arimidex 1mg daily.     Followed by Dr. Galo cardiology seen February 2022, presenting for cardio oncology visit for preop clearance for lumpectomy of the left breast.  Low risk stress test completed February 2022.  Carotid Doppler bilateral internal carotid artery plaque without significant stenosis echocardiogram showed left ventricular EF of 56 to 60%.  Mild  "calcification of the aortic valve, mild aortic valve regurg, mild aortic valve stenosis.  Echocardiogram every 6 months for 2 years, plan d/c routine echocardiogram February 2024.  Next echocardiogram scheduled for February 13 2023.      Seen by Dr. Peck urology in the past due to hematuria, follows yearly next February.      Routine ophthalmology checks with Dr. Olmos.     Colonoscopy due in 2024.    * The required components of Health Risk Assessment (HRA) that were completed by the patient and/or my staff are contained within this note and in the scanned documents titled \"Health Risk Assessment\" within the media section of the patient's chart in [employer].         HISTORY       Recent Hospitalizations:    Recent hospitalization?: No     If YES, location, date, and diagnoses:     · Location:   · Date:   · Principle Discharge Dx:   · Secondary Dx:       Patient Care Team:    Patient Care Team:  Chris Issa APRN as PCP - General (Internal Medicine)  Becky Liu MD as Referring Physician (General Surgery)  Sheri Avendano MD as Consulting Physician (Hematology and Oncology)  Nakita Galo MD as Consulting Physician (Cardiology)  Rula Schneider MD as Consulting Physician (Radiation Oncology)  Kai Peck Jr., MD as Consulting Physician (Urology)      Allergies:  Patient has no known allergies.    Medications:  Current Outpatient Medications on File Prior to Visit   Medication Sig Dispense Refill   • amLODIPine (NORVASC) 5 MG tablet Take 1 tablet by mouth Daily. 90 tablet 2   • anastrozole (ARIMIDEX) 1 MG tablet Take 1 tablet by mouth once daily 90 tablet 3   • Cholecalciferol (Vitamin D) 50 MCG (2000 UT) capsule Take  by mouth.     • cyclobenzaprine (FLEXERIL) 5 MG tablet Take 1 tablet by mouth three times daily as needed for muscle spasm (Patient taking differently: Take 5 mg by mouth 3 (Three) Times a Day As Needed for Muscle Spasms.) 30 tablet 0   • desoximetasone (TOPICORT) 0.25 % " ointment Apply 1 application topically to the appropriate area as directed 2 (Two) Times a Day As Needed (TO IRRITATED AREA).     • escitalopram (LEXAPRO) 20 MG tablet Take 1 tablet by mouth once daily 30 tablet 5   • levothyroxine (SYNTHROID, LEVOTHROID) 25 MCG tablet TAKE 1 TABLET BY MOUTH ONCE DAILY IN THE MORNING 30  MINUTES  PRIOR  TO  BREAKFAST  OR  ANY  OTHER  MEDICINE 90 tablet 1   • metoprolol succinate XL (TOPROL-XL) 25 MG 24 hr tablet Take 1 tablet by mouth Daily. 90 tablet 3   • mupirocin (BACTROBAN) 2 % ointment APPLY A SMALL AMOUNT OF OINTMENT TOPICALLY TO AFFECTED AREA EVERY NIGHT     • ondansetron (Zofran) 8 MG tablet Take 1 tablet by mouth Every 8 (Eight) Hours As Needed for Nausea or Vomiting. 60 tablet 3   • pantoprazole (PROTONIX) 40 MG EC tablet Take 1 tablet by mouth Daily. 30 tablet 0   • prochlorperazine (COMPAZINE) 10 MG tablet Take 1 tablet by mouth Every 6 (Six) Hours As Needed for Nausea or Vomiting. 30 tablet 3   • rosuvastatin (CRESTOR) 10 MG tablet Take 1 tablet by mouth Daily. 90 tablet 3   • [DISCONTINUED] alendronate (FOSAMAX) 70 MG tablet Take 1 tablet by mouth once a week 12 tablet 0   • dexamethasone 0.5 MG/5ML solution Take 10 mL by mouth 4 (Four) Times a Day. Take 10 mls by mouth four times daily. Swish in mouth for 2 minutes and then spit out. Do not swallow. 280 mL 0     No current facility-administered medications on file prior to visit.        PFSH:     The following portions of the patient's history were reviewed and updated as appropriate: Allergies / Current Medications / Past Medical History / Surgical History / Social History / Family History    Problem List:  Patient Active Problem List   Diagnosis   • Anxiety   • DD (diverticular disease)   • Gastroesophageal reflux disease   • Essential hypertension   • Hyperlipidemia   • Osteopenia   • Bilateral low back pain without sciatica   • Malignant neoplasm of female breast (HCC)   • Encounter for management of implanted  device   • Personal history of breast cancer       Past Medical History:  Past Medical History:   Diagnosis Date   • Anxiety    • Arthritis    • Cataract     EARLY. JIMMIE EYES   • Chronic back pain     LOW BACK ACHES AT TIME FROM ARTHRTIS   • Constipation     AT TIMES. NO RECENT ISSUES   • Dermatitis     LEGS. STATES CLEARING UP NOW.   • Disease of thyroid gland     HYPO   • Diverticulosis     Colonoscopy November 2014   • Erythema of face     Transitory Erythema Of The Face   • GERD (gastroesophageal reflux disease)     ON OCC   • History of chemotherapy     FOR LEFT BREAST CANCER   • Hyperlipidemia    • Hypertension    • Malignant neoplasm of female breast (HCC) 9/27/2021   • Osteopenia    • Tachycardia        Past Surgical History:  Past Surgical History:   Procedure Laterality Date   • BREAST BIOPSY  09/2021    Dr. Tirado    • BREAST CYST ASPIRATION Right    • BREAST CYST EXCISION Right    • BREAST LUMPECTOMY WITH SENTINEL NODE BIOPSY Left 2/10/2022    Procedure: LEFT BREAST WIRE LOCALIZED LUMPECTOMY WITH SENTINEL NODE BIOPSY, POSSIBLE LEFT AXILLARY DISSECTION;  Surgeon: Becky Liu MD;  [place] [facility] [place] [facility] [place]  Service: General;  Laterality: Left;   • COLONOSCOPY     • COLONOSCOPY  2014   • EXOSTECTOMY Bilateral     Simple Bunion Exostectomy (Silver Procedure)   • FOOT TENDON SURGERY  2012   • VENOUS ACCESS DEVICE (PORT) INSERTION N/A 10/18/2021    Procedure: INSERTION VENOUS ACCESS DEVICE;  Surgeon: Becky Liu MD;  [place] [facility] [place] [facility] [place]  Service: General;  Laterality: N/A;       Social History:  Social History     Socioeconomic History   • Marital status:      Spouse name: Pj   • Number of children: 1   Tobacco Use   • Smoking status: Former     Types: Cigarettes   • Smokeless tobacco: Never   • Tobacco comments:     SOCIALLY IN EARLY 20'S   Vaping Use   • Vaping Use: Never used   Substance and Sexual Activity   • Alcohol use: Yes     Comment: social drinker   • Drug use: Never   • Sexual  "activity: Defer       Family History:  Family History   Problem Relation Age of Onset   • Arthritis Father    • Heart disease Father    • Hypertension Father    • Hypertension Mother    • Malig Hyperthermia Neg Hx          PATIENT ASSESSMENT     Vitals:  /72 (Cuff Size: Adult)   Pulse 103   Temp 97.1 °F (36.2 °C) (Temporal)   Ht 151.9 cm (59.8\")   Wt 54 kg (119 lb)   SpO2 97%   BMI 23.40 kg/m²   BP Readings from Last 3 Encounters:   01/26/23 122/72   01/20/23 117/76   12/30/22 110/68     Wt Readings from Last 3 Encounters:   01/26/23 54 kg (119 lb)   01/20/23 54.3 kg (119 lb 12.8 oz)   12/30/22 54.6 kg (120 lb 4.8 oz)      Body mass index is 23.4 kg/m².    Pain Score    01/26/23 0804   PainSc: 0-No pain         Review of Systems:    Review of Systems  Constitutional neg except per HPI   ENT decreased hearing   Resp neg  CV neg    Physical Exam:    Physical Exam  Constitutional  No distress  ENT bilateral cerumen impaction   Cardiovascular Rate  normal . Rhythm: regular . Heart sounds:  normal  Pulmonary/Chest  Effort normal. Breath sounds:  normal  Psychiatric  Alert. Judgment and thought content normal. Mood normal     Reviewed Data:    Labs:   Lab Results   Component Value Date     01/20/2023    K 3.9 01/20/2023    CALCIUM 9.3 01/20/2023    AST 36 (H) 01/20/2023    ALT 25 01/20/2023    BUN 19 01/20/2023    CREATININE 0.81 01/20/2023    CREATININE 0.71 12/30/2022    CREATININE 0.72 12/09/2022    EGFRIFNONA 69 02/22/2022    EGFRIFAFRI 95 04/12/2021       Lab Results   Component Value Date    HGBA1C 5.3 02/23/2022    HGBA1C 5.60 04/12/2021    HGBA1C 5.60 08/05/2019       Lab Results   Component Value Date     (H) 07/07/2022     (H) 02/23/2022    LDL 83 04/12/2021    HDL 61 07/07/2022    TRIG 108 07/07/2022    CHOLHDLRATIO 3.0 07/07/2022       Lab Results   Component Value Date    TSH 1.490 07/07/2022    FREET4 1.14 07/07/2022          Lab Results   Component Value Date    WBC 7.82 " 01/20/2023    HGB 12.5 01/20/2023    HGB 12.8 12/30/2022    HGB 12.2 12/09/2022     01/20/2023                 No results found for: PSA    Imaging:          Medical Tests:          Screening for Glaucoma:  Previous screening for glaucoma?:       Hearing Loss Screen:  Finger Rub Hearing Test (right ear): borderline  Finger Rub Hearing Test (left ear): borderline      Urinary Incontinence Screen:  Episodes of urinary incontinence? : Will need close follow-up.       Depression Screen:  PHQ-2/PHQ-9 Depression Screening 1/26/2023   Retired PHQ-9 Total Score -   Retired Total Score -   Little Interest or Pleasure in Doing Things 0-->not at all   Feeling Down, Depressed or Hopeless 0-->not at all   PHQ-9: Brief Depression Severity Measure Score 0        PHQ-2: 0 (Not depressed)    PHQ-9: 0 (Negative screening for depression)       FUNCTIONAL, FALL RISK, & COGNITIVE SCREENING (Components below):    DATA:    Functional & Cognitive Status 1/26/2023   Do you have difficulty preparing food and eating? No   Do you have difficulty bathing yourself, getting dressed or grooming yourself? No   Do you have difficulty using the toilet? No   Do you have difficulty moving around from place to place? No   Do you have trouble with steps or getting out of a bed or a chair? No   Current Diet Well Balanced Diet   Dental Exam Up to date   Eye Exam Up to date   Exercise (times per week) 0 times per week   Current Exercises Include No Regular Exercise   Current Exercise Activities Include -   Do you need help using the phone?  No   Are you deaf or do you have serious difficulty hearing?  Yes   Do you need help with transportation? No   Do you need help shopping? No   Do you need help preparing meals?  No   Do you need help with housework?  No   Do you need help with laundry? No   Do you need help taking your medications? No   Do you need help managing money? No   Do you ever drive or ride in a car without wearing a seat belt? Yes   Do  you have difficulty concentrating, remembering or making decisions? -         A) Assessment of Functional Ability:  (Assessment of ability to perform ADL's (showering/bathing, using toilet, dressing, feeding self, moving self around) and IADL's (use telephone, shop, prepare food, housekeep, do laundry, transport independently, take medications independently, and handle finances)    Degree of functional impairment: MILD (based on assessment noted above)      B) Assessment of Fall Risk:  Fall Risk Assessment was completed, and patient is at moderate risk for fall; recent fall due to walking dog        Need for further evaluation of gait, strength, and balance? : No    Timed Up and Go (TUG):   (>= 12 seconds indicates high risk for falling)    Observable abnormalities included: Normal gait pattern       C. Assessment of Cognitive Function:    Mini-Cog Test:     1) Registration (3 objects): Yes   2) Number of objects recalled: 3   3) Clock Draw: Passed? : Yes       Further evaluation required? : No        COUNSELING       A. Identification of Health Risk Factors:    Risk factors include: cardiovascular risk factors and depression / other psychiatric problems      B. Age-Appropriate Screening Schedule:  (Refer to the list below for future screening recommendations based on patient's age, sex and/or medical conditions. Orders for these recommended tests are listed in the plan section. The patient has been provided with a written plan)    Health Maintenance Topics  Health Maintenance   Topic Date Due   • INFLUENZA VACCINE  08/01/2022   • LIPID PANEL  07/07/2023   • DXA SCAN  08/10/2023   • MAMMOGRAM  08/17/2023   • TDAP/TD VACCINES (2 - Td or Tdap) 09/30/2025   • ZOSTER VACCINE  Completed       Health Maintenance Topics Due or Over-Due  Health Maintenance Due   Topic Date Due   • MOST FORM  05/22/2019   • COVID-19 Vaccine (4 - Booster for Pfizer series) 12/08/2021   • INFLUENZA VACCINE  08/01/2022         C. Advanced Care  Planning:    Advance Care Planning   ACP discussion was held with the patient during this visit. Patient has an advance directive in EMR which is still valid.        D. Patient Self-Management and Personalized Health Advice:    She has been provided with personalized counseling/information (including brochures/handouts) about:     -- reducing risk for cardiovascular disease (heart, stroke, vascular)      She has been recommended for the following preventative services which has been performed today, will be ordered today or ordered/performed on upcoming follow-up visit:     -- EXERCISE counseling provided, EYE exam for glaucoma screening recommended, CARDIOVASCULAR disease risk reduction counseling performed, FALL RISK assessment / plan of care completed, URINARY incontinence assessment done, OSTEOPOROSIS screening DISCUSSED, BREAST CANCER screening DISCUSSED, **COLORECTAL cancer screening (colonoscopy/Cologuard discussed or ordered), vaccination for INFLUENZA administered/ (or recommended), vaccination for SHINGRIX administered  (or recommended)      E. Miscellaneous Items:    -Aspirin use counseling: Does not need ASA (and currently is not on it)    -Discussed BMI with her. The BMI is in the acceptable range    -Reviewed use of high risk medication in the elderly: YES    -Reviewed for potential of harmful drug interactions in the elderly: YES        WRAP UP       Assessment & Plan:  1) MEDICARE ANNUAL WELLNESS VISIT    2) OTHER MEDICAL CONDITIONS ADDRESSED TODAY:            Problem List Items Addressed This Visit        Cardiac and Vasculature    Essential hypertension    Relevant Medications    amLODIPine (NORVASC) 5 MG tablet    metoprolol succinate XL (TOPROL-XL) 25 MG 24 hr tablet    Hyperlipidemia    Relevant Medications    rosuvastatin (CRESTOR) 10 MG tablet    Other Relevant Orders    Lipid Panel With / Chol / HDL Ratio       Gastrointestinal Abdominal     Gastroesophageal reflux disease    Relevant  Medications    pantoprazole (PROTONIX) 40 MG EC tablet       Mental Health    Anxiety    Relevant Medications    escitalopram (LEXAPRO) 20 MG tablet       Musculoskeletal and Injuries    Osteopenia    Overview     Description: Per bone density testing 7/ 2013         Relevant Medications    alendronate (FOSAMAX) 70 MG tablet   Other Visit Diagnoses     Medicare annual wellness visit, subsequent    -  Primary    Hypothyroidism, unspecified type        Relevant Orders    TSH+Free T4                    Orders Placed This Encounter   Procedures   • Fluzone High-Dose 65+yrs (0351-3099)   • COVID-19 Bivalent Booster (Pfizer) 12+yrs   • Lipid Panel With / Chol / HDL Ratio   • TSH+Free T4       Discussion / Summary:  · Up-to-date on mammogram, colonoscopy and DEXA scan  · Continue specialty management with hematology oncology, breast surgery, urology, cardiology  · Continue current medication regimen for osteopenia, anxiety, GERD, hypertension, hyperlipidemia and hypothyroidism  · Administer influenza and COVID-19 booster today  · Impacted cerumen seen on exam, follow-up next week for irrigation.  Follow-up in 6 months for chronic medical, 1 year wellness visit    Medications as of TODAY:              Current Outpatient Medications   Medication Sig Dispense Refill   • alendronate (FOSAMAX) 70 MG tablet Take 1 tablet by mouth 1 (One) Time Per Week. 12 tablet 3   • amLODIPine (NORVASC) 5 MG tablet Take 1 tablet by mouth Daily. 90 tablet 2   • anastrozole (ARIMIDEX) 1 MG tablet Take 1 tablet by mouth once daily 90 tablet 3   • Cholecalciferol (Vitamin D) 50 MCG (2000 UT) capsule Take  by mouth.     • cyclobenzaprine (FLEXERIL) 5 MG tablet Take 1 tablet by mouth three times daily as needed for muscle spasm (Patient taking differently: Take 5 mg by mouth 3 (Three) Times a Day As Needed for Muscle Spasms.) 30 tablet 0   • desoximetasone (TOPICORT) 0.25 % ointment Apply 1 application topically to the appropriate area as directed 2  (Two) Times a Day As Needed (TO IRRITATED AREA).     • escitalopram (LEXAPRO) 20 MG tablet Take 1 tablet by mouth once daily 30 tablet 5   • levothyroxine (SYNTHROID, LEVOTHROID) 25 MCG tablet TAKE 1 TABLET BY MOUTH ONCE DAILY IN THE MORNING 30  MINUTES  PRIOR  TO  BREAKFAST  OR  ANY  OTHER  MEDICINE 90 tablet 1   • metoprolol succinate XL (TOPROL-XL) 25 MG 24 hr tablet Take 1 tablet by mouth Daily. 90 tablet 3   • mupirocin (BACTROBAN) 2 % ointment APPLY A SMALL AMOUNT OF OINTMENT TOPICALLY TO AFFECTED AREA EVERY NIGHT     • ondansetron (Zofran) 8 MG tablet Take 1 tablet by mouth Every 8 (Eight) Hours As Needed for Nausea or Vomiting. 60 tablet 3   • pantoprazole (PROTONIX) 40 MG EC tablet Take 1 tablet by mouth Daily. 30 tablet 0   • prochlorperazine (COMPAZINE) 10 MG tablet Take 1 tablet by mouth Every 6 (Six) Hours As Needed for Nausea or Vomiting. 30 tablet 3   • rosuvastatin (CRESTOR) 10 MG tablet Take 1 tablet by mouth Daily. 90 tablet 3   • dexamethasone 0.5 MG/5ML solution Take 10 mL by mouth 4 (Four) Times a Day. Take 10 mls by mouth four times daily. Swish in mouth for 2 minutes and then spit out. Do not swallow. 280 mL 0     No current facility-administered medications for this visit.         FOLLOW-UP:            Return in about 6 months (around 7/26/2023) for Next scheduled follow up; 1 year wellness; Jan 31 1:30 ear cleaning .                 Future Appointments   Date Time Provider Department Center   2/10/2023  9:45 AM [facility] TRI PORT CHAIR  [facility]   2/10/2023 10:20 AM Sheri Avendano MD [facility]   2/10/2023 10:45 AM CHAIR 7A JUANCARLOS MILLER  [facility]   2/13/2023 10:30 AM JESSICA MAMM 2  [facility]ICA MAMMO JESSICA   2/13/2023 11:30 AM JESSICA LCG ECHO/VAS BACK [facility] L[facility] ECHO JESSICA   3/14/2023 10:15 AM Yue Bailey PA-C [facility] SALOU JESSICA   8/14/2023 10:30 AM JESSICA DEXA 1  JESSICA DEXA JESSICA           After Visit Summary (AVS) including the Personalized Prevention  Plan Services (PPPS) was either  printed and given to the patient at check-out today and/or [facility].       *Examiner was wearing medical surgical mask.     **Dragon dictation used for documentation.

## 2023-02-02 ENCOUNTER — OFFICE VISIT (OUTPATIENT)
Dept: INTERNAL MEDICINE | Age: 78
End: 2023-02-02
Payer: MEDICARE

## 2023-02-02 VITALS
BODY MASS INDEX: 23.87 KG/M2 | SYSTOLIC BLOOD PRESSURE: 126 MMHG | HEIGHT: 60 IN | WEIGHT: 121.6 LBS | TEMPERATURE: 97.1 F | HEART RATE: 108 BPM | OXYGEN SATURATION: 98 % | DIASTOLIC BLOOD PRESSURE: 72 MMHG

## 2023-02-02 DIAGNOSIS — H61.23 BILATERAL HEARING LOSS DUE TO CERUMEN IMPACTION: Primary | ICD-10-CM

## 2023-02-02 PROCEDURE — 99212 OFFICE O/P EST SF 10 MIN: CPT | Performed by: NURSE PRACTITIONER

## 2023-02-02 NOTE — PROGRESS NOTES
"    I N T E R N A L  M E D I C I N E  JULEE ALEMAN, APRN      ENCOUNTER DATE:  02/02/2023    Neela Ramirez / 77 y.o. / female      CHIEF COMPLAINT / REASON FOR OFFICE VISIT     Cerumen Impaction and Back Pain (Recent fall)      ASSESSMENT & PLAN     Problem List Items Addressed This Visit    None  Visit Diagnoses     Bilateral hearing loss due to cerumen impaction    -  Primary    Relevant Orders    Ambulatory Referral to ENT (Otolaryngology) (Completed)        Orders Placed This Encounter   Procedures   • Ambulatory Referral to ENT (Otolaryngology)     No orders of the defined types were placed in this encounter.      SUMMARY/DISCUSSION  • Unsuccessful cerumen impaction, referred to ENT    Next Appointment with me: 7/26/2023    No follow-ups on file.      VITAL SIGNS     Visit Vitals  /72 (Cuff Size: Adult)   Pulse 108   Temp 97.1 °F (36.2 °C) (Temporal)   Ht 151.9 cm (59.8\")   Wt 55.2 kg (121 lb 9.6 oz)   SpO2 98%   BMI 23.91 kg/m²       Wt Readings from Last 3 Encounters:   02/02/23 55.2 kg (121 lb 9.6 oz)   01/26/23 54 kg (119 lb)   01/20/23 54.3 kg (119 lb 12.8 oz)     Body mass index is 23.91 kg/m².      MEDICATIONS AT THE TIME OF OFFICE VISIT     Current Outpatient Medications on File Prior to Visit   Medication Sig   • alendronate (FOSAMAX) 70 MG tablet Take 1 tablet by mouth 1 (One) Time Per Week.   • amLODIPine (NORVASC) 5 MG tablet Take 1 tablet by mouth Daily.   • anastrozole (ARIMIDEX) 1 MG tablet Take 1 tablet by mouth once daily   • Cholecalciferol (Vitamin D) 50 MCG (2000 UT) capsule Take  by mouth.   • cyclobenzaprine (FLEXERIL) 5 MG tablet Take 1 tablet by mouth three times daily as needed for muscle spasm (Patient taking differently: Take 5 mg by mouth 3 (Three) Times a Day As Needed for Muscle Spasms.)   • desoximetasone (TOPICORT) 0.25 % ointment Apply 1 application topically to the appropriate area as directed 2 (Two) Times a Day As Needed (TO IRRITATED AREA).   • dexamethasone 0.5 MG/5ML " solution Take 10 mL by mouth 4 (Four) Times a Day. Take 10 mls by mouth four times daily. Swish in mouth for 2 minutes and then spit out. Do not swallow.   • escitalopram (LEXAPRO) 20 MG tablet Take 1 tablet by mouth once daily   • levothyroxine (SYNTHROID, LEVOTHROID) 25 MCG tablet TAKE 1 TABLET BY MOUTH ONCE DAILY IN THE MORNING 30  MINUTES  PRIOR  TO  BREAKFAST  OR  ANY  OTHER  MEDICINE   • metoprolol succinate XL (TOPROL-XL) 25 MG 24 hr tablet Take 1 tablet by mouth Daily.   • mupirocin (BACTROBAN) 2 % ointment APPLY A SMALL AMOUNT OF OINTMENT TOPICALLY TO AFFECTED AREA EVERY NIGHT   • ondansetron (Zofran) 8 MG tablet Take 1 tablet by mouth Every 8 (Eight) Hours As Needed for Nausea or Vomiting.   • pantoprazole (PROTONIX) 40 MG EC tablet Take 1 tablet by mouth Daily.   • prochlorperazine (COMPAZINE) 10 MG tablet Take 1 tablet by mouth Every 6 (Six) Hours As Needed for Nausea or Vomiting.   • rosuvastatin (CRESTOR) 10 MG tablet Take 1 tablet by mouth Daily.     No current facility-administered medications on file prior to visit.          HISTORY OF PRESENT ILLNESS     Patient presents for bilateral cerumen impaction    REVIEW OF SYSTEMS     Constitutional neg except per HPI   ENT bilateral cerumen impaction, no discharge or changes of bilateral ear, decreased hearing as a result    PHYSICAL EXAMINATION     Physical Exam  Constitutional  No distress  ENT bilateral cerumen impaction erythema or infection of the ear canal    REVIEWED DATA     Labs:         Imaging:           Medical Tests:           Summary of old records / correspondence / consultant report:           Request outside records:             *Examiner was wearing medical surgical mask

## 2023-02-02 NOTE — RESEARCH
"[id number] (CompassHER2 pCR): Preoperative THP and postoperative HP in patients who achieve a pathologic complete response   Part 1 Component of: The CompassHER2 Trials (COMprehensive use of Pathologic response ASSessment to optimize therapy in HER2-positive breast cancer)     Patient is a participant in the above mentioned clinical trial and completed neoadjuvant THP x 4 cycles with an additional two treatments of HP on 2/1/2022 and then went on to have a Left breast wire localized lumpectomy with sentinel lymph node biopsy on 2/10/2022.    If patient were to have a complete pathological response, the subject would go into Arm A of the study which would entail continuing HP for 13 cycles with radiation and endocrine therapy, if appropriate.     However, this subject did not have a complete pathological response, her surgical path is [id number]. So, per the study protocol, \"For patients who do not achieve pCR (i.e., invasive disease is found in breast or nodes at surgery), decisions about additional chemotherapy and HER2-targeted therapy will be made by the treating oncologist. This includes patients with ITCs isolated tumor cells (ITCs) found in axillary nodes by H&E or IHC (ypN0i+).   All patients are recommended to receive therapy with trastuzumab emtansine (T-DM1) for 14 doses in the post-operative setting. Patients may also receive additional adjuvant chemotherapy. Decisions about systemic therapy will be at the investigator’s discretion. For patients with hormone receptor-positive disease, any regimen of adjuvant hormonal therapy may be given at the investigator’s discretion.\"    Dr. Avendano will decide next course of treatment for patient.     Subject will continue in follow up on this clinical trial.   " Hpi Title: Evaluation of Skin Lesions How Severe Are Your Spot(S)?: mild Have Your Spot(S) Been Treated In The Past?: has not been treated

## 2023-02-10 ENCOUNTER — HOSPITAL ENCOUNTER (OUTPATIENT)
Dept: GENERAL RADIOLOGY | Facility: HOSPITAL | Age: 78
Discharge: HOME | End: 2023-02-10
Payer: MEDICARE

## 2023-02-10 ENCOUNTER — OFFICE VISIT (OUTPATIENT)
Dept: ONCOLOGY | Facility: CLINIC | Age: 78
End: 2023-02-10
Payer: MEDICARE

## 2023-02-10 ENCOUNTER — INFUSION (OUTPATIENT)
Dept: ONCOLOGY | Facility: HOSPITAL | Age: 78
End: 2023-02-10
Payer: MEDICARE

## 2023-02-10 VITALS
DIASTOLIC BLOOD PRESSURE: 73 MMHG | HEIGHT: 60 IN | HEART RATE: 104 BPM | OXYGEN SATURATION: 96 % | BODY MASS INDEX: 23.64 KG/M2 | SYSTOLIC BLOOD PRESSURE: 116 MMHG | TEMPERATURE: 98.2 F | RESPIRATION RATE: 16 BRPM | WEIGHT: 120.4 LBS

## 2023-02-10 DIAGNOSIS — R52 PAIN: ICD-10-CM
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
## 2023-02-10 DIAGNOSIS — Z17.0 MALIGNANT NEOPLASM OF UPPER-INNER QUADRANT OF LEFT BREAST IN FEMALE, ESTROGEN RECEPTOR POSITIVE: Primary | ICD-10-CM

## 2023-02-10 DIAGNOSIS — Z45.9 ENCOUNTER FOR MANAGEMENT OF IMPLANTED DEVICE: ICD-10-CM

## 2023-02-10 DIAGNOSIS — Z17.0 MALIGNANT NEOPLASM OF UPPER-INNER QUADRANT OF LEFT BREAST IN FEMALE, ESTROGEN RECEPTOR POSITIVE: ICD-10-CM

## 2023-02-10 DIAGNOSIS — C50.212 MALIGNANT NEOPLASM OF UPPER-INNER QUADRANT OF LEFT BREAST IN FEMALE, ESTROGEN RECEPTOR POSITIVE: Primary | ICD-10-CM

## 2023-02-10 DIAGNOSIS — C50.212 MALIGNANT NEOPLASM OF UPPER-INNER QUADRANT OF LEFT BREAST IN FEMALE, ESTROGEN RECEPTOR POSITIVE: ICD-10-CM

## 2023-02-10 LAB
ALBUMIN SERPL-MCNC: 3.9 G/DL (ref 3.5–5.2)
ALBUMIN/GLOB SERPL: 1.3 G/DL (ref 1.1–2.4)
ALP SERPL-CCNC: 117 U/L (ref 38–116)
ALT SERPL W P-5'-P-CCNC: 25 U/L (ref 0–33)
ANION GAP SERPL CALCULATED.3IONS-SCNC: 11.9 MMOL/L (ref 5–15)
AST SERPL-CCNC: 33 U/L (ref 0–32)
BASOPHILS # BLD AUTO: 0.06 10*3/MM3 (ref 0–0.2)
BASOPHILS NFR BLD AUTO: 0.8 % (ref 0–1.5)
BILIRUB SERPL-MCNC: 0.3 MG/DL (ref 0.2–1.2)
BUN SERPL-MCNC: 12 MG/DL (ref 6–20)
BUN/CREAT SERPL: 14.8 (ref 7.3–30)
CALCIUM SPEC-SCNC: 9.4 MG/DL (ref 8.5–10.2)
CHLORIDE SERPL-SCNC: 105 MMOL/L (ref 98–107)
CO2 SERPL-SCNC: 23.1 MMOL/L (ref 22–29)
CREAT SERPL-MCNC: 0.81 MG/DL (ref 0.6–1.1)
DEPRECATED RDW RBC AUTO: 51 FL (ref 37–54)
EGFRCR SERPLBLD CKD-EPI 2021: 74.9 ML/MIN/1.73
EOSINOPHIL # BLD AUTO: 0.21 10*3/MM3 (ref 0–0.4)
EOSINOPHIL NFR BLD AUTO: 2.7 % (ref 0.3–6.2)
ERYTHROCYTE [DISTWIDTH] IN BLOOD BY AUTOMATED COUNT: 13.5 % (ref 12.3–15.4)
GLOBULIN UR ELPH-MCNC: 2.9 GM/DL (ref 1.8–3.5)
GLUCOSE SERPL-MCNC: 143 MG/DL (ref 74–124)
HCT VFR BLD AUTO: 40.3 % (ref 34–46.6)
HGB BLD-MCNC: 12.7 G/DL (ref 12–15.9)
IMM GRANULOCYTES # BLD AUTO: 0.01 10*3/MM3 (ref 0–0.05)
IMM GRANULOCYTES NFR BLD AUTO: 0.1 % (ref 0–0.5)
LYMPHOCYTES # BLD AUTO: 1.24 10*3/MM3 (ref 0.7–3.1)
LYMPHOCYTES NFR BLD AUTO: 15.9 % (ref 19.6–45.3)
MCH RBC QN AUTO: 32.8 PG (ref 26.6–33)
MCHC RBC AUTO-ENTMCNC: 31.5 G/DL (ref 31.5–35.7)
MCV RBC AUTO: 104.1 FL (ref 79–97)
MONOCYTES # BLD AUTO: 0.94 10*3/MM3 (ref 0.1–0.9)
MONOCYTES NFR BLD AUTO: 12.1 % (ref 5–12)
NEUTROPHILS NFR BLD AUTO: 5.33 10*3/MM3 (ref 1.7–7)
NEUTROPHILS NFR BLD AUTO: 68.4 % (ref 42.7–76)
NRBC BLD AUTO-RTO: 0 /100 WBC (ref 0–0.2)
PLATELET # BLD AUTO: 231 10*3/MM3 (ref 140–450)
PMV BLD AUTO: 9.3 FL (ref 6–12)
POTASSIUM SERPL-SCNC: 4 MMOL/L (ref 3.5–4.7)
PROT SERPL-MCNC: 6.8 G/DL (ref 6.3–8)
RBC # BLD AUTO: 3.87 10*6/MM3 (ref 3.77–5.28)
SODIUM SERPL-SCNC: 140 MMOL/L (ref 134–145)
WBC NRBC COR # BLD: 7.79 10*3/MM3 (ref 3.4–10.8)

## 2023-02-10 PROCEDURE — 96415 CHEMO IV INFUSION ADDL HR: CPT

## 2023-02-10 PROCEDURE — 99215 OFFICE O/P EST HI 40 MIN: CPT | Performed by: INTERNAL MEDICINE

## 2023-02-10 PROCEDURE — 72100 X-RAY EXAM L-S SPINE 2/3 VWS: CPT

## 2023-02-10 PROCEDURE — 25010000002 HEPARIN LOCK FLUSH PER 10 UNITS: Performed by: INTERNAL MEDICINE

## 2023-02-10 PROCEDURE — 72220 X-RAY EXAM SACRUM TAILBONE: CPT

## 2023-02-10 PROCEDURE — 85025 COMPLETE CBC W/AUTO DIFF WBC: CPT

## 2023-02-10 PROCEDURE — 25010000002 DEXAMETHASONE SODIUM PHOSPHATE 100 MG/10ML SOLUTION: Performed by: INTERNAL MEDICINE

## 2023-02-10 PROCEDURE — 96375 TX/PRO/DX INJ NEW DRUG ADDON: CPT

## 2023-02-10 PROCEDURE — 80053 COMPREHEN METABOLIC PANEL: CPT

## 2023-02-10 PROCEDURE — 25010000002 ADO-TRASTUZUMAB 100 MG RECONSTITUTED SOLUTION 1 EACH VIAL: Performed by: INTERNAL MEDICINE

## 2023-02-10 PROCEDURE — 96413 CHEMO IV INFUSION 1 HR: CPT

## 2023-02-10 RX ORDER — HEPARIN SODIUM (PORCINE) LOCK FLUSH IV SOLN 100 UNIT/ML 100 UNIT/ML
500 SOLUTION INTRAVENOUS AS NEEDED
Status: DISCONTINUED | OUTPATIENT
Start: 2023-02-10 | End: 2023-02-10 | Stop reason: HOSPADM

## 2023-02-10 RX ORDER — SODIUM CHLORIDE 0.9 % (FLUSH) 0.9 %
10 SYRINGE (ML) INJECTION AS NEEDED
Status: DISCONTINUED | OUTPATIENT
Start: 2023-02-10 | End: 2023-02-10 | Stop reason: HOSPADM

## 2023-02-10 RX ORDER — SODIUM CHLORIDE 9 MG/ML
250 INJECTION, SOLUTION INTRAVENOUS ONCE
Status: CANCELLED | OUTPATIENT
Start: 2023-02-10

## 2023-02-10 RX ORDER — SODIUM CHLORIDE 9 MG/ML
250 INJECTION, SOLUTION INTRAVENOUS ONCE
Status: COMPLETED | OUTPATIENT
Start: 2023-02-10 | End: 2023-02-10

## 2023-02-10 RX ADMIN — Medication 500 UNITS: at 12:12

## 2023-02-10 RX ADMIN — Medication 10 ML: at 12:12

## 2023-02-10 RX ADMIN — DEXAMETHASONE SODIUM PHOSPHATE 12 MG: 10 INJECTION, SOLUTION INTRAMUSCULAR; INTRAVENOUS at 11:10

## 2023-02-10 RX ADMIN — SODIUM CHLORIDE 250 ML: 9 INJECTION, SOLUTION INTRAVENOUS at 11:08

## 2023-02-10 RX ADMIN — ADO-TRASTUZUMAB EMTANSINE 195 MG: 20 INJECTION, POWDER, LYOPHILIZED, FOR SOLUTION INTRAVENOUS at 11:34

## 2023-02-10 NOTE — PROGRESS NOTES
Subjective   Neela Ramirez is a 77 y.o. female.  Referred by Dr. 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  A bilateral diagnostic mammogram was performed for further evaluation of the same.    8/17/2021-bilateral diagnostic mammogram-scattered fibroglandular densities throughout both breasts.  In the posterior one third upper inner quadrant of the left breast at the site of palpable concern, 11 o'clock position there is a mass with adjacent pleomorphic calcifications.  The mass and the calcifications measure on order of 1.5 cm in greatest dimension.  No evidence of axillary lymphadenopathy appreciated.  Stable microcalcifications seen in other areas of the left breast on right breast.  Ultrasound-at 11 o'clock position, 5 cm from the nipple in the left breast there is an irregular hypoechoic mass which measures 2.4 x 2 x 1.6 cm.    Impression  1.irregular 2.4 cm mass in the left breast at the site of palpable concern at 11:00, 5 cm from the nipple.  Ultrasound-guided biopsy of the mass recommended  No finding suspicious for malignancy in the right breast    9/8/2021-left breast ultrasound-guided biopsy  Pathology consistent with invasive ductal carcinoma with lobular features.  Grade 3.  The carcinoma measures 7 mm in greatest dimension.  Focally suspicious for lymphovascular space invasion.  ER low +1-10%, intensity week  NJ negative  HER-2 3+ on immunohistochemistry  Ki-67 70%    MRI of the breast 10/8/2021-Biopsy-proven malignancy in the left breast at 11:00 measuring 2.8 cm with a centrally located metallic clip.  No other suspicious findings are seen within the left breast or no left axillary lymphadenopathy.  No suspicious findings of the right breast    Echocardiogram 10/8/2021 was normal  ejection fraction of 56 to 60% and strain of -20    She denies any new bone pains, dyspnea, cough, abdominal pain nausea vomiting or recent changes in weight.    She had 2 previous breast aspirations in her 20s.  No other breast biopsies  She does not know much about her family hence limited family history.    She received neoadjuvant chemotherapy on EA 1181 trial with Taxol Perjeta and Herceptin.  She completed 12 weeks of Taxol Herceptin and Perjeta.    Had an abnormal EKG preop and hence a stress test was performed on 2/3/2022 which showed a normal left ventricular ejection fraction and LVEF at stress was 73%.  Considered a low risk study with normal myocardial perfusion imaging.    She underwent a left breast lumpectomy and a sentinel lymph node biopsy on 2/10/2022.    Pathology was consistent with residual tumor which was pleomorphic invasive lobular carcinoma, poorly differentiated, grade 3  Tumor measured 18 mm  Margins negative for invasive carcinoma  Associated lobular carcinoma in situ noted  1 out of 10 lymph nodes was positive for metastatic focus measuring 3.5 mm.    Receptors were repeated on the pleomorphic invasive lobular carcinoma  ER +91 to 100% strong  NC +61 to 70% moderate  HER-2 negative  Ki-67 55%    Receptors performed on the metastatic lymph node  ER +81-90% strong  NC negative  HER-2 2+ on immunohistochemistry, FISH pending.    ypT1 cN1 aM0    2/28/2022-CT of the chest abdomen and pelvis  Fluid collection in the upper left breast measuring 6 x 2.5 x 4.9 cm, postoperative seroma.  Skin thickening of the left breast likely postsurgical.  Fluid collections left axilla measuring 2.9 x 1.4 x 2.9 cm.  No other suspicious abnormalities on the CT of the chest  CT abdomen with no evidence of metastatic disease.  Mild colonic diverticulosis.  Small uterine fibroid.    3/1/2022-bone scan  No evidence of metastatic disease.  Multifocal degenerative arthritic uptake without scintigraphic evidence to  suggest metastatic disease.    3/1/2022-echocardiogram  Left ventricular ejection fraction 56-60%.  Normal global LV strain of -20.5%.    3/11/2022-cycle 1 of AC    Completed 4 cycles of AC    6/3/2022-cycle 1 Kadcyla    7/19/2022-completed adjuvant radiation    7/20/2022-started adjuvant anastrozole    8/17/2022-bilateral diagnostic mammogram-benign    Interval history  Ms. Ramirez presents to the clinic today for follow-up.  She is scheduled for cycle 13 of Kadcyla.  She has had some constipation over the past week which improved with senna S but when she stopped it she had constipation again.  Her last colonoscopy was in 2014.  Denies any blood in stool or dark-colored stool.  She is also concerned about the erythema and the firmness of the left breast and the fact that it appears different compared to the right breast.  She had a fall last week when she twisted her leg.  She is complaining of back pain since the fall.  This is on the left side in the sacroiliac region.  She denies any other new bone pains, cough, abdominal pain nausea vomiting.    The following portions of the patient's history were reviewed and updated as appropriate: allergies, current medications, past family history, past medical history, past social history, past surgical history and problem list.    Past Medical History:   Diagnosis Date   • Anxiety    • Arthritis    • Cataract     EARLY. JIMMIE EYES   • Chronic back pain     LOW BACK ACHES AT TIME FROM ARTHRTIS   • Constipation     AT TIMES. NO RECENT ISSUES   • Dermatitis     LEGS. STATES CLEARING UP NOW.   • Disease of thyroid gland     HYPO   • Diverticulosis     Colonoscopy November 2014   • Erythema of face     Transitory Erythema Of The Face   • GERD (gastroesophageal reflux disease)     ON OCC   • History of chemotherapy     FOR LEFT BREAST CANCER   • Hyperlipidemia    • Hypertension    • Malignant neoplasm of female breast (HCC) 9/27/2021   • Osteopenia    • Tachycardia         Past  Surgical History:   Procedure Laterality Date   • BREAST BIOPSY  2021    Dr. Tirado    • BREAST CYST ASPIRATION Right    • BREAST CYST EXCISION Right    • BREAST LUMPECTOMY WITH SENTINEL NODE BIOPSY Left 2/10/2022    Procedure: LEFT BREAST WIRE LOCALIZED LUMPECTOMY WITH SENTINEL NODE BIOPSY, POSSIBLE LEFT AXILLARY DISSECTION;  Surgeon: Becky Liu MD;  [place] [facility] [place] [facility] [place]  Service: General;  Laterality: Left;   • COLONOSCOPY     • COLONOSCOPY     • EXOSTECTOMY Bilateral     Simple Bunion Exostectomy (Silver Procedure)   • FOOT TENDON SURGERY     • VENOUS ACCESS DEVICE (PORT) INSERTION N/A 10/18/2021    Procedure: INSERTION VENOUS ACCESS DEVICE;  Surgeon: Becky Liu MD;  [place] [facility] [place] [facility] [place]  Service: General;  Laterality: N/A;        Family History   Problem Relation Age of Onset   • Arthritis Father    • Heart disease Father    • Hypertension Father    • Hypertension Mother    • Malig Hyperthermia Neg Hx         Social History     Socioeconomic History   • Marital status:      Spouse name: Pj   • Number of children: 1   Tobacco Use   • Smoking status: Former     Types: Cigarettes   • Smokeless tobacco: Never   • Tobacco comments:     SOCIALLY IN EARLY S   Vaping Use   • Vaping Use: Never used   Substance and Sexual Activity   • Alcohol use: Yes     Comment: social drinker   • Drug use: Never   • Sexual activity: Defer        OB History             Para        Term   0            AB        Living           SAB        IAB        Ectopic        Molar        Multiple        Live Births                 Age at menarche-13  Age at menopause-50  Nulliparous  Breast-feeding-N/A   0 para 0  0  Oral contraceptive pill use-none  Hormone replacement therapy-7 years    No Known Allergies     Review of systems as mentioned in the HPI    Objective   Blood pressure 116/73, pulse 104, temperature 98.2 °F (36.8 °C), temperature source Temporal, resp. rate 16,  "height 151.9 cm (59.8\"), weight 54.6 kg (120 lb 6.4 oz), SpO2 96 %.     ECOG performance status-0    Physical Exam  Vitals reviewed.   Constitutional:       Appearance: Normal appearance.   HENT:      Head: Normocephalic and atraumatic.      Right Ear: There is impacted cerumen.      Left Ear: There is impacted cerumen.      Nose: Nose normal.      Mouth/Throat:      Mouth: Mucous membranes are moist.      Pharynx: Oropharynx is clear.   Eyes:      Conjunctiva/sclera: Conjunctivae normal.      Pupils: Pupils are equal, round, and reactive to light.   Cardiovascular:      Rate and Rhythm: Normal rate and regular rhythm.      Pulses: Normal pulses.      Heart sounds: Normal heart sounds.   Pulmonary:      Effort: Pulmonary effort is normal.      Breath sounds: Normal breath sounds.   Abdominal:      General: Abdomen is flat. Bowel sounds are normal.      Palpations: Abdomen is soft.   Musculoskeletal:         General: Normal range of motion.      Cervical back: Normal range of motion.   Skin:     General: Skin is warm and dry.      Findings: No rash.   Neurological:      General: No focal deficit present.      Mental Status: She is alert and oriented to person, place, and time. Mental status is at baseline.   Psychiatric:         Mood and Affect: Mood normal.         Behavior: Behavior normal.         Thought Content: Thought content normal.         Judgment: Judgment normal.       Breast Exam: Right breast appears normal on inspection.  No palpable abnormalities of the right breast.    Left breast on inspection there is a scar which is well-healed.  There is also left axillary scar which is well-healed.  Skin changes consistent with radiation dermatitis.  The left breast is somewhat firm which is likely secondary to radiation.    I have reexamined the patient and the results are consistent with the previously documented exam. Sheri Avendano MD         Results from last 7 days   Lab Units 02/10/23  0931   WBC " 10*3/mm3 7.79   NEUTROS ABS 10*3/mm3 5.33   HEMOGLOBIN g/dL 12.7   HEMATOCRIT % 40.3   PLATELETS 10*3/mm3 231               No radiology results for the last 30 days.         Assessment & Plan       *Invasive ductal carcinoma of the left breast  · Clinical T2 N0 M0, stage IIa  · On ultrasound the tumor measures 2.4 cm.  Lymph nodes on the left side are normal.  · MRI 10/8/2021 with tumor measuring 2.8 cm.  Lymph nodes appear normal  · Pathology consistent with invasive carcinoma with ductal and lobular features, grade 3, ER +1 to 10% weak, MD negative, HER-2 3+ immunohistochemistry, Ki-67 70%.  · She was evaluated by Dr. Liu and referred for consideration of neoadjuvant chemotherapy.   Since the tumor is HER-2 positive and over 2 cm I think it would be reasonable to proceed with neoadjuvant chemotherapy.  Since the tumor is over 2 cm but lymph node negative I think she would be a great candidate for EA 1181 clinical trial which comprises of administering Taxol Herceptin and Perjeta tamika  adjuvantly followed by surgery and additional adjuvant treatment depending upon the response.  Port placed 10/18/2021  Echocardiogram shows normal ejection fraction  Week 1 TPH on 10/19/2021  Completed 12 weeks of Taxol Perjeta and Herceptin on 1/4/2022 on EA 1181 clinical trial  1/11/2022 due for Herceptin and Perjeta only  2/1/2022-due for dose 2 of Herceptin and Perjeta  MRI 1/14/2022 reviewed and there has been a good tumor response with decrease in size of the mass to 1.2 cm.  2/10/2022-left breast lumpectomy and sentinel lymph node biopsy  Pathology shows ypT1 cN1 aM0, stage IIb, pleomorphic invasive lobular carcinoma, grade 3, ER +% strong, MD +61-70% moderate, HER-2 negative on the residual tumor  The left axillary lymph node was ER positive strong, MD negative and HER-2 2+, FISH amplified.  On the initial biopsy prior to surgery the pathology showed invasive ductal with lobular features and the ER/MD was negative  and HER-2 was 3+.  The residual disease obviously appears to be different from the initial pathology.  What is remaining is essentially pleomorphic lobular carcinoma which is high-grade with a high Ki-67 and ER/SC positivity.  Now that the lymph node is also positive I do believe that she will benefit from additional chemotherapy particularly either AC or EC.  Case was presented at multidisciplinary conference.  Decided to proceed with adjuvant Adriamycin and cyclophosphamide.  Given her age we will proceed with every 3 weekly AC over every 2 weeks.  Staging CT chest and pelvis and bone scan without any evidence of metastatic disease  3/11/2022-proceed with cycle 1 of AC  · 5/13/2022-due for cycle 4 AC.    · 6/3/2022-cycle 1 Kadcyla  · 7/19/2022-completed adjuvant radiation  · 7/20/2022-started anastrozole  · 2/10/2023-cycle 13 of Kadcyla.  · She has 1 more cycle to go.  · No evidence of recurrent disease at this time  · Has remained compliant with anastrozole    *Right-sided port appears normal    *Hypertension-currently controlled with amlodipine and metoprolol.  Blood pressure 116/73    *Anxiety  · No longer on Zyprexa  · Mood normal    *Hypothyroidism  · Continue Synthroid  · Stable    *Cardiac health-  · Echocardiogram 1/5/2022 reviewed and stable  · Stress test 2/3/2022 normal  · 3/1/2022-echocardiogram shows normal ejection fraction of 56 to 60%, normal LV strain  · 5/31/2022 EF 61%.  Maintain follow-up with Dr. Galo   · 8/19/2022-2D echocardiogram shows an ejection fraction of 55% and a strain of -22%.  Stable  · Repeat echocardiogram scheduled 2/13/2023     *Bone health-  · DEXA scan August 2021 showing osteopenia (this was stable compared to imaging 2 years prior).  · She is currently on Fosamax which was started by her primary care physician.  · Repeat DEXA in 2023 August.    *Left axillary stiffness-patient has been seen by physical therapy with exercises she knows to do to help with this.  She  admits that she needs to increase routine practice of this.      *Decreased hearing, feelings of lightheadedness and left ear bothering patient today.  Per examination she has significant cerumen in both external ear canals.  Patient has required visit with ENT before to have this removed.  I recommended that she obtain Debrox in the short-term until she gets through the holiday season and then to have repeat visit to have this cleared out.      *Constipation  · New onset  · Recommend that she take senna S for 1 week and if there is no improvement in symptoms or if constipation recurs then we will refer her to GI for repeat colonoscopy.    *Left-sided back pain  · Secondary to the fall  · Obtain x-ray    PLAN:   1. Proceed with cycle 13 Kadcyla today  2. Has 1 more treatment left of Kadcyla  3. Continue anastrozole.  4. Currently on Fosamax, continue the same  5. Follow-up with me in 2 months      Patient is on cytotoxic chemotherapy requiring close monitoring for toxicities.  40 minutes spent on the encounter including face-to-face time, reviewing the records, documentation on the same day

## 2023-02-13 ENCOUNTER — APPOINTMENT (OUTPATIENT)
Dept: MAMMOGRAPHY | Facility: HOSPITAL | Age: 78
End: 2023-02-13
Payer: MEDICARE

## 2023-02-13 ENCOUNTER — TELEPHONE (OUTPATIENT)
Dept: CARDIOLOGY | Facility: CLINIC | Age: 78
End: 2023-02-13

## 2023-02-13 ENCOUNTER — HOSPITAL ENCOUNTER (OUTPATIENT)
Dept: CARDIOLOGY | Facility: HOSPITAL | Age: 78
Discharge: HOME | End: 2023-02-13
Admitting: INTERNAL MEDICINE
Payer: MEDICARE

## 2023-02-13 ENCOUNTER — TELEPHONE (OUTPATIENT)
Dept: ONCOLOGY | Facility: CLINIC | Age: 78
End: 2023-02-13
Payer: MEDICARE

## 2023-02-13 VITALS
HEART RATE: 66 BPM | SYSTOLIC BLOOD PRESSURE: 110 MMHG | WEIGHT: 120 LBS | HEIGHT: 59 IN | DIASTOLIC BLOOD PRESSURE: 70 MMHG | BODY MASS INDEX: 24.19 KG/M2

## 2023-02-13 DIAGNOSIS — Z51.11 ENCOUNTER FOR ANTINEOPLASTIC CHEMOTHERAPY: ICD-10-CM

## 2023-02-13 LAB
BH CV ECHO LEFT VENTRICLE GLOBAL LONGITUDINAL STRAIN: -20.9 %
BH CV ECHO MEAS - EDV(CUBED): 63.7 ML
BH CV ECHO MEAS - EDV(MOD-SP2): 111 ML
BH CV ECHO MEAS - EDV(MOD-SP4): 101 ML
BH CV ECHO MEAS - EF(MOD-BP): 53 %
BH CV ECHO MEAS - EF(MOD-SP2): 55 %
BH CV ECHO MEAS - EF(MOD-SP4): 53.5 %
BH CV ECHO MEAS - ESV(CUBED): 22.1 ML
BH CV ECHO MEAS - ESV(MOD-SP2): 50 ML
BH CV ECHO MEAS - ESV(MOD-SP4): 47 ML
BH CV ECHO MEAS - FS: 29.8 %
BH CV ECHO MEAS - IVS/LVPW: 0.95 CM
BH CV ECHO MEAS - IVSD: 0.91 CM
BH CV ECHO MEAS - LAT PEAK E' VEL: 6.2 CM/SEC
BH CV ECHO MEAS - LV DIASTOLIC VOL/BSA (35-75): 68.1 CM2
BH CV ECHO MEAS - LV MASS(C)D: 115.5 GRAMS
BH CV ECHO MEAS - LV SYSTOLIC VOL/BSA (12-30): 31.7 CM2
BH CV ECHO MEAS - LVIDD: 4 CM
BH CV ECHO MEAS - LVIDS: 2.8 CM
BH CV ECHO MEAS - LVOT AREA: 3 CM2
BH CV ECHO MEAS - LVOT DIAM: 1.94 CM
BH CV ECHO MEAS - LVPWD: 0.96 CM
BH CV ECHO MEAS - MED PEAK E' VEL: 5.9 CM/SEC
BH CV ECHO MEAS - MV A DUR: 0.14 SEC
BH CV ECHO MEAS - MV A MAX VEL: 90.8 CM/SEC
BH CV ECHO MEAS - MV DEC SLOPE: 304.9 CM/SEC2
BH CV ECHO MEAS - MV DEC TIME: 0.18 MSEC
BH CV ECHO MEAS - MV E MAX VEL: 64.3 CM/SEC
BH CV ECHO MEAS - MV E/A: 0.71
BH CV ECHO MEAS - MV MAX PG: 2.8 MMHG
BH CV ECHO MEAS - MV MEAN PG: 1.43 MMHG
BH CV ECHO MEAS - MV P1/2T: 62.6 MSEC
BH CV ECHO MEAS - MV V2 VTI: 17.3 CM
BH CV ECHO MEAS - MVA(P1/2T): 3.5 CM2
BH CV ECHO MEAS - PULM A REVS DUR: 0.13 SEC
BH CV ECHO MEAS - PULM A REVS VEL: 24.9 CM/SEC
BH CV ECHO MEAS - PULM DIAS VEL: 24.9 CM/SEC
BH CV ECHO MEAS - PULM S/D: 1.48
BH CV ECHO MEAS - PULM SYS VEL: 36.8 CM/SEC
BH CV ECHO MEAS - RAP SYSTOLE: 3 MMHG
BH CV ECHO MEAS - RVSP: 25.1 MMHG
BH CV ECHO MEAS - SI(MOD-SP2): 41.1 ML/M2
BH CV ECHO MEAS - SI(MOD-SP4): 36.4 ML/M2
BH CV ECHO MEAS - SV(MOD-SP2): 61 ML
BH CV ECHO MEAS - SV(MOD-SP4): 54 ML
BH CV ECHO MEAS - TR MAX PG: 22.1 MMHG
BH CV ECHO MEAS - TR MAX VEL: 235.3 CM/SEC
BH CV ECHO MEASUREMENTS AVERAGE E/E' RATIO: 10.63
LEFT ATRIUM VOLUME INDEX: 15.3 ML/M2
MAXIMAL PREDICTED HEART RATE: 143 BPM
STRESS TARGET HR: 122 BPM

## 2023-02-13 PROCEDURE — 93325 DOPPLER ECHO COLOR FLOW MAPG: CPT | Performed by: INTERNAL MEDICINE

## 2023-02-13 PROCEDURE — 25010000002 PERFLUTREN (DEFINITY) 8.476 MG IN SODIUM CHLORIDE (PF) 0.9 % 10 ML INJECTION: Performed by: INTERNAL MEDICINE

## 2023-02-13 PROCEDURE — 93321 DOPPLER ECHO F-UP/LMTD STD: CPT

## 2023-02-13 PROCEDURE — 93308 TTE F-UP OR LMTD: CPT | Performed by: INTERNAL MEDICINE

## 2023-02-13 PROCEDURE — 93356 MYOCRD STRAIN IMG SPCKL TRCK: CPT | Performed by: INTERNAL MEDICINE

## 2023-02-13 PROCEDURE — 93325 DOPPLER ECHO COLOR FLOW MAPG: CPT

## 2023-02-13 PROCEDURE — 93321 DOPPLER ECHO F-UP/LMTD STD: CPT | Performed by: INTERNAL MEDICINE

## 2023-02-13 PROCEDURE — 93308 TTE F-UP OR LMTD: CPT

## 2023-02-13 PROCEDURE — 93356 MYOCRD STRAIN IMG SPCKL TRCK: CPT

## 2023-02-13 RX ADMIN — PERFLUTREN 2 ML: 6.52 INJECTION, SUSPENSION INTRAVENOUS at 12:00

## 2023-02-13 NOTE — TELEPHONE ENCOUNTER
----- Message from Sheri Avendano MD sent at 2/10/2023  3:13 PM EST -----  Please inform patient that there is no acute fracture.    ----- Message -----  From: Lilian Rad Results [place] In  Sent: 2/10/2023   2:35 PM EST  To: Sheri Avendano MD

## 2023-02-13 NOTE — TELEPHONE ENCOUNTER
Contacted Ms James and reviewed xray report with her per Dr Avendano's request.  She voiced understanding.

## 2023-02-14 NOTE — TELEPHONE ENCOUNTER
Attempted to call Neela Ramirez, no answer.  Left a voicemail for patient to call back.  Will continue to try to reach patient.    Ceci Fitzpatrick RN  [facility] Triage  02/14/23 08:17 EST

## 2023-02-15 NOTE — TELEPHONE ENCOUNTER
Notified patient of results. Patient verbalized understanding.    Jacqueline Quach RN  Triage [facility] [place]

## 2023-03-03 ENCOUNTER — INFUSION (OUTPATIENT)
Dept: ONCOLOGY | Facility: HOSPITAL | Age: 78
End: 2023-03-03
Payer: MEDICARE

## 2023-03-03 VITALS
RESPIRATION RATE: 16 BRPM | WEIGHT: 119.2 LBS | OXYGEN SATURATION: 94 % | HEART RATE: 99 BPM | BODY MASS INDEX: 24.08 KG/M2 | SYSTOLIC BLOOD PRESSURE: 120 MMHG | TEMPERATURE: 97.5 F | DIASTOLIC BLOOD PRESSURE: 71 MMHG

## 2023-03-03 DIAGNOSIS — C50.212 MALIGNANT NEOPLASM OF UPPER-INNER QUADRANT OF LEFT BREAST IN FEMALE, ESTROGEN RECEPTOR POSITIVE: Primary | ICD-10-CM

## 2023-03-03 DIAGNOSIS — Z17.0 MALIGNANT NEOPLASM OF UPPER-INNER QUADRANT OF LEFT BREAST IN FEMALE, ESTROGEN RECEPTOR POSITIVE: Primary | ICD-10-CM

## 2023-03-03 LAB
ALBUMIN SERPL-MCNC: 3.9 G/DL (ref 3.5–5.2)
ALBUMIN/GLOB SERPL: 1.3 G/DL (ref 1.1–2.4)
ALP SERPL-CCNC: 107 U/L (ref 38–116)
ALT SERPL W P-5'-P-CCNC: 29 U/L (ref 0–33)
ANION GAP SERPL CALCULATED.3IONS-SCNC: 13.8 MMOL/L (ref 5–15)
AST SERPL-CCNC: 36 U/L (ref 0–32)
BASOPHILS # BLD AUTO: 0.04 10*3/MM3 (ref 0–0.2)
BASOPHILS NFR BLD AUTO: 0.7 % (ref 0–1.5)
BILIRUB SERPL-MCNC: 0.5 MG/DL (ref 0.2–1.2)
BUN SERPL-MCNC: 13 MG/DL (ref 6–20)
BUN/CREAT SERPL: 15.9 (ref 7.3–30)
CALCIUM SPEC-SCNC: 9.5 MG/DL (ref 8.5–10.2)
CHLORIDE SERPL-SCNC: 103 MMOL/L (ref 98–107)
CO2 SERPL-SCNC: 23.2 MMOL/L (ref 22–29)
CREAT SERPL-MCNC: 0.82 MG/DL (ref 0.6–1.1)
DEPRECATED RDW RBC AUTO: 50.2 FL (ref 37–54)
EGFRCR SERPLBLD CKD-EPI 2021: 73.8 ML/MIN/1.73
EOSINOPHIL # BLD AUTO: 0.16 10*3/MM3 (ref 0–0.4)
EOSINOPHIL NFR BLD AUTO: 2.8 % (ref 0.3–6.2)
ERYTHROCYTE [DISTWIDTH] IN BLOOD BY AUTOMATED COUNT: 13.4 % (ref 12.3–15.4)
GLOBULIN UR ELPH-MCNC: 2.9 GM/DL (ref 1.8–3.5)
GLUCOSE SERPL-MCNC: 145 MG/DL (ref 74–124)
HCT VFR BLD AUTO: 40.5 % (ref 34–46.6)
HGB BLD-MCNC: 12.9 G/DL (ref 12–15.9)
IMM GRANULOCYTES # BLD AUTO: 0.01 10*3/MM3 (ref 0–0.05)
IMM GRANULOCYTES NFR BLD AUTO: 0.2 % (ref 0–0.5)
LYMPHOCYTES # BLD AUTO: 0.96 10*3/MM3 (ref 0.7–3.1)
LYMPHOCYTES NFR BLD AUTO: 16.6 % (ref 19.6–45.3)
MCH RBC QN AUTO: 32.2 PG (ref 26.6–33)
MCHC RBC AUTO-ENTMCNC: 31.9 G/DL (ref 31.5–35.7)
MCV RBC AUTO: 101 FL (ref 79–97)
MONOCYTES # BLD AUTO: 0.83 10*3/MM3 (ref 0.1–0.9)
MONOCYTES NFR BLD AUTO: 14.4 % (ref 5–12)
NEUTROPHILS NFR BLD AUTO: 3.78 10*3/MM3 (ref 1.7–7)
NEUTROPHILS NFR BLD AUTO: 65.3 % (ref 42.7–76)
NRBC BLD AUTO-RTO: 0 /100 WBC (ref 0–0.2)
PLATELET # BLD AUTO: 247 10*3/MM3 (ref 140–450)
PMV BLD AUTO: 9.2 FL (ref 6–12)
POTASSIUM SERPL-SCNC: 4.1 MMOL/L (ref 3.5–4.7)
PROT SERPL-MCNC: 6.8 G/DL (ref 6.3–8)
RBC # BLD AUTO: 4.01 10*6/MM3 (ref 3.77–5.28)
SODIUM SERPL-SCNC: 140 MMOL/L (ref 134–145)
WBC NRBC COR # BLD: 5.78 10*3/MM3 (ref 3.4–10.8)

## 2023-03-03 PROCEDURE — 96413 CHEMO IV INFUSION 1 HR: CPT

## 2023-03-03 PROCEDURE — 80053 COMPREHEN METABOLIC PANEL: CPT

## 2023-03-03 PROCEDURE — 25010000002 DEXAMETHASONE SODIUM PHOSPHATE 100 MG/10ML SOLUTION: Performed by: NURSE PRACTITIONER

## 2023-03-03 PROCEDURE — 96375 TX/PRO/DX INJ NEW DRUG ADDON: CPT

## 2023-03-03 PROCEDURE — 85025 COMPLETE CBC W/AUTO DIFF WBC: CPT

## 2023-03-03 PROCEDURE — 25010000002 ADO-TRASTUZUMAB 100 MG RECONSTITUTED SOLUTION 1 EACH VIAL: Performed by: NURSE PRACTITIONER

## 2023-03-03 RX ORDER — SODIUM CHLORIDE 9 MG/ML
250 INJECTION, SOLUTION INTRAVENOUS ONCE
Status: COMPLETED | OUTPATIENT
Start: 2023-03-03 | End: 2023-03-03

## 2023-03-03 RX ADMIN — ADO-TRASTUZUMAB EMTANSINE 195 MG: 20 INJECTION, POWDER, LYOPHILIZED, FOR SOLUTION INTRAVENOUS at 13:58

## 2023-03-03 RX ADMIN — SODIUM CHLORIDE 250 ML: 9 INJECTION, SOLUTION INTRAVENOUS at 13:42

## 2023-03-03 RX ADMIN — DEXAMETHASONE SODIUM PHOSPHATE 12 MG: 100 INJECTION INTRAMUSCULAR; INTRAVENOUS at 13:42

## 2023-03-12 NOTE — PROGRESS NOTES
General Surgery History and Physical Exam     Summary (A/P):  77 y.o. 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   - Mammogram 8/2022 reviewed, BIRADS 2.   - Screening mammogram due 8/2023. Order placed.   - Follow up in 6 months (9/2023).    (2) Medical Oncology:  - Following with Dr. Avendano, next appointment 4/10/2023  - Participated in EA 1181 clinical trial.   - Finished Kadcyla February 2023.  - Currently taking Anastrozole, tolerating well.     (3) Radiation Oncology:  - Following with Dr. Schneider, next appointment August 2023.  - Dose of 50Gy to the left breast and regional nodes followed by a 1000cgy boost.    - Completed: 6/7/22 - 7/19/22    (4) Health Maintenance:  - Recommend she follow with her PCP for routine care.   - Colonoscopy up to date. Follows with PCP for scheduling. Due 11/2024.     Referring Provider: No ref. provider found    Chief Complaint: breast mass    History of Present Illness: Ms. Neela Ramirez is a 77 y.o. year old lady, seen at the request of No ref. provider found for a new diagnosis of left breast cancer.      This was initially detected as a palpable mass. She has had annual mammograms each year or every other year. She denies any prior history of abnormal mammograms or breast biopsies. Her work-up is detailed in the oncologic history below.     She denies any pain, skin changes, or nipple discharge.  She has noticed this lump for a few months but new her regular mammogram is coming up soon.  She denies any family history of breast or ovarian cancer.     12/22/2021 she presents  today for follow-up.  She is doing well.  She is tolerating the chemotherapy well.  She is had some issues with diarrhea that are improving.  She has also had some issues with sinus infections.    2/28/2022 She is doing well since surgery with no issues. Her pain is controlled. She is not taking pain meds.    3/2/2022 doing well since surgery with no acute issues.  Her pain is controlled.  She is getting back to her daily activities.  She has no complaints or concerns.    9/28/2022 Patient is here today for a follow-up. She states she is doing well overall, still has some issues with her taste. She is going to look into finding a bra that fits her better.    3/14/2023 She is here today for a follow-up. She denies any new breast concerns.     Workup of Current Diagnosis:    8/17/2021 Left Breast Diagnostic Mammogram with Ultrasound:   In the posterior one-third upper inner quadrant of the left breast at the site of palpable concern  near the 11-o'clock position there is a mass with adjacent pleomorphic microcalcifications. The mass and calcifications measure on the order 1.5 cm in greatest dimension. 11-o'clock position on the order of 5 cm from the nipple. At this location, there is an irregular hypoechoic mass that measures 2.4 x 2.0 x 1.6 cm. Internal vascularity is noted. Echogenic foci in the posterior aspect of the mass that represents the visualized calcifications are noted.  IMPRESSION:  1. There is an irregular 2.4 cm mass in the left breast at the site of palpable concern which corresponds to the 11-o'clock position on the order of 5 cm from the nipple. Correlation with an ultrasound guided left breast biopsy is recommended.  2. There are no findings suspicious for malignancy in the right breast.  BI-RADS Category 5: Highly suggestive for malignancy. Appropriate action should be taken.    9/8/2021 Left Breast Ultrasound-guided Biopsy:   The lesion at the 11:00 position on the order of 5 cm from the nipple  was visualized. Multiple tissue specimens were obtained. A barbell shaped metallic clip was placed to katey the site. Postbiopsy unilateral left digital mammography consisting of CC and 90 degree lateral images were obtained and demonstrate placement of a barbell shaped metallic clip at the 11:00 position in the posterior one third of the left breast at the site of a pre-existing mass seen on the examination of 8/17/2021. The metallic clip is on the order of 7 mm anterior to residual microcalcifications.   IMPRESSION:   Technically successful ultrasound guided Mammotome vacuum assisted left breast biopsy with placement of a barbell shaped metallic clip. The pathology result has returned as malignant. Surgical consultation is recommended.  BI-RADS Category 6: Known malignancy    9/8/2021 Pathology:   Final Diagnosis   1. Left Breast at 11 O'clock 5 cm from Nipple, Core Biopsy:               A. Invasive breast carcinoma with ductal and lobular features (see comment):                             1. Overall Watson grade III (tubular score = 3, nuclear score = 3, mitotic score = 2).                            2. Invasive carcinoma measures at least 7 mm in greatest dimension.                            3. Focally suspicious for lymphovascular space invasion.               B. Rare focus of high grade solid and comedo DCIS.     10/8/2021 Bilateral Breast MRI   IMPRESSION:  1. Biopsy-proven malignancy in the left breast at the 11-o'clock position measuring on the order of 2.8 cm in greatest dimension with a centrally located barbell shaped metallic clip. No other suspicious findings are seen within left breast and there is no evidence for left axillary adenopathy.  2. There are no findings suspicious for malignancy in the right breast.  BI-RADS category 6: Known biopsy-proven malignancy.    10/18/2021 Port placement    1/14/2022 Bilateral Breast MRI   IMPRESSION AND RECOMMENDATION:  1.  A 1.2 cm mass at 11:00 in the  posterior left breast corresponds to biopsy-proven malignancy and has decreased in size from 10/20/2021, consistent with positive treatment response. Continued oncologic and surgical management are recommended.  2.  No MRI evidence of malignancy in the right breast.  BI-RADS Category 6: Known biopsy-proven malignancy    2/10/2022 Left breast wire localized lumpectomy with sentinel lymph node biopsy  Final Diagnosis   1. Left Axillary [place] Lymph Node #1, Hot and Blue, Count 220, Biopsy (FS):               A. ONE OF THREE LYMPH NODES, POSITIVE FOR CARCINOMA (1/3).               B. Metastatic focus measures 3.5 mm.               C. Negative for treatment effect.                D. Negative for extranodal extension.               E. AE1/AE3 immunostain performed on block 1A highlights the metastatic carcinoma (control reacted appropriately).                F. See Comment #1.  2. Left Axillary [place] Lymph Node #2, Hot and Blue, Count 120, Biopsy (FS):               A. Five lymph nodes, negative for carcinoma (0/5).               B. Negative for treatment effect.               C. AE1/AE3 immunostain performed on blocks 2A and 2B are both negative (control reacted appropriately).   3. Left Axillary [place] Lymph Node #3, Palpable, Biopsy (FS):               A. Two lymph nodes, negative for carcinoma (0/2).               B. Negative for treatment effect.               C. AE1/AE3 immunostains performed on blocks 3A and 3B are both negative (control reacted appropriately).  4. Left Breast, Needle-Localized Lumpectomy S/P Neoadjuvant Chemotherapy (15 grams):               A. FIBROTIC TUMOR BED WITH INVASIVE PLEOMORPHIC LOBULAR CARCINOMA, Poorly differentiated; Vendor Histologic Grade III/III (tubule score =                   3, nuclear score = 3, mitoses score = 3):               1. Tumor size: 18 mm (based on the slices involved).                2. Margins are negative for invasive carcinoma; Closest distances:  Invasive       carcinoma is present 5 mm from the inferior margin (superseded by specimen #8).                3. No definitive lymphovascular space invasion is seen.   B. ASSOCIATED LOBULAR CARCINOMA IN SITU (LCIS).   C. Clip and biopsy site changes are present and adjacent to invasive carcinoma.  D. See Synoptic Report (to include parts 1-3 and 5-9) and Comment #2.  5. Left Breast, Additional Superior Margin, Re-excision:               A. Breast parenchyma with atypical lobular hyperplasia (additional 9 mm of tissue free of carcinoma).  6. Left Breast, Additional Medial Margin, Re-excision:               A. Breast parenchyma with atypical lobular hyperplasia (additional 10 mm of tissue free of carcinoma).  7. Left Breast, Additional Lateral Margin, Re-excision:               A. Fibroadipose tissue with no significant histopathologic changes (additional 10 mm of tissue free of carcinoma).  8. Left Breast, Additional Inferior Margin, Re-excision:               A. Breast parenchyma with atypical lobular hyperplasia (additional 10 mm of tissue free of carcinoma).  9. Left Breast, Additional Anterior Margin, Re-excision:               A. Breast parenchyma with no significant histopathologic changes (additional 8 mm of tissue free of carcinoma).     8/17/2022 Bilateral Digital Diagnostic Mammogram with Heladio:  FINDINGS:  MAMMOGRAM: Bilateral CC and MLO, right MLO spot compression, and right lateral 2-D and Tomosynthesis views were obtained.    The breasts are heterogeneously dense, which may obscure small masses.   There are new benign-appearing post surgical changes in the left breast and axilla, as well as new treatment-related changes in the left breast. There are benign-appearing calcifications. There are 2 asymmetries in the upper right breast, both of which effaces with spot compression and have no correlate on the lateral view, consistent with superimposition  of normal breast parenchyma. There are no suspicious  masses, calcifications, or areas of architectural distortion in either breast. There is a partially imaged right chest port.  IMPRESSION:  No mammographic evidence of malignancy in either breast. Recommend annual screening mammogram in one year.  BI-RADS Category 2: Benign    Gynecologic History:   . P:0 AB:0  Age at first childbirth: Not applicable  Lactation/How long: Not applicable  Age at menarche: 13  Age at menopause: 50  Total years of oral contraceptive use: None  Total years of hormone replacement therapy: 7 years    Past Medical History:   • Hypertension  • Hyperlipidemia  • Hypothyroidism  • GERD    Past Surgical History:    • Right breast lumpectomy for benign disease  • Left cyst aspiration  • Foot surgery    Family History:    Family History   Problem Relation Age of Onset   • Arthritis Father    • Heart disease Father    • Hypertension Father    • Hypertension Mother    • Malig Hyperthermia Neg Hx      Social History:  • Denies tobacco use  • Occasional alcohol use    Allergies:   No Known Allergies    Medications:     Current Outpatient Medications:   •  alendronate (FOSAMAX) 70 MG tablet, Take 1 tablet by mouth 1 (One) Time Per Week., Disp: 12 tablet, Rfl: 3  •  amLODIPine (NORVASC) 5 MG tablet, Take 1 tablet by mouth Daily., Disp: 90 tablet, Rfl: 2  •  anastrozole (ARIMIDEX) 1 MG tablet, Take 1 tablet by mouth once daily, Disp: 90 tablet, Rfl: 3  •  Cholecalciferol (Vitamin D) 50 MCG (2000 UT) capsule, Take  by mouth., Disp: , Rfl:   •  cyclobenzaprine (FLEXERIL) 5 MG tablet, Take 1 tablet by mouth three times daily as needed for muscle spasm (Patient taking differently: Take 1 tablet by mouth 3 (Three) Times a Day As Needed for Muscle Spasms.), Disp: 30 tablet, Rfl: 0  •  desoximetasone (TOPICORT) 0.25 % ointment, Apply 1 application topically to the appropriate area as directed 2 (Two) Times a Day As Needed (TO IRRITATED AREA)., Disp: , Rfl:   •  dexamethasone 0.5 MG/5ML solution, Take 10 mL  by mouth 4 (Four) Times a Day. Take 10 mls by mouth four times daily. Swish in mouth for 2 minutes and then spit out. Do not swallow., Disp: 280 mL, Rfl: 0  •  escitalopram (LEXAPRO) 20 MG tablet, Take 1 tablet by mouth once daily, Disp: 30 tablet, Rfl: 5  •  levothyroxine (SYNTHROID, LEVOTHROID) 25 MCG tablet, TAKE 1 TABLET BY MOUTH ONCE DAILY IN THE MORNING 30  MINUTES  PRIOR  TO  BREAKFAST  OR  ANY  OTHER  MEDICINE, Disp: 90 tablet, Rfl: 1  •  metoprolol succinate XL (TOPROL-XL) 25 MG 24 hr tablet, Take 1 tablet by mouth Daily., Disp: 90 tablet, Rfl: 3  •  mupirocin (BACTROBAN) 2 % ointment, APPLY A SMALL AMOUNT OF OINTMENT TOPICALLY TO AFFECTED AREA EVERY NIGHT, Disp: , Rfl:   •  ondansetron (Zofran) 8 MG tablet, Take 1 tablet by mouth Every 8 (Eight) Hours As Needed for Nausea or Vomiting., Disp: 60 tablet, Rfl: 3  •  pantoprazole (PROTONIX) 40 MG EC tablet, Take 1 tablet by mouth Daily., Disp: 30 tablet, Rfl: 0  •  prochlorperazine (COMPAZINE) 10 MG tablet, Take 1 tablet by mouth Every 6 (Six) Hours As Needed for Nausea or Vomiting., Disp: 30 tablet, Rfl: 3  •  rosuvastatin (CRESTOR) 10 MG tablet, Take 1 tablet by mouth Daily., Disp: 90 tablet, Rfl: 3    Labs:  Labs from 3/3/2023 reviewed.    Review of Systems:   Influenza-like illness: no fever, no cough, no sore throat, no body aches, no known exposure to person with Covid-19.  Constitutional: Negative for fevers or chills  HENT: Negative for hearing loss or runny nose  Eyes: Negative for vision changes or scleral icterus  Respiratory: Negative for cough or shortness of breath  Cardiovascular: Negative for chest pain or heart palpitations  Gastrointestinal: Negative for abdominal pain, nausea, vomiting, constipation, melena, or hematochezia  Genitourinary: Negative for hematuria or dysuria  Musculoskeletal: Negative for joint swelling or gait instability  Neurologic: Negative for tremors or seizures  Psychiatric: Negative for suicidal ideations or  depression  All other systems reviewed and negative    Physical Exam:   • ECO - Asymptomatic  • Constitutional: Well-developed, well-nourished, no acute distress  • Eyes: Conjunctiva normal, sclera nonicteric  • ENMT: Hearing grossly normal, oral mucosa moist  • Neck: Supple, no palpable mass, trachea midline  • Respiratory: Clear to auscultation, normal inspiratory effort  • Cardiovascular: Regular rate, no peripheral edema, no jugular venous distention  • Breast: symmetric  o Right: No visible abnormalities on inspection while seated, with arms raised or hands on hips. No masses, skin changes, or nipple abnormalities. Prior scar noted in UOQ.  o Left: No visible abnormalities on inspection while seated, with arms raised or hands on hips.  Left breast incision 11:00-1:00 well healed, axillary incision well healed.   o No clinical chest wall involvement.  • Lymphatics (palpable nodes): No cervical, supraclavicular, or axillary lymphadenopathy  • Skin: Warm, dry, no rash on visualized skin surfaces  • Musculoskeletal: Symmetric strength, normal gait  • Psychiatric: Alert and oriented ×3, normal affect     Yue Bailey PA-C  General Surgery     [facility]  [address], [address]  [place] [address]  P: [phone]  F: [phone]

## 2023-03-14 ENCOUNTER — OFFICE VISIT (OUTPATIENT)
Dept: SURGERY | Facility: CLINIC | Age: 78
End: 2023-03-14
Payer: MEDICARE

## 2023-03-14 VITALS — BODY MASS INDEX: 24.39 KG/M2 | WEIGHT: 121 LBS | HEIGHT: 59 IN

## 2023-03-14 DIAGNOSIS — C50.912 MALIGNANT NEOPLASM OF LEFT FEMALE BREAST, UNSPECIFIED ESTROGEN RECEPTOR STATUS, UNSPECIFIED SITE OF BREAST: Primary | ICD-10-CM

## 2023-03-14 DIAGNOSIS — Z12.31 SCREENING MAMMOGRAM FOR BREAST CANCER: ICD-10-CM

## 2023-03-14 PROCEDURE — 1159F MED LIST DOCD IN RCRD: CPT

## 2023-03-14 PROCEDURE — 99213 OFFICE O/P EST LOW 20 MIN: CPT

## 2023-03-14 PROCEDURE — 1160F RVW MEDS BY RX/DR IN RCRD: CPT

## 2023-04-10 ENCOUNTER — OFFICE VISIT (OUTPATIENT)
Dept: ONCOLOGY | Facility: CLINIC | Age: 78
End: 2023-04-10
Payer: MEDICARE

## 2023-04-10 ENCOUNTER — INFUSION (OUTPATIENT)
Dept: ONCOLOGY | Facility: HOSPITAL | Age: 78
End: 2023-04-10
Payer: MEDICARE

## 2023-04-10 VITALS
SYSTOLIC BLOOD PRESSURE: 101 MMHG | TEMPERATURE: 97.7 F | HEIGHT: 59 IN | DIASTOLIC BLOOD PRESSURE: 67 MMHG | OXYGEN SATURATION: 97 % | RESPIRATION RATE: 16 BRPM | HEART RATE: 90 BPM | BODY MASS INDEX: 24.25 KG/M2 | WEIGHT: 120.3 LBS

## 2023-04-10 DIAGNOSIS — C50.212 MALIGNANT NEOPLASM OF UPPER-INNER QUADRANT OF LEFT BREAST IN FEMALE, ESTROGEN RECEPTOR POSITIVE: Primary | ICD-10-CM

## 2023-04-10 DIAGNOSIS — K59.00 CONSTIPATION, UNSPECIFIED CONSTIPATION TYPE: ICD-10-CM

## 2023-04-10 DIAGNOSIS — Z17.0 MALIGNANT NEOPLASM OF UPPER-INNER QUADRANT OF LEFT BREAST IN FEMALE, ESTROGEN RECEPTOR POSITIVE: Primary | ICD-10-CM

## 2023-04-10 DIAGNOSIS — Z45.9 ENCOUNTER FOR MANAGEMENT OF IMPLANTED DEVICE: ICD-10-CM

## 2023-04-10 DIAGNOSIS — M54.50 LUMBOSACRAL PAIN: ICD-10-CM

## 2023-04-10 LAB
ALBUMIN SERPL-MCNC: 4.1 G/DL (ref 3.5–5.2)
ALBUMIN/GLOB SERPL: 1.4 G/DL (ref 1.1–2.4)
ALP SERPL-CCNC: 113 U/L (ref 38–116)
ALT SERPL W P-5'-P-CCNC: 24 U/L (ref 0–33)
ANION GAP SERPL CALCULATED.3IONS-SCNC: 11.3 MMOL/L (ref 5–15)
AST SERPL-CCNC: 33 U/L (ref 0–32)
BASOPHILS # BLD AUTO: 0.04 10*3/MM3 (ref 0–0.2)
BASOPHILS NFR BLD AUTO: 0.5 % (ref 0–1.5)
BILIRUB SERPL-MCNC: 0.4 MG/DL (ref 0.2–1.2)
BUN SERPL-MCNC: 9 MG/DL (ref 6–20)
BUN/CREAT SERPL: 11.5 (ref 7.3–30)
CALCIUM SPEC-SCNC: 9.7 MG/DL (ref 8.5–10.2)
CHLORIDE SERPL-SCNC: 102 MMOL/L (ref 98–107)
CO2 SERPL-SCNC: 24.7 MMOL/L (ref 22–29)
CREAT SERPL-MCNC: 0.78 MG/DL (ref 0.6–1.1)
DEPRECATED RDW RBC AUTO: 46.8 FL (ref 37–54)
EGFRCR SERPLBLD CKD-EPI 2021: 77.9 ML/MIN/1.73
EOSINOPHIL # BLD AUTO: 0.09 10*3/MM3 (ref 0–0.4)
EOSINOPHIL NFR BLD AUTO: 1.2 % (ref 0.3–6.2)
ERYTHROCYTE [DISTWIDTH] IN BLOOD BY AUTOMATED COUNT: 12.8 % (ref 12.3–15.4)
GLOBULIN UR ELPH-MCNC: 3 GM/DL (ref 1.8–3.5)
GLUCOSE SERPL-MCNC: 115 MG/DL (ref 74–124)
HCT VFR BLD AUTO: 40.6 % (ref 34–46.6)
HGB BLD-MCNC: 13.6 G/DL (ref 12–15.9)
IMM GRANULOCYTES # BLD AUTO: 0.03 10*3/MM3 (ref 0–0.05)
IMM GRANULOCYTES NFR BLD AUTO: 0.4 % (ref 0–0.5)
LYMPHOCYTES # BLD AUTO: 1.16 10*3/MM3 (ref 0.7–3.1)
LYMPHOCYTES NFR BLD AUTO: 15.3 % (ref 19.6–45.3)
MCH RBC QN AUTO: 33.2 PG (ref 26.6–33)
MCHC RBC AUTO-ENTMCNC: 33.5 G/DL (ref 31.5–35.7)
MCV RBC AUTO: 99 FL (ref 79–97)
MONOCYTES # BLD AUTO: 0.79 10*3/MM3 (ref 0.1–0.9)
MONOCYTES NFR BLD AUTO: 10.4 % (ref 5–12)
NEUTROPHILS NFR BLD AUTO: 5.46 10*3/MM3 (ref 1.7–7)
NEUTROPHILS NFR BLD AUTO: 72.2 % (ref 42.7–76)
NRBC BLD AUTO-RTO: 0 /100 WBC (ref 0–0.2)
PLATELET # BLD AUTO: 212 10*3/MM3 (ref 140–450)
PMV BLD AUTO: 9.1 FL (ref 6–12)
POTASSIUM SERPL-SCNC: 4 MMOL/L (ref 3.5–4.7)
PROT SERPL-MCNC: 7.1 G/DL (ref 6.3–8)
RBC # BLD AUTO: 4.1 10*6/MM3 (ref 3.77–5.28)
SODIUM SERPL-SCNC: 138 MMOL/L (ref 134–145)
WBC NRBC COR # BLD: 7.57 10*3/MM3 (ref 3.4–10.8)

## 2023-04-10 PROCEDURE — 36591 DRAW BLOOD OFF VENOUS DEVICE: CPT

## 2023-04-10 PROCEDURE — 1160F RVW MEDS BY RX/DR IN RCRD: CPT | Performed by: INTERNAL MEDICINE

## 2023-04-10 PROCEDURE — 3074F SYST BP LT 130 MM HG: CPT | Performed by: INTERNAL MEDICINE

## 2023-04-10 PROCEDURE — 1125F AMNT PAIN NOTED PAIN PRSNT: CPT | Performed by: INTERNAL MEDICINE

## 2023-04-10 PROCEDURE — 80053 COMPREHEN METABOLIC PANEL: CPT

## 2023-04-10 PROCEDURE — 99214 OFFICE O/P EST MOD 30 MIN: CPT | Performed by: INTERNAL MEDICINE

## 2023-04-10 PROCEDURE — 3078F DIAST BP <80 MM HG: CPT | Performed by: INTERNAL MEDICINE

## 2023-04-10 PROCEDURE — 1159F MED LIST DOCD IN RCRD: CPT | Performed by: INTERNAL MEDICINE

## 2023-04-10 PROCEDURE — 85025 COMPLETE CBC W/AUTO DIFF WBC: CPT

## 2023-04-10 PROCEDURE — 25010000002 HEPARIN LOCK FLUSH PER 10 UNITS: Performed by: INTERNAL MEDICINE

## 2023-04-10 RX ORDER — SODIUM FLUORIDE 6.1 MG/ML
GEL, DENTIFRICE DENTAL
COMMUNITY
Start: 2023-02-13

## 2023-04-10 RX ORDER — SODIUM CHLORIDE 0.9 % (FLUSH) 0.9 %
10 SYRINGE (ML) INJECTION AS NEEDED
Status: DISCONTINUED | OUTPATIENT
Start: 2023-04-10 | End: 2023-04-10 | Stop reason: HOSPADM

## 2023-04-10 RX ORDER — HEPARIN SODIUM (PORCINE) LOCK FLUSH IV SOLN 100 UNIT/ML 100 UNIT/ML
500 SOLUTION INTRAVENOUS AS NEEDED
Status: DISCONTINUED | OUTPATIENT
Start: 2023-04-10 | End: 2023-04-10 | Stop reason: HOSPADM

## 2023-04-10 RX ADMIN — Medication 10 ML: at 14:10

## 2023-04-10 RX ADMIN — Medication 500 UNITS: at 14:10

## 2023-04-10 NOTE — PROGRESS NOTES
Subjective   Neela Ramirez is a 78 y.o. female.  Referred by Dr. 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  A bilateral diagnostic mammogram was performed for further evaluation of the same.    8/17/2021-bilateral diagnostic mammogram-scattered fibroglandular densities throughout both breasts.  In the posterior one third upper inner quadrant of the left breast at the site of palpable concern, 11 o'clock position there is a mass with adjacent pleomorphic calcifications.  The mass and the calcifications measure on order of 1.5 cm in greatest dimension.  No evidence of axillary lymphadenopathy appreciated.  Stable microcalcifications seen in other areas of the left breast on right breast.  Ultrasound-at 11 o'clock position, 5 cm from the nipple in the left breast there is an irregular hypoechoic mass which measures 2.4 x 2 x 1.6 cm.    Impression  1.irregular 2.4 cm mass in the left breast at the site of palpable concern at 11:00, 5 cm from the nipple.  Ultrasound-guided biopsy of the mass recommended  No finding suspicious for malignancy in the right breast    9/8/2021-left breast ultrasound-guided biopsy  Pathology consistent with invasive ductal carcinoma with lobular features.  Grade 3.  The carcinoma measures 7 mm in greatest dimension.  Focally suspicious for lymphovascular space invasion.  ER low +1-10%, intensity week  NE negative  HER-2 3+ on immunohistochemistry  Ki-67 70%    MRI of the breast 10/8/2021-Biopsy-proven malignancy in the left breast at 11:00 measuring 2.8 cm with a centrally located metallic clip.  No other suspicious findings are seen within the left breast or no left axillary lymphadenopathy.  No suspicious findings of the right breast    Echocardiogram 10/8/2021 was normal  ejection fraction of 56 to 60% and strain of -20    She denies any new bone pains, dyspnea, cough, abdominal pain nausea vomiting or recent changes in weight.    She had 2 previous breast aspirations in her 20s.  No other breast biopsies  She does not know much about her family hence limited family history.    She received neoadjuvant chemotherapy on EA 1181 trial with Taxol Perjeta and Herceptin.  She completed 12 weeks of Taxol Herceptin and Perjeta.    Had an abnormal EKG preop and hence a stress test was performed on 2/3/2022 which showed a normal left ventricular ejection fraction and LVEF at stress was 73%.  Considered a low risk study with normal myocardial perfusion imaging.    She underwent a left breast lumpectomy and a sentinel lymph node biopsy on 2/10/2022.    Pathology was consistent with residual tumor which was pleomorphic invasive lobular carcinoma, poorly differentiated, grade 3  Tumor measured 18 mm  Margins negative for invasive carcinoma  Associated lobular carcinoma in situ noted  1 out of 10 lymph nodes was positive for metastatic focus measuring 3.5 mm.    Receptors were repeated on the pleomorphic invasive lobular carcinoma  ER +91 to 100% strong  VT +61 to 70% moderate  HER-2 negative  Ki-67 55%    Receptors performed on the metastatic lymph node  ER +81-90% strong  VT negative  HER-2 2+ on immunohistochemistry, FISH pending.    ypT1 cN1 aM0    2/28/2022-CT of the chest abdomen and pelvis  Fluid collection in the upper left breast measuring 6 x 2.5 x 4.9 cm, postoperative seroma.  Skin thickening of the left breast likely postsurgical.  Fluid collections left axilla measuring 2.9 x 1.4 x 2.9 cm.  No other suspicious abnormalities on the CT of the chest  CT abdomen with no evidence of metastatic disease.  Mild colonic diverticulosis.  Small uterine fibroid.    3/1/2022-bone scan  No evidence of metastatic disease.  Multifocal degenerative arthritic uptake without scintigraphic evidence to  suggest metastatic disease.    3/1/2022-echocardiogram  Left ventricular ejection fraction 56-60%.  Normal global LV strain of -20.5%.    3/11/2022-cycle 1 of AC    Completed 4 cycles of AC    6/3/2022-cycle 1 Kadcyla    7/19/2022-completed adjuvant radiation    7/20/2022-started adjuvant anastrozole    8/17/2022-bilateral diagnostic mammogram-benign    Interval history  Ms. Ramirez presents to the clinic today for follow-up.  She continues on anastrozole and tolerating that overall fairly well.  She continues to struggle with intermittent constipation and remains worried about that.  She is still experiencing pain in the left sacral area.  This pain started after she had a fall for which x-rays have been performed and were negative.  She still continues to have pain in that area especially when she sits.  Denies any new breast masses.    The following portions of the patient's history were reviewed and updated as appropriate: allergies, current medications, past family history, past medical history, past social history, past surgical history and problem list.    Past Medical History:   Diagnosis Date   • Anxiety    • Arthritis    • Cataract     EARLY. JIMMIE EYES   • Chronic back pain     LOW BACK ACHES AT TIME FROM ARTHRTIS   • Constipation     AT TIMES. NO RECENT ISSUES   • Dermatitis     LEGS. STATES CLEARING UP NOW.   • Disease of thyroid gland     HYPO   • Diverticulosis     Colonoscopy November 2014   • Erythema of face     Transitory Erythema Of The Face   • GERD (gastroesophageal reflux disease)     ON OCC   • History of chemotherapy     FOR LEFT BREAST CANCER   • Hyperlipidemia    • Hypertension    • Malignant neoplasm of female breast 9/27/2021   • Osteopenia    • Tachycardia         Past Surgical History:   Procedure Laterality Date   • BREAST BIOPSY  09/2021    Dr. Tirado    • BREAST CYST ASPIRATION Right    • BREAST CYST EXCISION Right    • BREAST LUMPECTOMY WITH SENTINEL NODE BIOPSY Left 2/10/2022     "Procedure: LEFT BREAST WIRE LOCALIZED LUMPECTOMY WITH SENTINEL NODE BIOPSY, POSSIBLE LEFT AXILLARY DISSECTION;  Surgeon: Becky Liu MD;  [place] [facility] [place] [facility] [place]  Service: General;  Laterality: Left;   • COLONOSCOPY     • COLONOSCOPY     • EXOSTECTOMY Bilateral     Simple Bunion Exostectomy (Silver Procedure)   • FOOT TENDON SURGERY     • VENOUS ACCESS DEVICE (PORT) INSERTION N/A 10/18/2021    Procedure: INSERTION VENOUS ACCESS DEVICE;  Surgeon: Becky Liu MD;  [place] [facility] [place] [facility] [place]  Service: General;  Laterality: N/A;        Family History   Problem Relation Age of Onset   • Arthritis Father    • Heart disease Father    • Hypertension Father    • Hypertension Mother    • Malig Hyperthermia Neg Hx         Social History     Socioeconomic History   • Marital status:      Spouse name: Pj   • Number of children: 1   Tobacco Use   • Smoking status: Former     Types: Cigarettes   • Smokeless tobacco: Never   • Tobacco comments:     SOCIALLY IN EARLY 20'S   Vaping Use   • Vaping Use: Never used   Substance and Sexual Activity   • Alcohol use: Yes     Comment: social drinker   • Drug use: Never   • Sexual activity: Defer        OB History             Para        Term   0            AB        Living           SAB        IAB        Ectopic        Molar        Multiple        Live Births                 Age at menarche-13  Age at menopause-50  Nulliparous  Breast-feeding-N/A   0 para 0  0  Oral contraceptive pill use-none  Hormone replacement therapy-7 years    No Known Allergies     Review of systems as mentioned in the HPI    Objective   Blood pressure 101/67, pulse 90, temperature 97.7 °F (36.5 °C), temperature source Temporal, resp. rate 16, height 149.9 cm (59.02\"), weight 54.6 kg (120 lb 4.8 oz), SpO2 97 %.     ECOG performance status-0    Physical Exam  Vitals reviewed.   Constitutional:       Appearance: Normal appearance.   HENT:      Head: Normocephalic " and atraumatic.      Right Ear: There is impacted cerumen.      Left Ear: There is impacted cerumen.      Nose: Nose normal.      Mouth/Throat:      Mouth: Mucous membranes are moist.      Pharynx: Oropharynx is clear.   Eyes:      Conjunctiva/sclera: Conjunctivae normal.      Pupils: Pupils are equal, round, and reactive to light.   Cardiovascular:      Rate and Rhythm: Normal rate and regular rhythm.      Pulses: Normal pulses.      Heart sounds: Normal heart sounds.   Pulmonary:      Effort: Pulmonary effort is normal.      Breath sounds: Normal breath sounds.   Abdominal:      General: Abdomen is flat. Bowel sounds are normal.      Palpations: Abdomen is soft.   Musculoskeletal:         General: Normal range of motion.      Cervical back: Normal range of motion.   Skin:     General: Skin is warm and dry.      Findings: No rash.   Neurological:      General: No focal deficit present.      Mental Status: She is alert and oriented to person, place, and time. Mental status is at baseline.   Psychiatric:         Mood and Affect: Mood normal.         Behavior: Behavior normal.         Thought Content: Thought content normal.         Judgment: Judgment normal.       Breast Exam: Right breast appears normal on inspection.  No palpable abnormalities of the right breast.    Left breast on inspection there is a scar which is well-healed.  There is also left axillary scar which is well-healed.  Skin changes consistent with radiation dermatitis.  The left breast is somewhat firm which is likely secondary to radiation.    I have reexamined the patient and the results are consistent with the previously documented exam. Sheri Avendano MD         Results from last 7 days   Lab Units 04/10/23  1405   WBC 10*3/mm3 7.57   NEUTROS ABS 10*3/mm3 5.46   HEMOGLOBIN g/dL 13.6   HEMATOCRIT % 40.6   PLATELETS 10*3/mm3 212               No radiology results for the last 30 days.         Assessment & Plan       *Invasive ductal carcinoma  of the left breast  · Clinical T2 N0 M0, stage IIa  · On ultrasound the tumor measures 2.4 cm.  Lymph nodes on the left side are normal.  · MRI 10/8/2021 with tumor measuring 2.8 cm.  Lymph nodes appear normal  · Pathology consistent with invasive carcinoma with ductal and lobular features, grade 3, ER +1 to 10% weak, ND negative, HER-2 3+ immunohistochemistry, Ki-67 70%.  · She was evaluated by Dr. Liu and referred for consideration of neoadjuvant chemotherapy.   Since the tumor is HER-2 positive and over 2 cm I think it would be reasonable to proceed with neoadjuvant chemotherapy.  Since the tumor is over 2 cm but lymph node negative I think she would be a great candidate for EA 1181 clinical trial which comprises of administering Taxol Herceptin and Perjeta tamika  adjuvantly followed by surgery and additional adjuvant treatment depending upon the response.  Port placed 10/18/2021  Echocardiogram shows normal ejection fraction  Week 1 TPH on 10/19/2021  Completed 12 weeks of Taxol Perjeta and Herceptin on 1/4/2022 on EA 1181 clinical trial  1/11/2022 due for Herceptin and Perjeta only  2/1/2022-due for dose 2 of Herceptin and Perjeta  MRI 1/14/2022 reviewed and there has been a good tumor response with decrease in size of the mass to 1.2 cm.  2/10/2022-left breast lumpectomy and sentinel lymph node biopsy  Pathology shows ypT1 cN1 aM0, stage IIb, pleomorphic invasive lobular carcinoma, grade 3, ER +% strong, ND +61-70% moderate, HER-2 negative on the residual tumor  The left axillary lymph node was ER positive strong, ND negative and HER-2 2+, FISH amplified.  On the initial biopsy prior to surgery the pathology showed invasive ductal with lobular features and the ER/ND was negative and HER-2 was 3+.  The residual disease obviously appears to be different from the initial pathology.  What is remaining is essentially pleomorphic lobular carcinoma which is high-grade with a high Ki-67 and ER/ND  positivity.  Now that the lymph node is also positive I do believe that she will benefit from additional chemotherapy particularly either AC or EC.  Case was presented at multidisciplinary conference.  Decided to proceed with adjuvant Adriamycin and cyclophosphamide.  Given her age we will proceed with every 3 weekly AC over every 2 weeks.  Staging CT chest and pelvis and bone scan without any evidence of metastatic disease  3/11/2022-proceed with cycle 1 of AC  · 5/13/2022-due for cycle 4 AC.    · 6/3/2022-cycle 1 Kadcyla  · 7/19/2022-completed adjuvant radiation  · 7/20/2022-started anastrozole  · Patient continues on anastrozole and tolerating that fairly well.  · She is reporting ongoing back pain which is likely secondary to the fall.  Previous x-rays were performed and reviewed by me and those were negative.  However due to ongoing pain we will plan to obtain an MRI of the lumbosacral spine    *Right-port can be removed, message sent to Dr. Liu's office    *Hypertension-currently controlled with amlodipine and metoprolol.  Blood pressure 101/67    *Anxiety  · Mood normal    *Hypothyroidism  · Continue Synthroid  · Stable    *Cardiac health-  · Echocardiogram 1/5/2022 reviewed and stable  · Stress test 2/3/2022 normal  · 3/1/2022-echocardiogram shows normal ejection fraction of 56 to 60%, normal LV strain  · 5/31/2022 EF 61%.  Maintain follow-up with Dr. Galo   · 8/19/2022-2D echocardiogram shows an ejection fraction of 55% and a strain of -22%.  Stable  · Repeat echocardiogram scheduled 2/13/2023     *Bone health-  · DEXA scan August 2021 showing osteopenia (this was stable compared to imaging 2 years prior).  · She is currently on Fosamax which was started by her primary care physician.  · Repeat DEXA in 2023 August.    *Left axillary stiffness-patient has been seen by physical therapy with exercises she knows to do to help with this.  She admits that she needs to increase routine practice of this.       *Decreased hearing, feelings of lightheadedness and left ear bothering patient today.  Per examination she has significant cerumen in both external ear canals.  Patient has required visit with ENT before to have this removed.  I recommended that she obtain Debrox in the short-term until she gets through the holiday season and then to have repeat visit to have this cleared out.      *Constipation  · Improved somewhat with stool softeners however patient experiences intermittent constipation and is worried about the same.  · She will be referred to gastroenterology for further evaluation of the same.    *Left-sided back pain  · Secondary to the fall  · X-rays previously performed were negative  · We will obtain MRI of the spine and the sacral area    PLAN:   1. Continue anastrozole.  2. Currently on Fosamax, continue the same  3. Follow-up in 3 to 4 months

## 2023-04-26 DIAGNOSIS — F41.9 ANXIETY: ICD-10-CM

## 2023-04-27 RX ORDER — ESCITALOPRAM OXALATE 20 MG/1
TABLET ORAL
Qty: 90 TABLET | Refills: 1 | Status: SHIPPED | OUTPATIENT
Start: 2023-04-27

## 2023-04-29 ENCOUNTER — HOSPITAL ENCOUNTER (OUTPATIENT)
Dept: MRI IMAGING | Facility: HOSPITAL | Age: 78
Discharge: HOME | End: 2023-04-29
Payer: MEDICARE

## 2023-04-29 DIAGNOSIS — M54.50 LUMBOSACRAL PAIN: ICD-10-CM

## 2023-04-29 PROCEDURE — 72158 MRI LUMBAR SPINE W/O & W/DYE: CPT

## 2023-04-29 PROCEDURE — A9577 INJ MULTIHANCE: HCPCS | Performed by: INTERNAL MEDICINE

## 2023-04-29 PROCEDURE — 82565 ASSAY OF CREATININE: CPT

## 2023-04-29 PROCEDURE — 0 GADOBENATE DIMEGLUMINE 529 MG/ML SOLUTION: Performed by: INTERNAL MEDICINE

## 2023-04-29 RX ADMIN — GADOBENATE DIMEGLUMINE 11 ML: 529 INJECTION, SOLUTION INTRAVENOUS at 12:26

## 2023-04-30 LAB — CREAT BLDA-MCNC: 0.7 MG/DL (ref 0.6–1.3)

## 2023-05-02 ENCOUNTER — TELEPHONE (OUTPATIENT)
Dept: ONCOLOGY | Facility: CLINIC | Age: 78
End: 2023-05-02
Payer: MEDICARE

## 2023-05-02 NOTE — TELEPHONE ENCOUNTER
Contacted Ms Ramirez by phone to review Dr Avendano's note with her regarding her MRI report.  She voiced understanding.

## 2023-05-02 NOTE — TELEPHONE ENCOUNTER
----- Message from Sheri Avendano MD sent at 5/2/2023  6:06 AM EDT -----  Pls inform pt that MRI of the lumbar spine with degenerative changes. No fractures.

## 2023-05-10 DIAGNOSIS — E78.5 HYPERLIPIDEMIA, UNSPECIFIED HYPERLIPIDEMIA TYPE: ICD-10-CM

## 2023-05-11 RX ORDER — ROSUVASTATIN CALCIUM 10 MG/1
TABLET, COATED ORAL
Qty: 90 TABLET | Refills: 3 | Status: SHIPPED | OUTPATIENT
Start: 2023-05-11

## 2023-06-15 DIAGNOSIS — E03.8 SUBCLINICAL HYPOTHYROIDISM: ICD-10-CM

## 2023-06-16 RX ORDER — LEVOTHYROXINE SODIUM 0.03 MG/1
TABLET ORAL
Qty: 90 TABLET | Refills: 1 | Status: SHIPPED | OUTPATIENT
Start: 2023-06-16

## 2023-07-13 PROBLEM — K59.00 CONSTIPATION: Status: ACTIVE | Noted: 2023-07-13

## 2023-07-13 PROBLEM — R11.0 NAUSEA: Status: ACTIVE | Noted: 2023-07-13

## 2023-07-17 ENCOUNTER — APPOINTMENT (OUTPATIENT)
Dept: PHYSICAL THERAPY | Facility: HOSPITAL | Age: 78
End: 2023-07-17
Payer: MEDICARE

## 2023-07-19 ENCOUNTER — APPOINTMENT (OUTPATIENT)
Dept: PHYSICAL THERAPY | Facility: HOSPITAL | Age: 78
End: 2023-07-19
Payer: MEDICARE

## 2023-07-26 ENCOUNTER — OFFICE VISIT (OUTPATIENT)
Dept: INTERNAL MEDICINE | Age: 78
End: 2023-07-26
Payer: MEDICARE

## 2023-07-26 VITALS
TEMPERATURE: 99.8 F | HEIGHT: 59 IN | HEART RATE: 63 BPM | DIASTOLIC BLOOD PRESSURE: 68 MMHG | SYSTOLIC BLOOD PRESSURE: 118 MMHG | OXYGEN SATURATION: 95 % | BODY MASS INDEX: 24.23 KG/M2 | WEIGHT: 120.2 LBS

## 2023-07-26 DIAGNOSIS — E78.5 HYPERLIPIDEMIA, UNSPECIFIED HYPERLIPIDEMIA TYPE: ICD-10-CM

## 2023-07-26 DIAGNOSIS — F41.9 ANXIETY: ICD-10-CM

## 2023-07-26 DIAGNOSIS — E03.9 HYPOTHYROIDISM, UNSPECIFIED TYPE: ICD-10-CM

## 2023-07-26 DIAGNOSIS — I10 ESSENTIAL HYPERTENSION: Primary | ICD-10-CM

## 2023-07-26 PROCEDURE — 99214 OFFICE O/P EST MOD 30 MIN: CPT | Performed by: NURSE PRACTITIONER

## 2023-07-26 PROCEDURE — 3074F SYST BP LT 130 MM HG: CPT | Performed by: NURSE PRACTITIONER

## 2023-07-26 PROCEDURE — 3078F DIAST BP <80 MM HG: CPT | Performed by: NURSE PRACTITIONER

## 2023-07-26 RX ORDER — LEVOTHYROXINE SODIUM 0.03 MG/1
TABLET ORAL
Qty: 90 TABLET | Refills: 1
Start: 2023-07-26

## 2023-07-26 NOTE — PROGRESS NOTES
"    I N T E R N A L  M E D I C I N E  JULEE ALEMAN, APRMIKE      ENCOUNTER DATE:  07/26/2023    Neela Ramirez / 78 y.o. / female      CHIEF COMPLAINT / REASON FOR OFFICE VISIT     Hyperlipidemia, Hypertension, and Hypothyroidism      ASSESSMENT & PLAN     Problem List Items Addressed This Visit          Cardiac and Vasculature    Essential hypertension - Primary    Relevant Medications    amLODIPine (NORVASC) 5 MG tablet    metoprolol succinate XL (TOPROL-XL) 25 MG 24 hr tablet    Other Relevant Orders    Comprehensive Metabolic Panel    Hyperlipidemia    Relevant Medications    rosuvastatin (CRESTOR) 10 MG tablet    Other Relevant Orders    Lipid Panel With / Chol / HDL Ratio       Endocrine and Metabolic    Hypothyroidism    Relevant Medications    dexamethasone 0.5 MG/5ML solution    metoprolol succinate XL (TOPROL-XL) 25 MG 24 hr tablet    levothyroxine (SYNTHROID, LEVOTHROID) 25 MCG tablet    Other Relevant Orders    TSH+Free T4       Mental Health    Anxiety    Relevant Medications    escitalopram (LEXAPRO) 20 MG tablet     Orders Placed This Encounter   Procedures    Comprehensive Metabolic Panel    Lipid Panel With / Chol / HDL Ratio    TSH+Free T4     New Medications Ordered This Visit   Medications    levothyroxine (SYNTHROID, LEVOTHROID) 25 MCG tablet     Sig: TAKE ONE TABLET BY MOUTH EVERY MORNING 30 MINUTES BEFORE BREAKFAST OR ANY OTHER MEDICATION     Dispense:  90 tablet     Refill:  1       SUMMARY/DISCUSSION  Will keep scheduled follow-up in February for medicare wellness, earlier if needed     Next Appointment with me: Visit date not found    Return for Next scheduled follow up.      VITAL SIGNS     Visit Vitals  /68 (BP Location: Right arm, Patient Position: Sitting, Cuff Size: Adult)   Pulse 63   Temp 99.8 °F (37.7 °C) (Temporal)   Ht 149.9 cm (59.02\")   Wt 54.5 kg (120 lb 3.2 oz)   SpO2 95%   BMI 24.26 kg/m²     Wt Readings from Last 3 Encounters:   07/26/23 54.5 kg (120 lb 3.2 oz)   07/14/23 " 55.5 kg (122 lb 5.7 oz)   07/13/23 55.5 kg (122 lb 6.4 oz)     Body mass index is 24.26 kg/m².    MEDICATIONS AT THE TIME OF OFFICE VISIT     Current Outpatient Medications on File Prior to Visit   Medication Sig    alendronate (FOSAMAX) 70 MG tablet Take 1 tablet by mouth 1 (One) Time Per Week.    amLODIPine (NORVASC) 5 MG tablet Take 1 tablet by mouth Daily.    anastrozole (ARIMIDEX) 1 MG tablet Take 1 tablet by mouth once daily    Cholecalciferol (Vitamin D) 50 MCG (2000 UT) capsule Take  by mouth.    cyclobenzaprine (FLEXERIL) 5 MG tablet Take 1 tablet by mouth three times daily as needed for muscle spasm (Patient taking differently: Take 1 tablet by mouth 3 (Three) Times a Day As Needed for Muscle Spasms.)    desoximetasone (TOPICORT) 0.25 % ointment Apply 1 application  topically to the appropriate area as directed 2 (Two) Times a Day As Needed (TO IRRITATED AREA).    dexamethasone 0.5 MG/5ML solution Take 10 mL by mouth 4 (Four) Times a Day. Take 10 mls by mouth four times daily. Swish in mouth for 2 minutes and then spit out. Do not swallow.    escitalopram (LEXAPRO) 20 MG tablet TAKE ONE TABLET BY MOUTH DAILY    metoprolol succinate XL (TOPROL-XL) 25 MG 24 hr tablet Take 1 tablet by mouth Daily.    mupirocin (BACTROBAN) 2 % ointment APPLY A SMALL AMOUNT OF OINTMENT TOPICALLY TO AFFECTED AREA EVERY NIGHT    ondansetron (Zofran) 8 MG tablet Take 1 tablet by mouth Every 8 (Eight) Hours As Needed for Nausea or Vomiting.    pantoprazole (PROTONIX) 40 MG EC tablet Take 1 tablet by mouth Daily.    PreviDent 5000 Dry Mouth 1.1 % gel     prochlorperazine (COMPAZINE) 10 MG tablet Take 1 tablet by mouth Every 6 (Six) Hours As Needed for Nausea or Vomiting.    rosuvastatin (CRESTOR) 10 MG tablet TAKE ONE TABLET BY MOUTH DAILY    [DISCONTINUED] levothyroxine (SYNTHROID, LEVOTHROID) 25 MCG tablet TAKE ONE TABLET BY MOUTH EVERY MORNING 30 MINUTES BEFORE BREAKFAST OR ANY OTHER MEDICATION     No current  facility-administered medications on file prior to visit.      HISTORY OF PRESENT ILLNESS     Hypertension stable on amlodipine 5 mg metoprolol 25. Denies any chest pain, shortness of air, chest palpitations.      Hyperlipidemia Controlled with rosuvastatin 10 mg daily, last . No myalgias with medication.      Hypothyroidism controlled with thyroxine 25 MCG daily. Last TSH and free T4 within normal     Generalized anxiety well-controlled Lexapro 20 mg daily no panic attack or thoughts of suicide or self-harm.     REVIEW OF SYSTEMS     Constitutional neg except per HPI   Resp neg  CV neg     PHYSICAL EXAMINATION     Physical Exam  Constitutional  No distress  Cardiovascular Rate  normal . Rhythm: regular . Heart sounds:  normal  Pulmonary/Chest  Effort normal. Breath sounds:  normal  Psychiatric  Alert. Judgment and thought content normal. Mood normal      REVIEWED DATA     Labs:   Lab Results   Component Value Date    GLUCOSE 115 04/10/2023    BUN 9 04/10/2023    CREATININE 0.70 04/29/2023    EGFRRESULT 86 07/07/2022    EGFR 77.9 04/10/2023    BCR 11.5 04/10/2023    K 4.0 04/10/2023    CO2 24.7 04/10/2023    CALCIUM 9.7 04/10/2023    PROTENTOTREF 6.8 07/07/2022    ALBUMIN 4.1 04/10/2023    BILITOT 0.4 04/10/2023    AST 33 (H) 04/10/2023    ALT 24 04/10/2023     Lab Results   Component Value Date    TSH 2.660 01/26/2023      Lab Results   Component Value Date    CHLPL 160 01/26/2023    TRIG 82 01/26/2023    HDL 62 (H) 01/26/2023    LDL 83 01/26/2023      Lab Results   Component Value Date    WBC 7.57 04/10/2023    HGB 13.6 04/10/2023    HCT 40.6 04/10/2023    MCV 99.0 (H) 04/10/2023     04/10/2023      Lab Results   Component Value Date    HGBA1C 5.3 02/23/2022      Brief Urine Lab Results  (Last result in the past 365 days)        Color   Clarity   Blood   Leuk Est   Nitrite   Protein   CREAT   Urine HCG        12/30/22 1317 Yellow   Clear   Trace   Negative   Negative   Negative                   Imaging:           Medical Tests:           Summary of old records / correspondence / consultant report:           Request outside records:           *Dragon dictation used for documentation.

## 2023-07-27 ENCOUNTER — TELEPHONE (OUTPATIENT)
Dept: CARDIOLOGY | Facility: CLINIC | Age: 78
End: 2023-07-27
Payer: MEDICARE

## 2023-07-27 DIAGNOSIS — E87.5 HYPERKALEMIA: Primary | ICD-10-CM

## 2023-07-27 LAB
ALBUMIN SERPL-MCNC: 3.8 G/DL (ref 3.5–5.2)
ALBUMIN/GLOB SERPL: 1.7 G/DL
ALP SERPL-CCNC: 135 U/L (ref 39–117)
ALT SERPL-CCNC: 9 U/L (ref 1–33)
AST SERPL-CCNC: 18 U/L (ref 1–32)
BILIRUB SERPL-MCNC: 0.4 MG/DL (ref 0–1.2)
BUN SERPL-MCNC: 8 MG/DL (ref 8–23)
BUN/CREAT SERPL: 15.4 (ref 7–25)
CALCIUM SERPL-MCNC: 9.7 MG/DL (ref 8.6–10.5)
CHLORIDE SERPL-SCNC: 97 MMOL/L (ref 98–107)
CHOLEST SERPL-MCNC: 136 MG/DL (ref 0–200)
CHOLEST/HDLC SERPL: 2.67 {RATIO}
CO2 SERPL-SCNC: 26.5 MMOL/L (ref 22–29)
CREAT SERPL-MCNC: 0.52 MG/DL (ref 0.57–1)
EGFRCR SERPLBLD CKD-EPI 2021: 95.2 ML/MIN/1.73
GLOBULIN SER CALC-MCNC: 2.3 GM/DL
GLUCOSE SERPL-MCNC: 105 MG/DL (ref 65–99)
HDLC SERPL-MCNC: 51 MG/DL (ref 40–60)
LDLC SERPL CALC-MCNC: 64 MG/DL (ref 0–100)
POTASSIUM SERPL-SCNC: 5.3 MMOL/L (ref 3.5–5.2)
PROT SERPL-MCNC: 6.1 G/DL (ref 6–8.5)
SODIUM SERPL-SCNC: 133 MMOL/L (ref 136–145)
T4 FREE SERPL-MCNC: 1.64 NG/DL (ref 0.93–1.7)
TRIGL SERPL-MCNC: 114 MG/DL (ref 0–150)
TSH SERPL DL<=0.005 MIU/L-ACNC: 1.73 UIU/ML (ref 0.27–4.2)
VLDLC SERPL CALC-MCNC: 21 MG/DL (ref 5–40)

## 2023-07-28 ENCOUNTER — TELEPHONE (OUTPATIENT)
Dept: INTERNAL MEDICINE | Age: 78
End: 2023-07-28
Payer: MEDICARE

## 2023-07-28 NOTE — TELEPHONE ENCOUNTER
[facility] INSTRUCTING PT TO RETURN CALL TO BE READ LAB RESULTS. LETTER SENT VIA [employer]

## 2023-07-28 NOTE — TELEPHONE ENCOUNTER
----- Message from RENO Nolasco sent at 7/27/2023  5:27 PM EDT -----  Labs are in acceptable ranges except for decreased sodium and increased potassium.  I have any palpitations or chest pain?  I would like to recheck your potassium by Monday or Tuesday of next week to see if this is accurate.  Please make a lab only appointment

## 2023-07-31 ENCOUNTER — OFFICE VISIT (OUTPATIENT)
Dept: ONCOLOGY | Facility: CLINIC | Age: 78
End: 2023-07-31
Payer: MEDICARE

## 2023-07-31 VITALS
HEART RATE: 93 BPM | HEIGHT: 59 IN | RESPIRATION RATE: 18 BRPM | OXYGEN SATURATION: 97 % | SYSTOLIC BLOOD PRESSURE: 125 MMHG | BODY MASS INDEX: 24.27 KG/M2 | DIASTOLIC BLOOD PRESSURE: 79 MMHG | WEIGHT: 120.4 LBS | TEMPERATURE: 97.8 F

## 2023-07-31 DIAGNOSIS — Z79.899 HIGH RISK MEDICATION USE: ICD-10-CM

## 2023-07-31 DIAGNOSIS — C50.212 MALIGNANT NEOPLASM OF UPPER-INNER QUADRANT OF LEFT BREAST IN FEMALE, ESTROGEN RECEPTOR POSITIVE: Primary | ICD-10-CM

## 2023-07-31 DIAGNOSIS — Z17.0 MALIGNANT NEOPLASM OF UPPER-INNER QUADRANT OF LEFT BREAST IN FEMALE, ESTROGEN RECEPTOR POSITIVE: Primary | ICD-10-CM

## 2023-08-14 ENCOUNTER — HOSPITAL ENCOUNTER (OUTPATIENT)
Dept: BONE DENSITY | Facility: HOSPITAL | Age: 78
Discharge: HOME | End: 2023-08-14
Admitting: INTERNAL MEDICINE
Payer: MEDICARE

## 2023-08-14 DIAGNOSIS — Z17.0 MALIGNANT NEOPLASM OF UPPER-INNER QUADRANT OF LEFT BREAST IN FEMALE, ESTROGEN RECEPTOR POSITIVE: ICD-10-CM

## 2023-08-14 DIAGNOSIS — Z79.899 HIGH RISK MEDICATION USE: ICD-10-CM

## 2023-08-14 DIAGNOSIS — Z78.0 POST-MENOPAUSAL: ICD-10-CM

## 2023-08-14 DIAGNOSIS — C50.212 MALIGNANT NEOPLASM OF UPPER-INNER QUADRANT OF LEFT BREAST IN FEMALE, ESTROGEN RECEPTOR POSITIVE: ICD-10-CM

## 2023-08-14 PROCEDURE — 77080 DXA BONE DENSITY AXIAL: CPT

## 2023-08-16 ENCOUNTER — HOSPITAL ENCOUNTER (OUTPATIENT)
Dept: PHYSICAL THERAPY | Facility: HOSPITAL | Age: 78
Setting detail: THERAPIES SERIES
Discharge: HOME | End: 2023-08-16
Payer: MEDICARE

## 2023-08-16 DIAGNOSIS — Z17.0 MALIGNANT NEOPLASM OF UPPER-INNER QUADRANT OF LEFT BREAST IN FEMALE, ESTROGEN RECEPTOR POSITIVE: ICD-10-CM

## 2023-08-16 DIAGNOSIS — I89.0 LYMPHEDEMA OF LEFT ARM: Primary | ICD-10-CM

## 2023-08-16 DIAGNOSIS — C50.212 MALIGNANT NEOPLASM OF UPPER-INNER QUADRANT OF LEFT BREAST IN FEMALE, ESTROGEN RECEPTOR POSITIVE: ICD-10-CM

## 2023-08-16 DIAGNOSIS — Z98.890 S/P LUMPECTOMY, LEFT BREAST: ICD-10-CM

## 2023-08-16 PROCEDURE — 97535 SELF CARE MNGMENT TRAINING: CPT

## 2023-08-16 PROCEDURE — 93702 BIS XTRACELL FLUID ANALYSIS: CPT

## 2023-08-16 NOTE — THERAPY RE-EVALUATION
Physical Therapy Lymphedema Re-Evaluation  [facility]     Patient Name: Neela Ramirez  : 1945  MRN: [id number]  Today's Date: 2023      Visit Date: 2023    Visit Dx:    ICD-10-CM ICD-9-CM   1. Lymphedema of left arm  I89.0 457.1   2. Malignant neoplasm of upper-inner quadrant of left breast in female, estrogen receptor positive  C50.212 174.2    Z17.0 V86.0   3. S/P lumpectomy, left breast  Z98.890 V45.89       Patient Active Problem List   Diagnosis    Anxiety    DD (diverticular disease)    Gastroesophageal reflux disease    Essential hypertension    Hyperlipidemia    Osteopenia    Bilateral low back pain without sciatica    Malignant neoplasm of female breast    Encounter for management of implanted device    Personal history of breast cancer    Constipation    Nausea    Hypothyroidism        Past Medical History:   Diagnosis Date    Anxiety     Arthritis     Cataract     EARLY. JIMMIE EYES    Chronic back pain     LOW BACK ACHES AT TIME FROM ARTHRTIS    Constipation     AT TIMES. NO RECENT ISSUES    Dermatitis     LEGS. STATES CLEARING UP NOW.    Disease of thyroid gland     HYPO    Diverticulosis     Colonoscopy 2014    Erythema of face     Transitory Erythema Of The Face    GERD (gastroesophageal reflux disease)     ON OCC    History of chemotherapy     FOR LEFT BREAST CANCER    Hyperlipidemia     Hypertension     Malignant neoplasm of female breast 2021    Osteopenia     Tachycardia         Past Surgical History:   Procedure Laterality Date    BREAST BIOPSY  2021    Dr. Tirado     BREAST CYST ASPIRATION Right     BREAST CYST EXCISION Right     BREAST LUMPECTOMY WITH SENTINEL NODE BIOPSY Left 02/10/2022    Procedure: LEFT BREAST WIRE LOCALIZED LUMPECTOMY WITH SENTINEL NODE BIOPSY, POSSIBLE LEFT AXILLARY DISSECTION;  Surgeon: Becky Liu MD;  Location: [facility] OR [facility] [place];  Service: General;  Laterality: Left;    COLONOSCOPY      COLONOSCOPY      EXOSTECTOMY Bilateral      Simple Bunion Exostectomy (Silver Procedure)    FOOT TENDON SURGERY  2012    VENOUS ACCESS DEVICE (PORT) INSERTION N/A 10/18/2021    Procedure: INSERTION VENOUS ACCESS DEVICE;  Surgeon: Becky Liu MD;  [place] [facility] [place] [facility] [place]  Service: General;  Laterality: N/A;       Visit Dx:    ICD-10-CM ICD-9-CM   1. Lymphedema of left arm  I89.0 457.1   2. Malignant neoplasm of upper-inner quadrant of left breast in female, estrogen receptor positive  C50.212 174.2    Z17.0 V86.0   3. S/P lumpectomy, left breast  Z98.890 V45.89            Lymphedema       Row Name 08/16/23 1000             Subjective Pain    Able to rate subjective pain? yes  -LB      Pre-Treatment Pain Level 0  -LB         Subjective Comments    Subjective Comments I am doing fine. We have been doing the massage 3x/week. I am wearing the sleeve daily.  -LB         Lymphedema Assessment    Lymphedema Classification LUE:;secondary;stage 1 (Spontaneously Reversible)  -LB      Lymphedema Cancer Related Sx left;lumpectomy;sentinel node biopsy  -LB      Lymph Nodes Removed # 10  -LB      Positive Lymph Nodes # 1  -LB      Chemo Received yes  -LB      Radiation Therapy Received yes  -LB      Infections or Cellulitis? no  -LB         Lymphedema Edema Assessment    Edema Assessment Comment minimal edema mid L forearm, soft, non-pitting, hand negative for edema  -LB         Skin Changes/Observations    Skin Observations Comment one area of open scabbing from dermatological issue, otherwise UE improved  -LB         Lymphedema Measurements    Measurement Type(s) Quick Girth  -LB      Quick Girth Areas Upper extremities  -LB         LUE Quick Girth (cm)    Axilla 26.4 cm  -LB      Mid upper arm 27 cm  -LB      Elbow 23.5 cm  -LB      Mid forearm 18.6 cm  -LB      Wrist crease 15.2 cm  -LB      Web space 18 cm  -LB      Met-heads 17.8 cm  -LB      LUE Quick Girth Total 146.5  -LB         Compression/Skin Care    Compression/Skin Care compression garment  -LB       Compression Garment Comments reviewed donning/doffing  -LB      Compression/Skin Care Comments compression sleeve well fitted  -LB         L-Dex Bioimpedence Screening    L-Dex Measurement Extremity LUE  -LB      L-Dex Patient Position Standing  -LB      L-Dex UE Dominate Side Right  -LB      L-Dex UE At Risk Side Left  -LB      L-Dex UE Pre Surgical Value No  -LB      L-Dex UE Score 20.9  -LB      L-Dex UE Baseline Score 2.9  -LB      L-Dex UE Value Change 18  -LB      L-Dex UE Comment outside of normal range but improving  -LB      $ L-Dex Charge yes  -LB                User Key  (r) = Recorded By, (t) = Taken By, (c) = Cosigned By      Initials Name Provider Type    Edna Abdalla, PT Physical Therapist                                    Therapy Education  Education Details: discussed bioimpedance results and interpretation, use of compression sleeve, reviewed donning and doffing sleeve, reviewed POC, self MLD, independent management of condition with spouse assist  Given: HEP, Edema management, Symptoms/condition management, Bandaging/dressing change  Program: Reinforced  How Provided: Verbal  Provided to: Patient  Level of Understanding: Verbalized  97477 - PT Self Care/Mgmt Minutes: 15       OP Exercises       Row Name 08/16/23 1000             Subjective Comments    Subjective Comments I am doing fine. We have been doing the massage 3x/week. I am wearing the sleeve daily.  -LB         Subjective Pain    Able to rate subjective pain? yes  -LB      Pre-Treatment Pain Level 0  -LB                User Key  (r) = Recorded By, (t) = Taken By, (c) = Cosigned By      Initials Name Provider Type    Edna Abdalla, PT Physical Therapist                                 PT OP Goals       Row Name 08/16/23 1100          PT Short Term Goals    STG Date to Achieve 07/17/23  -LB     STG 1 LDex Bioimpedence will decrease to 20 or lower showing improvement in lymphedema.  -LB     STG 1 Progress Ongoing  -LB     STG 1  Progress Comments LDex continues to improve at 20.9 today  -LB     STG 2 Pt/family independent with self bandaging for self management of condition.  -LB     STG 2 Progress Met  -LB     STG 3 Pt will demonstrate decreased girth measurements of >/= 5 cm on LUE to reduce infection risk and improve skin condition.  -LB     STG 3 Progress Met  -LB        Long Term Goals    LTG Date to Achieve 05/06/22  -LB     LTG 1 Pt will maintain LDex bioimpedance score WNL.  -LB     LTG 1 Progress Ongoing  -LB     LTG 2 Pt will acquire appropriately fitted compression garment and verbalize appropriate wear schedule.  -LB     LTG 2 Progress Met  -LB     LTG 3 Pt will verbalize signs and symptoms of lymphedema and steps to prevention.  -LB     LTG 3 Progress Met  -LB               User Key  (r) = Recorded By, (t) = Taken By, (c) = Cosigned By      Initials Name Provider Type    LB Edna Condon, PT Physical Therapist                     PT Assessment/Plan       Row Name 08/16/23 1126          PT Assessment    Functional Limitations Other (comment)  LUE lymphedema  -LB     Impairments Impaired flexibility;Impaired lymphatic circulation;Sensation;Edema  -LB     Assessment Comments Pt returns for one month reassessment after independently managing LUE lymphedema with spouse to assist. Pt and spouse have been very compliant with self MLD 3x/week and daily use of compression sleeve for all waking hours. Pt with obvious improvement in tissue quality and demonstrates reduced LUE edema today with softer tissue feel and non-pitting edema greatest at medial forearm. L hand is negative today for edema. LDex score has improved to 20.9 which remains outside of normal limits but continues to decrease. Pt with appropriately fitted compression sleeve and observed donning/doffing independently. Pt has met 2/3 STGs and 2/3 LTGs. She remains appropriate for continued skilled PT services to continue to address LUE lymphedema and address issues as needed.   -LB     Rehab Potential Good  -LB     Patient/caregiver participated in establishment of treatment plan and goals Yes  -LB     Patient would benefit from skilled therapy intervention Yes  -LB        PT Plan    PT Frequency Other (comment)  -LB     Predicted Duration of Therapy Intervention (PT) repeat bioimpedance in 3 months  -LB     Planned CPT's? PT EVAL LOW COMPLEXITY: 37492;PT RE-EVAL: 21869;PT THER PROC EA 15 MIN: 67522;PT THER ACT EA 15 MIN: 10568;PT MANUAL THERAPY EA 15 MIN: 42457;PT SELF CARE/HOME MGMT/TRAIN EA 15: 80861;PT BIS XTRACELL FLUID ANALYSIS: 16080  -LB     PT Plan Comments repeat bioimpedance, return sooner if needed  -LB               User Key  (r) = Recorded By, (t) = Taken By, (c) = Cosigned By      Initials Name Provider Type    Edna Abdalla, PT Physical Therapist                                 Time Calculation:   Start Time: 1045  Stop Time: 1115  Time Calculation (min): 30 min  Total Timed Code Minutes- PT: 15 minute(s)  Timed Charges  76835 - PT Self Care/Mgmt Minutes: 15  Total Minutes  Timed Charges Total Minutes: 15   Total Minutes: 15   Therapy Charges for Today       Code Description Service Date Service Provider Modifiers Qty    44939345507 HC PT BIS XTRACELL FLUID ANALYSIS 8/16/2023 Edna Condon, PT  1    99085058060 HC PT SELF CARE/MGMT/TRAIN EA 15 MIN 8/16/2023 Edna Condon, PT GP 1                      Edna Condon, PT  8/16/2023

## 2023-08-18 ENCOUNTER — TELEPHONE (OUTPATIENT)
Dept: ONCOLOGY | Facility: CLINIC | Age: 78
End: 2023-08-18
Payer: MEDICARE

## 2023-08-18 ENCOUNTER — HOSPITAL ENCOUNTER (OUTPATIENT)
Dept: RADIATION ONCOLOGY | Facility: HOSPITAL | Age: 78
Setting detail: RADIATION/ONCOLOGY SERIES
End: 2023-08-18
Payer: MEDICARE

## 2023-08-18 ENCOUNTER — OFFICE VISIT (OUTPATIENT)
Dept: RADIATION ONCOLOGY | Facility: HOSPITAL | Age: 78
End: 2023-08-18
Payer: MEDICARE

## 2023-08-18 VITALS
BODY MASS INDEX: 24.67 KG/M2 | OXYGEN SATURATION: 96 % | SYSTOLIC BLOOD PRESSURE: 133 MMHG | WEIGHT: 122.2 LBS | HEART RATE: 99 BPM | DIASTOLIC BLOOD PRESSURE: 80 MMHG

## 2023-08-18 DIAGNOSIS — Z17.0 MALIGNANT NEOPLASM OF CENTRAL PORTION OF LEFT BREAST IN FEMALE, ESTROGEN RECEPTOR POSITIVE: Primary | ICD-10-CM

## 2023-08-18 DIAGNOSIS — C50.112 MALIGNANT NEOPLASM OF CENTRAL PORTION OF LEFT BREAST IN FEMALE, ESTROGEN RECEPTOR POSITIVE: Primary | ICD-10-CM

## 2023-08-18 PROCEDURE — G0463 HOSPITAL OUTPT CLINIC VISIT: HCPCS

## 2023-08-18 NOTE — PROGRESS NOTES
Subjective     No ref. provider found     Cancer Staging   CC:    1 year follow up for left breast cancer cT2N0 [id number] ER FL pos Her 2 neg     S:  I had the [facility] of [facility]ela [facility].  She returns today for follow up now 1 year out from completion of her radiation therapy to the left breast.  She is a pleasant 78 year old female with cT2N0 left breast cancer, [id number] ER [place] pos er 2 neg. She has been doing well since I last saw her.  She completed adjuvant radiation on 7/19/22.  She received a dose of 50Gy to the left breast and regional nodes followed by a 1000cgy boost. she had a mammogram August 2022 which was benign.       Review of Systems   Constitutional: Negative.    Musculoskeletal: Negative.    Psychiatric/Behavioral: Negative.         Past Medical History:   Diagnosis Date    Anxiety     Arthritis     Cataract     EARLY. JIMMIE EYES    Chronic back pain     LOW BACK ACHES AT TIME FROM ARTHRTIS    Constipation     AT TIMES. NO RECENT ISSUES    Dermatitis     LEGS. STATES CLEARING UP NOW.    Disease of thyroid gland     HYPO    Diverticulosis     Colonoscopy November 2014    Erythema of face     Transitory Erythema Of The Face    GERD (gastroesophageal reflux disease)     ON OCC    History of chemotherapy     FOR LEFT BREAST CANCER    Hyperlipidemia     Hypertension     Malignant neoplasm of female breast 9/27/2021    Osteopenia     Tachycardia          Past Surgical History:   Procedure Laterality Date    BREAST BIOPSY  09/2021    Dr. Tirado     BREAST CYST ASPIRATION Right     BREAST CYST EXCISION Right     BREAST LUMPECTOMY WITH SENTINEL NODE BIOPSY Left 02/10/2022    Procedure: LEFT BREAST WIRE LOCALIZED LUMPECTOMY WITH SENTINEL NODE BIOPSY, POSSIBLE LEFT AXILLARY DISSECTION;  Surgeon: Becky Liu MD;  [place] [facility] [place] [facility] [place];  Service: General;  Laterality: Left;    COLONOSCOPY      COLONOSCOPY  2014    EXOSTECTOMY Bilateral     Simple  Bunion Exostectomy (Silver Procedure)    FOOT TENDON SURGERY      VENOUS ACCESS DEVICE (PORT) INSERTION N/A 10/18/2021    Procedure: INSERTION VENOUS ACCESS DEVICE;  Surgeon: Becky Liu MD;  [place] [facility] [place] [facility] [place]  Service: General;  Laterality: N/A;         Social History     Socioeconomic History    Marital status:      Spouse name: Pj    Number of children: 1   Tobacco Use    Smoking status: Former     Packs/day: 0.25     Years: 0.50     Pack years: 0.13     Types: Cigarettes     Quit date:      Years since quittin.6    Smokeless tobacco: Never    Tobacco comments:     SOCIALLY IN EARLY    Vaping Use    Vaping Use: Never used   Substance and Sexual Activity    Alcohol use: Yes     Comment: social drinker    Drug use: Never    Sexual activity: Defer         Family History   Problem Relation Age of Onset    Hypertension Mother     Breast cancer Mother     Arthritis Father     Heart disease Father     Hypertension Father     Malig Hyperthermia Neg Hx           Objective    Physical Exam  Constitutional:       Appearance: Normal appearance.   Chest:          Comments: Left breast smaller than right , moderate retraction and fibrosis of scar. No palpable masses  Neurological:      Mental Status: She is alert.         Current Outpatient Medications on File Prior to Visit   Medication Sig Dispense Refill    alendronate (FOSAMAX) 70 MG tablet Take 1 tablet by mouth 1 (One) Time Per Week. 12 tablet 3    amLODIPine (NORVASC) 5 MG tablet Take 1 tablet by mouth Daily. 90 tablet 2    anastrozole (ARIMIDEX) 1 MG tablet Take 1 tablet by mouth once daily 90 tablet 3    Cholecalciferol (Vitamin D) 50 MCG (2000 UT) capsule Take  by mouth.      cyclobenzaprine (FLEXERIL) 5 MG tablet Take 1 tablet by mouth three times daily as needed for muscle spasm (Patient taking differently: Take 1 tablet by mouth 3 (Three) Times a Day As Needed for Muscle Spasms.) 30 tablet 0     desoximetasone (TOPICORT) 0.25 % ointment Apply 1 application  topically to the appropriate area as directed 2 (Two) Times a Day As Needed (TO IRRITATED AREA).      dexamethasone 0.5 MG/5ML solution Take 10 mL by mouth 4 (Four) Times a Day. Take 10 mls by mouth four times daily. Swish in mouth for 2 minutes and then spit out. Do not swallow. 280 mL 0    escitalopram (LEXAPRO) 20 MG tablet TAKE ONE TABLET BY MOUTH DAILY 90 tablet 1    levothyroxine (SYNTHROID, LEVOTHROID) 25 MCG tablet TAKE ONE TABLET BY MOUTH EVERY MORNING 30 MINUTES BEFORE BREAKFAST OR ANY OTHER MEDICATION 90 tablet 1    metoprolol succinate XL (TOPROL-XL) 25 MG 24 hr tablet Take 1 tablet by mouth Daily. 90 tablet 3    mupirocin (BACTROBAN) 2 % ointment APPLY A SMALL AMOUNT OF OINTMENT TOPICALLY TO AFFECTED AREA EVERY NIGHT      ondansetron (Zofran) 8 MG tablet Take 1 tablet by mouth Every 8 (Eight) Hours As Needed for Nausea or Vomiting. 60 tablet 3    pantoprazole (PROTONIX) 40 MG EC tablet Take 1 tablet by mouth Daily. 30 tablet 0    PreviDent 5000 Dry Mouth 1.1 % gel       prochlorperazine (COMPAZINE) 10 MG tablet Take 1 tablet by mouth Every 6 (Six) Hours As Needed for Nausea or Vomiting. 30 tablet 3    rosuvastatin (CRESTOR) 10 MG tablet TAKE ONE TABLET BY MOUTH DAILY 90 tablet 3     No current facility-administered medications on file prior to visit.       ALLERGIES:  No Known Allergies    There were no vitals taken for this visit.         4/10/2023     2:24 PM   Current Status   ECOG score 0         Assessment & Plan      78 year old female with cT2N0 left breast cancer, [id number] ER [place] pos her 2 neg now 1 year out from radiation.  She is scheduled for her yearly mammogram. She will have regular follow up with her medical oncologist.  I will see her back in one year for follow up.  She knows to call for this appointment.       I personally spent greater than 30 minutes today assessing, managing, discussing and documenting my visit  with the patient. That time includes review of records, imaging and pathology reports, obtaining my own history, performing a medically appropriate evaluation, counseling and educating the patient, discussing goals, logistics, alternatives and risks of my recommendations, surveillance options and potential outcomes. It also includes the time documenting the clinical information in the EMR and communicating my recommendations to the other involved physicians.              Thank you very much for allowing me to participate in the care of this very pleasant patient.    Sincerely,      Rula Schneider MD

## 2023-08-18 NOTE — TELEPHONE ENCOUNTER
Spoke with Ms Ramirez by phone earlier in the week to review her dexa scan results with her per Dr Avendano's request.  She recommends she transition to prolia from her fosamax due to worsening bone mineral density.  We discussed that she will need dental clearance prior to initiating the prolia.  She asked if she should continue the fosamax in the mean time.  Follow up call today after further review with Dr Avendano.  She recommends she continue the fosamax until starting on prolia.  She will see her dentist in September and we will plan to administer the prolia at her follow up scheduled in October.  She voiced understanding.

## 2023-08-21 ENCOUNTER — HOSPITAL ENCOUNTER (OUTPATIENT)
Dept: MAMMOGRAPHY | Facility: HOSPITAL | Age: 78
Discharge: HOME | End: 2023-08-21
Payer: MEDICARE

## 2023-08-21 DIAGNOSIS — Z12.31 SCREENING MAMMOGRAM FOR BREAST CANCER: ICD-10-CM

## 2023-08-21 PROCEDURE — 77067 SCR MAMMO BI INCL CAD: CPT

## 2023-08-21 PROCEDURE — 77063 BREAST TOMOSYNTHESIS BI: CPT

## 2023-08-22 ENCOUNTER — TELEPHONE (OUTPATIENT)
Dept: SURGERY | Facility: CLINIC | Age: 78
End: 2023-08-22
Payer: MEDICARE

## 2023-08-22 NOTE — TELEPHONE ENCOUNTER
----- Message from Yue Bailey PA-C sent at 8/22/2023  1:29 PM EDT -----  Regarding: results  Hi,     Please let her know that her recent mammogram was benign.  Recommend she proceed with her annual mammogram in August 2024.  She does have dense breast tissue, thus it is reasonable to proceed with a screening ultrasound and/or MRI in addition to her annual mammograms.  We can discuss this at her next appointment with me (9/16/23) and decide if this is something she wishes to proceed with.    Thanks,  Yue

## 2023-08-23 NOTE — TELEPHONE ENCOUNTER
Spoke with patient and informed her of results and recommended follow-up.  She voiced understanding.

## 2023-09-13 ENCOUNTER — OFFICE VISIT (OUTPATIENT)
Dept: SURGERY | Facility: CLINIC | Age: 78
End: 2023-09-13
Payer: MEDICARE

## 2023-09-13 VITALS
DIASTOLIC BLOOD PRESSURE: 84 MMHG | SYSTOLIC BLOOD PRESSURE: 128 MMHG | BODY MASS INDEX: 24.64 KG/M2 | WEIGHT: 122.2 LBS | HEIGHT: 59 IN

## 2023-09-13 DIAGNOSIS — Z17.0 MALIGNANT NEOPLASM OF LEFT BREAST IN FEMALE, ESTROGEN RECEPTOR POSITIVE, UNSPECIFIED SITE OF BREAST: Primary | ICD-10-CM

## 2023-09-13 DIAGNOSIS — R92.2 DENSE BREAST TISSUE: Primary | ICD-10-CM

## 2023-09-13 DIAGNOSIS — C50.912 MALIGNANT NEOPLASM OF LEFT BREAST IN FEMALE, ESTROGEN RECEPTOR POSITIVE, UNSPECIFIED SITE OF BREAST: Primary | ICD-10-CM

## 2023-09-13 NOTE — PROGRESS NOTES
Assessment/Plan:  78 y.o. 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   - Mammogram 8/2023 BIRADS 2.   - Screening mammogram due 8/2024. Dense breast tissue noted, discussed considering additional screening with MRI. She would like to proceed, will plan for this in 02/2024.   - Following with the lymphedema clinic.   - Follow up in 1 year.    (2) Medical Oncology:  - Following with Dr. Avendano.  - Participated in EA 1181 clinical trial.   [facility]  - Currently taking Anastrozole.     (3) Radiation Oncology:  - Following with Dr. Schneider.  - Dose of 50Gy to the left breast and regional nodes followed by a 1000cgy boost.    - Completed: 6/7/22 - 7/19/22  - Recommended she use Vitamin E oil.     (4) Health Maintenance:  - Recommend she follow with her PCP for routine care.   - Colonoscopy up to date. Follows with PCP for scheduling. Planning for EGD and Colonoscopy 10/6/2023 with Dr. Mead.     Chief Complaint: routine followup breast cancer    History of Present Illness: Ms. Neela Ramirez is a 78 y.o. year old lady, seen at the request of No ref. provider found for a new diagnosis of left breast cancer.    This was initially detected as a palpable mass. She has had annual mammograms each year or every other year. She denies any prior history of abnormal mammograms or breast biopsies. Her work-up is detailed in the oncologic history below.   She denies any pain, skin changes, or nipple discharge.  She has noticed this lump for a few months but new her regular mammogram is coming up soon.   She denies any family history of breast or ovarian cancer.     12/22/2021 she presents today for follow-up.  She is doing well.  She is tolerating the chemotherapy well.  She is had some issues with diarrhea that are improving.  She has also had some issues with sinus infections.    2/28/2022 She is doing well since surgery with no issues. Her pain is controlled. She is not taking pain meds.    3/2/2022 doing well since surgery with no acute issues.  Her pain is controlled.  She is getting back to her daily activities.  She has no complaints or concerns.    9/28/2022 Patient is here today for a follow-up. She states she is doing well overall, still has some issues with her taste. She is going to look into finding a bra that fits her better.    3/14/2023 She is here today for a follow-up. She denies any new breast concerns.     9/13/2023 She is here today for a follow-up. She recently had her DEXA scan which showed a decrease in her bone density. She is planning on injections. She has an upcoming dental appointment for clearance. Denies any new breast complaints. She states she saw Dr. Schneider and it was recommended she begin using vitamin E oil.  She is planning on picking some up later today.    Workup of Current Diagnosis:    8/17/2021 Left Breast Diagnostic Mammogram with Ultrasound:   In the posterior one-third upper inner quadrant of the left breast at the site of palpable concern  near the 11-o'clock position there is a mass with adjacent pleomorphic microcalcifications. The mass and calcifications measure on the order 1.5 cm in greatest dimension. 11-o'clock position on the order of 5 cm from the nipple. At this location, there is an irregular hypoechoic mass that measures 2.4 x 2.0 x 1.6 cm. Internal vascularity is noted. Echogenic foci in the posterior aspect of the mass that represents the visualized calcifications are noted.  IMPRESSION:  1. There is an irregular 2.4 cm mass in the left breast at the site  of palpable concern which corresponds to the 11-o'clock position on the order of 5 cm from the nipple. Correlation with an ultrasound guided left breast biopsy is recommended.  2. There are no findings suspicious for malignancy in the right breast.  BI-RADS Category 5: Highly suggestive for malignancy. Appropriate action should be taken.    9/8/2021 Left Breast Ultrasound-guided Biopsy:   The lesion at the 11:00 position on the order of 5 cm from the nipple was visualized. Multiple tissue specimens were obtained. A barbell shaped metallic clip was placed to katey the site. Postbiopsy unilateral left digital mammography consisting of CC and 90 degree lateral images were obtained and demonstrate placement of a barbell shaped metallic clip at the 11:00 position in the posterior one third of the left breast at the site of a pre-existing mass seen on the examination of 8/17/2021. The metallic clip is on the order of 7 mm anterior to residual microcalcifications.   IMPRESSION:   Technically successful ultrasound guided Mammotome vacuum assisted left breast biopsy with placement of a barbell shaped metallic clip. The pathology result has returned as malignant. Surgical consultation is recommended.  BI-RADS Category 6: Known malignancy    9/8/2021 Pathology:   Final Diagnosis   1. Left Breast at 11 O'clock 5 cm from Nipple, Core Biopsy:               A. Invasive breast carcinoma with ductal and lobular features (see comment):                             1. Overall Dawson grade III (tubular score = 3, nuclear score = 3, mitotic score = 2).                            2. Invasive carcinoma measures at least 7 mm in greatest dimension.                            3. Focally suspicious for lymphovascular space invasion.               B. Rare focus of high grade solid and comedo DCIS.     10/8/2021 Bilateral Breast MRI:  IMPRESSION:  1. Biopsy-proven malignancy in the left breast at the 11-o'clock position measuring on the  order of 2.8 cm in greatest dimension with a centrally located barbell shaped metallic clip. No other suspicious findings are seen within left breast and there is no evidence for left axillary adenopathy.  2. There are no findings suspicious for malignancy in the right breast.  BI-RADS category 6: Known biopsy-proven malignancy.    10/18/2021 Port placement    1/14/2022 Bilateral Breast MRI:  IMPRESSION AND RECOMMENDATION:  1.  A 1.2 cm mass at 11:00 in the posterior left breast corresponds to biopsy-proven malignancy and has decreased in size from 10/20/2021, consistent with positive treatment response. Continued oncologic and surgical management are recommended.  2.  No MRI evidence of malignancy in the right breast.  BI-RADS Category 6: Known biopsy-proven malignancy    2/10/2022 Left breast wire localized lumpectomy with sentinel lymph node biopsy:  Final Diagnosis   1. Left Axillary [place] Lymph Node #1, Hot and Blue, Count 220, Biopsy (FS):               A. ONE OF THREE LYMPH NODES, POSITIVE FOR CARCINOMA (1/3).               B. Metastatic focus measures 3.5 mm.               C. Negative for treatment effect.                D. Negative for extranodal extension.               E. AE1/AE3 immunostain performed on block 1A highlights the metastatic carcinoma (control reacted appropriately).                F. See Comment #1.  2. Left Axillary [place] Lymph Node #2, Hot and Blue, Count 120, Biopsy (FS):               A. Five lymph nodes, negative for carcinoma (0/5).               B. Negative for treatment effect.               C. AE1/AE3 immunostain performed on blocks 2A and 2B are both negative (control reacted appropriately).   3. Left Axillary [place] Lymph Node #3, Palpable, Biopsy (FS):               A. Two lymph nodes, negative for carcinoma (0/2).               B. Negative for treatment effect.               C. AE1/AE3 immunostains performed on blocks 3A and 3B are both negative (control reacted  appropriately).  4. Left Breast, Needle-Localized Lumpectomy S/P Neoadjuvant Chemotherapy (15 grams):               A. FIBROTIC TUMOR BED WITH INVASIVE PLEOMORPHIC LOBULAR CARCINOMA, Poorly differentiated; Daphne Histologic Grade III/III (tubule score =                   3, nuclear score = 3, mitoses score = 3):               1. Tumor size: 18 mm (based on the slices involved).                2. Margins are negative for invasive carcinoma; Closest distances: Invasive       carcinoma is present 5 mm from the inferior margin (superseded by specimen #8).                3. No definitive lymphovascular space invasion is seen.   B. ASSOCIATED LOBULAR CARCINOMA IN SITU (LCIS).   C. Clip and biopsy site changes are present and adjacent to invasive carcinoma.  D. See Synoptic Report (to include parts 1-3 and 5-9) and Comment #2.  5. Left Breast, Additional Superior Margin, Re-excision:               A. Breast parenchyma with atypical lobular hyperplasia (additional 9 mm of tissue free of carcinoma).  6. Left Breast, Additional Medial Margin, Re-excision:               A. Breast parenchyma with atypical lobular hyperplasia (additional 10 mm of tissue free of carcinoma).  7. Left Breast, Additional Lateral Margin, Re-excision:               A. Fibroadipose tissue with no significant histopathologic changes (additional 10 mm of tissue free of carcinoma).  8. Left Breast, Additional Inferior Margin, Re-excision:               A. Breast parenchyma with atypical lobular hyperplasia (additional 10 mm of tissue free of carcinoma).  9. Left Breast, Additional Anterior Margin, Re-excision:               A. Breast parenchyma with no significant histopathologic changes (additional 8 mm of tissue free of carcinoma).     8/17/2022 Bilateral Digital Diagnostic Mammogram with Heladio:  FINDINGS:  MAMMOGRAM: Bilateral CC and MLO, right MLO spot compression, and right lateral 2-D and Tomosynthesis views were obtained.    The breasts are  heterogeneously dense, which may obscure small masses.   There are new benign-appearing post surgical changes in the left breast and axilla, as well as new treatment-related changes in the left breast. There are benign-appearing calcifications. There are 2 asymmetries in the upper right breast, both of which effaces with spot compression and have no correlate on the lateral view, consistent with superimposition  of normal breast parenchyma. There are no suspicious masses, calcifications, or areas of architectural distortion in either breast. There is a partially imaged right chest port.  IMPRESSION:  No mammographic evidence of malignancy in either breast. Recommend annual screening mammogram in one year.  BI-RADS Category 2: Benign    2023 Bilateral Screening Mammogram:  FINDINGS: Bilateral digital CC and MLO mammographic and digital Tomosynthesis images were obtained. Comparison is made to prior studies dated 2022 and 2021 the parenchyma of both breasts demonstrates a heterogeneously dense pattern which lessens the sensitivity. Stable postsurgical change of the left breast with left breast volume relative to the right is noted. I see no new or dominant masses, areas of architectural distortion or skin thickening. There is no evidence for axillary lymphadenopathy or nipple retraction.  IMPRESSION:  1. There is no evidence for malignancy or significant change in either breast. Benign post breast conservation therapy changes of the left breast are noted. Routine followup mammography is recommended. Given the density of the patient's breast tissue, correlation with screening bilateral breast sonography and/or bilateral breast MRI may provide additional benefit.   BI-RADS category 2: Benign.    Gynecologic History:   . P:0 AB:0  Age at first childbirth: Not applicable  Lactation/How long: Not applicable  Age at menarche: 13  Age at menopause: 50  Total years of oral contraceptive use: None  Total  years of hormone replacement therapy: 7 years    Past Medical History:   Hypertension  Hyperlipidemia  Hypothyroidism  GERD  History of breast cancer     Past Surgical History:    Right breast excision  Left breast wire localized lumpectomy with SLN bx  Left cyst aspiration  Foot surgery  Port placement    Family History:    Family History   Problem Relation Age of Onset    Hypertension Mother     Breast cancer Mother     Arthritis Father     Heart disease Father     Hypertension Father     Malig Hyperthermia Neg Hx      Social History:  Denies tobacco use  Occasional alcohol use    Allergies:   No Known Allergies    Medications:     Current Outpatient Medications:     alendronate (FOSAMAX) 70 MG tablet, Take 1 tablet by mouth 1 (One) Time Per Week., Disp: 12 tablet, Rfl: 3    amLODIPine (NORVASC) 5 MG tablet, Take 1 tablet by mouth Daily., Disp: 90 tablet, Rfl: 2    anastrozole (ARIMIDEX) 1 MG tablet, Take 1 tablet by mouth once daily, Disp: 90 tablet, Rfl: 3    Cholecalciferol (Vitamin D) 50 MCG (2000 UT) capsule, Take  by mouth., Disp: , Rfl:     cyclobenzaprine (FLEXERIL) 5 MG tablet, Take 1 tablet by mouth three times daily as needed for muscle spasm (Patient taking differently: Take 1 tablet by mouth 3 (Three) Times a Day As Needed for Muscle Spasms.), Disp: 30 tablet, Rfl: 0    desoximetasone (TOPICORT) 0.25 % ointment, Apply 1 application  topically to the appropriate area as directed 2 (Two) Times a Day As Needed (TO IRRITATED AREA)., Disp: , Rfl:     dexamethasone 0.5 MG/5ML solution, Take 10 mL by mouth 4 (Four) Times a Day. Take 10 mls by mouth four times daily. Swish in mouth for 2 minutes and then spit out. Do not swallow., Disp: 280 mL, Rfl: 0    escitalopram (LEXAPRO) 20 MG tablet, TAKE ONE TABLET BY MOUTH DAILY, Disp: 90 tablet, Rfl: 1    levothyroxine (SYNTHROID, LEVOTHROID) 25 MCG tablet, TAKE ONE TABLET BY MOUTH EVERY MORNING 30 MINUTES BEFORE BREAKFAST OR ANY OTHER MEDICATION, Disp: 90 tablet,  Rfl: 1    metoprolol succinate XL (TOPROL-XL) 25 MG 24 hr tablet, Take 1 tablet by mouth Daily., Disp: 90 tablet, Rfl: 3    mupirocin (BACTROBAN) 2 % ointment, APPLY A SMALL AMOUNT OF OINTMENT TOPICALLY TO AFFECTED AREA EVERY NIGHT, Disp: , Rfl:     ondansetron (Zofran) 8 MG tablet, Take 1 tablet by mouth Every 8 (Eight) Hours As Needed for Nausea or Vomiting., Disp: 60 tablet, Rfl: 3    pantoprazole (PROTONIX) 40 MG EC tablet, Take 1 tablet by mouth Daily., Disp: 30 tablet, Rfl: 0    PreviDent 5000 Dry Mouth 1.1 % gel, , Disp: , Rfl:     prochlorperazine (COMPAZINE) 10 MG tablet, Take 1 tablet by mouth Every 6 (Six) Hours As Needed for Nausea or Vomiting., Disp: 30 tablet, Rfl: 3    rosuvastatin (CRESTOR) 10 MG tablet, TAKE ONE TABLET BY MOUTH DAILY, Disp: 90 tablet, Rfl: 3    Labs:  Labs from 2023 and 2023 reviewed.    Review of Systems:   Constitutional: Negative for fevers or chills  HENT: Negative for hearing loss or runny nose  Eyes: Negative for vision changes or scleral icterus  Respiratory: Negative for cough or shortness of breath  Cardiovascular: Negative for chest pain or heart palpitations  Gastrointestinal: Negative for abdominal pain, nausea, vomiting, constipation, melena, or hematochezia  Genitourinary: Negative for hematuria or dysuria  Musculoskeletal: Negative for joint swelling or gait instability  Neurologic: Negative for tremors or seizures  Psychiatric: Negative for suicidal ideations or depression  All other systems reviewed and negative    Physical Exam:   ECO - Asymptomatic  Constitutional: Well-developed, well-nourished, no acute distress  Eyes: Conjunctiva normal, sclera nonicteric  ENMT: Hearing grossly normal, oral mucosa moist  Neck: Supple, no palpable mass, trachea midline  Respiratory: Clear to auscultation, normal inspiratory effort  Cardiovascular: Regular rate, no peripheral edema, no jugular venous distention  Breast: symmetric  Right: No visible abnormalities on  inspection while seated or with arms raised. No masses, skin changes, or nipple abnormalities. Prior scar noted in UOQ.  Left: No visible abnormalities on inspection while seated or with arms raised.  Left breast incision 11:00-1:00 healed, axillary incision healed.   No clinical chest wall involvement.  Lymphatics (palpable nodes): No cervical, supraclavicular, or axillary lymphadenopathy  Skin: Warm, dry, no rash on visualized skin surfaces  Musculoskeletal: Symmetric strength, normal gait  Psychiatric: Alert and oriented ×3, normal affect     Yue Bailey PA-C    [facility] - General Surgery   [address], [address]  [place] [address]    [address], [address]  [place] [address]    Office: [phone]  Fax: [phone]

## 2023-09-30 DIAGNOSIS — I10 ESSENTIAL HYPERTENSION: ICD-10-CM

## 2023-10-02 RX ORDER — AMLODIPINE BESYLATE 5 MG/1
TABLET ORAL
Qty: 90 TABLET | Refills: 1 | Status: SHIPPED | OUTPATIENT
Start: 2023-10-02

## 2023-10-06 ENCOUNTER — ANESTHESIA (OUTPATIENT)
Dept: GASTROENTEROLOGY | Facility: HOSPITAL | Age: 78
End: 2023-10-06
Payer: MEDICARE

## 2023-10-06 ENCOUNTER — HOSPITAL ENCOUNTER (OUTPATIENT)
Facility: HOSPITAL | Age: 78
Setting detail: HOSPITAL OUTPATIENT SURGERY
Discharge: HOME | End: 2023-10-06
Attending: INTERNAL MEDICINE | Admitting: INTERNAL MEDICINE
Payer: MEDICARE

## 2023-10-06 ENCOUNTER — ANESTHESIA EVENT (OUTPATIENT)
Dept: GASTROENTEROLOGY | Facility: HOSPITAL | Age: 78
End: 2023-10-06
Payer: MEDICARE

## 2023-10-06 VITALS
OXYGEN SATURATION: 96 % | DIASTOLIC BLOOD PRESSURE: 73 MMHG | HEIGHT: 58 IN | WEIGHT: 121.2 LBS | BODY MASS INDEX: 25.44 KG/M2 | RESPIRATION RATE: 20 BRPM | SYSTOLIC BLOOD PRESSURE: 116 MMHG | HEART RATE: 75 BPM

## 2023-10-06 DIAGNOSIS — R11.0 NAUSEA: ICD-10-CM

## 2023-10-06 DIAGNOSIS — K59.00 CONSTIPATION, UNSPECIFIED CONSTIPATION TYPE: ICD-10-CM

## 2023-10-06 PROCEDURE — 25010000002 PROPOFOL 10 MG/ML EMULSION: Performed by: ANESTHESIOLOGY

## 2023-10-06 PROCEDURE — 25010000002 PHENYLEPHRINE 10 MG/ML SOLUTION: Performed by: ANESTHESIOLOGY

## 2023-10-06 PROCEDURE — 45380 COLONOSCOPY AND BIOPSY: CPT | Performed by: INTERNAL MEDICINE

## 2023-10-06 PROCEDURE — 25810000003 LACTATED RINGERS PER 1000 ML: Performed by: INTERNAL MEDICINE

## 2023-10-06 PROCEDURE — 43239 EGD BIOPSY SINGLE/MULTIPLE: CPT | Performed by: INTERNAL MEDICINE

## 2023-10-06 PROCEDURE — 88305 TISSUE EXAM BY PATHOLOGIST: CPT | Performed by: INTERNAL MEDICINE

## 2023-10-06 RX ORDER — GLYCOPYRROLATE 0.2 MG/ML
INJECTION INTRAMUSCULAR; INTRAVENOUS AS NEEDED
Status: DISCONTINUED | OUTPATIENT
Start: 2023-10-06 | End: 2023-10-06 | Stop reason: SURG

## 2023-10-06 RX ORDER — PHENYLEPHRINE HYDROCHLORIDE 10 MG/ML
INJECTION INTRAVENOUS AS NEEDED
Status: DISCONTINUED | OUTPATIENT
Start: 2023-10-06 | End: 2023-10-06 | Stop reason: SURG

## 2023-10-06 RX ORDER — SODIUM CHLORIDE, SODIUM LACTATE, POTASSIUM CHLORIDE, CALCIUM CHLORIDE 600; 310; 30; 20 MG/100ML; MG/100ML; MG/100ML; MG/100ML
30 INJECTION, SOLUTION INTRAVENOUS CONTINUOUS PRN
Status: DISCONTINUED | OUTPATIENT
Start: 2023-10-06 | End: 2023-10-06 | Stop reason: HOSPADM

## 2023-10-06 RX ORDER — PROPOFOL 10 MG/ML
VIAL (ML) INTRAVENOUS AS NEEDED
Status: DISCONTINUED | OUTPATIENT
Start: 2023-10-06 | End: 2023-10-06 | Stop reason: SURG

## 2023-10-06 RX ORDER — LIDOCAINE HYDROCHLORIDE 20 MG/ML
INJECTION, SOLUTION INFILTRATION; PERINEURAL AS NEEDED
Status: DISCONTINUED | OUTPATIENT
Start: 2023-10-06 | End: 2023-10-06 | Stop reason: SURG

## 2023-10-06 RX ADMIN — PHENYLEPHRINE HYDROCHLORIDE 200 MCG: 10 INJECTION INTRAVENOUS at 07:41

## 2023-10-06 RX ADMIN — PHENYLEPHRINE HYDROCHLORIDE 200 MCG: 10 INJECTION INTRAVENOUS at 07:27

## 2023-10-06 RX ADMIN — PROPOFOL 250 MG: 10 INJECTION, EMULSION INTRAVENOUS at 07:14

## 2023-10-06 RX ADMIN — PHENYLEPHRINE HYDROCHLORIDE 100 MCG: 10 INJECTION INTRAVENOUS at 07:22

## 2023-10-06 RX ADMIN — SODIUM CHLORIDE, POTASSIUM CHLORIDE, SODIUM LACTATE AND CALCIUM CHLORIDE 30 ML/HR: 600; 310; 30; 20 INJECTION, SOLUTION INTRAVENOUS at 06:45

## 2023-10-06 RX ADMIN — LIDOCAINE HYDROCHLORIDE 100 MG: 20 INJECTION, SOLUTION INFILTRATION; PERINEURAL at 07:12

## 2023-10-06 RX ADMIN — GLYCOPYRROLATE 2 MG: 0.2 INJECTION INTRAMUSCULAR; INTRAVENOUS at 07:02

## 2023-10-06 NOTE — ANESTHESIA PREPROCEDURE EVALUATION
Anesthesia Evaluation     Patient summary reviewed and Nursing notes reviewed   NPO Solid Status: > 8 hours  NPO Liquid Status: > 2 hours           Airway   Mallampati: II  TM distance: >3 FB  Neck ROM: limited  Possible difficult intubation  Dental - normal exam     Pulmonary - normal exam    breath sounds clear to auscultation  (+) a smoker Former,  Cardiovascular - normal exam    ECG reviewed  Patient on routine beta blocker  Rhythm: regular  Rate: normal    (+) hypertension 2 medications or greater, dysrhythmias Tachycardia, hyperlipidemia  (-) angina, orthopnea, PND, BENITO    ROS comment: NSR. T wave inversion noted with slight ST depression     Neuro/Psych  (+) psychiatric history Anxiety  GI/Hepatic/Renal/Endo    (+) GERD, thyroid problem hypothyroidism    Musculoskeletal     (+) back pain  Abdominal    Substance History - negative use     OB/GYN negative ob/gyn ROS         Other   arthritis,   history of cancer                    Anesthesia Plan    ASA 2     general and MAC     intravenous induction     Anesthetic plan, risks, benefits, and alternatives have been provided, discussed and informed consent has been obtained with: patient.      CODE STATUS:

## 2023-10-06 NOTE — H&P
Chief Complaint   Patient presents with    Constipation         History of Present Illness:   78 y.o. female w/ h/o breast cancer (s/p surgery, XRT and chemo) who has consitpation intermittently. No rectal bleeding or melena. NO abdominal or chest pain. Rare nausea but no vomiting. No heartburn. She last had a colonoscopy in 2014: tics. Weight stable. NO dysphagia.     Medical History        Past Medical History:   Diagnosis Date    Anxiety      Arthritis      Cataract       EARLY. JIMMIE EYES    Chronic back pain       LOW BACK ACHES AT TIME FROM ARTHRTIS    Constipation       AT TIMES. NO RECENT ISSUES    Dermatitis       LEGS. STATES CLEARING UP NOW.    Disease of thyroid gland       HYPO    Diverticulosis       Colonoscopy November 2014    Erythema of face       Transitory Erythema Of The Face    GERD (gastroesophageal reflux disease)       ON OCC    History of chemotherapy       FOR LEFT BREAST CANCER    Hyperlipidemia      Hypertension      Malignant neoplasm of female breast 9/27/2021    Osteopenia      Tachycardia              Surgical History         Past Surgical History:   Procedure Laterality Date    BREAST BIOPSY   09/2021     Dr. Tirado     BREAST CYST ASPIRATION Right      BREAST CYST EXCISION Right      BREAST LUMPECTOMY WITH SENTINEL NODE BIOPSY Left 02/10/2022     Procedure: LEFT BREAST WIRE LOCALIZED LUMPECTOMY WITH SENTINEL NODE BIOPSY, POSSIBLE LEFT AXILLARY DISSECTION;  Surgeon: Becky Liu MD;  Location: [facility] OR [place];  Service: General;  Laterality: Left;    COLONOSCOPY        COLONOSCOPY   2014    EXOSTECTOMY Bilateral       Simple Bunion Exostectomy ([facility] Procedure)    FOOT TENDON SURGERY   2012    VENOUS ACCESS DEVICE (PORT) INSERTION N/A 10/18/2021     Procedure: INSERTION VENOUS ACCESS DEVICE;  Surgeon: Becky Liu MD;  Location: [facility] [place];  Service: General;  Laterality: N/A;               Current Outpatient Medications:     alendronate (FOSAMAX) 70 MG tablet, Take 1 tablet  by mouth 1 (One) Time Per Week., Disp: 12 tablet, Rfl: 3    amLODIPine (NORVASC) 5 MG tablet, Take 1 tablet by mouth Daily., Disp: 90 tablet, Rfl: 2    anastrozole (ARIMIDEX) 1 MG tablet, Take 1 tablet by mouth once daily, Disp: 90 tablet, Rfl: 3    Cholecalciferol (Vitamin D) 50 MCG (2000 UT) capsule, Take  by mouth., Disp: , Rfl:     cyclobenzaprine (FLEXERIL) 5 MG tablet, Take 1 tablet by mouth three times daily as needed for muscle spasm (Patient taking differently: Take 1 tablet by mouth 3 (Three) Times a Day As Needed for Muscle Spasms.), Disp: 30 tablet, Rfl: 0    desoximetasone (TOPICORT) 0.25 % ointment, Apply 1 application  topically to the appropriate area as directed 2 (Two) Times a Day As Needed (TO IRRITATED AREA)., Disp: , Rfl:     dexamethasone 0.5 MG/5ML solution, Take 10 mL by mouth 4 (Four) Times a Day. Take 10 mls by mouth four times daily. Swish in mouth for 2 minutes and then spit out. Do not swallow., Disp: 280 mL, Rfl: 0    escitalopram (LEXAPRO) 20 MG tablet, TAKE ONE TABLET BY MOUTH DAILY, Disp: 90 tablet, Rfl: 1    levothyroxine (SYNTHROID, LEVOTHROID) 25 MCG tablet, TAKE ONE TABLET BY MOUTH EVERY MORNING 30 MINUTES BEFORE BREAKFAST OR ANY OTHER MEDICATION, Disp: 90 tablet, Rfl: 1    metoprolol succinate XL (TOPROL-XL) 25 MG 24 hr tablet, Take 1 tablet by mouth Daily., Disp: 90 tablet, Rfl: 3    mupirocin (BACTROBAN) 2 % ointment, APPLY A SMALL AMOUNT OF OINTMENT TOPICALLY TO AFFECTED AREA EVERY NIGHT, Disp: , Rfl:     ondansetron (Zofran) 8 MG tablet, Take 1 tablet by mouth Every 8 (Eight) Hours As Needed for Nausea or Vomiting., Disp: 60 tablet, Rfl: 3    pantoprazole (PROTONIX) 40 MG EC tablet, Take 1 tablet by mouth Daily., Disp: 30 tablet, Rfl: 0    PreviDent 5000 Dry Mouth 1.1 % gel, , Disp: , Rfl:     prochlorperazine (COMPAZINE) 10 MG tablet, Take 1 tablet by mouth Every 6 (Six) Hours As Needed for Nausea or Vomiting., Disp: 30 tablet, Rfl: 3    rosuvastatin (CRESTOR) 10 MG tablet,  TAKE ONE TABLET BY MOUTH DAILY, Disp: 90 tablet, Rfl: 3     No Known Allergies           Family History   Problem Relation Age of Onset    Hypertension Mother      Breast cancer Mother      Arthritis Father      Heart disease Father      Hypertension Father      Malig Hyperthermia Neg Hx           Social History   Social History            Socioeconomic History    Marital status:        Spouse name: Pj    Number of children: 1   Tobacco Use    Smoking status: Former       Types: Cigarettes    Smokeless tobacco: Never    Tobacco comments:       SOCIALLY IN EARLY 20'S   Vaping Use    Vaping Use: Never used   Substance and Sexual Activity    Alcohol use: Yes       Comment: social drinker    Drug use: Never    Sexual activity: Defer            Review of Systems   Gastrointestinal:  Positive for constipation and nausea.   All other systems reviewed and are negative.  Pertinent positives and negatives documented in the HPI and all other systems reviewed and were found to be negative.      Vitals:     07/13/23 0931   BP: 123/80   Pulse: 92   Temp: 97.3 °F (36.3 °C)   SpO2: 95%         Physical Exam  Vitals reviewed.   Constitutional:       General: She is not in acute distress.     Appearance: Normal appearance. She is well-developed. She is not diaphoretic.   HENT:      Head: Normocephalic and atraumatic. Hair is normal.      Right Ear: Hearing, tympanic membrane, ear canal and external ear normal. No decreased hearing noted. No drainage.      Left Ear: Hearing, tympanic membrane, ear canal and external ear normal. No decreased hearing noted.      Nose: Nose normal. No nasal deformity.      Mouth/Throat:      Mouth: Mucous membranes are moist.   Eyes:      General: Lids are normal.         Right eye: No discharge.         Left eye: No discharge.      Extraocular Movements: Extraocular movements intact.      Conjunctiva/sclera: Conjunctivae normal.      Pupils: Pupils are equal, round, and reactive to light.    Neck:      Thyroid: No thyromegaly.      Vascular: No JVD.      Trachea: No tracheal deviation.   Cardiovascular:      Rate and Rhythm: Normal rate and regular rhythm.      Pulses: Normal pulses.      Heart sounds: Normal heart sounds. No murmur heard.    No friction rub. No gallop.   Pulmonary:      Effort: Pulmonary effort is normal. No respiratory distress.      Breath sounds: Normal breath sounds. No wheezing or rales.   Chest:      Chest wall: No tenderness.   Abdominal:      General: Bowel sounds are normal. There is no distension.      Palpations: Abdomen is soft. There is no mass.      Tenderness: There is no abdominal tenderness. There is no guarding or rebound.      Hernia: No hernia is present.   Musculoskeletal:         General: No tenderness or deformity. Normal range of motion.      Cervical back: Normal range of motion and neck supple.   Lymphadenopathy:      Cervical: No cervical adenopathy.   Skin:     General: Skin is warm and dry.      Findings: No erythema or rash.   Neurological:      Mental Status: She is alert and oriented to person, place, and time.      Cranial Nerves: No cranial nerve deficit.      Motor: No abnormal muscle tone.      Coordination: Coordination normal.      Deep Tendon Reflexes: Reflexes are normal and symmetric. Reflexes normal.   Psychiatric:         Mood and Affect: Mood normal.         Behavior: Behavior normal.         Thought Content: Thought content normal.         Judgment: Judgment normal.         Diagnoses and all orders for this visit:     1. Constipation, unspecified constipation type (Primary)  -     Case Request; Standing  -     Case Request     2. Nausea  -     Case Request; Standing  -     Case Request     Other orders  -     Implement Anesthesia orders day of procedure.; Standing  -     Obtain informed consent; Standing  -     Follow Anesthesia Guidelines / Protocol; Future  -     Obtain Informed Consent; Future  -     Verify bowel prep was successful;  Standing  -     Give tap water enema if bowel prep was insufficient; Standing        Assessment:  Constipation.  Nausea        Recommendations:  EGD and colonoscopy        No follow-ups on file.     10/6/23 - No change from the above H paulina P.    Gavin Mead MD

## 2023-10-06 NOTE — DISCHARGE INSTRUCTIONS
For the next 24 hours patient needs to be with a responsible adult.    For 24 hours DO NOT drive, operate machinery, appliances, drink alcohol, make important decisions or sign legal documents.    Start with a light or bland diet if you are feeling sick to your stomach otherwise advance to regular diet as tolerated.    Follow recommendations on procedure report if provided by your doctor.    Call Dr Mead for problems [phone]    Problems may include but not limited to: large amounts of bleeding, trouble breathing, repeated vomiting, severe unrelieved pain, fever or chills.

## 2023-10-06 NOTE — ANESTHESIA POSTPROCEDURE EVALUATION
"Patient: Neela Ramirez    Procedure Summary       Date: 10/06/23 [facility]    Anesthesia Start: 0700 Anesthesia Stop: 0743    Procedures:       ESOPHAGOGASTRODUODENOSCOPY with cold bx (Esophagus)      COLONOSCOPY to cecum and TI with cold bx polypectomy Diagnosis:       Constipation, unspecified constipation type      Nausea      (Constipation, unspecified constipation type [K59.00])      (Nausea [R11.0])    Surgeons: Gavin Mead MD Provider: Johny Perez MD    Anesthesia Type: general, MAC ASA Status: 2            Anesthesia Type: general, MAC    Vitals  Vitals Value Taken Time   /73 10/06/23 0808   Temp     Pulse 75 10/06/23 0808   Resp 20 10/06/23 0808   SpO2 96 % 10/06/23 0808           Post Anesthesia Care and Evaluation    Patient location during evaluation: bedside  Patient participation: complete - patient participated  Level of consciousness: awake and alert  Pain management: adequate    Airway patency: patent  Anesthetic complications: No anesthetic complications    Cardiovascular status: acceptable  Respiratory status: acceptable  Hydration status: acceptable    Comments: /73 (BP Location: Right arm)   Pulse 75   Resp 20   Ht 147.3 cm (58\")   Wt 55 kg (121 lb 3.2 oz)   SpO2 96%   BMI 25.33 kg/m²     "

## 2023-10-09 ENCOUNTER — TELEPHONE (OUTPATIENT)
Dept: GASTROENTEROLOGY | Facility: CLINIC | Age: 78
End: 2023-10-09
Payer: MEDICARE

## 2023-10-09 LAB
LAB AP CASE REPORT: NORMAL
PATH REPORT.FINAL DX SPEC: NORMAL
PATH REPORT.GROSS SPEC: NORMAL

## 2023-10-09 NOTE — PROGRESS NOTES
10/09/23       Tell her that pathology from her recent EGD showed biopsies of the stomach had mild inflammation with no evidence of Helicobacter pylori.       The colon polyp that was removed was not cancerous and not precancerous, which is good.  Maybe she does not need to have a follow-up colonoscopy given her age?  I will defer that to between her and her PCP.  Gabriela. kjh

## 2023-10-09 NOTE — TELEPHONE ENCOUNTER
----- Message from Gavin Mead MD sent at 10/9/2023 12:06 PM EDT -----  10/09/23       Tell her that pathology from her recent EGD showed biopsies of the stomach had mild inflammation with no evidence of Helicobacter pylori.       The colon polyp that was removed was not cancerous and not precancerous, which is good.  Maybe she does not need to have a follow-up colonoscopy given her age?  I will defer that to between her and her PCP.  Thx. kjh

## 2023-10-09 NOTE — TELEPHONE ENCOUNTER
Hub staff attempted to follow warm transfer process and was unsuccessful     Caller: Neela Ramirez    Relationship to patient: Self    Best call back number: [phone]    Patient is needing: RETURNED MISSED CALL FROM SERENA SHETH

## 2023-10-21 DIAGNOSIS — F41.9 ANXIETY: ICD-10-CM

## 2023-10-23 RX ORDER — ESCITALOPRAM OXALATE 20 MG/1
TABLET ORAL
Qty: 90 TABLET | Refills: 1 | Status: SHIPPED | OUTPATIENT
Start: 2023-10-23

## 2023-10-30 ENCOUNTER — OFFICE VISIT (OUTPATIENT)
Dept: ONCOLOGY | Facility: CLINIC | Age: 78
End: 2023-10-30
Payer: MEDICARE

## 2023-10-30 ENCOUNTER — LAB (OUTPATIENT)
Dept: LAB | Facility: HOSPITAL | Age: 78
End: 2023-10-30
Payer: MEDICARE

## 2023-10-30 ENCOUNTER — APPOINTMENT (OUTPATIENT)
Dept: ONCOLOGY | Facility: HOSPITAL | Age: 78
End: 2023-10-30
Payer: MEDICARE

## 2023-10-30 VITALS
TEMPERATURE: 98 F | WEIGHT: 125.9 LBS | SYSTOLIC BLOOD PRESSURE: 139 MMHG | HEIGHT: 59 IN | OXYGEN SATURATION: 96 % | HEART RATE: 89 BPM | RESPIRATION RATE: 16 BRPM | BODY MASS INDEX: 25.38 KG/M2 | DIASTOLIC BLOOD PRESSURE: 80 MMHG

## 2023-10-30 DIAGNOSIS — M85.80 OSTEOPENIA, UNSPECIFIED LOCATION: ICD-10-CM

## 2023-10-30 DIAGNOSIS — M85.80 OSTEOPENIA, UNSPECIFIED LOCATION: Primary | ICD-10-CM

## 2023-10-30 LAB
ALBUMIN SERPL-MCNC: 4.1 G/DL (ref 3.5–5.2)
ALBUMIN/GLOB SERPL: 1.5 G/DL
ALP SERPL-CCNC: 105 U/L (ref 39–117)
ALT SERPL W P-5'-P-CCNC: 21 U/L (ref 1–33)
ANION GAP SERPL CALCULATED.3IONS-SCNC: 10.2 MMOL/L (ref 5–15)
AST SERPL-CCNC: 29 U/L (ref 1–32)
BASOPHILS # BLD AUTO: 0.05 10*3/MM3 (ref 0–0.2)
BASOPHILS NFR BLD AUTO: 0.6 % (ref 0–1.5)
BILIRUB SERPL-MCNC: 0.4 MG/DL (ref 0–1.2)
BUN SERPL-MCNC: 15 MG/DL (ref 8–23)
BUN/CREAT SERPL: 18.8 (ref 7–25)
CALCIUM SPEC-SCNC: 9.4 MG/DL (ref 8.6–10.5)
CHLORIDE SERPL-SCNC: 104 MMOL/L (ref 98–107)
CO2 SERPL-SCNC: 25.8 MMOL/L (ref 22–29)
CREAT SERPL-MCNC: 0.8 MG/DL (ref 0.6–1.1)
DEPRECATED RDW RBC AUTO: 48.9 FL (ref 37–54)
EGFRCR SERPLBLD CKD-EPI 2021: 75.5 ML/MIN/1.73
EOSINOPHIL # BLD AUTO: 0.18 10*3/MM3 (ref 0–0.4)
EOSINOPHIL NFR BLD AUTO: 2.3 % (ref 0.3–6.2)
ERYTHROCYTE [DISTWIDTH] IN BLOOD BY AUTOMATED COUNT: 12.9 % (ref 12.3–15.4)
GLOBULIN UR ELPH-MCNC: 2.7 GM/DL
GLUCOSE SERPL-MCNC: 103 MG/DL (ref 65–99)
HCT VFR BLD AUTO: 43.6 % (ref 34–46.6)
HGB BLD-MCNC: 14.5 G/DL (ref 12–15.9)
IMM GRANULOCYTES # BLD AUTO: 0.04 10*3/MM3 (ref 0–0.05)
IMM GRANULOCYTES NFR BLD AUTO: 0.5 % (ref 0–0.5)
LYMPHOCYTES # BLD AUTO: 1.09 10*3/MM3 (ref 0.7–3.1)
LYMPHOCYTES NFR BLD AUTO: 13.7 % (ref 19.6–45.3)
MAGNESIUM SERPL-MCNC: 2.2 MG/DL (ref 1.6–2.4)
MCH RBC QN AUTO: 34.2 PG (ref 26.6–33)
MCHC RBC AUTO-ENTMCNC: 33.3 G/DL (ref 31.5–35.7)
MCV RBC AUTO: 102.8 FL (ref 79–97)
MONOCYTES # BLD AUTO: 0.68 10*3/MM3 (ref 0.1–0.9)
MONOCYTES NFR BLD AUTO: 8.5 % (ref 5–12)
NEUTROPHILS NFR BLD AUTO: 5.94 10*3/MM3 (ref 1.7–7)
NEUTROPHILS NFR BLD AUTO: 74.4 % (ref 42.7–76)
NRBC BLD AUTO-RTO: 0 /100 WBC (ref 0–0.2)
PHOSPHATE SERPL-MCNC: 3.3 MG/DL (ref 2.5–4.5)
PLATELET # BLD AUTO: 240 10*3/MM3 (ref 140–450)
PMV BLD AUTO: 9.1 FL (ref 6–12)
POTASSIUM SERPL-SCNC: 4.6 MMOL/L (ref 3.5–5.2)
PROT SERPL-MCNC: 6.8 G/DL (ref 6–8.5)
RBC # BLD AUTO: 4.24 10*6/MM3 (ref 3.77–5.28)
SODIUM SERPL-SCNC: 140 MMOL/L (ref 136–145)
WBC NRBC COR # BLD: 7.98 10*3/MM3 (ref 3.4–10.8)

## 2023-10-30 PROCEDURE — 1160F RVW MEDS BY RX/DR IN RCRD: CPT | Performed by: INTERNAL MEDICINE

## 2023-10-30 PROCEDURE — 99214 OFFICE O/P EST MOD 30 MIN: CPT | Performed by: INTERNAL MEDICINE

## 2023-10-30 PROCEDURE — 3079F DIAST BP 80-89 MM HG: CPT | Performed by: INTERNAL MEDICINE

## 2023-10-30 PROCEDURE — 80053 COMPREHEN METABOLIC PANEL: CPT

## 2023-10-30 PROCEDURE — 36415 COLL VENOUS BLD VENIPUNCTURE: CPT

## 2023-10-30 PROCEDURE — 1126F AMNT PAIN NOTED NONE PRSNT: CPT | Performed by: INTERNAL MEDICINE

## 2023-10-30 PROCEDURE — 83735 ASSAY OF MAGNESIUM: CPT

## 2023-10-30 PROCEDURE — 3075F SYST BP GE 130 - 139MM HG: CPT | Performed by: INTERNAL MEDICINE

## 2023-10-30 PROCEDURE — 85025 COMPLETE CBC W/AUTO DIFF WBC: CPT

## 2023-10-30 PROCEDURE — 84100 ASSAY OF PHOSPHORUS: CPT

## 2023-10-30 PROCEDURE — 1159F MED LIST DOCD IN RCRD: CPT | Performed by: INTERNAL MEDICINE

## 2023-10-30 NOTE — PROGRESS NOTES
Subjective   Neela Ramirez is a 78 y.o. female.  Referred by Dr. 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  A bilateral diagnostic mammogram was performed for further evaluation of the same.    8/17/2021-bilateral diagnostic mammogram-scattered fibroglandular densities throughout both breasts.  In the posterior one third upper inner quadrant of the left breast at the site of palpable concern, 11 o'clock position there is a mass with adjacent pleomorphic calcifications.  The mass and the calcifications measure on order of 1.5 cm in greatest dimension.  No evidence of axillary lymphadenopathy appreciated.  Stable microcalcifications seen in other areas of the left breast on right breast.  Ultrasound-at 11 o'clock position, 5 cm from the nipple in the left breast there is an irregular hypoechoic mass which measures 2.4 x 2 x 1.6 cm.    Impression  1.irregular 2.4 cm mass in the left breast at the site of palpable concern at 11:00, 5 cm from the nipple.  Ultrasound-guided biopsy of the mass recommended  No finding suspicious for malignancy in the right breast    9/8/2021-left breast ultrasound-guided biopsy  Pathology consistent with invasive ductal carcinoma with lobular features.  Grade 3.  The carcinoma measures 7 mm in greatest dimension.  Focally suspicious for lymphovascular space invasion.  ER low +1-10%, intensity week  ID negative  HER-2 3+ on immunohistochemistry  Ki-67 70%    MRI of the breast 10/8/2021-Biopsy-proven malignancy in the left breast at 11:00 measuring 2.8 cm with a centrally located metallic clip.  No other suspicious findings are seen within the left breast or no left axillary lymphadenopathy.  No suspicious findings of the right breast    Echocardiogram 10/8/2021 was normal  ejection fraction of 56 to 60% and strain of -20    She denies any new bone pains, dyspnea, cough, abdominal pain nausea vomiting or recent changes in weight.    She had 2 previous breast aspirations in her 20s.  No other breast biopsies  She does not know much about her family hence limited family history.    She received neoadjuvant chemotherapy on EA 1181 trial with Taxol Perjeta and Herceptin.  She completed 12 weeks of Taxol Herceptin and Perjeta.    Had an abnormal EKG preop and hence a stress test was performed on 2/3/2022 which showed a normal left ventricular ejection fraction and LVEF at stress was 73%.  Considered a low risk study with normal myocardial perfusion imaging.    She underwent a left breast lumpectomy and a sentinel lymph node biopsy on 2/10/2022.    Pathology was consistent with residual tumor which was pleomorphic invasive lobular carcinoma, poorly differentiated, grade 3  Tumor measured 18 mm  Margins negative for invasive carcinoma  Associated lobular carcinoma in situ noted  1 out of 10 lymph nodes was positive for metastatic focus measuring 3.5 mm.    Receptors were repeated on the pleomorphic invasive lobular carcinoma  ER +91 to 100% strong  MN +61 to 70% moderate  HER-2 negative  Ki-67 55%    Receptors performed on the metastatic lymph node  ER +81-90% strong  MN negative  HER-2 2+ on immunohistochemistry, FISH pending.    ypT1 cN1 aM0    2/28/2022-CT of the chest abdomen and pelvis  Fluid collection in the upper left breast measuring 6 x 2.5 x 4.9 cm, postoperative seroma.  Skin thickening of the left breast likely postsurgical.  Fluid collections left axilla measuring 2.9 x 1.4 x 2.9 cm.  No other suspicious abnormalities on the CT of the chest  CT abdomen with no evidence of metastatic disease.  Mild colonic diverticulosis.  Small uterine fibroid.    3/1/2022-bone scan  No evidence of metastatic disease.  Multifocal degenerative arthritic uptake without scintigraphic evidence to  suggest metastatic disease.    3/1/2022-echocardiogram  Left ventricular ejection fraction 56-60%.  Normal global LV strain of -20.5%.    3/11/2022-cycle 1 of AC    Completed 4 cycles of AC    6/3/2022-cycle 1 Kadcyla    7/19/2022-completed adjuvant radiation    7/20/2022-started adjuvant anastrozole    8/17/2022-bilateral diagnostic mammogram-benign    8/21/2023-bilateral screening mammogram benign    Interval history  The patient is a 78 y.o. female with the above-mentioned history, who returns the office today for a 4-month follow-up.  She had a benign screening mammogram in August 2023.  She denies any new breast masses.  She is experiencing some neck pain which is chronic and unchanged.  No other new bone pains, cough, abdominal pain  headaches or blurry vision.   She does have intermittent nausea for which she is using leftover Zofran from chemotherapy.  She is also complaining of heartburn.  She has not been cleared by her dentist to start Zometa yet as she requires some dental work.      The following portions of the patient's history were reviewed and updated as appropriate: allergies, current medications, past family history, past medical history, past social history, past surgical history and problem list.    Past Medical History:   Diagnosis Date    Anxiety     Arthritis     Cataract     EARLY. JIMMIE EYES    Chronic back pain     LOW BACK ACHES AT TIME FROM ARTHRTIS    Constipation     AT TIMES. NO RECENT ISSUES    Dermatitis     LEGS. STATES CLEARING UP NOW.    Disease of thyroid gland     HYPO    Diverticulosis     Colonoscopy November 2014    Erythema of face     Transitory Erythema Of The Face    GERD (gastroesophageal reflux disease)     ON OCC    History of chemotherapy     FOR LEFT BREAST CANCER    Hyperlipidemia     Hypertension     Malignant neoplasm of female breast 9/27/2021    Osteopenia     Tachycardia         Past Surgical History:   Procedure Laterality Date    BREAST BIOPSY  09/2021      Celestino     BREAST CYST ASPIRATION Right     BREAST CYST EXCISION Right     BREAST LUMPECTOMY WITH SENTINEL NODE BIOPSY Left 02/10/2022    Procedure: LEFT BREAST WIRE LOCALIZED LUMPECTOMY WITH SENTINEL NODE BIOPSY, POSSIBLE LEFT AXILLARY DISSECTION;  Surgeon: Becky Liu MD;  Location: [facility] OR [facility];  Service: General;  Laterality: Left;    COLONOSCOPY      COLONOSCOPY      COLONOSCOPY N/A 10/6/2023    Procedure: COLONOSCOPY to cecum and TI with cold bx polypectomy;  Surgeon: Gavin Mead MD;  Location: [facility] ENDOSCOPY;  Service: Gastroenterology;  Laterality: N/A;  pre - nausea, constipation  post - diverticulosis, polyp    ENDOSCOPY N/A 10/6/2023    Procedure: ESOPHAGOGASTRODUODENOSCOPY with cold bx;  Surgeon: Gavin Mead MD;  Location: [facility] ENDOSCOPY;  Service: Gastroenterology;  Laterality: N/A;  pre - nausea, constipation  post - gastritis    EXOSTECTOMY Bilateral     Simple Bunion Exostectomy (Silver Procedure)    FOOT TENDON SURGERY      VENOUS ACCESS DEVICE (PORT) INSERTION N/A 10/18/2021    Procedure: INSERTION VENOUS ACCESS DEVICE;  Surgeon: Becky Liu MD;  Location: [facility] OR [facility];  Service: General;  Laterality: N/A;        Family History   Problem Relation Age of Onset    Hypertension Mother     Breast cancer Mother     Arthritis Father     Heart disease Father     Hypertension Father     Malig Hyperthermia Neg Hx         Social History     Socioeconomic History    Marital status:      Spouse name: Pj    Number of children: 1   Tobacco Use    Smoking status: Former     Packs/day: 0.25     Years: 0.50     Additional pack years: 0.00     Total pack years: 0.13     Types: Cigarettes     Quit date: 1970     Years since quittin.8    Smokeless tobacco: Never    Tobacco comments:     SOCIALLY IN EARLY S   Vaping Use    Vaping Use: Never used   Substance and Sexual Activity    Alcohol use: Yes     Comment: social drinker    Drug use: Never    Sexual activity: Defer     "    OB History               Para        Term   0            AB        Living             SAB        IAB        Ectopic        Molar        Multiple        Live Births                 Age at menarche-13  Age at menopause-50  Nulliparous  Breast-feeding-N/A   0 para 0  0  Oral contraceptive pill use-none  Hormone replacement therapy-7 years    No Known Allergies     Review of systems as mentioned in the HPI otherwise negative    Objective   Blood pressure 139/80, pulse 89, temperature 98 °F (36.7 °C), temperature source Infrared, resp. rate 16, height 149.9 cm (59.02\"), weight 57.1 kg (125 lb 14.4 oz), SpO2 96%.     ECOG performance status-0    Physical Exam  Vitals reviewed.   Constitutional:       Appearance: Normal appearance.   HENT:      Head: Normocephalic and atraumatic.      Nose: Nose normal.      Mouth/Throat:      Mouth: Mucous membranes are moist.      Pharynx: Oropharynx is clear.   Eyes:      Conjunctiva/sclera: Conjunctivae normal.      Pupils: Pupils are equal, round, and reactive to light.   Cardiovascular:      Rate and Rhythm: Normal rate and regular rhythm.      Pulses: Normal pulses.      Heart sounds: Normal heart sounds.   Pulmonary:      Effort: Pulmonary effort is normal.      Breath sounds: Normal breath sounds.   Abdominal:      General: Abdomen is flat. Bowel sounds are normal.      Palpations: Abdomen is soft.   Musculoskeletal:         General: Normal range of motion.      Cervical back: Normal range of motion.   Skin:     General: Skin is warm and dry.      Findings: No rash.   Neurological:      General: No focal deficit present.      Mental Status: She is alert and oriented to person, place, and time. Mental status is at baseline.   Psychiatric:         Mood and Affect: Mood normal.         Behavior: Behavior normal.         Thought Content: Thought content normal.         Judgment: Judgment normal.     Breast Exam: Right breast appears normal on " inspection.  No palpable abnormalities of the right breast.    Left breast on inspection there is a scar which is well-healed.  There is also left axillary scar which is well-healed.  Skin changes consistent with radiation dermatitis.  The left breast is somewhat firm which is likely secondary to radiation.  With sitting, there is dimpling of the left breast at the lumpectomy site which is unchanged per the patient's report    I have reexamined the patient and the results are consistent with the previously documented exam. Sheri Avendano MD          Results from last 7 days   Lab Units 10/30/23  1110   WBC 10*3/mm3 7.98   NEUTROS ABS 10*3/mm3 5.94   HEMOGLOBIN g/dL 14.5   HEMATOCRIT % 43.6   PLATELETS 10*3/mm3 240     Results from last 7 days   Lab Units 10/30/23  1110   SODIUM mmol/L 140   POTASSIUM mmol/L 4.6   CHLORIDE mmol/L 104   CO2 mmol/L 25.8   BUN mg/dL 15   CREATININE mg/dL 0.80   CALCIUM mg/dL 9.4   ALBUMIN g/dL 4.1   BILIRUBIN mg/dL 0.4   ALK PHOS U/L 105   ALT (SGPT) U/L 21   AST (SGOT) U/L 29   GLUCOSE mg/dL 103*   MAGNESIUM mg/dL 2.2                   No radiology results for the last 30 days.     CBC and CMP from 10/30/2023 reviewed and within normal limits    Assessment & Plan       *Invasive ductal carcinoma of the left breast  Clinical T2 N0 M0, stage IIa  On ultrasound the tumor measures 2.4 cm.  Lymph nodes on the left side are normal.  MRI 10/8/2021 with tumor measuring 2.8 cm.  Lymph nodes appear normal  Pathology consistent with invasive carcinoma with ductal and lobular features, grade 3, ER +1 to 10% weak, IN negative, HER-2 3+ immunohistochemistry, Ki-67 70%.  She was evaluated by Dr. Liu and referred for consideration of neoadjuvant chemotherapy.   Since the tumor is HER-2 positive and over 2 cm I think it would be reasonable to proceed with neoadjuvant chemotherapy.  Since the tumor is over 2 cm but lymph node negative I think she would be a great candidate for EA 1181 clinical  trial which comprises of administering Taxol Herceptin and Perjeta tamika  adjuvantly followed by surgery and additional adjuvant treatment depending upon the response.  Port placed 10/18/2021  Echocardiogram shows normal ejection fraction  Week 1 TPH on 10/19/2021  Completed 12 weeks of Taxol Perjeta and Herceptin on 1/4/2022 on EA 1181 clinical trial  1/11/2022 due for Herceptin and Perjeta only  2/1/2022-due for dose 2 of Herceptin and Perjeta  MRI 1/14/2022 reviewed and there has been a good tumor response with decrease in size of the mass to 1.2 cm.  2/10/2022-left breast lumpectomy and sentinel lymph node biopsy  Pathology shows ypT1 cN1 aM0, stage IIb, pleomorphic invasive lobular carcinoma, grade 3, ER +% strong, WI +61-70% moderate, HER-2 negative on the residual tumor  The left axillary lymph node was ER positive strong, WI negative and HER-2 2+, FISH amplified.  On the initial biopsy prior to surgery the pathology showed invasive ductal with lobular features and the ER/WI was negative and HER-2 was 3+.  The residual disease obviously appears to be different from the initial pathology.  What is remaining is essentially pleomorphic lobular carcinoma which is high-grade with a high Ki-67 and ER/WI positivity.  Now that the lymph node is also positive I do believe that she will benefit from additional chemotherapy particularly either AC or EC.  Case was presented at multidisciplinary conference.  Decided to proceed with adjuvant Adriamycin and cyclophosphamide.  Given her age we will proceed with every 3 weekly AC over every 2 weeks.  Staging CT chest and pelvis and bone scan without any evidence of metastatic disease  3/11/2022-proceed with cycle 1 of AC  5/13/2022-due for cycle 4 AC.    6/3/2022-cycle 1 Kadcyla  7/19/2022-completed adjuvant radiation  7/20/2022-started anastrozole  Patient continues on anastrozole and tolerating that well.  Currently no evidence of recurrent disease.  Scheduled for  Zometa today however she has not been cleared by her dentist.  We will defer Zometa till cleared by her dentist    *Right-port removed    *Hypertension-currently controlled with amlodipine and metoprolol.  Blood pressure 139/80    *Anxiety  Mood normal    *Hypothyroidism  Continue Synthroid  Stable    *Cardiac health-  Echocardiogram 1/5/2022 reviewed and stable  Stress test 2/3/2022 normal  3/1/2022-echocardiogram shows normal ejection fraction of 56 to 60%, normal LV strain  5/31/2022 EF 61%.  Maintain follow-up with Dr. Galo   8/19/2022-2D echocardiogram shows an ejection fraction of 55% and a strain of -22%.  Stable  Echocardiogram February 2023 normal    *Bone health-  DEXA scan August 2021 showing osteopenia (this was stable compared to imaging 2 years prior).  She is currently on Fosamax which was started by her primary care physician.  8/14/2023 DEXA shows osteopenia with a T score of -1.9 in the hip and T score of 0.2 in the lumbar spine  Waiting for clearance from her dentist to start Zometa.    *Left axillary stiffness-patient has been seen by physical therapy with exercises she knows to do to help with this.  She admits that she needs to increase routine practice of this.      *Decreased hearing, feelings of lightheadedness and left ear bothering patient.  Per examination she has significant cerumen in both external ear canals.  Patient has required visit with ENT before to have this removed. Recommended that she obtain Debrox in the short-term until she gets through the holiday season and then to have repeat visit to have this cleared out.      *Constipation  Improved somewhat with stool softeners however patient experiences intermittent constipation and is worried about the same.  She had a colonoscopy which was benign.  Continue follow-up with gastroenterology as needed    *Left-sided back pain  Secondary to the fall  X-rays previously performed were negative  4/29/2023 MRI of the lumbar spine  with no evidence of metastatic disease, degenerative changes noted    PLAN:   Continue anastrozole 1 mg daily  Start Zometa once cleared by her dentist  [place] in 4 months and MD in 8 months    Patient continues on high risk medication requiring close monitoring for toxicity    Sheri Avendano MD  10/30/2023

## 2023-11-16 ENCOUNTER — HOSPITAL ENCOUNTER (OUTPATIENT)
Dept: PHYSICAL THERAPY | Facility: HOSPITAL | Age: 78
Setting detail: THERAPIES SERIES
Discharge: HOME | End: 2023-11-16
Payer: MEDICARE

## 2023-11-16 DIAGNOSIS — I89.0 LYMPHEDEMA OF LEFT ARM: Primary | ICD-10-CM

## 2023-11-16 DIAGNOSIS — C50.212 MALIGNANT NEOPLASM OF UPPER-INNER QUADRANT OF LEFT BREAST IN FEMALE, ESTROGEN RECEPTOR POSITIVE: ICD-10-CM

## 2023-11-16 DIAGNOSIS — Z91.89 AT RISK FOR LYMPHEDEMA: ICD-10-CM

## 2023-11-16 DIAGNOSIS — Z17.0 MALIGNANT NEOPLASM OF UPPER-INNER QUADRANT OF LEFT BREAST IN FEMALE, ESTROGEN RECEPTOR POSITIVE: ICD-10-CM

## 2023-11-16 DIAGNOSIS — Z98.890 S/P LUMPECTOMY, LEFT BREAST: ICD-10-CM

## 2023-11-16 PROCEDURE — 93702 BIS XTRACELL FLUID ANALYSIS: CPT

## 2023-11-16 PROCEDURE — 97535 SELF CARE MNGMENT TRAINING: CPT

## 2023-11-16 NOTE — THERAPY RE-EVALUATION
Physical Therapy Lymphedema Re-Evaluation  [facility]     Patient Name: Neela Ramirez  : 1945  MRN: [id number]  Today's Date: 2023      Visit Date: 2023    Visit Dx:    ICD-10-CM ICD-9-CM   1. Lymphedema of left arm  I89.0 457.1   2. Malignant neoplasm of upper-inner quadrant of left breast in female, estrogen receptor positive  C50.212 174.2    Z17.0 V86.0   3. S/P lumpectomy, left breast  Z98.890 V45.89   4. At risk for lymphedema  Z91.89 V49.89       Patient Active Problem List   Diagnosis    Anxiety    DD (diverticular disease)    Gastroesophageal reflux disease    Essential hypertension    Hyperlipidemia    Osteopenia    Bilateral low back pain without sciatica    Malignant neoplasm of female breast    Encounter for management of implanted device    Personal history of breast cancer    Constipation    Nausea    Hypothyroidism        Past Medical History:   Diagnosis Date    Anxiety     Arthritis     Cataract     EARLY. JIMMIE EYES    Chronic back pain     LOW BACK ACHES AT TIME FROM ARTHRTIS    Constipation     AT TIMES. NO RECENT ISSUES    Dermatitis     LEGS. STATES CLEARING UP NOW.    Disease of thyroid gland     HYPO    Diverticulosis     Colonoscopy 2014    Erythema of face     Transitory Erythema Of The Face    GERD (gastroesophageal reflux disease)     ON OCC    History of chemotherapy     FOR LEFT BREAST CANCER    Hyperlipidemia     Hypertension     Malignant neoplasm of female breast 2021    Osteopenia     Tachycardia         Past Surgical History:   Procedure Laterality Date    BREAST BIOPSY  2021    Dr. Tirado     BREAST CYST ASPIRATION Right     BREAST CYST EXCISION Right     BREAST LUMPECTOMY WITH SENTINEL NODE BIOPSY Left 02/10/2022    Procedure: LEFT BREAST WIRE LOCALIZED LUMPECTOMY WITH SENTINEL NODE BIOPSY, POSSIBLE LEFT AXILLARY DISSECTION;  Surgeon: Becky Liu MD;  [facility]: [facility] OR [facility] [place];  Service: General;  Laterality: Left;    COLONOSCOPY       COLONOSCOPY  2014    COLONOSCOPY N/A 10/6/2023    Procedure: COLONOSCOPY to cecum and TI with cold bx polypectomy;  Surgeon: Gavin Mead MD;  Location: [facility] ENDOSCOPY;  Service: Gastroenterology;  Laterality: N/A;  pre - nausea, constipation  post - diverticulosis, polyp    ENDOSCOPY N/A 10/6/2023    Procedure: ESOPHAGOGASTRODUODENOSCOPY with cold bx;  Surgeon: Gavin Mead MD;  Location: [facility] ENDOSCOPY;  Service: Gastroenterology;  Laterality: N/A;  pre - nausea, constipation  post - gastritis    EXOSTECTOMY Bilateral     Simple Bunion Exostectomy (Silver Procedure)    FOOT TENDON SURGERY  2012    VENOUS ACCESS DEVICE (PORT) INSERTION N/A 10/18/2021    Procedure: INSERTION VENOUS ACCESS DEVICE;  Surgeon: Becky Liu MD;  [place] [facility] [place] [facility] [place]  Service: General;  Laterality: N/A;       Visit Dx:    ICD-10-CM ICD-9-CM   1. Lymphedema of left arm  I89.0 457.1   2. Malignant neoplasm of upper-inner quadrant of left breast in female, estrogen receptor positive  C50.212 174.2    Z17.0 V86.0   3. S/P lumpectomy, left breast  Z98.890 V45.89   4. At risk for lymphedema  Z91.89 V49.89            Lymphedema       Row Name 11/16/23 1300             Subjective Pain    Able to rate subjective pain? yes  -LB      Pre-Treatment Pain Level 0  -LB         Subjective    Subjective Comments I am doing well. We are doing the massage 3x/week and I wear the sleeve daily. I haven't skipped any days.  -LB         Lymphedema Assessment    Lymphedema Classification LUE:;secondary;stage 1 (Spontaneously Reversible)  -LB      Lymphedema Cancer Related Sx left;lumpectomy;sentinel node biopsy  -LB      Lymph Nodes Removed # 10  -LB      Positive Lymph Nodes # 1  -LB      Chemo Received yes  -LB      Radiation Therapy Received yes  -LB      Infections or Cellulitis? no  -LB         Lymphedema Edema Assessment    Edema Assessment Comment forearm much improved, lingering edema medial forearm  -LB         Skin Changes/Observations     Skin Observations Comment well healed skin, continued pigment changes throughout forearm but no open areas  -LB         Compression/Skin Care    Compression/Skin Care compression garment  -LB         L-Dex Bioimpedence Screening    L-Dex Measurement Extremity LUE  -LB      L-Dex Patient Position Standing  -LB      L-Dex UE Dominate Side Right  -LB      L-Dex UE At Risk Side Left  -LB      L-Dex UE Pre Surgical Value No  -LB      L-Dex UE Score 13.6  -LB      L-Dex UE Baseline Score 2.9  -LB      L-Dex UE Value Change 10.7  -LB      L-Dex UE Comment outside of normal range but improved  -LB      $ L-Dex Charge yes  -LB                User Key  (r) = Recorded By, (t) = Taken By, (c) = Cosigned By      Initials Name Provider Type    Edna Abdalla PT Physical Therapist                                    Therapy Education  Education Details: reviewed continued management with pt and spouse, reviewed bioimpedance results, interpretation, and use of data, discussed compression wear, continued MLD, focus of MLD  Given: Symptoms/condition management, Edema management, Mobility training  Program: Reinforced  How Provided: Verbal  Provided to: Patient  Level of Understanding: Verbalized  38855 - PT Self Care/Mgmt Minutes: 15       OP Exercises       Row Name 11/16/23 1300             Subjective    Subjective Comments I am doing well. We are doing the massage 3x/week and I wear the sleeve daily. I haven't skipped any days.  -LB         Subjective Pain    Able to rate subjective pain? yes  -LB      Pre-Treatment Pain Level 0  -LB                User Key  (r) = Recorded By, (t) = Taken By, (c) = Cosigned By      Initials Name Provider Type    Edna Abdalla PT Physical Therapist                                 PT OP Goals       Row Name 11/16/23 1300          PT Short Term Goals    STG Date to Achieve 07/17/23  -LB     STG 1 LDex Bioimpedence will decrease to 20 or lower showing improvement in lymphedema.  -LB     STG 1  Progress Met  -LB     STG 1 Progress Comments 13.7 today  -LB     STG 2 Pt/family independent with self bandaging for self management of condition.  -LB     STG 2 Progress Met  -LB     STG 3 Pt will demonstrate decreased girth measurements of >/= 5 cm on LUE to reduce infection risk and improve skin condition.  -LB     STG 3 Progress Met  -LB        Long Term Goals    LTG Date to Achieve 05/06/22  -LB     LTG 1 Pt will maintain LDex bioimpedance score WNL.  -LB     LTG 1 Progress Ongoing  -LB     LTG 1 Progress Comments nearing normal limits, 13.7 today  -LB     LTG 2 Pt will acquire appropriately fitted compression garment and verbalize appropriate wear schedule.  -LB     LTG 2 Progress Met  -LB     LTG 3 Pt will verbalize signs and symptoms of lymphedema and steps to prevention.  -LB     LTG 3 Progress Met  -LB               User Key  (r) = Recorded By, (t) = Taken By, (c) = Cosigned By      Initials Name Provider Type    LB Edna Condon, PT Physical Therapist                     PT Assessment/Plan       Row Name 11/16/23 1306          PT Assessment    Functional Limitations Other (comment)  LUE lymphedema  -LB     Impairments Impaired flexibility;Impaired lymphatic circulation;Sensation;Edema  -LB     Assessment Comments Pt returns for 3 month assessment reporting continued good tolernance to compression garment use and daily compliance. She and spouse are performing 3x/week and are very compliant with self management. LDex bioimpedance score has improved to 13.7 which remains outside of normal limits but dramatically improved in last 6 months. Pt has met 3/3 STGs and 2/3 LTGs. She remains appropriate for continued PT services to continue to monitor lymphedema and address deficits as needed.  -LB     Rehab Potential Good  -LB     Patient/caregiver participated in establishment of treatment plan and goals Yes  -LB     Patient would benefit from skilled therapy intervention Yes  -LB        PT Plan    PT Frequency  Other (comment)  -LB     Predicted Duration of Therapy Intervention (PT) return in 6 months for assessment  -LB     Planned CPT's? PT EVAL LOW COMPLEXITY: 09640;PT RE-EVAL: 40065;PT THER PROC EA 15 MIN: 95450;PT THER ACT EA 15 MIN: 10856;PT MANUAL THERAPY EA 15 MIN: 52337;PT SELF CARE/HOME MGMT/TRAIN EA 15: 02177;PT BIS XTRACELL FLUID ANALYSIS: 77508  -LB     PT Plan Comments repeat bioimpedance, assess L forearm  -LB               User Key  (r) = Recorded By, (t) = Taken By, (c) = Cosigned By      Initials Name Provider Type    LB Edna Condon, PT Physical Therapist                                 Time Calculation:   Start Time: 1045  Stop Time: 1115  Time Calculation (min): 30 min  Total Timed Code Minutes- PT: 15 minute(s)  Timed Charges  25917 - PT Self Care/Mgmt Minutes: 15  Total Minutes  Timed Charges Total Minutes: 15   Total Minutes: 15   Therapy Charges for Today       Code Description Service Date Service Provider Modifiers Qty    74785204076 HC PT BIS XTRACELL FLUID ANALYSIS 11/16/2023 Edna Condon, PT  1    81779508196 HC PT SELF CARE/MGMT/TRAIN EA 15 MIN 11/16/2023 Edna Condon, PT GP 1                      Edna Condon PT  11/16/2023

## 2023-12-06 ENCOUNTER — TELEPHONE (OUTPATIENT)
Dept: ONCOLOGY | Facility: CLINIC | Age: 78
End: 2023-12-06
Payer: MEDICARE

## 2023-12-06 NOTE — TELEPHONE ENCOUNTER
Called the patient  to let him know I went over the chart with Delma GUPTA and we did not see any reason as to why she could not have foot surgery. Patients  did not answer but a v/m was left on his identifiable v/m with my number if there were any questions.

## 2023-12-06 NOTE — TELEPHONE ENCOUNTER
Caller: Pj Ramirez    Relationship: Emergency Contact    Best call back number: [phone]     What is the best time to reach you: ASAP    Who are you requesting to speak with (clinical staff, provider,  specific staff member): CLINICAL/ROSALINDA    What was the call regarding: PT'S  NEEDS TO SPEAK TO ROSALINDA ABOUT A FOOT SURGERY THAT THE PATIENT WILL NEED, NEEDS APPROVAL BEFORE THEY COMMIT TO THE FOOT SURGERY, PLEASE CALL BACK TO DISCUSS/ADVISE.

## 2023-12-19 DIAGNOSIS — E03.9 HYPOTHYROIDISM, UNSPECIFIED TYPE: ICD-10-CM

## 2023-12-19 RX ORDER — LEVOTHYROXINE SODIUM 0.03 MG/1
TABLET ORAL
Qty: 90 TABLET | Refills: 1
Start: 2023-12-19 | End: 2023-12-21 | Stop reason: SDUPTHER

## 2023-12-20 ENCOUNTER — HOSPITAL ENCOUNTER (OUTPATIENT)
Dept: PHYSICAL THERAPY | Facility: HOSPITAL | Age: 78
Setting detail: THERAPIES SERIES
Discharge: HOME | End: 2023-12-20
Payer: MEDICARE

## 2023-12-20 ENCOUNTER — APPOINTMENT (OUTPATIENT)
Dept: PHYSICAL THERAPY | Facility: HOSPITAL | Age: 78
End: 2023-12-20
Payer: MEDICARE

## 2023-12-20 DIAGNOSIS — Z91.89 AT RISK FOR LYMPHEDEMA: ICD-10-CM

## 2023-12-20 DIAGNOSIS — I89.0 LYMPHEDEMA OF LEFT ARM: Primary | ICD-10-CM

## 2023-12-20 DIAGNOSIS — Z17.0 MALIGNANT NEOPLASM OF UPPER-INNER QUADRANT OF LEFT BREAST IN FEMALE, ESTROGEN RECEPTOR POSITIVE: ICD-10-CM

## 2023-12-20 DIAGNOSIS — E03.9 HYPOTHYROIDISM, UNSPECIFIED TYPE: ICD-10-CM

## 2023-12-20 DIAGNOSIS — C50.212 MALIGNANT NEOPLASM OF UPPER-INNER QUADRANT OF LEFT BREAST IN FEMALE, ESTROGEN RECEPTOR POSITIVE: ICD-10-CM

## 2023-12-20 DIAGNOSIS — Z98.890 S/P LUMPECTOMY, LEFT BREAST: ICD-10-CM

## 2023-12-20 PROCEDURE — 97535 SELF CARE MNGMENT TRAINING: CPT

## 2023-12-20 RX ORDER — LEVOTHYROXINE SODIUM 0.03 MG/1
TABLET ORAL
Status: CANCELLED
Start: 2023-12-20

## 2023-12-20 NOTE — TELEPHONE ENCOUNTER
Caller: JamesPj    Relationship: Emergency Contact    Best call back number: [phone]     Requested Prescriptions:   Requested Prescriptions     Pending Prescriptions Disp Refills    levothyroxine (SYNTHROID, LEVOTHROID) 25 MCG tablet       Sig: TAKE ONE TABLET BY MOUTH EVERY MORNING 30 MINUTES BEFORE BREAKFAST OR ANY OTHER MEDICATION        Pharmacy where request should be sent: [facility] [id number] [address] AT [facility].  [phone]  - [phone] FX     Last office visit with prescribing clinician: 7/26/2023   Last telemedicine visit with prescribing clinician: Visit date not found   Next office visit with prescribing clinician: 2/15/2024     Additional details provided by patient: PRESCRIPTION DID NOT GO THROUGH TO PHARMACY NEEDS RESENT ASAP PLEASE ONLY HAS 1 PILL LEFT     Does the patient have less than a 3 day supply:  [x] Yes  [] No    Would you like a call back once the refill request has been completed: [] Yes [x] No    If the office needs to give you a call back, can they leave a voicemail: [] Yes [x] No    Mary Soria Rep   12/20/23 11:11 EST

## 2023-12-20 NOTE — THERAPY PROGRESS REPORT/RE-CERT
Physical Therapy Lymphedema Progress Note  [facility]     Patient Name: Neela Ramirez  : 1945  MRN: [id number]  Today's Date: 2023      Visit Date: 2023    Visit Dx:    ICD-10-CM ICD-9-CM   1. Lymphedema of left arm  I89.0 457.1   2. Malignant neoplasm of upper-inner quadrant of left breast in female, estrogen receptor positive  C50.212 174.2    Z17.0 V86.0   3. S/P lumpectomy, left breast  Z98.890 V45.89   4. At risk for lymphedema  Z91.89 V49.89       Patient Active Problem List   Diagnosis    Anxiety    DD (diverticular disease)    Gastroesophageal reflux disease    Essential hypertension    Hyperlipidemia    Osteopenia    Bilateral low back pain without sciatica    Malignant neoplasm of female breast    Encounter for management of implanted device    Personal history of breast cancer    Constipation    Nausea    Hypothyroidism        Past Medical History:   Diagnosis Date    Anxiety     Arthritis     Cataract     EARLY. JIMMIE EYES    Chronic back pain     LOW BACK ACHES AT TIME FROM ARTHRTIS    Constipation     AT TIMES. NO RECENT ISSUES    Dermatitis     LEGS. STATES CLEARING UP NOW.    Disease of thyroid gland     HYPO    Diverticulosis     Colonoscopy 2014    Erythema of face     Transitory Erythema Of The Face    GERD (gastroesophageal reflux disease)     ON OCC    History of chemotherapy     FOR LEFT BREAST CANCER    Hyperlipidemia     Hypertension     Malignant neoplasm of female breast 2021    Osteopenia     Tachycardia         Past Surgical History:   Procedure Laterality Date    BREAST BIOPSY  2021    Dr. Tirado     BREAST CYST ASPIRATION Right     BREAST CYST EXCISION Right     BREAST LUMPECTOMY WITH SENTINEL NODE BIOPSY Left 02/10/2022    Procedure: LEFT BREAST WIRE LOCALIZED LUMPECTOMY WITH SENTINEL NODE BIOPSY, POSSIBLE LEFT AXILLARY DISSECTION;  Surgeon: Becky Liu MD;  [place] [facility] [place] [facility] [place];  Service: General;  Laterality: Left;    COLONOSCOPY       COLONOSCOPY  2014    COLONOSCOPY N/A 10/6/2023    Procedure: COLONOSCOPY to cecum and TI with cold bx polypectomy;  Surgeon: Gavin Mead MD;  Location: [facility] ENDOSCOPY;  Service: Gastroenterology;  Laterality: N/A;  pre - nausea, constipation  post - diverticulosis, polyp    ENDOSCOPY N/A 10/6/2023    Procedure: ESOPHAGOGASTRODUODENOSCOPY with cold bx;  Surgeon: Gavin Mead MD;  Location: [facility] ENDOSCOPY;  Service: Gastroenterology;  Laterality: N/A;  pre - nausea, constipation  post - gastritis    EXOSTECTOMY Bilateral     Simple Bunion Exostectomy (Silver Procedure)    FOOT TENDON SURGERY  2012    VENOUS ACCESS DEVICE (PORT) INSERTION N/A 10/18/2021    Procedure: INSERTION VENOUS ACCESS DEVICE;  Surgeon: Becky Liu MD;  Location:  [facility] [place];  Service: General;  Laterality: N/A;       Visit Dx:    ICD-10-CM ICD-9-CM   1. Lymphedema of left arm  I89.0 457.1   2. Malignant neoplasm of upper-inner quadrant of left breast in female, estrogen receptor positive  C50.212 174.2    Z17.0 V86.0   3. S/P lumpectomy, left breast  Z98.890 V45.89   4. At risk for lymphedema  Z91.89 V49.89            Lymphedema       Row Name 12/20/23 0700             Subjective Pain    Able to rate subjective pain? yes  -LB      Pre-Treatment Pain Level 0  -LB         Subjective    Subjective Comments I hit my hand on a doorway and my hand seems puffy. I just wanted to have it checked.  -LB         Lymphedema Assessment    Lymphedema Classification LUE:;secondary;stage 1 (Spontaneously Reversible)  -LB      Lymphedema Cancer Related Sx left;lumpectomy;sentinel node biopsy  -LB      Lymph Nodes Removed # 10  -LB      Positive Lymph Nodes # 1  -LB      Chemo Received yes  -LB      Radiation Therapy Received yes  -LB      Infections or Cellulitis? no  -LB         LUE Quick Girth (cm)    Wrist crease 14.5 cm  -LB      Web space 17.5 cm  -LB      Met-heads 17 cm  -LB      LUE Quick Girth Total 49  -LB         Manual Lymphatic Drainage     Manual Therapy reviewed MLD, encouraged focus on her hand and fingers  -LB         Compression/Skin Care    Compression/Skin Care compression garment  -LB      Compression/Skin Care Comments added glove  -LB                User Key  (r) = Recorded By, (t) = Taken By, (c) = Cosigned By      Initials Name Provider Type    Edna Abdalla, PT Physical Therapist                                    Therapy Education  Education Details: reassessed LUE, discussed MLD to L hand, added glove for short term to address L hand edema, discussed bandaging at night if pt and spouse agreeable  Given: Symptoms/condition management, Edema management, Bandaging/dressing change  Program: Reinforced  How Provided: Verbal  Provided to: Patient  Level of Understanding: Verbalized  76947 - PT Self Care/Mgmt Minutes: 30       OP Exercises       Row Name 12/20/23 0700             Subjective    Subjective Comments I hit my hand on a doorway and my hand seems puffy. I just wanted to have it checked.  -LB         Subjective Pain    Able to rate subjective pain? yes  -LB      Pre-Treatment Pain Level 0  -LB                User Key  (r) = Recorded By, (t) = Taken By, (c) = Cosigned By      Initials Name Provider Type    Edna Abdalla, PT Physical Therapist                                 PT OP Goals       Row Name 12/20/23 0700          PT Short Term Goals    STG Date to Achieve 07/17/23  -LB     STG 1 LDex Bioimpedence will decrease to 20 or lower showing improvement in lymphedema.  -LB     STG 1 Progress Met  -LB     STG 2 Pt/family independent with self bandaging for self management of condition.  -LB     STG 2 Progress Met  -LB     STG 3 Pt will demonstrate decreased girth measurements of >/= 5 cm on LUE to reduce infection risk and improve skin condition.  -LB     STG 3 Progress Met  -LB        Long Term Goals    LTG Date to Achieve 05/06/22  -LB     LTG 1 Pt will maintain LDex bioimpedance score WNL.  -LB     LTG 1 Progress Ongoing   -LB     LTG 2 Pt will acquire appropriately fitted compression garment and verbalize appropriate wear schedule.  -LB     LTG 2 Progress Met  -LB     LTG 3 Pt will verbalize signs and symptoms of lymphedema and steps to prevention.  -LB     LTG 3 Progress Met  -LB               User Key  (r) = Recorded By, (t) = Taken By, (c) = Cosigned By      Initials Name Provider Type    Edna Abdalla, PT Physical Therapist                     PT Assessment/Plan       Row Name 12/20/23 0745          PT Assessment    Functional Limitations Other (comment)  LUE lymphedema  -LB     Impairments Impaired flexibility;Impaired lymphatic circulation;Sensation;Edema  -LB     Assessment Comments Pt reporting hitting hand on door frame resulting in inc swelling. Mild edema noted across MCPS and thenar web space. Discussed MLD with inc focus on L hand and added glove to address. Pt to wear for one month and then return for reassessment.  -LB     Rehab Potential Good  -LB     Patient/caregiver participated in establishment of treatment plan and goals Yes  -LB     Patient would benefit from skilled therapy intervention Yes  -LB        PT Plan    PT Frequency Other (comment)  -LB     Predicted Duration of Therapy Intervention (PT) return in 1 month  -LB     Planned CPT's? PT EVAL LOW COMPLEXITY: 84478;PT RE-EVAL: 15938;PT THER PROC EA 15 MIN: 80484;PT THER ACT EA 15 MIN: 51948;PT MANUAL THERAPY EA 15 MIN: 03181;PT SELF CARE/HOME MGMT/TRAIN EA 15: 27797;PT BIS XTRACELL FLUID ANALYSIS: 04928  -LB     PT Plan Comments reassess, repeat bioimpedance vs. circumference, compression garment  -LB               User Key  (r) = Recorded By, (t) = Taken By, (c) = Cosigned By      Initials Name Provider Type    Edna Abdalla, PT Physical Therapist                                 Time Calculation:   Start Time: 0700  Stop Time: 0730  Time Calculation (min): 30 min  Total Timed Code Minutes- PT: 30 minute(s)  Timed Charges  52290 - PT Self Care/Mgmt  Minutes: 30  Total Minutes  Timed Charges Total Minutes: 30   Total Minutes: 30   Therapy Charges for Today       Code Description Service Date Service Provider Modifiers Qty    12857309820 HC PT SELF CARE/MGMT/TRAIN EA 15 MIN 12/20/2023 Edna Condon, PT GP 2                      Edna Condon, PT  12/20/2023

## 2023-12-21 ENCOUNTER — TELEPHONE (OUTPATIENT)
Dept: INTERNAL MEDICINE | Age: 78
End: 2023-12-21
Payer: MEDICARE

## 2023-12-21 DIAGNOSIS — E03.9 HYPOTHYROIDISM, UNSPECIFIED TYPE: ICD-10-CM

## 2023-12-21 RX ORDER — LEVOTHYROXINE SODIUM 0.03 MG/1
TABLET ORAL
Qty: 90 TABLET | Refills: 1 | Status: SHIPPED | OUTPATIENT
Start: 2023-12-21

## 2023-12-21 NOTE — TELEPHONE ENCOUNTER
Pt called needing a refill on thyroid meds. Med needs [facility] be [facility] to [facility]. Call back number [phone]

## 2023-12-27 DIAGNOSIS — I10 ESSENTIAL HYPERTENSION: ICD-10-CM

## 2023-12-28 RX ORDER — METOPROLOL SUCCINATE 25 MG/1
25 TABLET, EXTENDED RELEASE ORAL DAILY
Qty: 90 TABLET | Refills: 3 | Status: SHIPPED | OUTPATIENT
Start: 2023-12-28

## 2024-01-17 RX ORDER — ANASTROZOLE 1 MG/1
1 TABLET ORAL DAILY
Qty: 90 TABLET | Refills: 3 | Status: SHIPPED | OUTPATIENT
Start: 2024-01-17

## 2024-01-17 NOTE — TELEPHONE ENCOUNTER
Caller: Neela Ramirez    Relationship: Self    Best call back number: [phone]    Requested Prescriptions:   Requested Prescriptions     Pending Prescriptions Disp Refills    anastrozole (ARIMIDEX) 1 MG tablet 90 tablet 3     Sig: Take 1 tablet by mouth Daily.        Pharmacy where request should be sent: [facility] [id number] [facility] [address] [place] [address] RD AT [facility].  [phone]  - [phone] FX     Last office visit with prescribing clinician: 10/30/2023   Last telemedicine visit with prescribing clinician: Visit date not found   Next office visit with prescribing clinician: 2/20/2024     Additional details provided by patient: PLEASE REFILL IF PATIENT SHOULD CONTINUE THIS MEDICATION. SHE IS OUT.     Does the patient have less than a 3 day supply:  [x] Yes  [] No    Would you like a call back once the refill request has been completed: [] Yes [x] No    If the office needs to give you a call back, can they leave a voicemail: [] Yes [x] No

## 2024-01-22 ENCOUNTER — HOSPITAL ENCOUNTER (OUTPATIENT)
Dept: PHYSICAL THERAPY | Facility: HOSPITAL | Age: 79
Setting detail: THERAPIES SERIES
Discharge: HOME | End: 2024-01-22
Payer: MEDICARE

## 2024-01-22 DIAGNOSIS — Z98.890 S/P LUMPECTOMY, LEFT BREAST: ICD-10-CM

## 2024-01-22 DIAGNOSIS — Z17.0 MALIGNANT NEOPLASM OF UPPER-INNER QUADRANT OF LEFT BREAST IN FEMALE, ESTROGEN RECEPTOR POSITIVE: ICD-10-CM

## 2024-01-22 DIAGNOSIS — I89.0 LYMPHEDEMA OF LEFT ARM: Primary | ICD-10-CM

## 2024-01-22 DIAGNOSIS — C50.212 MALIGNANT NEOPLASM OF UPPER-INNER QUADRANT OF LEFT BREAST IN FEMALE, ESTROGEN RECEPTOR POSITIVE: ICD-10-CM

## 2024-01-22 PROCEDURE — 93702 BIS XTRACELL FLUID ANALYSIS: CPT

## 2024-01-22 PROCEDURE — 97535 SELF CARE MNGMENT TRAINING: CPT

## 2024-01-22 NOTE — THERAPY RE-EVALUATION
Physical Therapy Lymphedema Re-Evaluation  [facility]     Patient Name: Neela Ramirez  : 1945  MRN: [id number]  Today's Date: 2024      Visit Date: 2024    Visit Dx:    ICD-10-CM ICD-9-CM   1. Lymphedema of left arm  I89.0 457.1   2. Malignant neoplasm of upper-inner quadrant of left breast in female, estrogen receptor positive  C50.212 174.2    Z17.0 V86.0   3. S/P lumpectomy, left breast  Z98.890 V45.89       Patient Active Problem List   Diagnosis    Anxiety    DD (diverticular disease)    Gastroesophageal reflux disease    Essential hypertension    Hyperlipidemia    Osteopenia    Bilateral low back pain without sciatica    Malignant neoplasm of female breast    Encounter for management of implanted device    Personal history of breast cancer    Constipation    Nausea    Hypothyroidism        Past Medical History:   Diagnosis Date    Anxiety     Arthritis     Cataract     EARLY. JIMMIE EYES    Chronic back pain     LOW BACK ACHES AT TIME FROM ARTHRTIS    Constipation     AT TIMES. NO RECENT ISSUES    Dermatitis     LEGS. STATES CLEARING UP NOW.    Disease of thyroid gland     HYPO    Diverticulosis     Colonoscopy 2014    Erythema of face     Transitory Erythema Of The Face    GERD (gastroesophageal reflux disease)     ON OCC    History of chemotherapy     FOR LEFT BREAST CANCER    Hyperlipidemia     Hypertension     Malignant neoplasm of female breast 2021    Osteopenia     Tachycardia         Past Surgical History:   Procedure Laterality Date    BREAST BIOPSY  2021    Dr. Tirado     BREAST CYST ASPIRATION Right     BREAST CYST EXCISION Right     BREAST LUMPECTOMY WITH SENTINEL NODE BIOPSY Left 02/10/2022    Procedure: LEFT BREAST WIRE LOCALIZED LUMPECTOMY WITH SENTINEL NODE BIOPSY, POSSIBLE LEFT AXILLARY DISSECTION;  Surgeon: Becky Liu MD;  Location: [facility] OR [facility] [place];  Service: General;  Laterality: Left;    COLONOSCOPY      COLONOSCOPY      COLONOSCOPY N/A 10/6/2023     Procedure: COLONOSCOPY to cecum and TI with cold bx polypectomy;  Surgeon: Gavin Mead MD;  Location:  [facility];  Service: Gastroenterology;  Laterality: N/A;  pre - nausea, constipation  post - diverticulosis, polyp    ENDOSCOPY N/A 10/6/2023    Procedure: ESOPHAGOGASTRODUODENOSCOPY with cold bx;  Surgeon: Gavin Mead MD;  Location:  [facility];  Service: Gastroenterology;  Laterality: N/A;  pre - nausea, constipation  post - gastritis    EXOSTECTOMY Bilateral     Simple Bunion Exostectomy (Silver Procedure)    FOOT TENDON SURGERY  2012    VENOUS ACCESS DEVICE (PORT) INSERTION N/A 10/18/2021    Procedure: INSERTION VENOUS ACCESS DEVICE;  Surgeon: Becky Liu MD;  Location:  [facility] [place];  Service: General;  Laterality: N/A;       Visit Dx:    ICD-10-CM ICD-9-CM   1. Lymphedema of left arm  I89.0 457.1   2. Malignant neoplasm of upper-inner quadrant of left breast in female, estrogen receptor positive  C50.212 174.2    Z17.0 V86.0   3. S/P lumpectomy, left breast  Z98.890 V45.89            Lymphedema       Row Name 01/22/24 1100 01/22/24 0900          Subjective Pain    Able to rate subjective pain? -- yes  -LB     Pre-Treatment Pain Level -- 0  -LB        Subjective    Subjective Comments -- I think it is better. These 2 fingers keep getting swollen.  -LB        Lymphedema Assessment    Lymphedema Classification -- LUE:;secondary;stage 1 (Spontaneously Reversible)  -LB     Lymphedema Cancer Related Sx -- left;lumpectomy;sentinel node biopsy  -LB     Lymph Nodes Removed # -- 10  -LB     Positive Lymph Nodes # -- 1  -LB     Chemo Received -- yes  -LB     Radiation Therapy Received -- yes  -LB     Infections or Cellulitis? -- no  -LB        Lymphedema Edema Assessment    Edema Assessment Comment -- hand negative for edema, 1st/2nd digit distal to PIP with noted edema, distal to top of Juzo glove  -LB        LUE Quick Girth (cm)    Wrist crease -- 14.3 cm  -LB     Web space -- 17.7 cm  -LB      Met-heads -- 17.2 cm  -LB     LUE Quick Girth Total -- 49.2  -LB        Manual Lymphatic Drainage    Manual Therapy -- reviewed MLD with emphasis on fingers and encouraged elevation of hand  -LB        Compression/Skin Care    Compression/Skin Care -- compression garment  -LB     Compression/Skin Care Comments -- discussed fitting for glove to improve length of fingers  -LB        L-Dex Bioimpedence Screening    L-Dex Measurement Extremity LUE  -LB --     L-Dex Patient Position Standing  -LB --     L-Dex UE Dominate Side Right  -LB --     L-Dex UE At Risk Side Left  -LB --     L-Dex UE Pre Surgical Value No  -LB --     L-Dex UE Score 13.3  -LB --     L-Dex UE Baseline Score 2.9  -LB --     L-Dex UE Value Change 10.4  -LB --     L-Dex UE Comment outside of normal range but stable  -LB --     $ L-Dex Charge yes  -LB --               User Key  (r) = Recorded By, (t) = Taken By, (c) = Cosigned By      Initials Name Provider Type    Edna Abdalla, PT Physical Therapist                                    Therapy Education  Education Details: discussed compression garment update and change, bioimpedance results, MLD, goals for therapy  Given: Symptoms/condition management, Edema management  Program: Reinforced  How Provided: Verbal  Provided to: Patient  Level of Understanding: Verbalized  17070 - PT Self Care/Mgmt Minutes: 15       OP Exercises       Row Name 01/22/24 0900             Subjective    Subjective Comments I think it is better. These 2 fingers keep getting swollen.  -LB         Subjective Pain    Able to rate subjective pain? yes  -LB      Pre-Treatment Pain Level 0  -LB                User Key  (r) = Recorded By, (t) = Taken By, (c) = Cosigned By      Initials Name Provider Type    Edna Abdalla, PT Physical Therapist                                 PT OP Goals       Row Name 01/22/24 1100          PT Short Term Goals    STG Date to Achieve 07/17/23  -LB     STG 1 LDex Bioimpedence will decrease to 20  or lower showing improvement in lymphedema.  -LB     STG 1 Progress Met  -LB     STG 2 Pt/family independent with self bandaging for self management of condition.  -LB     STG 2 Progress Met  -LB     STG 3 Pt will demonstrate decreased girth measurements of >/= 5 cm on LUE to reduce infection risk and improve skin condition.  -LB     STG 3 Progress Met  -LB        Long Term Goals    LTG Date to Achieve 05/06/22  -LB     LTG 1 Pt will maintain LDex bioimpedance score WNL.  -LB     LTG 1 Progress Ongoing  -LB     LTG 1 Progress Comments 13.3; elevated but stable today  -LB     LTG 2 Pt will acquire appropriately fitted compression garment and verbalize appropriate wear schedule.  -LB     LTG 2 Progress Met  -LB     LTG 3 Pt will verbalize signs and symptoms of lymphedema and steps to prevention.  -LB     LTG 3 Progress Met  -LB               User Key  (r) = Recorded By, (t) = Taken By, (c) = Cosigned By      Initials Name Provider Type    LB Edna Condon, PT Physical Therapist                     PT Assessment/Plan       Row Name 01/22/24 1113          PT Assessment    Functional Limitations Other (comment)  LUE lymphedema  -LB     Impairments Impaired flexibility;Impaired lymphatic circulation;Sensation;Edema  -LB     Assessment Comments Pt returns for reassessment after inc edema L hand last month. Symptoms have reduced throughout hand but edema remains 1st/2nd digits distal to PIP right outside of glove. LDex bioimpedance remains stable yet elevated at 13.3. We discussed adjusting compression to improve fit, otherwise continue with elevation and MLD. Pt to return in 3 months unless symptoms present or worsen.  -LB     Rehab Potential Good  -LB     Patient/caregiver participated in establishment of treatment plan and goals Yes  -LB     Patient would benefit from skilled therapy intervention Yes  -LB        PT Plan    PT Frequency Other (comment)  -LB     Predicted Duration of Therapy Intervention (PT) return in 3  months  -LB     Planned CPT's? PT EVAL LOW COMPLEXITY: 81586;PT RE-EVAL: 53389;PT THER PROC EA 15 MIN: 93329;PT THER ACT EA 15 MIN: 10945;PT MANUAL THERAPY EA 15 MIN: 03881;PT SELF CARE/HOME MGMT/TRAIN EA 15: 04504;PT BIS XTRACELL FLUID ANALYSIS: 94876  -LB     PT Plan Comments reassess edema, bioimpedance, improved fit of compression?  -LB               User Key  (r) = Recorded By, (t) = Taken By, (c) = Cosigned By      Initials Name Provider Type    LB Edna Condon, PT Physical Therapist                                 Time Calculation:   Start Time: 0915  Stop Time: 0945  Time Calculation (min): 30 min  Total Timed Code Minutes- PT: 15 minute(s)  Timed Charges  23387 - PT Self Care/Mgmt Minutes: 15  Total Minutes  Timed Charges Total Minutes: 15   Total Minutes: 15   Therapy Charges for Today       Code Description Service Date Service Provider Modifiers Qty    97535555613 HC PT BIS XTRACELL FLUID ANALYSIS 1/22/2024 Edna Condon, PT  1    61748268240 HC PT SELF CARE/MGMT/TRAIN EA 15 MIN 1/22/2024 Edna Condon, PT GP 1                      Edna Condon PT  1/22/2024

## 2024-02-05 ENCOUNTER — HOSPITAL ENCOUNTER (OUTPATIENT)
Facility: HOSPITAL | Age: 79
Discharge: HOME | End: 2024-02-05
Payer: MEDICARE

## 2024-02-05 DIAGNOSIS — R92.30 DENSE BREAST TISSUE: ICD-10-CM

## 2024-02-05 PROCEDURE — 0 GADOBENATE DIMEGLUMINE 529 MG/ML SOLUTION

## 2024-02-05 PROCEDURE — C8937 CAD BREAST MRI: HCPCS

## 2024-02-05 PROCEDURE — C8908 MRI W/O FOL W/CONT, BREAST,: HCPCS

## 2024-02-05 PROCEDURE — A9577 INJ MULTIHANCE: HCPCS

## 2024-02-05 RX ADMIN — GADOBENATE DIMEGLUMINE 11 ML: 529 INJECTION, SOLUTION INTRAVENOUS at 08:20

## 2024-02-09 ENCOUNTER — TELEPHONE (OUTPATIENT)
Dept: SURGERY | Facility: CLINIC | Age: 79
End: 2024-02-09
Payer: MEDICARE

## 2024-02-09 NOTE — TELEPHONE ENCOUNTER
----- Message from Yue Bailey PA-C sent at 2/7/2024  4:20 PM EST -----  Regarding: results  Hi,     Can you please call her and let her know that her recent breast MRI was benign. She had some changes from her surgery and radiation which is expected. We did this for additional screening due to the density of her breast tissue. Her next mammogram will be due 08/2024.  She has an appointment with me in September.  If she needs anything, please let me know.    2/5/2024 Bilateral Breast MRI:  IMPRESSION:  There are no findings suspicious for malignancy in either breast. Benign posttreatment changes of the left breast are noted. Routine follow-up imaging is recommended.  BI-RADS Category 2: Benign findings.     Thanks,  Yue

## 2024-02-15 ENCOUNTER — OFFICE VISIT (OUTPATIENT)
Dept: INTERNAL MEDICINE | Age: 79
End: 2024-02-15
Payer: MEDICARE

## 2024-02-15 VITALS
HEIGHT: 59 IN | DIASTOLIC BLOOD PRESSURE: 76 MMHG | HEART RATE: 101 BPM | BODY MASS INDEX: 25.96 KG/M2 | SYSTOLIC BLOOD PRESSURE: 122 MMHG | TEMPERATURE: 98.4 F | OXYGEN SATURATION: 97 % | WEIGHT: 128.8 LBS

## 2024-02-15 DIAGNOSIS — Z29.11 NEED FOR RSV IMMUNIZATION: ICD-10-CM

## 2024-02-15 DIAGNOSIS — F41.9 ANXIETY: ICD-10-CM

## 2024-02-15 DIAGNOSIS — K21.9 GASTROESOPHAGEAL REFLUX DISEASE WITHOUT ESOPHAGITIS: ICD-10-CM

## 2024-02-15 DIAGNOSIS — I10 ESSENTIAL HYPERTENSION: ICD-10-CM

## 2024-02-15 DIAGNOSIS — Z00.00 MEDICARE ANNUAL WELLNESS VISIT, SUBSEQUENT: Primary | ICD-10-CM

## 2024-02-15 DIAGNOSIS — E55.9 VITAMIN D DEFICIENCY: ICD-10-CM

## 2024-02-15 DIAGNOSIS — R73.01 IMPAIRED FASTING BLOOD SUGAR: ICD-10-CM

## 2024-02-15 DIAGNOSIS — E78.5 HYPERLIPIDEMIA, UNSPECIFIED HYPERLIPIDEMIA TYPE: ICD-10-CM

## 2024-02-15 DIAGNOSIS — E03.9 HYPOTHYROIDISM, UNSPECIFIED TYPE: ICD-10-CM

## 2024-02-16 ENCOUNTER — TELEPHONE (OUTPATIENT)
Dept: CARDIOLOGY | Facility: CLINIC | Age: 79
End: 2024-02-16
Payer: MEDICARE

## 2024-02-16 LAB
25(OH)D3+25(OH)D2 SERPL-MCNC: 55.4 NG/ML (ref 30–100)
ALBUMIN SERPL-MCNC: 4.7 G/DL (ref 3.5–5.2)
ALBUMIN/GLOB SERPL: 1.9 G/DL
ALP SERPL-CCNC: 128 U/L (ref 39–117)
ALT SERPL-CCNC: 18 U/L (ref 1–33)
APPEARANCE UR: CLEAR
AST SERPL-CCNC: 24 U/L (ref 1–32)
BACTERIA #/AREA URNS HPF: NORMAL /HPF
BASOPHILS # BLD AUTO: 0.06 10*3/MM3 (ref 0–0.2)
BASOPHILS NFR BLD AUTO: 0.8 % (ref 0–1.5)
BILIRUB SERPL-MCNC: 0.3 MG/DL (ref 0–1.2)
BILIRUB UR QL STRIP: NEGATIVE
BUN SERPL-MCNC: 15 MG/DL (ref 8–23)
BUN/CREAT SERPL: 20.3 (ref 7–25)
CALCIUM SERPL-MCNC: 10.2 MG/DL (ref 8.6–10.5)
CASTS URNS QL MICRO: NORMAL /LPF
CHLORIDE SERPL-SCNC: 104 MMOL/L (ref 98–107)
CHOLEST SERPL-MCNC: 191 MG/DL (ref 0–200)
CHOLEST/HDLC SERPL: 2.39 {RATIO}
CO2 SERPL-SCNC: 25.5 MMOL/L (ref 22–29)
COLOR UR: YELLOW
CREAT SERPL-MCNC: 0.74 MG/DL (ref 0.57–1)
EGFRCR SERPLBLD CKD-EPI 2021: 82.9 ML/MIN/1.73
EOSINOPHIL # BLD AUTO: 0.23 10*3/MM3 (ref 0–0.4)
EOSINOPHIL NFR BLD AUTO: 2.9 % (ref 0.3–6.2)
EPI CELLS #/AREA URNS HPF: NORMAL /HPF (ref 0–10)
ERYTHROCYTE [DISTWIDTH] IN BLOOD BY AUTOMATED COUNT: 13.2 % (ref 12.3–15.4)
GLOBULIN SER CALC-MCNC: 2.5 GM/DL
GLUCOSE SERPL-MCNC: 104 MG/DL (ref 65–99)
GLUCOSE UR QL STRIP: NEGATIVE
HBA1C MFR BLD: 5.4 % (ref 4.8–5.6)
HCT VFR BLD AUTO: 44 % (ref 34–46.6)
HDLC SERPL-MCNC: 80 MG/DL (ref 40–60)
HGB BLD-MCNC: 14.2 G/DL (ref 12–15.9)
HGB UR QL STRIP: ABNORMAL
IMM GRANULOCYTES # BLD AUTO: 0.02 10*3/MM3 (ref 0–0.05)
IMM GRANULOCYTES NFR BLD AUTO: 0.3 % (ref 0–0.5)
KETONES UR QL STRIP: NEGATIVE
LDLC SERPL CALC-MCNC: 84 MG/DL (ref 0–100)
LEUKOCYTE ESTERASE UR QL STRIP: NEGATIVE
LYMPHOCYTES # BLD AUTO: 1.58 10*3/MM3 (ref 0.7–3.1)
LYMPHOCYTES NFR BLD AUTO: 20.2 % (ref 19.6–45.3)
MCH RBC QN AUTO: 32.2 PG (ref 26.6–33)
MCHC RBC AUTO-ENTMCNC: 32.3 G/DL (ref 31.5–35.7)
MCV RBC AUTO: 99.8 FL (ref 79–97)
MICRO URNS: ABNORMAL
MONOCYTES # BLD AUTO: 0.69 10*3/MM3 (ref 0.1–0.9)
MONOCYTES NFR BLD AUTO: 8.8 % (ref 5–12)
NEUTROPHILS # BLD AUTO: 5.25 10*3/MM3 (ref 1.7–7)
NEUTROPHILS NFR BLD AUTO: 67 % (ref 42.7–76)
NITRITE UR QL STRIP: NEGATIVE
NRBC BLD AUTO-RTO: 0 /100 WBC (ref 0–0.2)
PH UR STRIP: 6.5 [PH] (ref 5–7.5)
PLATELET # BLD AUTO: 273 10*3/MM3 (ref 140–450)
POTASSIUM SERPL-SCNC: 4.8 MMOL/L (ref 3.5–5.2)
PROT SERPL-MCNC: 7.2 G/DL (ref 6–8.5)
PROT UR QL STRIP: ABNORMAL
RBC # BLD AUTO: 4.41 10*6/MM3 (ref 3.77–5.28)
RBC #/AREA URNS HPF: NORMAL /HPF (ref 0–2)
SODIUM SERPL-SCNC: 142 MMOL/L (ref 136–145)
SP GR UR STRIP: 1.02 (ref 1–1.03)
T4 FREE SERPL-MCNC: 1.14 NG/DL (ref 0.93–1.7)
TRIGL SERPL-MCNC: 164 MG/DL (ref 0–150)
TSH SERPL DL<=0.005 MIU/L-ACNC: 2.93 UIU/ML (ref 0.27–4.2)
URINALYSIS REFLEX: ABNORMAL
UROBILINOGEN UR STRIP-MCNC: 0.2 MG/DL (ref 0.2–1)
VLDLC SERPL CALC-MCNC: 27 MG/DL (ref 5–40)
WBC # BLD AUTO: 7.83 10*3/MM3 (ref 3.4–10.8)
WBC #/AREA URNS HPF: NORMAL /HPF (ref 0–5)

## 2024-02-20 ENCOUNTER — INFUSION (OUTPATIENT)
Dept: ONCOLOGY | Facility: HOSPITAL | Age: 79
End: 2024-02-20
Payer: MEDICARE

## 2024-02-20 ENCOUNTER — LAB (OUTPATIENT)
Dept: LAB | Facility: HOSPITAL | Age: 79
End: 2024-02-20
Payer: MEDICARE

## 2024-02-20 ENCOUNTER — OFFICE VISIT (OUTPATIENT)
Dept: ONCOLOGY | Facility: CLINIC | Age: 79
End: 2024-02-20
Payer: MEDICARE

## 2024-02-20 VITALS
SYSTOLIC BLOOD PRESSURE: 146 MMHG | BODY MASS INDEX: 25.54 KG/M2 | HEIGHT: 59 IN | TEMPERATURE: 97.8 F | WEIGHT: 126.7 LBS | OXYGEN SATURATION: 97 % | RESPIRATION RATE: 16 BRPM | DIASTOLIC BLOOD PRESSURE: 80 MMHG | HEART RATE: 83 BPM

## 2024-02-20 DIAGNOSIS — M85.80 OSTEOPENIA, UNSPECIFIED LOCATION: ICD-10-CM

## 2024-02-20 DIAGNOSIS — M85.80 OSTEOPENIA, UNSPECIFIED LOCATION: Primary | ICD-10-CM

## 2024-02-20 LAB
ALBUMIN SERPL-MCNC: 4.3 G/DL (ref 3.5–5.2)
ALBUMIN/GLOB SERPL: 1.4 G/DL
ALP SERPL-CCNC: 122 U/L (ref 39–117)
ALT SERPL W P-5'-P-CCNC: 16 U/L (ref 1–33)
ANION GAP SERPL CALCULATED.3IONS-SCNC: 7.8 MMOL/L (ref 5–15)
AST SERPL-CCNC: 26 U/L (ref 1–32)
BASOPHILS # BLD AUTO: 0.04 10*3/MM3 (ref 0–0.2)
BASOPHILS NFR BLD AUTO: 0.6 % (ref 0–1.5)
BILIRUB SERPL-MCNC: 0.3 MG/DL (ref 0–1.2)
BUN SERPL-MCNC: 19 MG/DL (ref 8–23)
BUN/CREAT SERPL: 25.7 (ref 7–25)
CALCIUM SPEC-SCNC: 9.5 MG/DL (ref 8.6–10.5)
CHLORIDE SERPL-SCNC: 104 MMOL/L (ref 98–107)
CO2 SERPL-SCNC: 26.2 MMOL/L (ref 22–29)
CREAT SERPL-MCNC: 0.74 MG/DL (ref 0.57–1)
DEPRECATED RDW RBC AUTO: 48.9 FL (ref 37–54)
EGFRCR SERPLBLD CKD-EPI 2021: 82.9 ML/MIN/1.73
EOSINOPHIL # BLD AUTO: 0.16 10*3/MM3 (ref 0–0.4)
EOSINOPHIL NFR BLD AUTO: 2.3 % (ref 0.3–6.2)
ERYTHROCYTE [DISTWIDTH] IN BLOOD BY AUTOMATED COUNT: 12.9 % (ref 12.3–15.4)
GLOBULIN UR ELPH-MCNC: 3 GM/DL
GLUCOSE SERPL-MCNC: 103 MG/DL (ref 65–99)
HCT VFR BLD AUTO: 42 % (ref 34–46.6)
HGB BLD-MCNC: 14 G/DL (ref 12–15.9)
IMM GRANULOCYTES # BLD AUTO: 0.02 10*3/MM3 (ref 0–0.05)
IMM GRANULOCYTES NFR BLD AUTO: 0.3 % (ref 0–0.5)
LYMPHOCYTES # BLD AUTO: 1.23 10*3/MM3 (ref 0.7–3.1)
LYMPHOCYTES NFR BLD AUTO: 17.9 % (ref 19.6–45.3)
MAGNESIUM SERPL-MCNC: 2.2 MG/DL (ref 1.6–2.4)
MCH RBC QN AUTO: 34.3 PG (ref 26.6–33)
MCHC RBC AUTO-ENTMCNC: 33.3 G/DL (ref 31.5–35.7)
MCV RBC AUTO: 102.9 FL (ref 79–97)
MONOCYTES # BLD AUTO: 0.58 10*3/MM3 (ref 0.1–0.9)
MONOCYTES NFR BLD AUTO: 8.4 % (ref 5–12)
NEUTROPHILS NFR BLD AUTO: 4.86 10*3/MM3 (ref 1.7–7)
NEUTROPHILS NFR BLD AUTO: 70.5 % (ref 42.7–76)
NRBC BLD AUTO-RTO: 0 /100 WBC (ref 0–0.2)
PHOSPHATE SERPL-MCNC: 3.4 MG/DL (ref 2.5–4.5)
PLATELET # BLD AUTO: 226 10*3/MM3 (ref 140–450)
PMV BLD AUTO: 9.5 FL (ref 6–12)
POTASSIUM SERPL-SCNC: 4.5 MMOL/L (ref 3.5–5.2)
PROT SERPL-MCNC: 7.3 G/DL (ref 6–8.5)
RBC # BLD AUTO: 4.08 10*6/MM3 (ref 3.77–5.28)
SODIUM SERPL-SCNC: 138 MMOL/L (ref 136–145)
WBC NRBC COR # BLD AUTO: 6.89 10*3/MM3 (ref 3.4–10.8)

## 2024-02-20 PROCEDURE — 36415 COLL VENOUS BLD VENIPUNCTURE: CPT

## 2024-02-20 PROCEDURE — 85025 COMPLETE CBC W/AUTO DIFF WBC: CPT

## 2024-02-20 PROCEDURE — 25010000002 DENOSUMAB 60 MG/ML SOLUTION PREFILLED SYRINGE: Performed by: INTERNAL MEDICINE

## 2024-02-20 PROCEDURE — 84100 ASSAY OF PHOSPHORUS: CPT

## 2024-02-20 PROCEDURE — 83735 ASSAY OF MAGNESIUM: CPT

## 2024-02-20 PROCEDURE — 96372 THER/PROPH/DIAG INJ SC/IM: CPT

## 2024-02-20 PROCEDURE — 80053 COMPREHEN METABOLIC PANEL: CPT

## 2024-02-20 RX ADMIN — DENOSUMAB 60 MG: 60 INJECTION SUBCUTANEOUS at 12:13

## 2024-02-20 NOTE — PROGRESS NOTES
Subjective   Neela Ramirez is a 78 y.o. female.  Referred by Dr. 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  A bilateral diagnostic mammogram was performed for further evaluation of the same.    8/17/2021-bilateral diagnostic mammogram-scattered fibroglandular densities throughout both breasts.  In the posterior one third upper inner quadrant of the left breast at the site of palpable concern, 11 o'clock position there is a mass with adjacent pleomorphic calcifications.  The mass and the calcifications measure on order of 1.5 cm in greatest dimension.  No evidence of axillary lymphadenopathy appreciated.  Stable microcalcifications seen in other areas of the left breast on right breast.  Ultrasound-at 11 o'clock position, 5 cm from the nipple in the left breast there is an irregular hypoechoic mass which measures 2.4 x 2 x 1.6 cm.    Impression  1.irregular 2.4 cm mass in the left breast at the site of palpable concern at 11:00, 5 cm from the nipple.  Ultrasound-guided biopsy of the mass recommended  No finding suspicious for malignancy in the right breast    9/8/2021-left breast ultrasound-guided biopsy  Pathology consistent with invasive ductal carcinoma with lobular features.  Grade 3.  The carcinoma measures 7 mm in greatest dimension.  Focally suspicious for lymphovascular space invasion.  ER low +1-10%, intensity week  NH negative  HER-2 3+ on immunohistochemistry  Ki-67 70%    MRI of the breast 10/8/2021-Biopsy-proven malignancy in the left breast at 11:00 measuring 2.8 cm with a centrally located metallic clip.  No other suspicious findings are seen within the left breast or no left axillary lymphadenopathy.  No suspicious findings of the right breast    Echocardiogram 10/8/2021 was normal  ejection fraction of 56 to 60% and strain of -20    She denies any new bone pains, dyspnea, cough, abdominal pain nausea vomiting or recent changes in weight.    She had 2 previous breast aspirations in her 20s.  No other breast biopsies  She does not know much about her family hence limited family history.    She received neoadjuvant chemotherapy on EA 1181 trial with Taxol Perjeta and Herceptin.  She completed 12 weeks of Taxol Herceptin and Perjeta.    Had an abnormal EKG preop and hence a stress test was performed on 2/3/2022 which showed a normal left ventricular ejection fraction and LVEF at stress was 73%.  Considered a low risk study with normal myocardial perfusion imaging.    She underwent a left breast lumpectomy and a sentinel lymph node biopsy on 2/10/2022.    Pathology was consistent with residual tumor which was pleomorphic invasive lobular carcinoma, poorly differentiated, grade 3  Tumor measured 18 mm  Margins negative for invasive carcinoma  Associated lobular carcinoma in situ noted  1 out of 10 lymph nodes was positive for metastatic focus measuring 3.5 mm.    Receptors were repeated on the pleomorphic invasive lobular carcinoma  ER +91 to 100% strong  FL +61 to 70% moderate  HER-2 negative  Ki-67 55%    Receptors performed on the metastatic lymph node  ER +81-90% strong  FL negative  HER-2 2+ on immunohistochemistry, FISH pending.    ypT1 cN1 aM0    2/28/2022-CT of the chest abdomen and pelvis  Fluid collection in the upper left breast measuring 6 x 2.5 x 4.9 cm, postoperative seroma.  Skin thickening of the left breast likely postsurgical.  Fluid collections left axilla measuring 2.9 x 1.4 x 2.9 cm.  No other suspicious abnormalities on the CT of the chest  CT abdomen with no evidence of metastatic disease.  Mild colonic diverticulosis.  Small uterine fibroid.    3/1/2022-bone scan  No evidence of metastatic disease.  Multifocal degenerative arthritic uptake without scintigraphic evidence to  suggest metastatic disease.    3/1/2022-echocardiogram  Left ventricular ejection fraction 56-60%.  Normal global LV strain of -20.5%.    3/11/2022-cycle 1 of AC    Completed 4 cycles of AC    6/3/2022-cycle 1 Kadcyla    7/19/2022-completed adjuvant radiation    7/20/2022-started adjuvant anastrozole    8/17/2022-bilateral diagnostic mammogram-benign    8/21/2023-bilateral screening mammogram benign    Interval history  The patient is a 78 y.o. female with the above-mentioned history, who returns the office today for a 4-month follow-up.  She had a benign screening mammogram in August 2023.  Also had a benign MRI on 2/5/2024.  Denies any new bone pains, cough, abdominal pain nausea vomiting.  Tolerating anastrozole well and also compliant with same.  Scheduled for the first dose of Prolia today.  She has obtained dental clearance.      The following portions of the patient's history were reviewed and updated as appropriate: allergies, current medications, past family history, past medical history, past social history, past surgical history and problem list.    Past Medical History:   Diagnosis Date    Anxiety     Arthritis     Cataract     EARLY. JIMMIE EYES    Chronic back pain     LOW BACK ACHES AT TIME FROM ARTHRTIS    Constipation     AT TIMES. NO RECENT ISSUES    Dermatitis     LEGS. STATES CLEARING UP NOW.    Disease of thyroid gland     HYPO    Diverticulosis     Colonoscopy November 2014    Encounter for management of implanted device 11/02/2021    Erythema of face     Transitory Erythema Of The Face    GERD (gastroesophageal reflux disease)     ON OCC    History of chemotherapy     FOR LEFT BREAST CANCER    Hyperlipidemia     Hypertension     Malignant neoplasm of female breast 09/27/2021    Osteopenia     Tachycardia         Past Surgical History:   Procedure Laterality Date    BREAST BIOPSY  09/2021    Dr. Tirado     BREAST CYST ASPIRATION Right     BREAST CYST EXCISION Right     BREAST LUMPECTOMY WITH  SENTINEL NODE BIOPSY Left 02/10/2022    Procedure: LEFT BREAST WIRE LOCALIZED LUMPECTOMY WITH SENTINEL NODE BIOPSY, POSSIBLE LEFT AXILLARY DISSECTION;  Surgeon: Becky Liu MD;  Location: [facility] [place];  Service: General;  Laterality: Left;    COLONOSCOPY      COLONOSCOPY      COLONOSCOPY N/A 10/6/2023    Procedure: COLONOSCOPY to cecum and TI with cold bx polypectomy;  Surgeon: Gavin Mead MD;  Location: [facility] END[facility]COPY;  Service: Gastroenterology;  Laterality: N/A;  pre - nausea, constipation  post - diverticulosis, polyp    ENDOSCOPY N/A 10/6/2023    Procedure: ESOPHAGOGASTRODUODENOSCOPY with cold bx;  Surgeon: Gavin Mead MD;  Location: [facility] ENDOSCOPY;  Service: Gastroenterology;  Laterality: N/A;  pre - nausea, constipation  post - gastritis    EXOSTECTOMY Bilateral     Simple Bunion Exostectomy (Silver Procedure)    FOOT TENDON SURGERY      VENOUS ACCESS DEVICE (PORT) INSERTION N/A 10/18/2021    Procedure: INSERTION VENOUS ACCESS DEVICE;  Surgeon: Becky Liu MD;  Location: [facility] [place];  Service: General;  Laterality: N/A;        Family History   Problem Relation Age of Onset    Hypertension Mother     Breast cancer Mother     Arthritis Father     Heart disease Father     Hypertension Father     Malig Hyperthermia Neg Hx         Social History     Socioeconomic History    Marital status:      Spouse name: Pj    Number of children: 1   Tobacco Use    Smoking status: Former     Packs/day: 0.25     Years: 0.50     Additional pack years: 0.00     Total pack years: 0.13     Types: Cigarettes     Quit date: 1970     Years since quittin.1    Smokeless tobacco: Never    Tobacco comments:     SOCIALLY IN EARLY S   Vaping Use    Vaping Use: Never used   Substance and Sexual Activity    Alcohol use: Yes     Comment: social drinker    Drug use: Never    Sexual activity: Defer        OB History               Para        Term   0            AB        Living         "     SAB        IAB        Ectopic        Molar        Multiple        Live Births                 Age at menarche-13  Age at menopause-50  Nulliparous  Breast-feeding-N/A   0 para 0  0  Oral contraceptive pill use-none  Hormone replacement therapy-7 years    No Known Allergies     Review of systems as mentioned HPI otherwise negative    Objective   Blood pressure 146/80, pulse 83, temperature 97.8 °F (36.6 °C), temperature source Temporal, resp. rate 16, height 149.9 cm (59.02\"), weight 57.5 kg (126 lb 11.2 oz), SpO2 97%.     ECOG performance status-0    Physical Exam  Vitals reviewed.   Constitutional:       Appearance: Normal appearance.   HENT:      Head: Normocephalic and atraumatic.      Nose: Nose normal.      Mouth/Throat:      Mouth: Mucous membranes are moist.      Pharynx: Oropharynx is clear.   Eyes:      Conjunctiva/sclera: Conjunctivae normal.      Pupils: Pupils are equal, round, and reactive to light.   Cardiovascular:      Rate and Rhythm: Normal rate and regular rhythm.      Pulses: Normal pulses.      Heart sounds: Normal heart sounds.   Pulmonary:      Effort: Pulmonary effort is normal.      Breath sounds: Normal breath sounds.   Abdominal:      General: Abdomen is flat. Bowel sounds are normal.      Palpations: Abdomen is soft.   Musculoskeletal:         General: Normal range of motion.      Cervical back: Normal range of motion.   Skin:     General: Skin is warm and dry.      Findings: No rash.   Neurological:      General: No focal deficit present.      Mental Status: She is alert and oriented to person, place, and time. Mental status is at baseline.   Psychiatric:         Mood and Affect: Mood normal.         Behavior: Behavior normal.         Thought Content: Thought content normal.         Judgment: Judgment normal.       Breast Exam: Right breast appears normal on inspection.  No palpable abnormalities of the right breast.    Left breast on inspection there is a scar which " is well-healed.  There is also left axillary scar which is well-healed.  Skin changes consistent with radiation dermatitis.  The left breast is somewhat firm which is likely secondary to radiation.  With sitting, there is dimpling of the left breast at the lumpectomy site which is unchanged per the patient's report    I have reexamined the patient and the results are consistent with the previously documented exam. Sheri Avendano MD          Results from last 7 days   Lab Units 02/20/24  1105 02/15/24  1023   WBC 10*3/mm3 6.89 7.83   NEUTROS ABS 10*3/mm3 4.86 5.25   HEMOGLOBIN g/dL 14.0 14.2   HEMATOCRIT % 42.0 44.0   PLATELETS 10*3/mm3 226 273     Results from last 7 days   Lab Units 02/20/24  1105 02/15/24  1023   SODIUM mmol/L 138 142   POTASSIUM mmol/L 4.5 4.8   CHLORIDE mmol/L 104 104   CO2 mmol/L 26.2 25.5   BUN mg/dL 19 15   CREATININE mg/dL 0.74 0.74   CALCIUM mg/dL 9.5 10.2   ALBUMIN g/dL 4.3 4.7   PROTEIN UA   --  Trace   BILIRUBIN mg/dL 0.3 0.3   ALK PHOS U/L 122* 128*   ALT (SGPT) U/L 16 18   AST (SGOT) U/L 26 24   GLUCOSE mg/dL 103* 104*   MAGNESIUM mg/dL 2.2  --                    MRI Breast Bilateral With & Without Contrast    Result Date: 2/5/2024  There are no findings suspicious for malignancy in either breast. Benign posttreatment changes of the left breast are noted. Routine follow-up imaging is recommended.  BI-RADS Category 2: Benign findings.  This report was finalized on 2/5/2024 3:35 PM by Dr. Trevor Tirado M.D on Workstation: [id number]        CBC and CMP from 10/30/2023 reviewed and within normal limits    Assessment & Plan       *Invasive ductal carcinoma of the left breast  Clinical T2 N0 M0, stage IIa  On ultrasound the tumor measures 2.4 cm.  Lymph nodes on the left side are normal.  MRI 10/8/2021 with tumor measuring 2.8 cm.  Lymph nodes appear normal  Pathology consistent with invasive carcinoma with ductal and lobular features, grade 3, ER +1 to 10% weak, OK negative, HER-2 3+  immunohistochemistry, Ki-67 70%.  She was evaluated by Dr. Liu and referred for consideration of neoadjuvant chemotherapy.   Since the tumor is HER-2 positive and over 2 cm I think it would be reasonable to proceed with neoadjuvant chemotherapy.  Since the tumor is over 2 cm but lymph node negative I think she would be a great candidate for EA 1181 clinical trial which comprises of administering Taxol Herceptin and Perjeta tamika  adjuvantly followed by surgery and additional adjuvant treatment depending upon the response.  Port placed 10/18/2021  Echocardiogram shows normal ejection fraction  Week 1 TPH on 10/19/2021  Completed 12 weeks of Taxol Perjeta and Herceptin on 1/4/2022 on EA 1181 clinical trial  1/11/2022 due for Herceptin and Perjeta only  2/1/2022-due for dose 2 of Herceptin and Perjeta  MRI 1/14/2022 reviewed and there has been a good tumor response with decrease in size of the mass to 1.2 cm.  2/10/2022-left breast lumpectomy and sentinel lymph node biopsy  Pathology shows ypT1 cN1 aM0, stage IIb, pleomorphic invasive lobular carcinoma, grade 3, ER +% strong, CT +61-70% moderate, HER-2 negative on the residual tumor  The left axillary lymph node was ER positive strong, CT negative and HER-2 2+, FISH amplified.  On the initial biopsy prior to surgery the pathology showed invasive ductal with lobular features and the ER/CT was negative and HER-2 was 3+.  The residual disease obviously appears to be different from the initial pathology.  What is remaining is essentially pleomorphic lobular carcinoma which is high-grade with a high Ki-67 and ER/CT positivity.  Now that the lymph node is also positive I do believe that she will benefit from additional chemotherapy particularly either AC or EC.  Case was presented at multidisciplinary conference.  Decided to proceed with adjuvant Adriamycin and cyclophosphamide.  Given her age we will proceed with every 3 weekly AC over every 2 weeks.  Staging CT  chest and pelvis and bone scan without any evidence of metastatic disease  3/11/2022-proceed with cycle 1 of AC  5/13/2022-due for cycle 4 AC.    6/3/2022-cycle 1 Kadcyla  7/19/2022-completed adjuvant radiation  7/20/2022-started anastrozole  Patient continues on anastrozole and tolerating that well.  No evidence of recurrent disease  Screening mammogram from August 2023 and MRI from February 2024 reviewed and benign    *Hypertension-currently controlled with amlodipine and metoprolol.  Blood pressure 146/80    *Anxiety  Mood normal    *Hypothyroidism  Continue Synthroid  Stable    *Cardiac health-  Echocardiogram 1/5/2022 reviewed and stable  Stress test 2/3/2022 normal  3/1/2022-echocardiogram shows normal ejection fraction of 56 to 60%, normal LV strain  5/31/2022 EF 61%.  Maintain follow-up with Dr. Galo   8/19/2022-2D echocardiogram shows an ejection fraction of 55% and a strain of -22%.  Stable  Echocardiogram February 2023 normal    *Bone health-  DEXA scan August 2021 showing osteopenia (this was stable compared to imaging 2 years prior).  She is currently on Fosamax which was started by her primary care physician.  8/14/2023 DEXA shows osteopenia with a T score of -1.9 in the hip and T score of 0.2 in the lumbar spine  2/20/2024-due for first dose of Prolia.  Dental clearance obtained.  Discontinue Fosamax    *Left axillary stiffness-improved.  Continue follow-up with lymphedema clinic    *Decreased hearing, feelings of lightheadedness and left ear bothering patient.  Per examination she has significant cerumen in both external ear canals.  Patient has required visit with ENT before to have this removed. Recommended that she obtain Debrox in the short-term until she gets through the holiday season and then to have repeat visit to have this cleared out.      *Constipation  Improved somewhat with stool softeners however patient experiences intermittent constipation and is worried about the same.  She had  a colonoscopy which was benign.  Continue follow-up with gastroenterology as needed    *Left-sided back pain  Secondary to the fall  X-rays previously performed were negative  4/29/2023 MRI of the lumbar spine with no evidence of metastatic disease, degenerative changes noted    PLAN:   Continue anastrozole 1 mg daily  Cycle 1 Prolia 2/20/2024  Screening mammogram due August 2024 and MRI due February 2025  APRN in 3 months and MD in 6 months with Prolia    Patient continues on high risk medication requiring close monitoring for toxicity    Sheri Avendano MD  02/20/2024

## 2024-02-27 ENCOUNTER — TELEPHONE (OUTPATIENT)
Dept: ONCOLOGY | Facility: CLINIC | Age: 79
End: 2024-02-27
Payer: MEDICARE

## 2024-02-27 NOTE — TELEPHONE ENCOUNTER
Dr. Avendano,    Patient was directed to use estrogen cream for vaginal dryness. She wanted to see what you thought about her using it sparingly     Spoke with Dr. Avendano and she was fine with her using it sparingly and to not use it long term and she v/u.

## 2024-02-27 NOTE — TELEPHONE ENCOUNTER
Caller: Nii Ramirez    Relationship: Self    Best call back number: [phone]    What is the best time to reach you: ANY    Who are you requesting to speak with (clinical staff, provider,  specific staff member): CLINICAL    What was the call regarding: NII IS WANTING TO TALK TO DR MCRAE REGARDING HER VISIT WITH THE  UROLOGIST    THE UROLOGIST  SUGGESTED HER TO USE THE ESTROGEN CREAM AND NII IS WANTING TO DISCUSS USING IT BECAUSE OF HER CANCER AND IF SHE WILL BENEFIT FORM USING IT         PLEASE CALL TO ADVISE AND DISCUSS

## 2024-03-29 DIAGNOSIS — I10 ESSENTIAL HYPERTENSION: ICD-10-CM

## 2024-03-29 RX ORDER — AMLODIPINE BESYLATE 5 MG/1
5 TABLET ORAL DAILY
Qty: 90 TABLET | Refills: 1 | Status: SHIPPED | OUTPATIENT
Start: 2024-03-29

## 2024-04-20 DIAGNOSIS — F41.9 ANXIETY: ICD-10-CM

## 2024-04-21 RX ORDER — ESCITALOPRAM OXALATE 20 MG/1
20 TABLET ORAL DAILY
Qty: 90 TABLET | Refills: 1 | Status: SHIPPED | OUTPATIENT
Start: 2024-04-21

## 2024-04-22 ENCOUNTER — HOSPITAL ENCOUNTER (OUTPATIENT)
Dept: PHYSICAL THERAPY | Facility: HOSPITAL | Age: 79
Setting detail: THERAPIES SERIES
Discharge: HOME | End: 2024-04-22
Payer: MEDICARE

## 2024-04-22 DIAGNOSIS — Z91.89 AT RISK FOR LYMPHEDEMA: ICD-10-CM

## 2024-04-22 DIAGNOSIS — Z98.890 S/P LUMPECTOMY, LEFT BREAST: ICD-10-CM

## 2024-04-22 DIAGNOSIS — Z17.0 MALIGNANT NEOPLASM OF UPPER-INNER QUADRANT OF LEFT BREAST IN FEMALE, ESTROGEN RECEPTOR POSITIVE: ICD-10-CM

## 2024-04-22 DIAGNOSIS — I89.0 LYMPHEDEMA OF LEFT ARM: Primary | ICD-10-CM

## 2024-04-22 DIAGNOSIS — C50.212 MALIGNANT NEOPLASM OF UPPER-INNER QUADRANT OF LEFT BREAST IN FEMALE, ESTROGEN RECEPTOR POSITIVE: ICD-10-CM

## 2024-04-22 PROCEDURE — 93702 BIS XTRACELL FLUID ANALYSIS: CPT

## 2024-04-22 PROCEDURE — 97535 SELF CARE MNGMENT TRAINING: CPT

## 2024-04-22 NOTE — THERAPY RE-EVALUATION
Physical Therapy Lymphedema Re-Evaluation  [facility]     Patient Name: Neela Ramirez  : 1945  MRN: [id number]  Today's Date: 2024      Visit Date: 2024    Visit Dx:    ICD-10-CM ICD-9-CM   1. Lymphedema of left arm  I89.0 457.1   2. Malignant neoplasm of upper-inner quadrant of left breast in female, estrogen receptor positive  C50.212 174.2    Z17.0 V86.0   3. S/P lumpectomy, left breast  Z98.890 V45.89   4. At risk for lymphedema  Z91.89 V49.89       Patient Active Problem List   Diagnosis    Anxiety    DD (diverticular disease)    Gastroesophageal reflux disease    Essential hypertension    Hyperlipidemia    Osteopenia    Bilateral low back pain without sciatica    Malignant neoplasm of female breast    Encounter for management of implanted device    Personal history of breast cancer    Constipation    Nausea    Hypothyroidism        Past Medical History:   Diagnosis Date    Anxiety     Arthritis     Cataract     EARLY. JIMMIE EYES    Chronic back pain     LOW BACK ACHES AT TIME FROM ARTHRTIS    Constipation     AT TIMES. NO RECENT ISSUES    Dermatitis     LEGS. STATES CLEARING UP NOW.    Disease of thyroid gland     HYPO    Diverticulosis     Colonoscopy 2014    Encounter for management of implanted device 2021    Erythema of face     Transitory Erythema Of The Face    GERD (gastroesophageal reflux disease)     ON OCC    History of chemotherapy     FOR LEFT BREAST CANCER    Hyperlipidemia     Hypertension     Malignant neoplasm of female breast 2021    Osteopenia     Tachycardia         Past Surgical History:   Procedure Laterality Date    BREAST BIOPSY  2021    Dr. Tirado     BREAST CYST ASPIRATION Right     BREAST CYST EXCISION Right     BREAST LUMPECTOMY WITH SENTINEL NODE BIOPSY Left 02/10/2022    Procedure: LEFT BREAST WIRE LOCALIZED LUMPECTOMY WITH SENTINEL NODE BIOPSY, POSSIBLE LEFT AXILLARY DISSECTION;  Surgeon: Becky Liu MD;  Location: [facility] OR [facility];   Service: General;  Laterality: Left;    COLONOSCOPY      COLONOSCOPY  2014    COLONOSCOPY N/A 10/6/2023    Procedure: COLONOSCOPY to cecum and TI with cold bx polypectomy;  Surgeon: Gavin Mead MD;  Location: [facility] ENDOSCOPY;  Service: Gastroenterology;  Laterality: N/A;  pre - nausea, constipation  post - diverticulosis, polyp    ENDOSCOPY N/A 10/6/2023    Procedure: ESOPHAGOGASTRODUODENOSCOPY with cold bx;  Surgeon: Gavin Mead MD;  Location: [facility] ENDOSCOPY;  Service: Gastroenterology;  Laterality: N/A;  pre - nausea, constipation  post - gastritis    EXOSTECTOMY Bilateral     Simple Bunion Exostectomy (Silver Procedure)    FOOT TENDON SURGERY  2012    VENOUS ACCESS DEVICE (PORT) INSERTION N/A 10/18/2021    Procedure: INSERTION VENOUS ACCESS DEVICE;  Surgeon: Becky Liu MD;  Location: [facility] OR [facility] [place];  Service: General;  Laterality: N/A;       Visit Dx:    ICD-10-CM ICD-9-CM   1. Lymphedema of left arm  I89.0 457.1   2. Malignant neoplasm of upper-inner quadrant of left breast in female, estrogen receptor positive  C50.212 174.2    Z17.0 V86.0   3. S/P lumpectomy, left breast  Z98.890 V45.89   4. At risk for lymphedema  Z91.89 V49.89            Lymphedema       Row Name 04/22/24 1000             Subjective Pain    Able to rate subjective pain? yes  -LB      Pre-Treatment Pain Level 0  -LB         Subjective    Subjective Comments I am doing well. About 2 weeks ago I was holding an iron skillet and I hurt my wrist. It is swollen and is still hurts. Otherwise I have been doing well and wearing my compression.  -LB         Lymphedema Assessment    Lymphedema Classification LUE:;secondary;stage 1 (Spontaneously Reversible)  -LB      Lymphedema Cancer Related Sx left;lumpectomy;sentinel node biopsy  -LB      Lymph Nodes Removed # 10  -LB      Positive Lymph Nodes # 1  -LB      Chemo Received yes  -LB      Radiation Therapy Received yes  -LB      Infections or Cellulitis? no  -LB         Lymphedema Edema  Assessment    Edema Assessment Comment hand negative for edema, noted inc in edema at L medial wrist  -LB         LUE Quick Girth (cm)    Axilla 25 cm  -LB      Mid upper arm 26.4 cm  -LB      Elbow 23.5 cm  -LB      Mid forearm 19.2 cm  -LB      Wrist crease 15 cm  -LB      Web space 18 cm  -LB      Met-heads 18 cm  -LB      LUE Quick Girth Total 145.1  -LB         RUE Quick Girth (cm)    Axilla 27.5 cm  -LB      Mid upper arm 23.6 cm  -LB      Elbow 22 cm  -LB      Mid forearm 20.5 cm  -LB      Wrist crease 14.6 cm  -LB      Web space 18.2 cm  -LB      Met-heads 18.1 cm  -LB      RUE Quick Girth Total 144.5  -LB         Compression/Skin Care    Compression/Skin Care compression garment  -LB      Compression/Skin Care Comments well fitted L glove/sleeve  -LB         L-Dex Bioimpedence Screening    L-Dex Measurement Extremity LUE  -LB      L-Dex Patient Position Standing  -LB      L-Dex UE Dominate Side Right  -LB      L-Dex UE At Risk Side Left  -LB      L-Dex UE Pre Surgical Value No  -LB      L-Dex UE Score 21.6  -LB      L-Dex UE Baseline Score 2.9  -LB      L-Dex UE Value Change 18.7  -LB      L-Dex UE Comment remains elevated and outside of normal range  -LB      $ L-Dex Charge yes  -LB                User Key  (r) = Recorded By, (t) = Taken By, (c) = Cosigned By      Initials Name Provider Type    LB Edna Condon PT Physical Therapist                                    Therapy Education  Education Details: reviewed s/s of lymphedema, steps to prevention, bioimpedance results and interpretation, use of compression sleeve, management of L wrist pain/discomfort  Given: Symptoms/condition management, Edema management  Program: Reinforced  How Provided: Verbal  Provided to: Patient  Level of Understanding: Verbalized  47764 - PT Self Care/Mgmt Minutes: 15       OP Exercises       Row Name 04/22/24 1000             Subjective    Subjective Comments I am doing well. About 2 weeks ago I was holding an iron skillet  and I hurt my wrist. It is swollen and is still hurts. Otherwise I have been doing well and wearing my compression.  -LB         Subjective Pain    Able to rate subjective pain? yes  -LB      Pre-Treatment Pain Level 0  -LB                User Key  (r) = Recorded By, (t) = Taken By, (c) = Cosigned By      Initials Name Provider Type    Edna Abdalla, PT Physical Therapist                                 PT OP Goals       Row Name 04/22/24 1300          PT Short Term Goals    STG Date to Achieve 07/17/23  -LB     STG 1 LDex Bioimpedence will decrease to 20 or lower showing improvement in lymphedema.  -LB     STG 1 Progress Met  -LB     STG 2 Pt/family independent with self bandaging for self management of condition.  -LB     STG 2 Progress Met  -LB     STG 3 Pt will demonstrate decreased girth measurements of >/= 5 cm on LUE to reduce infection risk and improve skin condition.  -LB     STG 3 Progress Met  -LB        Long Term Goals    LTG Date to Achieve 05/06/22  -LB     LTG 1 Pt will maintain LDex bioimpedance score WNL.  -LB     LTG 1 Progress Ongoing  -LB     LTG 1 Progress Comments outside of normal limits; elevated 21.6  -LB     LTG 2 Pt will acquire appropriately fitted compression garment and verbalize appropriate wear schedule.  -LB     LTG 2 Progress Met  -LB     LTG 3 Pt will verbalize signs and symptoms of lymphedema and steps to prevention.  -LB     LTG 3 Progress Met  -LB               User Key  (r) = Recorded By, (t) = Taken By, (c) = Cosigned By      Initials Name Provider Type    Edna Abdalla, PT Physical Therapist                     PT Assessment/Plan       Row Name 04/22/24 1304          PT Assessment    Functional Limitations Other (comment)  at risk for lymphedema  -LB     Impairments Impaired flexibility;Impaired lymphatic circulation;Sensation;Edema  -LB     Assessment Comments Pt returns for reassessment stating LUE symptoms have been relatively stable and controlled with MLD and  compression garment wear daily. She did have L wrist injury 2 weeks ago resulting in isolated edema at L medial wrist most likely influencing LDex bioimpedance score today. LDex is elevated and remains outside of normal limits at 21.6. Circumferential measurements are stable. We reviewed compression garment use, replacement, MLD, and continued management of condition as well as suggested gentle ROM and icing for L wrist management. Pt remains appropriate for continued skilled PT services to address deficits and continue to monitor lymphedema.  -LB     Rehab Potential Good  -LB     Patient/caregiver participated in establishment of treatment plan and goals Yes  -LB     Patient would benefit from skilled therapy intervention Yes  -LB        PT Plan    PT Frequency Other (comment)  -LB     Predicted Duration of Therapy Intervention (PT) return in 4 months for reassessment  -LB     Planned CPT's? PT EVAL LOW COMPLEXITY: 85854;PT RE-EVAL: 78407;PT THER PROC EA 15 MIN: 32946;PT THER ACT EA 15 MIN: 52995;PT MANUAL THERAPY EA 15 MIN: 52097;PT SELF CARE/HOME MGMT/TRAIN EA 15: 01176;PT BIS XTRACELL FLUID ANALYSIS: 23431  -LB     PT Plan Comments reassess LUE, wrist pain?, bioimpedance, compression garment replacement?  -LB               User Key  (r) = Recorded By, (t) = Taken By, (c) = Cosigned By      Initials Name Provider Type    LB Edna Condon PT Physical Therapist                                 Time Calculation:   Start Time: 1045  Stop Time: 1115  Time Calculation (min): 30 min  Total Timed Code Minutes- PT: 15 minute(s)  Timed Charges  23931 - PT Self Care/Mgmt Minutes: 15  Total Minutes  Timed Charges Total Minutes: 15   Total Minutes: 15   Therapy Charges for Today       Code Description Service Date Service Provider Modifiers Qty    89336145931 HC PT BIS XTRACELL FLUID ANALYSIS 4/22/2024 Edna Condon, PT  1    21858351340 HC PT SELF CARE/MGMT/TRAIN EA 15 MIN 4/22/2024 Edna Condon, PT GP 1                       Edna Condon, PT  4/22/2024

## 2024-05-04 DIAGNOSIS — E78.5 HYPERLIPIDEMIA, UNSPECIFIED HYPERLIPIDEMIA TYPE: ICD-10-CM

## 2024-05-04 RX ORDER — ROSUVASTATIN CALCIUM 10 MG/1
TABLET, COATED ORAL
Qty: 90 TABLET | Refills: 3 | Status: SHIPPED | OUTPATIENT
Start: 2024-05-04

## 2024-05-14 ENCOUNTER — OFFICE VISIT (OUTPATIENT)
Dept: ONCOLOGY | Facility: CLINIC | Age: 79
End: 2024-05-14
Payer: MEDICARE

## 2024-05-14 VITALS
BODY MASS INDEX: 24.49 KG/M2 | WEIGHT: 129.7 LBS | OXYGEN SATURATION: 96 % | SYSTOLIC BLOOD PRESSURE: 120 MMHG | TEMPERATURE: 98.2 F | HEART RATE: 83 BPM | HEIGHT: 61 IN | DIASTOLIC BLOOD PRESSURE: 78 MMHG

## 2024-05-14 DIAGNOSIS — Z12.31 ENCOUNTER FOR SCREENING MAMMOGRAM FOR MALIGNANT NEOPLASM OF BREAST: ICD-10-CM

## 2024-05-14 DIAGNOSIS — C50.912 MALIGNANT NEOPLASM OF LEFT BREAST IN FEMALE, ESTROGEN RECEPTOR POSITIVE, UNSPECIFIED SITE OF BREAST: Primary | ICD-10-CM

## 2024-05-14 DIAGNOSIS — M25.532 LEFT WRIST PAIN: ICD-10-CM

## 2024-05-14 DIAGNOSIS — Z17.0 MALIGNANT NEOPLASM OF LEFT BREAST IN FEMALE, ESTROGEN RECEPTOR POSITIVE, UNSPECIFIED SITE OF BREAST: Primary | ICD-10-CM

## 2024-05-14 PROCEDURE — 1159F MED LIST DOCD IN RCRD: CPT | Performed by: NURSE PRACTITIONER

## 2024-05-14 PROCEDURE — 3078F DIAST BP <80 MM HG: CPT | Performed by: NURSE PRACTITIONER

## 2024-05-14 PROCEDURE — 99214 OFFICE O/P EST MOD 30 MIN: CPT | Performed by: NURSE PRACTITIONER

## 2024-05-14 PROCEDURE — 3074F SYST BP LT 130 MM HG: CPT | Performed by: NURSE PRACTITIONER

## 2024-05-14 PROCEDURE — 1160F RVW MEDS BY RX/DR IN RCRD: CPT | Performed by: NURSE PRACTITIONER

## 2024-05-14 PROCEDURE — 1126F AMNT PAIN NOTED NONE PRSNT: CPT | Performed by: NURSE PRACTITIONER

## 2024-05-14 RX ORDER — ANASTROZOLE 1 MG/1
1 TABLET ORAL DAILY
COMMUNITY

## 2024-05-14 RX ORDER — CYCLOBENZAPRINE HCL 5 MG
TABLET ORAL
COMMUNITY
End: 2024-05-14

## 2024-05-14 RX ORDER — LEVOTHYROXINE SODIUM 0.03 MG/1
TABLET ORAL
COMMUNITY
End: 2024-05-14 | Stop reason: SDUPTHER

## 2024-05-14 NOTE — PROGRESS NOTES
Subjective   Neela Ramirez is a 79 y.o. female.  Referred by Dr. 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  A bilateral diagnostic mammogram was performed for further evaluation of the same.    8/17/2021-bilateral diagnostic mammogram-scattered fibroglandular densities throughout both breasts.  In the posterior one third upper inner quadrant of the left breast at the site of palpable concern, 11 o'clock position there is a mass with adjacent pleomorphic calcifications.  The mass and the calcifications measure on order of 1.5 cm in greatest dimension.  No evidence of axillary lymphadenopathy appreciated.  Stable microcalcifications seen in other areas of the left breast on right breast.  Ultrasound-at 11 o'clock position, 5 cm from the nipple in the left breast there is an irregular hypoechoic mass which measures 2.4 x 2 x 1.6 cm.    Impression  1.irregular 2.4 cm mass in the left breast at the site of palpable concern at 11:00, 5 cm from the nipple.  Ultrasound-guided biopsy of the mass recommended  No finding suspicious for malignancy in the right breast    9/8/2021-left breast ultrasound-guided biopsy  Pathology consistent with invasive ductal carcinoma with lobular features.  Grade 3.  The carcinoma measures 7 mm in greatest dimension.  Focally suspicious for lymphovascular space invasion.  ER low +1-10%, intensity week  KS negative  HER-2 3+ on immunohistochemistry  Ki-67 70%    MRI of the breast 10/8/2021-Biopsy-proven malignancy in the left breast at 11:00 measuring 2.8 cm with a centrally located metallic clip.  No other suspicious findings are seen within the left breast or no left axillary lymphadenopathy.  No suspicious findings of the right breast    Echocardiogram 10/8/2021 was normal  ejection fraction of 56 to 60% and strain of -20    She denies any new bone pains, dyspnea, cough, abdominal pain nausea vomiting or recent changes in weight.    She had 2 previous breast aspirations in her 20s.  No other breast biopsies  She does not know much about her family hence limited family history.    She received neoadjuvant chemotherapy on EA 1181 trial with Taxol Perjeta and Herceptin.  She completed 12 weeks of Taxol Herceptin and Perjeta.    Had an abnormal EKG preop and hence a stress test was performed on 2/3/2022 which showed a normal left ventricular ejection fraction and LVEF at stress was 73%.  Considered a low risk study with normal myocardial perfusion imaging.    She underwent a left breast lumpectomy and a sentinel lymph node biopsy on 2/10/2022.    Pathology was consistent with residual tumor which was pleomorphic invasive lobular carcinoma, poorly differentiated, grade 3  Tumor measured 18 mm  Margins negative for invasive carcinoma  Associated lobular carcinoma in situ noted  1 out of 10 lymph nodes was positive for metastatic focus measuring 3.5 mm.    Receptors were repeated on the pleomorphic invasive lobular carcinoma  ER +91 to 100% strong  ND +61 to 70% moderate  HER-2 negative  Ki-67 55%    Receptors performed on the metastatic lymph node  ER +81-90% strong  ND negative  HER-2 2+ on immunohistochemistry, FISH pending.    ypT1 cN1 aM0    2/28/2022-CT of the chest abdomen and pelvis  Fluid collection in the upper left breast measuring 6 x 2.5 x 4.9 cm, postoperative seroma.  Skin thickening of the left breast likely postsurgical.  Fluid collections left axilla measuring 2.9 x 1.4 x 2.9 cm.  No other suspicious abnormalities on the CT of the chest  CT abdomen with no evidence of metastatic disease.  Mild colonic diverticulosis.  Small uterine fibroid.    3/1/2022-bone scan  No evidence of metastatic disease.  Multifocal degenerative arthritic uptake without scintigraphic evidence to  suggest metastatic disease.    3/1/2022-echocardiogram  Left ventricular ejection fraction 56-60%.  Normal global LV strain of -20.5%.    3/11/2022-cycle 1 of AC    Completed 4 cycles of AC    6/3/2022-cycle 1 Kadcyla    7/19/2022-completed adjuvant radiation    7/20/2022-started adjuvant anastrozole    8/17/2022-bilateral diagnostic mammogram-benign    8/21/2023-bilateral screening mammogram benign    Interval history  The patient is a 79 y.o. female with the above-mentioned show is here today for follow-up continuing on anastrozole 1 mg daily.   She had a bilateral breast MRI on 2/5/2024 which was benign.  Denies any new palpable masses or nodularity.  Feels like she is tolerating anastrozole well without any significant issues with arthralgias or hot flashes.  She does struggle with lymphedema of her left upper extremity.  She wears a compression sleeve, and her  does massage on her left upper extremity 3 times per week.    Patient is complaining of pain in her left wrist.  She states that she started having pain about 5 to 6 weeks ago after lifting a cast iron skillet and it has persisted.    Patient also states that she was prescribed estradiol vaginal cream by her urologist and is asking if it is safe for her to use that.  I have discussed with Dr. Avendano who recommends the patient not use estradiol cream while she is on anastrozole.  If it is absolutely needed we would need to switch her to tamoxifen.    The following portions of the patient's history were reviewed and updated as appropriate: allergies, current medications, past family history, past medical history, past social history, past surgical history and problem list.    Past Medical History:   Diagnosis Date    Anxiety     Arthritis     Cataract     EARLY. JIMMIE EYES    Chronic back pain     LOW BACK ACHES AT TIME FROM ARTHRTIS    Constipation     AT TIMES. NO RECENT ISSUES    Dermatitis     LEGS. STATES CLEARING UP NOW.    Disease of thyroid  gland     HYPO    Diverticulosis     Colonoscopy November 2014    Encounter for management of implanted device 11/02/2021    Erythema of face     Transitory Erythema Of The Face    GERD (gastroesophageal reflux disease)     ON OCC    History of chemotherapy     FOR LEFT BREAST CANCER    Hyperlipidemia     Hypertension     Malignant neoplasm of female breast 09/27/2021    Osteopenia     Tachycardia         Past Surgical History:   Procedure Laterality Date    BREAST BIOPSY  09/2021    Dr. Tirado     BREAST CYST ASPIRATION Right     BREAST CYST EXCISION Right     BREAST LUMPECTOMY WITH SENTINEL NODE BIOPSY Left 02/10/2022    Procedure: LEFT BREAST WIRE LOCALIZED LUMPECTOMY WITH SENTINEL NODE BIOPSY, POSSIBLE LEFT AXILLARY DISSECTION;  Surgeon: Becky Liu MD;  Location: [facility] OR [facility] [place];  Service: General;  Laterality: Left;    COLONOSCOPY      COLONOSCOPY  2014    COLONOSCOPY N/A 10/6/2023    Procedure: COLONOSCOPY to cecum and TI with cold bx polypectomy;  Surgeon: Gavin Mead MD;  [facility];  Service: Gastroenterology;  Laterality: N/A;  pre - nausea, constipation  post - diverticulosis, polyp    ENDOSCOPY N/A 10/6/2023    Procedure: ESOPHAGOGASTRODUODENOSCOPY with cold bx;  Surgeon: Gavin Mead MD;  Location: [facility] ENDOSCOPY;  Service: Gastroenterology;  Laterality: N/A;  pre - nausea, constipation  post - gastritis    EXOSTECTOMY Bilateral     Simple Bunion Exostectomy (Silver Procedure)    FOOT TENDON SURGERY  2012    VENOUS ACCESS DEVICE (PORT) INSERTION N/A 10/18/2021    Procedure: INSERTION VENOUS ACCESS DEVICE;  Surgeon: Becky Liu MD;  Location: [facility] OR [facility] [place];  Service: General;  Laterality: N/A;        Family History   Problem Relation Age of Onset    Hypertension Mother     Breast cancer Mother     Arthritis Father     Heart disease Father     Hypertension Father     Malig Hyperthermia Neg Hx         Social History     Socioeconomic History    Marital status:       "Spouse name: Pj    Number of children: 1   Tobacco Use    Smoking status: Former     Current packs/day: 0.00     Average packs/day: 0.3 packs/day for 0.5 years (0.1 ttl pk-yrs)     Types: Cigarettes     Start date: 1969     Quit date: 1970     Years since quittin.4    Smokeless tobacco: Never    Tobacco comments:     SOCIALLY IN EARLY    Vaping Use    Vaping status: Never Used   Substance and Sexual Activity    Alcohol use: Yes     Comment: social drinker    Drug use: Never    Sexual activity: Defer        OB History               Para        Term   0            AB        Living             SAB        IAB        Ectopic        Molar        Multiple        Live Births                 Age at menarche-13  Age at menopause-50  Nulliparous  Breast-feeding-N/A   0 para 0  0  Oral contraceptive pill use-none  Hormone replacement therapy-7 years    No Known Allergies     Review of systems as mentioned HPI otherwise negative    Objective   Vitals:    24 1144   BP: 120/78   Pulse: 83   Temp: 98.2 °F (36.8 °C)   TempSrc: Oral   SpO2: 96%   Weight: 58.8 kg (129 lb 11.2 oz)   Height: 154.9 cm (60.98\")   PainSc: 0-No pain         ECOG performance status-0    Physical Exam  Vitals reviewed.   Constitutional:       Appearance: Normal appearance.   HENT:      Head: Normocephalic and atraumatic.      Nose: Nose normal.      Mouth/Throat:      Mouth: Mucous membranes are moist.      Pharynx: Oropharynx is clear.   Eyes:      Conjunctiva/sclera: Conjunctivae normal.      Pupils: Pupils are equal, round, and reactive to light.   Cardiovascular:      Rate and Rhythm: Normal rate and regular rhythm.      Pulses: Normal pulses.      Heart sounds: Normal heart sounds.   Pulmonary:      Effort: Pulmonary effort is normal.      Breath sounds: Normal breath sounds.   Abdominal:      General: Abdomen is flat. Bowel sounds are normal.      Palpations: Abdomen is soft.   Musculoskeletal:         " General: Normal range of motion.      Cervical back: Normal range of motion.      Comments: LUE sleeve for lympedema, decreased ROM    Skin:     General: Skin is warm and dry.      Findings: No rash.   Neurological:      General: No focal deficit present.      Mental Status: She is alert and oriented to person, place, and time. Mental status is at baseline.   Psychiatric:         Mood and Affect: Mood normal.         Behavior: Behavior normal.         Thought Content: Thought content normal.         Judgment: Judgment normal.       Breast Exam: Right breast appears normal on inspection.  No palpable abnormalities of the right breast.    Left breast on inspection there is a scar which is well-healed.  There is also left axillary scar which is well-healed.  Skin changes consistent with radiation dermatitis.  The left breast is somewhat firm which is likely secondary to radiation.  With sitting, there is dimpling of the left breast at the lumpectomy site which is unchanged per the patient's report    05/14/2024 I have reexamined the patient and the results are consistent with the previously documented exam. Erika Hart, APRN                                    No radiology results for the last 30 days.       Assessment & Plan       *Invasive ductal carcinoma of the left breast  Clinical T2 N0 M0, stage IIa  On ultrasound the tumor measures 2.4 cm.  Lymph nodes on the left side are normal.  MRI 10/8/2021 with tumor measuring 2.8 cm.  Lymph nodes appear normal  Pathology consistent with invasive carcinoma with ductal and lobular features, grade 3, ER +1 to 10% weak, ME negative, HER-2 3+ immunohistochemistry, Ki-67 70%.  She was evaluated by Dr. Liu and referred for consideration of neoadjuvant chemotherapy.   Since the tumor is HER-2 positive and over 2 cm I think it would be reasonable to proceed with neoadjuvant chemotherapy.  Since the tumor is over 2 cm but lymph node negative I think she would be a great  candidate for EA 1181 clinical trial which comprises of administering Taxol Herceptin and Perjeta tamika  adjuvantly followed by surgery and additional adjuvant treatment depending upon the response.  Port placed 10/18/2021  Echocardiogram shows normal ejection fraction  Week 1 TPH on 10/19/2021  Completed 12 weeks of Taxol Perjeta and Herceptin on 1/4/2022 on EA 1181 clinical trial  1/11/2022 due for Herceptin and Perjeta only  2/1/2022-due for dose 2 of Herceptin and Perjeta  MRI 1/14/2022 reviewed and there has been a good tumor response with decrease in size of the mass to 1.2 cm.  2/10/2022-left breast lumpectomy and sentinel lymph node biopsy  Pathology shows ypT1 cN1 aM0, stage IIb, pleomorphic invasive lobular carcinoma, grade 3, ER +% strong, MI +61-70% moderate, HER-2 negative on the residual tumor  The left axillary lymph node was ER positive strong, MI negative and HER-2 2+, FISH amplified.  On the initial biopsy prior to surgery the pathology showed invasive ductal with lobular features and the ER/MI was negative and HER-2 was 3+.  The residual disease obviously appears to be different from the initial pathology.  What is remaining is essentially pleomorphic lobular carcinoma which is high-grade with a high Ki-67 and ER/MI positivity.  Now that the lymph node is also positive I do believe that she will benefit from additional chemotherapy particularly either AC or EC.  Case was presented at multidisciplinary conference.  Decided to proceed with adjuvant Adriamycin and cyclophosphamide.  Given her age we will proceed with every 3 weekly AC over every 2 weeks.  Staging CT chest and pelvis and bone scan without any evidence of metastatic disease  3/11/2022-proceed with cycle 1 of AC  5/13/2022-due for cycle 4 AC.    6/3/2022-cycle 1 Kadcyla  7/19/2022-completed adjuvant radiation  7/20/2022-started anastrozole  Patient continues on anastrozole and tolerating that well.  No evidence of recurrent  disease  Screening mammogram from August 2023 and MRI from February 2024 reviewed and benign  5/14/2024 continue on anastrozole, tolerating well.  No evidence of recurrent disease on exam today.  Due for her screening mammogram in August 2024.    *Hypertension-currently controlled with amlodipine and metoprolol.  Blood pressure 120/78    *Anxiety  Mood normal    *Hypothyroidism  Continue Synthroid  Stable    *Cardiac health-  Echocardiogram 1/5/2022 reviewed and stable  Stress test 2/3/2022 normal  3/1/2022-echocardiogram shows normal ejection fraction of 56 to 60%, normal LV strain  5/31/2022 EF 61%.  Maintain follow-up with Dr. Galo   8/19/2022-2D echocardiogram shows an ejection fraction of 55% and a strain of -22%.  Stable  Echocardiogram February 2023 normal    *Bone health-  DEXA scan August 2021 showing osteopenia (this was stable compared to imaging 2 years prior).  She is currently on Fosamax which was started by her primary care physician.  8/14/2023 DEXA shows osteopenia with a T score of -1.9 in the hip and T score of 0.2 in the lumbar spine  2/20/2024-due for first dose of Prolia.  Dental clearance obtained.  Discontinue Fosamax      *Left axillary stiffness-improved.  Continue follow-up with lymphedema clinic    *Decreased hearing, feelings of lightheadedness and left ear bothering patient.  Per examination she has significant cerumen in both external ear canals.  Patient has required visit with ENT before to have this removed. Recommended that she obtain Debrox in the short-term until she gets through the holiday season and then to have repeat visit to have this cleared out.      *Constipation  Improved somewhat with stool softeners however patient experiences intermittent constipation and is worried about the same.  She had a colonoscopy which was benign.  Continue follow-up with gastroenterology as needed    *Left-sided back pain  Secondary to the fall  X-rays previously performed were  negative  4/29/2023 MRI of the lumbar spine with no evidence of metastatic disease, degenerative changes noted    *Left wrist pain:  Started 5-6 weeks ago after lifting a cast iron skillet.  Sent patient for x-rays and also referred to orthopedics for further evaluation.    PLAN:   Continue anastrozole 1 mg daily.  Send patient for plain film x-rays of the left wrist and refer to orthopedics.  Patient due for screening mammogram August 2024.  MRI due February 2025.  Follow-up in 3 months with Dr. Avendano with repeat labs reassessment due for her next dose of Prolia.  After discussion with Dr. Avendano, patient advised to not use vaginal estradiol while taking anastrozole.  If she absolutely needs to use this we would need to switch her to tamoxifen.  Patient verbalized understanding.    Call/ return sooner should the patient develop any new concerns or problems.    Patient is on a high risk medication requiring close monitoring for toxicity.    Erika Hart, APRN  05/14/2024

## 2024-05-21 ENCOUNTER — OFFICE VISIT (OUTPATIENT)
Dept: ORTHOPEDIC SURGERY | Facility: CLINIC | Age: 79
End: 2024-05-21
Payer: MEDICARE

## 2024-05-21 VITALS — WEIGHT: 129 LBS | TEMPERATURE: 98.7 F | BODY MASS INDEX: 24.35 KG/M2 | HEIGHT: 61 IN

## 2024-05-21 DIAGNOSIS — M25.532 WRIST PAIN, LEFT: Primary | ICD-10-CM

## 2024-05-21 DIAGNOSIS — M18.12 ARTHRITIS OF CARPOMETACARPAL (CMC) JOINT OF LEFT THUMB: ICD-10-CM

## 2024-05-21 RX ORDER — OMEPRAZOLE 20 MG/1
20 CAPSULE, DELAYED RELEASE ORAL DAILY
COMMUNITY

## 2024-05-21 NOTE — PROGRESS NOTES
Patient Name: Neela Ramirez   YOB: 1945  Referring Primary Care Physician: Chris Issa, SIVA, APRN  BMI: Body mass index is 24.37 kg/m².    Chief Complaint:    Chief Complaint   Patient presents with    Left Wrist - Pain        HPI: New patient to me was picking up of cast iron skillet about 5 weeks ago and had pain in her wrist she went to the [facility] and was told it was not broken was given a steroid pack and sent here for follow-up evaluation and treatment.  Patient has a history of lymphedema with breast cancer wears a compression sleeve on her left upper extremity  Hx of breast cancer and had lumpectomy and wears a lymphedema sleeve  Neela Ramirez is a 79 y.o. female who presents today for evaluation of   Chief Complaint   Patient presents with    Left Wrist - Pain         Subjective   Medications:   Home Medications:  Current Outpatient Medications on File Prior to Visit   Medication Sig    amLODIPine (NORVASC) 5 MG tablet TAKE 1 TABLET BY MOUTH DAILY    anastrozole (ARIMIDEX) 1 MG tablet Take 1 tablet by mouth Daily.    cyclobenzaprine (FLEXERIL) 5 MG tablet Take 1 tablet by mouth three times daily as needed for muscle spasm (Patient taking differently: Take 1 tablet by mouth 3 (Three) Times a Day As Needed for Muscle Spasms.)    desoximetasone (TOPICORT) 0.25 % ointment Apply 1 Application topically to the appropriate area as directed 2 (Two) Times a Day As Needed (TO IRRITATED AREA).    escitalopram (LEXAPRO) 20 MG tablet TAKE 1 TABLET BY MOUTH DAILY    levothyroxine (SYNTHROID, LEVOTHROID) 25 MCG tablet TAKE ONE TABLET BY MOUTH EVERY MORNING 30 MINUTES BEFORE BREAKFAST OR ANY OTHER MEDICATION    metoprolol succinate XL (TOPROL-XL) 25 MG 24 hr tablet TAKE ONE TABLET BY MOUTH DAILY    mupirocin (BACTROBAN) 2 % ointment APPLY A SMALL AMOUNT OF OINTMENT TOPICALLY TO AFFECTED AREA EVERY NIGHT    omeprazole (priLOSEC) 20 MG capsule Take 1 capsule by mouth Daily.    rosuvastatin  (CRESTOR) 10 MG tablet TAKE ONE TABLET BY MOUTH DAILY    anastrozole (ARIMIDEX) 1 MG tablet Take 1 tablet by mouth Daily. (Patient not taking: Reported on 5/21/2024)    Cholecalciferol (Vitamin D) 50 MCG (2000 UT) capsule Take  by mouth. (Patient not taking: Reported on 5/21/2024)    ondansetron (Zofran) 8 MG tablet Take 1 tablet by mouth Every 8 (Eight) Hours As Needed for Nausea or Vomiting. (Patient not taking: Reported on 5/21/2024)    pantoprazole (PROTONIX) 40 MG EC tablet Take 1 tablet by mouth Daily. (Patient not taking: Reported on 5/21/2024)    PreviDent 5000 Dry Mouth 1.1 % gel  (Patient not taking: Reported on 5/21/2024)     No current facility-administered medications on file prior to visit.     Current Medications:  Scheduled Meds:  Continuous Infusions:No current facility-administered medications for this visit.    PRN Meds:.    I have reviewed the patient's medical history in detail and updated the computerized patient record.  Review and summarization of old records includes:    Past Medical History:   Diagnosis Date    Anxiety     Arthritis     Cataract     EARLY. JIMMIE EYES    Chronic back pain     LOW BACK ACHES AT TIME FROM ARTHRTIS    Constipation     AT TIMES. NO RECENT ISSUES    Dermatitis     LEGS. STATES CLEARING UP NOW.    Disease of thyroid gland     HYPO    Diverticulosis     Colonoscopy November 2014    Encounter for management of implanted device 11/02/2021    Erythema of face     Transitory Erythema Of The Face    GERD (gastroesophageal reflux disease)     ON OCC    History of chemotherapy     FOR LEFT BREAST CANCER    Hyperlipidemia     Hypertension     Malignant neoplasm of female breast 09/27/2021    Osteopenia     Tachycardia         Past Surgical History:   Procedure Laterality Date    BREAST BIOPSY  09/2021    Dr. Tirado     BREAST CYST ASPIRATION Right     BREAST CYST EXCISION Right     BREAST LUMPECTOMY WITH SENTINEL NODE BIOPSY Left 02/10/2022    Procedure: LEFT BREAST WIRE  LOCALIZED LUMPECTOMY WITH SENTINEL NODE BIOPSY, POSSIBLE LEFT AXILLARY DISSECTION;  Surgeon: Becky Liu MD;  Location:  [facility];  Service: General;  Laterality: Left;    COLONOSCOPY      COLONOSCOPY      COLONOSCOPY N/A 10/6/2023    Procedure: COLONOSCOPY to cecum and TI with cold bx polypectomy;  Surgeon: Gavin Mead MD;  [facility];  Service: Gastroenterology;  Laterality: N/A;  pre - nausea, constipation  post - diverticulosis, polyp    ENDOSCOPY N/A 10/6/2023    Procedure: ESOPHAGOGASTRODUODENOSCOPY with cold bx;  Surgeon: Gavin Mead MD;  [facility];  Service: Gastroenterology;  Laterality: N/A;  pre - nausea, constipation  post - gastritis    EXOSTECTOMY Bilateral     Simple Bunion Exostectomy (Silver Procedure)    FOOT TENDON SURGERY      VENOUS ACCESS DEVICE (PORT) INSERTION N/A 10/18/2021    Procedure: INSERTION VENOUS ACCESS DEVICE;  Surgeon: Becky Liu MD;  [facility];  Service: General;  Laterality: N/A;        Social History     Occupational History    Occupation: Retired   Tobacco Use    Smoking status: Former     Current packs/day: 0.00     Average packs/day: 0.3 packs/day for 0.5 years (0.1 ttl pk-yrs)     Types: Cigarettes     Start date: 1969     Quit date: 1970     Years since quittin.4    Smokeless tobacco: Never    Tobacco comments:     SOCIALLY IN EARLY S   Vaping Use    Vaping status: Never Used   Substance and Sexual Activity    Alcohol use: Yes     Comment: social drinker    Drug use: Never    Sexual activity: Defer      Social History     Social History Narrative    Not on file        Family History   Problem Relation Age of Onset    Hypertension Mother     Breast cancer Mother     Arthritis Father     Heart disease Father     Hypertension Father     Malig Hyperthermia Neg Hx        ROS: 14 point review of systems was performed and all other systems were reviewed and are negative except for documented  "findings in HPI and today's encounter.     Allergies: No Known Allergies  Constitutional:  Denies fever, shaking or chills   Eyes:  Denies change in visual acuity   HENT:  Denies nasal congestion or sore throat   Respiratory:  Denies cough or shortness of breath   Cardiovascular:  Denies chest pain or severe LE edema   GI:  Denies abdominal pain, nausea, vomiting, bloody stools or diarrhea   Musculoskeletal:  Numbness, tingling, pain, or loss of motor function only as noted above in history of present illness.  : Denies painful urination or hematuria  Integument:  Denies rash, lesion or ulceration   Neurologic:  Denies headache or focal weakness  Endocrine:  Denies lymphadenopathy  Psych:  Denies confusion or change in mental status   Hem:  Denies active bleeding    OBJECTIVE:  Physical Exam: 79 y.o. female  Wt Readings from Last 3 Encounters:   05/21/24 58.5 kg (129 lb)   05/14/24 58.8 kg (129 lb 11.2 oz)   04/27/24 57.6 kg (127 lb)     Ht Readings from Last 1 Encounters:   05/21/24 154.9 cm (61\")     Body mass index is 24.37 kg/m².  Vitals:    05/21/24 0930   Temp: 98.7 °F (37.1 °C)     Vital signs reviewed.     General Appearance:    Alert, cooperative, in no acute distress                  Eyes: conjunctiva clear  ENT: external ears and nose atraumatic  CV: no peripheral edema  Resp: normal respiratory effort  Skin: no rashes or wounds; normal turgor  Psych: mood and affect appropriate  Lymph: no nodes appreciated  Neuro: gross sensation intact  Vascular:  Palpable peripheral pulse in noted extremity  Musculoskeletal Extremities: Skin is warm dry intact with good pulses and sensation there is no palpable lymph nodes she has point tenderness over the first CMC of the thumb at the base there is no obvious deformity or angulation there is no tenderness in the scaphoid    Radiology:   Left wrist 2 views done for pain with no comparison views readily available show no acute fracture but there is arthritis in the " base of the left CMC thumb joint    Assessment:     ICD-10-CM ICD-9-CM   1. Wrist pain, left  M25.532 719.43   2. Arthritis of carpometacarpal (CMC) joint of left thumb  M18.12 716.94        Procedures    Splint applied to immobilize the thumb with immediate relief can take it off at night and use for comfort to rest the joint -hand referral if does not improve  The diagnosis(es), natural history, pathophysiology and treatment for diagnosis(es) were discussed. Opportunity given and questions answered.  BMI:  The concept of BMI body mass index and its importance and implications discussed.    MEDICATIONS:  The risks, benefits, warnings,side effects and alternatives of medications discussed.  Inflammation/pain control; with cold, heat, elevation and/or liniments discussed as appropriate  MEDICAL RECORDS reviewed from other provider(s) for past and current medical history pertinent to this complaint.      5/21/2024    Much of this encounter note is an electronic transcription/translation of spoken language to printed text. The electronic translation of spoken language may permit erroneous, or at times, nonsensical words or phrases to be inadvertently transcribed; Although I have reviewed the note for such errors, some may still exist

## 2024-05-24 ENCOUNTER — PATIENT ROUNDING (BHMG ONLY) (OUTPATIENT)
Dept: ORTHOPEDIC SURGERY | Facility: CLINIC | Age: 79
End: 2024-05-24
Payer: MEDICARE

## 2024-05-24 NOTE — PROGRESS NOTES
A [facility] Message has been sent to the patient for [place] [facility]

## 2024-06-12 ENCOUNTER — TELEPHONE (OUTPATIENT)
Dept: RADIATION ONCOLOGY | Facility: HOSPITAL | Age: 79
End: 2024-06-12
Payer: MEDICARE

## 2024-06-18 DIAGNOSIS — E03.9 HYPOTHYROIDISM, UNSPECIFIED TYPE: ICD-10-CM

## 2024-06-18 RX ORDER — LEVOTHYROXINE SODIUM 0.03 MG/1
TABLET ORAL
Qty: 90 TABLET | Refills: 1 | Status: SHIPPED | OUTPATIENT
Start: 2024-06-18

## 2024-07-22 ENCOUNTER — HOSPITAL ENCOUNTER (OUTPATIENT)
Dept: PHYSICAL THERAPY | Facility: HOSPITAL | Age: 79
Setting detail: THERAPIES SERIES
Discharge: HOME | End: 2024-07-22
Payer: MEDICARE

## 2024-07-22 DIAGNOSIS — Z98.890 S/P LUMPECTOMY, LEFT BREAST: ICD-10-CM

## 2024-07-22 DIAGNOSIS — Z91.89 AT RISK FOR LYMPHEDEMA: ICD-10-CM

## 2024-07-22 DIAGNOSIS — Z17.0 MALIGNANT NEOPLASM OF UPPER-INNER QUADRANT OF LEFT BREAST IN FEMALE, ESTROGEN RECEPTOR POSITIVE: ICD-10-CM

## 2024-07-22 DIAGNOSIS — C50.212 MALIGNANT NEOPLASM OF UPPER-INNER QUADRANT OF LEFT BREAST IN FEMALE, ESTROGEN RECEPTOR POSITIVE: ICD-10-CM

## 2024-07-22 DIAGNOSIS — I89.0 LYMPHEDEMA OF LEFT ARM: Primary | ICD-10-CM

## 2024-07-22 PROCEDURE — 93702 BIS XTRACELL FLUID ANALYSIS: CPT

## 2024-07-22 PROCEDURE — 97535 SELF CARE MNGMENT TRAINING: CPT

## 2024-07-22 NOTE — THERAPY RE-EVALUATION
Physical Therapy Lymphedema Re-Evaluation  [facility]     Patient Name: Neela Ramirez  : 1945  MRN: [id number]  Today's Date: 2024      Visit Date: 2024    Visit Dx:    ICD-10-CM ICD-9-CM   1. Lymphedema of left arm  I89.0 457.1   2. Malignant neoplasm of upper-inner quadrant of left breast in female, estrogen receptor positive  C50.212 174.2    Z17.0 V86.0   3. S/P lumpectomy, left breast  Z98.890 V45.89   4. At risk for lymphedema  Z91.89 V49.89       Patient Active Problem List   Diagnosis    Anxiety    DD (diverticular disease)    Gastroesophageal reflux disease    Essential hypertension    Hyperlipidemia    Osteopenia    Bilateral low back pain without sciatica    Malignant neoplasm of female breast    Encounter for management of implanted device    Personal history of breast cancer    Constipation    Nausea    Hypothyroidism        Past Medical History:   Diagnosis Date    Anxiety     Arthritis     Cataract     EARLY. JIMMIE EYES    Chronic back pain     LOW BACK ACHES AT TIME FROM ARTHRTIS    Constipation     AT TIMES. NO RECENT ISSUES    Dermatitis     LEGS. STATES CLEARING UP NOW.    Disease of thyroid gland     HYPO    Diverticulosis     Colonoscopy 2014    Encounter for management of implanted device 2021    Erythema of face     Transitory Erythema Of The Face    GERD (gastroesophageal reflux disease)     ON OCC    History of chemotherapy     FOR LEFT BREAST CANCER    Hyperlipidemia     Hypertension     Malignant neoplasm of female breast 2021    Osteopenia     Tachycardia         Past Surgical History:   Procedure Laterality Date    BREAST BIOPSY  2021    Dr. Tirado     BREAST CYST ASPIRATION Right     BREAST CYST EXCISION Right     BREAST LUMPECTOMY WITH SENTINEL NODE BIOPSY Left 02/10/2022    Procedure: LEFT BREAST WIRE LOCALIZED LUMPECTOMY WITH SENTINEL NODE BIOPSY, POSSIBLE LEFT AXILLARY DISSECTION;  Surgeon: Becky Liu MD;  Location: [facility];   Service: General;  Laterality: Left;    COLONOSCOPY      COLONOSCOPY  2014    COLONOSCOPY N/A 10/6/2023    Procedure: COLONOSCOPY to cecum and TI with cold bx polypectomy;  Surgeon: Gavin Mead MD;  Location: [facility] ENDOSCOPY;  Service: Gastroenterology;  Laterality: N/A;  pre - nausea, constipation  post - diverticulosis, polyp    ENDOSCOPY N/A 10/6/2023    Procedure: ESOPHAGOGASTRODUODENOSCOPY with cold bx;  Surgeon: Gavin Mead MD;  Location: [facility] ENDOSCOPY;  Service: Gastroenterology;  Laterality: N/A;  pre - nausea, constipation  post - gastritis    EXOSTECTOMY Bilateral     Simple Bunion Exostectomy (Silver Procedure)    FOOT TENDON SURGERY  2012    VENOUS ACCESS DEVICE (PORT) INSERTION N/A 10/18/2021    Procedure: INSERTION VENOUS ACCESS DEVICE;  Surgeon: Becky Liu MD;  Location:  [facility] [place] [facility];  Service: General;  Laterality: N/A;       Visit Dx:    ICD-10-CM ICD-9-CM   1. Lymphedema of left arm  I89.0 457.1   2. Malignant neoplasm of upper-inner quadrant of left breast in female, estrogen receptor positive  C50.212 174.2    Z17.0 V86.0   3. S/P lumpectomy, left breast  Z98.890 V45.89   4. At risk for lymphedema  Z91.89 V49.89            Lymphedema       Row Name 07/22/24 1100             Subjective Pain    Able to rate subjective pain? yes  -LB      Pre-Treatment Pain Level 0  -LB         Subjective    Subjective Comments I am doing well. My wrist is better with a cortisone injection and wearing this brace.  -LB         Lymphedema Assessment    Lymphedema Classification LUE:;secondary;stage 1 (Spontaneously Reversible)  -LB      Lymphedema Cancer Related Sx left;lumpectomy;sentinel node biopsy  -LB      Lymph Nodes Removed # 10  -LB      Positive Lymph Nodes # 1  -LB      Chemo Received yes  -LB      Radiation Therapy Received yes  -LB      Infections or Cellulitis? no  -LB         Lymphedema Edema Assessment    Edema Assessment Comment hand negative for edema, noted inc in edema at L medial  elbow  -LB         LUE Quick Girth (cm)    Axilla 26.5 cm  -LB      Mid upper arm 27.3 cm  -LB      Elbow 23.7 cm  -LB      Mid forearm 20.5 cm  -LB      Wrist crease 14.4 cm  -LB      Web space 18.7 cm  -LB      Met-heads 17.5 cm  -LB      LUE Quick Girth Total 148.6  -LB         Compression/Skin Care    Compression/Skin Care compression garment  -LB         L-Dex Bioimpedence Screening    L-Dex Measurement Extremity LUE  -LB      L-Dex Patient Position Standing  -LB      L-Dex UE Dominate Side Right  -LB      L-Dex UE At Risk Side Left  -LB      L-Dex UE Pre Surgical Value No  -LB      L-Dex UE Score 17.1  -LB      L-Dex UE Baseline Score 2.9  -LB      L-Dex UE Value Change 14.2  -LB      L-Dex UE Comment remains elevated but improving  -LB      $ L-Dex Charge yes  -LB                User Key  (r) = Recorded By, (t) = Taken By, (c) = Cosigned By      Initials Name Provider Type    Edna Abdalla PT Physical Therapist                                    Therapy Education  Education Details: reviewed bioimpedance score and results, strong caution regarding scabs on L forearm and encouraged treating and avoiding picking, discussed weaning from glove if hand remains stable  Given: Symptoms/condition management, Edema management  Program: Reinforced  How Provided: Verbal  Provided to: Patient  Level of Understanding: Verbalized  62459 - PT Self Care/Mgmt Minutes: 15       OP Exercises       Row Name 07/22/24 1100             Subjective    Subjective Comments I am doing well. My wrist is better with a cortisone injection and wearing this brace.  -LB         Subjective Pain    Able to rate subjective pain? yes  -LB      Pre-Treatment Pain Level 0  -LB                User Key  (r) = Recorded By, (t) = Taken By, (c) = Cosigned By      Initials Name Provider Type    Edna Abdalla PT Physical Therapist                                 PT OP Goals       Row Name 07/22/24 1100          PT Short Term Goals    STG Date to  Achieve 07/17/23  -LB     STG 1 LDex Bioimpedence will decrease to 20 or lower showing improvement in lymphedema.  -LB     STG 1 Progress Met  -LB     STG 2 Pt/family independent with self bandaging for self management of condition.  -LB     STG 2 Progress Met  -LB     STG 3 Pt will demonstrate decreased girth measurements of >/= 5 cm on LUE to reduce infection risk and improve skin condition.  -LB     STG 3 Progress Met  -LB        Long Term Goals    LTG Date to Achieve 05/06/22  -LB     LTG 1 Pt will maintain LDex bioimpedance score WNL.  -LB     LTG 1 Progress Ongoing  -LB     LTG 1 Progress Comments outside of normal limits but improving at 17.1  -LB     LTG 2 Pt will acquire appropriately fitted compression garment and verbalize appropriate wear schedule.  -LB     LTG 2 Progress Met  -LB     LTG 3 Pt will verbalize signs and symptoms of lymphedema and steps to prevention.  -LB     LTG 3 Progress Met  -LB               User Key  (r) = Recorded By, (t) = Taken By, (c) = Cosigned By      Initials Name Provider Type    LB Edna Condon, PT Physical Therapist                     PT Assessment/Plan       Row Name 07/22/24 1120          PT Assessment    Functional Limitations Other (comment)  at risk for lymphedema  -LB     Impairments Impaired flexibility;Impaired lymphatic circulation;Sensation;Edema  -LB     Assessment Comments Pt returns for reassessment reporting reduced wrist pain and obvious reduction in hand/wrist swelling following cortisone injection and wearing brace. L forearm especially medial elbow with inc edema but pt also has visible open areas /scabs where she has picked at her skin. Strong encouragement to stop picking and treat scabs. Pt is compliant with compression wear and we discussed weaning from glove if hand remains stable. She continues MLD daily.  Repeat LDex bioimpedance score remains elevated but is improving today at 17.1. Circumferential measurements are slightly inc but inc is  distributed across entire arm. We reviewed compression garment use, exercise, MLD, and continued management of condition. Pt remains appropriate for continued skilled PT services to address deficits and continue to monitor lymphedema  -LB     Rehab Potential Good  -LB     Patient/caregiver participated in establishment of treatment plan and goals Yes  -LB     Patient would benefit from skilled therapy intervention Yes  -LB        PT Plan    PT Frequency Other (comment)  -LB     Predicted Duration of Therapy Intervention (PT) return in 3 months for reassessment  -LB     Planned CPT's? PT EVAL LOW COMPLEXITY: 45137;PT RE-EVAL: 36630;PT THER PROC EA 15 MIN: 36016;PT THER ACT EA 15 MIN: 21349;PT MANUAL THERAPY EA 15 MIN: 31381;PT NEUROMUSC RE-EDUCATION EA 15 MIN: 68317;PT SELF CARE/HOME MGMT/TRAIN EA 15: 50163;PT BIS XTRACELL FLUID ANALYSIS: 84128  -LB     PT Plan Comments repeat bioimpedance, has pt weaned from glove, compression garment replacement?  -LB               User Key  (r) = Recorded By, (t) = Taken By, (c) = Cosigned By      Initials Name Provider Type    Edna Abdalla, PT Physical Therapist                                 Time Calculation:   Start Time: 1045  Stop Time: 1115  Time Calculation (min): 30 min  Total Timed Code Minutes- PT: 15 minute(s)  Timed Charges  44919 - PT Self Care/Mgmt Minutes: 15  Total Minutes  Timed Charges Total Minutes: 15   Total Minutes: 15   Therapy Charges for Today       Code Description Service Date Service Provider Modifiers Qty    46142728818 HC PT BIS XTRACELL FLUID ANALYSIS 7/22/2024 Edna Condon, PT  1    26203198760 HC PT SELF CARE/MGMT/TRAIN EA 15 MIN 7/22/2024 Edna Condon, PT GP 1                      Edna Condon PT  7/22/2024

## 2024-08-19 ENCOUNTER — TELEPHONE (OUTPATIENT)
Dept: SURGERY | Facility: CLINIC | Age: 79
End: 2024-08-19
Payer: MEDICARE

## 2024-08-19 NOTE — TELEPHONE ENCOUNTER
Attempted to reach  patient to canc/mark patients appt on 9/25. Needs to moved up 9/11 or sooner with Yue. Anyone can schedule.

## 2024-08-20 ENCOUNTER — OFFICE VISIT (OUTPATIENT)
Dept: INTERNAL MEDICINE | Age: 79
End: 2024-08-20
Payer: MEDICARE

## 2024-08-20 VITALS
TEMPERATURE: 97.8 F | HEIGHT: 61 IN | WEIGHT: 128.8 LBS | DIASTOLIC BLOOD PRESSURE: 88 MMHG | SYSTOLIC BLOOD PRESSURE: 122 MMHG | BODY MASS INDEX: 24.32 KG/M2 | OXYGEN SATURATION: 97 % | HEART RATE: 87 BPM

## 2024-08-20 DIAGNOSIS — F41.9 ANXIETY: ICD-10-CM

## 2024-08-20 DIAGNOSIS — E03.9 HYPOTHYROIDISM, UNSPECIFIED TYPE: ICD-10-CM

## 2024-08-20 DIAGNOSIS — E78.5 HYPERLIPIDEMIA, UNSPECIFIED HYPERLIPIDEMIA TYPE: Primary | ICD-10-CM

## 2024-08-20 DIAGNOSIS — I10 ESSENTIAL HYPERTENSION: ICD-10-CM

## 2024-08-20 LAB
CHOLEST SERPL-MCNC: 153 MG/DL (ref 0–200)
CHOLEST/HDLC SERPL: 2.15 {RATIO}
HDLC SERPL-MCNC: 71 MG/DL (ref 40–60)
LDLC SERPL CALC-MCNC: 68 MG/DL (ref 0–100)
T4 FREE SERPL-MCNC: 1.24 NG/DL (ref 0.92–1.68)
TRIGL SERPL-MCNC: 71 MG/DL (ref 0–150)
TSH SERPL DL<=0.005 MIU/L-ACNC: 2.19 UIU/ML (ref 0.27–4.2)
VLDLC SERPL CALC-MCNC: 14 MG/DL (ref 5–40)

## 2024-08-20 PROCEDURE — G2211 COMPLEX E/M VISIT ADD ON: HCPCS | Performed by: NURSE PRACTITIONER

## 2024-08-20 PROCEDURE — 3074F SYST BP LT 130 MM HG: CPT | Performed by: NURSE PRACTITIONER

## 2024-08-20 PROCEDURE — 1125F AMNT PAIN NOTED PAIN PRSNT: CPT | Performed by: NURSE PRACTITIONER

## 2024-08-20 PROCEDURE — 99214 OFFICE O/P EST MOD 30 MIN: CPT | Performed by: NURSE PRACTITIONER

## 2024-08-20 PROCEDURE — 3079F DIAST BP 80-89 MM HG: CPT | Performed by: NURSE PRACTITIONER

## 2024-08-20 NOTE — PROGRESS NOTES
"    I N T E R N A L  M E D I C I N E  JULEE ALEMAN, APRMIKE      ENCOUNTER DATE:  08/20/2024    Neela Ramirez / 79 y.o. / female      CHIEF COMPLAINT / REASON FOR OFFICE VISIT     Hypertension, Hypothyroidism, Hyperlipidemia, and Anxiety      ASSESSMENT & PLAN     Problem List Items Addressed This Visit       Anxiety    Relevant Medications    escitalopram (LEXAPRO) 20 MG tablet    Essential hypertension    Relevant Medications    metoprolol succinate XL (TOPROL-XL) 25 MG 24 hr tablet    amLODIPine (NORVASC) 5 MG tablet    Hyperlipidemia - Primary    Relevant Medications    rosuvastatin (CRESTOR) 10 MG tablet    Other Relevant Orders    Lipid Panel With / Chol / HDL Ratio    Hypothyroidism    Relevant Medications    metoprolol succinate XL (TOPROL-XL) 25 MG 24 hr tablet    levothyroxine (SYNTHROID, LEVOTHROID) 25 MCG tablet    Other Relevant Orders    TSH+Free T4     Orders Placed This Encounter   Procedures    Lipid Panel With / Chol / HDL Ratio    TSH+Free T4     No orders of the defined types were placed in this encounter.      SUMMARY/DISCUSSION  Patient has upcoming laboratory evaluation with specialty.  Continue all specialty management will check cholesterol thyroid today.  Follow-up with primary care as scheduled in February, earlier if needed    Next Appointment with me: Visit date not found    Return for Next scheduled follow up.      VITAL SIGNS     Visit Vitals  /88 (BP Location: Right arm, Patient Position: Sitting, Cuff Size: Adult)   Pulse 87   Temp 97.8 °F (36.6 °C) (Temporal)   Ht 154.9 cm (61\")   Wt 58.4 kg (128 lb 12.8 oz)   SpO2 97%   BMI 24.34 kg/m²       Wt Readings from Last 3 Encounters:   08/20/24 58.4 kg (128 lb 12.8 oz)   05/21/24 58.5 kg (129 lb)   05/14/24 58.8 kg (129 lb 11.2 oz)     Body mass index is 24.34 kg/m².      MEDICATIONS AT THE TIME OF OFFICE VISIT     Current Outpatient Medications on File Prior to Visit   Medication Sig    amLODIPine (NORVASC) 5 MG tablet TAKE 1 TABLET " BY MOUTH DAILY    anastrozole (ARIMIDEX) 1 MG tablet Take 1 tablet by mouth Daily.    Cholecalciferol (Vitamin D) 50 MCG (2000 UT) capsule Take  by mouth.    cyclobenzaprine (FLEXERIL) 5 MG tablet Take 1 tablet by mouth three times daily as needed for muscle spasm (Patient taking differently: Take 1 tablet by mouth 3 (Three) Times a Day As Needed for Muscle Spasms.)    desoximetasone (TOPICORT) 0.25 % ointment Apply 1 Application topically to the appropriate area as directed 2 (Two) Times a Day As Needed (TO IRRITATED AREA).    escitalopram (LEXAPRO) 20 MG tablet TAKE 1 TABLET BY MOUTH DAILY    levothyroxine (SYNTHROID, LEVOTHROID) 25 MCG tablet TAKE 1 TABLET BY MOUTH EVERY MORNING 30 MINUTES BEFORE BREAKFAST OR ANY OTHER MEDICATION    metoprolol succinate XL (TOPROL-XL) 25 MG 24 hr tablet TAKE ONE TABLET BY MOUTH DAILY    mupirocin (BACTROBAN) 2 % ointment APPLY A SMALL AMOUNT OF OINTMENT TOPICALLY TO AFFECTED AREA EVERY NIGHT    omeprazole (priLOSEC) 20 MG capsule Take 1 capsule by mouth Daily.    ondansetron (Zofran) 8 MG tablet Take 1 tablet by mouth Every 8 (Eight) Hours As Needed for Nausea or Vomiting.    pantoprazole (PROTONIX) 40 MG EC tablet Take 1 tablet by mouth Daily.    PreviDent 5000 Dry Mouth 1.1 % gel     rosuvastatin (CRESTOR) 10 MG tablet TAKE ONE TABLET BY MOUTH DAILY    [DISCONTINUED] anastrozole (ARIMIDEX) 1 MG tablet Take 1 tablet by mouth Daily. (Patient not taking: Reported on 8/20/2024)     No current facility-administered medications on file prior to visit.          HISTORY OF PRESENT ILLNESS     Hypertension stable on amlodipine 5 mg metoprolol 25. Denies any chest pain, shortness of air, chest palpitations.      Hyperlipidemia Controlled with rosuvastatin 10 mg daily, last LDL 84. No myalgias with medication.      Hypothyroidism controlled with thyroxine 25 MCG daily. Last TSH and free T4 within normal     Generalized anxiety well-controlled Lexapro 20 mg daily no panic attack or  thoughts of suicide or self-harm.     Saw her dermatologist November 13, 2023 for seborrheic keratosis, melanocytic nevi, tinea unguium.     Saw cardiology May 2022 echocardiogram February 2023 normal.     Upper and lower GI completed October 2023.  Polyp found on colonoscopy.  Erythematous mucosa is in the stomach.  Mild inflammation on biopsy with no evidence of H. pylori, on PPI daily.  Colon polyp that was removed was not cancerous and not precancerous.  Gastroenterology deferring decision for repeat colonoscopy at age.    Patient was seen by  and Pam podiatrist for onychomycosis of toenails, pain in the left foot and hammertoe June 18, 2024.    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  A splint was applied to immobilize the thumb and discussed that she can take it off at night.  Plan for hand referral if no improvement in symptoms.    Seen by Erika BOJORQUEZ oncology for breast cancer left-sided estrogen receptor positive.  At that time she was continued on anastrozole 1 mg daily.  Due for screening mammogram August 2024  and MRI February 2025.  Mammogram scheduled for August 23.  Osteoporosis with [facility] through their care team.  Will need to switch her to tamoxifen if she needs vaginal estradiol.    Seen by Dr. Olmos ophthalmology for cataracts and presbyopia April 26, 2024.    Seen by urology care team, Roxann BOJORQUEZ for microscopic hematuria, atrophic vaginitis, stress incontinence.  Seen February 2024.    REVIEW OF SYSTEMS     Constitutional neg except per HPI   Resp neg  CV neg     PHYSICAL EXAMINATION     Physical Exam  Constitutional  No distress  Cardiovascular Rate  normal . Rhythm: regular . Heart sounds:  normal  Pulmonary/Chest  Effort normal. Breath sounds:  normal  Psychiatric  Alert. Judgment and thought content normal. Mood normal        REVIEWED DATA     Labs:   Lab Results   Component Value Date    GLUCOSE 103 (H)  02/20/2024    BUN 19 02/20/2024    CREATININE 0.74 02/20/2024    EGFRRESULT 82.9 02/15/2024    EGFR 82.9 02/20/2024    BCR 25.7 (H) 02/20/2024    K 4.5 02/20/2024    CO2 26.2 02/20/2024    CALCIUM 9.5 02/20/2024    PROTENTOTREF 7.2 02/15/2024    ALBUMIN 4.3 02/20/2024    BILITOT 0.3 02/20/2024    AST 26 02/20/2024    ALT 16 02/20/2024     Lab Results   Component Value Date    WBC 6.89 02/20/2024    HGB 14.0 02/20/2024    HCT 42.0 02/20/2024    .9 (H) 02/20/2024     02/20/2024     Lab Results   Component Value Date    CHLPL 191 02/15/2024    TRIG 164 (H) 02/15/2024    HDL 80 (H) 02/15/2024    LDL 84 02/15/2024      Lab Results   Component Value Date    TSH 2.930 02/15/2024     Brief Urine Lab Results  (Last result in the past 365 days)        Color   Clarity   Blood   Leuk Est   Nitrite   Protein   CREAT   Urine HCG        02/15/24 1023 Yellow   Clear   Trace   Negative   Negative   Trace                 Lab Results   Component Value Date    HGBA1C 5.40 02/15/2024       Imaging:           Medical Tests:           Summary of old records / correspondence / consultant report:           Request outside records:

## 2024-08-21 ENCOUNTER — TELEPHONE (OUTPATIENT)
Dept: CARDIOLOGY | Facility: CLINIC | Age: 79
End: 2024-08-21
Payer: MEDICARE

## 2024-08-21 NOTE — TELEPHONE ENCOUNTER
Called and left VM, will continue to try to reach pt.    HUB- please put patient straight through to triage    Kenia Tripp RN  Triage RN  08/21/24 08:40 EDT

## 2024-08-21 NOTE — TELEPHONE ENCOUNTER
I spoke with Neela Ramirez and updated pt on results from provider.  Pt verbalized understanding and has no further questions at this time.    Thank you,    Kathryn MAURICE, RN  Triage [facility]  08/21/24 08:47 EDT

## 2024-08-23 ENCOUNTER — HOSPITAL ENCOUNTER (OUTPATIENT)
Dept: MAMMOGRAPHY | Facility: HOSPITAL | Age: 79
Discharge: HOME | End: 2024-08-23
Admitting: INTERNAL MEDICINE
Payer: MEDICARE

## 2024-08-23 DIAGNOSIS — Z12.31 ENCOUNTER FOR SCREENING MAMMOGRAM FOR MALIGNANT NEOPLASM OF BREAST: ICD-10-CM

## 2024-08-23 DIAGNOSIS — Z17.0 MALIGNANT NEOPLASM OF LEFT BREAST IN FEMALE, ESTROGEN RECEPTOR POSITIVE, UNSPECIFIED SITE OF BREAST: ICD-10-CM

## 2024-08-23 DIAGNOSIS — C50.912 MALIGNANT NEOPLASM OF LEFT BREAST IN FEMALE, ESTROGEN RECEPTOR POSITIVE, UNSPECIFIED SITE OF BREAST: ICD-10-CM

## 2024-08-23 PROCEDURE — 77063 BREAST TOMOSYNTHESIS BI: CPT

## 2024-08-23 PROCEDURE — 77067 SCR MAMMO BI INCL CAD: CPT

## 2024-08-24 NOTE — RESEARCH
[facility]  CONSULTING IN BLOOD DISORDERS & CANCER  Durga Bey, Maxx Curry, Juan Miguel, Rishi,  Óscar, Yo, Darlyn, Quentin Barron & Daniel  [address], [address]  [place], [place] [address]  Phone: [phone]  Fax: [phone]      Patient Name:  Neela Ramirez  YOB: 1945  Patient Age:  76 y.o.  Patient's Sex:  female    Date of Service:  12/14/2021    Provider:                       RESEARCH NOTE                  Research nurse met with patient today for the above mentioned protocol for C3D15.  Reviewed adverse events and no changes noted, conmeds and no changes noted.  Chemotherapy was administered without any problems. Will meet with patient for next planned infusion  C4D1 on 12/21/21.  Will continue to monitor.  ECOG was 0  
The patient is a 24y Female complaining of vomiting blood.

## 2024-08-27 ENCOUNTER — TELEPHONE (OUTPATIENT)
Dept: RADIATION ONCOLOGY | Facility: HOSPITAL | Age: 79
End: 2024-08-27
Payer: MEDICARE

## 2024-08-28 ENCOUNTER — OFFICE VISIT (OUTPATIENT)
Dept: RADIATION ONCOLOGY | Facility: HOSPITAL | Age: 79
End: 2024-08-28
Payer: MEDICARE

## 2024-08-28 VITALS
OXYGEN SATURATION: 97 % | WEIGHT: 128.8 LBS | BODY MASS INDEX: 24.34 KG/M2 | HEART RATE: 86 BPM | SYSTOLIC BLOOD PRESSURE: 121 MMHG | DIASTOLIC BLOOD PRESSURE: 76 MMHG

## 2024-08-28 DIAGNOSIS — C50.912 MALIGNANT NEOPLASM OF LEFT BREAST IN FEMALE, ESTROGEN RECEPTOR POSITIVE, UNSPECIFIED SITE OF BREAST: Primary | ICD-10-CM

## 2024-08-28 DIAGNOSIS — Z17.0 MALIGNANT NEOPLASM OF LEFT BREAST IN FEMALE, ESTROGEN RECEPTOR POSITIVE, UNSPECIFIED SITE OF BREAST: Primary | ICD-10-CM

## 2024-08-28 PROCEDURE — G0463 HOSPITAL OUTPT CLINIC VISIT: HCPCS | Performed by: RADIOLOGY

## 2024-08-28 NOTE — PROGRESS NOTES
CC: 2 year follow up for left breast cancer cT2N0 [id number] ER NH pos Her 2 neg     S:  I had the pleasure of seeing [facility]ela [facility].  She returns today for follow up now 2 year out from completion of her radiation therapy to the left breast.  She is a pleasant 79 year old female with cT2N0 left breast cancer, [id number] ER NH pos er 2 neg. She has been doing well since I last saw her.  She completed adjuvant radiation on 7/19/22.  She received a dose of 50Gy to the left breast and regional nodes followed by a 1000cgy boost. she had a mammogram 8/3/24 which was benign.         Review of Systems   Constitutional: Negative.    HENT: Negative.     Respiratory: Negative.     Psychiatric/Behavioral: Negative.         Past Medical History:   Diagnosis Date   • Anxiety    • Arthritis    • Cataract     EARLY. JIMMIE EYES   • Chronic back pain     LOW BACK ACHES AT TIME FROM ARTHRTIS   • Constipation     AT TIMES. NO RECENT ISSUES   • Dermatitis     LEGS. STATES CLEARING UP NOW.   • Disease of thyroid gland     HYPO   • Diverticulosis     Colonoscopy November 2014   • Encounter for management of implanted device 11/02/2021   • Erythema of face     Transitory Erythema Of The Face   • GERD (gastroesophageal reflux disease)     ON OCC   • History of chemotherapy     FOR LEFT BREAST CANCER   • Hyperlipidemia    • Hypertension    • Malignant neoplasm of female breast 09/27/2021   • Osteopenia    • Tachycardia          Past Surgical History:   Procedure Laterality Date   • BREAST BIOPSY  09/2021    Dr. Tirado    • BREAST CYST ASPIRATION Right    • BREAST CYST EXCISION Right    • BREAST LUMPECTOMY WITH SENTINEL NODE BIOPSY Left 02/10/2022    Procedure: LEFT BREAST WIRE LOCALIZED LUMPECTOMY WITH SENTINEL NODE BIOPSY, POSSIBLE LEFT AXILLARY DISSECTION;  Surgeon: Becky Liu MD;  [facility]: [facility];  Service: General;  Laterality: Left;   • COLONOSCOPY     • COLONOSCOPY  2014   • COLONOSCOPY N/A  10/6/2023    Procedure: COLONOSCOPY to cecum and TI with cold bx polypectomy;  Surgeon: Gavin Mead MD;  Location[facility];  Service: Gastroenterology;  Laterality: N/A;  pre - nausea, constipation  post - diverticulosis, polyp   • ENDOSCOPY N/A 10/6/2023    Procedure: ESOPHAGOGASTRODUODENOSCOPY with cold bx;  Surgeon: Gavin Mead MD;  Location: [facility];  Service: Gastroenterology;  Laterality: N/A;  pre - nausea, constipation  post - gastritis   • EXOSTECTOMY Bilateral     Simple Bunion Exostectomy (Silver Procedure)   • FOOT TENDON SURGERY     • VENOUS ACCESS DEVICE (PORT) INSERTION N/A 10/18/2021    Procedure: INSERTION VENOUS ACCESS DEVICE;  Surgeon: Becky Liu MD;  [facility]  [facility];  Service: General;  Laterality: N/A;         Social History     Socioeconomic History   • Marital status:      Spouse name: Pj   • Number of children: 1   Tobacco Use   • Smoking status: Former     Current packs/day: 0.00     Average packs/day: 0.3 packs/day for 0.5 years (0.1 ttl pk-yrs)     Types: Cigarettes     Start date: 1969     Quit date: 1970     Years since quittin.6   • Smokeless tobacco: Never   • Tobacco comments:     SOCIALLY IN EARLY    Vaping Use   • Vaping status: Never Used   Substance and Sexual Activity   • Alcohol use: Yes     Comment: social drinker   • Drug use: Never   • Sexual activity: Defer         Family History   Problem Relation Age of Onset   • Hypertension Mother    • Breast cancer Mother    • Arthritis Father    • Heart disease Father    • Hypertension Father    • Malig Hyperthermia Neg Hx           Objective    Physical Exam  Constitutional:       Appearance: Normal appearance.   Chest:          Comments: Fibrosis of left breast with palpable thickening upper left chest wall, no suspicious masses or adenopathy appreciated   Neurological:      Mental Status: She is alert.         Current Outpatient Medications on File Prior to Visit    Medication Sig Dispense Refill   • amLODIPine (NORVASC) 5 MG tablet TAKE 1 TABLET BY MOUTH DAILY 90 tablet 1   • anastrozole (ARIMIDEX) 1 MG tablet Take 1 tablet by mouth Daily. 90 tablet 3   • Cholecalciferol (Vitamin D) 50 MCG (2000 UT) capsule Take  by mouth.     • cyclobenzaprine (FLEXERIL) 5 MG tablet Take 1 tablet by mouth three times daily as needed for muscle spasm (Patient taking differently: Take 1 tablet by mouth 3 (Three) Times a Day As Needed for Muscle Spasms.) 30 tablet 0   • desoximetasone (TOPICORT) 0.25 % ointment Apply 1 Application topically to the appropriate area as directed 2 (Two) Times a Day As Needed (TO IRRITATED AREA).     • escitalopram (LEXAPRO) 20 MG tablet TAKE 1 TABLET BY MOUTH DAILY 90 tablet 1   • levothyroxine (SYNTHROID, LEVOTHROID) 25 MCG tablet TAKE 1 TABLET BY MOUTH EVERY MORNING 30 MINUTES BEFORE BREAKFAST OR ANY OTHER MEDICATION 90 tablet 1   • metoprolol succinate XL (TOPROL-XL) 25 MG 24 hr tablet TAKE ONE TABLET BY MOUTH DAILY 90 tablet 3   • mupirocin (BACTROBAN) 2 % ointment APPLY A SMALL AMOUNT OF OINTMENT TOPICALLY TO AFFECTED AREA EVERY NIGHT     • omeprazole (priLOSEC) 20 MG capsule Take 1 capsule by mouth Daily.     • ondansetron (Zofran) 8 MG tablet Take 1 tablet by mouth Every 8 (Eight) Hours As Needed for Nausea or Vomiting. 60 tablet 3   • pantoprazole (PROTONIX) 40 MG EC tablet Take 1 tablet by mouth Daily. 30 tablet 0   • PreviDent 5000 Dry Mouth 1.1 % gel      • rosuvastatin (CRESTOR) 10 MG tablet TAKE ONE TABLET BY MOUTH DAILY 90 tablet 3     No current facility-administered medications on file prior to visit.       ALLERGIES:  No Known Allergies    There were no vitals taken for this visit.         5/14/2024    11:48 AM   Current Status   ECOG score 0         Assessment & Plan     79 year old female with cT2N0 left breast cancer, [id number] ER OK pos her 2 neg now 2 year out from radiation.  She is scheduled for her yearly mammogram August 2025. She will  have regular follow up with her medical oncologist.  I  have not scheduled a follow up with me but I asked her to call with any questions or concerns in the future.     I personally spent greater than 20 minutes today assessing, managing, discussing and documenting my visit with the patient. That time includes review of records, imaging and pathology reports, obtaining my own history, performing a medically appropriate evaluation, counseling and educating the patient, discussing goals, logistics, alternatives and risks of my recommendations, surveillance options and potential outcomes. It also includes the time documenting the clinical information in the EMR and communicating my recommendations to the other involved physicians.               Thank you very much for allowing me to participate in the care of this very pleasant patient.    Sincerely,      Rula Schneider MD

## 2024-08-30 ENCOUNTER — OFFICE VISIT (OUTPATIENT)
Dept: ONCOLOGY | Facility: CLINIC | Age: 79
End: 2024-08-30
Payer: MEDICARE

## 2024-08-30 ENCOUNTER — LAB (OUTPATIENT)
Dept: LAB | Facility: HOSPITAL | Age: 79
End: 2024-08-30
Payer: MEDICARE

## 2024-08-30 ENCOUNTER — INFUSION (OUTPATIENT)
Dept: ONCOLOGY | Facility: HOSPITAL | Age: 79
End: 2024-08-30
Payer: MEDICARE

## 2024-08-30 VITALS
WEIGHT: 127 LBS | HEART RATE: 63 BPM | SYSTOLIC BLOOD PRESSURE: 116 MMHG | BODY MASS INDEX: 24 KG/M2 | TEMPERATURE: 98.3 F | DIASTOLIC BLOOD PRESSURE: 72 MMHG | OXYGEN SATURATION: 96 %

## 2024-08-30 DIAGNOSIS — Z17.0 MALIGNANT NEOPLASM OF LEFT BREAST IN FEMALE, ESTROGEN RECEPTOR POSITIVE, UNSPECIFIED SITE OF BREAST: Primary | ICD-10-CM

## 2024-08-30 DIAGNOSIS — Z79.811 LONG TERM (CURRENT) USE OF AROMATASE INHIBITORS: ICD-10-CM

## 2024-08-30 DIAGNOSIS — M85.80 OSTEOPENIA, UNSPECIFIED LOCATION: Primary | ICD-10-CM

## 2024-08-30 DIAGNOSIS — C50.912 MALIGNANT NEOPLASM OF LEFT BREAST IN FEMALE, ESTROGEN RECEPTOR POSITIVE, UNSPECIFIED SITE OF BREAST: Primary | ICD-10-CM

## 2024-08-30 DIAGNOSIS — M85.80 OSTEOPENIA, UNSPECIFIED LOCATION: ICD-10-CM

## 2024-08-30 LAB
ALBUMIN SERPL-MCNC: 4.1 G/DL (ref 3.5–5.2)
ALBUMIN/GLOB SERPL: 1.6 G/DL
ALP SERPL-CCNC: 94 U/L (ref 39–117)
ALT SERPL W P-5'-P-CCNC: 14 U/L (ref 1–33)
ANION GAP SERPL CALCULATED.3IONS-SCNC: 10.1 MMOL/L (ref 5–15)
AST SERPL-CCNC: 24 U/L (ref 1–32)
BASOPHILS # BLD AUTO: 0.05 10*3/MM3 (ref 0–0.2)
BASOPHILS NFR BLD AUTO: 0.8 % (ref 0–1.5)
BILIRUB SERPL-MCNC: 0.4 MG/DL (ref 0–1.2)
BUN SERPL-MCNC: 15 MG/DL (ref 8–23)
BUN/CREAT SERPL: 17.6 (ref 7–25)
CALCIUM SPEC-SCNC: 9.3 MG/DL (ref 8.6–10.5)
CHLORIDE SERPL-SCNC: 105 MMOL/L (ref 98–107)
CO2 SERPL-SCNC: 24.9 MMOL/L (ref 22–29)
CREAT SERPL-MCNC: 0.85 MG/DL (ref 0.57–1)
DEPRECATED RDW RBC AUTO: 47.7 FL (ref 37–54)
EGFRCR SERPLBLD CKD-EPI 2021: 69.8 ML/MIN/1.73
EOSINOPHIL # BLD AUTO: 0.14 10*3/MM3 (ref 0–0.4)
EOSINOPHIL NFR BLD AUTO: 2.2 % (ref 0.3–6.2)
ERYTHROCYTE [DISTWIDTH] IN BLOOD BY AUTOMATED COUNT: 12.7 % (ref 12.3–15.4)
GLOBULIN UR ELPH-MCNC: 2.6 GM/DL
GLUCOSE SERPL-MCNC: 94 MG/DL (ref 65–99)
HCT VFR BLD AUTO: 41.9 % (ref 34–46.6)
HGB BLD-MCNC: 13.7 G/DL (ref 12–15.9)
IMM GRANULOCYTES # BLD AUTO: 0.03 10*3/MM3 (ref 0–0.05)
IMM GRANULOCYTES NFR BLD AUTO: 0.5 % (ref 0–0.5)
LYMPHOCYTES # BLD AUTO: 1.4 10*3/MM3 (ref 0.7–3.1)
LYMPHOCYTES NFR BLD AUTO: 22.3 % (ref 19.6–45.3)
MAGNESIUM SERPL-MCNC: 2.1 MG/DL (ref 1.6–2.4)
MCH RBC QN AUTO: 33.4 PG (ref 26.6–33)
MCHC RBC AUTO-ENTMCNC: 32.7 G/DL (ref 31.5–35.7)
MCV RBC AUTO: 102.2 FL (ref 79–97)
MONOCYTES # BLD AUTO: 0.63 10*3/MM3 (ref 0.1–0.9)
MONOCYTES NFR BLD AUTO: 10 % (ref 5–12)
NEUTROPHILS NFR BLD AUTO: 4.03 10*3/MM3 (ref 1.7–7)
NEUTROPHILS NFR BLD AUTO: 64.2 % (ref 42.7–76)
NRBC BLD AUTO-RTO: 0 /100 WBC (ref 0–0.2)
PHOSPHATE SERPL-MCNC: 3.3 MG/DL (ref 2.5–4.5)
PLATELET # BLD AUTO: 220 10*3/MM3 (ref 140–450)
PMV BLD AUTO: 9.5 FL (ref 6–12)
POTASSIUM SERPL-SCNC: 4.2 MMOL/L (ref 3.5–5.2)
PROT SERPL-MCNC: 6.7 G/DL (ref 6–8.5)
RBC # BLD AUTO: 4.1 10*6/MM3 (ref 3.77–5.28)
SODIUM SERPL-SCNC: 140 MMOL/L (ref 136–145)
WBC NRBC COR # BLD AUTO: 6.28 10*3/MM3 (ref 3.4–10.8)

## 2024-08-30 PROCEDURE — 84100 ASSAY OF PHOSPHORUS: CPT

## 2024-08-30 PROCEDURE — 96372 THER/PROPH/DIAG INJ SC/IM: CPT

## 2024-08-30 PROCEDURE — 85025 COMPLETE CBC W/AUTO DIFF WBC: CPT

## 2024-08-30 PROCEDURE — 80053 COMPREHEN METABOLIC PANEL: CPT

## 2024-08-30 PROCEDURE — 36415 COLL VENOUS BLD VENIPUNCTURE: CPT

## 2024-08-30 PROCEDURE — 25010000002 DENOSUMAB 60 MG/ML SOLUTION PREFILLED SYRINGE: Performed by: NURSE PRACTITIONER

## 2024-08-30 PROCEDURE — 83735 ASSAY OF MAGNESIUM: CPT

## 2024-08-30 RX ORDER — ANASTROZOLE 1 MG/1
1 TABLET ORAL DAILY
Qty: 90 TABLET | Refills: 3 | Status: SHIPPED | OUTPATIENT
Start: 2024-08-30

## 2024-08-30 RX ADMIN — DENOSUMAB 60 MG: 60 INJECTION SUBCUTANEOUS at 12:37

## 2024-08-30 NOTE — PROGRESS NOTES
Subjective   Neela Ramirez is a 79 y.o. female.  Referred by Dr. 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  A bilateral diagnostic mammogram was performed for further evaluation of the same.    8/17/2021-bilateral diagnostic mammogram-scattered fibroglandular densities throughout both breasts.  In the posterior one third upper inner quadrant of the left breast at the site of palpable concern, 11 o'clock position there is a mass with adjacent pleomorphic calcifications.  The mass and the calcifications measure on order of 1.5 cm in greatest dimension.  No evidence of axillary lymphadenopathy appreciated.  Stable microcalcifications seen in other areas of the left breast on right breast.  Ultrasound-at 11 o'clock position, 5 cm from the nipple in the left breast there is an irregular hypoechoic mass which measures 2.4 x 2 x 1.6 cm.    Impression  1.irregular 2.4 cm mass in the left breast at the site of palpable concern at 11:00, 5 cm from the nipple.  Ultrasound-guided biopsy of the mass recommended  No finding suspicious for malignancy in the right breast    9/8/2021-left breast ultrasound-guided biopsy  Pathology consistent with invasive ductal carcinoma with lobular features.  Grade 3.  The carcinoma measures 7 mm in greatest dimension.  Focally suspicious for lymphovascular space invasion.  ER low +1-10%, intensity week  CO negative  HER-2 3+ on immunohistochemistry  Ki-67 70%    MRI of the breast 10/8/2021-Biopsy-proven malignancy in the left breast at 11:00 measuring 2.8 cm with a centrally located metallic clip.  No other suspicious findings are seen within the left breast or no left axillary lymphadenopathy.  No suspicious findings of the right breast    Echocardiogram 10/8/2021 was normal  ejection fraction of 56 to 60% and strain of -20    She denies any new bone pains, dyspnea, cough, abdominal pain nausea vomiting or recent changes in weight.    She had 2 previous breast aspirations in her 20s.  No other breast biopsies  She does not know much about her family hence limited family history.    She received neoadjuvant chemotherapy on EA 1181 trial with Taxol Perjeta and Herceptin.  She completed 12 weeks of Taxol Herceptin and Perjeta.    Had an abnormal EKG preop and hence a stress test was performed on 2/3/2022 which showed a normal left ventricular ejection fraction and LVEF at stress was 73%.  Considered a low risk study with normal myocardial perfusion imaging.    She underwent a left breast lumpectomy and a sentinel lymph node biopsy on 2/10/2022.    Pathology was consistent with residual tumor which was pleomorphic invasive lobular carcinoma, poorly differentiated, grade 3  Tumor measured 18 mm  Margins negative for invasive carcinoma  Associated lobular carcinoma in situ noted  1 out of 10 lymph nodes was positive for metastatic focus measuring 3.5 mm.    Receptors were repeated on the pleomorphic invasive lobular carcinoma  ER +91 to 100% strong  MD +61 to 70% moderate  HER-2 negative  Ki-67 55%    Receptors performed on the metastatic lymph node  ER +81-90% strong  MD negative  HER-2 2+ on immunohistochemistry, FISH pending.    ypT1 cN1 aM0    2/28/2022-CT of the chest abdomen and pelvis  Fluid collection in the upper left breast measuring 6 x 2.5 x 4.9 cm, postoperative seroma.  Skin thickening of the left breast likely postsurgical.  Fluid collections left axilla measuring 2.9 x 1.4 x 2.9 cm.  No other suspicious abnormalities on the CT of the chest  CT abdomen with no evidence of metastatic disease.  Mild colonic diverticulosis.  Small uterine fibroid.    3/1/2022-bone scan  No evidence of metastatic disease.  Multifocal degenerative arthritic uptake without scintigraphic evidence to  suggest metastatic disease.    3/1/2022-echocardiogram  Left ventricular ejection fraction 56-60%.  Normal global LV strain of -20.5%.    3/11/2022-cycle 1 of AC    Completed 4 cycles of AC    6/3/2022-cycle 1 Kadcyla    7/19/2022-completed adjuvant radiation    7/20/2022-started adjuvant anastrozole    8/17/2022-bilateral diagnostic mammogram-benign    8/21/2023-bilateral screening mammogram benign    Interval history  Neela returns today for lab review and Prolia.  She continues on anastrozole daily.  She is complaining of vaginal dryness.  She does have very mild and occasional arthralgias, not requiring any medication and tolerable.  Continues with lymphedema to the left upper extremity, continues wearing her sleeve and massage 3 times per week.  Appetite adequate.  Bowels moving regularly.  Denies nausea, vomiting, abdominal pain, cough/shortness of breath, new bone pain.  Denies hot flashes.  Denies any new breast mass.    Screening mammogram 8/23/2024 benign.    The following portions of the patient's history were reviewed and updated as appropriate: allergies, current medications, past family history, past medical history, past social history, past surgical history and problem list.    Past Medical History:   Diagnosis Date    Anxiety     Arthritis     Cataract     EARLY. JIMMIE EYES    Chronic back pain     LOW BACK ACHES AT TIME FROM ARTHRTIS    Constipation     AT TIMES. NO RECENT ISSUES    Dermatitis     LEGS. STATES CLEARING UP NOW.    Disease of thyroid gland     HYPO    Diverticulosis     Colonoscopy November 2014    Encounter for management of implanted device 11/02/2021    Erythema of face     Transitory Erythema Of The Face    GERD (gastroesophageal reflux disease)     ON OCC    History of chemotherapy     FOR LEFT BREAST CANCER    Hyperlipidemia     Hypertension     Malignant neoplasm of female breast 09/27/2021    Osteopenia     Tachycardia         Past Surgical History:   Procedure Laterality Date     BREAST BIOPSY  2021    Dr. Tirado     BREAST CYST ASPIRATION Right     BREAST CYST EXCISION Right     BREAST LUMPECTOMY WITH SENTINEL NODE BIOPSY Left 02/10/2022    Procedure: LEFT BREAST WIRE LOCALIZED LUMPECTOMY WITH SENTINEL NODE BIOPSY, POSSIBLE LEFT AXILLARY DISSECTION;  Surgeon: Becky Liu MD;  Location: [facility] OR [facility] [place];  Service: General;  Laterality: Left;    COLONOSCOPY      COLONOSCOPY      COLONOSCOPY N/A 10/6/2023    Procedure: COLONOSCOPY to cecum and TI with cold bx polypectomy;  Surgeon: Gavin Mead MD;  Location: [facility] ENDOSCOPY;  Service: Gastroenterology;  Laterality: N/A;  pre - nausea, constipation  post - diverticulosis, polyp    ENDOSCOPY N/A 10/6/2023    Procedure: ESOPHAGOGASTRODUODENOSCOPY with cold bx;  Surgeon: Gavin Mead MD;  Location: [facility] ENDOSCOPY;  Service: Gastroenterology;  Laterality: N/A;  pre - nausea, constipation  post - gastritis    EXOSTECTOMY Bilateral     Simple Bunion Exostectomy (Silver Procedure)    FOOT TENDON SURGERY      VENOUS ACCESS DEVICE (PORT) INSERTION N/A 10/18/2021    Procedure: INSERTION VENOUS ACCESS DEVICE;  Surgeon: Becky Liu MD;  Location: [facility] OR [facility] [place];  Service: General;  Laterality: N/A;        Family History   Problem Relation Age of Onset    Hypertension Mother     Breast cancer Mother     Arthritis Father     Heart disease Father     Hypertension Father     Malig Hyperthermia Neg Hx         Social History     Socioeconomic History    Marital status:      Spouse name: Pj    Number of children: 1   Tobacco Use    Smoking status: Former     Current packs/day: 0.00     Average packs/day: 0.3 packs/day for 0.5 years (0.1 ttl pk-yrs)     Types: Cigarettes     Start date: 1969     Quit date: 1970     Years since quittin.6    Smokeless tobacco: Never    Tobacco comments:     SOCIALLY IN EARLY    Vaping Use    Vaping status: Never Used   Substance and Sexual Activity    Alcohol use: Yes     Comment:  social drinker    Drug use: Never    Sexual activity: Defer        OB History               Para        Term   0            AB        Living             SAB        IAB        Ectopic        Molar        Multiple        Live Births                 Age at menarche-13  Age at menopause-50  Nulliparous  Breast-feeding-N/A   0 para 0  0  Oral contraceptive pill use-none  Hormone replacement therapy-7 years    No Known Allergies     Review of systems as mentioned HPI otherwise negative    Objective   Vitals:    24 1158   BP: 116/72   Pulse: 63   Temp: 98.3 °F (36.8 °C)   SpO2: 96%   Weight: 57.6 kg (127 lb)   PainSc: 0-No pain         ECOG performance status-0    Physical Exam  Vitals reviewed.   Constitutional:       Appearance: Normal appearance.   HENT:      Head: Normocephalic and atraumatic.      Nose: Nose normal.      Mouth/Throat:      Mouth: Mucous membranes are moist.      Pharynx: Oropharynx is clear.   Eyes:      Conjunctiva/sclera: Conjunctivae normal.      Pupils: Pupils are equal, round, and reactive to light.   Cardiovascular:      Rate and Rhythm: Normal rate and regular rhythm.      Pulses: Normal pulses.      Heart sounds: Normal heart sounds.   Pulmonary:      Effort: Pulmonary effort is normal.      Breath sounds: Normal breath sounds.   Abdominal:      General: Abdomen is flat. Bowel sounds are normal.      Palpations: Abdomen is soft.   Musculoskeletal:         General: Normal range of motion.      Cervical back: Normal range of motion.      Comments: LUE sleeve for lympedema, decreased ROM    Skin:     General: Skin is warm and dry.      Findings: No rash.   Neurological:      General: No focal deficit present.      Mental Status: She is alert and oriented to person, place, and time. Mental status is at baseline.   Psychiatric:         Mood and Affect: Mood normal.         Behavior: Behavior normal.         Thought Content: Thought content normal.          Judgment: Judgment normal.       Breast Exam: Right breast appears normal on inspection.  No palpable abnormalities of the right breast.    Left breast on inspection there is a scar which is well-healed.  There is also left axillary scar which is well-healed.  Skin changes consistent with radiation dermatitis.  The left breast is somewhat firm which is likely secondary to radiation.  With sitting, there is dimpling of the left breast at the lumpectomy site which is unchanged per the patient's report    08/30/2024 I have reexamined the patient and the results are consistent with the previously documented exam. RENO Mays          Results from last 7 days   Lab Units 08/30/24  1136   WBC 10*3/mm3 6.28   NEUTROS ABS 10*3/mm3 4.03   HEMOGLOBIN g/dL 13.7   HEMATOCRIT % 41.9   PLATELETS 10*3/mm3 220       Results from last 7 days   Lab Units 08/30/24  1136   SODIUM mmol/L 140   POTASSIUM mmol/L 4.2   CHLORIDE mmol/L 105   CO2 mmol/L 24.9   BUN mg/dL 15   CREATININE mg/dL 0.85   CALCIUM mg/dL 9.3   ALBUMIN g/dL 4.1   BILIRUBIN mg/dL 0.4   ALK PHOS U/L 94   ALT (SGPT) U/L 14   AST (SGOT) U/L 24   GLUCOSE mg/dL 94   MAGNESIUM mg/dL 2.1                     No radiology results for the last 30 days.       Assessment & Plan       *Invasive ductal carcinoma of the left breast  Clinical T2 N0 M0, stage IIa  On ultrasound the tumor measures 2.4 cm.  Lymph nodes on the left side are normal.  MRI 10/8/2021 with tumor measuring 2.8 cm.  Lymph nodes appear normal  Pathology consistent with invasive carcinoma with ductal and lobular features, grade 3, ER +1 to 10% weak, MD negative, HER-2 3+ immunohistochemistry, Ki-67 70%.  She was evaluated by Dr. Liu and referred for consideration of neoadjuvant chemotherapy.   Since the tumor is HER-2 positive and over 2 cm I think it would be reasonable to proceed with neoadjuvant chemotherapy.  Since the tumor is over 2 cm but lymph node negative I think she would be a great  candidate for EA 1181 clinical trial which comprises of administering Taxol Herceptin and Perjeta tamika  adjuvantly followed by surgery and additional adjuvant treatment depending upon the response.  Port placed 10/18/2021  Echocardiogram shows normal ejection fraction  Week 1 TPH on 10/19/2021  Completed 12 weeks of Taxol Perjeta and Herceptin on 1/4/2022 on EA 1181 clinical trial  1/11/2022 due for Herceptin and Perjeta only  2/1/2022-due for dose 2 of Herceptin and Perjeta  MRI 1/14/2022 reviewed and there has been a good tumor response with decrease in size of the mass to 1.2 cm.  2/10/2022-left breast lumpectomy and sentinel lymph node biopsy  Pathology shows ypT1 cN1 aM0, stage IIb, pleomorphic invasive lobular carcinoma, grade 3, ER +% strong, WI +61-70% moderate, HER-2 negative on the residual tumor  The left axillary lymph node was ER positive strong, WI negative and HER-2 2+, FISH amplified.  On the initial biopsy prior to surgery the pathology showed invasive ductal with lobular features and the ER/WI was negative and HER-2 was 3+.  The residual disease obviously appears to be different from the initial pathology.  What is remaining is essentially pleomorphic lobular carcinoma which is high-grade with a high Ki-67 and ER/WI positivity.  Now that the lymph node is also positive I do believe that she will benefit from additional chemotherapy particularly either AC or EC.  Case was presented at multidisciplinary conference.  Decided to proceed with adjuvant Adriamycin and cyclophosphamide.  Given her age we will proceed with every 3 weekly AC over every 2 weeks.  Staging CT chest and pelvis and bone scan without any evidence of metastatic disease  3/11/2022-proceed with cycle 1 of AC  5/13/2022-due for cycle 4 AC.    6/3/2022-cycle 1 Kadcyla  7/19/2022-completed adjuvant radiation  7/20/2022-started anastrozole  Patient continues on anastrozole and tolerating that well.  No evidence of recurrent  disease  Screening mammogram from August 2023 and MRI from February 2024 reviewed and benign  Screening mammogram 8/23/2024 benign.  8/30/2024: Continues anastrozole, overall tolerating well.  Having minor arthralgias which are tolerable.  Complaining of vaginal dryness, recommended coconut oil.  No evidence of recurrent disease on exam today.    *Hypertension-currently controlled with amlodipine and metoprolol.  Blood pressure BP: 116/72     *Anxiety  Mood normal    *Hypothyroidism  Continue Synthroid  Stable    *Cardiac health-  Echocardiogram 1/5/2022 reviewed and stable  Stress test 2/3/2022 normal  3/1/2022-echocardiogram shows normal ejection fraction of 56 to 60%, normal LV strain  5/31/2022 EF 61%.  Maintain follow-up with Dr. Galo   8/19/2022-2D echocardiogram shows an ejection fraction of 55% and a strain of -22%.  Stable  Echocardiogram February 2023 normal    *Bone health-  DEXA scan August 2021 showing osteopenia (this was stable compared to imaging 2 years prior).  She is currently on Fosamax which was started by her primary care physician.  8/14/2023 DEXA shows osteopenia with a T score of -1.9 in the hip and T score of 0.2 in the lumbar spine  2/20/2024-due for first dose of Prolia.  Dental clearance obtained.  Discontinue Fosamax  8/30/2024: Proceed with Prolia today.    *Left axillary stiffness-improved.  Continue follow-up with lymphedema clinic.  Wearing lymphedema sleeve and doing massage 3 times per week.    *Decreased hearing, feelings of lightheadedness and left ear bothering patient.  Per examination she has significant cerumen in both external ear canals.  Patient has required visit with ENT before to have this removed. Recommended that she obtain Debrox in the short-term until she gets through the holiday season and then to have repeat visit to have this cleared out.      *Constipation  Improved somewhat with stool softeners however patient experiences intermittent constipation and is  worried about the same.  She had a colonoscopy which was benign.  Continue follow-up with gastroenterology as needed    *Left-sided back pain  Secondary to the fall  X-rays previously performed were negative  4/29/2023 MRI of the lumbar spine with no evidence of metastatic disease, degenerative changes noted    *Left wrist pain:  Started 5-6 weeks ago after lifting a cast iron skillet.  Following with [facility].    PLAN:   Proceed with Prolia today and continue every 6 months.  Continue anastrozole 1 mg daily.  Refill sent to pharmacy today.  Start coconut oil for vaginal dryness.  Screening mammogram next due August 2025.  Bilateral breast MRI due February 2025, order placed today.  Return in 3 months for MD follow-up.  Call/ return sooner should the patient develop any new concerns or problems.    Patient is on a high risk medication requiring close monitoring for toxicity.    Janet Esparza, RENO  08/30/2024

## 2024-09-11 ENCOUNTER — OFFICE VISIT (OUTPATIENT)
Dept: SURGERY | Facility: CLINIC | Age: 79
End: 2024-09-11
Payer: MEDICARE

## 2024-09-11 ENCOUNTER — TELEPHONE (OUTPATIENT)
Dept: ONCOLOGY | Facility: CLINIC | Age: 79
End: 2024-09-11
Payer: MEDICARE

## 2024-09-11 VITALS
SYSTOLIC BLOOD PRESSURE: 120 MMHG | BODY MASS INDEX: 24.32 KG/M2 | WEIGHT: 128.8 LBS | HEIGHT: 61 IN | DIASTOLIC BLOOD PRESSURE: 68 MMHG

## 2024-09-11 DIAGNOSIS — Z09 ENCOUNTER FOR FOLLOW-UP: ICD-10-CM

## 2024-09-11 DIAGNOSIS — R92.30 DENSE BREAST TISSUE: ICD-10-CM

## 2024-09-11 DIAGNOSIS — Z85.3 HISTORY OF BREAST CANCER: Primary | ICD-10-CM

## 2024-09-11 NOTE — PROGRESS NOTES
Assessment/Plan:  79 y.o. 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 iLu.   - Bilateral screening mammogram 8/2024 BIRADS 2. Annual screening mammogram due 8/2025.   - Dense breast tissue, additional screening with MRI. Bilateral Breast MRI 02/2024 BIRADS 2. Annual MRI due 02/2025.  - Following with the lymphedema clinic.   - At times, she reports discomfort in the left breast, discussed using a compression bra and Vitamin E. Consider diagnostic imaging if persistent symptoms.   - Follow up in one year.    (2) Medical Oncology:  - Following with Dr. Avendano.  - Participated in EA 1181 clinical trial.   - Finished [facility] February 2023.  - Currently on Anastrozole.   - Prolia injections.     (3) Radiation Oncology:  - Following with Dr. Schneider.  - Dose of 50Gy to the left breast and regional nodes followed by a 1000cgy boost.    - Completed: 6/7/22 - 7/19/22.    (4) Health Maintenance:   - Colon cancer screening: Colonoscopy 10/2023 with Dr. Mead.     Chief Complaint: routine followup breast cancer    History of Present Illness:   9/21/2021 Seen by Dr. Liu:   Ms. Neela Ramirez is a 79 y.o. year old lady, seen at the request of No ref. provider found for a new diagnosis of left breast cancer.    This was initially detected as a palpable mass. She has had annual mammograms each year or every other year. She denies any prior history of abnormal mammograms or breast biopsies. Her work-up is detailed in the oncologic history below.   She denies any pain, skin changes, or nipple discharge.  She  has noticed this lump for a few months but new her regular mammogram is coming up soon.  She denies any family history of breast or ovarian cancer.     12/22/2021 Seen by Dr. Liu:   she presents today for follow-up.  She is doing well.  She is tolerating the chemotherapy well.  She is had some issues with diarrhea that are improving.  She has also had some issues with sinus infections.    2/28/2022 Seen by Dr. Liu:   She is doing well since surgery with no issues. Her pain is controlled. She is not taking pain meds.    3/2/2022 Seen by Dr. Liu:   doing well since surgery with no acute issues.  Her pain is controlled.  She is getting back to her daily activities.  She has no complaints or concerns.    9/28/2022 Patient is here today for a follow-up. She states she is doing well overall, still has some issues with her taste. She is going to look into finding a bra that fits her better.    3/14/2023 She is here today for a follow-up. She denies any new breast concerns.     6/28/2023 Seen by Dr. Liu    9/13/2023 She is here today for a follow-up. She recently had her DEXA scan which showed a decrease in her bone density. She is planning on injections. She has an upcoming dental appointment for clearance. Denies any new breast complaints. She states she saw Dr. Schneider and it was recommended she begin using vitamin E oil.  She is planning on picking some up later today.    9/11/2024 She presents today for a follow-up.  She denies any new breast masses, skin changes, or nipple discharge.  She states she has occasional discomfort in the left breast at times.  She wears her compression sleeve on her left arm as well as performs massages 3 times a week.  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  She has also had some low back pain, previously followed with [facility], she is going to consider making a follow-up appointment with them for re-evaluation.    Workup of  Current Diagnosis:    8/17/2021 Bilateral Diagnostic Mammogram with Left Breast Ultrasound:   Scattered fibroglandular densities are seen throughout both breasts.  In the posterior one-third upper inner quadrant of the left breast at the site of palpable concern  near the 11-o'clock position there is a mass with adjacent pleomorphic microcalcifications. The mass and calcifications measure on the order 1.5 cm in greatest dimension.   On US, 11-o'clock position on the order of 5 cm from the nipple. At this location, there is an irregular hypoechoic mass that measures 2.4 x 2.0 x 1.6 cm. Internal vascularity is noted. Echogenic foci in the posterior aspect of the mass that represents the visualized calcifications are noted.  IMPRESSION:  1. There is an irregular 2.4 cm mass in the left breast at the site of palpable concern which corresponds to the 11-o'clock position on the order of 5 cm from the nipple. Correlation with an ultrasound guided left breast biopsy is recommended.  2. There are no findings suspicious for malignancy in the right breast.  BI-RADS Category 5: Highly suggestive for malignancy. Appropriate action should be taken.    9/8/2021 Left Breast Ultrasound-guided Biopsy:   The lesion at the 11:00 position on the order of 5 cm from the nipple was visualized. Multiple tissue specimens were obtained. A barbell shaped metallic clip was placed to katey the site. Postbiopsy unilateral left digital mammography consisting of CC and 90 degree lateral images were obtained and demonstrate placement of a barbell shaped metallic clip at the 11:00 position in the posterior one third of the left breast at the site of a pre-existing mass seen on the examination of 8/17/2021. The metallic clip is on the order of 7 mm anterior to residual microcalcifications.  IMPRESSION:   Technically successful ultrasound guided Mammotome vacuum assisted left breast biopsy with placement of a barbell shaped metallic clip. The pathology  result has returned as malignant. Surgical consultation is recommended.  BI-RADS Category 6: Known malignancy  Pathology:   Final Diagnosis   1. Left Breast at 11 O'clock 5 cm from Nipple, Core Biopsy:               A. Invasive breast carcinoma with ductal and lobular features (see comment):                             1. Overall [place] grade III (tubular score = 3, nuclear score = 3, mitotic score = 2).                            2. Invasive carcinoma measures at least 7 mm in greatest dimension.                            3. Focally suspicious for lymphovascular space invasion.               B. Rare focus of high grade solid and comedo DCIS.     10/8/2021 Bilateral Breast MRI:  IMPRESSION:  1. Biopsy-proven malignancy in the left breast at the 11-o'clock position measuring on the order of 2.8 cm in greatest dimension with a centrally located barbell shaped metallic clip. No other suspicious findings are seen within left breast and there is no evidence for left axillary adenopathy.  2. There are no findings suspicious for malignancy in the right breast.  BI-RADS category 6: Known biopsy-proven malignancy.    10/18/2021 Port placement    1/14/2022 Bilateral Breast MRI:  IMPRESSION AND RECOMMENDATION:  1.  A 1.2 cm mass at 11:00 in the posterior left breast corresponds to biopsy-proven malignancy and has decreased in size from 10/20/2021, consistent with positive treatment response. Continued oncologic and surgical management are recommended.  2.  No MRI evidence of malignancy in the right breast.  BI-RADS Category 6: Known biopsy-proven malignancy    2/10/2022 Left breast wire localized lumpectomy with left sentinel lymph node biopsy:  Final Diagnosis   1. Left Axillary [place] Lymph Node #1, Hot and Blue, Count 220, Biopsy (FS):               A. ONE OF THREE LYMPH NODES, POSITIVE FOR CARCINOMA (1/3).               B. Metastatic focus measures 3.5 mm.               C. Negative for treatment effect.                 D. Negative for extranodal extension.               E. AE1/AE3 immunostain performed on block 1A highlights the metastatic carcinoma (control reacted appropriately).                F. See Comment #1.  2. Left Axillary [place] Lymph Node #2, Hot and Blue, Count 120, Biopsy (FS):               A. Five lymph nodes, negative for carcinoma (0/5).               B. Negative for treatment effect.               C. AE1/AE3 immunostain performed on blocks 2A and 2B are both negative (control reacted appropriately).   3. Left Axillary [place] Lymph Node #3, Palpable, Biopsy (FS):               A. Two lymph nodes, negative for carcinoma (0/2).               B. Negative for treatment effect.               C. AE1/AE3 immunostains performed on blocks 3A and 3B are both negative (control reacted appropriately).  4. Left Breast, Needle-Localized Lumpectomy S/P Neoadjuvant Chemotherapy (15 grams):               A. FIBROTIC TUMOR BED WITH INVASIVE PLEOMORPHIC LOBULAR CARCINOMA, Poorly differentiated; [place] Histologic Grade III/III (tubule score = 3, nuclear score = 3, mitoses score = 3):               1. Tumor size: 18 mm (based on the slices involved).                2. Margins are negative for invasive carcinoma; Closest distances: Invasive carcinoma is present 5 mm from the inferior margin (superseded by specimen #8).                3. No definitive lymphovascular space invasion is seen.   B. ASSOCIATED LOBULAR CARCINOMA IN SITU (LCIS).   C. Clip and biopsy site changes are present and adjacent to invasive carcinoma.  D. See Synoptic Report (to include parts 1-3 and 5-9) and Comment #2.  5. Left Breast, Additional Superior Margin, Re-excision:               A. Breast parenchyma with atypical lobular hyperplasia (additional 9 mm of tissue free of carcinoma).  6. Left Breast, Additional Medial Margin, Re-excision:               A. Breast parenchyma with atypical lobular hyperplasia (additional 10 mm of tissue free of  carcinoma).  7. Left Breast, Additional Lateral Margin, Re-excision:               A. Fibroadipose tissue with no significant histopathologic changes (additional 10 mm of tissue free of carcinoma).  8. Left Breast, Additional Inferior Margin, Re-excision:               A. Breast parenchyma with atypical lobular hyperplasia (additional 10 mm of tissue free of carcinoma).  9. Left Breast, Additional Anterior Margin, Re-excision:               A. Breast parenchyma with no significant histopathologic changes (additional 8 mm of tissue free of carcinoma).     8/17/2022 Bilateral Diagnostic Mammogram:  FINDINGS:  The breasts are heterogeneously dense, which may obscure small masses.   There are new benign-appearing post surgical changes in the left breast and axilla, as well as new treatment-related changes in the left breast. There are benign-appearing calcifications.   There are 2 asymmetries in the upper right breast, both of which effaces with spot compression and have no correlate on the lateral view, consistent with superimposition of normal breast parenchyma. There are no suspicious masses, calcifications, or areas of architectural distortion in either breast. There is a partially imaged right chest port.  IMPRESSION:  No mammographic evidence of malignancy in either breast. Recommend annual screening mammogram in one year.  BI-RADS Category 2: Benign    8/21/2023 Bilateral Screening Mammogram:  FINDINGS:   The parenchyma of both breasts demonstrates a heterogeneously dense pattern which lessens the sensitivity. Stable postsurgical change of the left breast with left breast volume relative to the right is noted. I see no new or dominant masses, areas of architectural distortion or skin thickening. There is no evidence for axillary lymphadenopathy or nipple retraction.  IMPRESSION:  1. There is no evidence for malignancy or significant change in either breast. Benign post breast conservation therapy changes of the  left breast are noted. Routine followup mammography is recommended. Given the density of the patient's breast tissue, correlation with screening bilateral breast sonography and/or bilateral breast MRI may provide additional benefit.   BI-RADS category 2: Benign.    2024 Bilateral Breast MRI:  FINDINGS:   Heterogenous fibroglandular tissue is seen throughout both breasts. Normal background parenchymal enhancement of both breasts is noted. Postsurgical change of the left breast is noted. No areas of suspicious enhancement or morphology are seen in either breast. I see no evidence for abnormal skin, nipple or chest wall enhancement of either breast and there is no evidence for axillary or internal mammary chain adenopathy.  IMPRESSION:  There are no findings suspicious for malignancy in either breast. Benign posttreatment changes of the left breast are noted. Routine follow-up imaging is recommended.  BI-RADS Category 2: Benign findings.     2024 Bilateral Screening Mammogram:  The breasts are heterogeneously dense and symmetric except for postsurgical scarring and deformity and decrease in size of the left breast compared to the right. This shows no change from 2023. There are no findings in either breast to suggest malignancy.  CONCLUSION: Benign mammogram showing no change since 2023 or 2022.   BI-RADS 2. Benign finding.    Gynecologic History:   . P:0 AB:0  Age at first childbirth: Not applicable  Lactation/How long: Not applicable  Age at menarche: 13  Age at menopause: 50  Total years of oral contraceptive use: None  Total years of hormone replacement therapy: 7 years    Past Medical History:   Past Medical History:   Diagnosis Date    Anxiety     Arthritis     Cataract     EARLY. JIMMIE EYES    Chronic back pain     LOW BACK ACHES AT TIME FROM ARTHRTIS    Constipation     AT TIMES. NO RECENT ISSUES    Dermatitis     LEGS. STATES CLEARING UP NOW.    Disease of thyroid gland     HYPO     Diverticulosis     Colonoscopy November 2014    Encounter for management of implanted device 11/02/2021    Erythema of face     Transitory Erythema Of The Face    GERD (gastroesophageal reflux disease)     ON OCC    History of chemotherapy     FOR LEFT BREAST CANCER    Hyperlipidemia     Hypertension     Malignant neoplasm of female breast 09/27/2021    Osteopenia     Tachycardia       Past Surgical History:    Past Surgical History:   Procedure Laterality Date    BREAST BIOPSY  09/2021    Dr. Tirado     BREAST CYST ASPIRATION Right     BREAST CYST EXCISION Right     BREAST LUMPECTOMY WITH SENTINEL NODE BIOPSY Left 02/10/2022    Procedure: LEFT BREAST WIRE LOCALIZED LUMPECTOMY WITH SENTINEL NODE BIOPSY, POSSIBLE LEFT AXILLARY DISSECTION;  Surgeon: Becky Liu MD;  Location: [facility] OR [facility] [place] [facility];  Service: General;  Laterality: Left;    COLONOSCOPY      COLONOSCOPY  2014    COLONOSCOPY N/A 10/6/2023    Procedure: COLONOSCOPY to cecum and TI with cold bx polypectomy;  Surgeon: Gavin Mead MD;  Location: [facility] ENDOSCOPY;  Service: Gastroenterology;  Laterality: N/A;  pre - nausea, constipation  post - diverticulosis, polyp    ENDOSCOPY N/A 10/6/2023    Procedure: ESOPHAGOGASTRODUODENOSCOPY with cold bx;  Surgeon: Gavin Mead MD;  Location: [facility] ENDOSCOPY;  Service: Gastroenterology;  Laterality: N/A;  pre - nausea, constipation  post - gastritis    EXOSTECTOMY Bilateral     Simple Bunion Exostectomy (Silver Procedure)    FOOT TENDON SURGERY  2012    VENOUS ACCESS DEVICE (PORT) INSERTION N/A 10/18/2021    Procedure: INSERTION VENOUS ACCESS DEVICE;  Surgeon: Becky Liu MD;  Location: [facility] OR [facility] [place] [facility];  Service: General;  Laterality: N/A;      Family History:    Family History   Problem Relation Age of Onset    Hypertension Mother     Breast cancer Mother     Arthritis Father     Heart disease Father     Hypertension Father     Malig Hyperthermia Neg Hx      Social History:  Social History     Socioeconomic History     Marital status:      Spouse name: Pj    Number of children: 1   Tobacco Use    Smoking status: Former     Current packs/day: 0.00     Average packs/day: 0.3 packs/day for 0.5 years (0.1 ttl pk-yrs)     Types: Cigarettes     Start date: 1969     Quit date:      Years since quittin.7    Smokeless tobacco: Never    Tobacco comments:     SOCIALLY IN EARLY    Vaping Use    Vaping status: Never Used   Substance and Sexual Activity    Alcohol use: Yes     Comment: social drinker    Drug use: Never    Sexual activity: Defer      Allergies:   No Known Allergies    Medications:     Current Outpatient Medications:     amLODIPine (NORVASC) 5 MG tablet, TAKE 1 TABLET BY MOUTH DAILY, Disp: 90 tablet, Rfl: 1    anastrozole (ARIMIDEX) 1 MG tablet, Take 1 tablet by mouth Daily., Disp: 90 tablet, Rfl: 3    Cholecalciferol (Vitamin D) 50 MCG (2000) capsule, Take  by mouth., Disp: , Rfl:     cyclobenzaprine (FLEXERIL) 5 MG tablet, Take 1 tablet by mouth three times daily as needed for muscle spasm (Patient taking differently: Take 1 tablet by mouth 3 (Three) Times a Day As Needed for Muscle Spasms.), Disp: 30 tablet, Rfl: 0    desoximetasone (TOPICORT) 0.25 % ointment, Apply 1 Application topically to the appropriate area as directed 2 (Two) Times a Day As Needed (TO IRRITATED AREA)., Disp: , Rfl:     escitalopram (LEXAPRO) 20 MG tablet, TAKE 1 TABLET BY MOUTH DAILY, Disp: 90 tablet, Rfl: 1    levothyroxine (SYNTHROID, LEVOTHROID) 25 MCG tablet, TAKE 1 TABLET BY MOUTH EVERY MORNING 30 MINUTES BEFORE BREAKFAST OR ANY OTHER MEDICATION, Disp: 90 tablet, Rfl: 1    metoprolol succinate XL (TOPROL-XL) 25 MG 24 hr tablet, TAKE ONE TABLET BY MOUTH DAILY, Disp: 90 tablet, Rfl: 3    mupirocin (BACTROBAN) 2 % ointment, APPLY A SMALL AMOUNT OF OINTMENT TOPICALLY TO AFFECTED AREA EVERY NIGHT, Disp: , Rfl:     omeprazole (priLOSEC) 20 MG capsule, Take 1 capsule by mouth Daily., Disp: , Rfl:     ondansetron (Zofran) 8  "MG tablet, Take 1 tablet by mouth Every 8 (Eight) Hours As Needed for Nausea or Vomiting., Disp: 60 tablet, Rfl: 3    pantoprazole (PROTONIX) 40 MG EC tablet, Take 1 tablet by mouth Daily., Disp: 30 tablet, Rfl: 0    PreviDent 5000 Dry Mouth 1.1 % gel, , Disp: , Rfl:     rosuvastatin (CRESTOR) 10 MG tablet, TAKE ONE TABLET BY MOUTH DAILY, Disp: 90 tablet, Rfl: 3    Labs:  Labs from 2024 reviewed.    Review of Systems:   Constitutional: Negative for fevers or chills  HENT: Negative for hearing loss or runny nose  Eyes: Negative for vision changes or scleral icterus  Respiratory: Negative for cough or shortness of breath  Cardiovascular: Negative for chest pain or heart palpitations  Gastrointestinal: Negative for abdominal pain, nausea, vomiting, constipation, melena, or hematochezia  Genitourinary: Negative for hematuria or dysuria  Musculoskeletal: Negative for joint swelling or gait instability  Neurologic: Negative for tremors or seizures  Psychiatric: Negative for suicidal ideations or depression  All other systems reviewed and negative    Physical Exam:   ECO - Asymptomatic  Constitutional: Well-developed, well-nourished, no acute distress  Ht: 154.9cm (61\")  Wt: 58.4kg (128lb)  BMI: 24.24  Eyes: Conjunctiva normal, sclera nonicteric  ENMT: Hearing grossly normal, oral mucosa moist  Neck: Supple, no palpable mass, trachea midline  Respiratory: Clear to auscultation, normal inspiratory effort  Cardiovascular: Regular rate, no peripheral edema, no jugular venous distention  Breast:   Right: No visible abnormalities on inspection while seated, with arms raised, or hands on hips. No masses, skin changes, or nipple abnormalities.   Left: No visible abnormalities on inspection while seated, with arms raised, or hands on hips.  Left breast incision 11:00-1:00 healed with mild distortion, axillary incision healed.   No clinical chest wall involvement.  Lymphatics (palpable nodes): No cervical, supraclavicular, " or axillary lymphadenopathy  Skin: Warm, dry, no rash on visualized skin surfaces  Musculoskeletal: Symmetric strength, normal gait  Psychiatric: Alert and oriented ×3, normal affect     Yue Bailey PA-C    [facility] - General Surgery   [address], [address]  [place] [address]    [address], [address]  [place] [address]    Office: [phone]  Fax: [phone]

## 2024-09-11 NOTE — TELEPHONE ENCOUNTER
Neela had inquired if it would be ok for her to use glucosamine chondroitin as recommended by her hand surgeon.  Pharmacy team reviewed this with her medication list and see no interactions, therefore ok for her to take.  She voiced understanding.

## 2024-09-30 DIAGNOSIS — I10 ESSENTIAL HYPERTENSION: ICD-10-CM

## 2024-10-01 RX ORDER — AMLODIPINE BESYLATE 5 MG/1
5 TABLET ORAL DAILY
Qty: 90 TABLET | Refills: 0 | Status: SHIPPED | OUTPATIENT
Start: 2024-10-01

## 2024-10-20 DIAGNOSIS — F41.9 ANXIETY: ICD-10-CM

## 2024-10-20 RX ORDER — ESCITALOPRAM OXALATE 20 MG/1
20 TABLET ORAL DAILY
Qty: 90 TABLET | Refills: 1 | Status: SHIPPED | OUTPATIENT
Start: 2024-10-20

## 2024-10-23 ENCOUNTER — HOSPITAL ENCOUNTER (OUTPATIENT)
Dept: PHYSICAL THERAPY | Facility: HOSPITAL | Age: 79
Setting detail: THERAPIES SERIES
Discharge: HOME | End: 2024-10-23
Payer: MEDICARE

## 2024-10-23 DIAGNOSIS — Z91.89 AT RISK FOR LYMPHEDEMA: ICD-10-CM

## 2024-10-23 DIAGNOSIS — I89.0 LYMPHEDEMA OF LEFT ARM: Primary | ICD-10-CM

## 2024-10-23 DIAGNOSIS — Z98.890 S/P LUMPECTOMY, LEFT BREAST: ICD-10-CM

## 2024-10-23 DIAGNOSIS — Z17.0 MALIGNANT NEOPLASM OF UPPER-INNER QUADRANT OF LEFT BREAST IN FEMALE, ESTROGEN RECEPTOR POSITIVE: ICD-10-CM

## 2024-10-23 DIAGNOSIS — C50.212 MALIGNANT NEOPLASM OF UPPER-INNER QUADRANT OF LEFT BREAST IN FEMALE, ESTROGEN RECEPTOR POSITIVE: ICD-10-CM

## 2024-10-23 PROCEDURE — 93702 BIS XTRACELL FLUID ANALYSIS: CPT

## 2024-10-23 PROCEDURE — 97535 SELF CARE MNGMENT TRAINING: CPT

## 2024-10-23 NOTE — THERAPY RE-EVALUATION
Physical Therapy Lymphedema Re-Evaluation  [facility]     Patient Name: Neela Ramirez  : 1945  MRN: [id number]  Today's Date: 10/23/2024      Visit Date: 10/23/2024    Visit Dx:    ICD-10-CM ICD-9-CM   1. Lymphedema of left arm  I89.0 457.1   2. Malignant neoplasm of upper-inner quadrant of left breast in female, estrogen receptor positive  C50.212 174.2    Z17.0 V86.0   3. S/P lumpectomy, left breast  Z98.890 V45.89   4. At risk for lymphedema  Z91.89 V49.89       Patient Active Problem List   Diagnosis    Anxiety    DD (diverticular disease)    Gastroesophageal reflux disease    Essential hypertension    Hyperlipidemia    Osteopenia    Bilateral low back pain without sciatica    Malignant neoplasm of female breast    Encounter for management of implanted device    Personal history of breast cancer    Constipation    Nausea    Hypothyroidism        Past Medical History:   Diagnosis Date    Anxiety     Arthritis     Cataract     EARLY. JIMMIE EYES    Chronic back pain     LOW BACK ACHES AT TIME FROM ARTHRTIS    Constipation     AT TIMES. NO RECENT ISSUES    Dermatitis     LEGS. STATES CLEARING UP NOW.    Disease of thyroid gland     HYPO    Diverticulosis     Colonoscopy 2014    Encounter for management of implanted device 2021    Erythema of face     Transitory Erythema Of The Face    GERD (gastroesophageal reflux disease)     ON OCC    History of chemotherapy     FOR LEFT BREAST CANCER    Hyperlipidemia     Hypertension     Malignant neoplasm of female breast 2021    Osteopenia     Tachycardia         Past Surgical History:   Procedure Laterality Date    BREAST BIOPSY  2021    Dr. Tirado     BREAST CYST ASPIRATION Right     BREAST CYST EXCISION Right     BREAST LUMPECTOMY WITH SENTINEL NODE BIOPSY Left 02/10/2022    Procedure: LEFT BREAST WIRE LOCALIZED LUMPECTOMY WITH SENTINEL NODE BIOPSY, POSSIBLE LEFT AXILLARY DISSECTION;  Surgeon: Becky Liu MD;  Location: [facility] OR [facility] [place];   Service: General;  Laterality: Left;    COLONOSCOPY      COLONOSCOPY  2014    COLONOSCOPY N/A 10/6/2023    Procedure: COLONOSCOPY to cecum and TI with cold bx polypectomy;  Surgeon: Gavin Mead MD;  Location: [facility] ENDOSCOPY;  Service: Gastroenterology;  Laterality: N/A;  pre - nausea, constipation  post - diverticulosis, polyp    ENDOSCOPY N/A 10/6/2023    Procedure: ESOPHAGOGASTRODUODENOSCOPY with cold bx;  Surgeon: Gavin Mead MD;  Location: [facility] ENDOSCOPY;  Service: Gastroenterology;  Laterality: N/A;  pre - nausea, constipation  post - gastritis    EXOSTECTOMY Bilateral     Simple Bunion Exostectomy (Silver Procedure)    FOOT TENDON SURGERY  2012    VENOUS ACCESS DEVICE (PORT) INSERTION N/A 10/18/2021    Procedure: INSERTION VENOUS ACCESS DEVICE;  Surgeon: Becky Liu MD;  [place]  [facility] [place] [facility] [place]  Service: General;  Laterality: N/A;       Visit Dx:    ICD-10-CM ICD-9-CM   1. Lymphedema of left arm  I89.0 457.1   2. Malignant neoplasm of upper-inner quadrant of left breast in female, estrogen receptor positive  C50.212 174.2    Z17.0 V86.0   3. S/P lumpectomy, left breast  Z98.890 V45.89   4. At risk for lymphedema  Z91.89 V49.89            Lymphedema       Row Name 10/23/24 1000             Subjective Pain    Able to rate subjective pain? yes  -LB      Pre-Treatment Pain Level 0  -LB      Post-Treatment Pain Level 0  -LB         Subjective    Subjective Comments I am doing well. I didn't wear the glove for the last few months and my hand did fine. I am still doing the massage 3 days/week.  -LB         Lymphedema Assessment    Lymphedema Classification LUE:;secondary;stage 1 (Spontaneously Reversible)  -LB      Lymphedema Cancer Related Sx left;lumpectomy;sentinel node biopsy  -LB      Lymph Nodes Removed # 10  -LB      Positive Lymph Nodes # 1  -LB      Chemo Received yes  -LB      Radiation Therapy Received yes  -LB      Infections or Cellulitis? no  -LB         Lymphedema Edema Assessment     Edema Assessment Comment inc edema medial L forearm to slightly proximal to L elbow  -LB         Skin Changes/Observations    Skin Observations Comment scabbing and small sores L forearm  -LB         LUE Quick Girth (cm)    Axilla 26.2 cm  -LB      Mid upper arm 27.5 cm  -LB      Elbow 24 cm  -LB      Mid forearm 20.5 cm  -LB      Wrist crease 14.4 cm  -LB      Web space 17.7 cm  -LB      Met-heads 17.9 cm  -LB      LUE Quick Girth Total 148.2  -LB         Compression/Skin Care    Compression/Skin Care compression garment  -LB      Compression/Skin Care Comments discussed ordering new sleeves, Medi [place] sand 20-30 mmHg size II with silicone band from [employer] as well as trialing pneumatic pump  -LB         L-Dex Bioimpedence Screening    L-Dex Measurement Extremity LUE  -LB      L-Dex Patient Position Standing  -LB      L-Dex UE Dominate Side Right  -LB      L-Dex UE At Risk Side Left  -LB      L-Dex UE Pre Surgical Value No  -LB      L-Dex UE Score 24.7  -LB      L-Dex UE Baseline Score 2.9  -LB      L-Dex UE Value Change 21.8  -LB      L-Dex UE Comment elevated  -LB      $ L-Dex Charge yes  -LB                User Key  (r) = Recorded By, (t) = Taken By, (c) = Cosigned By      Initials Name Provider Type    Edna Abdalla PT Physical Therapist                                    Therapy Education  Education Details: reviewed bioimpedance score and results, continued to caution regarding scabs on L forearm and encouraged treating and avoiding picking, discussed trial of pneumatic pump, continued MLD, replacing compression garments due to >6 months use  Given: Symptoms/condition management, Edema management  Program: Reinforced, Progressed  How Provided: Verbal, Demonstration  Provided to: Patient, Caregiver  Level of Understanding: Teach back education performed, Verbalized, Demonstrated  38567 - PT Self Care/Mgmt Minutes: 30       OP Exercises       Row Name 10/23/24 1000             Subjective    Subjective  Comments I am doing well. I didn't wear the glove for the last few months and my hand did fine. I am still doing the massage 3 days/week.  -LB         Subjective Pain    Able to rate subjective pain? yes  -LB      Pre-Treatment Pain Level 0  -LB      Post-Treatment Pain Level 0  -LB                User Key  (r) = Recorded By, (t) = Taken By, (c) = Cosigned By      Initials Name Provider Type    Edna Abdalla, PT Physical Therapist                                 PT OP Goals       Row Name 10/23/24 1400          PT Short Term Goals    STG Date to Achieve 07/17/23  -LB     STG 1 LDex Bioimpedence will decrease to 20 or lower showing improvement in lymphedema.  -LB     STG 1 Progress Met  -LB     STG 2 Pt/family independent with self bandaging for self management of condition.  -LB     STG 2 Progress Met  -LB     STG 3 Pt will demonstrate decreased girth measurements of >/= 5 cm on LUE to reduce infection risk and improve skin condition.  -LB     STG 3 Progress Met  -LB        Long Term Goals    LTG Date to Achieve 05/06/22  -LB     LTG 1 Pt will maintain LDex bioimpedance score WNL.  -LB     LTG 1 Progress Ongoing  -LB     LTG 1 Progress Comments elevated at 24.7; continued scabbing RUE and obvious edema medial forearm to slightly proximal to elbow  -LB     LTG 2 Pt will acquire appropriately fitted compression garment and verbalize appropriate wear schedule.  -LB     LTG 2 Progress Met  -LB     LTG 3 Pt will verbalize signs and symptoms of lymphedema and steps to prevention.  -LB     LTG 3 Progress Met  -LB               User Key  (r) = Recorded By, (t) = Taken By, (c) = Cosigned By      Initials Name Provider Type    Edna Abdalla, PT Physical Therapist                     PT Assessment/Plan       Row Name 10/23/24 1406          PT Assessment    Functional Limitations Other (comment)  LUE lymphedema  -LB     Impairments Impaired flexibility;Impaired lymphatic circulation;Sensation;Edema  -LB     Assessment  Comments Pt returns for 3 month follow up. She continues to be compliant with daily LUE compression sleeve use (Medi Ama 20-30 mmHg armsleeve size II) and has been for last 3 months daily. She continues with daily exercise, MLD, and elevation and LUE lymphedema remains. It is primarily mid L forearm to distal upper arm. Also noted hyperpigmentation LUE and open scab like areas where pt states she picks at her skin. Discouraged this to reduce inflammation and risk of infection. Repeat LDex bioimpedance with score of 24.7 which is outside of normal limits. We discussed replacing compression garments due to >6 months use and trial of pneumatic pump to assist with management of symptoms. Pt remains appropriate for continued skilled PT services to address LUE lymphedema.  -LB     Rehab Potential Good  -LB     Patient/caregiver participated in establishment of treatment plan and goals Yes  -LB     Patient would benefit from skilled therapy intervention Yes  -LB        PT Plan    PT Frequency Other (comment)  -LB     Predicted Duration of Therapy Intervention (PT) return in 3 months for reassessment  -LB     Planned CPT's? PT EVAL LOW COMPLEXITY: 74778;PT RE-EVAL: 41851;PT THER PROC EA 15 MIN: 19174;PT THER ACT EA 15 MIN: 19553;PT MANUAL THERAPY EA 15 MIN: 45525;PT NEUROMUSC RE-EDUCATION EA 15 MIN: 76004;PT SELF CARE/HOME MGMT/TRAIN EA 15: 99729;PT BIS XTRACELL FLUID ANALYSIS: 02937  -LB     PT Plan Comments repeat bioimpedance, follow up after sleeve/pump acquisition  -LB               User Key  (r) = Recorded By, (t) = Taken By, (c) = Cosigned By      Initials Name Provider Type    Edna Abdalla, PT Physical Therapist                                 Time Calculation:   Start Time: 1045  Stop Time: 1130  Time Calculation (min): 45 min  Total Timed Code Minutes- PT: 30 minute(s)  Timed Charges  90462 - PT Self Care/Mgmt Minutes: 30  Total Minutes  Timed Charges Total Minutes: 30   Total Minutes: 30   Therapy Charges  for Today       Code Description Service Date Service Provider Modifiers Qty    61704389203 HC PT BIS XTRACELL FLUID ANALYSIS 10/23/2024 Edna Condon, PT  1    12663303254 HC PT SELF CARE/MGMT/TRAIN EA 15 MIN 10/23/2024 Edna Condon, PT GP 2                      Edna Condon, PT  10/23/2024

## 2024-11-14 ENCOUNTER — TELEPHONE (OUTPATIENT)
Dept: PHYSICAL THERAPY | Facility: HOSPITAL | Age: 79
End: 2024-11-14
Payer: MEDICARE

## 2024-11-14 NOTE — TELEPHONE ENCOUNTER
Neela has a history of breast cancer that has caused LUE lymphedema. She was trialed in home with an  compression pump but has been ruled out due to chest swelling and would benefit from the  pump. She has unique characteristics such as chest swelling, significant pain in the arm and breast. Neela’s lymphedema is classified as chronic, persistent, and severe identified by increase measurements over time, including in the chest, axilla, and trunk. She has been compliant with daily use of compression, exercise, elevation for more than 4 weeks. Her  does MLD at home daily for her.  She has been compliant with diet and medications, but still presents with significant lymphedema pectoral, axilla, and chest wall. I am recommending this patient get an  Compression Pump with the BioVest garment. She requires this advanced pump due to her complex conditions of Breast Cancer.

## 2024-11-15 ENCOUNTER — OFFICE VISIT (OUTPATIENT)
Dept: INTERNAL MEDICINE | Age: 79
End: 2024-11-15
Payer: MEDICARE

## 2024-11-15 VITALS
HEART RATE: 86 BPM | BODY MASS INDEX: 24.73 KG/M2 | OXYGEN SATURATION: 96 % | HEIGHT: 61 IN | SYSTOLIC BLOOD PRESSURE: 110 MMHG | RESPIRATION RATE: 17 BRPM | TEMPERATURE: 96 F | WEIGHT: 131 LBS | DIASTOLIC BLOOD PRESSURE: 80 MMHG

## 2024-11-15 DIAGNOSIS — I89.0 ACQUIRED LYMPHEDEMA: Primary | ICD-10-CM

## 2024-11-15 PROCEDURE — 1126F AMNT PAIN NOTED NONE PRSNT: CPT | Performed by: PHYSICIAN ASSISTANT

## 2024-11-15 PROCEDURE — 99214 OFFICE O/P EST MOD 30 MIN: CPT | Performed by: PHYSICIAN ASSISTANT

## 2024-11-15 PROCEDURE — 3079F DIAST BP 80-89 MM HG: CPT | Performed by: PHYSICIAN ASSISTANT

## 2024-11-15 PROCEDURE — 3074F SYST BP LT 130 MM HG: CPT | Performed by: PHYSICIAN ASSISTANT

## 2024-11-15 RX ORDER — ERYTHROMYCIN 5 MG/G
OINTMENT OPHTHALMIC
COMMUNITY
Start: 2024-11-07

## 2024-11-15 RX ORDER — ERGOCALCIFEROL 1.25 MG/1
CAPSULE, LIQUID FILLED ORAL
COMMUNITY
Start: 2024-10-25

## 2024-11-15 RX ORDER — TRETINOIN 0.25 MG/G
CREAM TOPICAL
COMMUNITY
Start: 2024-10-25 | End: 2024-11-15

## 2024-11-15 NOTE — ASSESSMENT & PLAN NOTE
Left humerus and forearm measured at 29 cm and 25 cm, as compared to the right of 24.5 cm and 22.5 cm on 11/15/2024.

## 2024-11-15 NOTE — PROGRESS NOTES
"                            FABIENNE MERA PA-C                  I  N  T  E  R  N  A  L    M  E  D  I  C  I  N  E         ENCOUNTER DATE:  11/15/2024    Neela Ramirez / 79 y.o. / female    OFFICE VISIT ENCOUNTER       CHIEF COMPLAINT / REASON FOR OFFICE VISIT     Hyperlipidemia and Edema (A lymphedema pump to qualify for; )      ASSESSMENT & PLAN     Problem List Items Addressed This Visit       Acquired lymphedema - Primary    Overview     Medical device order placed for lymphedema pump for the left arm on 11/15/2024.         Current Assessment & Plan     Left humerus and forearm measured at 29 cm and 25 cm, as compared to the right of 24.5 cm and 22.5 cm on 11/15/2024.         Relevant Orders    Miscellaneous DME     Orders Placed This Encounter   Procedures    Miscellaneous DME     Order Specific Question:   Type of DME     Answer:   Lymphedema pump for left arm     Order Specific Question:   Length of Need     Answer:   99 Months = Lifetime     No orders of the defined types were placed in this encounter.      SUMMARY/DISCUSSION  Medical device order placed for lymphedema pump for the left arm on 11/15/2024.   Left humerus and forearm measured at 29 cm and 25 cm, as compared to the right of 24.5 cm and 22.5 cm on 11/15/2024.   Continue to follow-up with lymphedema clinic, continue 3 times weekly massages with aspirin.        Next Appointment:   Return for Follow-up with Chris Issa DNP, APRN 2/18/24.      VITAL SIGNS     Vitals:    11/15/24 1246   BP: 110/80   BP Location: Right arm   Patient Position: Sitting   Cuff Size: Adult   Pulse: 86   Resp: 17   Temp: 96 °F (35.6 °C)   TempSrc: Temporal   SpO2: 96%   Weight: 59.4 kg (131 lb)   Height: 154.9 cm (60.98\")       BP Readings from Last 3 Encounters:   11/15/24 110/80   09/11/24 120/68   08/30/24 116/72     Wt Readings from Last 3 Encounters:   11/15/24 59.4 kg (131 lb)   09/11/24 58.4 kg (128 lb 12.8 oz)   08/30/24 57.6 kg (127 lb)     Body mass index is " 24.77 kg/m².         No data to display                   MEDICATIONS AT THE TIME OF OFFICE VISIT     Current Outpatient Medications on File Prior to Visit   Medication Sig    amLODIPine (NORVASC) 5 MG tablet TAKE 1 TABLET BY MOUTH DAILY    anastrozole (ARIMIDEX) 1 MG tablet Take 1 tablet by mouth Daily.    Cholecalciferol (Vitamin D) 50 MCG (2000 UT) capsule Take  by mouth.    cyclobenzaprine (FLEXERIL) 5 MG tablet Take 1 tablet by mouth three times daily as needed for muscle spasm (Patient taking differently: Take 1 tablet by mouth 3 (Three) Times a Day As Needed for Muscle Spasms.)    desoximetasone (TOPICORT) 0.25 % ointment Apply 1 Application topically to the appropriate area as directed 2 (Two) Times a Day As Needed (TO IRRITATED AREA).    erythromycin (ROMYCIN) 5 MG/GM ophthalmic ointment     escitalopram (LEXAPRO) 20 MG tablet TAKE 1 TABLET BY MOUTH DAILY    levothyroxine (SYNTHROID, LEVOTHROID) 25 MCG tablet TAKE 1 TABLET BY MOUTH EVERY MORNING 30 MINUTES BEFORE BREAKFAST OR ANY OTHER MEDICATION    metoprolol succinate XL (TOPROL-XL) 25 MG 24 hr tablet TAKE ONE TABLET BY MOUTH DAILY    mupirocin (BACTROBAN) 2 % ointment APPLY A SMALL AMOUNT OF OINTMENT TOPICALLY TO AFFECTED AREA EVERY NIGHT    omeprazole (priLOSEC) 20 MG capsule Take 1 capsule by mouth Daily.    ondansetron (Zofran) 8 MG tablet Take 1 tablet by mouth Every 8 (Eight) Hours As Needed for Nausea or Vomiting.    rosuvastatin (CRESTOR) 10 MG tablet TAKE ONE TABLET BY MOUTH DAILY    vitamin D (ERGOCALCIFEROL) 1.25 MG (99457 UT) capsule capsule     pantoprazole (PROTONIX) 40 MG EC tablet Take 1 tablet by mouth Daily. (Patient not taking: Reported on 11/15/2024)    [DISCONTINUED] PreviDent 5000 Dry Mouth 1.1 % gel  (Patient not taking: Reported on 11/15/2024)    [DISCONTINUED] tretinoin (RETIN-A) 0.025 % cream APPLY A PEA SIZED AMOUNT TOPICALLY TO ENTIRE FACE AT NIGHT (Patient not taking: Reported on 11/15/2024)     No current  facility-administered medications on file prior to visit.          HISTORY OF PRESENT ILLNESS     Pt is a 80y/o wf with cancer of left breast, left arm lymphedema, hypertension, and hyperlipidemia who presents seeking evaluation for prescribing of a left-sided lymphedema pump.    Followed by Dr. Avendano of Medical Oncology and Dr. Schneider of Radiation Oncology. She completed both chemotherapy and adjuvant radiation on 7/19/22.  She received a dose of 50Gy to the left breast and regional nodes followed by a 1000cgy boost. Most recent mammogram 8/3/24 was benign. She also underwent a localized lumpectomy with left sentinel lymph node biopsy in 2/2022 br Dr. Liu of General Surgery. She admits chronic left arm swelling, which is most prominent to her left inner arm from mid forearm through mid humerus.  The swelling can sometimes cause feelings of heaviness to the left breast and arm.  She currently follows up with the lymphedema clinic every 3-to-6 months and her  performs a lymphedema massage three times weekly. She is also by Physical Therapy every 3-to-6 months and was given the suggestion that she might attempt using a lymphedema pump to help manage her left arm edema due to recently increased measurements. The difference in our measurement has been fairly consistent for the last several months per physical therapy. Left humerus and forearm measured at 29 cm and 25 cm, as compared to the right of 24.5 cm and 22.5 cm.      Patient Care Team:  Chris Issa, DNP, APRN as PCP - General (Internal Medicine)  Becky Liu MD as Referring Physician (General Surgery)  Sheri Avendano MD as Consulting Physician (Hematology and Oncology)  Nakita Galo MD as Consulting Physician (Cardiology)  Rula Schneider MD as Consulting Physician (Radiation Oncology)  Kai Peck Jr., MD as Consulting Physician (Urology)    REVIEW OF SYSTEMS     Review of Systems   As Per HPI.  All other systems  "negative.     PHYSICAL EXAMINATION     Physical Exam  Vitals and nursing note reviewed.   Constitutional:       General: She is not in acute distress.     Appearance: Normal appearance. She is not ill-appearing.   Cardiovascular:      Rate and Rhythm: Normal rate and regular rhythm.      Pulses: Normal pulses.      Heart sounds: Normal heart sounds. No murmur heard.     No friction rub. No gallop.   Pulmonary:      Effort: Pulmonary effort is normal. No respiratory distress.      Breath sounds: Normal breath sounds. No stridor. No wheezing.   Musculoskeletal:      Right upper arm: Normal.      Left upper arm: Edema present.   Neurological:      Mental Status: She is alert.   Psychiatric:         Mood and Affect: Mood normal.         Behavior: Behavior normal.             REVIEWED DATA     Labs:     Lab Results   Component Value Date     08/30/2024    K 4.2 08/30/2024    CALCIUM 9.3 08/30/2024    AST 24 08/30/2024    ALT 14 08/30/2024    BUN 15 08/30/2024    CREATININE 0.85 08/30/2024    CREATININE 0.74 02/20/2024    CREATININE 0.74 02/15/2024    EGFRRESULT 82.9 02/15/2024     Lab Results   Component Value Date    HGBA1C 5.40 02/15/2024    HGBA1C 5.3 02/23/2022    HGBA1C 5.60 04/12/2021     Lab Results   Component Value Date    LDL 68 08/20/2024    LDL 84 02/15/2024    LDL 64 07/26/2023    HDL 71 (H) 08/20/2024    HDL 80 (H) 02/15/2024    TRIG 71 08/20/2024    TRIG 164 (H) 02/15/2024     Lab Results   Component Value Date    TSH 2.190 08/20/2024    TSH 2.930 02/15/2024    TSH 1.730 07/26/2023    FREET4 1.24 08/20/2024    FREET4 1.14 02/15/2024    FREET4 1.64 07/26/2023     Lab Results   Component Value Date    WBC 6.28 08/30/2024    HGB 13.7 08/30/2024     08/30/2024   No results found for: \"MALBCRERATIO\"          Imaging:               Medical Tests:               Summary of old records / correspondence / consultant report:             Request outside records:       "

## 2024-11-25 ENCOUNTER — OFFICE VISIT (OUTPATIENT)
Dept: ONCOLOGY | Facility: CLINIC | Age: 79
End: 2024-11-25
Payer: MEDICARE

## 2024-11-25 VITALS
DIASTOLIC BLOOD PRESSURE: 65 MMHG | HEART RATE: 74 BPM | OXYGEN SATURATION: 97 % | HEIGHT: 61 IN | WEIGHT: 127.85 LBS | BODY MASS INDEX: 24.14 KG/M2 | SYSTOLIC BLOOD PRESSURE: 103 MMHG | RESPIRATION RATE: 16 BRPM | TEMPERATURE: 98 F

## 2024-11-25 DIAGNOSIS — Z79.811 LONG TERM (CURRENT) USE OF AROMATASE INHIBITORS: ICD-10-CM

## 2024-11-25 DIAGNOSIS — M85.80 OSTEOPENIA, UNSPECIFIED LOCATION: Primary | ICD-10-CM

## 2024-11-25 DIAGNOSIS — C50.912 MALIGNANT NEOPLASM OF LEFT BREAST IN FEMALE, ESTROGEN RECEPTOR POSITIVE, UNSPECIFIED SITE OF BREAST: ICD-10-CM

## 2024-11-25 DIAGNOSIS — Z17.0 MALIGNANT NEOPLASM OF LEFT BREAST IN FEMALE, ESTROGEN RECEPTOR POSITIVE, UNSPECIFIED SITE OF BREAST: ICD-10-CM

## 2024-11-25 NOTE — PROGRESS NOTES
Subjective   Neela Ramirez is a 79 y.o. female.  Referred by Dr. 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 Ramirez is a 79-year-old postmenopausal  lady with history of hypertension, anxiety who self palpated a left breast mass about 2 to 3 months ago.  A bilateral diagnostic mammogram was performed for further evaluation of the same.    8/17/2021-bilateral diagnostic mammogram-scattered fibroglandular densities throughout both breasts.  In the posterior one third upper inner quadrant of the left breast at the site of palpable concern, 11 o'clock position there is a mass with adjacent pleomorphic calcifications.  The mass and the calcifications measure on order of 1.5 cm in greatest dimension.  No evidence of axillary lymphadenopathy appreciated.  Stable microcalcifications seen in other areas of the left breast on right breast.  Ultrasound-at 11 o'clock position, 5 cm from the nipple in the left breast there is an irregular hypoechoic mass which measures 2.4 x 2 x 1.6 cm.    Impression  1.irregular 2.4 cm mass in the left breast at the site of palpable concern at 11:00, 5 cm from the nipple.  Ultrasound-guided biopsy of the mass recommended  No finding suspicious for malignancy in the right breast    9/8/2021-left breast ultrasound-guided biopsy  Pathology consistent with invasive ductal carcinoma with lobular features.  Grade 3.  The carcinoma measures 7 mm in greatest dimension.  Focally suspicious for lymphovascular space invasion.  ER low +1-10%, intensity week  [place] negative  HER-2 3+ on immunohistochemistry  Ki-67 70%    MRI of the breast 10/8/2021-Biopsy-proven malignancy in the left breast at 11:00 measuring 2.8 cm with a centrally located metallic clip.  No other suspicious findings are seen within the left breast or no left axillary lymphadenopathy.  No suspicious findings of the right breast    Echocardiogram 10/8/2021 was normal  ejection fraction of 56 to 60% and strain of -20    She denies any new bone pains, dyspnea, cough, abdominal pain nausea vomiting or recent changes in weight.    She had 2 previous breast aspirations in her 20s.  No other breast biopsies  She does not know much about her family hence limited family history.    She received neoadjuvant chemotherapy on EA 1181 trial with Taxol Perjeta and Herceptin.  She completed 12 weeks of Taxol Herceptin and Perjeta.    Had an abnormal EKG preop and hence a stress test was performed on 2/3/2022 which showed a normal left ventricular ejection fraction and LVEF at stress was 73%.  Considered a low risk study with normal myocardial perfusion imaging.    She underwent a left breast lumpectomy and a sentinel lymph node biopsy on 2/10/2022.    Pathology was consistent with residual tumor which was pleomorphic invasive lobular carcinoma, poorly differentiated, grade 3  Tumor measured 18 mm  Margins negative for invasive carcinoma  Associated lobular carcinoma in situ noted  1 out of 10 lymph nodes was positive for metastatic focus measuring 3.5 mm.    Receptors were repeated on the pleomorphic invasive lobular carcinoma  ER +91 to 100% strong  OR +61 to 70% moderate  HER-2 negative  Ki-67 55%    Receptors performed on the metastatic lymph node  ER +81-90% strong  OR negative  HER-2 2+ on immunohistochemistry, FISH pending.    ypT1 cN1 aM0    2/28/2022-CT of the chest abdomen and pelvis  Fluid collection in the upper left breast measuring 6 x 2.5 x 4.9 cm, postoperative seroma.  Skin thickening of the left breast likely postsurgical.  Fluid collections left axilla measuring 2.9 x 1.4 x 2.9 cm.  No other suspicious abnormalities on the CT of the chest  CT abdomen with no evidence of metastatic disease.  Mild colonic diverticulosis.  Small uterine fibroid.    3/1/2022-bone scan  No evidence of metastatic disease.  Multifocal degenerative arthritic uptake without scintigraphic evidence to  suggest metastatic disease.    3/1/2022-echocardiogram  Left ventricular ejection fraction 56-60%.  Normal global LV strain of -20.5%.    3/11/2022-cycle 1 of AC    Completed 4 cycles of AC    6/3/2022-cycle 1 Kadcyla    7/19/2022-completed adjuvant radiation    7/20/2022-started adjuvant anastrozole    8/17/2022-bilateral diagnostic mammogram-benign    8/21/2023-bilateral screening mammogram benign    Interval history  Rayo returns today for follow-up.  She continues on anastrozole as well as every 6 monthly Prolia.  She is reporting low back pain in the lumbar area which somewhat worse today compared to the past.  She has had a previous MRI to assess this and consistent with degenerative disc disease.  She denies any other new bone pains, cough, abdominal pain nausea vomiting  She remains compliant with anastrozole.  Denies any new breast masses.  Up-to-date on screening mammograms.    The following portions of the patient's history were reviewed and updated as appropriate: allergies, current medications, past family history, past medical history, past social history, past surgical history and problem list.    Past Medical History:   Diagnosis Date    Anxiety     Arthritis     Cataract     EARLY. JIMMIE EYES    Chronic back pain     LOW BACK ACHES AT TIME FROM ARTHRTIS    Constipation     AT TIMES. NO RECENT ISSUES    Dermatitis     LEGS. STATES CLEARING UP NOW.    Disease of thyroid gland     HYPO    Diverticulosis     Colonoscopy November 2014    Encounter for management of implanted device 11/02/2021    Erythema of face     Transitory Erythema Of The Face    GERD (gastroesophageal reflux disease)     ON OCC    History of chemotherapy     FOR LEFT BREAST CANCER    Hyperlipidemia     Hypertension     Malignant neoplasm of female breast 09/27/2021    Osteopenia     Tachycardia         Past Surgical History:   Procedure Laterality Date    BREAST BIOPSY  09/2021    Dr. Tirado     BREAST CYST ASPIRATION Right     BREAST  CYST EXCISION Right     BREAST LUMPECTOMY WITH SENTINEL NODE BIOPSY Left 02/10/2022    Procedure: LEFT BREAST WIRE LOCALIZED LUMPECTOMY WITH SENTINEL NODE BIOPSY, POSSIBLE LEFT AXILLARY DISSECTION;  Surgeon: Becky Liu MD;  Location: [facility] OR [facility] [place];  Service: General;  Laterality: Left;    COLONOSCOPY      COLONOSCOPY      COLONOSCOPY N/A 10/6/2023    Procedure: COLONOSCOPY to cecum and TI with cold bx polypectomy;  Surgeon: Gavin Mead MD;  Location: [facility] ENDOSCOPY;  Service: Gastroenterology;  Laterality: N/A;  pre - nausea, constipation  post - diverticulosis, polyp    ENDOSCOPY N/A 10/6/2023    Procedure: ESOPHAGOGASTRODUODENOSCOPY with cold bx;  Surgeon: Gavin Mead MD;  Location: [facility] ENDOSCOPY;  Service: Gastroenterology;  Laterality: N/A;  pre - nausea, constipation  post - gastritis    EXOSTECTOMY Bilateral     Simple Bunion Exostectomy (Silver Procedure)    FOOT TENDON SURGERY      VENOUS ACCESS DEVICE (PORT) INSERTION N/A 10/18/2021    Procedure: INSERTION VENOUS ACCESS DEVICE;  Surgeon: Becky Liu MD;  Location: [facility] OR [facility] [place];  Service: General;  Laterality: N/A;        Family History   Problem Relation Age of Onset    Hypertension Mother     Breast cancer Mother     Arthritis Father     Heart disease Father     Hypertension Father     Malig Hyperthermia Neg Hx         Social History     Socioeconomic History    Marital status:      Spouse name: Pj    Number of children: 1   Tobacco Use    Smoking status: Former     Current packs/day: 0.00     Average packs/day: 0.3 packs/day for 0.5 years (0.1 ttl pk-yrs)     Types: Cigarettes     Start date: 1969     Quit date:      Years since quittin.9    Smokeless tobacco: Never    Tobacco comments:     SOCIALLY IN EARLY    Vaping Use    Vaping status: Never Used   Substance and Sexual Activity    Alcohol use: Yes     Comment: social drinker    Drug use: Never    Sexual activity: Not Currently     Partners: Male  "       OB History               Para        Term   0            AB        Living             SAB        IAB        Ectopic        Molar        Multiple        Live Births                 Age at menarche-13  Age at menopause-50  Nulliparous  Breast-feeding-N/A   0 para 0  0  Oral contraceptive pill use-none  Hormone replacement therapy-7 years    No Known Allergies     Review of systems as mentioned HPI otherwise negative    Objective   Vitals:    24 1334   BP: 103/65   Pulse: 74   Resp: 16   Temp: 98 °F (36.7 °C)   TempSrc: Oral   SpO2: 97%   Weight: 58 kg (127 lb 13.6 oz)   Height: 154.9 cm (60.98\")   PainSc:   8   PainLoc: Back         ECOG performance status-0    Physical Exam  Vitals reviewed.   Constitutional:       Appearance: Normal appearance.   HENT:      Head: Normocephalic and atraumatic.      Nose: Nose normal.      Mouth/Throat:      Mouth: Mucous membranes are moist.      Pharynx: Oropharynx is clear.   Eyes:      Conjunctiva/sclera: Conjunctivae normal.      Pupils: Pupils are equal, round, and reactive to light.   Cardiovascular:      Rate and Rhythm: Normal rate and regular rhythm.      Pulses: Normal pulses.      Heart sounds: Normal heart sounds.   Pulmonary:      Effort: Pulmonary effort is normal.      Breath sounds: Normal breath sounds.   Abdominal:      General: Abdomen is flat. Bowel sounds are normal.      Palpations: Abdomen is soft.   Musculoskeletal:         General: Normal range of motion.      Cervical back: Normal range of motion.      Comments: LUE sleeve for lympedema, decreased ROM    Skin:     General: Skin is warm and dry.      Findings: No rash.   Neurological:      General: No focal deficit present.      Mental Status: She is alert and oriented to person, place, and time. Mental status is at baseline.   Psychiatric:         Mood and Affect: Mood normal.         Behavior: Behavior normal.         Thought Content: Thought content normal.         " Judgment: Judgment normal.       Breast Exam: Right breast appears normal on inspection.  No palpable abnormalities of the right breast.    Left breast on inspection there is a scar which is well-healed.  There is also left axillary scar which is well-healed.  Skin changes consistent with radiation dermatitis.  The left breast is somewhat firm which is likely secondary to radiation.  With sitting, there is dimpling of the left breast at the lumpectomy site which is unchanged per the patient's report    11/25/2024 I have reexamined the patient and the results are consistent with the previously documented exam. Sheri Avendano MD                                        No radiology results for the last 30 days.       Assessment & Plan       *Invasive ductal carcinoma of the left breast  Clinical T2 N0 M0, stage IIa  On ultrasound the tumor measures 2.4 cm.  Lymph nodes on the left side are normal.  MRI 10/8/2021 with tumor measuring 2.8 cm.  Lymph nodes appear normal  Pathology consistent with invasive carcinoma with ductal and lobular features, grade 3, ER +1 to 10% weak, [place] negative, HER-2 3+ immunohistochemistry, Ki-67 70%.  She was evaluated by Dr. Liu and referred for consideration of neoadjuvant chemotherapy.   Since the tumor is HER-2 positive and over 2 cm I think it would be reasonable to proceed with neoadjuvant chemotherapy.  Since the tumor is over 2 cm but lymph node negative I think she would be a great candidate for EA 1181 clinical trial which comprises of administering Taxol Herceptin and Perjeta tamika  adjuvantly followed by surgery and additional adjuvant treatment depending upon the response.  Port placed 10/18/2021  Echocardiogram shows normal ejection fraction  Week 1 TPH on 10/19/2021  Completed 12 weeks of Taxol Perjeta and Herceptin on 1/4/2022 on EA 1181 clinical trial  1/11/2022 due for Herceptin and Perjeta only  2/1/2022-due for dose 2 of Herceptin and Perjeta  MRI 1/14/2022 reviewed and  there has been a good tumor response with decrease in size of the mass to 1.2 cm.  2/10/2022-left breast lumpectomy and sentinel lymph node biopsy  Pathology shows ypT1 cN1 aM0, stage IIb, pleomorphic invasive lobular carcinoma, grade 3, ER +% strong, IA +61-70% moderate, HER-2 negative on the residual tumor  The left axillary lymph node was ER positive strong, IA negative and HER-2 2+, FISH amplified.  On the initial biopsy prior to surgery the pathology showed invasive ductal with lobular features and the ER/IA was negative and HER-2 was 3+.  The residual disease obviously appears to be different from the initial pathology.  What is remaining is essentially pleomorphic lobular carcinoma which is high-grade with a high Ki-67 and ER/IA positivity.  Now that the lymph node is also positive I do believe that she will benefit from additional chemotherapy particularly either AC or EC.  Case was presented at multidisciplinary conference.  Decided to proceed with adjuvant Adriamycin and cyclophosphamide.  Given her age we will proceed with every 3 weekly AC over every 2 weeks.  Staging CT chest and pelvis and bone scan without any evidence of metastatic disease  3/11/2022-proceed with cycle 1 of AC  5/13/2022-due for cycle 4 AC.    6/3/2022-cycle 1 Kadcyla  7/19/2022-completed adjuvant radiation  7/20/2022-started anastrozole  Patient continues on anastrozole and tolerating that well.  No evidence of recurrent disease  Screening mammogram from August 2023 and MRI from February 2024 reviewed and benign  Screening mammogram 8/23/2024 benign.  No evidence of recurrent disease, continue anastrozole.  Screening MRI scheduled for February 2025    *Hypertension-currently controlled with amlodipine and metoprolol.  Blood pressure BP: 103/65     *Anxiety  Mood normal    *Hypothyroidism  Continue Synthroid  Stable    *Cardiac health-  Echocardiogram 1/5/2022 reviewed and stable  Stress test 2/3/2022  normal  3/1/2022-echocardiogram shows normal ejection fraction of 56 to 60%, normal LV strain  5/31/2022 EF 61%.  Maintain follow-up with Dr. Galo   8/19/2022-2D echocardiogram shows an ejection fraction of 55% and a strain of -22%.  Stable  Echocardiogram February 2023 normal    *Bone health-  DEXA scan August 2021 showing osteopenia (this was stable compared to imaging 2 years prior).  She is currently on Fosamax which was started by her primary care physician.  8/14/2023 DEXA shows osteopenia with a T score of -1.9 in the hip and T score of 0.2 in the lumbar spine  2/20/2024-due for first dose of Prolia.  Dental clearance obtained.  Discontinue Fosamax  8/30/2024: Proceed with Prolia today.  Next Prolia is due in February 2025.    *Left axillary stiffness-improved.  Continue follow-up with lymphedema clinic.  Wearing lymphedema sleeve and doing massage 3 times per week.    *Decreased hearing, feelings of lightheadedness and left ear bothering patient.  Per examination she has significant cerumen in both external ear canals.  Patient has required visit with ENT before to have this removed. Recommended that she obtain Debrox in the short-term until she gets through the holiday season and then to have repeat visit to have this cleared out.      *Constipation  Improved somewhat with stool softeners however patient experiences intermittent constipation and is worried about the same.  She had a colonoscopy which was benign.  Continue follow-up with gastroenterology as needed    *Left-sided back pain  Secondary to the fall  X-rays previously performed were negative  4/29/2023 MRI of the lumbar spine with no evidence of metastatic disease, degenerative changes noted  Reports worsening of back pain today but this waxes and wanes.  No other new aches or pains.    *Left wrist pain:  Started 5-6 weeks ago after lifting a cast iron skillet.  Following with [facility].    PLAN:   APRMIKE in 3 months and MD Lowe  months  Continue anastrozole 1 mg daily.   Start coconut oil for vaginal dryness.  Patient reports microscopic hematuria and has been evaluated by urology for the same.  Reports that this is improving.  Screening mammogram next due August 2025.  Bilateral breast MRI due February 2025, order placed today.    Patient is on a high risk medication requiring close monitoring for toxicity.    Sheri Avendano MD  11/25/2024

## 2024-12-17 DIAGNOSIS — E03.9 HYPOTHYROIDISM, UNSPECIFIED TYPE: ICD-10-CM

## 2024-12-17 RX ORDER — LEVOTHYROXINE SODIUM 25 UG/1
TABLET ORAL
Qty: 90 TABLET | Refills: 1 | Status: SHIPPED | OUTPATIENT
Start: 2024-12-17

## 2024-12-28 DIAGNOSIS — I10 ESSENTIAL HYPERTENSION: ICD-10-CM

## 2024-12-29 RX ORDER — METOPROLOL SUCCINATE 25 MG/1
25 TABLET, EXTENDED RELEASE ORAL DAILY
Qty: 90 TABLET | Refills: 3 | Status: SHIPPED | OUTPATIENT
Start: 2024-12-29

## 2025-01-03 DIAGNOSIS — I10 ESSENTIAL HYPERTENSION: ICD-10-CM

## 2025-01-03 RX ORDER — AMLODIPINE BESYLATE 5 MG/1
5 TABLET ORAL DAILY
Qty: 90 TABLET | Refills: 0 | Status: SHIPPED | OUTPATIENT
Start: 2025-01-03

## 2025-01-03 NOTE — TELEPHONE ENCOUNTER
Caller: Neela Ramirez    Relationship: Self    Best call back number: [phone]    Requested Prescriptions:   Requested Prescriptions     Pending Prescriptions Disp Refills    amLODIPine (NORVASC) 5 MG tablet 90 tablet 0     Sig: Take 1 tablet by mouth Daily.        Pharmacy where request should be sent: [facility] [id number] [address] AT [facility]. - [phone]  - [phone] FX     Last office visit with prescribing clinician: 8/20/2024   Last telemedicine visit with prescribing clinician: Visit date not found   Next office visit with prescribing clinician: 2/18/2025     Additional details provided by patient: PATIENT IS OUT OF THIS MEDICATION.     Does the patient have less than a 3 day supply:  [x] Yes  [] No    Would you like a call back once the refill request has been completed: [] Yes [x] No    If the office needs to give you a call back, can they leave a voicemail: [] Yes [x] No    Mary Knapp Rep   01/03/25 11:40 EST

## 2025-02-13 ENCOUNTER — HOSPITAL ENCOUNTER (OUTPATIENT)
Dept: MRI IMAGING | Facility: HOSPITAL | Age: 80
Discharge: HOME | End: 2025-02-13
Admitting: NURSE PRACTITIONER
Payer: MEDICARE

## 2025-02-13 DIAGNOSIS — Z17.0 MALIGNANT NEOPLASM OF LEFT BREAST IN FEMALE, ESTROGEN RECEPTOR POSITIVE, UNSPECIFIED SITE OF BREAST: ICD-10-CM

## 2025-02-13 DIAGNOSIS — C50.912 MALIGNANT NEOPLASM OF LEFT BREAST IN FEMALE, ESTROGEN RECEPTOR POSITIVE, UNSPECIFIED SITE OF BREAST: ICD-10-CM

## 2025-02-13 PROCEDURE — A9577 INJ MULTIHANCE: HCPCS | Performed by: NURSE PRACTITIONER

## 2025-02-13 PROCEDURE — C8908 MRI W/O FOL W/CONT, BREAST,: HCPCS

## 2025-02-13 PROCEDURE — 25510000002 GADOBENATE DIMEGLUMINE 529 MG/ML SOLUTION: Performed by: NURSE PRACTITIONER

## 2025-02-13 PROCEDURE — C8937 CAD BREAST MRI: HCPCS

## 2025-02-13 RX ADMIN — GADOBENATE DIMEGLUMINE 11 ML: 529 INJECTION, SOLUTION INTRAVENOUS at 11:14

## 2025-02-17 ENCOUNTER — TELEPHONE (OUTPATIENT)
Dept: INTERNAL MEDICINE | Age: 80
End: 2025-02-17

## 2025-02-17 ENCOUNTER — TELEPHONE (OUTPATIENT)
Dept: ONCOLOGY | Facility: CLINIC | Age: 80
End: 2025-02-17
Payer: MEDICARE

## 2025-02-17 NOTE — TELEPHONE ENCOUNTER
Caller: Neela Ramirez    Relationship to patient: Self    Best call back number: [phone]     Chief complaint: AWV    Type of visit: SUBSEQUENT MEDICARE WELLNESS VISIT    Requested date: LATE MARCH     If rescheduling, when is the original appointment: 2/18/25     Additional notes:PT HAD TO RESCHEDULE AWV DUE TO LACK OF TRANSPORTATION AND WANTS LATE MARCH. NEXT AVAIL IS NOT UNTIL AUGUST

## 2025-03-03 NOTE — PROGRESS NOTES
Subjective   Neela Ramirez is a 79 y.o. female.  Referred by Dr. 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 Ramirez is a 79-year-old postmenopausal  lady with history of hypertension, anxiety who self palpated a left breast mass about 2 to 3 months ago.  A bilateral diagnostic mammogram was performed for further evaluation of the same.    8/17/2021-bilateral diagnostic mammogram-scattered fibroglandular densities throughout both breasts.  In the posterior one third upper inner quadrant of the left breast at the site of palpable concern, 11 o'clock position there is a mass with adjacent pleomorphic calcifications.  The mass and the calcifications measure on order of 1.5 cm in greatest dimension.  No evidence of axillary lymphadenopathy appreciated.  Stable microcalcifications seen in other areas of the left breast on right breast.  Ultrasound-at 11 o'clock position, 5 cm from the nipple in the left breast there is an irregular hypoechoic mass which measures 2.4 x 2 x 1.6 cm.    Impression  1.irregular 2.4 cm mass in the left breast at the site of palpable concern at 11:00, 5 cm from the nipple.  Ultrasound-guided biopsy of the mass recommended  No finding suspicious for malignancy in the right breast    9/8/2021-left breast ultrasound-guided biopsy  Pathology consistent with invasive ductal carcinoma with lobular features.  Grade 3.  The carcinoma measures 7 mm in greatest dimension.  Focally suspicious for lymphovascular space invasion.  ER low +1-10%, intensity week  IN negative  HER-2 3+ on immunohistochemistry  Ki-67 70%    MRI of the breast 10/8/2021-Biopsy-proven malignancy in the left breast at 11:00 measuring 2.8 cm with a centrally located metallic clip.  No other suspicious findings are seen within the left breast or no left axillary lymphadenopathy.  No suspicious findings of the right breast    Echocardiogram 10/8/2021 was normal  ejection fraction of 56 to 60% and strain of -20    She denies any new bone pains, dyspnea, cough, abdominal pain nausea vomiting or recent changes in weight.    She had 2 previous breast aspirations in her 20s.  No other breast biopsies  She does not know much about her family hence limited family history.    She received neoadjuvant chemotherapy on EA 1181 trial with Taxol Perjeta and Herceptin.  She completed 12 weeks of Taxol Herceptin and Perjeta.    Had an abnormal EKG preop and hence a stress test was performed on 2/3/2022 which showed a normal left ventricular ejection fraction and LVEF at stress was 73%.  Considered a low risk study with normal myocardial perfusion imaging.    She underwent a left breast lumpectomy and a sentinel lymph node biopsy on 2/10/2022.    Pathology was consistent with residual tumor which was pleomorphic invasive lobular carcinoma, poorly differentiated, grade 3  Tumor measured 18 mm  Margins negative for invasive carcinoma  Associated lobular carcinoma in situ noted  1 out of 10 lymph nodes was positive for metastatic focus measuring 3.5 mm.    Receptors were repeated on the pleomorphic invasive lobular carcinoma  ER +91 to 100% strong  MI +61 to 70% moderate  HER-2 negative  Ki-67 55%    Receptors performed on the metastatic lymph node  ER +81-90% strong  MI negative  HER-2 2+ on immunohistochemistry, FISH pending.    ypT1 cN1 aM0    2/28/2022-CT of the chest abdomen and pelvis  Fluid collection in the upper left breast measuring 6 x 2.5 x 4.9 cm, postoperative seroma.  Skin thickening of the left breast likely postsurgical.  Fluid collections left axilla measuring 2.9 x 1.4 x 2.9 cm.  No other suspicious abnormalities on the CT of the chest  CT abdomen with no evidence of metastatic disease.  Mild colonic diverticulosis.  Small uterine fibroid.    3/1/2022-bone scan  No evidence of metastatic disease.  Multifocal degenerative arthritic uptake without scintigraphic evidence to  suggest metastatic disease.    3/1/2022-echocardiogram  Left ventricular ejection fraction 56-60%.  Normal global LV strain of -20.5%.    3/11/2022-cycle 1 of AC    Completed 4 cycles of AC    6/3/2022-cycle 1 Kadcyla    7/19/2022-completed adjuvant radiation    7/20/2022-started adjuvant anastrozole    8/17/2022-bilateral diagnostic mammogram-benign    8/21/2023-bilateral screening mammogram benign    Interval history  Neela returns today for follow-up.  She continues on anastrozole daily.  She is also due for Prolia today.  She is seen today with her  present.  She reports over the last 2-3 weeks she has had some low back pain that radiates down her right leg to her knee.  This pain typically bothers her when she is trying to walk up the stairs.  Aside from back pain, she is doing well.  Appetite adequate.  Bowels moving regularly.  She does have occasional nausea, which is a chronic issue.  She uses Zofran intermittently, is requesting a refill today.  Denies vomiting, abdominal pain, bone pain, cough or shortness of breath.  Denies hot flashes.  Aside from her low back pain, denies any arthralgias.    The following portions of the patient's history were reviewed and updated as appropriate: allergies, current medications, past family history, past medical history, past social history, past surgical history and problem list.    Past Medical History:   Diagnosis Date    Anxiety     Arthritis     Cataract     EARLY. JIMMIE EYES    Chronic back pain     LOW BACK ACHES AT TIME FROM ARTHRTIS    Constipation     AT TIMES. NO RECENT ISSUES    Dermatitis     LEGS. STATES CLEARING UP NOW.    Disease of thyroid gland     HYPO    Diverticulosis     Colonoscopy November 2014    Encounter for management of implanted device 11/02/2021    Erythema of face     Transitory Erythema Of The Face    GERD (gastroesophageal reflux disease)     ON [facility]    History of chemotherapy     FOR LEFT BREAST CANCER    Hyperlipidemia      Hypertension     Malignant neoplasm of female breast 2021    Osteopenia     Tachycardia         Past Surgical History:   Procedure Laterality Date    BREAST BIOPSY  2021    Dr. Tirado     BREAST CYST ASPIRATION Right     BREAST CYST EXCISION Right     BREAST LUMPECTOMY WITH SENTINEL NODE BIOPSY Left 02/10/2022    Procedure: LEFT BREAST WIRE LOCALIZED LUMPECTOMY WITH SENTINEL NODE BIOPSY, POSSIBLE LEFT AXILLARY DISSECTION;  Surgeon: Becky Liu MD;  [place] [facility] [place] [facility] [place]  Service: General;  Laterality: Left;    COLONOSCOPY      COLONOSCOPY      COLONOSCOPY N/A 10/6/2023    Procedure: COLONOSCOPY to cecum and TI with cold bx polypectomy;  Surgeon: Gavin Mead MD;  Location: [facility] ENDOSCOPY;  Service: Gastroenterology;  Laterality: N/A;  pre - nausea, constipation  post - diverticulosis, polyp    ENDOSCOPY N/A 10/6/2023    Procedure: ESOPHAGOGASTRODUODENOSCOPY with cold bx;  Surgeon: Gavin Mead MD;  Location: [facility] ENDOSCOPY;  Service: Gastroenterology;  Laterality: N/A;  pre - nausea, constipation  post - gastritis    EXOSTECTOMY Bilateral     Simple Bunion Exostectomy (Silver Procedure)    FOOT TENDON SURGERY      VENOUS ACCESS DEVICE (PORT) INSERTION N/A 10/18/2021    Procedure: INSERTION VENOUS ACCESS DEVICE;  Surgeon: Becky Liu MD;  [place] [facility] [place] [facility] [place]  Service: General;  Laterality: N/A;        Family History   Problem Relation Age of Onset    Hypertension Mother     Breast cancer Mother     Arthritis Father     Heart disease Father     Hypertension Father     Malig Hyperthermia Neg Hx         Social History     Socioeconomic History    Marital status:      Spouse name: Pj    Number of children: 1   Tobacco Use    Smoking status: Former     Current packs/day: 0.00     Average packs/day: 0.3 packs/day for 0.5 years (0.1 ttl pk-yrs)     Types: Cigarettes     Start date: 1969     Quit date: 1970     Years since quittin.2    Smokeless tobacco: Never     "Tobacco comments:     SOCIALLY IN EARLY 20'S   Vaping Use    Vaping status: Never Used   Substance and Sexual Activity    Alcohol use: Yes     Comment: social drinker    Drug use: Never    Sexual activity: Not Currently     Partners: Male        OB History               Para        Term   0            AB        Living             SAB        IAB        Ectopic        Molar        Multiple        Live Births                 Age at menarche-13  Age at menopause-50  Nulliparous  Breast-feeding-N/A   0 para 0  0  Oral contraceptive pill use-none  Hormone replacement therapy-7 years    No Known Allergies     Review of systems as mentioned HPI otherwise negative    Objective   Vitals:    25 1021   BP: 128/77   Pulse: 89   Resp: 16   Temp: 97.4 °F (36.3 °C)   TempSrc: Oral   SpO2: 97%   Weight: 59.1 kg (130 lb 4.8 oz)   Height: 154.9 cm (60.98\")   PainSc: 9    PainLoc: Hip     ECOG performance status-0    Physical Exam  Vitals reviewed.   Constitutional:       Appearance: Normal appearance.   HENT:      Head: Normocephalic and atraumatic.      Nose: Nose normal.      Mouth/Throat:      Mouth: Mucous membranes are moist.      Pharynx: Oropharynx is clear.   Eyes:      Conjunctiva/sclera: Conjunctivae normal.      Pupils: Pupils are equal, round, and reactive to light.   Cardiovascular:      Rate and Rhythm: Normal rate and regular rhythm.      Pulses: Normal pulses.      Heart sounds: Normal heart sounds.   Pulmonary:      Effort: Pulmonary effort is normal.      Breath sounds: Normal breath sounds.   Abdominal:      General: Abdomen is flat. Bowel sounds are normal.      Palpations: Abdomen is soft.   Musculoskeletal:         General: Normal range of motion.      Cervical back: Normal range of motion.      Comments: LUE sleeve for lympedema, decreased ROM    Skin:     General: Skin is warm and dry.      Findings: No rash.   Neurological:      General: No focal deficit present.      Mental " Status: She is alert and oriented to person, place, and time. Mental status is at baseline.   Psychiatric:         Mood and Affect: Mood normal.         Behavior: Behavior normal.         Thought Content: Thought content normal.         Judgment: Judgment normal.       Previous breast Exam, patient declined today: Right breast appears normal on inspection.  No palpable abnormalities of the right breast.    Left breast on inspection there is a scar which is well-healed.  There is also left axillary scar which is well-healed.  Skin changes consistent with radiation dermatitis.  The left breast is somewhat firm which is likely secondary to radiation.  With sitting, there is dimpling of the left breast at the lumpectomy site which is unchanged per the patient's report    03/07/2025 I have reexamined the patient and the results are consistent with the previously documented exam. RENO Mays          Results from last 7 days   Lab Units 03/07/25  1021   WBC 10*3/mm3 7.92   NEUTROS ABS 10*3/mm3 5.64   HEMOGLOBIN g/dL 14.3   HEMATOCRIT % 44.7   PLATELETS 10*3/mm3 225         Results from last 7 days   Lab Units 03/07/25  1021   SODIUM mmol/L 142   POTASSIUM mmol/L 3.9   CHLORIDE mmol/L 106   CO2 mmol/L 25.2   BUN mg/dL 13   CREATININE mg/dL 0.85   CALCIUM mg/dL 9.5   ALBUMIN g/dL 4.3   BILIRUBIN mg/dL 0.6   ALK PHOS U/L 103   ALT (SGPT) U/L 21   AST (SGOT) U/L 31   GLUCOSE mg/dL 101*   MAGNESIUM mg/dL 2.2                       No radiology results for the last 30 days.       Assessment & Plan       *Invasive ductal carcinoma of the left breast  Clinical T2 N0 M0, stage IIa  On ultrasound the tumor measures 2.4 cm.  Lymph nodes on the left side are normal.  MRI 10/8/2021 with tumor measuring 2.8 cm.  Lymph nodes appear normal  Pathology consistent with invasive carcinoma with ductal and lobular features, grade 3, ER +1 to 10% weak, NC negative, HER-2 3+ immunohistochemistry, Ki-67 70%.  She was evaluated by   Noe and referred for consideration of neoadjuvant chemotherapy.   Since the tumor is HER-2 positive and over 2 cm I think it would be reasonable to proceed with neoadjuvant chemotherapy.  Since the tumor is over 2 cm but lymph node negative I think she would be a great candidate for EA 1181 clinical trial which comprises of administering Taxol Herceptin and Perjeta tamika  adjuvantly followed by surgery and additional adjuvant treatment depending upon the response.  Port placed 10/18/2021  Echocardiogram shows normal ejection fraction  Week 1 TPH on 10/19/2021  Completed 12 weeks of Taxol Perjeta and Herceptin on 1/4/2022 on EA 1181 clinical trial  1/11/2022 due for Herceptin and Perjeta only  2/1/2022-due for dose 2 of Herceptin and Perjeta  MRI 1/14/2022 reviewed and there has been a good tumor response with decrease in size of the mass to 1.2 cm.  2/10/2022-left breast lumpectomy and sentinel lymph node biopsy  Pathology shows ypT1 cN1 aM0, stage IIb, pleomorphic invasive lobular carcinoma, grade 3, ER +% strong, UT +61-70% moderate, HER-2 negative on the residual tumor  The left axillary lymph node was ER positive strong, UT negative and HER-2 2+, FISH amplified.  On the initial biopsy prior to surgery the pathology showed invasive ductal with lobular features and the ER/UT was negative and HER-2 was 3+.  The residual disease obviously appears to be different from the initial pathology.  What is remaining is essentially pleomorphic lobular carcinoma which is high-grade with a high Ki-67 and ER/UT positivity.  Now that the lymph node is also positive I do believe that she will benefit from additional chemotherapy particularly either AC or EC.  Case was presented at multidisciplinary conference.  Decided to proceed with adjuvant Adriamycin and cyclophosphamide.  Given her age we will proceed with every 3 weekly AC over every 2 weeks.  Staging CT chest and pelvis and bone scan without any evidence of  metastatic disease  3/11/2022-proceed with cycle 1 of AC  5/13/2022-due for cycle 4 AC.    6/3/2022-cycle 1 Kadcyla  7/19/2022-completed adjuvant radiation  7/20/2022-started anastrozole  Patient continues on anastrozole and tolerating that well.  No evidence of recurrent disease  Screening mammogram from August 2023 and MRI from February 2024 reviewed and benign  Screening mammogram 8/23/2024 benign.  Screening MRI 2/13/2025 negative.  3/7/2025: Continues anastrozole daily.  Overall tolerating well.  Over the last 2-3 weeks she has had low back pain that radiates down her right leg.  She is requesting x-ray, so we will order this.  Also recommended PT, to which she is agreeable.  Otherwise, she is doing well and tolerating anastrozole without any difficulty.  Screening mammogram due August 2025, ordered today.     *Hypertension-currently controlled with amlodipine and metoprolol.  Blood pressure BP: 128/77     *Anxiety  Mood normal    *Hypothyroidism  Continue Synthroid  Stable    *Cardiac health-  Echocardiogram 1/5/2022 reviewed and stable  Stress test 2/3/2022 normal  3/1/2022-echocardiogram shows normal ejection fraction of 56 to 60%, normal LV strain  5/31/2022 EF 61%.  Maintain follow-up with Dr. Galo   8/19/2022-2D echocardiogram shows an ejection fraction of 55% and a strain of -22%.  Stable  Echocardiogram February 2023 normal    *Bone health-  DEXA scan August 2021 showing osteopenia (this was stable compared to imaging 2 years prior).  She is currently on Fosamax which was started by her primary care physician.  8/14/2023 DEXA shows osteopenia with a T score of -1.9 in the hip and T score of 0.2 in the lumbar spine  2/20/2024-due for first dose of Prolia.  Dental clearance obtained.  Discontinue Fosamax  8/30/2024: Proceed with Prolia today.  Prolia due today.  DEXA due August 2025, ordered today.     *Left axillary stiffness-improved.  Continue follow-up with lymphedema clinic.  Wearing lymphedema  sleeve and doing massage 3 times per week.    *Decreased hearing, feelings of lightheadedness and left ear bothering patient.  Per examination she has significant cerumen in both external ear canals.  Patient has required visit with ENT before to have this removed. Recommended that she obtain Debrox in the short-term until she gets through the holiday season and then to have repeat visit to have this cleared out.      *Constipation  Improved somewhat with stool softeners however patient experiences intermittent constipation and is worried about the same.  She had a colonoscopy which was benign.  Continue follow-up with gastroenterology as needed    *Left-sided back pain  Secondary to the fall  X-rays previously performed were negative  4/29/2023 MRI of the lumbar spine with no evidence of metastatic disease, degenerative changes noted  Reports worsening of back pain today but this waxes and wanes.  3/7/2025: Now having pain on her right lower back that radiates down to her right knee.  Has not had any fall or injury to the area.  She is concerned and would like to consider x-ray, which will be ordered today.  Did review that pain sounds like sciatic nerve pain, recommended PT as well to which she is agreeable.    *Left wrist pain:  Started 5-6 weeks ago after lifting a cast iron skillet.  Following with [facility].    PLAN:   Continue anastrozole 1 mg daily.  Initiated 7/20/2022.  Lumbar x-ray ordered today.  Referral placed for PT eval and treat for low back pain.  Screening mammogram next due August 2025.  Ordered today.  Prolia today.  DEXA due August 2025, ordered today.   Return in 6 months for MD follow-up and Prolia.  Bilateral breast MRI will next be due February 2026.    Patient is on a high risk medication requiring close monitoring for toxicity.    I spent 52 minutes caring for Neela on this date of service. This time includes time spent by me in the following activities: preparing for the visit,  reviewing tests, obtaining and/or reviewing a separately obtained history, performing a medically appropriate examination and/or evaluation, counseling and educating the patient/family/caregiver, ordering medications, tests, or procedures, documenting information in the medical record, and care coordination.     Janet Esparza, RENO  03/07/2025

## 2025-03-07 ENCOUNTER — LAB (OUTPATIENT)
Dept: LAB | Facility: HOSPITAL | Age: 80
End: 2025-03-07
Payer: MEDICARE

## 2025-03-07 ENCOUNTER — INFUSION (OUTPATIENT)
Dept: ONCOLOGY | Facility: HOSPITAL | Age: 80
End: 2025-03-07
Payer: MEDICARE

## 2025-03-07 ENCOUNTER — OFFICE VISIT (OUTPATIENT)
Dept: ONCOLOGY | Facility: CLINIC | Age: 80
End: 2025-03-07
Payer: MEDICARE

## 2025-03-07 VITALS
TEMPERATURE: 97.4 F | RESPIRATION RATE: 16 BRPM | WEIGHT: 130.3 LBS | SYSTOLIC BLOOD PRESSURE: 128 MMHG | DIASTOLIC BLOOD PRESSURE: 77 MMHG | OXYGEN SATURATION: 97 % | HEIGHT: 61 IN | HEART RATE: 89 BPM | BODY MASS INDEX: 24.6 KG/M2

## 2025-03-07 DIAGNOSIS — M85.80 OSTEOPENIA, UNSPECIFIED LOCATION: Primary | ICD-10-CM

## 2025-03-07 DIAGNOSIS — Z17.0 MALIGNANT NEOPLASM OF LEFT BREAST IN FEMALE, ESTROGEN RECEPTOR POSITIVE, UNSPECIFIED SITE OF BREAST: Primary | ICD-10-CM

## 2025-03-07 DIAGNOSIS — Z12.31 ENCOUNTER FOR SCREENING MAMMOGRAM FOR MALIGNANT NEOPLASM OF BREAST: ICD-10-CM

## 2025-03-07 DIAGNOSIS — M85.80 OSTEOPENIA, UNSPECIFIED LOCATION: ICD-10-CM

## 2025-03-07 DIAGNOSIS — M54.50 ACUTE LOW BACK PAIN, UNSPECIFIED BACK PAIN LATERALITY, UNSPECIFIED WHETHER SCIATICA PRESENT: ICD-10-CM

## 2025-03-07 DIAGNOSIS — C50.912 MALIGNANT NEOPLASM OF LEFT BREAST IN FEMALE, ESTROGEN RECEPTOR POSITIVE, UNSPECIFIED SITE OF BREAST: Primary | ICD-10-CM

## 2025-03-07 DIAGNOSIS — Z79.811 LONG TERM (CURRENT) USE OF AROMATASE INHIBITORS: ICD-10-CM

## 2025-03-07 LAB
ALBUMIN SERPL-MCNC: 4.3 G/DL (ref 3.5–5.2)
ALBUMIN/GLOB SERPL: 1.4 G/DL
ALP SERPL-CCNC: 103 U/L (ref 39–117)
ALT SERPL W P-5'-P-CCNC: 21 U/L (ref 1–33)
ANION GAP SERPL CALCULATED.3IONS-SCNC: 10.8 MMOL/L (ref 5–15)
AST SERPL-CCNC: 31 U/L (ref 1–32)
BASOPHILS # BLD AUTO: 0.06 10*3/MM3 (ref 0–0.2)
BASOPHILS NFR BLD AUTO: 0.8 % (ref 0–1.5)
BILIRUB SERPL-MCNC: 0.6 MG/DL (ref 0–1.2)
BUN SERPL-MCNC: 13 MG/DL (ref 8–23)
BUN/CREAT SERPL: 15.3 (ref 7–25)
CALCIUM SPEC-SCNC: 9.5 MG/DL (ref 8.6–10.5)
CHLORIDE SERPL-SCNC: 106 MMOL/L (ref 98–107)
CO2 SERPL-SCNC: 25.2 MMOL/L (ref 22–29)
CREAT SERPL-MCNC: 0.85 MG/DL (ref 0.57–1)
DEPRECATED RDW RBC AUTO: 50.1 FL (ref 37–54)
EGFRCR SERPLBLD CKD-EPI 2021: 69.8 ML/MIN/1.73
EOSINOPHIL # BLD AUTO: 0.14 10*3/MM3 (ref 0–0.4)
EOSINOPHIL NFR BLD AUTO: 1.8 % (ref 0.3–6.2)
ERYTHROCYTE [DISTWIDTH] IN BLOOD BY AUTOMATED COUNT: 13.2 % (ref 12.3–15.4)
GLOBULIN UR ELPH-MCNC: 3 GM/DL
GLUCOSE SERPL-MCNC: 101 MG/DL (ref 65–99)
HCT VFR BLD AUTO: 44.7 % (ref 34–46.6)
HGB BLD-MCNC: 14.3 G/DL (ref 12–15.9)
IMM GRANULOCYTES # BLD AUTO: 0.03 10*3/MM3 (ref 0–0.05)
IMM GRANULOCYTES NFR BLD AUTO: 0.4 % (ref 0–0.5)
LYMPHOCYTES # BLD AUTO: 1.41 10*3/MM3 (ref 0.7–3.1)
LYMPHOCYTES NFR BLD AUTO: 17.8 % (ref 19.6–45.3)
MAGNESIUM SERPL-MCNC: 2.2 MG/DL (ref 1.6–2.4)
MCH RBC QN AUTO: 32.7 PG (ref 26.6–33)
MCHC RBC AUTO-ENTMCNC: 32 G/DL (ref 31.5–35.7)
MCV RBC AUTO: 102.3 FL (ref 79–97)
MONOCYTES # BLD AUTO: 0.64 10*3/MM3 (ref 0.1–0.9)
MONOCYTES NFR BLD AUTO: 8.1 % (ref 5–12)
NEUTROPHILS NFR BLD AUTO: 5.64 10*3/MM3 (ref 1.7–7)
NEUTROPHILS NFR BLD AUTO: 71.1 % (ref 42.7–76)
NRBC BLD AUTO-RTO: 0 /100 WBC (ref 0–0.2)
PHOSPHATE SERPL-MCNC: 2.6 MG/DL (ref 2.5–4.5)
PLATELET # BLD AUTO: 225 10*3/MM3 (ref 140–450)
PMV BLD AUTO: 9.5 FL (ref 6–12)
POTASSIUM SERPL-SCNC: 3.9 MMOL/L (ref 3.5–5.2)
PROT SERPL-MCNC: 7.3 G/DL (ref 6–8.5)
RBC # BLD AUTO: 4.37 10*6/MM3 (ref 3.77–5.28)
SODIUM SERPL-SCNC: 142 MMOL/L (ref 136–145)
WBC NRBC COR # BLD AUTO: 7.92 10*3/MM3 (ref 3.4–10.8)

## 2025-03-07 PROCEDURE — 96372 THER/PROPH/DIAG INJ SC/IM: CPT

## 2025-03-07 PROCEDURE — 80053 COMPREHEN METABOLIC PANEL: CPT

## 2025-03-07 PROCEDURE — 84100 ASSAY OF PHOSPHORUS: CPT

## 2025-03-07 PROCEDURE — 25010000002 DENOSUMAB 60 MG/ML SOLUTION PREFILLED SYRINGE: Performed by: NURSE PRACTITIONER

## 2025-03-07 PROCEDURE — 36415 COLL VENOUS BLD VENIPUNCTURE: CPT

## 2025-03-07 PROCEDURE — 83735 ASSAY OF MAGNESIUM: CPT

## 2025-03-07 PROCEDURE — 85025 COMPLETE CBC W/AUTO DIFF WBC: CPT

## 2025-03-07 RX ORDER — ONDANSETRON 8 MG/1
8 TABLET, FILM COATED ORAL EVERY 8 HOURS PRN
Qty: 30 TABLET | Refills: 1 | Status: SHIPPED | OUTPATIENT
Start: 2025-03-07

## 2025-03-07 RX ADMIN — DENOSUMAB 60 MG: 60 INJECTION SUBCUTANEOUS at 11:01

## 2025-03-10 ENCOUNTER — TELEPHONE (OUTPATIENT)
Dept: ONCOLOGY | Facility: CLINIC | Age: 80
End: 2025-03-10
Payer: MEDICARE

## 2025-03-10 ENCOUNTER — HOSPITAL ENCOUNTER (OUTPATIENT)
Dept: GENERAL RADIOLOGY | Facility: HOSPITAL | Age: 80
Discharge: HOME | End: 2025-03-10
Admitting: NURSE PRACTITIONER
Payer: MEDICARE

## 2025-03-10 DIAGNOSIS — M54.50 ACUTE LOW BACK PAIN, UNSPECIFIED BACK PAIN LATERALITY, UNSPECIFIED WHETHER SCIATICA PRESENT: ICD-10-CM

## 2025-03-10 PROCEDURE — 72100 X-RAY EXAM L-S SPINE 2/3 VWS: CPT

## 2025-03-10 NOTE — TELEPHONE ENCOUNTER
Provider: Dr. Avendano/ Janet Esparza   Caller: Romana with Radiology  Relationship to Patient: Other  Call Back Phone Number: [phone]  Reason for Call: Radiology is asking if order is correct. 1 view. Stated usually X rays are  2, 3 or 4 view... pt waiting

## 2025-03-10 NOTE — TELEPHONE ENCOUNTER
Spoke with Janet Esparza re: need for updated order and she will place. Called Romana back to let her know.

## 2025-03-13 ENCOUNTER — TELEPHONE (OUTPATIENT)
Dept: ONCOLOGY | Facility: CLINIC | Age: 80
End: 2025-03-13
Payer: MEDICARE

## 2025-03-13 NOTE — TELEPHONE ENCOUNTER
Contacted Neela by phone to review xray report with her per RENO Kemp.  This shows just degenerative changes and she can proceed with PT as they discussed.  Reviewed appts for mammogram and dexa and provided phone number for PT at [facility].

## 2025-03-20 ENCOUNTER — TELEPHONE (OUTPATIENT)
Dept: INTERNAL MEDICINE | Age: 80
End: 2025-03-20

## 2025-03-20 NOTE — TELEPHONE ENCOUNTER
Caller: Pj Ramirez    Relationship: Emergency Contact    Best call back number: [phone]    What is the best time to reach you: 8 AM - 3 PM    Who are you requesting to speak with (clinical staff, provider,  specific staff member): Chris Issa, SIVA, APRN      Do you know the name of the person who called: N/A    What was the call regarding: PATIENT  IS CALLING IN REQUESTING TO DISCUSS THE POSSIBILITY THAT THE PATIENT SHOWING SIGNS OF ALZHEIMER.     Is it okay if the provider responds through MyChart: NO

## 2025-03-24 NOTE — PROGRESS NOTES
Medical Group Progress Note  ASSESSMENT & PLAN:     Sepsis with metabolic encephalopathy and elevated LFTs  Proteus Mirabalis bacteremia  -source either UTI, given headache consideration for meningitis as well  -for now cont abx to cover meningitis including vanc, rocephin, ampicillin  -ID consulted  -tentative plan for LP with IR tomorrow mornign  -follow cultures      Intractable headache - resolved  Hx: migraine  Hx: anxiety  - CTA head neck showing developmental abnormality without evidence of infarction or dissection  - consult Neuro   - regular diet   - pain control  - telemetry overnight     HEDY  Hypokalemia    - Baseline SCr 0.85, on admission SCr 1.61, GFR 34  - Replace and recheck electrolytes     Transaminitis  - No complaints of abdominal pain monitor and trend,      - resume home meds once rec complete and clinically appropriate      VTE Prophylaxis: Heparin subcu    3/24/25  Remains clinically stable  No recurrent fever  Renal function back to baseline  Seen by urology, given size and location of stone planning for nephrostomy tube with PCNL at a later date  Likely observe overnight and discharge in am  Tentative plan for cipro at discharge per ID  Cont IV abx for now        Glen Felton MD    SUBJECTIVE     No overnight events. No new complaints. No pain.     OBJECTIVE:     Last Recorded Vitals:  Vitals:    03/24/25 0338 03/24/25 0754 03/24/25 1058 03/24/25 1307   BP: 170/73 148/79 121/64 138/88   BP Location: Right arm      Patient Position: Lying  Sitting    Pulse: 78 85 66 73   Resp: 18  18 18   Temp: 37.4 °C (99.3 °F) 37.1 °C (98.8 °F) 36.8 °C (98.2 °F)    TempSrc: Temporal      SpO2: 93% 92% 97% 98%   Weight:       Height:         Last I/O:  I/O last 3 completed shifts:  In: 990 (10.3 mL/kg) [P.O.:940; IV Piggyback:50]  Out: - (0 mL/kg)   Weight: 95.7 kg     Physical Exam  GEN: comfortable, appears stated age  HEENT: NCAT, PERRLA, EOMI, moist mucous membranes  NECK: supple, no  QUICK REFERENCE INFORMATION:  The ABCs of the Annual Wellness Visit    Subsequent Medicare Wellness Visit    HEALTH RISK ASSESSMENT    Recent Hospitalizations:  No recent hospitalization(s)..        Current Medical Providers:  Patient Care Team:  Pj Ramirez MD as PCP - General        Smoking Status:  History   Smoking Status   • Never Smoker   Smokeless Tobacco   • Not on file       Alcohol Consumption:  History   Alcohol Use   • Yes     Comment: social drinker       Depression Screen:   PHQ-9 Depression Screening 2/15/2017   Little interest or pleasure in doing things 0   Feeling down, depressed, or hopeless 0   Trouble falling or staying asleep, or sleeping too much 0   Feeling tired or having little energy 0   Poor appetite or overeating 0   Feeling bad about yourself - or that you are a failure or have let yourself or your family down 0   Trouble concentrating on things, such as reading the newspaper or watching television 0   Moving or speaking so slowly that other people could have noticed. Or the opposite - being so fidgety or restless that you have been moving around a lot more than usual 0   Thoughts that you would be better off dead, or of hurting yourself in some way 0   PHQ-9 Total Score 0   If you checked off any problems, how difficult have these problems made it for you to do your work, take care of things at home, or get along with other people? Not difficult at all       Health Habits and Functional and Cognitive Screening:  Functional & Cognitive Status 2/15/2017   Do you have difficulty preparing food and eating? No   Do you have difficulty bathing yourself? No   Do you have difficulty getting dressed? No   Do you have difficulty using the toilet? No   Do you have difficulty moving around from place to place? No   In the past year have you fallen or experienced a near fall? Yes   Do you need help using the phone?  No   Are you deaf or do you have serious difficulty hearing?  No   Do you need  help with transportation? No   Do you need help shopping? No   Do you need help preparing meals?  No   Do you need help with housework?  No   Do you need help with laundry? No   Do you need help taking your medications? No   Do you need help managing money? No   Do you have difficulty concentrating, remembering or making decisions? Yes              Does the patient have evidence of cognitive impairment? No    Asprin use counseling:There is no clinical data to suggest aspirin is helpful as primary prophylaxis in a patient over 70 result.    Finger Rub Hearing Test (right ear):passed  Finger Rub Hearing Test (left ear):passed    Recent Lab Results:  CMP:  Lab Results   Component Value Date     (H) 08/11/2016    BUN 19 08/11/2016    CREATININE 1.01 (H) 08/11/2016    EGFRIFNONA 56 (L) 08/11/2016    EGFRIFAFRI 65 08/11/2016    BCR 19 08/11/2016     08/11/2016    K 4.2 08/11/2016    CO2 22 08/11/2016    CALCIUM 9.4 08/11/2016    PROTENTOTREF 7.0 08/11/2016    ALBUMIN 4.4 08/11/2016    LABGLOBREF 2.6 08/11/2016    LABIL2 1.7 08/11/2016    BILITOT 0.3 08/11/2016    ALKPHOS 93 08/11/2016    AST 17 08/11/2016    ALT 15 08/11/2016     Lipid Panel:  Lab Results   Component Value Date    CHLPL 191 08/11/2016    TRIG 121 08/11/2016    HDL 67 08/11/2016    VLDL 24 08/11/2016     (H) 08/11/2016     LDL:     HbA1c:     Urine Microalbumin:     Visual Acuity:  No exam data present    Age-appropriate Screening Schedule:  Refer to the list below for future screening recommendations based on patient's age, sex and/or medical conditions. Orders for these recommended tests are listed in the plan section. The patient has been provided with a written plan.    Health Maintenance   Topic Date Due   • LIPID PANEL  08/11/2017   • MAMMOGRAM  12/01/2017   • DXA SCAN  02/15/2019   • COLONOSCOPY  11/17/2024   • TDAP/TD VACCINES (2 - Td) 09/30/2025   • INFLUENZA VACCINE  Completed   • PNEUMOCOCCAL VACCINES (65+ LOW/MEDIUM RISK)   masses  CV: RRR, no m/r/g, no LE edema  LUNGS: CTAB, no w/r/c  ABD: soft, NT, ND, NBS  SKIN: no rashes  MSK; no gross deformities, normal joints  NEURO: A+Ox3, no FND  PSYCH: appropriate mood, affect      Inpatient Medications:  aspirin, 81 mg, oral, Daily  [Held by provider] atorvastatin, 40 mg, oral, Daily  cefTRIAXone, 2 g, intravenous, q12h  heparin (porcine), 7,500 Units, subcutaneous, q8h ALVIN  melatonin, 5 mg, oral, Daily  metoprolol succinate XL, 50 mg, oral, Daily  nystatin, , Topical, BID  pantoprazole, 40 mg, oral, Daily    PRN Medications  PRN medications: acetaminophen **OR** acetaminophen **OR** acetaminophen, acetaminophen, benzocaine-menthol, benzonatate, guaiFENesin, ipratropium-albuteroL, lidocaine, ondansetron **OR** ondansetron  Continuous Medications:       LABS AND IMAGING:     Labs:  Results from last 7 days   Lab Units 03/22/25  0649 03/21/25  0627 03/20/25  0553   WBC AUTO x10*3/uL 11.9* 16.1* 24.2*   RBC AUTO x10*6/uL 3.04* 3.39* 3.31*   HEMOGLOBIN g/dL 9.3* 10.3* 10.2*   HEMATOCRIT % 26.8* 29.9* 29.2*   MCV fL 88 88 88   MCH pg 30.6 30.4 30.8   MCHC g/dL 34.7 34.4 34.9   RDW % 15.7* 15.6* 15.4*   PLATELETS AUTO x10*3/uL 277 288 245     Results from last 7 days   Lab Units 03/23/25  0442 03/22/25  0650 03/21/25  0627   SODIUM mmol/L 139 137 137   POTASSIUM mmol/L 3.0* 4.8 3.9   CHLORIDE mmol/L 105 105 107   CO2 mmol/L 26 24 21   BUN mg/dL 17 16 19   CREATININE mg/dL 0.94 0.98 1.21*   GLUCOSE mg/dL 110* 117* 110*   PROTEIN TOTAL g/dL 6.2* 6.2* 6.5   CALCIUM mg/dL 8.2* 8.0* 8.3*   BILIRUBIN TOTAL mg/dL 0.5 0.5 0.5   ALK PHOS U/L 281* 232* 243*   AST U/L 46* 53* 56*   ALT U/L 54* 52* 53*     Results from last 7 days   Lab Units 03/20/25  0553 03/19/25  0602 03/18/25  0546   MAGNESIUM mg/dL 2.04 1.89 1.39*     Results from last 7 days   Lab Units 03/19/25  1441 03/19/25  0602   TROPHS ng/L 79* 105*     Imaging:  CT abdomen pelvis w IV contrast  Narrative: Interpreted By:  Bebo Negrete,    STUDY:  CT ABDOMEN PELVIS W IV CONTRAST;  3/23/2025 1:47 pm      INDICATION:  Signs/Symptoms:proteus bacteremia, persistent elevation LFTs, r/o  liver involvement.      COMPARISON:  03/18/2025 right upper quadrant abdominal ultrasound. No prior  abdominal or pelvic CT.      ACCESSION NUMBER(S):  [id number]      ORDERING CLINICIAN:  SROAIDA STERN      TECHNIQUE:  CT of the abdomen and pelvis was performed.  Standard contiguous  axial images were obtained at 3 mm slice thickness through the  abdomen and pelvis. Coronal and sagittal reconstructions at 3 mm  slice thickness were performed.  75 ML of Omnipaque 350 was  administered intravenously without immediate complication.      FINDINGS:  LOWER CHEST:  Mild right lower lobe subsegmental atelectasis or scarring.      ABDOMEN:      LIVER:  The liver is normal in size without evidence of focal lesions.      BILE DUCTS:  The intrahepatic and extrahepatic ducts are not dilated.      GALLBLADDER:  Cholecystectomy clips.      PANCREAS:  Within normal limits.      SPLEEN:  Normal size and homogeneous.      ADRENAL GLANDS:  Within normal limits.      KIDNEYS AND URETERS:  10 mm axial diameter and 18 mm tall very proximal (superior) left  ureteral calcification at the L3 level. Mild left hydronephrosis  without perinephric stranding. Multiple calyceal and infundibular  lower left renal calcifications measure up to almost 1 cm. Normal  size kidneys without a mass or right hydroureteronephrosis.      PELVIS:      BLADDER:  Collapsed precluding evaluation for mucosal lesions. No calcified  stone.      REPRODUCTIVE ORGANS:  Status post hysterectomy. The ovaries can not be identified.      BOWEL:  The stomach is unremarkable.   Moderate amount of stool without bowel  distention. Almost gasless small bowel. Normal retrocecal appendix.      VESSELS:  The aorta and IVC appear normal. Minimal vascular calcification.      PERITONEUM/RETROPERITONEUM/LYMPH NODES:  No ascites, free  Completed   • ZOSTER VACCINE  Completed        Subjective   History of Present Illness    Neela Ramirez is a 71 y.o. female who presents for an Subsequent Wellness Visit. In addition, we addressed the following health issues: Hypertension, hyperlipidemia, reflux, back pain.    The following portions of the patient's history were reviewed and updated as appropriate: allergies, current medications, past family history, past medical history, past social history, past surgical history and problem list.    Outpatient Medications Prior to Visit   Medication Sig Dispense Refill   • amLODIPine (NORVASC) 5 MG tablet TAKE ONE TABLET BY MOUTH ONCE DAILY 90 tablet 1   • desoximetasone (TOPICORT) 0.25 % ointment 1 application 2 (two) times a day as needed.     • escitalopram (LEXAPRO) 20 MG tablet TAKE ONE TABLET BY MOUTH ONCE DAILY 90 tablet 1   • metoprolol succinate XL (TOPROL-XL) 25 MG 24 hr tablet TAKE ONE TABLET BY MOUTH ONCE DAILY 90 tablet 1   • omeprazole (PriLOSEC) 20 MG capsule TAKE ONE CAPSULE BY MOUTH ONCE DAILY IN THE MORNING BEFORE  BREAKFAST 30 capsule 0   • rosuvastatin (CRESTOR) 5 MG tablet Take 1 tablet by mouth daily.     • calcium carbonate-vitamin d 600-400 MG-UNIT per tablet Take by mouth.     • Multiple Vitamins-Minerals (CENTRUM SILVER ADULT 50+) tablet Take 1 tablet by mouth daily.       No facility-administered medications prior to visit.        Patient Active Problem List   Diagnosis   • Anxiety   • DD (diverticular disease)   • Gastroesophageal reflux disease   • Essential hypertension   • Hyperlipidemia   • Osteopenia   • Bilateral low back pain without sciatica   • Medicare annual wellness visit, subsequent       Identification of Risk Factors:  Risk factors include: None.    Review of Systems    Compared to one year ago, the patient feels her physical health is the same.  Compared to one year ago, the patient feels her mental health is the same.    Objective     Physical Exam    Vitals:     "02/15/17 0853   BP: 144/94   Pulse: 107   Temp: 97.6 °F (36.4 °C)   Weight: 136 lb 3.2 oz (61.8 kg)   Height: 61.5\" (156.2 cm)   PainSc:   4   PainLoc: Back       Body mass index is 25.32 kg/(m^2).  Discussed the patient's BMI with her. The BMI is in the acceptable range.    Assessment/Plan   Patient Self-Management and Personalized Health Advice  The patient has been provided with information about: None and preventive services including:   · Advanced directives: has an advanced directive - a copy HAS NOT been provided, Bone densitometry screening, Diabetes screening, see lab orders, Pneumococcal vaccine .    Visit Diagnoses:    ICD-10-CM ICD-9-CM   1. Medicare annual wellness visit, subsequent Z00.00 V70.0   2. Essential hypertension I10 401.9   3. Hyperlipidemia, unspecified hyperlipidemia type E78.5 272.4   4. Gastroesophageal reflux disease without esophagitis K21.9 530.81   5. Acute bilateral low back pain without sciatica M54.5 724.2     338.19   6. 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 Encounter for immunization Z23 V03.89       Orders Placed This Encounter   Procedures   • DEXA Bone Density Axial     Order Specific Question:   Reason for Exam:     Answer:   Ovarian failure   • Pneumococcal Conjugate Vaccine 13-Valent All       Outpatient Encounter Prescriptions as of 2/15/2017   Medication Sig Dispense Refill   • amLODIPine (NORVASC) 5 MG tablet TAKE ONE TABLET BY MOUTH ONCE DAILY 90 tablet 1   • desoximetasone (TOPICORT) 0.25 % ointment 1 application 2 (two) times a day as needed.     • escitalopram (LEXAPRO) 20 MG tablet TAKE ONE TABLET BY MOUTH ONCE DAILY 90 tablet 1   • metoprolol succinate XL (TOPROL-XL) 25 MG 24 hr tablet TAKE ONE TABLET BY MOUTH ONCE DAILY 90 tablet 1   • omeprazole (PriLOSEC) 20 MG capsule TAKE ONE CAPSULE BY MOUTH ONCE DAILY IN THE MORNING BEFORE  BREAKFAST 30 capsule 0   • rosuvastatin (CRESTOR) 5 MG tablet Take 1 tablet by mouth daily.     • [DISCONTINUED] calcium " air or fluid collection.  No abdominopelvic  lymphadenopathy.      BONES AND ABDOMINAL WALL:  Marked, extensive spinal degenerative changes include very severe  bilateral hypertrophic L4-5 facet joint DJD accounting for minimal  spondylolisthesis at this level. Slight scoliosis. Bilateral  sacroiliac joint DJD. Diastasis recti.      Impression: 1. 10 x 10 x 18 mm proximal left ureteral calculus with mild left  hydronephrosis.  2. Left renal calculi.      MACRO:  None      Signed by: Bebo Negrete 3/23/2025 3:11 PM  Dictation workstation:   [id number]        carbonate-vitamin d 600-400 MG-UNIT per tablet Take by mouth.     • [DISCONTINUED] Multiple Vitamins-Minerals (CENTRUM SILVER ADULT 50+) tablet Take 1 tablet by mouth daily.       No facility-administered encounter medications on file as of 2/15/2017.        Reviewed use of high risk medication in the elderly: yes  Reviewed for potential of harmful drug interactions in the elderly: yes    Follow Up:  1 YEAR    An After Visit Summary and PPPS with all of these plans were given to the patient.

## 2025-03-29 DIAGNOSIS — I10 ESSENTIAL HYPERTENSION: ICD-10-CM

## 2025-03-30 RX ORDER — AMLODIPINE BESYLATE 5 MG/1
5 TABLET ORAL DAILY
Qty: 90 TABLET | Refills: 0 | Status: SHIPPED | OUTPATIENT
Start: 2025-03-30

## 2025-03-31 ENCOUNTER — OFFICE VISIT (OUTPATIENT)
Dept: INTERNAL MEDICINE | Age: 80
End: 2025-03-31
Payer: MEDICARE

## 2025-03-31 VITALS
TEMPERATURE: 97 F | SYSTOLIC BLOOD PRESSURE: 130 MMHG | HEIGHT: 61 IN | DIASTOLIC BLOOD PRESSURE: 74 MMHG | HEART RATE: 68 BPM | BODY MASS INDEX: 24.84 KG/M2 | OXYGEN SATURATION: 98 % | WEIGHT: 131.6 LBS | RESPIRATION RATE: 16 BRPM

## 2025-03-31 DIAGNOSIS — R73.01 IMPAIRED FASTING BLOOD SUGAR: ICD-10-CM

## 2025-03-31 DIAGNOSIS — E78.5 HYPERLIPIDEMIA, UNSPECIFIED HYPERLIPIDEMIA TYPE: Primary | ICD-10-CM

## 2025-03-31 DIAGNOSIS — E55.9 VITAMIN D DEFICIENCY: ICD-10-CM

## 2025-03-31 DIAGNOSIS — I10 ESSENTIAL HYPERTENSION: ICD-10-CM

## 2025-03-31 DIAGNOSIS — E03.9 HYPOTHYROIDISM, UNSPECIFIED TYPE: ICD-10-CM

## 2025-03-31 DIAGNOSIS — F41.9 ANXIETY: ICD-10-CM

## 2025-03-31 NOTE — PROGRESS NOTES
I N T E R N A L  M E D I C I N E  JULEE ALEMAN APRMIKE      ENCOUNTER DATE:  03/31/2025    Neela Ramirez / 79 y.o. / female      CHIEF COMPLAINT / REASON FOR OFFICE VISIT     Hypertension, Back Pain, Hyperlipidemia, and Memory Loss      ASSESSMENT & PLAN     Problem List Items Addressed This Visit       Anxiety    Relevant Medications    escitalopram (LEXAPRO) 20 MG tablet    Essential hypertension    Relevant Medications    metoprolol succinate XL (TOPROL-XL) 25 MG 24 hr tablet    amLODIPine (NORVASC) 5 MG tablet    Other Relevant Orders    Comprehensive Metabolic Panel    CBC & Differential    Urinalysis With Culture If Indicated -    Hyperlipidemia - Primary    Relevant Medications    rosuvastatin (CRESTOR) 10 MG tablet    Other Relevant Orders    Lipid Panel With / Chol / HDL Ratio    Hypothyroidism    Relevant Medications    levothyroxine (SYNTHROID, LEVOTHROID) 25 MCG tablet    metoprolol succinate XL (TOPROL-XL) 25 MG 24 hr tablet    Other Relevant Orders    TSH+Free T4     Other Visit Diagnoses         Vitamin D deficiency        Relevant Orders    Vitamin D,25-Hydroxy      Impaired fasting blood sugar        Relevant Orders    Hemoglobin A1c          Orders Placed This Encounter   Procedures    Comprehensive Metabolic Panel    Lipid Panel With / Chol / HDL Ratio    Hemoglobin A1c    TSH+Free T4    Urinalysis With Culture If Indicated -    Vitamin D,25-Hydroxy    CBC & Differential     No orders of the defined types were placed in this encounter.      SUMMARY/DISCUSSION  Would recommend yearly memory testing, mild short-term memory loss only.  Continue current medication regimen for anxiety, hypertension hyperlipidemia and hypothyroidism.  Continue all specialty management.      Next Appointment with me: Visit date not found    Return in about 6 months (around 9/30/2025) for Next scheduled follow up.      VITAL SIGNS     Visit Vitals  /74   Pulse 68   Temp 97 °F (36.1 °C) (Temporal)   Resp 16   Ht  "154.9 cm (60.98\")   Wt 59.7 kg (131 lb 9.6 oz)   SpO2 98%   BMI 24.88 kg/m²       Wt Readings from Last 3 Encounters:   03/31/25 59.7 kg (131 lb 9.6 oz)   03/07/25 59.1 kg (130 lb 4.8 oz)   11/25/24 58 kg (127 lb 13.6 oz)     Body mass index is 24.88 kg/m².      MEDICATIONS AT THE TIME OF OFFICE VISIT     Current Outpatient Medications on File Prior to Visit   Medication Sig    amLODIPine (NORVASC) 5 MG tablet TAKE 1 TABLET BY MOUTH DAILY    anastrozole (ARIMIDEX) 1 MG tablet Take 1 tablet by mouth Daily.    Cholecalciferol (Vitamin D) 50 MCG (2000 UT) capsule Take  by mouth.    cyclobenzaprine (FLEXERIL) 5 MG tablet Take 1 tablet by mouth three times daily as needed for muscle spasm (Patient taking differently: Take 1 tablet by mouth 3 (Three) Times a Day As Needed for Muscle Spasms.)    desoximetasone (TOPICORT) 0.25 % ointment Apply 1 Application topically to the appropriate area as directed 2 (Two) Times a Day As Needed (TO IRRITATED AREA).    erythromycin (ROMYCIN) 5 MG/GM ophthalmic ointment     escitalopram (LEXAPRO) 20 MG tablet TAKE 1 TABLET BY MOUTH DAILY    levothyroxine (SYNTHROID, LEVOTHROID) 25 MCG tablet TAKE 1 TABLET BY MOUTH EVERY MORNING 30 MINUTES BEFORE BREAKFAST OR ANY OTHER MEDICATION    metoprolol succinate XL (TOPROL-XL) 25 MG 24 hr tablet TAKE 1 TABLET BY MOUTH DAILY    mupirocin (BACTROBAN) 2 % ointment APPLY A SMALL AMOUNT OF OINTMENT TOPICALLY TO AFFECTED AREA EVERY NIGHT    omeprazole (priLOSEC) 20 MG capsule Take 1 capsule by mouth Daily.    ondansetron (Zofran) 8 MG tablet Take 1 tablet by mouth Every 8 (Eight) Hours As Needed for Nausea or Vomiting.    pantoprazole (PROTONIX) 40 MG EC tablet Take 1 tablet by mouth Daily.    rosuvastatin (CRESTOR) 10 MG tablet TAKE ONE TABLET BY MOUTH DAILY    vitamin D (ERGOCALCIFEROL) 1.25 MG (26092 UT) capsule capsule      No current facility-administered medications on file prior to visit.          HISTORY OF PRESENT ILLNESS     Hypertension stable " on amlodipine 5 mg metoprolol 25. Denies any chest pain, shortness of air, chest palpitations.      Hyperlipidemia Controlled with rosuvastatin 10 mg daily, last LDL 68. No myalgias with medication.     Hypothyroidism controlled with thyroxine 25 MCG daily. Last TSH and free T4 within normal. TSH 2.190.      Generalized anxiety well-controlled Lexapro 20 mg daily no panic attack or thoughts of suicide or self-harm.     Sees derm for seborrheic keratosis, melanocytic nevi, tinea unguium.      Saw cardiology May 2022 echocardiogram February 2023 normal.     Upper and lower GI completed October 2023.  Polyp found on colonoscopy.  Erythematous mucosa is in the stomach.  Mild inflammation on biopsy with no evidence of H. pylori, on PPI daily.  Colon polyp that was removed was not cancerous and not precancerous.  Gastroenterology deferring decision for repeat colonoscopy at age.     Patient was seen by  and Pam podiatrist for onychomycosis of toenails, pain in the left foot and hammertoe June 18, 2024.      Seen by [facility] oncology for breast cancer left-sided estrogen receptor positive.  At that time she was continued on anastrozole 1 mg daily.  Due for screening mammogram August 2025  and MRI February 2025 stable. Osteoporosis with [facility] through their care team.  Will need to switch her to tamoxifen if she needs vaginal estradiol.     Seen by Dr. Olmos ophthalmology for cataracts and presbyopia April 26, 2024.  Has upcoming appointment.     Seen by urology care team, Roxann BOJORQUEZ for microscopic hematuria, atrophic vaginitis, stress incontinence.  Seen February 2024.    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  Abnormal 26 or below.  Short-term memory was the only issue.     REVIEW OF SYSTEMS     Constitutional neg except per HPI   Resp neg  CV neg     PHYSICAL EXAMINATION     Physical Exam  Constitutional  No distress  Cardiovascular Rate   normal . Rhythm: regular . Heart sounds:  normal  Pulmonary/Chest  Effort normal. Breath sounds:  normal  Psychiatric  Alert. Judgment and thought content normal. Mood normal      REVIEWED DATA     Labs:   Lab Results   Component Value Date    GLUCOSE 101 (H) 03/07/2025    BUN 13 03/07/2025    CREATININE 0.85 03/07/2025    EGFR 69.8 03/07/2025    BCR 15.3 03/07/2025    K 3.9 03/07/2025    CO2 25.2 03/07/2025    CALCIUM 9.5 03/07/2025    ALBUMIN 4.3 03/07/2025    BILITOT 0.6 03/07/2025    AST 31 03/07/2025    ALT 21 03/07/2025     Lab Results   Component Value Date    WBC 7.92 03/07/2025    HGB 14.3 03/07/2025    HCT 44.7 03/07/2025    .3 (H) 03/07/2025     03/07/2025     Lab Results   Component Value Date    CHLPL 153 08/20/2024    TRIG 71 08/20/2024    HDL 71 (H) 08/20/2024    LDL 68 08/20/2024      Lab Results   Component Value Date    TSH 2.190 08/20/2024     Brief Urine Lab Results       None          Lab Results   Component Value Date    HGBA1C 5.40 02/15/2024       Imaging:           Medical Tests:           Summary of old records / correspondence / consultant report:           Request outside records:

## 2025-04-01 ENCOUNTER — RESULTS FOLLOW-UP (OUTPATIENT)
Dept: INTERNAL MEDICINE | Age: 80
End: 2025-04-01
Payer: MEDICARE

## 2025-04-01 LAB
25(OH)D3+25(OH)D2 SERPL-MCNC: 57.9 NG/ML (ref 30–100)
ALBUMIN SERPL-MCNC: 4.4 G/DL (ref 3.5–5.2)
ALBUMIN/GLOB SERPL: 1.8 G/DL
ALP SERPL-CCNC: 101 U/L (ref 39–117)
ALT SERPL-CCNC: 16 U/L (ref 1–33)
APPEARANCE UR: CLEAR
AST SERPL-CCNC: 24 U/L (ref 1–32)
BACTERIA #/AREA URNS HPF: NORMAL /[HPF]
BASOPHILS # BLD AUTO: 0.03 10*3/MM3 (ref 0–0.2)
BASOPHILS NFR BLD AUTO: 0.5 % (ref 0–1.5)
BILIRUB SERPL-MCNC: 0.6 MG/DL (ref 0–1.2)
BILIRUB UR QL STRIP: NEGATIVE
BUN SERPL-MCNC: 13 MG/DL (ref 8–23)
BUN/CREAT SERPL: 15.9 (ref 7–25)
CALCIUM SERPL-MCNC: 9.5 MG/DL (ref 8.6–10.5)
CASTS URNS QL MICRO: NORMAL /LPF
CHLORIDE SERPL-SCNC: 105 MMOL/L (ref 98–107)
CHOLEST SERPL-MCNC: 165 MG/DL (ref 0–200)
CHOLEST/HDLC SERPL: 2.26 {RATIO}
CO2 SERPL-SCNC: 25.6 MMOL/L (ref 22–29)
COLOR UR: YELLOW
CREAT SERPL-MCNC: 0.82 MG/DL (ref 0.57–1)
EGFRCR SERPLBLD CKD-EPI 2021: 72.9 ML/MIN/1.73
EOSINOPHIL # BLD AUTO: 0.07 10*3/MM3 (ref 0–0.4)
EOSINOPHIL NFR BLD AUTO: 1.1 % (ref 0.3–6.2)
EPI CELLS #/AREA URNS HPF: NORMAL /HPF (ref 0–10)
ERYTHROCYTE [DISTWIDTH] IN BLOOD BY AUTOMATED COUNT: 12.1 % (ref 12.3–15.4)
GLOBULIN SER CALC-MCNC: 2.5 GM/DL
GLUCOSE SERPL-MCNC: 89 MG/DL (ref 65–99)
GLUCOSE UR QL STRIP: NEGATIVE
HBA1C MFR BLD: 5.4 % (ref 4.8–5.6)
HCT VFR BLD AUTO: 42.3 % (ref 34–46.6)
HDLC SERPL-MCNC: 73 MG/DL (ref 40–60)
HGB BLD-MCNC: 14.4 G/DL (ref 12–15.9)
HGB UR QL STRIP: ABNORMAL
IMM GRANULOCYTES # BLD AUTO: 0.02 10*3/MM3 (ref 0–0.05)
IMM GRANULOCYTES NFR BLD AUTO: 0.3 % (ref 0–0.5)
KETONES UR QL STRIP: NEGATIVE
LDLC SERPL CALC-MCNC: 79 MG/DL (ref 0–100)
LEUKOCYTE ESTERASE UR QL STRIP: NEGATIVE
LYMPHOCYTES # BLD AUTO: 1.67 10*3/MM3 (ref 0.7–3.1)
LYMPHOCYTES NFR BLD AUTO: 26.1 % (ref 19.6–45.3)
MCH RBC QN AUTO: 34.2 PG (ref 26.6–33)
MCHC RBC AUTO-ENTMCNC: 34 G/DL (ref 31.5–35.7)
MCV RBC AUTO: 100.5 FL (ref 79–97)
MICRO URNS: ABNORMAL
MONOCYTES # BLD AUTO: 0.56 10*3/MM3 (ref 0.1–0.9)
MONOCYTES NFR BLD AUTO: 8.8 % (ref 5–12)
NEUTROPHILS # BLD AUTO: 4.05 10*3/MM3 (ref 1.7–7)
NEUTROPHILS NFR BLD AUTO: 63.2 % (ref 42.7–76)
NITRITE UR QL STRIP: NEGATIVE
NRBC BLD AUTO-RTO: 0 /100 WBC (ref 0–0.2)
PH UR STRIP: 5.5 [PH] (ref 5–7.5)
PLATELET # BLD AUTO: 242 10*3/MM3 (ref 140–450)
POTASSIUM SERPL-SCNC: 3.9 MMOL/L (ref 3.5–5.2)
PROT SERPL-MCNC: 6.9 G/DL (ref 6–8.5)
PROT UR QL STRIP: ABNORMAL
RBC # BLD AUTO: 4.21 10*6/MM3 (ref 3.77–5.28)
RBC #/AREA URNS HPF: NORMAL /HPF (ref 0–2)
SODIUM SERPL-SCNC: 141 MMOL/L (ref 136–145)
SP GR UR STRIP: 1.02 (ref 1–1.03)
T4 FREE SERPL-MCNC: 1.21 NG/DL (ref 0.92–1.68)
TRIGL SERPL-MCNC: 70 MG/DL (ref 0–150)
TSH SERPL DL<=0.005 MIU/L-ACNC: 1.86 UIU/ML (ref 0.27–4.2)
URINALYSIS REFLEX: ABNORMAL
UROBILINOGEN UR STRIP-MCNC: 0.2 MG/DL (ref 0.2–1)
VLDLC SERPL CALC-MCNC: 13 MG/DL (ref 5–40)
WBC # BLD AUTO: 6.4 10*3/MM3 (ref 3.4–10.8)
WBC #/AREA URNS HPF: NORMAL /HPF (ref 0–5)

## 2025-04-18 DIAGNOSIS — F41.9 ANXIETY: ICD-10-CM

## 2025-04-20 RX ORDER — ESCITALOPRAM OXALATE 20 MG/1
20 TABLET ORAL DAILY
Qty: 90 TABLET | Refills: 1 | Status: SHIPPED | OUTPATIENT
Start: 2025-04-20

## 2025-05-02 DIAGNOSIS — E78.5 HYPERLIPIDEMIA, UNSPECIFIED HYPERLIPIDEMIA TYPE: ICD-10-CM

## 2025-05-02 RX ORDER — ROSUVASTATIN CALCIUM 10 MG/1
10 TABLET, COATED ORAL DAILY
Qty: 90 TABLET | Refills: 3 | Status: SHIPPED | OUTPATIENT
Start: 2025-05-02

## 2025-06-14 DIAGNOSIS — E03.9 HYPOTHYROIDISM, UNSPECIFIED TYPE: ICD-10-CM

## 2025-06-15 RX ORDER — LEVOTHYROXINE SODIUM 25 UG/1
TABLET ORAL
Qty: 90 TABLET | Refills: 1 | Status: SHIPPED | OUTPATIENT
Start: 2025-06-15

## 2025-06-29 DIAGNOSIS — I10 ESSENTIAL HYPERTENSION: ICD-10-CM

## 2025-06-29 RX ORDER — AMLODIPINE BESYLATE 5 MG/1
5 TABLET ORAL DAILY
Qty: 90 TABLET | Refills: 0 | Status: SHIPPED | OUTPATIENT
Start: 2025-06-29

## 2025-08-22 ENCOUNTER — HOSPITAL ENCOUNTER (OUTPATIENT)
Dept: MAMMOGRAPHY | Facility: HOSPITAL | Age: 80
Discharge: HOME | End: 2025-08-22
Payer: MEDICARE

## 2025-08-22 ENCOUNTER — HOSPITAL ENCOUNTER (OUTPATIENT)
Dept: BONE DENSITY | Facility: HOSPITAL | Age: 80
Discharge: HOME | End: 2025-08-22
Payer: MEDICARE

## 2025-08-22 DIAGNOSIS — C50.912 MALIGNANT NEOPLASM OF LEFT BREAST IN FEMALE, ESTROGEN RECEPTOR POSITIVE, UNSPECIFIED SITE OF BREAST: ICD-10-CM

## 2025-08-22 DIAGNOSIS — M85.80 OSTEOPENIA, UNSPECIFIED LOCATION: ICD-10-CM

## 2025-08-22 DIAGNOSIS — Z17.0 MALIGNANT NEOPLASM OF LEFT BREAST IN FEMALE, ESTROGEN RECEPTOR POSITIVE, UNSPECIFIED SITE OF BREAST: ICD-10-CM

## 2025-08-22 DIAGNOSIS — Z12.31 ENCOUNTER FOR SCREENING MAMMOGRAM FOR MALIGNANT NEOPLASM OF BREAST: ICD-10-CM

## 2025-08-22 DIAGNOSIS — Z79.811 LONG TERM (CURRENT) USE OF AROMATASE INHIBITORS: ICD-10-CM

## 2025-08-22 PROCEDURE — 77080 DXA BONE DENSITY AXIAL: CPT

## 2025-08-26 ENCOUNTER — TELEPHONE (OUTPATIENT)
Dept: RADIATION ONCOLOGY | Facility: HOSPITAL | Age: 80
End: 2025-08-26
Payer: MEDICARE

## 2025-08-27 ENCOUNTER — OFFICE VISIT (OUTPATIENT)
Dept: RADIATION ONCOLOGY | Facility: HOSPITAL | Age: 80
End: 2025-08-27
Payer: MEDICARE

## 2025-08-27 VITALS
SYSTOLIC BLOOD PRESSURE: 127 MMHG | HEART RATE: 83 BPM | OXYGEN SATURATION: 99 % | RESPIRATION RATE: 14 BRPM | BODY MASS INDEX: 24.37 KG/M2 | WEIGHT: 129 LBS | DIASTOLIC BLOOD PRESSURE: 85 MMHG

## 2025-08-27 DIAGNOSIS — C50.912 MALIGNANT NEOPLASM OF LEFT BREAST IN FEMALE, ESTROGEN RECEPTOR POSITIVE, UNSPECIFIED SITE OF BREAST: Primary | ICD-10-CM

## 2025-08-27 DIAGNOSIS — Z17.0 MALIGNANT NEOPLASM OF LEFT BREAST IN FEMALE, ESTROGEN RECEPTOR POSITIVE, UNSPECIFIED SITE OF BREAST: Primary | ICD-10-CM

## 2025-08-27 PROCEDURE — G0463 HOSPITAL OUTPT CLINIC VISIT: HCPCS | Performed by: RADIOLOGY

## (undated) DEVICE — BLCK/BITE BLOX W/DENTL/RIM W/STRAP 54F

## (undated) DEVICE — GLV SURG BIOGEL LTX PF 6 1/2

## (undated) DEVICE — ANTIBACTERIAL UNDYED BRAIDED (POLYGLACTIN 910), SYNTHETIC ABSORBABLE SUTURE: Brand: COATED VICRYL

## (undated) DEVICE — TRAP FLD MINIVAC MEGADYNE 100ML

## (undated) DEVICE — SYR LUERLOK 20CC BX/50

## (undated) DEVICE — PATIENT RETURN ELECTRODE, SINGLE-USE, CONTACT QUALITY MONITORING, ADULT, WITH 9FT CORD, FOR PATIENTS WEIGING OVER 33LBS. (15KG): Brand: MEGADYNE

## (undated) DEVICE — SOL NACL 0.9PCT 100ML SGL

## (undated) DEVICE — DECANT BG O JET

## (undated) DEVICE — ADAPT CLN BIOGUARD AIR/H2O DISP

## (undated) DEVICE — SENSR O2 OXIMAX FNGR A/ 18IN NONSTR

## (undated) DEVICE — TUBING, SUCTION, 1/4" X 10', STRAIGHT: Brand: MEDLINE

## (undated) DEVICE — LN SMPL CO2 SHTRM SD STREAM W/M LUER

## (undated) DEVICE — SINGLE-USE BIOPSY FORCEPS: Brand: RADIAL JAW 4

## (undated) DEVICE — STCKNT IMPERV 9X36IN STRL

## (undated) DEVICE — SYR LL TP 10ML STRL

## (undated) DEVICE — SUT MONOCRYL 4/0 PS2 27IN Y426H ETY426H

## (undated) DEVICE — SUT PROLN 2/0 SH 36IN 8523H

## (undated) DEVICE — NDL HYPO PRECISIONGLIDE REG 25G 1 1/2

## (undated) DEVICE — ADHS SKIN SURG TISS VISC PREMIERPRO EXOFIN HI/VISC FAST/DRY

## (undated) DEVICE — DRP C/ARM 41X74IN

## (undated) DEVICE — CANN O2 ETCO2 FITS ALL CONN CO2 SMPL A/ 7IN DISP LF

## (undated) DEVICE — SUT MNCRYL 4/0 PS2 18 IN

## (undated) DEVICE — INTENDED FOR TISSUE SEPARATION, AND OTHER PROCEDURES THAT REQUIRE A SHARP SURGICAL BLADE TO PUNCTURE OR CUT.: Brand: BARD-PARKER ® CARBON RIB-BACK BLADES

## (undated) DEVICE — SUT SILK 2/0 FS BLK 18IN 685G

## (undated) DEVICE — PK UNIV COMPL 40

## (undated) DEVICE — APPL CHLORAPREP HI/LITE 26ML ORNG

## (undated) DEVICE — ELECTRD BLD EZ CLN MOD XLNG 2.75IN

## (undated) DEVICE — LEGGINGS, PAIR, CLEAR, STERILE: Brand: MEDLINE

## (undated) DEVICE — PK PROC MINOR TOWER 40

## (undated) DEVICE — KT ORCA ORCAPOD DISP STRL

## (undated) DEVICE — PENCL E/S ULTRAVAC TELESCP NOSE HOLSTR 10FT

## (undated) DEVICE — CONTAINER,SPECIMEN,OR STERILE,4OZ: Brand: MEDLINE

## (undated) DEVICE — CVR PROB GEN PURP W ISOSILK 6X48